# Patient Record
Sex: MALE | Race: BLACK OR AFRICAN AMERICAN | NOT HISPANIC OR LATINO | Employment: OTHER | URBAN - METROPOLITAN AREA
[De-identification: names, ages, dates, MRNs, and addresses within clinical notes are randomized per-mention and may not be internally consistent; named-entity substitution may affect disease eponyms.]

---

## 2017-01-03 ENCOUNTER — LAB VISIT (OUTPATIENT)
Dept: LAB | Facility: HOSPITAL | Age: 67
End: 2017-01-03
Attending: INTERNAL MEDICINE
Payer: MEDICARE

## 2017-01-03 ENCOUNTER — TELEPHONE (OUTPATIENT)
Dept: NEPHROLOGY | Facility: CLINIC | Age: 67
End: 2017-01-03

## 2017-01-03 DIAGNOSIS — E87.20 ACIDOSIS: ICD-10-CM

## 2017-01-03 DIAGNOSIS — R80.9 PROTEINURIA: ICD-10-CM

## 2017-01-03 DIAGNOSIS — N18.4 CHRONIC KIDNEY DISEASE, STAGE IV (SEVERE): ICD-10-CM

## 2017-01-03 DIAGNOSIS — E21.3 HPTH (HYPERPARATHYROIDISM): ICD-10-CM

## 2017-01-03 DIAGNOSIS — I10 ESSENTIAL HYPERTENSION: ICD-10-CM

## 2017-01-03 LAB
ALBUMIN SERPL BCP-MCNC: 2.9 G/DL
ANION GAP SERPL CALC-SCNC: 15 MMOL/L
BASOPHILS # BLD AUTO: 0.01 K/UL
BASOPHILS NFR BLD: 0.1 %
BUN SERPL-MCNC: 101 MG/DL
CALCIUM SERPL-MCNC: 10.3 MG/DL
CHLORIDE SERPL-SCNC: 102 MMOL/L
CO2 SERPL-SCNC: 21 MMOL/L
CREAT SERPL-MCNC: 9.9 MG/DL
DIFFERENTIAL METHOD: ABNORMAL
EOSINOPHIL # BLD AUTO: 0.2 K/UL
EOSINOPHIL NFR BLD: 1.8 %
ERYTHROCYTE [DISTWIDTH] IN BLOOD BY AUTOMATED COUNT: 13.6 %
EST. GFR  (AFRICAN AMERICAN): 5.6 ML/MIN/1.73 M^2
EST. GFR  (NON AFRICAN AMERICAN): 4.8 ML/MIN/1.73 M^2
GLUCOSE SERPL-MCNC: 98 MG/DL
HCT VFR BLD AUTO: 32.8 %
HGB BLD-MCNC: 10.8 G/DL
LYMPHOCYTES # BLD AUTO: 1.4 K/UL
LYMPHOCYTES NFR BLD: 16.2 %
MCH RBC QN AUTO: 28.2 PG
MCHC RBC AUTO-ENTMCNC: 32.9 %
MCV RBC AUTO: 86 FL
MONOCYTES # BLD AUTO: 1.1 K/UL
MONOCYTES NFR BLD: 13 %
NEUTROPHILS # BLD AUTO: 5.8 K/UL
NEUTROPHILS NFR BLD: 68.7 %
PHOSPHATE SERPL-MCNC: 5.9 MG/DL
PLATELET # BLD AUTO: 179 K/UL
PMV BLD AUTO: 12.5 FL
POTASSIUM SERPL-SCNC: 3.8 MMOL/L
RBC # BLD AUTO: 3.83 M/UL
SODIUM SERPL-SCNC: 138 MMOL/L
WBC # BLD AUTO: 8.44 K/UL

## 2017-01-03 PROCEDURE — 36415 COLL VENOUS BLD VENIPUNCTURE: CPT | Mod: PO

## 2017-01-03 PROCEDURE — 85025 COMPLETE CBC W/AUTO DIFF WBC: CPT

## 2017-01-03 PROCEDURE — 80069 RENAL FUNCTION PANEL: CPT

## 2017-01-03 NOTE — TELEPHONE ENCOUNTER
Returned call to pt. To schedule appointment as requested, pt. States he wants the appointment for his son, advised to call the appt. Desk as I can not schedule an appointment with him for his son. Pt. Transferred.

## 2017-01-03 NOTE — TELEPHONE ENCOUNTER
----- Message from Elham Adrian sent at 1/3/2017  1:19 PM CST -----  Contact: pt  Pt calling to speak to nurse....states that needs to be referred to a Primary Care Dr for his gout....please adv/call pt back at [phone]///thx jw

## 2017-01-04 ENCOUNTER — TELEPHONE (OUTPATIENT)
Dept: NEPHROLOGY | Facility: HOSPITAL | Age: 67
End: 2017-01-04

## 2017-01-04 NOTE — PLAN OF CARE
Lab Results   Component Value Date    CREATININE 9.9 (H) 01/03/2017     (H) 01/03/2017     01/03/2017    K 3.8 01/03/2017     01/03/2017    CO2 21 (L) 01/03/2017     I have called the patient and told him that he needs to prepare for dialysis now.  I have answered all his questions about hemodialysis as well as transplantation.  He may be a good candidate for home hemodialysis.  We will start him on training for home hemodialysis if he is agreeable which he is agreeable on the phone.  He has had education in the past.  We will also continue to promote home dialysis in his life.  He is very well educated and he is a good candidate for our 3 modalities for renal replacement therapy including PD &  Transplantation.      Robson Bernstein MD

## 2017-01-24 ENCOUNTER — TELEPHONE (OUTPATIENT)
Dept: NEPHROLOGY | Facility: CLINIC | Age: 67
End: 2017-01-24

## 2017-01-24 NOTE — TELEPHONE ENCOUNTER
----- Message from Marta Webb sent at 1/24/2017  3:32 PM CST -----  Pt at [phone]//states he has question//he is suppose to have surgery//and your office wanted to change the date//please call to discuss//thanks/lula

## 2017-01-24 NOTE — TELEPHONE ENCOUNTER
Returned call to pt. States that Vascular had to reschedule his pd cath placement was given the option to do it on 1/31 or on 2/6 advised pt. That he should proceed with the surgery on 1/31 that way he will have the cath placed before his appt. With dr. pabon 2/8 pt. Verbalized understanding.

## 2017-02-01 ENCOUNTER — LAB VISIT (OUTPATIENT)
Dept: LAB | Facility: HOSPITAL | Age: 67
End: 2017-02-01
Attending: INTERNAL MEDICINE
Payer: MEDICARE

## 2017-02-01 DIAGNOSIS — I10 ESSENTIAL HYPERTENSION: ICD-10-CM

## 2017-02-01 DIAGNOSIS — N18.4 CHRONIC KIDNEY DISEASE, STAGE IV (SEVERE): ICD-10-CM

## 2017-02-01 DIAGNOSIS — R80.9 PROTEINURIA: ICD-10-CM

## 2017-02-01 DIAGNOSIS — E87.20 ACIDOSIS: ICD-10-CM

## 2017-02-01 DIAGNOSIS — E21.3 HPTH (HYPERPARATHYROIDISM): ICD-10-CM

## 2017-02-01 LAB
ALBUMIN SERPL BCP-MCNC: 2.9 G/DL
ANION GAP SERPL CALC-SCNC: 16 MMOL/L
BASOPHILS # BLD AUTO: 0.01 K/UL
BASOPHILS NFR BLD: 0.2 %
BUN SERPL-MCNC: 95 MG/DL
CALCIUM SERPL-MCNC: 9.2 MG/DL
CHLORIDE SERPL-SCNC: 104 MMOL/L
CO2 SERPL-SCNC: 18 MMOL/L
CREAT SERPL-MCNC: 9.9 MG/DL
DIFFERENTIAL METHOD: ABNORMAL
EOSINOPHIL # BLD AUTO: 0.4 K/UL
EOSINOPHIL NFR BLD: 6.2 %
ERYTHROCYTE [DISTWIDTH] IN BLOOD BY AUTOMATED COUNT: 14.3 %
EST. GFR  (AFRICAN AMERICAN): 5.6 ML/MIN/1.73 M^2
EST. GFR  (NON AFRICAN AMERICAN): 4.8 ML/MIN/1.73 M^2
GLUCOSE SERPL-MCNC: 102 MG/DL
HCT VFR BLD AUTO: 32.5 %
HGB BLD-MCNC: 10.9 G/DL
LYMPHOCYTES # BLD AUTO: 1.7 K/UL
LYMPHOCYTES NFR BLD: 28.6 %
MCH RBC QN AUTO: 28.8 PG
MCHC RBC AUTO-ENTMCNC: 33.5 %
MCV RBC AUTO: 86 FL
MONOCYTES # BLD AUTO: 0.6 K/UL
MONOCYTES NFR BLD: 10.8 %
NEUTROPHILS # BLD AUTO: 3.1 K/UL
NEUTROPHILS NFR BLD: 53.9 %
PHOSPHATE SERPL-MCNC: 6 MG/DL
PLATELET # BLD AUTO: 190 K/UL
PMV BLD AUTO: 11.6 FL
POTASSIUM SERPL-SCNC: 3.7 MMOL/L
RBC # BLD AUTO: 3.79 M/UL
SODIUM SERPL-SCNC: 138 MMOL/L
WBC # BLD AUTO: 5.81 K/UL

## 2017-02-01 PROCEDURE — 80069 RENAL FUNCTION PANEL: CPT

## 2017-02-01 PROCEDURE — 85025 COMPLETE CBC W/AUTO DIFF WBC: CPT

## 2017-02-01 PROCEDURE — 36415 COLL VENOUS BLD VENIPUNCTURE: CPT | Mod: PO

## 2017-02-02 DIAGNOSIS — R53.1 WEAKNESS GENERALIZED: ICD-10-CM

## 2017-02-02 DIAGNOSIS — N18.5 CKD (CHRONIC KIDNEY DISEASE), STAGE 5: Primary | ICD-10-CM

## 2017-02-02 DIAGNOSIS — Z99.2 ENCOUNTER FOR PERITONEAL DIALYSIS: ICD-10-CM

## 2017-02-07 DIAGNOSIS — Z76.82 ORGAN TRANSPLANT CANDIDATE: Primary | ICD-10-CM

## 2017-02-07 NOTE — PROGRESS NOTES
YEARLY LIST MANAGEMENT NOTE    Alverto Ramirez's kidney transplant listing status reviewed.  Patient is due for follow-up appointments in March 2017.   Appointments will be scheduled per protocol.  HLA sample is past due at this time with sample last being received in November 2016.

## 2017-02-08 ENCOUNTER — OFFICE VISIT (OUTPATIENT)
Dept: NEPHROLOGY | Facility: CLINIC | Age: 67
End: 2017-02-08
Payer: MEDICARE

## 2017-02-08 ENCOUNTER — HOSPITAL ENCOUNTER (OUTPATIENT)
Dept: RADIOLOGY | Facility: HOSPITAL | Age: 67
Discharge: HOME | End: 2017-02-08
Attending: INTERNAL MEDICINE
Payer: MEDICARE

## 2017-02-08 VITALS
WEIGHT: 196.63 LBS | HEART RATE: 65 BPM | RESPIRATION RATE: 18 BRPM | DIASTOLIC BLOOD PRESSURE: 82 MMHG | SYSTOLIC BLOOD PRESSURE: 162 MMHG | BODY MASS INDEX: 30.35 KG/M2

## 2017-02-08 DIAGNOSIS — E87.20 ACIDOSIS: ICD-10-CM

## 2017-02-08 DIAGNOSIS — N18.9 ANEMIA IN CKD (CHRONIC KIDNEY DISEASE): ICD-10-CM

## 2017-02-08 DIAGNOSIS — D63.1 ANEMIA IN CKD (CHRONIC KIDNEY DISEASE): ICD-10-CM

## 2017-02-08 DIAGNOSIS — I10 ESSENTIAL HYPERTENSION: ICD-10-CM

## 2017-02-08 DIAGNOSIS — N18.5 CKD (CHRONIC KIDNEY DISEASE), STAGE 5: ICD-10-CM

## 2017-02-08 DIAGNOSIS — N18.5 CKD (CHRONIC KIDNEY DISEASE), STAGE 5: Primary | ICD-10-CM

## 2017-02-08 DIAGNOSIS — R80.1 PERSISTENT PROTEINURIA: ICD-10-CM

## 2017-02-08 PROCEDURE — 99215 OFFICE O/P EST HI 40 MIN: CPT | Mod: S$PBB,,, | Performed by: INTERNAL MEDICINE

## 2017-02-08 PROCEDURE — 71020 XR CHEST PA AND LATERAL: CPT | Mod: 26,,, | Performed by: RADIOLOGY

## 2017-02-08 PROCEDURE — 99999 PR PBB SHADOW E&M-EST. PATIENT-LVL III: CPT | Mod: PBBFAC,,, | Performed by: INTERNAL MEDICINE

## 2017-02-08 PROCEDURE — 71020 XR CHEST PA AND LATERAL: CPT | Mod: TC,PO

## 2017-02-08 RX ORDER — LOSARTAN POTASSIUM 100 MG/1
100 TABLET ORAL DAILY
COMMUNITY
End: 2017-05-09

## 2017-02-08 RX ORDER — AMLODIPINE BESYLATE 10 MG/1
10 TABLET ORAL DAILY
Qty: 90 TABLET | Refills: 8 | Status: SHIPPED | OUTPATIENT
Start: 2017-02-08 | End: 2018-04-13 | Stop reason: SDUPTHER

## 2017-02-08 RX ORDER — CALCIUM ACETATE 667 MG/1
1334 TABLET ORAL
Qty: 180 TABLET | Refills: 3 | Status: SHIPPED | OUTPATIENT
Start: 2017-02-08 | End: 2017-05-09

## 2017-02-08 RX ORDER — METOPROLOL TARTRATE 50 MG/1
50 TABLET ORAL DAILY
Qty: 90 TABLET | Refills: 3 | Status: SHIPPED | OUTPATIENT
Start: 2017-02-08 | End: 2018-02-20 | Stop reason: SDUPTHER

## 2017-02-08 NOTE — PROGRESS NOTES
Subjective:       Patient ID: Alverto Ramirez Jr. is a 66 y.o. Black or  male who presents for follow up evaluation of Chronic Kidney Disease; Hypertension; Proteinuria; and hyperparathyroid of renal origin    Hypertension   Pertinent negatives include no chest pain, headaches, neck pain, palpitations or shortness of breath.   Anemia   There has been no abdominal pain, confusion, fever or palpitations.   Hematuria   Irritative symptoms do not include frequency or urgency. Pertinent negatives include no abdominal pain, chills, dysuria, fever, flank pain, nausea or vomiting.    Mr. Ramirez is a 66 y.o. year old Black or  male who has presented to be evaluated for follow up .  He has advanced kidney disease secondary to HTN. 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 He refused a kidney biopsy for many years due to anxiety. His GFR was 34 in January 2014; 2/2015  it is reported at 15.5. He denies any uremic symptoms. Patient is currently pre-dialysis. He has 2 failed surgeries for  AV fistula for dialysis access ( Dr. Nascimento); [facility] -[facility] 11/2014     He states that he was diagnosed with HTN in his 40s. He states that his BP has never been normal at home. He recently had an adjustment in his medications.     He has history of monoclonal gammopathy . He is followed by hematology.     His PSA is elevated  at 4.2. His brother and uncle had prostate cancer. He has history of hematuria    He has off-and-on swelling of the legs; he has heavy proteinuria with 4.2 g per 24 hours; he has never been anemic    3/2015 on active transplant list ;     4/2015  Left arm BVT ; PSA down to 3.6    6/2015 walking and exercising ; lisinopril increased to 40 mg twice a day for proteinuria    August 2015 ultrasound of the kidney so stable examination    September 2015 patient comes back for follow-up with a creatinine of 5.2 and  bicarbonate of 16; increase calcitriol to 0.5 µg, added  sodium bicarbonate for acidosis, lisinopril increased to 40 mg twice a day    1/2016 Nuclear scan for stress test negative     2/2016 Echo showed LVEF 60 % no diastolic dysfunction     4/2016 Lip swelling ACEI stopped started cozaar; added phoslo for High PO4 and NaHCO3 for acidosis ( in place of baking soda)    10/2016 PCP ordered cardizem which was not started due to bradycardia on beta blocker     2/2017 PD cath placed dr. Guaman     Review of Systems   Constitutional: Negative.  Negative for activity change, appetite change, chills, diaphoresis, fatigue and fever.   HENT: Negative.  Negative for congestion and trouble swallowing.    Eyes: Negative.    Respiratory: Negative.  Negative for cough, chest tightness, shortness of breath and wheezing.    Cardiovascular: Negative.  Negative for chest pain, palpitations and leg swelling.   Gastrointestinal: Negative.  Negative for abdominal distention, abdominal pain, nausea and vomiting.   Genitourinary: Positive for hematuria. Negative for decreased urine volume, difficulty urinating, dysuria, enuresis, flank pain, frequency, penile swelling, scrotal swelling and urgency.   Musculoskeletal: Negative.  Negative for arthralgias, back pain, joint swelling and neck pain.   Skin: Negative for rash.   Neurological: Negative.  Negative for tremors, seizures and headaches.   Psychiatric/Behavioral: Negative.  Negative for confusion and sleep disturbance. The patient is not nervous/anxious.        Objective:     Visit Vitals    BP (!) 162/82 (BP Location: Right arm, Patient Position: Sitting, BP Method: Manual)    Pulse 65    Resp 18    Wt 89.2 kg (196 lb 10.4 oz)    BMI 30.35 kg/m2        Physical Exam   Constitutional: He is oriented to person, place, and time. He appears well-developed and well-nourished. No distress.   HENT:   Head: Normocephalic.   Eyes: EOM are normal. Pupils are equal, round, and reactive to light.   Neck: Normal range of motion. Neck supple. No  JVD present. No thyromegaly present.   Cardiovascular: Normal rate, regular rhythm, S1 normal, S2 normal, normal heart sounds and intact distal pulses.  PMI is not displaced.  Exam reveals no gallop and no friction rub.    No murmur heard.  S4 gallop   Pulmonary/Chest: Effort normal and breath sounds normal. No respiratory distress. He has no wheezes. He has no rales. He exhibits no tenderness.   Abdominal: He exhibits no distension and no mass. There is no hepatosplenomegaly. There is no tenderness. There is no rebound and no CVA tenderness. No hernia.   PD cath in left side intact mild tunnel tenderness no abscess   Musculoskeletal: Normal range of motion. He exhibits no edema or tenderness.   Scars in the left forearm from previous surgery; New LUAF is mature and ready with + B/T      Lymphadenopathy:     He has no cervical adenopathy.   Neurological: He is alert and oriented to person, place, and time. He has normal reflexes. He is not disoriented. No cranial nerve deficit. He exhibits normal muscle tone. Coordination normal.   Skin: Skin is warm and dry. No rash noted. No erythema. No pallor.   Psychiatric: He has a normal mood and affect. His behavior is normal. Judgment and thought content normal.         Lab Results   Component Value Date    CREATININE 9.9 (H) 02/01/2017    BUN 95 (H) 02/01/2017     02/01/2017    K 3.7 02/01/2017     02/01/2017    CO2 18 (L) 02/01/2017     Lab Results   Component Value Date    WBC 5.81 02/01/2017    HGB 10.9 (L) 02/01/2017    HCT 32.5 (L) 02/01/2017    MCV 86 02/01/2017     02/01/2017     Lab Results   Component Value Date    PTH 93.0 (H) 12/15/2016    CALCIUM 9.2 02/01/2017    PHOS 6.0 (H) 02/01/2017     Lab Results   Component Value Date    URICACID 9.5 (H) 06/25/2015     Urine protein 5.73 g /24 HOURS     Vitamin D level is 17    GFR is 17% according to Cystatin C as of October 2016    Assessment:    )    1. CKD (chronic kidney disease), stage 5    2.  Anemia in CKD (chronic kidney disease)    3. Essential hypertension    4. Persistent proteinuria    5. Acidosis        Plan:         1.  Chronic kidney disease stage V : hypertensive nephropathy with heavy proteinuria. Left arm BVT by dr. Nelson  Ready to use ; PD is also ready now ; on active transplant list. Slowly worsening but no uremia and no HD yet. Rapid rise in creatinine ___Hold cozaar and restart once starts PD in next 4 weeks     2.  Hypertension: controlled at home --restart NOravsc Hold cozaar     3.  Hyperparathyroidism:keep calcitriol to 0.5 mg once a day ; corrected calcium is ok ;  on ergocalciferol weekly,   keep phoslo for higher PO4 with each meal     4.  Nephrotic range proteinuria most likely from hypertensive nephropathy is ideal blood pressure would be 125 to 130 systolic.  stop  Arb NOW     5. Low albumin : diet options d/w patient in detail     6. Intertrigo:lotrisone cream working well     7. +eric feedback for exercise  ; Vince Prn #10 ; keep walking ; ok to take Tonic Water for leg cramps if any     8.  Metabolic acidosis: Getting worse with time. will need dialysis soon; will stop once start PD     9. On Transplant list     10. LUAF is access ready to use ; No steal           Robson Bernstein MD

## 2017-03-10 ENCOUNTER — CLINICAL SUPPORT (OUTPATIENT)
Dept: CARDIOLOGY | Facility: CLINIC | Age: 67
End: 2017-03-10
Payer: MEDICARE

## 2017-03-10 ENCOUNTER — HOSPITAL ENCOUNTER (OUTPATIENT)
Dept: RADIOLOGY | Facility: HOSPITAL | Age: 67
Discharge: HOME | End: 2017-03-10
Attending: INTERNAL MEDICINE
Payer: MEDICARE

## 2017-03-10 DIAGNOSIS — Z76.82 ORGAN TRANSPLANT CANDIDATE: ICD-10-CM

## 2017-03-15 ENCOUNTER — HOSPITAL ENCOUNTER (OUTPATIENT)
Dept: RADIOLOGY | Facility: HOSPITAL | Age: 67
Discharge: HOME | End: 2017-03-15
Attending: INTERNAL MEDICINE
Payer: MEDICARE

## 2017-03-15 ENCOUNTER — CLINICAL SUPPORT (OUTPATIENT)
Dept: CARDIOLOGY | Facility: CLINIC | Age: 67
End: 2017-03-15
Payer: MEDICARE

## 2017-03-15 DIAGNOSIS — Z99.2 CKD (CHRONIC KIDNEY DISEASE) REQUIRING CHRONIC DIALYSIS: ICD-10-CM

## 2017-03-15 DIAGNOSIS — I10 ESSENTIAL HYPERTENSION: ICD-10-CM

## 2017-03-15 DIAGNOSIS — N18.6 CKD (CHRONIC KIDNEY DISEASE) REQUIRING CHRONIC DIALYSIS: ICD-10-CM

## 2017-03-15 DIAGNOSIS — Z76.82 PRE-KIDNEY TRANSPLANT, PATIENT ON TRANSPLANT LIST: ICD-10-CM

## 2017-03-15 DIAGNOSIS — Z76.82 ORGAN TRANSPLANT CANDIDATE: ICD-10-CM

## 2017-03-15 DIAGNOSIS — Z01.810 ENCOUNTER FOR PREPROCEDURAL CARDIOVASCULAR EXAMINATION: ICD-10-CM

## 2017-03-15 DIAGNOSIS — Z76.82 ORGAN TRANSPLANT CANDIDATE: Primary | ICD-10-CM

## 2017-03-15 PROCEDURE — 93016 CV STRESS TEST SUPVJ ONLY: CPT | Mod: S$PBB,,, | Performed by: INTERNAL MEDICINE

## 2017-03-15 PROCEDURE — 78452 HT MUSCLE IMAGE SPECT MULT: CPT | Mod: 26,,, | Performed by: INTERNAL MEDICINE

## 2017-03-15 PROCEDURE — 93018 CV STRESS TEST I&R ONLY: CPT | Mod: S$PBB,,, | Performed by: INTERNAL MEDICINE

## 2017-03-15 PROCEDURE — 78452 HT MUSCLE IMAGE SPECT MULT: CPT | Mod: TC,PO

## 2017-03-16 LAB — DIASTOLIC DYSFUNCTION: NO

## 2017-03-16 RX ORDER — FUROSEMIDE 80 MG/1
80 TABLET ORAL DAILY
Qty: 90 TABLET | Refills: 3 | Status: SHIPPED | OUTPATIENT
Start: 2017-03-16 | End: 2017-05-17

## 2017-04-05 RX ORDER — GENTAMICIN SULFATE 1 MG/G
1 CREAM TOPICAL 3 TIMES DAILY
Qty: 15 G | Refills: 5 | Status: SHIPPED | OUTPATIENT
Start: 2017-04-05 | End: 2017-05-09

## 2017-05-09 ENCOUNTER — HOSPITAL ENCOUNTER (OUTPATIENT)
Dept: CARDIOLOGY | Facility: CLINIC | Age: 67
Discharge: HOME | End: 2017-05-09
Admitting: INTERNAL MEDICINE
Payer: MEDICARE

## 2017-05-09 ENCOUNTER — LAB VISIT (OUTPATIENT)
Dept: LAB | Facility: HOSPITAL | Age: 67
End: 2017-05-09
Payer: MEDICARE

## 2017-05-09 ENCOUNTER — OFFICE VISIT (OUTPATIENT)
Dept: TRANSPLANT | Facility: CLINIC | Age: 67
End: 2017-05-09
Payer: MEDICARE

## 2017-05-09 VITALS
BODY MASS INDEX: 32.35 KG/M2 | DIASTOLIC BLOOD PRESSURE: 84 MMHG | RESPIRATION RATE: 16 BRPM | SYSTOLIC BLOOD PRESSURE: 162 MMHG | HEART RATE: 84 BPM | OXYGEN SATURATION: 99 % | WEIGHT: 206.13 LBS | HEIGHT: 67 IN | TEMPERATURE: 98 F

## 2017-05-09 DIAGNOSIS — Z76.82 ORGAN TRANSPLANT CANDIDATE: ICD-10-CM

## 2017-05-09 DIAGNOSIS — I36.0 NONRHEUMATIC TRICUSPID (VALVE) STENOSIS: ICD-10-CM

## 2017-05-09 DIAGNOSIS — N18.6 ESRD (END STAGE RENAL DISEASE) ON DIALYSIS: ICD-10-CM

## 2017-05-09 DIAGNOSIS — E21.3 HPTH (HYPERPARATHYROIDISM): ICD-10-CM

## 2017-05-09 DIAGNOSIS — Z76.82 PATIENT ON WAITING LIST FOR KIDNEY TRANSPLANT: Primary | ICD-10-CM

## 2017-05-09 DIAGNOSIS — R97.20 ELEVATED PSA: ICD-10-CM

## 2017-05-09 DIAGNOSIS — I10 ESSENTIAL HYPERTENSION: ICD-10-CM

## 2017-05-09 DIAGNOSIS — Z99.2 ESRD (END STAGE RENAL DISEASE) ON DIALYSIS: ICD-10-CM

## 2017-05-09 DIAGNOSIS — F40.9 PHOBIC ANXIETY DISORDER: ICD-10-CM

## 2017-05-09 LAB
DIASTOLIC DYSFUNCTION: NO
ESTIMATED PA SYSTOLIC PRESSURE: 26.43
MITRAL VALVE MOBILITY: NORMAL
RETIRED EF AND QEF - SEE NOTES: 55 (ref 55–65)
TRICUSPID VALVE REGURGITATION: NORMAL

## 2017-05-09 PROCEDURE — 99214 OFFICE O/P EST MOD 30 MIN: CPT | Mod: S$PBB,TXP,, | Performed by: INTERNAL MEDICINE

## 2017-05-09 PROCEDURE — 93306 TTE W/DOPPLER COMPLETE: CPT | Mod: PBBFAC,TXP | Performed by: INTERNAL MEDICINE

## 2017-05-09 PROCEDURE — 99999 PR PBB SHADOW E&M-EST. PATIENT-LVL III: CPT | Mod: PBBFAC,TXP,, | Performed by: INTERNAL MEDICINE

## 2017-05-09 RX ORDER — ERGOCALCIFEROL 1.25 MG/1
50000 CAPSULE ORAL
COMMUNITY
End: 2018-09-16 | Stop reason: SDUPTHER

## 2017-05-09 NOTE — LETTER
May 9, 2017        Robson Bernstein  [address]  [place] [address]  Phone: [phone]  Fax: [phone]             [facility]  [address]  [facility] [address]  Phone: [phone]   Patient: Alverto Ramirez Jr.   MR Number: [id number]   YOB: 1950   Date of Visit: 5/9/2017       Dear Dr. Robson Bernstein    Thank you for referring Alverto Ramirez to me for evaluation. Attached you will find relevant portions of my assessment and plan of care.    If you have questions, please do not hesitate to call me. I look forward to following Alverto Ramirez along with you.    Sincerely,    Ann Peterson MD    Enclosure    If you would like to receive this communication electronically, please contact [email or web address] or [phone] to request [employer] access.    [employer] is a tool which provides read-only access to select patient information with whom you have a relationship. Its easy to use and provides real time access to review your patients record including encounter summaries, notes, results, and demographic information.    If you feel you have received this communication in error or would no longer like to receive these types of communications, please e-mail [email or web address]

## 2017-05-09 NOTE — PROGRESS NOTES
KIDNEY, KIDNEY/PANCREAS, PANCREAS TRANSPLANT RECIPIENT REEVALUATION    Requesting Physician: No ref. provider found    SUBJECTIVE     CC:   Six monthly/Annual reassessment of kidney transplant candidacy.    HPI:  Mr. Ramirez is a 66 y.o. Black or  male with end-stage renal disease to HTN. He has been on the wait-list for a kidney transplant since 3/30/2015. he is currently on peritoneal dialysis started on 2/1/17. Patient is dialyzing on cyclic peritoneal dialysis.  Patient reports that he is tolerating dialysis well.  No history of peritonitis.  He is currently active on the wait-list. He is here for his scheduled follow up appointment. Patient denies any recent ER visit, hospitalization or recent history of coronary artery disease, stroke, seizure disorder, chronic obstructive pulmonary disease, liver disease, kidney stones, gallstones, deep venous thrombosis, pulmonary embolism,  or malignancies.  Patient states that he is doing well. he denies any CP, SOB, nausea, vomiting, diarrhea, abdominal pain, melena. The bowel movements are regular and weight is stable. he has no cough, fever, chills, weight loss or night sweats.  he has no focal weakness, sensory loss, numbness or paresthesias.     He has had persistent elevated PSA since 2014 for which he follows with his urologist. Never had prostate biopsy. The recent PSA is 5.1.       Functional Capacity Status: he is active, walks frequently and denies any chest pain, shortness of breath or lower extremity pain on mild to moderate activity.  Previous Transplant: no  Previous Blood Transfusion: no  Potential Donor: no  On Anticoagulation: no    Recent Work UP  CXR: clear 2016  Echo: pending from today  Cardiac NM: unremarkable  Kindney US: simple cysts  PSA: 5.1  Colonoscopy: needs to repeat in 12/17      Past Medical History:   Diagnosis Date    Anemia in CKD (chronic kidney disease)     CKD (chronic kidney disease) stage 4, GFR 15 11/26/2014     Colon cancer screening 12/19/2014    Elevated PSA 11/26/2014    HTN (hypertension) diagnosed in his 40s 10/16/2014    Monoclonal gammopathy 11/26/2014    Phobic anxiety disorder 11/26/2014    Proteinuria 11/26/2014       Past Surgical History:   Procedure Laterality Date    AV FISTULA PLACEMENT  11/2014    VASECTOMY         Family History   Problem Relation Age of Onset    Heart disease Father     Dementia Father     Diabetes Mother     Cataracts Mother     Glaucoma Mother     Hypertension Sister     Cancer Brother      prostate    Kidney disease Sister      ESRD    Cancer Paternal Uncle        Social History     Social History    Marital status:      Spouse name: N/A    Number of children: N/A    Years of education: N/A     Occupational History     Retired     Social History Main Topics    Smoking status: Former Smoker     Packs/day: 1.00     Years: 15.00     Types: Cigarettes     Quit date: 11/26/1984    Smokeless tobacco: Never Used    Alcohol use 2.5 - 3.5 oz/week     5 - 7 Standard drinks or equivalent per week      Comment: 5-7 shots week of alyssa    Drug use: No    Sexual activity: Yes     Other Topics Concern    Not on file     Social History Narrative    Retired from [employer]     for 41 y.o.    2 children    No history of blood transfusions    No donors           Current Medication  Current Outpatient Prescriptions   Medication Sig Dispense Refill    alprazolam (XANAX) 1 MG tablet Take 1 mg by mouth 3 (three) times daily as needed for Anxiety.      amlodipine (NORVASC) 10 MG tablet Take 1 tablet (10 mg total) by mouth once daily. 90 tablet 8    doxazosin (CARDURA) 4 MG tablet TAKE 1 TABLET EVERY EVENING 90 tablet 4    ergocalciferol (VITAMIN D2) 50,000 unit Cap Take 50,000 Units by mouth every 7 days.      ferric citrate 210 mg iron Tab Take by mouth.      fluticasone (FLONASE) 50 mcg/actuation nasal spray 2 sprays by Each Nare route as needed.       furosemide (LASIX) 80 MG tablet Take 1 tablet (80 mg total) by mouth once daily. 90 tablet 3    metoprolol tartrate (LOPRESSOR) 50 MG tablet Take 1 tablet (50 mg total) by mouth once daily. 90 tablet 3    sodium bicarbonate 650 MG tablet Take 2 tablets (1,300 mg total) by mouth 3 (three) times daily. 540 tablet 0     No current facility-administered medications for this visit.            Review of Systems    Constitutional: Negative for activity change, appetite change, chills, fatigue, fever and unexpected weight change.   HENT: Negative for congestion, facial swelling, postnasal drip, rhinorrhea, sinus pressure, sore throat and trouble swallowing.   Eyes: Positive for visual disturbance. Negative for pain and redness.   Wears glasses   Respiratory: Negative for cough, chest tightness, shortness of breath and wheezing.   Cardiovascular: Negative. Negative for chest pain, palpitations and leg swelling.   Gastrointestinal: Negative for abdominal pain, diarrhea, nausea and vomiting.  + PD cath  Musculoskeletal: Negative for gait problem, neck pain and neck stiffness. LUE AV fistula   Skin: Negative for rash.   Allergic/Immunologic: Negative for environmental allergies, food allergies and immunocompromised state.   Neurological: Negative for dizziness, weakness, light-headedness and headaches.   Psychiatric/Behavioral: Negative for agitation and confusion. The patient is nervous/anxious.          OBJECTIVE       Body mass index is 32.28 kg/(m^2).    Vitals:    05/09/17 1234   BP: (!) 162/84   Pulse: 84   Resp: 16   Temp: 98.2 °F (36.8 °C)       Physical Exam  General: No acute distress, well groomed  HEENT: Normocephalic, atraumatic,  external inspection of ears and nose normal, moist mucous membranes, no oral ulcerations/lesions   Neck: Supple, symmetrical, trachea midline, no masses, thyroid is normal without nodules or enlargement   Respiratory: Clear to auscultation bilaterally, respirations unlabored, no  rales/rhonchi/wheezing   Cardiovacular: Regular rate and rhythm, S1, S2 normal, no murmurs, no rubs or gallops, no carotid bruits, no JVD,   Gastrointestinal: Soft, non-tender, bowel sounds normal, no masses, + PD cath in place, clean exit site  Extremities: No clubbing or cyanosis of upper extremities bilaterally, no pedal edema bilaterally; LUE AVF with good thirll  Skin: warm and dry; no rash on exposed skin  Lymph nodes: Cervical and supraclavicular nodes normal   Neurologic: No focal neurologic deficits. alert and oriented x 3   Musculoskeletal: moves all extremities without difficulty, FROM, 5/5 strength, ambulates without an assistive device  Psychiatric: Normal mood and affect. Responds appropriately to questions.        Labs:  Reviewed with the patient      ASSESSMENT     1. Patient on waiting list for kidney transplant     2. Essential hypertension     3. Elevated PSA     4. ESRD (end stage renal disease) on dialysis     5. Phobic anxiety disorder - Claustrophobia     6. HPTH (hyperparathyroidism)           PLAN       Transplant Candidacy:    Mr. Ramirez is a a suitable for kidney transplantation. He remains in overall stable health, and will remain active on the transplant list.    - Echo today  - Urology follow up for his PSA of 5.1  - Colonoscopy will be due in 12/17 , had tubular adenoma with high grade dysplasia in 2014         Follow up:  In addition to the tests mentioned above, the patient will continue to have reevaluation as per the standing pre-kidney transplant protocol:   1. Monthly blood for PRA   2. Annual return to Clinic, except HIV Positive, > 65 years of age, or pancreas transplant candidates who will be scheduled to see transplant every 6 months while in pre-transpalnt phase.   3. Annual re-testing: CXR, EKG, mammograms for women over 40 and PSA for males over 40. cardiology follow-up as recommended by initial cardiology pre-transplant evaluation.   4. Renal Ultrasound every 2 years.   5.  Baseline colonoscopy after age 50 and repeated as recommended.         Counselling:  We discussed various aspects of kidney transplantation including transplant surgery, immunosuppressive medications and the need for close follow up. We also discussed side effects of immunosuppression including weight gain, hypertension, hyperlipidemia, new-onset diabetes after transplantation, infections and malignancies, especially skin cancers and lymphomas. I also reviewed the risk of acute rejection, vascular thrombosis, recurrent disease and potential transmission of infections such as hepatitis and HIV. I informed the patient that the average time on the wait list in the [facility] is between 3 to 5 years.

## 2017-05-09 NOTE — MR AVS SNAPSHOT
[facility]  [address]  [facility] [address]  Phone: [phone]                  Alverto Ramirez Jr.   2017 12:30 PM   Office Visit    Description:  Male : 1950   Provider:  Ann Peterson MD   Department:  [facility]                To Do List           Future Appointments        Provider Department Dept Phone    2017 4:00 PM ECHO, [facility] Darin jessica - Echo/Stress Lab [phone]    2017 10:00 AM LABORATORY, [address] [facility]-Counts include 234 beds at the [facility] [phone]    2017 1:40 PM Zoltan Helm IV, MD [facility] [phone]      Goals (5 Years of Data)     None      [facility]     [facility] Nurse Care Line -  Assistance  Unless otherwise directed by your provider, please contact [facility], our nurse care line that is available for  assistance.     Registered nurses in the [facility] provide: appointment scheduling, clinical advisement, health education, and other advisory services.  Call: [phone] (toll free)               Medications           Message regarding Medications     Verify the changes and/or additions to your medication regime listed below are the same as discussed with your clinician today.  If any of these changes or additions are incorrect, please notify your healthcare provider.        STOP taking these medications     calcitRIOL (ROCALTROL) 0.5 MCG Cap 0.5 mcg once daily.     gentamicin (GARAMYCIN) 0.1 % cream Apply 1 mL topically 3 (three) times daily.    calcium acetate (PHOSLO) 667 mg tablet Take 2 tablets by mouth 3 (three) times daily with meals.    clotrimazole-betamethasone (LOTRISONE) lotion Apply topically as needed.    losartan (COZAAR) 100 MG tablet Take 100 mg by mouth once daily.           Verify that the below list of medications is an accurate representation of the medications you are currently taking.  If none reported, the list may be blank. If incorrect, please  "contact your healthcare provider. Carry this list with you in case of emergency.           Current Medications     alprazolam (XANAX) 1 MG tablet Take 1 mg by mouth 3 (three) times daily as needed for Anxiety.    amlodipine (NORVASC) 10 MG tablet Take 1 tablet (10 mg total) by mouth once daily.    doxazosin (CARDURA) 4 MG tablet TAKE 1 TABLET EVERY EVENING    ergocalciferol (VITAMIN D2) 50,000 unit Cap Take 50,000 Units by mouth every 7 days.    ferric citrate 210 mg iron Tab Take by mouth.    fluticasone (FLONASE) 50 mcg/actuation nasal spray 2 sprays by Each Nare route as needed.    furosemide (LASIX) 80 MG tablet Take 1 tablet (80 mg total) by mouth once daily.    metoprolol tartrate (LOPRESSOR) 50 MG tablet Take 1 tablet (50 mg total) by mouth once daily.    sodium bicarbonate 650 MG tablet Take 2 tablets (1,300 mg total) by mouth 3 (three) times daily.           Clinical Reference Information           Your Vitals Were     BP Pulse Temp Resp Height Weight    162/84 (BP Location: Right arm, Patient Position: Sitting, BP Method: Automatic) 84 98.2 °F (36.8 °C) (Oral) 16 5' 7" (1.702 m) 93.5 kg (206 lb 2.1 oz)    SpO2 BMI             99% 32.28 kg/m2         Blood Pressure          Most Recent Value    BP  (!)  162/84      Allergies as of 5/9/2017     No Known Allergies      Immunizations Administered on Date of Encounter - 5/9/2017     None      Maintenance Dialysis History     Start End Type Comments Center    2/20/2017  Peritoneal 1st date of dialysis is accurrate according to Zofia at dialysis unit. [facility]            Current Dialysis Center Information     [facility] [address] Phone #:  [phone]    Contact:  N/A [place]JEL[place]  [address] Fax #:  [phone]            Transplant Information        Txp Date Organ Coordinator Care Team     Kidney Wilberto Camp Jr., CAROLYNE Referring Physician:  Chris Adair MD   Current Nephrologist:  Robson Bernstein MD         Language Assistance Services     " ATTENTION: Language assistance services are available, free of charge. Please call [phone].      ATENCIÓN: Si habla maggiañol, tiene a germain disposición servicios gratuitos de asistencia lingüística. Llame al [phone].     CHÚ Ý: N?u b?n nói Ti?ng Vi?t, có các d?ch v? h? tr? ngôn ng? mi?n phí dành cho b?n. G?i s? [phone].         [facility] complies with applicable Federal civil rights laws and does not discriminate on the basis of race, color, national origin, age, disability, or sex.

## 2017-05-17 RX ORDER — POTASSIUM CHLORIDE 750 MG/1
10 TABLET, EXTENDED RELEASE ORAL DAILY
Qty: 90 TABLET | Refills: 3 | Status: ON HOLD | OUTPATIENT
Start: 2017-05-17 | End: 2017-12-29 | Stop reason: HOSPADM

## 2017-05-17 RX ORDER — TORSEMIDE 20 MG/1
100 TABLET ORAL DAILY
Qty: 450 TABLET | Refills: 3 | Status: SHIPPED | OUTPATIENT
Start: 2017-05-17 | End: 2018-09-06

## 2017-06-09 ENCOUNTER — LAB VISIT (OUTPATIENT)
Dept: LAB | Facility: HOSPITAL | Age: 67
End: 2017-06-09
Payer: MEDICARE

## 2017-06-09 DIAGNOSIS — Z76.82 ORGAN TRANSPLANT CANDIDATE: ICD-10-CM

## 2017-06-19 LAB — HPRA INTERPRETATION: NORMAL

## 2017-06-19 PROCEDURE — 86829 HLA CLASS I/II ANTIBODY QUAL: CPT | Mod: PO,TXP

## 2017-06-19 PROCEDURE — 86829 HLA CLASS I/II ANTIBODY QUAL: CPT | Mod: 91,PO,TXP

## 2017-06-20 PROCEDURE — 86832 HLA CLASS I HIGH DEFIN QUAL: CPT | Mod: PO,TXP

## 2017-06-28 NOTE — Clinical Note
June 28, 2017        ACE Badillo  [address]  [facility] [address]          The patient below is is currently being dialized at your dialysis center/unit and who is on the kidney waiting list at [facility].     Alverto Ramirez   [facility] Number: [id number]    To monitor the antibody levels that are essential in transplantation and which fluctuate   from month to month, it is imperative that we keep a record of monthly antibody titers. Therefore, we would like to have you draw one 10 ml RED top tube BEFORE dialysis   begins on the above specified patient.                                                    IMPORTANT NOTICE  SAMPLES THAT DO NOT HAVE CORRECT LABELLING INFORMATION WILL BE          REFUSED DUE TO LABORATORY REGULATIONS AND GUIDELINES    All tubes MUST BE labeled with the following information: PATIENT NAME, either DATE OF BIRTH or [facility] NUMBER and DATE DRAWN. Sample must be mailed within two to three days of the draw so that it is still a usable sample once received. Tubes do not have to be iced nor serum must be  from clot. It is imperative that the blood samples for the month be received in the HLA Laboratory by the end of the month, (preferably by the 15th).        Please send samples to :       [facility]     [address] [place] [address] [address]      [place] [address]        Please contact the HLA staff at [phone] if additional collection or shipping supplies are needed. Thank you for your cooperation.      Sincerely,  HLA Team

## 2017-06-28 NOTE — LETTER
IMPORTANT      July 7, 2017    Alverto Ramirez Jr.  [address] [facility] [address]     Re: [id number]    Dear Mr/Mrs Ramirez,    Thank you for choosing [facility] as your health care provider.  We are committed to assisting you with timely insurance filing and payment of your account.  To protect your liability, updated insurance information must be given to us at the time of service and we should be notified immediately if      · Your insurance benefits/plan changes.   You become eligible for any other benefits   Your current plan/coverage terms.    Also, please bring in a copy of your insurance premium payment if you have one of the following types of insurance:    · Coverage from a shelter plan.  · Coverage from the Affordable Healthcare Act Plan.  · Coverage from a COBRA plan  · Premium paid by the National Kid[employer] Foundation.    To ensure we have the correct insurance (Medical/Pharmacy) on file and to answer any questions regarding your benefits, please call us at [phone] or [phone] and ask to speak to the kidney  indicated below:      Transplant Dept  Katharine Julian   Heart   Nereyda Juanymiblu   Kidney (A-K) and Lung  Sera Sherman    Kidney (L-Z)  Kristy Rivas   Liver    We look forward to hearing from you soon.    Sincerely,      Transplant Financial Services

## 2017-07-03 LAB
CLASS I ANTIBODIES - LUMINEX: NEGATIVE
CPRA %: 0
SERUM COLLECTION DT - LUMINEX CLASS I: NORMAL
SPCL1 TESTING DATE: NORMAL

## 2017-07-07 NOTE — ADDENDUM NOTE
Encounter addended by: Mere Edwards on: 7/7/2017  3:37 PM<BR>    Actions taken: Letter status changed

## 2017-07-07 NOTE — PROGRESS NOTES
Transplant Recipient Adult Psychosocial Assessment    *This is an update assessment with the previous assessment completed on 10/04/2016; pt is listed for kidney txp since 3/30/2015 (UNOS qualified: 3/30/2015).      Alverto Ramirez  [address]  [place] [address]    Telephone Information:   Mobile [phone]   Mobile [phone]   Home  [phone] (home)  Work  There is no work phone number on file.  E-mail  [email or web address]    Sex: male  YOB: 1950  Age: 66 y.o.    Encounter Date: 2017  [place] Citizen: yes  Primary Language: English   Needed: no    Emergency Contact:  Name: Liana Ramirez  Relationship: wife  Address: Same as pt.  Phone Numbers:  [phone] (home), [phone] (work), [phone] (mobile)    Name: Capo Ramirez  Relationship: son  Address: [address] [place] [address] [place] [address]  Phone Numbers:  [phone] (mobile)    Family/Social Support:   Number of dependents/: Pt reports no dependents.  Marital history: pt reports being  only once and then to pt's Liana Ramirez for the past 46 years.  Other family dynamics: Pt reports 2 adult sons: Capo and Doug; sisters: Sue, Mayelin, Fernanda, and Jen (half-sister); 1 brother: Charles. Pt reports both parents are  and pt identifies all family members (except sisters: Sue and Fernanda) as supportive.    Household Composition:  Name: Alverto Ramirez  Age: 66  Relationship: patient  Does person drive? yes    Name: Liana Ramirez  Age: 66  Relationship: wife  Does person drive? yes     Name: Capo Ramirez  Age: 43  Relationship: son  Does person drive? yes    Do you and your caregivers have access to reliable transportation? yes     PRIMARY CAREGIVER: Liana Ramirez, pt's wife,  will be primary caregiver, phone number [phone].      provided in-depth information to patient and caregiver regarding pre- and post-transplant caregiver role.    strongly encourages patient and caregiver to have concrete plan regarding post-transplant care giving, including back-up caregiver(s) to ensure care giving needs are met as needed.    Patient and Caregiver states understanding all aspects of caregiver role/commitment and is able/willing/committed to being caregiver to the fullest extent necessary.    Patient and Caregiver verbalizes understanding of the education provided today and caregiver responsibilities.         remains available. Patient and Caregiver agree to contact  in a timely manner if concerns arise.      Able to take time off work without financial concerns: yes.      Additional Significant Others who will Assist with Transplant:  Name: Capo Ramirez  Age: 42  Phone: [phone]  City: [place] State: [place]  Relationship: son  Does person drive? yes       Living Will: no Education and information provided to pt.  Healthcare Power of : no Education and information provided to pt.  Advance Directives on file: <<no information> per medical record.  Verbally reviewed LW/HCPA information.   provided patient with copy of LW/HCPA documents and provided education on completion of forms    Living Donors: No. Education and resource information given to patient.    Highest Education Level: Attended College/Technical School  Reading Ability: college  Reports difficulty with: seeing and wears bifocals  Learns Best By:  Pt reports leaning best by verbal and hands-on instruction.     Status: no  VA Benefits: no     Working for Income: No  If no, reason not working: Patient Choice - Retired  Patient is retired from owning/operating driving school..    Spouse/Significant Other Employment: Pt and pt's wife both retired from work/own the driving school.    Disabled: no, however pt recived care home from [employer] due to anxiety.    Monthly Income:  FDC: $300  SS Reitrement: $1840     Able to afford all costs now  and if transplanted, including medications: yes Pt's son: Capo reports that he can assist as well  Patient verbalizes understanding of personal responsibilities related to transplant costs and the importance of having a financial plan to ensure that patients transplant costs are fully covered.   provided fundraising information/education.  Patient verbalizes understanding.   remains available.    Insurance:   Payor/Plan Subscr  Sex Relation Sub. Ins. ID Effective Group Num   1. MEDICARE - ME* ADAM MITCHELL JR. 1950 Male  [id number]                                     [address]   2. AETNA - AETNA* ADAM MITCHELL JR. 1950 Male  [id number]                                   [address]     Primary Insurance (for UNOS reporting): Public Insurance - Medicare FFS (Fee For Service)  Secondary Insurance (for UNOS reporting): Private Insurance (Pt reports that he pays and also has a part D)    Patient and Caregiver verbalizes clear understanding that patient may experience difficulty obtaining and/or be denied insurance coverage post-surgery. This includes and is not limited to disability insurance, life insurance, health insurance, burial insurance, long term care insurance, and other insurances.      Patient and Caregiver also reports understanding that future health concerns related to or unrelated to transplantation may not be covered by patient's insurance.  Resources and information provided and reviewed.     Patient and Caregiver provides verbal permission to release any necessary information to outside resources for patient care and discharge planning.  Resources and information provided are reviewed.      Patient provides verbal permission to release any necessary information to outside resources for patient care and discharge planning.  Resources and information provided are reviewed.      Dialysis History:  Patient reports being on peritoneal  dialysis attending all dialysis appointments and staying for the entire course of treatment. [employer] PD nurse at pt's dialysis center reports over last three months that the patient has completed his treatments, comes to clinic, appointments, calls with questions or concerns, and brings treatment logs.    Infusion Service: patient utilizing? no  Home Health: patient utilizing? no  DME: yes PD equipment and BP Cuff  Pulmonary/Cardiac Rehab: Pt denies.   ADLS:  Pt reports no difficulties with driving, walking, bathing, cooking, housekeeping, eating, shopping, and taking medication.    Adherence:   Pt reports good adherence with medications, dialysis, and health regimen..  Adherence education and counseling provided.     Per History Section:  Past Medical History:   Diagnosis Date    Anemia in CKD (chronic kidney disease)     CKD (chronic kidney disease) stage 4, GFR 15 11/26/2014    Colon cancer screening 12/19/2014    Elevated PSA 11/26/2014    HTN (hypertension) diagnosed in his 40s 10/16/2014    Monoclonal gammopathy 11/26/2014    Phobic anxiety disorder 11/26/2014    Proteinuria 11/26/2014     Social History   Substance Use Topics    Smoking status: Former Smoker     Packs/day: 1.00     Years: 15.00     Types: Cigarettes     Quit date: 11/26/1984    Smokeless tobacco: Never Used    Alcohol use 2.5 - 3.5 oz/week     5 - 7 Standard drinks or equivalent per week      Comment: 5-7 shots week of alyssa     History   Drug Use No     Per Today's Psychosocial:  Tobacco: See above.  Alcohol: See above.  Illicit Drugs/Non-prescribed Medications: See above    Patient states clear understanding of the potential impact of substance use as it relates to transplant candidacy and is aware of possible random substance screening.  Substance abstinence/cessation counseling, education and resources provided and reviewed.     Arrests/DWI/Treatment/Rehab: patient denies    Psychiatric History:    Mental Health: anxiety. Pt  reports dealing with his anxiety for the past 20 years. Pt reports claustrophobia and tight space as triggers.  Pt denies any recent difficulties.  Pt was previously cleared by Dr. Willis for transplant. Pt reports that he can go senior care through an MRI machine.  Psychiatrist/Counselor: Pt reports previously seeing Dr. Willis, psychiatrist, however reports that he is looking for a new psychiatrist that accepts his insurance. SW provided pt with resources.  Medications:  Pt reports taking Xanax, 1 mg PRN   Suicide/Homicide Issues: Pt denies any history of or current suicidal or homicidal ideations.    Safety in Household: Pt reports no current or history of safety concerns in household; including mental, physical, verbal, or sexual abuse.    Knowledge: Patient and Caregiver states having clear understanding and realistic expectations regarding the potential risks and potential benefits of organ transplantation and organ donation, agrees to discuss with health care team members and support system members and to utilize available resources and express questions and/or concerns in order to further facilitate the pt informed decision-making.  Resources and information provided and reviewed.     Patient and Caregiver is aware of [facility] affiliation and/or partnership with agencies in home health care, LTAC, SNF, [facility], and other hospitals and clinics.    Understanding: Patient and Caregiver reports having a clear understanding of the many lifetime commitments involved with being a transplant recipient, including costs, compliance, medications, lab work, procedures, appointments, concrete and financial planning, preparedness, timely and appropriate communication of concerns, abstinence (ETOH, tobacco, illicit non-prescribed drugs), adherence to all health care team recommendations, support system and caregiver involvement, appropriate and timely resource utilization and follow-through, mental health counseling as  needed/recommended, and patient and caregiver responsibilities.  Social Service Handbook, resources and detailed educational information provided and reviewed.  Educational information provided.    Patient and Caregiver also reports current and expected compliance with health care regime and states having a clear understanding of the importance of compliance.      Patient and Caregiver reports a clear understanding that risks and benefits may be involved with organ transplantation and with organ donation.      Patient and Caregiver also reports clear understanding that psychosocial risk factors may affect patient, and include but are not limited to feelings of depression, generalized anxiety, anxiety regarding dependence on others, post traumatic stress disorder, feelings of guilt and other emotional and/or mental concerns, and/or exacerbation of existing mental health concerns.  Detailed resources provided and discussed.     Patient and Caregiver agrees to access appropriate resources in a timely manner as needed and/or as recommended, and to communicate concerns appropriately.  Patient and Caregiver also reports a clear understanding of treatment options available.     Patient and Caregiver received education in a group setting.   reviewed education, provided additional information, and answered questions.    Feelings or Concerns: Patient and Caregiver did not express any concerns at this time.    Coping: Pt reports coping well with the transplant process at this time and reports taking a walk, watching DVDs ([employer], [employer], Dale and Deandre, etc); playing computer solitaire, and watching tv as ways to cope. Pt reports [employer] home as [facility] in [place], [place] with Rev. Shine presiding.     Goals: Pt reports enjoying life as a goal for after transplant and travel, play music, and smoke one cigar or cigarette. SW provided support and education. Pt verbalizes understanding  "that transplant is not a guarantee of ending dialysis and life after transplantation will be a "new normal" post transplant.  SW remains available.      Interview Behavior: Patient and Caregiver present as alert and oriented x 4, pleasant, good eye contact, well groomed, recall good, concentration/judgement good, average intelligence, calm, communicative, cooperative and asking and answering questions appropriately.  Pt presents with wife: Liana Ramirez and son: Capo Ramirez with pt's permission.       Transplant Social Work - Candidacy  Assessment/Plan:     Psychosocial Suitability: Patient presents as a suitable candidate for kidney transplant at this time. Based on psychosocial risk factors, patient presents as low risk, due to adequate insurance and financial plan in place, suitable dialysis adherence, and caregiver plan in place.    Recommendations/Additional Comments: SW recommends that pt conduct fundraising to assist pt with pay for cost of medications, food, gas, and other transplant related needs. SW recommends that pt remain aware of potential mental health concerns and contact the team if any concerns arise. SW recommends that pt remain abstinent from tobacco, ETOH, and drug use. SW supports pt's continued dialysis adherence. SW remains available to answer any questions or concerns that arise as the pt moves through the transplant process.       Rody Avila LCSW       "

## 2017-07-11 ENCOUNTER — LAB VISIT (OUTPATIENT)
Dept: LAB | Facility: HOSPITAL | Age: 67
End: 2017-07-11
Attending: UROLOGY
Payer: MEDICARE

## 2017-07-11 DIAGNOSIS — Z12.5 PROSTATE CANCER SCREENING: ICD-10-CM

## 2017-07-11 LAB — COMPLEXED PSA SERPL-MCNC: 4.9 NG/ML

## 2017-07-11 PROCEDURE — 84153 ASSAY OF PSA TOTAL: CPT | Mod: TXP

## 2017-07-11 PROCEDURE — 36415 COLL VENOUS BLD VENIPUNCTURE: CPT | Mod: PO,TXP

## 2017-07-14 LAB — HPRA INTERPRETATION: NORMAL

## 2017-07-14 PROCEDURE — 86829 HLA CLASS I/II ANTIBODY QUAL: CPT | Mod: 91,PO,TXP

## 2017-07-14 PROCEDURE — 86829 HLA CLASS I/II ANTIBODY QUAL: CPT | Mod: PO,TXP

## 2017-07-17 ENCOUNTER — OFFICE VISIT (OUTPATIENT)
Dept: UROLOGY | Facility: CLINIC | Age: 67
End: 2017-07-17
Payer: MEDICARE

## 2017-07-17 VITALS
BODY MASS INDEX: 32.91 KG/M2 | HEART RATE: 81 BPM | DIASTOLIC BLOOD PRESSURE: 84 MMHG | SYSTOLIC BLOOD PRESSURE: 139 MMHG | WEIGHT: 210.13 LBS

## 2017-07-17 DIAGNOSIS — Z12.5 PROSTATE CANCER SCREENING: ICD-10-CM

## 2017-07-17 DIAGNOSIS — N40.0 BPH LOC W/O UR OBS/LUTS: ICD-10-CM

## 2017-07-17 DIAGNOSIS — N52.9 ERECTILE DYSFUNCTION, UNSPECIFIED ERECTILE DYSFUNCTION TYPE: Primary | ICD-10-CM

## 2017-07-17 LAB
BILIRUB SERPL-MCNC: NORMAL MG/DL
BLOOD URINE, POC: 50
COLOR, POC UA: YELLOW
GLUCOSE UR QL STRIP: NORMAL
KETONES UR QL STRIP: NORMAL
LEUKOCYTE ESTERASE URINE, POC: NORMAL
NITRITE, POC UA: NORMAL
PH, POC UA: 5
PROTEIN, POC: 100
SPECIFIC GRAVITY, POC UA: 1.01
UROBILINOGEN, POC UA: NORMAL

## 2017-07-17 PROCEDURE — 99212 OFFICE O/P EST SF 10 MIN: CPT | Mod: PBBFAC,TXP | Performed by: UROLOGY

## 2017-07-17 PROCEDURE — 99999 PR PBB SHADOW E&M-EST. PATIENT-LVL II: CPT | Mod: PBBFAC,TXP,, | Performed by: UROLOGY

## 2017-07-17 PROCEDURE — 1126F AMNT PAIN NOTED NONE PRSNT: CPT | Mod: TXP,,, | Performed by: UROLOGY

## 2017-07-17 PROCEDURE — 1159F MED LIST DOCD IN RCRD: CPT | Mod: TXP,,, | Performed by: UROLOGY

## 2017-07-17 PROCEDURE — 99214 OFFICE O/P EST MOD 30 MIN: CPT | Mod: S$PBB,TXP,, | Performed by: UROLOGY

## 2017-07-17 PROCEDURE — 81002 URINALYSIS NONAUTO W/O SCOPE: CPT | Mod: PBBFAC,TXP | Performed by: UROLOGY

## 2017-07-17 RX ORDER — SILDENAFIL CITRATE 20 MG/1
20 TABLET ORAL
Qty: 50 TABLET | Refills: 11 | Status: SHIPPED | OUTPATIENT
Start: 2017-07-17 | End: 2017-12-20

## 2017-07-17 NOTE — PROGRESS NOTES
Chief Complaint: Elevated PSA    HPI:   7/17/17: Having some ED problems.  Still taking doxazosin, voiding well.  7/11/16: Doing well voiding fine  6/29/15: No problems in interim.  Has an AV fistula in left arm now. PSA unchanged at 3.6.  No urinary bother doing fine.  Taking saw palmetto and pumpkin seed.   12/29/14: 65 yo man being evaluated for kidney transplant had an elevated PSA of 4.2 and is here for screening.  Had a AV fistula surgery last week.  No abd/pelvic pain and no exac/rel factors.  No hematuria.  No urolithiasis history.  No prostate cancer family history but his brother had some sort of prostate surgery.  No urinary bother.  Still makes urine and is not on dialysis.  Feels good in general.    Allergies:  Review of patient's allergies indicates no known allergies.    Medications:  has a current medication list which includes the following prescription(s): alprazolam, amlodipine, doxazosin, ergocalciferol, fluticasone, metoprolol tartrate, potassium chloride, sucroferric oxyhydroxide, torsemide, and sodium bicarbonate.    Review of Systems:  General: No fever, chills, fatigability, or weight loss.  Skin: No rashes, itching, or changes in color or texture of skin.  Chest: Denies WEST, cyanosis, wheezing, cough, and sputum production.  Abdomen: Appetite fine. No weight loss. Denies diarrhea, abdominal pain, hematemesis, or blood in stool.  Musculoskeletal: No joint stiffness or swelling. Denies back pain.  : As above.  All other review of systems negative.    PMH:   has a past medical history of Anemia in CKD (chronic kidney disease); CKD (chronic kidney disease) stage 4, GFR 15 (11/26/2014); Colon cancer screening (12/19/2014); Elevated PSA (11/26/2014); HTN (hypertension) diagnosed in his 40s (10/16/2014); Monoclonal gammopathy (11/26/2014); Phobic anxiety disorder (11/26/2014); and Proteinuria (11/26/2014).    PSH:   has a past surgical history that includes AV fistula placement (11/2014) and  Vasectomy.    FamHx: family history includes Cancer in his brother and paternal uncle; Cataracts in his mother; Dementia in his father; Diabetes in his mother; Glaucoma in his mother; Heart disease in his father; Hypertension in his sister; Kidney disease in his sister.    SocHx:  reports that he quit smoking about 32 years ago. His smoking use included Cigarettes. He has a 15.00 pack-year smoking history. He has never used smokeless tobacco. He reports that he drinks about 2.5 - 3.5 oz of alcohol per week . He reports that he does not use drugs.      Physical Exam:  Vitals:    07/17/17 1058   BP: 139/84   Pulse: 81     General: A&Ox3, no apparent distress, no deformities  Neck: No masses, normal thyroid  Lungs: normal inspiration, no use of accessory muscles  Heart: normal pulse, no arrhythmias  Abdomen: Soft, NT, ND  Skin: The skin is warm and dry. No jaundice.  Ext: No c/c/e.  :   7/11/17: Test desc panfilo, no abnormalities of epididymus. Penis normal, with normal penile and scrotal skin. Meatus normal. Normal rectal tone, no hemorrhoids. Prost 30 gm no nodules or masses appreciated. SV not palpable. Perineum and anus normal.    Labs/Studies:   Urinalysis performed in clinic, summary: UA normal exc ++prot  PSA    3/15: 3.6    6/15: 3.6    6/16: 4.2    7/17: 4.9    Impression/Plan:   1. PSA elevated but not extremely so, not rising.  Will recheck 6/12 mo with RTC at 12 mo to review.  Low risk of prostate cancer at this point and without a bigger upward trend would not biopsy the prostate.

## 2017-07-18 PROCEDURE — 86833 HLA CLASS II HIGH DEFIN QUAL: CPT | Mod: PO,TXP

## 2017-07-18 PROCEDURE — 86832 HLA CLASS I HIGH DEFIN QUAL: CPT | Mod: PO,TXP

## 2017-07-24 LAB
CLASS I ANTIBODIES - LUMINEX: NEGATIVE
CLASS II ANTIBODIES - LUMINEX: NEGATIVE
CPRA %: 0
SERUM COLLECTION DT - LUMINEX CLASS I: NORMAL
SERUM COLLECTION DT - LUMINEX CLASS II: NORMAL
SPCL1 TESTING DATE: NORMAL
SPCL2 TESTING DATE: NORMAL
SPCLU TESTING DATE: NORMAL

## 2017-08-01 DIAGNOSIS — Z76.82 ORGAN TRANSPLANT CANDIDATE: ICD-10-CM

## 2017-08-01 DIAGNOSIS — Z76.82 ORGAN TRANSPLANT CANDIDATE: Primary | ICD-10-CM

## 2017-08-01 NOTE — PROGRESS NOTES
YEARLY LIST MANAGEMENT NOTE    Alverto Ramirez's kidney transplant listing status reviewed.  Patient is due for follow-up appointments in September 2017.   Appointments will be scheduled per protocol.  HLA sample is past due at this time with sample last being received in early June 2017.

## 2017-09-01 ENCOUNTER — TELEPHONE (OUTPATIENT)
Dept: RADIOLOGY | Facility: HOSPITAL | Age: 67
End: 2017-09-01

## 2017-09-05 ENCOUNTER — HOSPITAL ENCOUNTER (OUTPATIENT)
Dept: RADIOLOGY | Facility: HOSPITAL | Age: 67
Discharge: HOME | End: 2017-09-05
Attending: INTERNAL MEDICINE
Payer: MEDICARE

## 2017-09-05 DIAGNOSIS — Z76.82 ORGAN TRANSPLANT CANDIDATE: ICD-10-CM

## 2017-09-05 PROCEDURE — 76770 US EXAM ABDO BACK WALL COMP: CPT | Mod: TC,PO,TXP

## 2017-09-05 PROCEDURE — 76770 US EXAM ABDO BACK WALL COMP: CPT | Mod: 26,TXP,, | Performed by: RADIOLOGY

## 2017-09-08 ENCOUNTER — LAB VISIT (OUTPATIENT)
Dept: LAB | Facility: HOSPITAL | Age: 67
End: 2017-09-08
Payer: MEDICARE

## 2017-09-08 DIAGNOSIS — Z76.82 ORGAN TRANSPLANT CANDIDATE: ICD-10-CM

## 2017-09-08 PROCEDURE — 86829 HLA CLASS I/II ANTIBODY QUAL: CPT | Mod: 91,PO,TXP

## 2017-09-08 PROCEDURE — 86829 HLA CLASS I/II ANTIBODY QUAL: CPT | Mod: PO,TXP

## 2017-09-21 LAB — HPRA INTERPRETATION: NORMAL

## 2017-10-05 ENCOUNTER — OFFICE VISIT (OUTPATIENT)
Dept: TRANSPLANT | Facility: CLINIC | Age: 67
End: 2017-10-05
Payer: MEDICARE

## 2017-10-05 VITALS
TEMPERATURE: 98 F | OXYGEN SATURATION: 100 % | DIASTOLIC BLOOD PRESSURE: 79 MMHG | HEIGHT: 67 IN | RESPIRATION RATE: 16 BRPM | SYSTOLIC BLOOD PRESSURE: 148 MMHG | HEART RATE: 87 BPM | BODY MASS INDEX: 33.05 KG/M2 | WEIGHT: 210.56 LBS

## 2017-10-05 DIAGNOSIS — D47.2 MONOCLONAL GAMMOPATHY: ICD-10-CM

## 2017-10-05 DIAGNOSIS — F40.9 PHOBIC ANXIETY DISORDER: ICD-10-CM

## 2017-10-05 DIAGNOSIS — I10 ESSENTIAL HYPERTENSION: ICD-10-CM

## 2017-10-05 DIAGNOSIS — N18.5 CKD (CHRONIC KIDNEY DISEASE) STAGE 5, GFR LESS THAN 15 ML/MIN: Primary | Chronic | ICD-10-CM

## 2017-10-05 DIAGNOSIS — E21.3 HPTH (HYPERPARATHYROIDISM): ICD-10-CM

## 2017-10-05 PROCEDURE — 99213 OFFICE O/P EST LOW 20 MIN: CPT | Mod: PBBFAC,TXP

## 2017-10-05 PROCEDURE — 99215 OFFICE O/P EST HI 40 MIN: CPT | Mod: S$PBB,TXP,, | Performed by: INTERNAL MEDICINE

## 2017-10-05 PROCEDURE — 99999 PR PBB SHADOW E&M-EST. PATIENT-LVL III: CPT | Mod: PBBFAC,TXP,,

## 2017-10-05 NOTE — PROGRESS NOTES
Patient was seen in listed '[facility]' transplant clinic for continued evaluation for kidney, kidney/pancreas or pancreas only transplant. The patient attended a group education session that discussed/reviewed the following aspects of transplantation: evaluation and selection committee process, UNOS waitlist management/multiple listings, types of organs offered (KDPI < 85%, KDPI > 85%, PHS increased risk, DCD), financial aspects, surgical procedures, dietary instruction pre- and post-transplant, health maintenance pre- and post-transplant, post-transplant hospitalization and outpatient follow-up, potential to participate in a research protocol, and medication management and side effects. A question and answer session was provided after the presentation.     The patient was seen by all members of the multi-disciplinary team to include: Nephrologist/PA, , Transplant Coordinator, and .      The patient reviewed and signed all consents for evaluation which were witnessed and sent to scanning into the EPIC chart.     The patient was given an education book and plan for further evaluation based on her individual assessment.      The patient was encouraged to call with any questions or concerns.

## 2017-10-05 NOTE — PROGRESS NOTES
"   Kidney Transplant Recipient Reevalulation    Referring Physician: Chris Adair  Current Nephrologist: Robson Bernstein  Waitlist Status: active  Dialysis Start Date: 2/20/2017    Subjective:     CC:  Six month reassessment of kidney transplant candidacy.    HPI:  Mr. Ramirez is a 67 y.o. year old Black or  male with ESRD secondary to HTN.  He has been on the wait list for a kidney transplant at [facility] since 3/30/2015. Patient is currently on peritoneal dialysis started on 2/2017. Patient is dialyzing on cyclic peritoneal dialysis.  Patient reports that he is tolerating dialysis well. . He has a PD catheter. Patient denies any recent hospitalizations or ED visits.    Patient does not have any living donors    He is unclear if his proteinuria was the "cause of the kidney failure" he has a diagnosis of MGUS    No admissions to the hospital since the last clinic visit \\the patients says that he makes around 400 cc according with the 24 hr urine collection     No peritonitis    No exit site infection    PD for 8 hrs, no day exchange     Patient had an elevated PSA and is followed by Urology. Cleared to proceed with transplant. Marginal elevation. He saw Urology July 2017    Stress test 3/2017 No ischemia, good EF. 2D echo satisfactory    Kidney US No solid mass     Active at home. Walks up to 1 mile, walks 1 to 2 times a week, he remains active at home doing some shopping. Good appetite. No chest pain or SOB. No claudiaction      Review of Systems     Skin: no skin rash  CNS; no headaches, blurred vision, seizure, or syncope  ENT: No JVD,  Adenopathies,  nasal congestion. No oral lesions  Cardiac: No chest pain, dyspnea, claudication, edema or palpitations  Respiratory: No SOB, cough, hemoptysis   Gastro-intestinal: No diarrhea, constipation, abdominal pain, nausea, vomit. No ascitis  Genitourinary: no hematuria, dysuria, frequency, frequency  Musculoskeletal: joint pain, arthritis or vasculitic " "changes  Psych: alert awake, oriented, No cranial nerves deficit.      Objective:   body mass index is 32.58 kg/m².    Physical Exam     BP (!) 148/79 (BP Location: Right arm, Patient Position: Sitting, BP Method: Medium (Automatic))   Pulse 87   Temp 98 °F (36.7 °C) (Oral)   Resp 16   Ht 5' 7.4" (1.712 m)   Wt 95.5 kg (210 lb 8.6 oz)   SpO2 100%   BMI 32.58 kg/m²       Head: normocephalic  Neck: No JVD, cervical axillary, or femoral adenopathies  Heart: no murmurs, Normal s1 and s2, No gallops, no rubs, No murmurs  Lungs; CTA, good respiratory effort, no crackles  Abdomen: soft, non tender, no splenomegaly or hepatomegaly, no massess, no bruits. PD catheter w/o any erythema or signs of infection  Extremities: No edema, skin rash, joint pain. LUE AVF  SNC: awake, alert oriented. Cranial nerves are intact, no focalized, sensitivity and strength preserved      Labs:  Lab Results   Component Value Date    WBC 5.81 02/01/2017    HGB 10.9 (L) 02/01/2017    HCT 32.5 (L) 02/01/2017     02/01/2017    K 3.7 02/01/2017     02/01/2017    CO2 18 (L) 02/01/2017    BUN 95 (H) 02/01/2017    CREATININE 9.9 (H) 02/01/2017    EGFRNONAA 4.8 (A) 02/01/2017    CALCIUM 9.2 02/01/2017    PHOS 6.0 (H) 02/01/2017    ALBUMIN 2.9 (L) 02/01/2017    AST 27 11/26/2014    ALT 27 11/26/2014    UTPCR 5.38 (H) 02/01/2017    PTH 93.0 (H) 12/15/2016       Lab Results   Component Value Date    BILIRUBINUA Negative 02/01/2017    PROTEINUA 3+ (A) 02/01/2017    NITRITE neg\ 07/17/2017    RBCUA 0 02/01/2017    WBCUA 3 02/01/2017       No results found for: HLAABCTYPE    Lab Results   Component Value Date    CPRA 0 06/03/2017    CW6IEQR Negative 06/03/2017    CIIAB Negative 06/03/2017       Labs were reviewed with the patient.    Pre-transplant Workup:   Reviewed with the patient.    Assessment:     No diagnosis found.    Plan:     Transplant Candidacy:   Mr. Ramirez is a suitable kidney transplant candidate.  He remains in overall stable " health, and will remain active on the transplant list.    Wife will be care giver with his son    No living donors    Advise living donation    Remain active    Advise to call if any changes. Dr Bernstein is his nephrologist at     I have a very detailed conversation about the importance of living donation  And the process to do this if someone became available     We spoke for 40 minutes    All questions answered          Deny Rosario MD       Follow-up:   In addition to the tests noted in the plan, Mr. Ramirez will continue to have reevaluation as per the standing pre-kidney transplant protocol:  1. Monthly blood for PRA  2. Annual return to clinic, except HIV positive, > 65 years of age, or pancreas transplant candidates who will be scheduled to see transplant every 6 months while in pre-transplant phase  3. Annual re-testing: CXR, EKG, yearly mammograms for women over 40 and PSA for males over 40, cardiology follow-up as recommended by initial cardiology pre-transplant evaluation  4. Renal ultrasound every 2 years  5. Baseline colonoscopy after age 50 and repeated as recommended    UNOS Patient Status  Functional Status: 60% - Requires occasional assistance but is able to care for needs  Physical Capacity: No Limitations

## 2017-10-05 NOTE — LETTER
Date: 11/9/2017          Listed Patient      To: Dialysis Unit  and Charge RN From: HUSSAIN MoonW    RE: Alverto Ramirez , 1950, [id number]     At [facility], we conduct adherence checks as an important part of transplant care. Initial and listed patient assessments are not complete without adherence information.        Please complete the following information:     Current Dry Weight: ___________         Most Recent Pre-Treatment Weight: ___________ /date: _________                    Data from the last 1-3 months:  (data from last 3 months preferred):    Number of AMAs with dates, time, and reasons: ____________________________________________________    ______________________________________________________________________________________________    ______________________________________________________________________________________________    Number of No-Shows with dates and reasons: ______________________________________________________      ______________________________________________________________________________________________    Last intact PTH:  ___________/date: __________      Any concerns with Labs:  YES / NO      If yes, please explain:  ___________________________________________________________________________    ______________________________________________________________________________________________    Any concerns with Caregivers:  YES / NO    If yes, please explain:  ___________________________________________________________________________    ______________________________________________________________________________________________     Any concerns with Transportation:  YES / NO    If yes, please explain:  ___________________________________________________________________________    ______________________________________________________________________________________________    Any Psychiatric and/or Psychosocial concerns:   YES / NO     If yes, please explain: ___________________________________________________________________________    ______________________________________________________________________________________________      PLEASE RETURN TO: FAX: [phone]     Thank you for collaborating with us in the care of this patient.           [address]  ?  [place] [address]  ?  phone [phone]  ?  fax [phone]  ?  [email or web address]  Confidentiality notice: The accompanying facsimile is intended solely for the use of the recipient designated above. Document(s) transmitted herewith may contain information that is confidential and privileged. Delivery, distribution or dissemination of this communication other than to the intended recipient is strictly prohibited. If you have received this facsimile in error, please notify us immediately by telephone.

## 2017-10-06 ENCOUNTER — TELEPHONE (OUTPATIENT)
Dept: NEPHROLOGY | Facility: CLINIC | Age: 67
End: 2017-10-06

## 2017-10-06 NOTE — TELEPHONE ENCOUNTER
Returned call to patient who states that it is urgent that he speaks with in regards to the appointment yesterday. Patient states that he is very disappointed and discouraged. Please call patient back at [phone]

## 2017-10-06 NOTE — TELEPHONE ENCOUNTER
----- Message from Nereyda Hardwick sent at 10/6/2017  8:43 AM CDT -----  Contact: pt  Please call pt @ [phone], pt states he need to speak with doctor himself, pt will explain.

## 2017-10-09 RX ORDER — SUCROFERRIC OXYHYDROXIDE 500 MG/1
TABLET, CHEWABLE ORAL
Qty: 120 TABLET | Refills: 0 | Status: SHIPPED | OUTPATIENT
Start: 2017-10-09 | End: 2017-12-26 | Stop reason: SDUPTHER

## 2017-10-19 ENCOUNTER — LAB VISIT (OUTPATIENT)
Dept: LAB | Facility: HOSPITAL | Age: 67
End: 2017-10-19
Payer: MEDICARE

## 2017-10-19 DIAGNOSIS — Z76.82 ORGAN TRANSPLANT CANDIDATE: ICD-10-CM

## 2017-10-19 PROCEDURE — 86829 HLA CLASS I/II ANTIBODY QUAL: CPT | Mod: 91,PO,TXP

## 2017-10-19 PROCEDURE — 86829 HLA CLASS I/II ANTIBODY QUAL: CPT | Mod: PO,TXP

## 2017-11-07 ENCOUNTER — LAB VISIT (OUTPATIENT)
Dept: LAB | Facility: HOSPITAL | Age: 67
End: 2017-11-07
Payer: MEDICARE

## 2017-11-07 DIAGNOSIS — Z76.82 ORGAN TRANSPLANT CANDIDATE: ICD-10-CM

## 2017-11-08 ENCOUNTER — OFFICE VISIT (OUTPATIENT)
Dept: OPHTHALMOLOGY | Facility: CLINIC | Age: 67
End: 2017-11-08
Payer: MEDICARE

## 2017-11-08 DIAGNOSIS — H52.4 PRESBYOPIA: ICD-10-CM

## 2017-11-08 DIAGNOSIS — H52.223 REGULAR ASTIGMATISM OF BOTH EYES: ICD-10-CM

## 2017-11-08 DIAGNOSIS — H25.13 NUCLEAR SCLEROSIS, BILATERAL: Primary | ICD-10-CM

## 2017-11-08 PROCEDURE — 99211 OFF/OP EST MAY X REQ PHY/QHP: CPT | Mod: PBBFAC,PO,NTX | Performed by: OPTOMETRIST

## 2017-11-08 PROCEDURE — 99999 PR PBB SHADOW E&M-EST. PATIENT-LVL I: CPT | Mod: PBBFAC,TXP,, | Performed by: OPTOMETRIST

## 2017-11-08 PROCEDURE — 92015 DETERMINE REFRACTIVE STATE: CPT | Mod: TXP,,, | Performed by: OPTOMETRIST

## 2017-11-08 PROCEDURE — 92014 COMPRE OPH EXAM EST PT 1/>: CPT | Mod: S$PBB,TXP,, | Performed by: OPTOMETRIST

## 2017-11-08 RX ORDER — METHYLPREDNISOLONE 4 MG/1
TABLET ORAL
COMMUNITY
Start: 2017-08-10 | End: 2017-12-27 | Stop reason: ALTCHOICE

## 2017-11-08 RX ORDER — LOSARTAN POTASSIUM 100 MG/1
100 TABLET ORAL DAILY
COMMUNITY
Start: 2017-08-04 | End: 2018-12-31 | Stop reason: SDUPTHER

## 2017-11-08 RX ORDER — CALCITRIOL 0.5 UG/1
0.5 CAPSULE ORAL DAILY
COMMUNITY
End: 2019-01-30 | Stop reason: SDUPTHER

## 2017-11-08 NOTE — PROGRESS NOTES
HPI     Last appt with DNL 12/21/15.   Any vision changes since last exam: None  Eye pain: None  Other ocular symptoms: None    Do you wear currently wear glasses or contacts? Readers    Interested in contacts today? No    Do you plan on getting new glasses today? If needed    Last edited by Jose Antonio Mixon, Patient Care Assistant on 11/8/2017  8:55   AM. (History)            Assessment /Plan     For exam results, see Encounter Report.    Nuclear sclerosis, bilateral  Surgery is not indicated at this time. Monitor 12 months.    Regular astigmatism of both eyes  Presbyopia  Eyeglass Final Rx     Eyeglass Final Rx       Sphere Cylinder Axis Add    Right -0.25 +2.50 100 +2.50    Left Jadwin +2.00 090 +2.50    Expiration Date:  11/9/2018              RTC 1 yr for dilated eye exam or PRN if any problems.   Discussed above and answered questions.

## 2017-11-09 NOTE — PROGRESS NOTES
Transplant Recipient Adult Psychosocial Assessment (Last Assessment completed on 17)    Alverto Ramirez  [address]  [place] [address]    Telephone Information:   Mobile [phone]   Mobile [phone]   Home  [phone] (home)  Work  There is no work phone number on file.  E-mail  [email or web address]    Sex: male  YOB: 1950  Age: 67 y.o.    Encounter Date: 10/5/2017  U[place] Citizen: yes  Primary Language: English   Needed: no    Emergency Contact:  Name: Liana Ramirez  Relationship: wife  Address: Same as pt.  Phone Numbers:  [phone] (home), [phone] (mobile)    Name: Capo Ramirez  Relationship: son  Address: [address] [place] [address]  [place] [address]  Phone Numbers:  [phone] (mobile)    Family/Social Support:   Number of dependents/: Pt reports no dependents.  Marital history: pt reports being  only once and then to pt's Liana Ramirez for the past 46 years.  Other family dynamics: Pt reports 2 adult sons: Capo and Doug; sisters: Sue, Mayelin, Fernanda, and Jen (half-sister); 1 brother: Charles. Pt reports both parents are  and pt identifies all family members (except sisters: Sue and Fernanda) as supportive.    Household Composition:  Name: Alverto Ramirez  Age: 66  Relationship: patient  Does person drive? yes    Name: Liana Ramirez  Age: 66  Relationship: wife  Does person drive? yes     Name: Capo Ramirez  Age: 43  Relationship: son  Does person drive? yes    Do you and your caregivers have access to reliable transportation? yes     PRIMARY CAREGIVER: Liana Ramirez, pt's wife,  will be primary caregiver, phone number [phone].      provided in-depth information to patient and caregiver regarding pre- and post-transplant caregiver role.   strongly encourages patient and caregiver to have concrete plan regarding post-transplant care giving, including back-up caregiver(s) to ensure  care giving needs are met as needed.    Patient and Caregiver states understanding all aspects of caregiver role/commitment and is able/willing/committed to being caregiver to the fullest extent necessary.    Patient and Caregiver verbalizes understanding of the education provided today and caregiver responsibilities.         remains available. Patient and Caregiver agree to contact  in a timely manner if concerns arise.      Able to take time off work without financial concerns: yes.      Additional Significant Others who will Assist with Transplant:  Name: Capo Ramirez  Age: 42  Phone: [phone]  City: [place] State: [place]  Relationship: son  Does person drive? yes       Living Will: no Education and information provided to pt.  Healthcare Power of : no Education and information provided to pt.  Advance Directives on file: <<no information> per medical record.  Verbally reviewed LW/HCPA information.   provided patient with copy of LW/HCPA documents and provided education on completion of forms    Living Donors: Yes.  Name: wife: Liana Ramirez and son: Capo Ramirez. Education and resource information given to patient. SW explained to Patient and Caregiver that both recipients and donors require separate caregivers. Patient and Caregiver verbalized understanding and agreement.     Highest Education Level: Attended College/Technical School  Reading Ability: college  Reports difficulty with: seeing and wears bifocals  Learns Best By:  Pt reports leaning best by verbal and hands-on instruction.     Status: no  VA Benefits: no     Working for Income: No  If no, reason not working: Patient Choice - Retired  Patient is retired from owning/operating driving school..    Spouse/Significant Other Employment: Pt and pt's wife both retired from work/own the driving school.    Disabled: no, however pt recived correction from [employer] due to anxiety.    Monthly  Income:  skilled nursing: $300  SS Reitrement: $1840  Other household members total: $630 (wife)     Able to afford all costs now and if transplanted, including medications: yes Pt's son: Capo reports that he can assist as well  Patient verbalizes understanding of personal responsibilities related to transplant costs and the importance of having a financial plan to ensure that patients transplant costs are fully covered.   provided fundraising information/education.  Patient verbalizes understanding.   remains available.    Insurance:   Payor/Plan Subscr  Sex Relation Sub. Ins. ID Effective Group Num   1. MEDICARE - [place]* ADAM MITCHELL JR. 1950 Male  [id number]                                     [address]   2. AETNA - AETNA* ADAM MITCHELL JR. 1950 Male  [id number]                                   [address]     Primary Insurance (for UNOS reporting): Public Insurance - Medicare FFS (Fee For Service)  Secondary Insurance (for UNOS reporting): Private Insurance (Pt reports that he pays and also has a part D)    Patient and Caregiver verbalizes clear understanding that patient may experience difficulty obtaining and/or be denied insurance coverage post-surgery. This includes and is not limited to disability insurance, life insurance, health insurance, burial insurance, long term care insurance, and other insurances.      Patient and Caregiver also reports understanding that future health concerns related to or unrelated to transplantation may not be covered by patient's insurance.  Resources and information provided and reviewed.     Patient and Caregiver provides verbal permission to release any necessary information to outside resources for patient care and discharge planning.  Resources and information provided are reviewed.      Patient provides verbal permission to release any necessary information to outside resources for patient care and  discharge planning.  Resources and information provided are reviewed.      Dialysis History:  Patient reports being on peritoneal dialysis attending all dialysis appointments and staying for the entire course of treatment. SW faxed an adherence form (see Letters section) and is awaiting a fax back.   Infusion Service: patient utilizing? no  Home Health: patient utilizing? no  DME: yes PD equipment and BP Cuff  Pulmonary/Cardiac Rehab: Pt denies.   ADLS:  Pt reports no difficulties with driving, walking, bathing, cooking, housekeeping, eating, shopping, and taking medication.    Adherence:   Pt reports good adherence with medications, dialysis, and health regimen..  Adherence education and counseling provided.     Per History Section:  Past Medical History:   Diagnosis Date    Anemia in CKD (chronic kidney disease)     Atrial fibrillation     CKD (chronic kidney disease) stage 4, GFR 15 11/26/2014    Colon cancer screening 12/19/2014    Elevated PSA 11/26/2014    HTN (hypertension) diagnosed in his 40s 10/16/2014    Monoclonal gammopathy 11/26/2014    Phobic anxiety disorder 11/26/2014    Proteinuria 11/26/2014     Social History   Substance Use Topics    Smoking status: Former Smoker     Packs/day: 1.00     Years: 15.00     Types: Cigarettes     Quit date: 11/26/1984    Smokeless tobacco: Never Used    Alcohol use 2.5 - 3.5 oz/week     5 - 7 Standard drinks or equivalent per week      Comment: 5-7 shots week of alyssa     History   Drug Use No     Per Today's Psychosocial:  Tobacco: Pt reports quitting all use in 1984 and reports previously smoking about 1ppd for 15 years.  Alcohol: Pt reports drinking sips of wine coolers.  Illicit Drugs/Non-prescribed Medications: none, patient denies any use    Patient and Caregiver states clear understanding of the potential impact of substance use as it relates to transplant candidacy and is aware of possible random substance screening.  Substance  abstinence/cessation counseling, education and resources provided and reviewed.     Arrests/DWI/Treatment/Rehab: patient denies    Psychiatric History:    Mental Health: anxiety. Pt reports dealing with his anxiety for the past 20 years. Pt reports claustrophobia and tight space as triggers (Pt reports he was locked in an elevator a few times).  Pt denies any recent difficulties.  Pt was previously cleared by Dr. Willis for transplant. Pt reports that he can go penitentiary through an MRI machine.  Psychiatrist/Counselor: Pt reports previously seeing Dr. Willis, psychiatrist, however reports that he is looking for a new psychiatrist that accepts his insurance. SW provided pt with resources.  Medications:  Pt reports taking Xanax, 1 mg PRN   Suicide/Homicide Issues: Pt denies any history of or current suicidal or homicidal ideations.    Safety in Household: Pt reports no current or history of safety concerns in household; including mental, physical, verbal, or sexual abuse.    Knowledge: Patient and Caregiver states having clear understanding and realistic expectations regarding the potential risks and potential benefits of organ transplantation and organ donation, agrees to discuss with health care team members and support system members and to utilize available resources and express questions and/or concerns in order to further facilitate the pt informed decision-making.  Resources and information provided and reviewed.     Patient and Caregiver is aware of [facility]'s affiliation and/or partnership with agencies in home health care, LTAC, SNF, [facility], and other hospitals and clinics.    Understanding: Patient and Caregiver reports having a clear understanding of the many lifetime commitments involved with being a transplant recipient, including costs, compliance, medications, lab work, procedures, appointments, concrete and financial planning, preparedness, timely and appropriate communication of concerns, abstinence (ETOH,  tobacco, illicit non-prescribed drugs), adherence to all health care team recommendations, support system and caregiver involvement, appropriate and timely resource utilization and follow-through, mental health counseling as needed/recommended, and patient and caregiver responsibilities.  Social Service Handbook, resources and detailed educational information provided and reviewed.  Educational information provided.    Patient and Caregiver also reports current and expected compliance with health care regime and states having a clear understanding of the importance of compliance.      Patient and Caregiver reports a clear understanding that risks and benefits may be involved with organ transplantation and with organ donation.      Patient and Caregiver also reports clear understanding that psychosocial risk factors may affect patient, and include but are not limited to feelings of depression, generalized anxiety, anxiety regarding dependence on others, post traumatic stress disorder, feelings of guilt and other emotional and/or mental concerns, and/or exacerbation of existing mental health concerns.  Detailed resources provided and discussed.     Patient and Caregiver agrees to access appropriate resources in a timely manner as needed and/or as recommended, and to communicate concerns appropriately.  Patient and Caregiver also reports a clear understanding of treatment options available.     Patient and Caregiver received education in a group setting.   reviewed education, provided additional information, and answered questions.    Feelings or Concerns: Patient and Caregiver did not express any concerns at this time.    Coping: Pt reports coping well with the transplant process at this time and reports taking a walk, watching DVDs ([employer], [employer], Dale and Deandre, etc); playing computer solitaire, going to football games, and watching tv as ways to cope. Pt reports [employer] home as [facility]  [facility] in [place], [place] with Rev. Shine presiding.     Goals: Pt reports enjoying life, travel, play music, smoke one cigar or cigarette, and thank the donor family as goals for after transplant. SW provided support and education. Pt verbalizes understanding that transplant is not a guarantee of ending dialysis and life after transplantation will be a "new normal" post transplant.  SW remains available.      Interview Behavior: Patient and Caregiver present as alert and oriented x 4, pleasant, good eye contact, well groomed, recall good, concentration/judgement good, average intelligence, calm, communicative, cooperative and asking and answering questions appropriately.  Pt presents with wife: Liana Ramirez with pt's permission.       Transplant Social Work - Candidacy  Assessment/Plan:     Psychosocial Suitability: Patient presents as a suitable candidate for kidney transplant at this time. Based on psychosocial risk factors, patient presents as low risk, due to adequate insurance and financial plan in place, suitable dialysis adherence, and caregiver plan in place.    Recommendations/Additional Comments: SW recommends that pt conduct fundraising to assist pt with pay for cost of medications, food, gas, and other transplant related needs. SW recommends that pt remain aware of potential mental health concerns and contact the team if any concerns arise. SW recommends that pt remain abstinent from tobacco, ETOH, and drug use. SW supports pt's continued dialysis adherence. SW remains available to answer any questions or concerns that arise as the pt moves through the transplant process.       Rody Avila LCSW       "

## 2017-11-14 LAB — HPRA INTERPRETATION: NORMAL

## 2017-11-16 ENCOUNTER — TELEPHONE (OUTPATIENT)
Dept: NEPHROLOGY | Facility: CLINIC | Age: 67
End: 2017-11-16

## 2017-11-16 NOTE — TELEPHONE ENCOUNTER
Returned call to patient who states that he think that his sister in law has Meningitis. Informed patient if she does and it's contaitagious he will need to go to the ER to get evaluated. Patient expressed understanding.

## 2017-11-16 NOTE — TELEPHONE ENCOUNTER
----- Message from Marta Webb sent at 11/16/2017  4:09 PM CST -----  Pt at [phone]//or [phone]//states he has a question regarding a medical matter//all information given//please call//dena/lula

## 2017-11-28 ENCOUNTER — SOCIAL WORK (OUTPATIENT)
Dept: TRANSPLANT | Facility: CLINIC | Age: 67
End: 2017-11-28

## 2017-11-28 NOTE — PROGRESS NOTES
Dialysis Adherence:    SW received a faxed adherence form from pt's dialysis center indicates over last three months that the patient has had no AMAs, not had any no-shows, and reports that pt has not had any issues with labs, fluid gains, or other psychosocial issues..      Psychosocial Suitability: Patient presents as a suitable candidate for kidney transplant at this time. Based on psychosocial risk factors, patient presents as low risk, due to suitable adherence (see above).          Form also indicates:    Last intact PTH from 11/1/17 is reported as 7.19.    Current dry weight is reported as 90kg and Most Recent Pre-treatment weight is reported as 95.1kg on 11/1/17.

## 2017-12-13 PROCEDURE — 86829 HLA CLASS I/II ANTIBODY QUAL: CPT | Mod: 91,PO,TXP

## 2017-12-13 PROCEDURE — 86829 HLA CLASS I/II ANTIBODY QUAL: CPT | Mod: PO,TXP

## 2017-12-15 ENCOUNTER — LAB VISIT (OUTPATIENT)
Dept: LAB | Facility: HOSPITAL | Age: 67
End: 2017-12-15
Payer: MEDICARE

## 2017-12-15 DIAGNOSIS — Z76.82 ORGAN TRANSPLANT CANDIDATE: ICD-10-CM

## 2017-12-15 LAB — HPRA INTERPRETATION: NORMAL

## 2017-12-15 PROCEDURE — 86829 HLA CLASS I/II ANTIBODY QUAL: CPT | Mod: 91,PO,TXP

## 2017-12-15 PROCEDURE — 86829 HLA CLASS I/II ANTIBODY QUAL: CPT | Mod: PO,TXP

## 2017-12-20 RX ORDER — TADALAFIL 10 MG/1
10 TABLET ORAL DAILY PRN
Qty: 10 TABLET | Refills: 0 | Status: SHIPPED | OUTPATIENT
Start: 2017-12-20 | End: 2018-06-21

## 2017-12-26 ENCOUNTER — TELEPHONE (OUTPATIENT)
Dept: GASTROENTEROLOGY | Facility: CLINIC | Age: 67
End: 2017-12-26

## 2017-12-26 RX ORDER — SUCROFERRIC OXYHYDROXIDE 500 MG/1
TABLET, CHEWABLE ORAL
Qty: 120 TABLET | Refills: 0 | Status: SHIPPED | OUTPATIENT
Start: 2017-12-26 | End: 2018-03-24 | Stop reason: SDUPTHER

## 2017-12-26 NOTE — TELEPHONE ENCOUNTER
----- Message from Kristy Zaman sent at 12/26/2017 11:00 AM CST -----  Contact: pt  States he needs to schedule his colonoscopy and he would like to do it before the end of the year. States he is a dialysis patient now. Please call pt at [phone] or [phone]. Thank you

## 2017-12-27 ENCOUNTER — OFFICE VISIT (OUTPATIENT)
Dept: GASTROENTEROLOGY | Facility: CLINIC | Age: 67
End: 2017-12-27
Payer: MEDICARE

## 2017-12-27 VITALS
HEIGHT: 68 IN | DIASTOLIC BLOOD PRESSURE: 80 MMHG | HEART RATE: 84 BPM | WEIGHT: 210.56 LBS | BODY MASS INDEX: 31.91 KG/M2 | SYSTOLIC BLOOD PRESSURE: 140 MMHG

## 2017-12-27 DIAGNOSIS — Z12.11 COLON CANCER SCREENING: Primary | ICD-10-CM

## 2017-12-27 DIAGNOSIS — Z86.010 HISTORY OF COLON POLYPS: ICD-10-CM

## 2017-12-27 LAB — HPRA INTERPRETATION: NORMAL

## 2017-12-27 PROCEDURE — 99213 OFFICE O/P EST LOW 20 MIN: CPT | Mod: PBBFAC,NTX | Performed by: NURSE PRACTITIONER

## 2017-12-27 PROCEDURE — 99999 PR PBB SHADOW E&M-EST. PATIENT-LVL III: CPT | Mod: PBBFAC,TXP,, | Performed by: NURSE PRACTITIONER

## 2017-12-27 PROCEDURE — 99499 UNLISTED E&M SERVICE: CPT | Mod: S$PBB,NTX,, | Performed by: NURSE PRACTITIONER

## 2017-12-27 RX ORDER — POLYETHYLENE GLYCOL 3350, SODIUM SULFATE ANHYDROUS, SODIUM BICARBONATE, SODIUM CHLORIDE, POTASSIUM CHLORIDE 236; 22.74; 6.74; 5.86; 2.97 G/4L; G/4L; G/4L; G/4L; G/4L
POWDER, FOR SOLUTION ORAL
Qty: 4000 ML | Refills: 0 | Status: SHIPPED | OUTPATIENT
Start: 2017-12-27 | End: 2018-04-03

## 2017-12-29 ENCOUNTER — SURGERY (OUTPATIENT)
Age: 67
End: 2017-12-29

## 2017-12-29 ENCOUNTER — HOSPITAL ENCOUNTER (OUTPATIENT)
Facility: HOSPITAL | Age: 67
Discharge: HOME | End: 2017-12-29
Attending: INTERNAL MEDICINE | Admitting: INTERNAL MEDICINE
Payer: MEDICARE

## 2017-12-29 ENCOUNTER — ANESTHESIA (OUTPATIENT)
Dept: ENDOSCOPY | Facility: HOSPITAL | Age: 67
End: 2017-12-29
Payer: MEDICARE

## 2017-12-29 ENCOUNTER — ANESTHESIA EVENT (OUTPATIENT)
Dept: ENDOSCOPY | Facility: HOSPITAL | Age: 67
End: 2017-12-29
Payer: MEDICARE

## 2017-12-29 VITALS
HEART RATE: 79 BPM | WEIGHT: 205 LBS | TEMPERATURE: 98 F | SYSTOLIC BLOOD PRESSURE: 137 MMHG | RESPIRATION RATE: 18 BRPM | HEIGHT: 68 IN | BODY MASS INDEX: 31.07 KG/M2 | DIASTOLIC BLOOD PRESSURE: 78 MMHG | OXYGEN SATURATION: 98 %

## 2017-12-29 DIAGNOSIS — K57.30 DIVERTICULOSIS OF LARGE INTESTINE WITHOUT HEMORRHAGE: Primary | ICD-10-CM

## 2017-12-29 LAB — POTASSIUM SERPL-SCNC: 4 MMOL/L

## 2017-12-29 PROCEDURE — 84132 ASSAY OF SERUM POTASSIUM: CPT | Mod: NTX

## 2017-12-29 PROCEDURE — 63600175 PHARM REV CODE 636 W HCPCS: Mod: TXP | Performed by: NURSE ANESTHETIST, CERTIFIED REGISTERED

## 2017-12-29 PROCEDURE — 25000003 PHARM REV CODE 250: Mod: TXP | Performed by: NURSE ANESTHETIST, CERTIFIED REGISTERED

## 2017-12-29 PROCEDURE — 25000003 PHARM REV CODE 250: Mod: NTX | Performed by: INTERNAL MEDICINE

## 2017-12-29 PROCEDURE — G0105 COLORECTAL SCRN; HI RISK IND: HCPCS | Mod: NTX | Performed by: INTERNAL MEDICINE

## 2017-12-29 PROCEDURE — 37000009 HC ANESTHESIA EA ADD 15 MINS: Mod: NTX | Performed by: INTERNAL MEDICINE

## 2017-12-29 PROCEDURE — G0105 COLORECTAL SCRN; HI RISK IND: HCPCS | Mod: NTX,,, | Performed by: INTERNAL MEDICINE

## 2017-12-29 PROCEDURE — 37000008 HC ANESTHESIA 1ST 15 MINUTES: Mod: TXP | Performed by: INTERNAL MEDICINE

## 2017-12-29 RX ORDER — SODIUM CHLORIDE, SODIUM LACTATE, POTASSIUM CHLORIDE, CALCIUM CHLORIDE 600; 310; 30; 20 MG/100ML; MG/100ML; MG/100ML; MG/100ML
INJECTION, SOLUTION INTRAVENOUS CONTINUOUS PRN
Status: DISCONTINUED | OUTPATIENT
Start: 2017-12-29 | End: 2017-12-29

## 2017-12-29 RX ORDER — LIDOCAINE HCL/PF 100 MG/5ML
SYRINGE (ML) INTRAVENOUS
Status: DISCONTINUED | OUTPATIENT
Start: 2017-12-29 | End: 2017-12-29

## 2017-12-29 RX ORDER — SODIUM CHLORIDE 9 MG/ML
INJECTION, SOLUTION INTRAVENOUS ONCE
Status: COMPLETED | OUTPATIENT
Start: 2017-12-29 | End: 2017-12-29

## 2017-12-29 RX ORDER — PROPOFOL 10 MG/ML
VIAL (ML) INTRAVENOUS
Status: DISCONTINUED | OUTPATIENT
Start: 2017-12-29 | End: 2017-12-29

## 2017-12-29 RX ADMIN — PROPOFOL 40 MG: 10 INJECTION, EMULSION INTRAVENOUS at 02:12

## 2017-12-29 RX ADMIN — LIDOCAINE HYDROCHLORIDE 40 MG: 20 INJECTION, SOLUTION INTRAVENOUS at 02:12

## 2017-12-29 RX ADMIN — PROPOFOL 90 MG: 10 INJECTION, EMULSION INTRAVENOUS at 02:12

## 2017-12-29 RX ADMIN — PROPOFOL 20 MG: 10 INJECTION, EMULSION INTRAVENOUS at 02:12

## 2017-12-29 RX ADMIN — PROPOFOL 30 MG: 10 INJECTION, EMULSION INTRAVENOUS at 02:12

## 2017-12-29 RX ADMIN — SODIUM CHLORIDE, SODIUM LACTATE, POTASSIUM CHLORIDE, AND CALCIUM CHLORIDE: 600; 310; 30; 20 INJECTION, SOLUTION INTRAVENOUS at 02:12

## 2017-12-29 RX ADMIN — SODIUM CHLORIDE: 900 INJECTION, SOLUTION INTRAVENOUS at 01:12

## 2017-12-29 NOTE — PROGRESS NOTES
Clinic Consult:  [facility] Consultation Note    Reason for Consult:  The primary encounter diagnosis was Colon cancer screening. A diagnosis of History of colon polyps was also pertinent to this visit.    PCP: Arnulfo Du       HPI:  This is a 67 y.o. male here for evaluation for colon cancer screening. He is due for repeat screening colonoscopy. His last colonoscopy in 2014 with two large polyps. Recommended repeat study in 3 years. He denies any GI complaints at this time. No abdominal pain, hematochezia, melena, nausea, vomiting, or weight loss.      Review of Systems   Constitutional: Negative for fever, malaise/fatigue and weight loss.   HENT: Negative for sore throat.    Respiratory: Negative for cough and wheezing.    Cardiovascular: Negative for chest pain and palpitations.   Gastrointestinal: Negative for abdominal pain, blood in stool, constipation, diarrhea, heartburn, melena, nausea and vomiting.   Genitourinary: Negative for dysuria and frequency.   Musculoskeletal: Negative for back pain, joint pain, myalgias and neck pain.   Skin: Negative for itching and rash.   Neurological: Negative for dizziness, speech change, seizures, loss of consciousness and headaches.   Psychiatric/Behavioral: Negative for depression and substance abuse. The patient is not nervous/anxious.        Medical History:  has a past medical history of Anemia in CKD (chronic kidney disease); Atrial fibrillation; CKD (chronic kidney disease) stage 4, GFR 15 (11/26/2014); Colon cancer screening (12/19/2014); Elevated PSA (11/26/2014); HTN (hypertension) diagnosed in his 40s (10/16/2014); Monoclonal gammopathy (11/26/2014); Phobic anxiety disorder (11/26/2014); and Proteinuria (11/26/2014).    Surgical History:  has a past surgical history that includes AV fistula placement (11/2014); Vasectomy; Peritoneal catheter insertion; and Vasectomy.    Family History: family history includes Cancer in his brother and paternal  uncle; Cataracts in his mother; Dementia in his father; Diabetes in his mother; Glaucoma in his mother; Heart disease in his father; Hypertension in his sister; Kidney disease in his sister..     Social History:  reports that he quit smoking about 33 years ago. His smoking use included Cigarettes. He has a 15.00 pack-year smoking history. He has never used smokeless tobacco. He reports that he drinks about 2.5 - 3.5 oz of alcohol per week . He reports that he does not use drugs.    Allergies: Reviewed    Home Medications:   Current Outpatient Prescriptions on File Prior to Visit   Medication Sig Dispense Refill    alprazolam (XANAX) 1 MG tablet Take 1 mg by mouth 3 (three) times daily as needed for Anxiety.      amlodipine (NORVASC) 10 MG tablet Take 1 tablet (10 mg total) by mouth once daily. 90 tablet 8    calcitRIOL (ROCALTROL) 0.5 MCG Cap Take 0.5 mcg by mouth once daily.      ergocalciferol (VITAMIN D2) 50,000 unit Cap Take 50,000 Units by mouth every 7 days.      fluticasone (FLONASE) 50 mcg/actuation nasal spray 2 sprays by Each Nare route as needed.      losartan (COZAAR) 100 MG tablet Take 100 mg by mouth once daily.       metoprolol tartrate (LOPRESSOR) 50 MG tablet Take 1 tablet (50 mg total) by mouth once daily. 90 tablet 3    potassium chloride (KLOR-CON) 10 MEQ TbSR Take 1 tablet (10 mEq total) by mouth once daily. 90 tablet 3    tadalafil (CIALIS) 10 MG tablet Take 1 tablet (10 mg total) by mouth daily as needed for Erectile Dysfunction. 10 tablet 0    torsemide (DEMADEX) 20 MG Tab Take 5 tablets (100 mg total) by mouth once daily. 450 tablet 3    VELPHORO 500 mg Chew CHEW AND SWALLOW ONE TABLET BY MOUTH THREE TIMES DAILY WITH EACH MEAL AND 1 TABLET BY MOUTH WITH EACH SNACK 120 tablet 0    sodium bicarbonate 650 MG tablet Take 2 tablets (1,300 mg total) by mouth 3 (three) times daily. 540 tablet 0     No current facility-administered medications on file prior to visit.        Physical  "Exam:  Vital Signs:  BP (!) 140/80   Pulse 84   Ht 5' 7.5" (1.715 m)   Wt 95.5 kg (210 lb 8.6 oz)   BMI 32.49 kg/m²   Body mass index is 32.49 kg/m².  Physical Exam   Constitutional: He is oriented to person, place, and time and well-developed, well-nourished, and in no distress. No distress.   HENT:   Head: Normocephalic.   Eyes: Conjunctivae are normal. Pupils are equal, round, and reactive to light.   Cardiovascular: Normal rate, regular rhythm and normal heart sounds.    Pulmonary/Chest: Effort normal and breath sounds normal. No respiratory distress.   Abdominal: Soft. Bowel sounds are normal. He exhibits no distension. There is no tenderness.   Neurological: He is alert and oriented to person, place, and time. No cranial nerve deficit.   Skin: Skin is warm and dry. No rash noted.   Psychiatric: Mood and affect normal.       Labs: Pertinent labs reviewed.    CRC Screening: due    Assessment:  1. Colon cancer screening    2. History of colon polyps           Recommendations:  Plan for high risk screening colonoscopy  -     Case request GI: COLONOSCOPY  -     polyethylene glycol (GOLYTELY,NULYTELY) 236-22.74-6.74 -5.86 gram suspension; Use as directed.  Dispense: 4000 mL; Refill: 0    Follow up to be determined after procedure.     Thank you so much for allowing me to participate in the care of FRANCINE Lion Jr.  "

## 2017-12-29 NOTE — ANESTHESIA POSTPROCEDURE EVALUATION
"Anesthesia Post Evaluation    Patient: Alverto Ramirez Jr.    Procedure(s) Performed: Procedure(s) (LRB):  COLONOSCOPY (N/A)    Final Anesthesia Type: MAC  Patient location during evaluation: PACU  Patient participation: Yes- Able to Participate  Level of consciousness: awake and alert  Post-procedure vital signs: reviewed and stable  Pain management: adequate  Airway patency: patent  PONV status at discharge: No PONV  Anesthetic complications: no      Cardiovascular status: blood pressure returned to baseline  Respiratory status: unassisted  Hydration status: euvolemic  Follow-up not needed.        Visit Vitals  BP (!) 148/99   Pulse 91   Temp 36.5 °C (97.7 °F) (Oral)   Resp 18   Ht 5' 7.5" (1.715 m)   Wt 93 kg (205 lb)   SpO2 (!) 93%   BMI 31.63 kg/m²       Pain/Belkys Score: Pain Assessment Performed: Yes (12/29/2017  1:13 PM)  Presence of Pain: denies (12/29/2017  2:50 PM)  Belkys Score: 9 (12/29/2017  2:50 PM)      "

## 2017-12-29 NOTE — DISCHARGE INSTRUCTIONS

## 2017-12-29 NOTE — DISCHARGE SUMMARY
[facility]  Brief Operative Note     SUMMARY     Surgery Date: 12/29/2017     Surgeon(s) and Role:     * Tony Peacock III, MD - Primary    Assisting Surgeon: None    Pre-op Diagnosis:  Colon cancer screening [Z12.11]  History of colon polyps [Z86.010]    Post-op Diagnosis:  Post-Op Diagnosis Codes:     * Colon cancer screening [Z12.11]     * History of colon polyps [Z86.010]      - Diverticulosis  Procedure(s) (LRB):  COLONOSCOPY (N/A)    Anesthesia: Monitor Anesthesia Care    Description of the findings of the procedure: Procedures completed. See Procedure note for full details.    Findings/Key Components: Procedures completed. See Procedure note for full details.    Prosthetics/Devices: None    Estimated Blood Loss: * No values recorded between 12/29/2017 12:00 AM and 12/29/2017  3:02 PM *         Specimens:   Specimen (12h ago through future)    None          Discharge Note    SUMMARY     Admit Date: 12/29/2017    Discharge Date and Time: 12/29/2017    Hospital Course (synopsis of major diagnoses, care, treatment, and services provided during the course of the hospital stay):  Procedures completed. See Procedure note for full details. Discharge patient when discharge criteria met.    Final Diagnosis: Post-Op Diagnosis Codes:     * Colon cancer screening [Z12.11]     * History of colon polyps [Z86.010]      - Diverticulosis  Disposition: Discharge patient when discharge criteria met.    Follow Up/Patient Instructions:       Medications:  Reconciled Home Medications: Current Discharge Medication List      CONTINUE these medications which have NOT CHANGED    Details   alprazolam (XANAX) 1 MG tablet Take 1 mg by mouth 3 (three) times daily as needed for Anxiety.      amlodipine (NORVASC) 10 MG tablet Take 1 tablet (10 mg total) by mouth once daily.  Qty: 90 tablet, Refills: 8      calcitRIOL (ROCALTROL) 0.5 MCG Cap Take 0.5 mcg by mouth once daily.      ergocalciferol (VITAMIN D2) 50,000 unit Cap  Take 50,000 Units by mouth every 7 days.      fluticasone (FLONASE) 50 mcg/actuation nasal spray 2 sprays by Each Nare route as needed.      losartan (COZAAR) 100 MG tablet Take 100 mg by mouth once daily.       metoprolol tartrate (LOPRESSOR) 50 MG tablet Take 1 tablet (50 mg total) by mouth once daily.  Qty: 90 tablet, Refills: 3      polyethylene glycol (GOLYTELY,NULYTELY) 236-22.74-6.74 -5.86 gram suspension Use as directed.  Qty: 4000 mL, Refills: 0    Associated Diagnoses: Colon cancer screening; History of colon polyps      torsemide (DEMADEX) 20 MG Tab Take 5 tablets (100 mg total) by mouth once daily.  Qty: 450 tablet, Refills: 3      VELPHORO 500 mg Chew CHEW AND SWALLOW ONE TABLET BY MOUTH THREE TIMES DAILY WITH EACH MEAL AND 1 TABLET BY MOUTH WITH EACH SNACK  Qty: 120 tablet, Refills: 0      sodium bicarbonate 650 MG tablet Take 2 tablets (1,300 mg total) by mouth 3 (three) times daily.  Qty: 540 tablet, Refills: 0      tadalafil (CIALIS) 10 MG tablet Take 1 tablet (10 mg total) by mouth daily as needed for Erectile Dysfunction.  Qty: 10 tablet, Refills: 0         STOP taking these medications       potassium chloride (KLOR-CON) 10 MEQ TbSR Comments:   Reason for Stopping:                Discharge Procedure Orders  Diet general     Activity as tolerated

## 2017-12-29 NOTE — TRANSFER OF CARE
"Anesthesia Transfer of Care Note    Patient: Alverto Ramirez Jr.    Procedure(s) Performed: Procedure(s) (LRB):  COLONOSCOPY (N/A)    Patient location: PACU    Anesthesia Type: MAC    Transport from OR: Transported from OR on room air with adequate spontaneous ventilation    Post pain: adequate analgesia    Post assessment: no apparent anesthetic complications    Post vital signs: stable    Level of consciousness: awake    Nausea/Vomiting: no nausea/vomiting    Complications: none    Transfer of care protocol was followed      Last vitals:   Visit Vitals  BP (!) 148/99   Pulse 91   Temp 36.5 °C (97.7 °F) (Oral)   Resp 18   Ht 5' 7.5" (1.715 m)   Wt 93 kg (205 lb)   SpO2 (!) 93%   BMI 31.63 kg/m²     "

## 2017-12-29 NOTE — ANESTHESIA RELEASE NOTE
"Anesthesia Release from PACU Note    Patient: Alverto Ramirez Jr.    Procedure(s) Performed: Procedure(s) (LRB):  COLONOSCOPY (N/A)    Anesthesia type: MAC    Post pain: Adequate analgesia    Post assessment: no apparent anesthetic complications    Last Vitals:   Visit Vitals  BP (!) 148/99   Pulse 91   Temp 36.5 °C (97.7 °F) (Oral)   Resp 18   Ht 5' 7.5" (1.715 m)   Wt 93 kg (205 lb)   SpO2 (!) 93%   BMI 31.63 kg/m²       Post vital signs: stable    Level of consciousness: awake    Nausea/Vomiting: no nausea/no vomiting    Complications: none    Airway Patency: patent    Respiratory: unassisted    Cardiovascular: stable and blood pressure at baseline    Hydration: euvolemic  "

## 2018-01-24 DIAGNOSIS — Z76.82 ORGAN TRANSPLANT CANDIDATE: Primary | ICD-10-CM

## 2018-01-24 NOTE — PROGRESS NOTES
YEARLY LIST MANAGEMENT NOTE    Alverto Ramirez's kidney transplant listing status reviewed.  Patient is due for follow-up appointments in March 2018.  Appointments will be scheduled per protocol.  HLA sample is current and being rec'd on a regular basis.

## 2018-02-15 ENCOUNTER — HOSPITAL ENCOUNTER (OUTPATIENT)
Dept: RADIOLOGY | Facility: HOSPITAL | Age: 68
Discharge: HOME | End: 2018-02-15
Attending: INTERNAL MEDICINE
Payer: MEDICARE

## 2018-02-15 ENCOUNTER — CLINICAL SUPPORT (OUTPATIENT)
Dept: CARDIOLOGY | Facility: CLINIC | Age: 68
End: 2018-02-15
Attending: INTERNAL MEDICINE
Payer: MEDICARE

## 2018-02-15 DIAGNOSIS — Z76.82 ORGAN TRANSPLANT CANDIDATE: ICD-10-CM

## 2018-02-15 LAB
DIASTOLIC DYSFUNCTION: YES
ESTIMATED PA SYSTOLIC PRESSURE: 28.3
RETIRED EF AND QEF - SEE NOTES: 60 (ref 55–65)

## 2018-02-15 PROCEDURE — 71046 X-RAY EXAM CHEST 2 VIEWS: CPT | Mod: 26,TXP,, | Performed by: RADIOLOGY

## 2018-02-15 PROCEDURE — 93306 TTE W/DOPPLER COMPLETE: CPT | Mod: PBBFAC,PO,TXP | Performed by: INTERNAL MEDICINE

## 2018-02-15 PROCEDURE — 71046 X-RAY EXAM CHEST 2 VIEWS: CPT | Mod: TC,FY,PO,TXP

## 2018-02-20 RX ORDER — METOPROLOL TARTRATE 50 MG/1
TABLET ORAL
Qty: 90 TABLET | Refills: 3 | Status: SHIPPED | OUTPATIENT
Start: 2018-02-20 | End: 2019-02-18 | Stop reason: SDUPTHER

## 2018-02-22 ENCOUNTER — TELEPHONE (OUTPATIENT)
Dept: NEUROLOGY | Facility: CLINIC | Age: 68
End: 2018-02-22

## 2018-02-22 RX ORDER — POTASSIUM CHLORIDE 750 MG/1
10 TABLET, EXTENDED RELEASE ORAL DAILY
Qty: 90 TABLET | Refills: 2 | Status: SHIPPED | OUTPATIENT
Start: 2018-02-22 | End: 2018-03-09 | Stop reason: SDUPTHER

## 2018-02-22 NOTE — TELEPHONE ENCOUNTER
Patient is requesting a RX for potassium chlor 10MEq, this is not on patient's med list. Please order if necessary

## 2018-02-26 ENCOUNTER — TELEPHONE (OUTPATIENT)
Dept: NEPHROLOGY | Facility: CLINIC | Age: 68
End: 2018-02-26

## 2018-02-26 NOTE — TELEPHONE ENCOUNTER
----- Message from Soraida Pantoja sent at 2/26/2018  9:41 AM CST -----  Contact: rakel  needs to verify potassium chloride sent it, pt now aware that he was to start this rx...[phone]

## 2018-02-26 NOTE — TELEPHONE ENCOUNTER
Called and spoke with Lakisha with the pharmacy and patient's wife and informed them that patient will need to take the medication. Dr. Bernstein sent in the prescription.

## 2018-03-09 RX ORDER — POTASSIUM CHLORIDE 750 MG/1
10 TABLET, EXTENDED RELEASE ORAL DAILY
Qty: 90 TABLET | Refills: 2 | Status: SHIPPED | OUTPATIENT
Start: 2018-03-09 | End: 2018-03-16 | Stop reason: SDUPTHER

## 2018-03-09 RX ORDER — CLOTRIMAZOLE AND BETAMETHASONE DIPROPIONATE 10; .64 MG/G; MG/G
CREAM TOPICAL 2 TIMES DAILY
COMMUNITY
End: 2018-03-14 | Stop reason: SDUPTHER

## 2018-03-09 NOTE — TELEPHONE ENCOUNTER
----- Message from Livier Parisi sent at 3/9/2018  8:30 AM CST -----  Contact: pt  He's calling to discuss medication, please advise [phone] (home)

## 2018-03-14 RX ORDER — CLOTRIMAZOLE AND BETAMETHASONE DIPROPIONATE 10; .64 MG/G; MG/G
1 CREAM TOPICAL 2 TIMES DAILY
Qty: 15 G | Refills: 3 | Status: SHIPPED | OUTPATIENT
Start: 2018-03-14 | End: 2018-04-13

## 2018-03-16 ENCOUNTER — LAB VISIT (OUTPATIENT)
Dept: LAB | Facility: HOSPITAL | Age: 68
End: 2018-03-16
Payer: MEDICARE

## 2018-03-16 DIAGNOSIS — Z76.82 ORGAN TRANSPLANT CANDIDATE: ICD-10-CM

## 2018-03-16 PROCEDURE — 86829 HLA CLASS I/II ANTIBODY QUAL: CPT | Mod: 91,PO,TXP

## 2018-03-16 PROCEDURE — 86829 HLA CLASS I/II ANTIBODY QUAL: CPT | Mod: PO,TXP

## 2018-03-16 RX ORDER — POTASSIUM CHLORIDE 750 MG/1
10 TABLET, EXTENDED RELEASE ORAL DAILY
Qty: 90 TABLET | Refills: 3 | Status: SHIPPED | OUTPATIENT
Start: 2018-03-16 | End: 2019-04-10 | Stop reason: SDUPTHER

## 2018-03-21 LAB — HPRA INTERPRETATION: NORMAL

## 2018-03-24 RX ORDER — SUCROFERRIC OXYHYDROXIDE 500 MG/1
TABLET, CHEWABLE ORAL
Qty: 120 TABLET | Refills: 0 | Status: SHIPPED | OUTPATIENT
Start: 2018-03-24 | End: 2018-05-04 | Stop reason: SDUPTHER

## 2018-04-03 ENCOUNTER — OFFICE VISIT (OUTPATIENT)
Dept: TRANSPLANT | Facility: CLINIC | Age: 68
End: 2018-04-03
Payer: MEDICARE

## 2018-04-03 VITALS
RESPIRATION RATE: 18 BRPM | HEART RATE: 80 BPM | SYSTOLIC BLOOD PRESSURE: 153 MMHG | DIASTOLIC BLOOD PRESSURE: 85 MMHG | HEIGHT: 67 IN | BODY MASS INDEX: 33.01 KG/M2 | WEIGHT: 210.31 LBS | OXYGEN SATURATION: 98 % | TEMPERATURE: 99 F

## 2018-04-03 DIAGNOSIS — I15.0 RENOVASCULAR HYPERTENSION: ICD-10-CM

## 2018-04-03 DIAGNOSIS — N18.6 ANEMIA IN ESRD (END-STAGE RENAL DISEASE): Chronic | ICD-10-CM

## 2018-04-03 DIAGNOSIS — D47.2 MONOCLONAL GAMMOPATHY: ICD-10-CM

## 2018-04-03 DIAGNOSIS — N18.6 ESRD ON PERITONEAL DIALYSIS: Chronic | ICD-10-CM

## 2018-04-03 DIAGNOSIS — E21.3 HPTH (HYPERPARATHYROIDISM): ICD-10-CM

## 2018-04-03 DIAGNOSIS — D63.1 ANEMIA IN ESRD (END-STAGE RENAL DISEASE): Chronic | ICD-10-CM

## 2018-04-03 DIAGNOSIS — Z99.2 ESRD ON PERITONEAL DIALYSIS: Chronic | ICD-10-CM

## 2018-04-03 DIAGNOSIS — R97.20 ELEVATED PSA: ICD-10-CM

## 2018-04-03 DIAGNOSIS — Z76.82 PATIENT ON WAITING LIST FOR KIDNEY TRANSPLANT: Primary | ICD-10-CM

## 2018-04-03 PROCEDURE — 99214 OFFICE O/P EST MOD 30 MIN: CPT | Mod: S$PBB,TXP,, | Performed by: NURSE PRACTITIONER

## 2018-04-03 PROCEDURE — 99214 OFFICE O/P EST MOD 30 MIN: CPT | Mod: PBBFAC,TXP | Performed by: NURSE PRACTITIONER

## 2018-04-03 PROCEDURE — 99999 PR PBB SHADOW E&M-EST. PATIENT-LVL IV: CPT | Mod: PBBFAC,TXP,, | Performed by: NURSE PRACTITIONER

## 2018-04-03 NOTE — LETTER
Date: 4/4/2018          Listed Patient      To: Dialysis Unit  and Charge RN From: Lavelle Busby LMSW  RE: Alverto Ramirez , 1950, [id number]     At [facility], we conduct adherence checks as an important part of transplant care. Initial and listed patient assessments are not complete without adherence information.        Please complete the following information:     Current Dry Weight: ___________         Most Recent Pre-Treatment Weight: ___________ /date: _________                    Data from the last 3 months:  (data from last 3 months preferred):    Number of AMAs with dates, time, and reasons: ____________________________________________________    ______________________________________________________________________________________________    ______________________________________________________________________________________________    Number of No-Shows with dates and reasons: ______________________________________________________      ______________________________________________________________________________________________    Last intact PTH:  ___________/date: __________      Any concerns with Labs:  YES / NO      If yes, please explain:  ___________________________________________________________________________    ______________________________________________________________________________________________    Any concerns with Caregivers:  YES / NO    If yes, please explain:  ___________________________________________________________________________    ______________________________________________________________________________________________     Any concerns with Transportation:  YES / NO    If yes, please explain:  ___________________________________________________________________________    ______________________________________________________________________________________________    Any Psychiatric and/or Psychosocial concerns:  YES /  NO     If yes, please explain: ___________________________________________________________________________    ______________________________________________________________________________________________      PLEASE RETURN TO: FAX: [phone]     Thank you for collaborating with us in the care of this patient.           [address]  ?  [place] [address]  ?  phone [phone]  ?  fax [phone]  ?  [email or web address]  Confidentiality notice: The accompanying facsimile is intended solely for the use of the recipient designated above. Document(s) transmitted herewith may contain information that is confidential and privileged. Delivery, distribution or dissemination of this communication other than to the intended recipient is strictly prohibited. If you have received this facsimile in error, please notify us immediately by telephone.

## 2018-04-03 NOTE — PROGRESS NOTES
Kidney Transplant Recipient Reevalulation    Referring Physician: Chris Adair  Current Nephrologist: Robson Bernstein  Waitlist Status: active  Dialysis Start Date: 2/20/2017    Subjective:     CC:  Six month reassessment of kidney transplant candidacy.    HPI:  Mr. Ramirez is a 67 y.o. year old Black or  male with ESRD secondary to HTN.  He has been on the wait list for a kidney transplant at [facility] since 3/30/2015. Patient is currently on peritoneal dialysis started on 2/20/2017. Patient is dialyzing on cyclic peritoneal dialysis.  Patient reports that he is tolerating dialysis well. . He has a PD catheter. Denies peritonitis. Patient denies any recent hospitalizations or ED visits.      Elevated PSA  Lab Results   Component Value Date    PSA 4.9 (H) 07/11/2017    PSA 5.1 (H) 05/09/2017    PSA 4.2 (H) 11/26/2014 7/17/2017 Dr Helm / urology  Impression/Plan:   1. PSA elevated but not extremely so, not rising.  Will recheck 6/12 mo with RTC at 12 mo to review.  Low risk of prostate cancer at this point and without a bigger upward trend would not biopsy the prostate.     HX MGUS--Last seen by hemoc in 1/13/2015 12/29/2017 colonoscopy  Impression:           - Diverticulosis in the sigmoid colon, in the                         descending colon, in the transverse colon and in                         the ascending colon.                        - No specimens collected.                        - The exam was otherwise normal to the cecum.  Recommendation:       - Discharge patient to home (via wheelchair).                        - High fiber diet.                        - Continue present medications.                        - Repeat colonoscopy in 5 years for surveillance.                        - Return to primary care physician as previously                         scheduled.                        - Discharge patient to home (via wheelchair).                        - High fiber  "diet.                        - Continue present medications.                        - Repeat colonoscopy in 5 years for surveillance.                        - Return to primary care physician as previously                         scheduled.      Review of Systems   Constitutional: Negative for activity change, appetite change, chills, fatigue, fever and unexpected weight change.   HENT: Negative for congestion, facial swelling, postnasal drip, rhinorrhea, sinus pressure, sore throat and trouble swallowing.    Eyes: Negative for pain, redness and visual disturbance.   Respiratory: Negative for cough, chest tightness, shortness of breath and wheezing.    Cardiovascular: Negative.  Negative for chest pain, palpitations and leg swelling.   Gastrointestinal: Negative for abdominal pain, diarrhea, nausea and vomiting.   Genitourinary: Negative for dysuria, flank pain and urgency.   Musculoskeletal: Negative for gait problem, neck pain and neck stiffness.   Skin: Negative for rash.   Allergic/Immunologic: Negative for environmental allergies, food allergies and immunocompromised state.   Neurological: Negative for dizziness, weakness, light-headedness and headaches.   Psychiatric/Behavioral: Negative for agitation and confusion. The patient is not nervous/anxious.        Objective:   body mass index is 32.59 kg/m².  BP (!) 153/85 (BP Location: Right arm, Patient Position: Sitting, BP Method: Medium (Automatic))   Pulse 80   Temp 99.2 °F (37.3 °C) (Oral)   Resp 18   Ht 5' 7.36" (1.711 m)   Wt 95.4 kg (210 lb 5.1 oz)   SpO2 98%   BMI 32.59 kg/m²     Physical Exam   Constitutional: He is oriented to person, place, and time. He appears well-developed and well-nourished.   HENT:   Head: Normocephalic.   Mouth/Throat: Oropharynx is clear and moist. No oropharyngeal exudate.   Eyes: Conjunctivae and EOM are normal. Pupils are equal, round, and reactive to light. No scleral icterus.   Neck: Normal range of motion. Neck " supple.   Cardiovascular: Normal rate, regular rhythm and normal heart sounds.    Pulmonary/Chest: Effort normal and breath sounds normal.   Abdominal: Soft. Normal appearance and bowel sounds are normal. He exhibits no distension and no mass. There is no splenomegaly or hepatomegaly. There is no tenderness. There is no rebound, no guarding, no CVA tenderness, no tenderness at McBurney's point and negative Richardson's sign.       Musculoskeletal: Normal range of motion. He exhibits edema.   Bilateral trace peripheral edema    Lymphadenopathy:     He has no cervical adenopathy.   Neurological: He is alert and oriented to person, place, and time. He exhibits normal muscle tone. Coordination normal.   Skin: Skin is warm and dry.   Psychiatric: He has a normal mood and affect. His behavior is normal.   Vitals reviewed.      Labs:    Lab Results   Component Value Date    WBC 5.81 02/01/2017    HGB 10.9 (L) 02/01/2017    HCT 32.5 (L) 02/01/2017     02/01/2017    K 4.0 12/29/2017     02/01/2017    CO2 18 (L) 02/01/2017    BUN 95 (H) 02/01/2017    CREATININE 9.9 (H) 02/01/2017    EGFRNONAA 4.8 (A) 02/01/2017    CALCIUM 9.2 02/01/2017    PHOS 6.0 (H) 02/01/2017    ALBUMIN 2.9 (L) 02/01/2017    AST 27 11/26/2014    ALT 27 11/26/2014    UTPCR 5.38 (H) 02/01/2017    PTH 93.0 (H) 12/15/2016       Lab Results   Component Value Date    BILIRUBINUA Negative 02/01/2017    PROTEINUA 3+ (A) 02/01/2017    NITRITE neg\ 07/17/2017    RBCUA 0 02/01/2017    WBCUA 3 02/01/2017       No results found for: HLAABCTYPE    Lab Results   Component Value Date    CPRA 0 06/03/2017    LV8RNTV Negative 06/03/2017    CIIAB Negative 06/03/2017       Labs were reviewed with the patient.    Pre-transplant Workup:   Reviewed with the patient.    Assessment:     1. Patient on waiting list for kidney transplant    2. Monoclonal gammopathy    3. Renovascular hypertension    4. HPTH (hyperparathyroidism)    5. Elevated PSA    6. ESRD on peritoneal  dialysis    7. Anemia in ESRD (end-stage renal disease)    8. BMI 32.0-32.9,adult        Plan:      Needs urology F/U w/ PSA 7/2018    Discussed case w/ Dr Kristen CHAUDHARY--needs Hemonc f/u every 6 months  . Last eval.  1/13/2015  ? Past  Bone marrow biopsy, please get record for Pt chart      Transplant Candidacy:   Mr. Ramirez is a suitable kidney transplant candidate.  He remains in overall stable health, and will remain active on the transplant list.    Jessica Spann NP       Follow-up:   In addition to the tests noted in the plan, Mr. Ramirez will continue to have reevaluation as per the standing pre-kidney transplant protocol:  1. Monthly blood for PRA  2. Annual return to clinic, except HIV positive, > 65 years of age, or pancreas transplant candidates who will be scheduled to see transplant every 6 months while in pre-transplant phase  3. Annual re-testing: CXR, EKG, yearly mammograms for women over 40 and PSA for males over 40, cardiology follow-up as recommended by initial cardiology pre-transplant evaluation  4. Renal ultrasound every 2 years  5. Baseline colonoscopy after age 50 and repeated as recommended    UNOS Patient Status  Functional Status: 80% - Normal activity with effort: some symptoms of disease  Physical Capacity: No Limitations

## 2018-04-03 NOTE — LETTER
April 3, 2018        Robson Bernstein  [address]  [place] [address]  Phone: [phone]  Fax: [phone]             [facility]  [address]  [facility] [address]  Phone: [phone]   Patient: Alverto Ramirez Jr.   MR Number: [id number]   YOB: 1950   Date of Visit: 4/3/2018       Dear Dr. Robson Bernstein    Thank you for referring Alverto Ramirze to me for evaluation. Attached you will find relevant portions of my assessment and plan of care.    If you have questions, please do not hesitate to call me. I look forward to following Alverto Ramirez along with you.    Sincerely,    Jessica Spann, NP    Enclosure    If you would like to receive this communication electronically, please contact [email or web address] or [phone] to request [employer] access.    [employer] is a tool which provides read-only access to select patient information with whom you have a relationship. Its easy to use and provides real time access to review your patients record including encounter summaries, notes, results, and demographic information.    If you feel you have received this communication in error or would no longer like to receive these types of communications, please e-mail [email or web address]

## 2018-04-04 NOTE — PROGRESS NOTES
"Transplant Recipient Adult Psychosocial Assessment    *This is an update assessment with the previous assessment completed on 2017; pt is listed for kidney txp since 3/30/2015 (UNOS qualified: 3/30/2015).      Alverto Ramirez  [address] [facility]  [place] [address]    Telephone Information:   Mobile [phone]   Mobile [phone]   Home  [phone] (home)  Work  There is no work phone number on file.  E-mail  [email or web address]    Sex: male  YOB: 1950  Age: 67 y.o.    Encounter Date: 4/3/2018  [place] Citizen: yes  Primary Language: English   Needed: no    Emergency Contact:  Name: Liana Ramirez  Relationship: wife  Address: Same as pt.  Phone Numbers:  [phone] (home), [phone] (work), [phone] (mobile)    Name: Capo Ashley  Relationship: son  Address: [address] [place] [address] [place] [address]  Phone Numbers:  [phone] (mobile)    Family/Social Support:   Number of dependents/: Pt reports no dependents.  Marital history: pt reports being  only once and then to pt's Liana Ramirez for the past 47 years.  Other family dynamics: Pt reports 2 adult sons: Capo and Doug (resides in [place]); sisters: Sue, Mayelin, Fernanda, and Jen (half-sister); 1 brother: Charles. Pt reports both parents are  and pt identifies all family members (except sisters: Sue and Fernanda) as supportive.    Household Composition:  Name: Alverto Ramirez  Age: 66  Relationship: patient  Does person drive? yes    Name: Liana Ramirez  Age: 66  Relationship: wife  Does person drive? yes     Name: Capo Ramirez (pt reports son is "in and out of the house"  Age: 43  Relationship: son  Does person drive? yes    Do you and your caregivers have access to reliable transportation? yes     PRIMARY CAREGIVER: Liana Ramirez, pt's wife,  will be primary caregiver, phone number [phone].      provided in-depth information to patient and " caregiver regarding pre- and post-transplant caregiver role.   strongly encourages patient and caregiver to have concrete plan regarding post-transplant care giving, including back-up caregiver(s) to ensure care giving needs are met as needed.    Patient and Caregiver states understanding all aspects of caregiver role/commitment and is able/willing/committed to being caregiver to the fullest extent necessary.    Patient and Caregiver verbalizes understanding of the education provided today and caregiver responsibilities.         remains available. Patient and Caregiver agree to contact  in a timely manner if concerns arise.      Able to take time off work without financial concerns: yes.      Additional Significant Others who will Assist with Transplant:  Name: Capo Ramirez  Age: 42  Phone: [phone]  City: [place] State: [place]  Relationship: son  Does person drive? yes       Living Will: no Education and information provided to pt.  Healthcare Power of : no Education and information provided to pt. Pt did report trusting wife with medical decisions  Advance Directives on file: <<no information> per medical record.  Verbally reviewed LW/HCPA information.   provided patient with copy of LW/HCPA documents and provided education on completion of forms    Living Donors: No. Education and resource information given to patient.     Highest Education Level: Attended College/Technical School  Reading Ability: college  Reports difficulty with: seeing and wears bifocals  Learns Best By:  A combination of verbal, written, and hands-on instruction.      Status: no  VA Benefits: no     Working for Income: No  If no, reason not working: Patient Choice - Retired  Patient is retired from owning/operating driving school..    Spouse/Significant Other Employment: Pt and pt's wife both retired from work/own the driving school.    Disabled: no, however pt received  USP from [employer] due to anxiety.    Monthly Income:  senior care: $300  SS Reitrement: $1840  Other household members total: $360 (wife's income)      Able to afford all costs now and if transplanted, including medications: yes pt and wife reports conerns financially. SW did encourage pt to contact insurance company regarding post transplant medications cost.   Patient verbalizes understanding of personal responsibilities related to transplant costs and the importance of having a financial plan to ensure that patients transplant costs are fully covered.   provided fundraising information/education.  Patient verbalizes understanding.   remains available.    Insurance:   Payor/Plan Subscr  Sex Relation Sub. Ins. ID Effective Group Num   1. MEDICARE - ME* ADAM MITCHELL JR. 1950 Male  [id number]                                     [address]   2. AETNA - AETNA* ADAM MITCHELL JR. 1950 Male  [id number]                                   [address]     Primary Insurance (for UNOS reporting): Public Insurance - Medicare FFS (Fee For Service)  Secondary Insurance (for UNOS reporting): Private Insurance (Pt reports that he pays and also has a part D)    Patient and Caregiver verbalizes clear understanding that patient may experience difficulty obtaining and/or be denied insurance coverage post-surgery. This includes and is not limited to disability insurance, life insurance, health insurance, burial insurance, long term care insurance, and other insurances.      Patient and Caregiver also reports understanding that future health concerns related to or unrelated to transplantation may not be covered by patient's insurance.  Resources and information provided and reviewed.     Patient and Caregiver provides verbal permission to release any necessary information to outside resources for patient care and discharge planning.  Resources and information  provided are reviewed.      Patient provides verbal permission to release any necessary information to outside resources for patient care and discharge planning.  Resources and information provided are reviewed.      Dialysis History:  Patient reports being on peritoneal dialysis attending all dialysis appointments and staying for the entire course of treatment. SW faxed adherence form.     Infusion Service: patient utilizing? no  Home Health: patient utilizing? no  DME: yes PD equipment and BP Cuff  Pulmonary/Cardiac Rehab: Pt denies.   ADLS:  Pt reports no difficulties with driving, walking, bathing, cooking, housekeeping, eating, shopping, and taking medication.    Adherence:   Pt reports good adherence with medications, dialysis, and health regimen..  Adherence education and counseling provided.     Per History Section:  Past Medical History:   Diagnosis Date    Anemia in CKD (chronic kidney disease)     Atrial fibrillation     CKD (chronic kidney disease) stage 4, GFR 15 11/26/2014    Colon cancer screening 12/19/2014    Elevated PSA 11/26/2014    HTN (hypertension) diagnosed in his 40s 10/16/2014    Monoclonal gammopathy 11/26/2014    Phobic anxiety disorder 11/26/2014    Proteinuria 11/26/2014     Social History   Substance Use Topics    Smoking status: Former Smoker     Packs/day: 1.00     Years: 15.00     Types: Cigarettes     Quit date: 11/26/1984    Smokeless tobacco: Never Used    Alcohol use 7.2 - 8.4 oz/week     5 - 7 Standard drinks or equivalent, 7 Glasses of wine per week      Comment: drinks glass red wine nightly     History   Drug Use No       Patient and Caregiver states clear understanding of the potential impact of substance use as it relates to transplant candidacy and is aware of possible random substance screening.  Substance abstinence/cessation counseling, education and resources provided and reviewed.     Arrests/DWI/Treatment/Rehab: patient denies    Psychiatric History:     Mental Health: anxiety. Pt reports dealing with his anxiety for the past 22 years. Pt confirmed previously reported claustrophobia and tight space as triggers.  Pt denies any recent difficulties.  Pt was previously cleared by Dr. Willis for transplant. Pt reports that he can go FPC through an MRI machine.  Psychiatrist/Counselor: Pt reports previously seeing Dr. Willis, psychiatrist, however reports that he is looking for a new psychiatrist that accepts his insurance.  Pt reports he does have the list of insurance approved psychiatrist and will choose a provider soon.   Medications:  Pt reports taking Xanax, 1 mg PRN. However, pt reports medication does not work as well as it once did. SW inquired if he expressed concern to prescribing doctor. Pt denies. SW STRONGLY encourage pt to communicate concern with doctor. Pt verbalized understanding and agreement.   Suicide/Homicide Issues: Pt denies any history of or current suicidal or homicidal ideations.    Safety in Household: Pt reports no current or history of safety concerns in household; including mental, physical, verbal, or sexual abuse.    Knowledge: Patient and Caregiver states having clear understanding and realistic expectations regarding the potential risks and potential benefits of organ transplantation and organ donation, agrees to discuss with health care team members and support system members and to utilize available resources and express questions and/or concerns in order to further facilitate the pt informed decision-making.  Resources and information provided and reviewed.     Patient and Caregiver is aware of [facility]'s affiliation and/or partnership with agencies in home health care, LTAC, SNF, [facility], and other hospitals and clinics.    Understanding: Patient and Caregiver reports having a clear understanding of the many lifetime commitments involved with being a transplant recipient, including costs, compliance, medications, lab work, procedures,  appointments, concrete and financial planning, preparedness, timely and appropriate communication of concerns, abstinence (ETOH, tobacco, illicit non-prescribed drugs), adherence to all health care team recommendations, support system and caregiver involvement, appropriate and timely resource utilization and follow-through, mental health counseling as needed/recommended, and patient and caregiver responsibilities.  Social Service Handbook, resources and detailed educational information provided and reviewed.  Educational information provided.    Patient and Caregiver also reports current and expected compliance with health care regime and states having a clear understanding of the importance of compliance.      Patient and Caregiver reports a clear understanding that risks and benefits may be involved with organ transplantation and with organ donation.      Patient and Caregiver also reports clear understanding that psychosocial risk factors may affect patient, and include but are not limited to feelings of depression, generalized anxiety, anxiety regarding dependence on others, post traumatic stress disorder, feelings of guilt and other emotional and/or mental concerns, and/or exacerbation of existing mental health concerns.  Detailed resources provided and discussed.     Patient and Caregiver agrees to access appropriate resources in a timely manner as needed and/or as recommended, and to communicate concerns appropriately.  Patient and Caregiver also reports a clear understanding of treatment options available.     Patient and Caregiver received education in a group setting.   reviewed education, provided additional information, and answered questions.    Feelings or Concerns: Patient and Caregiver did not express any concerns at this time.    Coping: Pt reports coping well with the transplant process at this time and reports taking a walk, watching DVDs (Javier, Jabier & Ian, Dale and Deandre, etc);  "playing computer solitaire, and watching tv as ways to cope. Pt reports Baptism home as [facility] in [place], [place] with Rev. Shine presiding.     Goals: Pt reports enjoying life as a goal for after transplant and travel, play music, and smoke a cigar . SW provided support and education. Pt verbalizes understanding that transplant is not a guarantee of ending dialysis and life after transplantation will be a "new normal" post transplant.  SW remains available.      Interview Behavior: Patient and Caregiver present as alert and oriented x 4, pleasant, good eye contact, well groomed, recall good, concentration/judgement good, average intelligence, calm, communicative, cooperative and asking and answering questions appropriately.  Pt presents with wife: Liana Ramirez with pt's permission.       Transplant Social Work - Candidacy  Assessment/Plan:     Psychosocial Suitability: Patient presents as a suitable candidate for kidney transplant at this time. Based on psychosocial risk factors, patient presents as low risk, due to adequate insurance and financial plan in place, suitable dialysis adherence, and caregiver plan in place.    Recommendations/Additional Comments: SW recommends that pt conduct fundraising to assist pt with pay for cost of medications, food, gas, and other transplant related needs. SW recommends that pt remain aware of potential mental health concerns and contact the team if any concerns arise. SW recommends that pt remain abstinent from tobacco, ETOH, and drug use. SW supports pt's continued dialysis adherence. SW remains available to answer any questions or concerns that arise as the pt moves through the transplant process.       KELLY Rowland, LMSW       "

## 2018-04-11 ENCOUNTER — TELEPHONE (OUTPATIENT)
Dept: NEPHROLOGY | Facility: CLINIC | Age: 68
End: 2018-04-11

## 2018-04-11 DIAGNOSIS — D47.2 MGUS (MONOCLONAL GAMMOPATHY OF UNKNOWN SIGNIFICANCE): Primary | ICD-10-CM

## 2018-04-11 NOTE — TELEPHONE ENCOUNTER
----- Message from Robson Bernstein MD sent at 4/11/2018  1:26 PM CDT -----  Please schedule patient for blood draw SPEP and free light chains as ordered for MGUS     Thanks

## 2018-04-12 ENCOUNTER — LAB VISIT (OUTPATIENT)
Dept: LAB | Facility: HOSPITAL | Age: 68
End: 2018-04-12
Attending: INTERNAL MEDICINE
Payer: MEDICARE

## 2018-04-12 DIAGNOSIS — D47.2 MGUS (MONOCLONAL GAMMOPATHY OF UNKNOWN SIGNIFICANCE): ICD-10-CM

## 2018-04-12 PROCEDURE — 84165 PROTEIN E-PHORESIS SERUM: CPT | Mod: 26,NTX,, | Performed by: PATHOLOGY

## 2018-04-12 PROCEDURE — 86334 IMMUNOFIX E-PHORESIS SERUM: CPT | Mod: TXP

## 2018-04-12 PROCEDURE — 84165 PROTEIN E-PHORESIS SERUM: CPT | Mod: TXP

## 2018-04-12 PROCEDURE — 86334 IMMUNOFIX E-PHORESIS SERUM: CPT | Mod: 26,NTX,, | Performed by: PATHOLOGY

## 2018-04-12 PROCEDURE — 83520 IMMUNOASSAY QUANT NOS NONAB: CPT | Mod: 59,TXP

## 2018-04-12 PROCEDURE — 36415 COLL VENOUS BLD VENIPUNCTURE: CPT | Mod: PO,TXP

## 2018-04-13 LAB
ALBUMIN SERPL ELPH-MCNC: 3.91 G/DL
ALPHA1 GLOB SERPL ELPH-MCNC: 0.35 G/DL
ALPHA2 GLOB SERPL ELPH-MCNC: 1.01 G/DL
B-GLOBULIN SERPL ELPH-MCNC: 0.86 G/DL
GAMMA GLOB SERPL ELPH-MCNC: 0.98 G/DL
PATHOLOGIST INTERPRETATION SPE: NORMAL
PROT SERPL-MCNC: 7.1 G/DL

## 2018-04-13 RX ORDER — AMLODIPINE BESYLATE 10 MG/1
TABLET ORAL
Qty: 90 TABLET | Refills: 8 | Status: SHIPPED | OUTPATIENT
Start: 2018-04-13 | End: 2019-07-01 | Stop reason: SDUPTHER

## 2018-04-16 LAB
INTERPRETATION SERPL IFE-IMP: NORMAL
KAPPA LC SER QL IA: 13.43 MG/DL
KAPPA LC/LAMBDA SER IA: 2.11
LAMBDA LC SER QL IA: 6.37 MG/DL
PATHOLOGIST INTERPRETATION IFE: NORMAL

## 2018-04-19 ENCOUNTER — TELEPHONE (OUTPATIENT)
Dept: NEPHROLOGY | Facility: CLINIC | Age: 68
End: 2018-04-19

## 2018-04-19 DIAGNOSIS — D47.2 MONOCLONAL GAMMOPATHY: Primary | ICD-10-CM

## 2018-04-19 NOTE — TELEPHONE ENCOUNTER
----- Message from Robson Bernstein MD sent at 4/19/2018 11:17 AM CDT -----  Please schedule the patient in hematology for monoclonal gammopathy orders for consultation placed    Thank you

## 2018-04-30 ENCOUNTER — LAB VISIT (OUTPATIENT)
Dept: LAB | Facility: HOSPITAL | Age: 68
End: 2018-04-30
Attending: INTERNAL MEDICINE
Payer: MEDICARE

## 2018-04-30 ENCOUNTER — INITIAL CONSULT (OUTPATIENT)
Dept: HEMATOLOGY/ONCOLOGY | Facility: CLINIC | Age: 68
End: 2018-04-30
Payer: MEDICARE

## 2018-04-30 VITALS
BODY MASS INDEX: 32.7 KG/M2 | SYSTOLIC BLOOD PRESSURE: 142 MMHG | DIASTOLIC BLOOD PRESSURE: 81 MMHG | HEIGHT: 67 IN | RESPIRATION RATE: 18 BRPM | TEMPERATURE: 98 F | WEIGHT: 208.31 LBS | HEART RATE: 77 BPM | OXYGEN SATURATION: 97 %

## 2018-04-30 DIAGNOSIS — D47.2 MONOCLONAL GAMMOPATHY: ICD-10-CM

## 2018-04-30 DIAGNOSIS — D47.2 IGM MONOCLONAL GAMMOPATHY OF UNCERTAIN SIGNIFICANCE: ICD-10-CM

## 2018-04-30 DIAGNOSIS — N52.9 ERECTILE DYSFUNCTION, UNSPECIFIED ERECTILE DYSFUNCTION TYPE: ICD-10-CM

## 2018-04-30 DIAGNOSIS — Z12.5 PROSTATE CANCER SCREENING: ICD-10-CM

## 2018-04-30 DIAGNOSIS — N40.0 BENIGN LOCALIZED PROSTATIC HYPERPLASIA WITHOUT LOWER URINARY TRACT SYMPTOMS (LUTS): ICD-10-CM

## 2018-04-30 DIAGNOSIS — D47.2 MONOCLONAL GAMMOPATHY: Primary | ICD-10-CM

## 2018-04-30 LAB
ALBUMIN SERPL BCP-MCNC: 3.3 G/DL
ALP SERPL-CCNC: 80 U/L
ALT SERPL W/O P-5'-P-CCNC: 91 U/L
ANION GAP SERPL CALC-SCNC: 16 MMOL/L
AST SERPL-CCNC: 44 U/L
BASOPHILS # BLD AUTO: 0.02 K/UL
BASOPHILS NFR BLD: 0.3 %
BILIRUB SERPL-MCNC: 0.4 MG/DL
BUN SERPL-MCNC: 79 MG/DL
CALCIUM SERPL-MCNC: 8.2 MG/DL
CHLORIDE SERPL-SCNC: 103 MMOL/L
CO2 SERPL-SCNC: 21 MMOL/L
COMPLEXED PSA SERPL-MCNC: 4.4 NG/ML
CREAT SERPL-MCNC: 12.3 MG/DL
DIFFERENTIAL METHOD: ABNORMAL
EOSINOPHIL # BLD AUTO: 0.4 K/UL
EOSINOPHIL NFR BLD: 6.7 %
ERYTHROCYTE [DISTWIDTH] IN BLOOD BY AUTOMATED COUNT: 14 %
EST. GFR  (AFRICAN AMERICAN): 4 ML/MIN/1.73 M^2
EST. GFR  (NON AFRICAN AMERICAN): 4 ML/MIN/1.73 M^2
GLUCOSE SERPL-MCNC: 105 MG/DL
HCT VFR BLD AUTO: 31.3 %
HGB BLD-MCNC: 10.6 G/DL
LYMPHOCYTES # BLD AUTO: 1.7 K/UL
LYMPHOCYTES NFR BLD: 26.9 %
MCH RBC QN AUTO: 30.8 PG
MCHC RBC AUTO-ENTMCNC: 33.9 G/DL
MCV RBC AUTO: 91 FL
MONOCYTES # BLD AUTO: 0.8 K/UL
MONOCYTES NFR BLD: 11.9 %
NEUTROPHILS # BLD AUTO: 3.4 K/UL
NEUTROPHILS NFR BLD: 54.2 %
PLATELET # BLD AUTO: 150 K/UL
PMV BLD AUTO: 10.1 FL
POTASSIUM SERPL-SCNC: 3.8 MMOL/L
PROT SERPL-MCNC: 6.8 G/DL
RBC # BLD AUTO: 3.44 M/UL
SODIUM SERPL-SCNC: 140 MMOL/L
WBC # BLD AUTO: 6.29 K/UL

## 2018-04-30 PROCEDURE — 99999 PR PBB SHADOW E&M-EST. PATIENT-LVL III: CPT | Mod: PBBFAC,,, | Performed by: INTERNAL MEDICINE

## 2018-04-30 PROCEDURE — 99205 OFFICE O/P NEW HI 60 MIN: CPT | Mod: S$PBB,,, | Performed by: INTERNAL MEDICINE

## 2018-04-30 PROCEDURE — 80053 COMPREHEN METABOLIC PANEL: CPT | Mod: NTX

## 2018-04-30 PROCEDURE — 99213 OFFICE O/P EST LOW 20 MIN: CPT | Mod: PBBFAC,NTX | Performed by: INTERNAL MEDICINE

## 2018-04-30 PROCEDURE — 85025 COMPLETE CBC W/AUTO DIFF WBC: CPT | Mod: TXP

## 2018-04-30 PROCEDURE — 36415 COLL VENOUS BLD VENIPUNCTURE: CPT | Mod: TXP

## 2018-04-30 PROCEDURE — 84153 ASSAY OF PSA TOTAL: CPT | Mod: NTX

## 2018-04-30 NOTE — LETTER
April 30, 2018      Robson Bernstein MD  [address]  [facility] [address]           [place] [facility] Hematology Oncology  [address] [place] [address]  Phone: [phone]  Fax: [phone]          Patient: Alverto Ramirez Jr.   MR Number: [id number]   YOB: 1950   Date of Visit: 4/30/2018       Dear Dr. Robson Bernstein:    Thank you for referring Alverto Ramirez to me for evaluation. Attached you will find relevant portions of my assessment and plan of care.    If you have questions, please do not hesitate to call me. I look forward to following Alverto Ramirez along with you.    Sincerely,    Rick Godoy Jr., MD    Enclosure  CC:  No Recipients    If you would like to receive this communication electronically, please contact [email or web address] or [phone] to request more information on [employer] access.    For providers and/or their staff who would like to refer a patient to [facility], please contact us through our one-stop-shop provider referral line, [facility], at [phone].    If you feel you have received this communication in error or would no longer like to receive these types of communications, please e-mail [email or web address]

## 2018-04-30 NOTE — PROGRESS NOTES
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  He is currently on peritoneal dialysis and has been placed on the kidney transplant list.   The patient has no complaints today and specifically denies fever, chills, night sweats, weight loss, nausea/vomiting/diarrhea.    Past Medical History:   Diagnosis Date    Anemia in CKD (chronic kidney disease)     Atrial fibrillation     CKD (chronic kidney disease) stage 4, GFR 15 11/26/2014    Colon cancer screening 12/19/2014    Elevated PSA 11/26/2014    HTN (hypertension) diagnosed in his 40s 10/16/2014    Monoclonal gammopathy 11/26/2014    Phobic anxiety disorder 11/26/2014    Proteinuria 11/26/2014         Social History:  No tobacco, alcohol, or illicit drugs      Family History: family history includes Cancer in his brother and paternal uncle; Cataracts in his mother; Dementia in his father; Diabetes in his mother; Glaucoma in his mother; Heart disease in his father; Hypertension in his sister; Kidney disease in his sister.        HPI  Review of Systems   Constitutional: Negative for activity change, appetite change, fatigue, fever and unexpected weight change.   HENT: Negative for congestion, drooling, ear discharge, ear pain, facial swelling, nosebleeds, rhinorrhea, sinus pain, sinus pressure, sneezing, sore throat, tinnitus, trouble swallowing and voice change.    Eyes: Negative for photophobia, discharge and visual disturbance.   Respiratory: Negative for apnea, cough, shortness of breath, wheezing and stridor.    Cardiovascular: Negative for chest pain, palpitations and leg swelling.  "  Gastrointestinal: Negative for abdominal distention, abdominal pain, anal bleeding, nausea and vomiting.   Endocrine: Negative for cold intolerance, polyphagia and polyuria.   Genitourinary: Negative for decreased urine volume, difficulty urinating, enuresis, flank pain, frequency, genital sores, hematuria, testicular pain and urgency.   Musculoskeletal: Negative for arthralgias, back pain, gait problem, joint swelling, myalgias, neck pain and neck stiffness.   Skin: Negative for color change and pallor.   Allergic/Immunologic: Negative for environmental allergies.   Neurological: Negative for dizziness, tremors, seizures, syncope, facial asymmetry, speech difficulty, weakness, light-headedness, numbness and headaches.   Hematological: Negative for adenopathy. Does not bruise/bleed easily.   Psychiatric/Behavioral: Negative for agitation, confusion, decreased concentration and hallucinations. The patient is not nervous/anxious and is not hyperactive.        Objective:       Vitals:    04/30/18 1134   BP: (!) 142/81   Pulse: 77   Resp: 18   Temp: 98.2 °F (36.8 °C)   TempSrc: Oral   SpO2: 97%   Weight: 94.5 kg (208 lb 5.4 oz)   Height: 5' 7" (1.702 m)       Physical Exam   Constitutional: He is oriented to person, place, and time. He appears well-developed and well-nourished. No distress.   HENT:   Head: Normocephalic and atraumatic.   Nose: Nose normal.   Mouth/Throat: Oropharynx is clear and moist. No oropharyngeal exudate.   Eyes: Conjunctivae and EOM are normal. Pupils are equal, round, and reactive to light. Right eye exhibits no discharge. Left eye exhibits no discharge. No scleral icterus.   Neck: Normal range of motion. Neck supple. No JVD present. No tracheal deviation present. No thyromegaly present.   Cardiovascular: Normal rate and regular rhythm.  Exam reveals no gallop and no friction rub.    No murmur heard.  Pulmonary/Chest: Effort normal and breath sounds normal. No stridor. No respiratory distress. " He has no wheezes. He has no rales. He exhibits no tenderness.   Abdominal: Soft. Bowel sounds are normal. He exhibits no distension and no mass. There is no tenderness. There is no rebound.   Musculoskeletal: Normal range of motion. He exhibits no edema or tenderness.   Lymphadenopathy:     He has no cervical adenopathy.   Neurological: He is alert and oriented to person, place, and time. No cranial nerve deficit. Coordination normal.   Skin: Skin is warm, dry and intact. Capillary refill takes less than 2 seconds. No rash noted. He is not diaphoretic. No cyanosis. No pallor. Nails show no clubbing.   Psychiatric: He has a normal mood and affect. Thought content normal.   Vitals reviewed.        Lab Results   Component Value Date    WBC 6.29 04/30/2018    HGB 10.6 (L) 04/30/2018    HCT 31.3 (L) 04/30/2018    MCV 91 04/30/2018     04/30/2018     Lab Results   Component Value Date    CREATININE 9.9 (H) 02/01/2017    BUN 95 (H) 02/01/2017     02/01/2017    K 4.0 12/29/2017     02/01/2017    CO2 18 (L) 02/01/2017     Lab Results   Component Value Date    ALT 27 11/26/2014    AST 27 11/26/2014    ALKPHOS 94 11/26/2014    BILITOT 0.3 11/26/2014         Assessment:     67-year-old gentleman with end-stage renal disease secondary to hypertensive nephropathy who was been referred for IgM kappa monoclonal gammopathy measuring 0.24 g/dL.  This appears to be intermediate risk monoclonal gammopathy with 1-1.5% chance of developing multiple myeloma annually or 20-25% chance of developing multiple myeloma over the next 20 years.  We will expand workup to include skeletal survey and 24 hour urine.  The patient will follow-up in 2-3 weeks to discuss results.      Intermediate risk monoclonal gammopathy IgM measuring 0.24 g/dL:  --CBC, CMP, 24-hour urine electrophoresis  --Skeletal survey  --Abdominal ultrasound to evaluate spleen  --Follow-up 2-3 weeks to discuss results

## 2018-05-04 RX ORDER — SUCROFERRIC OXYHYDROXIDE 500 MG/1
TABLET, CHEWABLE ORAL
Qty: 120 TABLET | Refills: 0 | Status: SHIPPED | OUTPATIENT
Start: 2018-05-04 | End: 2018-10-24

## 2018-05-08 ENCOUNTER — TELEPHONE (OUTPATIENT)
Dept: RADIOLOGY | Facility: HOSPITAL | Age: 68
End: 2018-05-08

## 2018-05-09 ENCOUNTER — HOSPITAL ENCOUNTER (OUTPATIENT)
Dept: RADIOLOGY | Facility: HOSPITAL | Age: 68
Discharge: HOME | End: 2018-05-09
Attending: INTERNAL MEDICINE
Payer: MEDICARE

## 2018-05-09 DIAGNOSIS — D47.2 MONOCLONAL GAMMOPATHY: ICD-10-CM

## 2018-05-09 DIAGNOSIS — D47.2 IGM MONOCLONAL GAMMOPATHY OF UNCERTAIN SIGNIFICANCE: ICD-10-CM

## 2018-05-09 PROCEDURE — 76705 ECHO EXAM OF ABDOMEN: CPT | Mod: 26,NTX,, | Performed by: RADIOLOGY

## 2018-05-09 PROCEDURE — 77074 RADEX OSSEOUS SURVEY LMTD: CPT | Mod: TC,PO,NTX

## 2018-05-09 PROCEDURE — 76705 ECHO EXAM OF ABDOMEN: CPT | Mod: TC,PO,TXP

## 2018-05-09 PROCEDURE — 77074 RADEX OSSEOUS SURVEY LMTD: CPT | Mod: 26,NTX,, | Performed by: RADIOLOGY

## 2018-05-09 RX ORDER — LANTHANUM CARBONATE 1000 MG/1
1000 TABLET, CHEWABLE ORAL
Qty: 90 TABLET | Refills: 11 | Status: SHIPPED | OUTPATIENT
Start: 2018-05-09 | End: 2018-10-24 | Stop reason: SDUPTHER

## 2018-05-17 ENCOUNTER — OFFICE VISIT (OUTPATIENT)
Dept: HEMATOLOGY/ONCOLOGY | Facility: CLINIC | Age: 68
End: 2018-05-17
Payer: MEDICARE

## 2018-05-17 VITALS
HEIGHT: 67 IN | OXYGEN SATURATION: 98 % | WEIGHT: 206.81 LBS | BODY MASS INDEX: 32.46 KG/M2 | HEART RATE: 75 BPM | RESPIRATION RATE: 18 BRPM | SYSTOLIC BLOOD PRESSURE: 128 MMHG | DIASTOLIC BLOOD PRESSURE: 74 MMHG | TEMPERATURE: 98 F

## 2018-05-17 DIAGNOSIS — D47.2 MONOCLONAL GAMMOPATHY: Primary | ICD-10-CM

## 2018-05-17 DIAGNOSIS — D47.2 IGM MONOCLONAL GAMMOPATHY OF UNCERTAIN SIGNIFICANCE: ICD-10-CM

## 2018-05-17 DIAGNOSIS — M89.9 LYTIC BONE LESIONS ON XRAY: ICD-10-CM

## 2018-05-17 PROCEDURE — 99999 PR PBB SHADOW E&M-EST. PATIENT-LVL III: CPT | Mod: PBBFAC,,, | Performed by: INTERNAL MEDICINE

## 2018-05-17 PROCEDURE — 99213 OFFICE O/P EST LOW 20 MIN: CPT | Mod: PBBFAC,PO | Performed by: INTERNAL MEDICINE

## 2018-05-17 PROCEDURE — 99214 OFFICE O/P EST MOD 30 MIN: CPT | Mod: S$PBB,,, | Performed by: INTERNAL MEDICINE

## 2018-05-17 NOTE — PROGRESS NOTES
Hematology/Oncology Office Note    Reason for referral:  IgM monoclonal gammopathy    CC:  Abnormal protien    Referred by:  No ref. provider found    Diagnosis:  IgM kappa monoclonal gammopathy 0.24 g/dL        History of present illness:  67-year-old gentleman with end-stage renal disease secondary to hypertensive nephropathy who was been referred for small IgM kappa monoclonal gammopathy measuring 0.24 g/dL.  He is currently on peritoneal dialysis and has been placed on the kidney transplant list.   The patient has no complaints today and specifically denies fever, chills, night sweats, weight loss, nausea/vomiting/diarrhea.    I have reviewed and updated the HPI, ROS, PMHx, Social Hx, Family Hx and treatment history.    Today's visit:  Patient presents today for routine follow-up.  He has no specific complaints.    Past Medical History:   Diagnosis Date    Anemia in CKD (chronic kidney disease)     Atrial fibrillation     CKD (chronic kidney disease) stage 4, GFR 15 11/26/2014    Colon cancer screening 12/19/2014    Elevated PSA 11/26/2014    HTN (hypertension) diagnosed in his 40s 10/16/2014    Monoclonal gammopathy 11/26/2014    Phobic anxiety disorder 11/26/2014    Proteinuria 11/26/2014         Social History:  No tobacco, alcohol, or illicit drugs      Family History: family history includes Cancer in his brother and paternal uncle; Cataracts in his mother; Dementia in his father; Diabetes in his mother; Glaucoma in his mother; Heart disease in his father; Hypertension in his sister; Kidney disease in his sister.        HPI    Review of Systems   Constitutional: Negative for activity change, appetite change, fatigue, fever and unexpected weight change.   HENT: Negative for congestion, ear pain, facial swelling, nosebleeds, rhinorrhea, sinus pain, sinus pressure, sneezing, sore throat and voice change.    Eyes: Negative.    Respiratory: Negative for apnea, cough, choking, chest tightness, shortness  "of breath, wheezing and stridor.    Cardiovascular: Negative for chest pain, palpitations and leg swelling.   Gastrointestinal: Negative for abdominal distention, abdominal pain and anal bleeding.   Endocrine: Negative.    Genitourinary: Negative for decreased urine volume, difficulty urinating, enuresis, flank pain, frequency, genital sores, hematuria, testicular pain and urgency.   Musculoskeletal: Negative for arthralgias, joint swelling, myalgias, neck pain and neck stiffness.   Skin: Negative for color change, pallor, rash and wound.   Allergic/Immunologic: Negative.    Neurological: Negative for dizziness, tremors, seizures, syncope, speech difficulty, light-headedness and numbness.   Hematological: Negative for adenopathy. Does not bruise/bleed easily.   Psychiatric/Behavioral: Negative for agitation, behavioral problems, confusion, decreased concentration, dysphoric mood and hallucinations. The patient is not nervous/anxious and is not hyperactive.        Objective:       Vitals:    05/17/18 1012   BP: 128/74   Pulse: 75   Resp: 18   Temp: 98.1 °F (36.7 °C)   TempSrc: Oral   SpO2: 98%   Weight: 93.8 kg (206 lb 12.8 oz)   Height: 5' 7" (1.702 m)       Physical Exam   Constitutional: He is oriented to person, place, and time. He appears well-developed and well-nourished. No distress.   HENT:   Head: Normocephalic and atraumatic.   Nose: Nose normal.   Mouth/Throat: Oropharynx is clear and moist. No oropharyngeal exudate.   Eyes: Conjunctivae and EOM are normal. Pupils are equal, round, and reactive to light. Right eye exhibits no discharge. Left eye exhibits no discharge. No scleral icterus.   Neck: Normal range of motion. Neck supple. No JVD present. No tracheal deviation present. No thyromegaly present.   Cardiovascular: Normal rate and regular rhythm.  Exam reveals no gallop and no friction rub.    No murmur heard.  Pulmonary/Chest: Effort normal and breath sounds normal. No stridor. No respiratory " distress. He has no wheezes.   Abdominal: Soft. Bowel sounds are normal. He exhibits no distension and no mass. There is no tenderness. There is no rebound and no guarding. No hernia.   Musculoskeletal: Normal range of motion. He exhibits no edema, tenderness or deformity.   Lymphadenopathy:     He has no cervical adenopathy.   Neurological: He is alert and oriented to person, place, and time. He displays normal reflexes. No cranial nerve deficit. Coordination normal.   Skin: Skin is warm, dry and intact. Capillary refill takes less than 2 seconds. No abrasion, no bruising, no ecchymosis and no rash noted. Rash is not pustular and not urticarial. He is not diaphoretic. No cyanosis. No pallor. Nails show no clubbing.   Psychiatric: He has a normal mood and affect. Thought content normal.   Vitals reviewed.        Lab Results   Component Value Date    WBC 6.29 04/30/2018    HGB 10.6 (L) 04/30/2018    HCT 31.3 (L) 04/30/2018    MCV 91 04/30/2018     04/30/2018     Lab Results   Component Value Date    CREATININE 12.3 (H) 04/30/2018    BUN 79 (H) 04/30/2018     04/30/2018    K 3.8 04/30/2018     04/30/2018    CO2 21 (L) 04/30/2018     Lab Results   Component Value Date    ALT 91 (H) 04/30/2018    AST 44 (H) 04/30/2018    ALKPHOS 80 04/30/2018    BILITOT 0.4 04/30/2018         Assessment:     67-year-old gentleman with end-stage renal disease secondary to hypertensive nephropathy who was been referred for IgM kappa monoclonal gammopathy measuring 0.24 g/dL.  24 hr urine demonstrated no monoclonal protein and skeletal survey demonstrated several lucencies within cervical vertebral bodies which we will further evaluate with CT scan without contrast.  This appears to be intermediate risk monoclonal gammopathy with 1-1.5% chance of developing multiple myeloma annually or 20-25% chance of developing multiple myeloma over the next 20 years.  There is no contraindication to renal transplant from a hematology  standpoint provided CT scan is unremarkable.        Intermediate risk monoclonal gammopathy IgM measuring 0.24 g/dL:  --CT scan of cervical spine without contrast to evaluate lucency of vertebral bodies  --continue to monitor with repeat CBC/SPEP/serum free light chains  --follow-up in 3 months

## 2018-05-23 ENCOUNTER — TELEPHONE (OUTPATIENT)
Dept: RADIOLOGY | Facility: HOSPITAL | Age: 68
End: 2018-05-23

## 2018-05-24 ENCOUNTER — HOSPITAL ENCOUNTER (OUTPATIENT)
Dept: RADIOLOGY | Facility: HOSPITAL | Age: 68
Discharge: HOME | End: 2018-05-24
Attending: INTERNAL MEDICINE
Payer: MEDICARE

## 2018-05-24 DIAGNOSIS — M89.9 LYTIC BONE LESIONS ON XRAY: ICD-10-CM

## 2018-05-24 PROCEDURE — 72125 CT NECK SPINE W/O DYE: CPT | Mod: TC,PO,TXP

## 2018-05-24 PROCEDURE — 72125 CT NECK SPINE W/O DYE: CPT | Mod: 26,NTX,, | Performed by: RADIOLOGY

## 2018-05-25 ENCOUNTER — DOCUMENTATION ONLY (OUTPATIENT)
Dept: NEPHROLOGY | Facility: HOSPITAL | Age: 68
End: 2018-05-25

## 2018-05-25 NOTE — H&P
History & Physical        Chief Complaint:  ESRD     HPI:      Patient is a 67-year-old male with ESRD due to diabetic nephropathy.  Currently is on peritoneal dialysis..  Patient is on active transplant list in [facility].    ROS:        Constitutional: Negative for fever, chills, weight loss, malaise/fatigue and diaphoresis.   HENT: Negative for hearing loss, ear pain, nosebleeds, congestion, sore throat, neck pain, tinnitus and ear discharge.    Eyes: Negative for blurred vision, double vision, photophobia, pain, discharge and redness.   Respiratory: Negative for cough, hemoptysis, sputum production, shortness of breath, wheezing and stridor.    Cardiovascular: Negative for chest pain, palpitations, orthopnea, claudication, leg swelling and PND.   Gastrointestinal: Negative for heartburn, nausea, vomiting, abdominal pain, diarrhea, constipation, blood in stool and melena.   Genitourinary: Negative for dysuria, urgency, frequency, hematuria and flank pain.   Musculoskeletal: Negative for myalgias, back pain, joint pain and falls.   Skin: Negative for itching and rash.   Neurological: Negative for dizziness, tingling, tremors, sensory change, speech change, focal weakness, seizures, loss of consciousness, weakness and headaches.   Endo/Heme/Allergies: Negative for environmental allergies and polydipsia. Does not bruise/bleed easily.   Psychiatric/Behavioral: Negative for depression, suicidal ideas, hallucinations, memory loss and substance abuse. The patient is not nervous/anxious and does not have insomnia.    All 14 systems reviewed and negative except as noted above.      PMHx:      Past Medical History:   Diagnosis Date    Anemia in CKD (chronic kidney disease)     Atrial fibrillation     CKD (chronic kidney disease) stage 4, GFR 15 11/26/2014    Colon cancer screening 12/19/2014    Elevated PSA 11/26/2014    HTN (hypertension) diagnosed in his 40s 10/16/2014    Monoclonal  gammopathy 11/26/2014    Phobic anxiety disorder 11/26/2014    Proteinuria 11/26/2014        PMSx:      Past Surgical History:   Procedure Laterality Date    AV FISTULA PLACEMENT  11/2014    COLONOSCOPY N/A 12/29/2017    Procedure: COLONOSCOPY;  Surgeon: Tony Peacock III, MD;  Location: [facility];  Service: Endoscopy;  Laterality: N/A;    PERITONEAL CATHETER INSERTION      VASECTOMY      VASECTOMY          Social Hx:      Social History     Social History    Marital status:      Spouse name: N/A    Number of children: N/A    Years of education: N/A     Occupational History     Retired     Social History Main Topics    Smoking status: Former Smoker     Packs/day: 1.00     Years: 15.00     Types: Cigarettes     Quit date: 11/26/1984    Smokeless tobacco: Never Used    Alcohol use 7.2 - 8.4 oz/week     5 - 7 Standard drinks or equivalent, 7 Glasses of wine per week      Comment: drinks glass red wine nightly    Drug use: No    Sexual activity: Yes     Other Topics Concern    Not on file     Social History Narrative    Retired from [employer]     for 41 y.o.    2 children    No history of blood transfusions    No donors        Family Hx:      Family History   Problem Relation Age of Onset    Heart disease Father     Dementia Father     Diabetes Mother     Cataracts Mother     Glaucoma Mother     Hypertension Sister     Cancer Brother         prostate    Kidney disease Sister         ESRD    Cancer Paternal Uncle         VITALS:          Physical Exam   Nursing Notes and Vital Signs Reviewed.     Constitutional: Well developed, well nourished. AAOx3, NAD, speech/ comprehension clear   Head: Atraumatic. Normocephalic.   Eyes: PERRL. EOMI. Conjunctivae are not pale. No scleral icterus.   ENT: Mucous membranes are dry. No tongue tremors. Throat clear.  Neck: Supple. No JVD or LN or Carotid Bruits noted B.  Cardiovascular: S1S2 RRR, no murmurs, rubs, or gallops. Distal pulses  are 2+ and symmetric.   Pulmonary/Chest: No evidence of respiratory distress. Clear to auscultation bilaterally. No wheezing, rales or rhonchi. No chest wall TTP.   Abdominal: Soft and non-distended. There is no tenderness. No rebound, guarding, or rigidity. No organomegaly. No mass or viscera palpable. PD Cath intact   Musculoskeletal: FROM in all extremities. No deformities, no TTP, no edema. No midline spinal TTP. No step-offs. Pelvis is stable to compression. No cyanosis. Moves all four extremities.   Skin: Skin is warm and dry. left knee, left foot.   Neurological: No gross neurological deficits, Strength 5/5 B, is equal in the upper and lower extremities bilaterally. No sensory deficits to light touch. No pronator drift.  DTRs are 2+ and equal throughout.   Psychiatric: Good eye contact. Normal Affect.      Laboratory Data:  Reviewed and noted in plan where applicable- Please see chart for full laboratory data.        Lab Results   Component Value Date    INR 0.9 11/26/2014       Lab Results   Component Value Date    WBC 6.29 04/30/2018    HGB 10.6 (L) 04/30/2018    HCT 31.3 (L) 04/30/2018    MCV 91 04/30/2018     04/30/2018         Radiology:  Reviewed and noted in plan where applicable- Please see chart for full radiology data.    Medications:  Current Outpatient Prescriptions   Medication Sig    alprazolam (XANAX) 1 MG tablet Take 1 mg by mouth 3 (three) times daily as needed for Anxiety.    amLODIPine (NORVASC) 10 MG tablet TAKE 1 TABLET DAILY    calcitRIOL (ROCALTROL) 0.5 MCG Cap Take 0.5 mcg by mouth once daily.    ergocalciferol (VITAMIN D2) 50,000 unit Cap Take 50,000 Units by mouth every 7 days.    fluticasone (FLONASE) 50 mcg/actuation nasal spray 2 sprays by Each Nare route as needed.    lanthanum (FOSRENOL) 1000 MG chewable tablet Take 1 tablet (1,000 mg total) by mouth 3 (three) times daily with meals.    losartan (COZAAR) 100 MG tablet Take 100 mg by mouth once daily.      metoprolol tartrate (LOPRESSOR) 50 MG tablet TAKE 1 TABLET DAILY    potassium chloride SA (K-DUR,KLOR-CON) 10 MEQ tablet Take 1 tablet (10 mEq total) by mouth once daily.    tadalafil (CIALIS) 10 MG tablet Take 1 tablet (10 mg total) by mouth daily as needed for Erectile Dysfunction.    torsemide (DEMADEX) 20 MG Tab Take 5 tablets (100 mg total) by mouth once daily.    VELPHORO 500 mg Chew CHEW AND SWALLOW ONE TABLET BY MOUTH THREE TIMES DAILY with each meal and chew 1 tablet  with each snack     No current facility-administered medications for this visit.          ASSESSMENT/PLAN:     ACTIVE PROBLEMS:    Patient Active Problem List   Diagnosis    HTN (hypertension) diagnosed in his 40s    Proteinuria    Hematuria    Monoclonal gammopathy    Hyperlipidemia    Phobic anxiety disorder - Claustrophobia    Elevated PSA    Colon cancer screening    HPTH (hyperparathyroidism)    Acidosis    Patient on waiting list for kidney transplant    ESRD on peritoneal dialysis    Anemia in ESRD (end-stage renal disease)    BMI 32.0-32.9,adult           PLAN:    Assessment and plan:    1.  ESRD: Doing very well on peritoneal dialysis    2.  Anemia continue Epogen and iron per protocol    3.  Hypertension much better controlled    4.  MGUS: s/p Heme follow up     5.  Hyperparathyroidism treat with vitamin D therapy.      Robson Bernstein MD

## 2018-06-19 ENCOUNTER — LAB VISIT (OUTPATIENT)
Dept: LAB | Facility: HOSPITAL | Age: 68
End: 2018-06-19
Payer: MEDICARE

## 2018-06-19 DIAGNOSIS — Z76.82 ORGAN TRANSPLANT CANDIDATE: ICD-10-CM

## 2018-06-19 PROCEDURE — 86829 HLA CLASS I/II ANTIBODY QUAL: CPT | Mod: PO,TXP

## 2018-06-19 PROCEDURE — 86829 HLA CLASS I/II ANTIBODY QUAL: CPT | Mod: 91,PO,TXP

## 2018-06-21 RX ORDER — SILDENAFIL 50 MG/1
50 TABLET, FILM COATED ORAL DAILY PRN
Qty: 8 TABLET | Refills: 1 | Status: SHIPPED | OUTPATIENT
Start: 2018-06-21 | End: 2018-07-25 | Stop reason: SDUPTHER

## 2018-07-03 LAB — HPRA INTERPRETATION: NORMAL

## 2018-07-13 ENCOUNTER — LAB VISIT (OUTPATIENT)
Dept: LAB | Facility: HOSPITAL | Age: 68
End: 2018-07-13
Payer: MEDICARE

## 2018-07-13 DIAGNOSIS — Z76.82 ORGAN TRANSPLANT CANDIDATE: ICD-10-CM

## 2018-07-13 PROCEDURE — 86833 HLA CLASS II HIGH DEFIN QUAL: CPT | Mod: PO,TXP

## 2018-07-13 PROCEDURE — 86832 HLA CLASS I HIGH DEFIN QUAL: CPT | Mod: PO,TXP

## 2018-07-23 DIAGNOSIS — Z76.82 ORGAN TRANSPLANT CANDIDATE: Primary | ICD-10-CM

## 2018-07-23 NOTE — PROGRESS NOTES
YEARLY LIST MANAGEMENT NOTE    Alverto Ramirez's kidney transplant listing status reviewed.  Patient is due for follow-up appointments in September 2018.  Appointments will be scheduled per protocol.  HLA sample is current and being rec'd on a regular basis.

## 2018-07-25 RX ORDER — SILDENAFIL 50 MG/1
50 TABLET, FILM COATED ORAL DAILY PRN
Qty: 8 TABLET | Refills: 1 | Status: SHIPPED | OUTPATIENT
Start: 2018-07-25 | End: 2019-07-25

## 2018-07-31 LAB — HPRA INTERPRETATION: NORMAL

## 2018-08-29 RX ORDER — CALCIUM ACETATE 667 MG/1
2001 TABLET ORAL
Qty: 270 TABLET | Refills: 11 | Status: SHIPPED | OUTPATIENT
Start: 2018-08-29 | End: 2018-09-06

## 2018-09-05 ENCOUNTER — LAB VISIT (OUTPATIENT)
Dept: LAB | Facility: HOSPITAL | Age: 68
End: 2018-09-05
Attending: UROLOGY
Payer: MEDICARE

## 2018-09-05 DIAGNOSIS — N52.9 ERECTILE DYSFUNCTION, UNSPECIFIED ERECTILE DYSFUNCTION TYPE: ICD-10-CM

## 2018-09-05 DIAGNOSIS — N40.0 BENIGN LOCALIZED PROSTATIC HYPERPLASIA WITHOUT LOWER URINARY TRACT SYMPTOMS (LUTS): ICD-10-CM

## 2018-09-05 DIAGNOSIS — Z12.5 PROSTATE CANCER SCREENING: ICD-10-CM

## 2018-09-05 LAB — COMPLEXED PSA SERPL-MCNC: 4.1 NG/ML

## 2018-09-05 PROCEDURE — 36415 COLL VENOUS BLD VENIPUNCTURE: CPT | Mod: PO,TXP

## 2018-09-05 PROCEDURE — 84153 ASSAY OF PSA TOTAL: CPT | Mod: TXP

## 2018-09-06 ENCOUNTER — OFFICE VISIT (OUTPATIENT)
Dept: TRANSPLANT | Facility: CLINIC | Age: 68
End: 2018-09-06
Payer: MEDICARE

## 2018-09-06 ENCOUNTER — HOSPITAL ENCOUNTER (OUTPATIENT)
Dept: RADIOLOGY | Facility: HOSPITAL | Age: 68
Discharge: HOME | End: 2018-09-06
Attending: INTERNAL MEDICINE
Payer: MEDICARE

## 2018-09-06 VITALS
SYSTOLIC BLOOD PRESSURE: 146 MMHG | HEART RATE: 72 BPM | TEMPERATURE: 98 F | DIASTOLIC BLOOD PRESSURE: 86 MMHG | WEIGHT: 205 LBS | HEIGHT: 67 IN | BODY MASS INDEX: 32.18 KG/M2 | OXYGEN SATURATION: 100 % | RESPIRATION RATE: 16 BRPM

## 2018-09-06 DIAGNOSIS — D63.1 ANEMIA IN ESRD (END-STAGE RENAL DISEASE): Chronic | ICD-10-CM

## 2018-09-06 DIAGNOSIS — N18.6 ANEMIA IN ESRD (END-STAGE RENAL DISEASE): Chronic | ICD-10-CM

## 2018-09-06 DIAGNOSIS — D47.2 MONOCLONAL GAMMOPATHY: ICD-10-CM

## 2018-09-06 DIAGNOSIS — N18.6 ESRD (END STAGE RENAL DISEASE): Chronic | ICD-10-CM

## 2018-09-06 DIAGNOSIS — I10 ESSENTIAL HYPERTENSION: Primary | ICD-10-CM

## 2018-09-06 DIAGNOSIS — Z76.82 ORGAN TRANSPLANT CANDIDATE: ICD-10-CM

## 2018-09-06 PROCEDURE — 76770 US EXAM ABDO BACK WALL COMP: CPT | Mod: TC,TXP

## 2018-09-06 PROCEDURE — 99213 OFFICE O/P EST LOW 20 MIN: CPT | Mod: PBBFAC,25,TXP

## 2018-09-06 PROCEDURE — 99999 PR PBB SHADOW E&M-EST. PATIENT-LVL III: CPT | Mod: PBBFAC,TXP,,

## 2018-09-06 PROCEDURE — 76770 US EXAM ABDO BACK WALL COMP: CPT | Mod: 26,TXP,, | Performed by: RADIOLOGY

## 2018-09-06 PROCEDURE — 99215 OFFICE O/P EST HI 40 MIN: CPT | Mod: S$PBB,TXP,, | Performed by: INTERNAL MEDICINE

## 2018-09-06 NOTE — PROGRESS NOTES
"   Kidney Transplant Recipient Reevalulation    Referring Physician: Chris Adair  Current Nephrologist: Robson Bernstein  Waitlist Status: active  Dialysis Start Date: 2/20/2017    Subjective:     CC:  Six month reassessment of kidney transplant candidacy.    HPI:  Mr. Ramirez is a 68 y.o. year old Black or  male with ESRD secondary to HTN.  He has been on the wait list for a kidney transplant at [facility] since 3/30/2015. Patient is currently on peritoneal dialysis started on 2/20/2017. Patient is dialyzing on cyclic peritoneal dialysis.  Patient reports that he is tolerating dialysis well. . He has a PD catheter. Patient denies any recent hospitalizations or ED visits.    No changes in the last 12 months    No peritonitis    Patient does some yard work. Patient able to walk up to 2 miles a few times a month. He does some walking w/o any limitation. No chest pain or SOB. No 02 at home. No walker or a cane.     Appetite is good. Still makes some urine.     Last colonoscopy in December 2017 with diverticulosis but not polyps, no specimen collected for pathology    Kidney US this morning showed No solid tumors      Review of Systems     Skin: no skin rash  CNS; no headaches, blurred vision, seizure, or syncope  ENT: No JVD,  Adenopathies,  nasal congestion. No oral lesions  Cardiac: No chest pain, dyspnea, claudication, edema or palpitations  Respiratory: No SOB, cough, hemoptysis   Gastro-intestinal: No diarrhea, constipation, abdominal pain, nausea, vomit. No ascitis  Genitourinary: no hematuria, dysuria, frequency, frequency  Musculoskeletal: joint pain, arthritis or vasculitic changes  Psych: alert awake, oriented, No cranial nerves deficit.      Objective:   body mass index is 32.11 kg/m².    Physical Exam     BP (!) 146/86 (BP Location: Right arm, Patient Position: Sitting, BP Method: Medium (Automatic))   Pulse 72   Temp 97.6 °F (36.4 °C) (Oral)   Resp 16   Ht 5' 7" (1.702 m)   Wt 93 kg " (205 lb 0.4 oz)   SpO2 100%   BMI 32.11 kg/m²     Head: normocephalic  Neck: No JVD, cervical axillary, or femoral adenopathies  Heart: no murmurs, Normal s1 and s2, No gallops, no rubs, No murmurs  Lungs; CTA, good respiratory effort, no crackles  Abdomen: soft, non tender, no splenomegaly or hepatomegaly, no massess, no bruits. PD catheter in place   Extremities: No edema, skin rash, joint pain  SNC: awake, alert oriented. Cranial nerves are intact, no focalized, sensitivity and strength preserved      Labs:  Lab Results   Component Value Date    WBC 6.29 04/30/2018    HGB 10.6 (L) 04/30/2018    HCT 31.3 (L) 04/30/2018     04/30/2018    K 3.8 04/30/2018     04/30/2018    CO2 21 (L) 04/30/2018    BUN 79 (H) 04/30/2018    CREATININE 12.3 (H) 04/30/2018    EGFRNONAA 4 (A) 04/30/2018    CALCIUM 8.2 (L) 04/30/2018    PHOS 6.0 (H) 02/01/2017    ALBUMIN 3.3 (L) 04/30/2018    AST 44 (H) 04/30/2018    ALT 91 (H) 04/30/2018    UTPCR 5.38 (H) 02/01/2017    PTH 93.0 (H) 12/15/2016       Lab Results   Component Value Date    BILIRUBINUA Negative 02/01/2017    PROTEINUA 3+ (A) 02/01/2017    NITRITE neg\ 07/17/2017    RBCUA 0 02/01/2017    WBCUA 3 02/01/2017       No results found for: HLAABCTYPE    Lab Results   Component Value Date    CPRA 0 07/12/2018    ZF5NAGO Negative 07/12/2018    CIIAB Negative 07/12/2018       Labs were reviewed with the patient.    Pre-transplant Workup:   Reviewed with the patient.    Assessment:     1. Essential hypertension    2. Anemia in ESRD (end-stage renal disease)    3. Monoclonal gammopathy    4. ESRD (end stage renal disease)        Plan:     Transplant Candidacy:   Mr. Ramirez is a suitable kidney transplant candidate.  He remains in overall stable health, and will remain active on the transplant list.    Patient remains acceptable for transplantation    Work up available reviewed with the patient    PSA stable and seen by Urology      Cardiac evaluation satisfactory    We spoke  in detail about living donation due to his age    We spoke for 30 minutes      Extensive education    All questions answered    Deny Rosario MD       Follow-up:   In addition to the tests noted in the plan, Mr. Ramirez will continue to have reevaluation as per the standing pre-kidney transplant protocol:  1. Monthly blood for PRA  2. Annual return to clinic, except HIV positive, > 65 years of age, or pancreas transplant candidates who will be scheduled to see transplant every 6 months while in pre-transplant phase  3. Annual re-testing: CXR, EKG, yearly mammograms for women over 40 and PSA for males over 40, cardiology follow-up as recommended by initial cardiology pre-transplant evaluation  4. Renal ultrasound every 2 years  5. Baseline colonoscopy after age 50 and repeated as recommended    UNOS Patient Status  Functional Status: 60% - Requires occasional assistance but is able to care for needs  Physical Capacity: No Limitations

## 2018-09-06 NOTE — PROGRESS NOTES
"Transplant Recipient Adult Psychosocial Assessment(update)      Alverto Ramirez  [address]  [place] [address]    Telephone Information:   Mobile [phone]   Mobile [phone]   Home  [phone] (home)  Work  There is no work phone number on file.  E-mail  [email or web address]    Sex: male  YOB: 1950  Age: 68 y.o.    Encounter Date: 2018  [place] Citizen: yes  Primary Language: English   Needed: no    Emergency Contact:  Name: Liana Ramirez  Relationship: wife  Address: Same as pt.  Phone Numbers:  [phone] (home), [phone] (work), [phone] (mobile)    Name: Capo Ramirez  Relationship: son  Address: [facility] [address] [place] [address]  Phone Numbers:  [phone] (mobile)    Family/Social Support:   Number of dependents/: Pt reports no dependents.  Marital history: pt reports being  only once and then to pt's Liana Ramirez for the past 47 years.  Other family dynamics: Pt reports 2 adult sons: Capo and Doug (resides in [place]); sisters: Sue, Mayelin, Fernanda, and Jen (half-sister); 1 brother: Charles. Pt reports both parents are  and pt identifies all family members (except sisters: Sue and Fernanda) as supportive.    Household Composition:  Name: Alverto Rmairez  Age: 66  Relationship: patient  Does person drive? yes    Name: Liana Ramirez  Age: 66  Relationship: wife  Does person drive? yes     Name: Capo Ramirez (pt reports son is "in and out of the house"  Age: 43  Relationship: son  Does person drive? yes    Do you and your caregivers have access to reliable transportation? yes     PRIMARY CAREGIVER: Liana Ramirez, pt's wife,  will be primary caregiver, phone number [phone].      provided in-depth information to patient and caregiver regarding pre- and post-transplant caregiver role.   strongly encourages patient and caregiver to have concrete plan regarding " post-transplant care giving, including back-up caregiver(s) to ensure care giving needs are met as needed.    Patient and Caregiver states understanding all aspects of caregiver role/commitment and is able/willing/committed to being caregiver to the fullest extent necessary.    Patient and Caregiver verbalizes understanding of the education provided today and caregiver responsibilities.         remains available. Patient and Caregiver agree to contact  in a timely manner if concerns arise.      Able to take time off work without financial concerns: yes.      Additional Significant Others who will Assist with Transplant:  Name: Capo Ramirez  Age: 42  Phone: [phone]  City: [place] State: [place]  Relationship: son  Does person drive? yes       Living Will: no Education and information provided to pt.  Healthcare Power of : no Education and information provided to pt. Pt did report trusting wife with medical decisions  Advance Directives on file: <<no information> per medical record.  Verbally reviewed LW/HCPA information.   provided patient with copy of LW/HCPA documents and provided education on completion of forms    Living Donors: No. Education and resource information given to patient.     Highest Education Level: Attended College/Technical School  Reading Ability: college  Reports difficulty with: seeing and wears bifocals  Learns Best By:  A combination of verbal, written, and hands-on instruction.      Status: no  VA Benefits: no     Working for Income: No  If no, reason not working: Patient Choice - Retired  Patient is retired from owning/operating driving school..    Spouse/Significant Other Employment: Pt and pt's wife both retired from work/own the driving school.    Disabled: no, however pt received nursing home from [facility] due to anxiety.    Monthly Income:  MCFP: $300  SS Reitrement: $1840  Other household members total: $360 (wife's income)       Able to afford all costs now and if transplanted, including medications: yes pt and wife reports conerns financially. SW did encourage pt to contact insurance company regarding post transplant medications cost.   Patient verbalizes understanding of personal responsibilities related to transplant costs and the importance of having a financial plan to ensure that patients transplant costs are fully covered.   provided fundraising information/education.  Patient verbalizes understanding.   remains available.    Insurance:   Payor/Plan Subscr  Sex Relation Sub. Ins. ID Effective Group Num   1. MEDICARE - [place]* ADAM MITCHELL JR. 1950 Male  [id number]                                     [address]   2. AETNA - AETNA* ADAM MITCHELL JR. 1950 Male  [id number]                                   [address]     Primary Insurance (for UNOS reporting): Public Insurance - Medicare FFS (Fee For Service)  Secondary Insurance (for UNOS reporting): Private Insurance (Pt reports that he pays and also has a part D)    Patient and Caregiver verbalizes clear understanding that patient may experience difficulty obtaining and/or be denied insurance coverage post-surgery. This includes and is not limited to disability insurance, life insurance, health insurance, burial insurance, long term care insurance, and other insurances.      Patient and Caregiver also reports understanding that future health concerns related to or unrelated to transplantation may not be covered by patient's insurance.  Resources and information provided and reviewed.     Patient and Caregiver provides verbal permission to release any necessary information to outside resources for patient care and discharge planning.  Resources and information provided are reviewed.      Patient provides verbal permission to release any necessary information to outside resources for patient care and discharge  planning.  Resources and information provided are reviewed.      Dialysis History:  Patient reports being on peritoneal dialysis attending all dialysis appointments and staying for the entire course of treatment. SW faxed adherence form.     Infusion Service: patient utilizing? no  Home Health: patient utilizing? no  DME: yes PD equipment and BP Cuff  Pulmonary/Cardiac Rehab: Pt denies.   ADLS:  Pt reports no difficulties with driving, walking, bathing, cooking, housekeeping, eating, shopping, and taking medication.    Adherence:   Pt reports good adherence with medications, dialysis, and health regimen..  Adherence education and counseling provided.     Per History Section:  Past Medical History:   Diagnosis Date    Anemia in CKD (chronic kidney disease)     Atrial fibrillation     CKD (chronic kidney disease) stage 4, GFR 15 2014    Colon cancer screening 2014    Elevated PSA 2014    HTN (hypertension) diagnosed in his 40s 10/16/2014    Monoclonal gammopathy 2014    Phobic anxiety disorder 2014    Proteinuria 2014     Social History     Tobacco Use    Smoking status: Former Smoker     Packs/day: 1.00     Years: 15.00     Pack years: 15.00     Types: Cigarettes     Last attempt to quit: 1984     Years since quittin.8    Smokeless tobacco: Never Used   Substance Use Topics    Alcohol use: Yes     Alcohol/week: 7.2 - 8.4 oz     Types: 5 - 7 Standard drinks or equivalent, 7 Glasses of wine per week     Comment: drinks glass red wine nightly     Social History     Substance and Sexual Activity   Drug Use No       Patient and Caregiver states clear understanding of the potential impact of substance use as it relates to transplant candidacy and is aware of possible random substance screening.  Substance abstinence/cessation counseling, education and resources provided and reviewed.     Arrests/DWI/Treatment/Rehab: patient denies    Psychiatric History:    Mental  Health: anxiety. Pt reports dealing with his anxiety for the past 22 years. Pt confirmed previously reported claustrophobia and tight space as triggers.  Pt denies any recent difficulties.  Pt was previously cleared by Dr. Willis for transplant. Pt reports that he can go residential through an MRI machine.  Psychiatrist/Counselor: Pt reports previously seeing Dr. Willis, psychiatrist, however reports that he is looking for a new psychiatrist that accepts his insurance.  Pt reports he does have the list of insurance approved psychiatrist and will choose a provider soon.   Medications:  Pt reports taking Xanax, 1 mg PRN. However, pt reports medication does not work as well as it once did. SW inquired if he expressed concern to prescribing doctor. Pt denies. SW STRONGLY encourage pt to communicate concern with doctor. Pt verbalized understanding and agreement.   Suicide/Homicide Issues: Pt denies any history of or current suicidal or homicidal ideations.    Safety in Household: Pt reports no current or history of safety concerns in household; including mental, physical, verbal, or sexual abuse.    Knowledge: Patient and Caregiver states having clear understanding and realistic expectations regarding the potential risks and potential benefits of organ transplantation and organ donation, agrees to discuss with health care team members and support system members and to utilize available resources and express questions and/or concerns in order to further facilitate the pt informed decision-making.  Resources and information provided and reviewed.     Patient and Caregiver is aware of [facility]'s affiliation and/or partnership with agencies in home health care, LTAC, SNF, [facility], and other hospitals and clinics.    Understanding: Patient and Caregiver reports having a clear understanding of the many lifetime commitments involved with being a transplant recipient, including costs, compliance, medications, lab work, procedures,  appointments, concrete and financial planning, preparedness, timely and appropriate communication of concerns, abstinence (ETOH, tobacco, illicit non-prescribed drugs), adherence to all health care team recommendations, support system and caregiver involvement, appropriate and timely resource utilization and follow-through, mental health counseling as needed/recommended, and patient and caregiver responsibilities.  Social Service Handbook, resources and detailed educational information provided and reviewed.  Educational information provided.    Patient and Caregiver also reports current and expected compliance with health care regime and states having a clear understanding of the importance of compliance.      Patient and Caregiver reports a clear understanding that risks and benefits may be involved with organ transplantation and with organ donation.      Patient and Caregiver also reports clear understanding that psychosocial risk factors may affect patient, and include but are not limited to feelings of depression, generalized anxiety, anxiety regarding dependence on others, post traumatic stress disorder, feelings of guilt and other emotional and/or mental concerns, and/or exacerbation of existing mental health concerns.  Detailed resources provided and discussed.     Patient and Caregiver agrees to access appropriate resources in a timely manner as needed and/or as recommended, and to communicate concerns appropriately.  Patient and Caregiver also reports a clear understanding of treatment options available.     Patient and Caregiver received education in a group setting.   reviewed education, provided additional information, and answered questions.    Feelings or Concerns: Patient and Caregiver did not express any concerns at this time.    Coping: Pt reports coping well with the transplant process at this time and reports taking a walk, watching DVDs (Javier, Jabier & Ian, Dale and Deandre, etc);  "playing computer solitaire, and watching tv as ways to cope. Pt reports [facility] home as [facility] in [place], [place] with Rev. Sihne presiding.     Goals: Pt reports enjoying life as a goal for after transplant and travel, play music, and smoke a cigar . SW provided support and education. Pt verbalizes understanding that transplant is not a guarantee of ending dialysis and life after transplantation will be a "new normal" post transplant.  SW remains available.      Interview Behavior: Patient and Caregiver present as alert and oriented x 4, pleasant, good eye contact, well groomed, recall good, concentration/judgement good, average intelligence, calm, communicative, cooperative and asking and answering questions appropriately.  Pt presents with wife: Liana Ramirez with pt's permission.       Transplant Social Work - Candidacy  Assessment/Plan:     Psychosocial Suitability: Patient presents as a suitable candidate for kidney transplant at this time. Based on psychosocial risk factors, patient presents as low risk, due to adequate insurance and financial plan in place, suitable dialysis adherence, and caregiver plan in place.    Recommendations/Additional Comments: SW recommends that pt conduct fundraising to assist pt with pay for cost of medications, food, gas, and other transplant related needs. SW recommends that pt remain aware of potential mental health concerns and contact the team if any concerns arise. SW recommends that pt remain abstinent from tobacco, ETOH, and drug use. SW supports pt's continued dialysis adherence. SW remains available to answer any questions or concerns that arise as the pt moves through the transplant process.       Aby Guerra LMSW       "

## 2018-09-10 ENCOUNTER — OFFICE VISIT (OUTPATIENT)
Dept: UROLOGY | Facility: CLINIC | Age: 68
End: 2018-09-10
Payer: MEDICARE

## 2018-09-10 VITALS — WEIGHT: 205 LBS | DIASTOLIC BLOOD PRESSURE: 68 MMHG | BODY MASS INDEX: 32.11 KG/M2 | SYSTOLIC BLOOD PRESSURE: 134 MMHG

## 2018-09-10 DIAGNOSIS — Z12.5 ENCOUNTER FOR SCREENING FOR MALIGNANT NEOPLASM OF PROSTATE: ICD-10-CM

## 2018-09-10 DIAGNOSIS — N40.0 BENIGN PROSTATIC HYPERPLASIA, UNSPECIFIED WHETHER LOWER URINARY TRACT SYMPTOMS PRESENT: ICD-10-CM

## 2018-09-10 DIAGNOSIS — R97.20 ELEVATED PSA: Primary | ICD-10-CM

## 2018-09-10 PROCEDURE — 99999 PR PBB SHADOW E&M-EST. PATIENT-LVL II: CPT | Mod: PBBFAC,TXP,, | Performed by: UROLOGY

## 2018-09-10 PROCEDURE — 99212 OFFICE O/P EST SF 10 MIN: CPT | Mod: PBBFAC,TXP | Performed by: UROLOGY

## 2018-09-10 PROCEDURE — 99214 OFFICE O/P EST MOD 30 MIN: CPT | Mod: S$PBB,TXP,, | Performed by: UROLOGY

## 2018-09-10 NOTE — PROGRESS NOTES
Chief Complaint: Elevated PSA    HPI:   9/10/18: Voiding fine with cardura off his list.  PSA dropped this last year.  7/17/17: Having some ED problems.  Still taking doxazosin, voiding well.  7/11/16: Doing well voiding fine  6/29/15: No problems in interim.  Has an AV fistula in left arm now. PSA unchanged at 3.6.  No urinary bother doing fine.  Taking saw palmetto and pumpkin seed.   12/29/14: 65 yo man being evaluated for kidney transplant had an elevated PSA of 4.2 and is here for screening.  Had a AV fistula surgery last week.  No abd/pelvic pain and no exac/rel factors.  No hematuria.  No urolithiasis history.  No prostate cancer family history but his brother had some sort of prostate surgery.  No urinary bother.  Still makes urine and is not on dialysis.  Feels good in general.    Allergies:  Patient has no known allergies.    Medications:  has a current medication list which includes the following prescription(s): alprazolam, amlodipine, calcitriol, ergocalciferol, fluticasone, lanthanum, losartan, metoprolol tartrate, potassium chloride sa, sildenafil, torsemide, and velphoro.    Review of Systems:  General: No fever, chills, fatigability, or weight loss.  Skin: No rashes, itching, or changes in color or texture of skin.  Chest: Denies WEST, cyanosis, wheezing, cough, and sputum production.  Abdomen: Appetite fine. No weight loss. Denies diarrhea, abdominal pain, hematemesis, or blood in stool.  Musculoskeletal: No joint stiffness or swelling. Denies back pain.  : As above.  All other review of systems negative.    PMH:   has a past medical history of Anemia in CKD (chronic kidney disease), Atrial fibrillation, CKD (chronic kidney disease) stage 4, GFR 15 (11/26/2014), Colon cancer screening (12/19/2014), Elevated PSA (11/26/2014), HTN (hypertension) diagnosed in his 40s (10/16/2014), Monoclonal gammopathy (11/26/2014), Phobic anxiety disorder (11/26/2014), and Proteinuria (11/26/2014).    PSH:   has a  past surgical history that includes AV fistula placement (11/2014); Vasectomy; Peritoneal catheter insertion; Vasectomy; COLONOSCOPY (N/A, 12/29/2017); [id number] left upper extremity (Left, 4/9/2015); and COLONOSCOPY (N/A, 12/19/2014).    FamHx: family history includes Cancer in his brother and paternal uncle; Cataracts in his mother; Dementia in his father; Diabetes in his mother; Glaucoma in his mother; Heart disease in his father; Hypertension in his sister; Kidney disease in his sister.    SocHx:  reports that he quit smoking about 33 years ago. His smoking use included cigarettes. He has a 15.00 pack-year smoking history. he has never used smokeless tobacco. He reports that he drinks about 7.2 - 8.4 oz of alcohol per week. He reports that he does not use drugs.      Physical Exam:  Vitals:    09/10/18 1530   BP: 134/68     General: A&Ox3, no apparent distress, no deformities  Neck: No masses, normal thyroid  Lungs: normal inspiration, no use of accessory muscles  Heart: normal pulse, no arrhythmias  Abdomen: Soft, NT, ND  Skin: The skin is warm and dry. No jaundice.  Ext: No c/c/e.  :   7/11/17: Test desc panfilo, no abnormalities of epididymus. Penis normal, with normal penile and scrotal skin. Meatus normal. Normal rectal tone, no hemorrhoids. Prost 30 gm no nodules or masses appreciated. SV not palpable. Perineum and anus normal.    Labs/Studies:   Urinalysis performed in clinic, summary: UA normal exc ++prot  PSA    3/15: 3.6    6/15: 3.6    6/16: 4.2    7/17: 4.9    9/18: 4.1    Impression/Plan:   1. PSA elevated but not extremely so, not rising.  PSA/RTC 12 mo.  Low risk of prostate cancer at this point and without a bigger upward trend would not biopsy the prostate.   2. No contraindications to renal transplant.

## 2018-09-16 RX ORDER — ERGOCALCIFEROL 1.25 MG/1
CAPSULE ORAL
Qty: 13 CAPSULE | Refills: 4 | Status: SHIPPED | OUTPATIENT
Start: 2018-09-16 | End: 2019-12-11 | Stop reason: SDUPTHER

## 2018-10-01 ENCOUNTER — TELEPHONE (OUTPATIENT)
Dept: TRANSPLANT | Facility: CLINIC | Age: 68
End: 2018-10-01

## 2018-10-01 NOTE — TELEPHONE ENCOUNTER
Adherence check:  SW received adherence fax form from pt's dialysis unit. In the last 3 months pt has had 0 AMAs, 0 no-shows, and does not sign off early. Pt's last PTH on 9/2018 was 511. No concerns with labs, caregivers, or transportation listed. SW remains available.

## 2018-10-10 DIAGNOSIS — Z76.82 ORGAN TRANSPLANT CANDIDATE: ICD-10-CM

## 2018-10-24 RX ORDER — LANTHANUM CARBONATE 1000 MG/1
2000 TABLET, CHEWABLE ORAL
Qty: 540 TABLET | Refills: 3 | Status: SHIPPED | OUTPATIENT
Start: 2018-10-24 | End: 2019-11-18 | Stop reason: SDUPTHER

## 2018-10-31 ENCOUNTER — TELEPHONE (OUTPATIENT)
Dept: NEPHROLOGY | Facility: CLINIC | Age: 68
End: 2018-10-31

## 2018-10-31 NOTE — TELEPHONE ENCOUNTER
----- Message from Robson Bernstein MD sent at 10/31/2018  9:28 AM CDT -----  Contact: Rosalba Lopez  Dose  would be 1000 mg 2 tablets with each meal 3 times a day  ----- Message -----  From: Anisa Castle  Sent: 10/31/2018   9:04 AM  To: Amandeep PRINGLE Staff    Ms Navarrete needs clarification on the medication Fosrenol, the directions, [phone], ref# [id number]. Thank you

## 2018-12-31 RX ORDER — LOSARTAN POTASSIUM 100 MG/1
TABLET ORAL
Qty: 90 TABLET | Refills: 4 | Status: SHIPPED | OUTPATIENT
Start: 2018-12-31 | End: 2020-01-30 | Stop reason: SDUPTHER

## 2019-01-16 RX ORDER — ALPRAZOLAM 1 MG/1
1 TABLET ORAL 3 TIMES DAILY PRN
Qty: 90 TABLET | Refills: 0 | Status: SHIPPED | OUTPATIENT
Start: 2019-01-16 | End: 2019-04-11 | Stop reason: SDUPTHER

## 2019-01-30 RX ORDER — CALCITRIOL 0.5 UG/1
CAPSULE ORAL
Qty: 30 CAPSULE | Refills: 10 | Status: SHIPPED | OUTPATIENT
Start: 2019-01-30 | End: 2021-05-28 | Stop reason: SDUPTHER

## 2019-01-31 DIAGNOSIS — N18.6 HYPERTENSIVE HEART DISEASE WITH END STAGE CHRONIC KIDNEY DISEASE ON DIALYSIS, UNSPECIFIED WHETHER HEART FAILURE PRESENT: ICD-10-CM

## 2019-01-31 DIAGNOSIS — I13.11 HYPERTENSIVE HEART DISEASE WITH END STAGE CHRONIC KIDNEY DISEASE ON DIALYSIS, UNSPECIFIED WHETHER HEART FAILURE PRESENT: ICD-10-CM

## 2019-01-31 DIAGNOSIS — Z76.82 ORGAN TRANSPLANT CANDIDATE: Primary | ICD-10-CM

## 2019-01-31 DIAGNOSIS — Z99.2 HYPERTENSIVE HEART DISEASE WITH END STAGE CHRONIC KIDNEY DISEASE ON DIALYSIS, UNSPECIFIED WHETHER HEART FAILURE PRESENT: ICD-10-CM

## 2019-01-31 NOTE — PROGRESS NOTES
YEARLY LIST MANAGEMENT NOTE    Alverto Ramirez's kidney transplant listing status reviewed.  Patient is due for follow-up appointments in March 2019.  Appointments will be scheduled per protocol.  HLA sample is current and being rec'd on a regular basis.

## 2019-02-02 DIAGNOSIS — Z76.82 ORGAN TRANSPLANT CANDIDATE: Primary | ICD-10-CM

## 2019-02-05 DIAGNOSIS — Z76.82 ORGAN TRANSPLANT CANDIDATE: Primary | ICD-10-CM

## 2019-02-11 RX ORDER — GENTAMICIN SULFATE 1 MG/G
CREAM TOPICAL
Qty: 15 G | Refills: 4 | Status: SHIPPED | OUTPATIENT
Start: 2019-02-11 | End: 2021-05-28 | Stop reason: SDUPTHER

## 2019-02-18 RX ORDER — METOPROLOL TARTRATE 50 MG/1
TABLET ORAL
Qty: 90 TABLET | Refills: 3 | Status: SHIPPED | OUTPATIENT
Start: 2019-02-18 | End: 2020-03-02

## 2019-02-19 DIAGNOSIS — Z76.82 ORGAN TRANSPLANT CANDIDATE: Primary | ICD-10-CM

## 2019-03-06 ENCOUNTER — HOSPITAL ENCOUNTER (OUTPATIENT)
Dept: RADIOLOGY | Facility: HOSPITAL | Age: 69
Discharge: HOME | End: 2019-03-06
Attending: NURSE PRACTITIONER
Payer: MEDICARE

## 2019-03-06 ENCOUNTER — CLINICAL SUPPORT (OUTPATIENT)
Dept: CARDIOLOGY | Facility: CLINIC | Age: 69
End: 2019-03-06
Attending: NURSE PRACTITIONER
Payer: MEDICARE

## 2019-03-06 DIAGNOSIS — Z76.82 ORGAN TRANSPLANT CANDIDATE: ICD-10-CM

## 2019-03-06 LAB
ESTIMATED PA SYSTOLIC PRESSURE: 25.85
RETIRED EF AND QEF - SEE NOTES: 60 (ref 55–65)

## 2019-03-06 PROCEDURE — 71046 X-RAY EXAM CHEST 2 VIEWS: CPT | Mod: TC,TXP

## 2019-03-06 PROCEDURE — 93306 2D ECHO WITH COLOR FLOW DOPPLER: ICD-10-PCS | Mod: 26,S$PBB,TXP, | Performed by: INTERNAL MEDICINE

## 2019-03-06 PROCEDURE — 93306 TTE W/DOPPLER COMPLETE: CPT | Mod: PBBFAC,PN,TXP | Performed by: INTERNAL MEDICINE

## 2019-03-06 PROCEDURE — 72170 XR PELVIS ROUTINE AP: ICD-10-PCS | Mod: 26,TXP,, | Performed by: RADIOLOGY

## 2019-03-06 PROCEDURE — 71046 XR CHEST PA AND LATERAL: ICD-10-PCS | Mod: 26,TXP,, | Performed by: RADIOLOGY

## 2019-03-06 PROCEDURE — 93978 US DOPP ILIACS BILATERAL: ICD-10-PCS | Mod: 26,TXP,, | Performed by: RADIOLOGY

## 2019-03-06 PROCEDURE — 93978 VASCULAR STUDY: CPT | Mod: 26,TXP,, | Performed by: RADIOLOGY

## 2019-03-06 PROCEDURE — 93978 VASCULAR STUDY: CPT | Mod: TC,TXP

## 2019-03-06 PROCEDURE — 71046 X-RAY EXAM CHEST 2 VIEWS: CPT | Mod: 26,TXP,, | Performed by: RADIOLOGY

## 2019-03-06 PROCEDURE — 72170 X-RAY EXAM OF PELVIS: CPT | Mod: TC,TXP

## 2019-03-06 PROCEDURE — 72170 X-RAY EXAM OF PELVIS: CPT | Mod: 26,TXP,, | Performed by: RADIOLOGY

## 2019-03-13 ENCOUNTER — TELEPHONE (OUTPATIENT)
Dept: CARDIOLOGY | Facility: CLINIC | Age: 69
End: 2019-03-13

## 2019-03-14 ENCOUNTER — CLINICAL SUPPORT (OUTPATIENT)
Dept: CARDIOLOGY | Facility: CLINIC | Age: 69
End: 2019-03-14
Attending: NURSE PRACTITIONER
Payer: MEDICARE

## 2019-03-14 ENCOUNTER — OFFICE VISIT (OUTPATIENT)
Dept: TRANSPLANT | Facility: CLINIC | Age: 69
End: 2019-03-14
Payer: MEDICARE

## 2019-03-14 VITALS
DIASTOLIC BLOOD PRESSURE: 89 MMHG | HEART RATE: 78 BPM | SYSTOLIC BLOOD PRESSURE: 177 MMHG | RESPIRATION RATE: 18 BRPM | TEMPERATURE: 96 F | HEIGHT: 67 IN | BODY MASS INDEX: 31.49 KG/M2 | OXYGEN SATURATION: 99 % | WEIGHT: 200.63 LBS

## 2019-03-14 VITALS — BODY MASS INDEX: 32.18 KG/M2 | WEIGHT: 205 LBS | HEIGHT: 67 IN

## 2019-03-14 DIAGNOSIS — Z76.82 PATIENT ON WAITING LIST FOR KIDNEY TRANSPLANT: Primary | ICD-10-CM

## 2019-03-14 DIAGNOSIS — N18.6 ANEMIA IN ESRD (END-STAGE RENAL DISEASE): Chronic | ICD-10-CM

## 2019-03-14 DIAGNOSIS — I15.0 RENOVASCULAR HYPERTENSION: ICD-10-CM

## 2019-03-14 DIAGNOSIS — N18.6 ESRD (END STAGE RENAL DISEASE): Chronic | ICD-10-CM

## 2019-03-14 DIAGNOSIS — R97.20 ELEVATED PSA: ICD-10-CM

## 2019-03-14 DIAGNOSIS — E21.3 HPTH (HYPERPARATHYROIDISM): ICD-10-CM

## 2019-03-14 DIAGNOSIS — D47.2 MONOCLONAL GAMMOPATHY: ICD-10-CM

## 2019-03-14 DIAGNOSIS — Z76.82 ORGAN TRANSPLANT CANDIDATE: ICD-10-CM

## 2019-03-14 DIAGNOSIS — D63.1 ANEMIA IN ESRD (END-STAGE RENAL DISEASE): Chronic | ICD-10-CM

## 2019-03-14 LAB
CV STRESS BASE HR: 70 BPM
DIASTOLIC BLOOD PRESSURE: 95 MMHG
END DIASTOLIC INDEX-HIGH: 171 ML/M2
END SYSTOLIC INDEX-HIGH: 70 ML/M2
NUC REST DIASTOLIC VOLUME INDEX: 79
NUC REST EJECTION FRACTION: 85
NUC REST SYSTOLIC VOLUME INDEX: 12
NUC STRESS DIASTOLIC VOLUME INDEX: 78
NUC STRESS EJECTION FRACTION: 90 %
NUC STRESS SYSTOLIC VOLUME INDEX: 8
OHS CV CPX 1 MINUTE RECOVERY HEART RATE: 90 BPM
OHS CV CPX 85 PERCENT MAX PREDICTED HEART RATE MALE: 128
OHS CV CPX MAX PREDICTED HEART RATE: 150
OHS CV CPX PATIENT IS FEMALE: 0
OHS CV CPX PATIENT IS MALE: 1
OHS CV CPX PEAK DIASTOLIC BLOOD PRESSURE: 96 MMHG
OHS CV CPX PEAK HEAR RATE: 83 BPM
OHS CV CPX PEAK RATE PRESSURE PRODUCT: NORMAL
OHS CV CPX PEAK SYSTOLIC BLOOD PRESSURE: 169 MMHG
OHS CV CPX PERCENT MAX PREDICTED HEART RATE ACHIEVED: 55
OHS CV CPX RATE PRESSURE PRODUCT PRESENTING: NORMAL
RETIRED EF AND QEF - SEE NOTES: 51 %
SYSTOLIC BLOOD PRESSURE: 170 MMHG

## 2019-03-14 PROCEDURE — 78452 STRESS TEST WITH MYOCARDIAL PERFUSION (CUPID ONLY): ICD-10-PCS | Mod: 26,S$PBB,TXP, | Performed by: INTERNAL MEDICINE

## 2019-03-14 PROCEDURE — 99215 OFFICE O/P EST HI 40 MIN: CPT | Mod: PBBFAC,25,27,TXP | Performed by: NURSE PRACTITIONER

## 2019-03-14 PROCEDURE — 99211 OFF/OP EST MAY X REQ PHY/QHP: CPT | Mod: PBBFAC,TXP,25

## 2019-03-14 PROCEDURE — 93016 STRESS TEST WITH MYOCARDIAL PERFUSION (CUPID ONLY): ICD-10-PCS | Mod: S$PBB,TXP,, | Performed by: INTERNAL MEDICINE

## 2019-03-14 PROCEDURE — 93018 STRESS TEST WITH MYOCARDIAL PERFUSION (CUPID ONLY): ICD-10-PCS | Mod: S$PBB,TXP,, | Performed by: INTERNAL MEDICINE

## 2019-03-14 PROCEDURE — 99999 PR PBB SHADOW E&M-EST. PATIENT-LVL V: CPT | Mod: PBBFAC,TXP,, | Performed by: NURSE PRACTITIONER

## 2019-03-14 PROCEDURE — 99999 PR PBB SHADOW E&M-EST. PATIENT-LVL V: ICD-10-PCS | Mod: PBBFAC,TXP,, | Performed by: NURSE PRACTITIONER

## 2019-03-14 PROCEDURE — 93018 CV STRESS TEST I&R ONLY: CPT | Mod: S$PBB,TXP,, | Performed by: INTERNAL MEDICINE

## 2019-03-14 PROCEDURE — 78452 HT MUSCLE IMAGE SPECT MULT: CPT | Mod: PBBFAC,TXP | Performed by: INTERNAL MEDICINE

## 2019-03-14 PROCEDURE — 99215 OFFICE O/P EST HI 40 MIN: CPT | Mod: S$PBB,TXP,, | Performed by: NURSE PRACTITIONER

## 2019-03-14 PROCEDURE — 99215 PR OFFICE/OUTPT VISIT, EST, LEVL V, 40-54 MIN: ICD-10-PCS | Mod: S$PBB,TXP,, | Performed by: NURSE PRACTITIONER

## 2019-03-14 PROCEDURE — 99999 PR PBB SHADOW E&M-EST. PATIENT-LVL I: ICD-10-PCS | Mod: PBBFAC,TXP,,

## 2019-03-14 PROCEDURE — 99999 PR PBB SHADOW E&M-EST. PATIENT-LVL I: CPT | Mod: PBBFAC,TXP,,

## 2019-03-14 PROCEDURE — 93016 CV STRESS TEST SUPVJ ONLY: CPT | Mod: S$PBB,TXP,, | Performed by: INTERNAL MEDICINE

## 2019-03-14 RX ORDER — TORSEMIDE 20 MG/1
80 TABLET ORAL DAILY
COMMUNITY
Start: 2019-02-10 | End: 2019-08-22 | Stop reason: SDUPTHER

## 2019-03-14 RX ORDER — REGADENOSON 0.08 MG/ML
0.4 INJECTION, SOLUTION INTRAVENOUS
Status: COMPLETED | OUTPATIENT
Start: 2019-03-14 | End: 2019-03-14

## 2019-03-14 RX ORDER — CALCIUM CARBONATE 500(1250)
1 TABLET,CHEWABLE ORAL 3 TIMES DAILY
Status: ON HOLD | COMMUNITY
End: 2022-03-21 | Stop reason: HOSPADM

## 2019-03-14 RX ADMIN — REGADENOSON 0.4 MG: 0.08 INJECTION, SOLUTION INTRAVENOUS at 09:03

## 2019-03-14 NOTE — PROGRESS NOTES
Transplant Recipient Adult Psychosocial Assessment(update)      Alverto Ramirez  [address]  [place] [address]  Telephone Information:   Mobile [phone]   Mobile [phone]   Home  [phone] (home)  Work  There is no work phone number on file.  E-mail  [email or web address]    Sex: male  YOB: 1950  Age: 68 y.o.    Encounter Date: 3/14/2019  [place] Citizen: yes  Primary Language: English   Needed: no    Emergency Contact:  Name: Liana Ramirez  Relationship: wife  Address: Same as pt.  Phone Numbers:  [phone] (home), [phone] (work), [phone] (mobile)    Name: Capo Ramirez  Relationship: son  Address: [address] [place] [address] [place] [address]  Phone Numbers:  [phone] (mobile)    Family/Social Support:   Number of dependents/: Pt reports no dependents.  Marital history: pt reports being  only once and then to pt's Liana Ramirez for the past 47 years.  Other family dynamics: Pt reports 2 adult sons: Capo and Doug (resides in [place]); sisters: Sue, Mayelin, Fernanda, and Jen (half-sister); 1 brother: Charles. Pt reports both parents are  and pt identifies all family members (except sisters: Sue and Fernanda) as supportive.    Household Composition:  Name: Alverto Ramirez  Age: 68  Relationship: patient  Does person drive? yes    Name: Liana Ramirez  Age: 67  Relationship: wife  Does person drive? yes     Do you and your caregivers have access to reliable transportation? yes     PRIMARY CAREGIVER: Liana Ramirez, pt's wife,  will be primary caregiver, phone number [phone].      provided in-depth information to patient and caregiver regarding pre- and post-transplant caregiver role.   strongly encourages patient and caregiver to have concrete plan regarding post-transplant care giving, including back-up caregiver(s) to ensure care giving needs are met as needed.    Patient and Caregiver  states understanding all aspects of caregiver role/commitment and is able/willing/committed to being caregiver to the fullest extent necessary.    Patient and Caregiver verbalizes understanding of the education provided today and caregiver responsibilities.         remains available. Patient and Caregiver agree to contact  in a timely manner if concerns arise.      Able to take time off work without financial concerns: yes.      Additional Significant Others who will Assist with Transplant:  Name: Capo Ramirez  Age: 42  Phone: [phone]  City: [place] State: [place]  Relationship: son  Does person drive? yes       Living Will: no Education and information provided to pt.  Healthcare Power of : no Education and information provided to pt. Pt did report trusting wife with medical decisions  Advance Directives on file: <<no information> per medical record.  Verbally reviewed LW/HCPA information.   provided patient with copy of LW/HCPA documents and provided education on completion of forms    Living Donors: No. Education and resource information given to patient.     Highest Education Level: Attended College/Technical School  Reading Ability: college  Reports difficulty with: seeing and wears bifocals  Learns Best By:  A combination of verbal, written, and hands-on instruction.      Status: no  VA Benefits: no     Working for Income: No  If no, reason not working: Patient Choice - Retired  Patient is retired from owning/operating driving school..    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    Disabled: no, however pt received snf from [employer] due to anxiety.    Monthly Income:  halfway: $300  SS Reitrement: $1840  Other household members total: $360 (wife's income)      Able to afford all costs now and if transplanted, including medications: yes pt and wife reports conerns financially. SW did  encourage pt to contact insurance company regarding post transplant medications cost.   Patient verbalizes understanding of personal responsibilities related to transplant costs and the importance of having a financial plan to ensure that patients transplant costs are fully covered.   provided fundraising information/education.  Patient verbalizes understanding.   remains available.    Insurance:   Payor/Plan Subscr  Sex Relation Sub. Ins. ID Effective Group Num   1. MEDICARE - ME* ADAM MITCHELL JR. 1950 Male  [id number]                                     [address]   2. AETNA - AETNA* ADAM MITCHELL JR. 1950 Male  [id number]                                   [address]     Primary Insurance (for UNOS reporting): Public Insurance - Medicare FFS (Fee For Service)  Secondary Insurance (for UNOS reporting): Private Insurance (Pt reports that he pays and also has a part D)    Patient and Caregiver verbalizes clear understanding that patient may experience difficulty obtaining and/or be denied insurance coverage post-surgery. This includes and is not limited to disability insurance, life insurance, health insurance, burial insurance, long term care insurance, and other insurances.      Patient and Caregiver also reports understanding that future health concerns related to or unrelated to transplantation may not be covered by patient's insurance.  Resources and information provided and reviewed.     Patient and Caregiver provides verbal permission to release any necessary information to outside resources for patient care and discharge planning.  Resources and information provided are reviewed.      Patient provides verbal permission to release any necessary information to outside resources for patient care and discharge planning.  Resources and information provided are reviewed.      Dialysis History:  Patient reports being on peritoneal dialysis  attending all dialysis appointments and staying for the entire course of treatment. SW faxed adherence form.     Infusion Service: patient utilizing? no  Home Health: patient utilizing? no  DME: yes PD equipment and BP Cuff  Pulmonary/Cardiac Rehab: Pt denies.   ADLS:  Pt reports no difficulties with driving, walking, bathing, cooking, housekeeping, eating, shopping, and taking medication.    Adherence:   Pt reports good adherence with medications, dialysis, and health regimen..  Adherence education and counseling provided.     Per History Section:  Past Medical History:   Diagnosis Date    Anemia in CKD (chronic kidney disease)     Atrial fibrillation     CKD (chronic kidney disease) stage 4, GFR 15 2014    Colon cancer screening 2014    Elevated PSA 2014    HTN (hypertension) diagnosed in his 40s 10/16/2014    Monoclonal gammopathy 2014    Phobic anxiety disorder 2014    Proteinuria 2014     Social History     Tobacco Use    Smoking status: Former Smoker     Packs/day: 1.00     Years: 15.00     Pack years: 15.00     Types: Cigarettes     Last attempt to quit: 1984     Years since quittin.3    Smokeless tobacco: Never Used   Substance Use Topics    Alcohol use: Yes     Alcohol/week: 7.2 - 8.4 oz     Types: 5 - 7 Standard drinks or equivalent, 7 Glasses of wine per week     Comment: drinks glass red wine nightly     Social History     Substance and Sexual Activity   Drug Use No       Patient and Caregiver states clear understanding of the potential impact of substance use as it relates to transplant candidacy and is aware of possible random substance screening.  Substance abstinence/cessation counseling, education and resources provided and reviewed.     Arrests/DWI/Treatment/Rehab: patient denies    Psychiatric History:    Mental Health: anxiety. Pt reports dealing with his anxiety for the past 22 years. Pt confirmed previously reported claustrophobia and  tight space as triggers.  Pt denies any recent difficulties.  Pt was previously cleared by Dr. Willis for transplant. Pt reports that he can go detention through an MRI machine.  Psychiatrist/Counselor: Pt reports previously seeing Dr. Willis, psychiatrist, however reports that he is looking for a new psychiatrist that accepts his insurance.  Pt reports he does have the list of insurance approved psychiatrist and will choose a provider soon.   Medications:  Pt reports taking Xanax, 1 mg PRN. However, pt reports medication does not work as well as it once did. SW inquired if he expressed concern to prescribing doctor. Pt denies. SW STRONGLY encourage pt to communicate concern with doctor. Pt verbalized understanding and agreement.   Suicide/Homicide Issues: Pt denies any history of or current suicidal or homicidal ideations.    Safety in Household: Pt reports no current or history of safety concerns in household; including mental, physical, verbal, or sexual abuse.    Knowledge: Patient and Caregiver states having clear understanding and realistic expectations regarding the potential risks and potential benefits of organ transplantation and organ donation, agrees to discuss with health care team members and support system members and to utilize available resources and express questions and/or concerns in order to further facilitate the pt informed decision-making.  Resources and information provided and reviewed.     Patient and Caregiver is aware of [facility]'s affiliation and/or partnership with agencies in home health care, LTAC, SNF, [facility], and other hospitals and clinics.    Understanding: Patient and Caregiver reports having a clear understanding of the many lifetime commitments involved with being a transplant recipient, including costs, compliance, medications, lab work, procedures, appointments, concrete and financial planning, preparedness, timely and appropriate communication of concerns, abstinence (ETOH, tobacco,  illicit non-prescribed drugs), adherence to all health care team recommendations, support system and caregiver involvement, appropriate and timely resource utilization and follow-through, mental health counseling as needed/recommended, and patient and caregiver responsibilities.  Social Service Handbook, resources and detailed educational information provided and reviewed.  Educational information provided.    Patient and Caregiver also reports current and expected compliance with health care regime and states having a clear understanding of the importance of compliance.      Patient and Caregiver reports a clear understanding that risks and benefits may be involved with organ transplantation and with organ donation.      Patient and Caregiver also reports clear understanding that psychosocial risk factors may affect patient, and include but are not limited to feelings of depression, generalized anxiety, anxiety regarding dependence on others, post traumatic stress disorder, feelings of guilt and other emotional and/or mental concerns, and/or exacerbation of existing mental health concerns.  Detailed resources provided and discussed.     Patient and Caregiver agrees to access appropriate resources in a timely manner as needed and/or as recommended, and to communicate concerns appropriately.  Patient and Caregiver also reports a clear understanding of treatment options available.     Patient and Caregiver received education in a group setting.   reviewed education, provided additional information, and answered questions.    Feelings or Concerns: Patient and Caregiver did not express any concerns at this time.    Coping: Pt reports coping well with the transplant process at this time and reports taking a walk, watching DVDs (Kingfish, Eugene & Son, Dale and Deandre, etc); playing computer solitaire, and watching tv as ways to cope. Pt reports Faith home as [facility] in [place], [place]  "with Rev. Shine presiding.     Goals: Pt reports enjoying life as a goal for after transplant and travel, play music, and smoke a cigar . SW provided support and education. Pt verbalizes understanding that transplant is not a guarantee of ending dialysis and life after transplantation will be a "new normal" post transplant.  SW remains available.      Interview Behavior: Patient and Caregiver present as alert and oriented x 4, pleasant, good eye contact, well groomed, recall good, concentration/judgement good, average intelligence, calm, communicative, cooperative and asking and answering questions appropriately.  Pt presents with wife: Liana Ramirez with pt's permission.       Transplant Social Work - Candidacy  Assessment/Plan:     Psychosocial Suitability: Patient presents as a suitable candidate for kidney transplant at this time. Based on psychosocial risk factors, patient presents as low risk, due to adequate insurance and financial plan in place, suitable dialysis adherence, and caregiver plan in place.    Recommendations/Additional Comments: SW recommends that pt conduct fundraising to assist pt with pay for cost of medications, food, gas, and other transplant related needs. SW recommends that pt remain aware of potential mental health concerns and contact the team if any concerns arise. SW recommends that pt remain abstinent from tobacco, ETOH, and drug use. SW supports pt's continued dialysis adherence. SW remains available to answer any questions or concerns that arise as the pt moves through the transplant process.       Aby Guerra LMSW       "

## 2019-03-14 NOTE — LETTER
March 17, 2019        Robson Bernstein  [address]  [place] [address]  Phone: [phone]  Fax: [phone]             [facility]  [address]  [facility] [address]  Phone: [phone]   Patient: Alverto Ramirez Jr.   MR Number: [id number]   YOB: 1950   Date of Visit: 3/14/2019       Dear Dr. Robson Bernstein    Thank you for referring Alverto Ramirez to me for evaluation. Attached you will find relevant portions of my assessment and plan of care.    If you have questions, please do not hesitate to call me. I look forward to following Alverto Ramirez along with you.    Sincerely,    Jessica Spann, NP    Enclosure    If you would like to receive this communication electronically, please contact [email or web address] or [phone] to request [facility] access.    [employer] is a tool which provides read-only access to select patient information with whom you have a relationship. Its easy to use and provides real time access to review your patients record including encounter summaries, notes, results, and demographic information.    If you feel you have received this communication in error or would no longer like to receive these types of communications, please e-mail [email or web address]

## 2019-03-14 NOTE — LETTER
Date: 3/14/2019          Listed Patient      To: Dialysis Unit  and Charge RN From: [facility] Workers and      Kidney Transplant Nurse Coordinators    RE: Alverto Ashley Merchant, 1950, [id number]     At [facility], we conduct adherence checks as an important part of transplant care. Initial and listed patient assessments are not complete without adherence information.        Please complete the following information:     Current Dry Weight: ___________         Most Recent Pre-Treatment Weight: ___________ /date: _________                    Data from the last 3 months:  (data from last 3 months preferred):    Number of AMAs with dates, time, and reasons: ____________________________________________________    ______________________________________________________________________________________________    ______________________________________________________________________________________________    Number of No-Shows with dates and reasons: ______________________________________________________      ______________________________________________________________________________________________    Last intact PTH:  ___________/date: __________      Any concerns with Labs:  YES / NO      If yes, please explain:  ___________________________________________________________________________    ______________________________________________________________________________________________    Any concerns with Caregivers:  YES / NO    If yes, please explain:  ___________________________________________________________________________    ______________________________________________________________________________________________     Any concerns with Transportation:  YES / NO    If yes, please explain:   ___________________________________________________________________________    ______________________________________________________________________________________________    Any Psychiatric and/or Psychosocial concerns:  YES / NO     If yes, please explain: ___________________________________________________________________________    ______________________________________________________________________________________________      PLEASE RETURN TO: FAX: [phone]     Thank you for collaborating with us in the care of this patient.           [address]  ?  [place] [address]  ?  phone [phone]  ?  fax [phone]  ?  [email or web address]  Confidentiality notice: The accompanying facsimile is intended solely for the use of the recipient designated above. Document(s) transmitted herewith may contain information that is confidential and privileged. Delivery, distribution or dissemination of this communication other than to the intended recipient is strictly prohibited. If you have received this facsimile in error, please notify us immediately by telephone.

## 2019-03-14 NOTE — PROGRESS NOTES
Kidney Transplant Recipient Reevalulation    Referring Physician: Chris Adair  Current Nephrologist: Robson Bernstein  Waitlist Status: active  Dialysis Start Date: 2/20/2017    Subjective:     CC:  Six month reassessment of kidney transplant candidacy.    HPI:  Mr. Ramirez is a 68 y.o. year old Black or  male with ESRD secondary to HTN.  He has been on the wait list for a kidney transplant [facility] since 3/30/2015. Patient is currently on peritoneal dialysis started on 2/20/2017. Patient is dialyzing on cyclic peritoneal dialysis.  Patient reports that he is tolerating dialysis well. . He has a PD catheter. Patient denies any recent hospitalizations or ED visits. Denies peritonitis. 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 Will do stairs daily for exercise at the building where he teaches. Doing well. No problems  With SOB, chest pain or claudication with exertion.  Looks good, does not appear frail.     HX MGUS, f/u annually with hemonc, remains stable    HX elevated PSA/ stable, followed by urology   Lab Results   Component Value Date    PSA 4.1 (H) 09/05/2018    PSA 4.4 (H) 04/30/2018    PSA 4.9 (H) 07/11/2017     3/14/2019 NM cardiac stress --results pending     3/6/2019 2 D Echo  CONCLUSIONS     1 - Mild left atrial enlargement.     2 - Concentric hypertrophy.     3 - No wall motion abnormalities.     4 - Normal left ventricular systolic function (EF 60-65%).     5 - Indeterminate LV diastolic function.     6 - Normal right ventricular systolic function .     7 - The estimated PA systolic pressure is 26 mmHg.     8 - Trivial pulmonic regurgitation.     3/6/2019 iliac doppler study   FINDINGS:  Right iliac vein is patent.  Peak systolic velocity within the right iliac artery is 150 centimeters/second.  Left iliac vein is patent.  Left iliac artery has PSV of 147 centimeters/second.      Impression     As above.     3/6/2019 PXR  FINDINGS:  Dialysis catheter projects over the upper  pelvis.  Retained oral contrast noted within numerous diverticuli in the distal descending and redundant sigmoid regions of the colon.  Mild degenerative change noted in the included spine, bilateral SI and hip joints.  No significant vascular calcification identified on this exam.  Few punctate areas of retained contrast or ingested material noted in the rectal and colonic regions.  Osseous structures intact.      Impression     As above.     3/6/2019 CXR   FINDINGS:  No significant interval change.  Heart and pulmonary vasculature within normal limits.  Trachea is midline and CP angles sharp.  Lungs are clear without consolidation, mass lesion or effusion noted.  Degenerative change present throughout the spine with minimal underlying scoliosis versus positioning noted.  There is faint visualization of nodular areas of increased density projecting over the upper abdomen most consistent with retained contrast material within colonic diverticuli.      Impression     Stable exam without acute infiltrate       12/29/2017 colonoscopy   Impression:           - Diverticulosis in the sigmoid colon, in the                         descending colon, in the transverse colon and in                         the ascending colon.                        - No specimens collected.                        - The exam was otherwise normal to the cecum.  Recommendation:       - Discharge patient to home (via wheelchair).                        - High fiber diet.                        - Continue present medications.                        - Repeat colonoscopy in 5 years for surveillance    Past Medical History:   Diagnosis Date    Anemia in CKD (chronic kidney disease)     Atrial fibrillation     CKD (chronic kidney disease) stage 4, GFR 15 11/26/2014    Colon cancer screening 12/19/2014    Elevated PSA 11/26/2014    HTN (hypertension) diagnosed in his 40s 10/16/2014    Monoclonal gammopathy 11/26/2014    Phobic anxiety disorder  "11/26/2014    Proteinuria 11/26/2014       Review of Systems   Constitutional: Negative for activity change, appetite change, chills, fatigue, fever and unexpected weight change.   HENT: Negative for congestion, facial swelling, postnasal drip, rhinorrhea, sinus pressure, sore throat and trouble swallowing.    Eyes: Positive for visual disturbance. Negative for pain and redness.        Wears glasses for reading    Respiratory: Negative for cough, chest tightness, shortness of breath and wheezing.    Cardiovascular: Negative.  Negative for chest pain, palpitations and leg swelling.        Hx a-fib   Gastrointestinal: Negative for abdominal pain, diarrhea, nausea and vomiting.   Genitourinary: Negative for dysuria, flank pain and urgency.        Still makes a good amount of urine   Musculoskeletal: Negative for gait problem, neck pain and neck stiffness.   Skin: Negative for rash.   Allergic/Immunologic: Negative for environmental allergies, food allergies and immunocompromised state.   Neurological: Negative for dizziness, weakness, light-headedness and headaches.   Psychiatric/Behavioral: Negative for agitation and confusion. The patient is not nervous/anxious.        Objective:   body mass index is 31.38 kg/m².  BP (!) 177/89 (BP Location: Right arm, Patient Position: Sitting, BP Method: Large (Automatic))   Pulse 78   Temp 96.2 °F (35.7 °C) (Oral)   Resp 18   Ht 5' 7.05" (1.703 m)   Wt 91 kg (200 lb 9.9 oz)   SpO2 99%   BMI 31.38 kg/m²     Physical Exam   Constitutional: He is oriented to person, place, and time. He appears well-developed and well-nourished.   HENT:   Head: Normocephalic.   Mouth/Throat: Oropharynx is clear and moist. No oropharyngeal exudate.   Eyes: Conjunctivae and EOM are normal. Pupils are equal, round, and reactive to light. No scleral icterus.   Neck: Normal range of motion. Neck supple.   Cardiovascular: Normal rate, regular rhythm and normal heart sounds.   Pulmonary/Chest: Effort " normal and breath sounds normal.   Abdominal: Soft. Normal appearance and bowel sounds are normal. He exhibits no distension and no mass. There is no splenomegaly or hepatomegaly. There is no tenderness. There is no rebound, no guarding, no CVA tenderness, no tenderness at McBurney's point and negative Richardson's sign.       Musculoskeletal: Normal range of motion. He exhibits no edema.   Lymphadenopathy:     He has no cervical adenopathy.   Neurological: He is alert and oriented to person, place, and time. He exhibits normal muscle tone. Coordination normal.   Skin: Skin is warm and dry.   Psychiatric: He has a normal mood and affect. His behavior is normal.   Vitals reviewed.      Labs:  Lab Results   Component Value Date    WBC 6.29 04/30/2018    HGB 10.6 (L) 04/30/2018    HCT 31.3 (L) 04/30/2018     04/30/2018    K 3.8 04/30/2018     04/30/2018    CO2 21 (L) 04/30/2018    BUN 79 (H) 04/30/2018    CREATININE 12.3 (H) 04/30/2018    EGFRNONAA 4 (A) 04/30/2018    CALCIUM 8.2 (L) 04/30/2018    PHOS 6.0 (H) 02/01/2017    ALBUMIN 3.3 (L) 04/30/2018    AST 44 (H) 04/30/2018    ALT 91 (H) 04/30/2018    UTPCR 5.38 (H) 02/01/2017    .0 (H) 03/14/2019       Lab Results   Component Value Date    BILIRUBINUA Negative 02/01/2017    PROTEINUA 3+ (A) 02/01/2017    NITRITE neg\ 07/17/2017    RBCUA 0 02/01/2017    WBCUA 3 02/01/2017       No results found for: HLAABCTYPE    Lab Results   Component Value Date    CPRA 0 07/12/2018    LW6DWBD Negative 07/12/2018    CIIAB Negative 07/12/2018       Labs were reviewed with the patient.    Pre-transplant Workup:   Reviewed with the patient.    Assessment:     1. Patient on waiting list for kidney transplant    2. ESRD (end stage renal disease)    3. BMI 31.0-31.9,adult    4. Anemia in ESRD (end-stage renal disease)    5. Monoclonal gammopathy    6. Renovascular hypertension    7. HPTH (hyperparathyroidism)    8. Elevated PSA        Plan:      Last colonoscopy 12/19/2014  with a 3 year repeat recommendation (on checklist). Repeat colonoscopy 12/29/2017--Checklist needs to be updated.     Routine hemonc and urology f/u as scheduled    Transplant Candidacy:   Mr. Ramirez is a suitable kidney transplant candidate.  Meets center eligibility for accepting HCV+ donor offer - yes.  Patient educated on HCV+ donors. Alverto is willing  to accept HCV+ donor offer -  no Pt would like to think about this option more.   Patient is a candidate for KDPI > 85 kidney donor offer - no d/t weight.  He remains in overall stable health, and will remain active on the transplant list.    Jessica Spann NP       Follow-up:   In addition to the tests noted in the plan, Mr. Ramirez will continue to have reevaluation as per the standing pre-kidney transplant protocol:  1. Monthly blood for PRA  2. Annual return to clinic, except HIV positive, > 65 years of age, or pancreas transplant candidates who will be scheduled to see transplant every 6 months while in pre-transplant phase  3. Annual re-testing: CXR, EKG, yearly mammograms for women over 40 and PSA for males over 40, cardiology follow-up as recommended by initial cardiology pre-transplant evaluation  4. Renal ultrasound every 2 years  5. Baseline colonoscopy after age 50 and repeated as recommended    UNOS Patient Status  Functional Status: 60% - Requires occasional assistance but is able to care for needs  Physical Capacity: No Limitations

## 2019-03-15 NOTE — PROGRESS NOTES
PHARM.D. PRE-TRANSPLANT NOTE:    This patient's medication therapy was evaluated as part of his pre-transplant evaluation.    The following pharmacologic concerns were noted: Patient on xanax.    This patient's medication profile was reviewed for contraindications for DAA Hepatitis C therapy:    [X]  No current inducers of CYP 3A4 or PGP  [X]  No amiodarone on this patient's EMR profile in the last 24 months  [YES]  No past or current atrial fibrillation on this patient's EMR profile       Current Outpatient Medications   Medication Sig Dispense Refill    ALPRAZolam (XANAX) 1 MG tablet Take 1 tablet (1 mg total) by mouth 3 (three) times daily as needed for Anxiety. 90 tablet 0    amLODIPine (NORVASC) 10 MG tablet TAKE 1 TABLET DAILY 90 tablet 8    calcitRIOL (ROCALTROL) 0.5 MCG Cap TAKE 1 BY MOUTH DAILY 30 capsule 10    calcium carbonate (OS-SUSAN) 500 mg calcium (1,250 mg) chewable tablet Take 1 tablet by mouth 3 (three) times daily.      fluticasone (FLONASE) 50 mcg/actuation nasal spray 2 sprays by Each Nare route as needed.      gentamicin (GARAMYCIN) 0.1 % cream APPLY  TOPICALLY THREE TIMES A DAY  15 g 4    lanthanum (FOSRENOL) 1000 MG chewable tablet Take 2 tablets (2,000 mg total) by mouth 3 (three) times daily with meals. 540 tablet 3    losartan (COZAAR) 100 MG tablet TAKE 1 TABLET DAILY 90 tablet 4    metoprolol tartrate (LOPRESSOR) 50 MG tablet TAKE 1 TABLET DAILY 90 tablet 3    potassium chloride SA (K-DUR,KLOR-CON) 10 MEQ tablet Take 1 tablet (10 mEq total) by mouth once daily. 90 tablet 3    torsemide (DEMADEX) 20 MG Tab Take 80 mg by mouth once daily.      VITAMIN D2 50,000 unit capsule TAKE 1 CAPSULE EVERY 7 DAYS 13 capsule 4    sildenafil (VIAGRA) 50 MG tablet Take 1 tablet (50 mg total) by mouth daily as needed for Erectile Dysfunction. 8 tablet 1     No current facility-administered medications for this visit.          Currently the patient is responsible for preparing / administering  this patient's medications on a daily basis.  I am available for consultation and can be contacted, as needed by the other members of the Kidney Transplant team.

## 2019-04-10 RX ORDER — POTASSIUM CHLORIDE 750 MG/1
TABLET, EXTENDED RELEASE ORAL
Qty: 90 TABLET | Refills: 3 | Status: SHIPPED | OUTPATIENT
Start: 2019-04-10 | End: 2020-04-27 | Stop reason: SDUPTHER

## 2019-04-11 RX ORDER — ALPRAZOLAM 1 MG/1
1 TABLET ORAL 3 TIMES DAILY PRN
Qty: 90 TABLET | Refills: 0 | Status: SHIPPED | OUTPATIENT
Start: 2019-04-11 | End: 2019-06-27 | Stop reason: SDUPTHER

## 2019-06-27 RX ORDER — SILDENAFIL CITRATE 20 MG/1
TABLET ORAL
Qty: 50 TABLET | Refills: 11 | Status: SHIPPED | OUTPATIENT
Start: 2019-06-27 | End: 2020-09-21

## 2019-06-27 RX ORDER — ALPRAZOLAM 1 MG/1
1 TABLET ORAL 3 TIMES DAILY PRN
Qty: 90 TABLET | Refills: 0 | Status: SHIPPED | OUTPATIENT
Start: 2019-06-27 | End: 2019-06-27 | Stop reason: SDUPTHER

## 2019-06-27 RX ORDER — ALPRAZOLAM 1 MG/1
1 TABLET ORAL 3 TIMES DAILY PRN
Qty: 90 TABLET | Refills: 0 | Status: SHIPPED | OUTPATIENT
Start: 2019-06-27 | End: 2019-07-09 | Stop reason: SDUPTHER

## 2019-07-01 RX ORDER — AMLODIPINE BESYLATE 10 MG/1
TABLET ORAL
Qty: 90 TABLET | Refills: 8 | Status: SHIPPED | OUTPATIENT
Start: 2019-07-01 | End: 2020-05-01 | Stop reason: SDUPTHER

## 2019-07-09 ENCOUNTER — TELEPHONE (OUTPATIENT)
Dept: NEPHROLOGY | Facility: CLINIC | Age: 69
End: 2019-07-09

## 2019-07-09 RX ORDER — ALPRAZOLAM 1 MG/1
1 TABLET ORAL 3 TIMES DAILY PRN
Qty: 90 TABLET | Refills: 0 | Status: SHIPPED | OUTPATIENT
Start: 2019-07-09 | End: 2019-09-03 | Stop reason: SDUPTHER

## 2019-07-09 NOTE — TELEPHONE ENCOUNTER
----- Message from Jackie Avila sent at 7/9/2019 10:47 AM CDT -----  Contact: Ms Cait March- [phone]  Would like to consult with the  Nurse, needs to verify patient medication, please call back at  [phone], thanks sj  .Type:  Pharmacy Calling to Clarify an RX,     Name of Caller  Pharmacy Name: [facility]  Prescription Name: Xanax   What do they need to clarify?  Best Call Back Number:[phone]  Additional Information: would like to know if this patient can take this medication

## 2019-07-31 DIAGNOSIS — Z76.82 ORGAN TRANSPLANT CANDIDATE: Primary | ICD-10-CM

## 2019-07-31 NOTE — PROGRESS NOTES
YEARLY LIST MANAGEMENT NOTE    Alverto Ramirez's kidney transplant listing status reviewed.  Patient is due for follow-up appointments in September 2019.  Appointments will be scheduled per protocol.  HLA sample is current and being rec'd on a regular basis.

## 2019-08-22 RX ORDER — TORSEMIDE 20 MG/1
TABLET ORAL
Qty: 450 TABLET | Refills: 4 | Status: SHIPPED | OUTPATIENT
Start: 2019-08-22 | End: 2019-09-26 | Stop reason: SDUPTHER

## 2019-08-29 NOTE — PROGRESS NOTES
History & Physical        Chief Complaint:  ESRD     HPI:      Patient is a 67-year-old male with ESRD due to diabetic nephropathy.  Currently is on peritoneal dialysis..  Patient is on active transplant list in [facility].    ROS:        Constitutional: Negative for fever, chills, weight loss, malaise/fatigue and diaphoresis.   HENT: Negative for hearing loss, ear pain, nosebleeds, congestion, sore throat, neck pain, tinnitus and ear discharge.    Eyes: Negative for blurred vision, double vision, photophobia, pain, discharge and redness.   Respiratory: Negative for cough, hemoptysis, sputum production, shortness of breath, wheezing and stridor.    Cardiovascular: Negative for chest pain, palpitations, orthopnea, claudication, leg swelling and PND.   Gastrointestinal: Negative for heartburn, nausea, vomiting, abdominal pain, diarrhea, constipation, blood in stool and melena.   Genitourinary: Negative for dysuria, urgency, frequency, hematuria and flank pain.   Musculoskeletal: Negative for myalgias, back pain, joint pain and falls.   Skin: Negative for itching and rash.   Neurological: Negative for dizziness, tingling, tremors, sensory change, speech change, focal weakness, seizures, loss of consciousness, weakness and headaches.   Endo/Heme/Allergies: Negative for environmental allergies and polydipsia. Does not bruise/bleed easily.   Psychiatric/Behavioral: Negative for depression, suicidal ideas, hallucinations, memory loss and substance abuse. The patient is not nervous/anxious and does not have insomnia.    All 14 systems reviewed and negative except as noted above.      PMHx:      Past Medical History:   Diagnosis Date    Anemia in CKD (chronic kidney disease)     Atrial fibrillation     CKD (chronic kidney disease) stage 4, GFR 15 11/26/2014    Colon cancer screening 12/19/2014    Elevated PSA 11/26/2014    HTN (hypertension) diagnosed in his 40s 10/16/2014    Monoclonal  gammopathy 2014    Phobic anxiety disorder 2014    Proteinuria 2014        PMSx:      Past Surgical History:   Procedure Laterality Date    AV FISTULA PLACEMENT  2014    COLONOSCOPY N/A 2017    [facility]    COLONOSCOPY N/A 2014    Per[facility] at [facility]    [id number] left upper extremity Left 2015    [facility]    PERITONEAL CATHETER INSERTION      VASECTOMY      VASECTOMY          Social Hx:      Social History     Socioeconomic History    Marital status:      Spouse name: Not on file    Number of children: Not on file    Years of education: Not on file    Highest education level: Not on file   Occupational History     Employer: retired   Social Needs    Financial resource strain: Not on file    Food insecurity:     Worry: Not on file     Inability: Not on file    Transportation needs:     Medical: Not on file     Non-medical: Not on file   Tobacco Use    Smoking status: Former Smoker     Packs/day: 1.00     Years: 15.00     Pack years: 15.00     Types: Cigarettes     Last attempt to quit: 1984     Years since quittin.7    Smokeless tobacco: Never Used   Substance and Sexual Activity    Alcohol use: Yes     Alcohol/week: 7.2 - 8.4 oz     Types: 5 - 7 Standard drinks or equivalent, 7 Glasses of wine per week     Comment: drinks glass red wine nightly    Drug use: No    Sexual activity: Yes   Lifestyle    Physical activity:     Days per week: Not on file     Minutes per session: Not on file    Stress: Not on file   Relationships    Social connections:     Talks on phone: Not on file     Gets together: Not on file     Attends Muslim service: Not on file     Active member of club or organization: Not on file     Attends meetings of clubs or organizations: Not on file     Relationship status: Not on file   Other Topics  Concern    Not on file   Social History Narrative    Retired from [employer]     for 41 y.o.    2 children    No history of blood transfusions    No donors        Family Hx:      Family History   Problem Relation Age of Onset    Heart disease Father     Dementia Father     Diabetes Mother     Cataracts Mother     Glaucoma Mother     Hypertension Sister     Cancer Brother         prostate    Kidney disease Sister         ESRD    Cancer Paternal Uncle         VITALS:          Physical Exam   Nursing Notes and Vital Signs Reviewed.     Constitutional: Well developed, well nourished. AAOx3, NAD, speech/ comprehension clear   Head: Atraumatic. Normocephalic.   Eyes: PERRL. EOMI. Conjunctivae are not pale. No scleral icterus.   ENT: Mucous membranes are dry. No tongue tremors. Throat clear.  Neck: Supple. No JVD or LN or Carotid Bruits noted B.  Cardiovascular: S1S2 RRR, no murmurs, rubs, or gallops. Distal pulses are 2+ and symmetric.   Pulmonary/Chest: No evidence of respiratory distress. Clear to auscultation bilaterally. No wheezing, rales or rhonchi. No chest wall TTP.   Abdominal: Soft and non-distended. There is no tenderness. No rebound, guarding, or rigidity. No organomegaly. No mass or viscera palpable. PD Cath intact   Musculoskeletal: FROM in all extremities. No deformities, no TTP, no edema. No midline spinal TTP. No step-offs. Pelvis is stable to compression. No cyanosis. Moves all four extremities.     LUAF+B/T  Skin: Skin is warm and dry. left knee, left foot.   Neurological: No gross neurological deficits, Strength 5/5 B, is equal in the upper and lower extremities bilaterally. No sensory deficits to light touch. No pronator drift.  DTRs are 2+ and equal throughout.   Psychiatric: Good eye contact. Normal Affect.      Laboratory Data:  Reviewed and noted in plan where applicable- Please see chart for full laboratory data.        Lab Results   Component Value Date    INR 0.9  11/26/2014       Lab Results   Component Value Date    WBC 6.29 04/30/2018    HGB 10.6 (L) 04/30/2018    HCT 31.3 (L) 04/30/2018    MCV 91 04/30/2018     04/30/2018         Radiology:  Reviewed and noted in plan where applicable- Please see chart for full radiology data.    Medications:  Current Outpatient Medications   Medication Sig    ALPRAZolam (XANAX) 1 MG tablet Take 1 tablet (1 mg total) by mouth 3 (three) times daily as needed for Anxiety.    amLODIPine (NORVASC) 10 MG tablet TAKE 1 TABLET DAILY    calcitRIOL (ROCALTROL) 0.5 MCG Cap TAKE 1 BY MOUTH DAILY    calcium carbonate (OS-SUSAN) 500 mg calcium (1,250 mg) chewable tablet Take 1 tablet by mouth 3 (three) times daily.    fluticasone (FLONASE) 50 mcg/actuation nasal spray 2 sprays by Each Nare route as needed.    gentamicin (GARAMYCIN) 0.1 % cream APPLY  TOPICALLY THREE TIMES A DAY     lanthanum (FOSRENOL) 1000 MG chewable tablet Take 2 tablets (2,000 mg total) by mouth 3 (three) times daily with meals.    losartan (COZAAR) 100 MG tablet TAKE 1 TABLET DAILY    metoprolol tartrate (LOPRESSOR) 50 MG tablet TAKE 1 TABLET DAILY    potassium chloride SA (K-DUR,KLOR-CON) 10 MEQ tablet TAKE 1 TABLET DAILY    sildenafil (REVATIO) 20 mg Tab TAKE 1 TABLET (20 MG TOTAL) BY MOUTH AS NEEDED.    torsemide (DEMADEX) 20 MG Tab TAKE 5 TABLETS ONCE DAILY    VITAMIN D2 50,000 unit capsule TAKE 1 CAPSULE EVERY 7 DAYS     No current facility-administered medications for this visit.          ASSESSMENT/PLAN:     ACTIVE PROBLEMS:    Patient Active Problem List   Diagnosis    HTN (hypertension) diagnosed in his 40s    Proteinuria    Hematuria    Monoclonal gammopathy    Hyperlipidemia    Phobic anxiety disorder - Claustrophobia    Elevated PSA    Colon cancer screening    HPTH (hyperparathyroidism)    Acidosis    Patient on waiting list for kidney transplant    ESRD (end stage renal disease)    Anemia in ESRD (end-stage renal disease)    BMI  32.0-32.9,adult           PLAN:    Assessment and plan:    1.  ESRD: Doing very well on peritoneal dialysis    2.  Anemia continue Epogen and iron per protocol    3.  Hypertension much better controlled    4.  MGUS: s/p Heme follow up     5.  Hyperparathyroidism treat with vitamin D therapy.      Robson Bernstein MD

## 2019-09-03 RX ORDER — ALPRAZOLAM 1 MG/1
1 TABLET ORAL 3 TIMES DAILY PRN
Qty: 90 TABLET | Refills: 0 | Status: SHIPPED | OUTPATIENT
Start: 2019-09-03 | End: 2019-09-07 | Stop reason: SDUPTHER

## 2019-09-06 ENCOUNTER — TELEPHONE (OUTPATIENT)
Dept: NEPHROLOGY | Facility: CLINIC | Age: 69
End: 2019-09-06

## 2019-09-06 NOTE — TELEPHONE ENCOUNTER
----- Message from Robson Bernstein MD sent at 9/6/2019  3:18 PM CDT -----  Contact: RX TO [facility] - [facility] [address]  Did we fax it ?  ----- Message -----  From: Angela Rebekah  Sent: 9/6/2019   2:57 PM  To: Robson Bernstein MD    She's calling in regards to Rx xanax 1mg     Rx needs to be sent over     [facility] call back  [phone]

## 2019-09-06 NOTE — TELEPHONE ENCOUNTER
Called pharmacy back and they are stating that patient wants it refilled there also until his mail order comes on.

## 2019-09-07 RX ORDER — ALPRAZOLAM 1 MG/1
1 TABLET ORAL 3 TIMES DAILY PRN
Qty: 90 TABLET | Refills: 0 | Status: SHIPPED | OUTPATIENT
Start: 2019-09-07 | End: 2020-03-11 | Stop reason: SDUPTHER

## 2019-09-10 ENCOUNTER — HOSPITAL ENCOUNTER (OUTPATIENT)
Dept: RADIOLOGY | Facility: HOSPITAL | Age: 69
Discharge: HOME | End: 2019-09-10
Attending: NURSE PRACTITIONER
Payer: MEDICARE

## 2019-09-10 ENCOUNTER — OFFICE VISIT (OUTPATIENT)
Dept: TRANSPLANT | Facility: CLINIC | Age: 69
End: 2019-09-10
Payer: MEDICARE

## 2019-09-10 VITALS
RESPIRATION RATE: 16 BRPM | TEMPERATURE: 98 F | HEART RATE: 69 BPM | OXYGEN SATURATION: 100 % | BODY MASS INDEX: 32.25 KG/M2 | WEIGHT: 205.5 LBS | HEIGHT: 67 IN | SYSTOLIC BLOOD PRESSURE: 135 MMHG | DIASTOLIC BLOOD PRESSURE: 79 MMHG

## 2019-09-10 DIAGNOSIS — D63.1 ANEMIA IN ESRD (END-STAGE RENAL DISEASE): Chronic | ICD-10-CM

## 2019-09-10 DIAGNOSIS — N18.6 ANEMIA IN ESRD (END-STAGE RENAL DISEASE): Chronic | ICD-10-CM

## 2019-09-10 DIAGNOSIS — R80.9 PROTEINURIA, UNSPECIFIED TYPE: ICD-10-CM

## 2019-09-10 DIAGNOSIS — F40.9 PHOBIC ANXIETY DISORDER: ICD-10-CM

## 2019-09-10 DIAGNOSIS — Z76.82 PATIENT ON WAITING LIST FOR KIDNEY TRANSPLANT: ICD-10-CM

## 2019-09-10 DIAGNOSIS — Z99.2 ESRD ON DIALYSIS: Primary | ICD-10-CM

## 2019-09-10 DIAGNOSIS — E78.5 HYPERLIPIDEMIA, UNSPECIFIED HYPERLIPIDEMIA TYPE: ICD-10-CM

## 2019-09-10 DIAGNOSIS — Z76.82 ORGAN TRANSPLANT CANDIDATE: ICD-10-CM

## 2019-09-10 DIAGNOSIS — I10 ESSENTIAL HYPERTENSION: ICD-10-CM

## 2019-09-10 DIAGNOSIS — Z76.82 AWAITING ORGAN TRANSPLANT STATUS: ICD-10-CM

## 2019-09-10 DIAGNOSIS — N18.6 ESRD ON DIALYSIS: Primary | ICD-10-CM

## 2019-09-10 DIAGNOSIS — D47.2 MONOCLONAL GAMMOPATHY: ICD-10-CM

## 2019-09-10 PROCEDURE — 99999 PR PBB SHADOW E&M-EST. PATIENT-LVL IV: ICD-10-PCS | Mod: PBBFAC,TXP,, | Performed by: INTERNAL MEDICINE

## 2019-09-10 PROCEDURE — 76770 US RETROPERITONEAL COMPLETE: ICD-10-PCS | Mod: 26,TXP,, | Performed by: RADIOLOGY

## 2019-09-10 PROCEDURE — 99214 OFFICE O/P EST MOD 30 MIN: CPT | Mod: PBBFAC,25,TXP | Performed by: INTERNAL MEDICINE

## 2019-09-10 PROCEDURE — 99999 PR PBB SHADOW E&M-EST. PATIENT-LVL IV: CPT | Mod: PBBFAC,TXP,, | Performed by: INTERNAL MEDICINE

## 2019-09-10 PROCEDURE — 99215 PR OFFICE/OUTPT VISIT, EST, LEVL V, 40-54 MIN: ICD-10-PCS | Mod: S$PBB,TXP,, | Performed by: INTERNAL MEDICINE

## 2019-09-10 PROCEDURE — 76770 US EXAM ABDO BACK WALL COMP: CPT | Mod: TC,TXP

## 2019-09-10 PROCEDURE — 76770 US EXAM ABDO BACK WALL COMP: CPT | Mod: 26,TXP,, | Performed by: RADIOLOGY

## 2019-09-10 PROCEDURE — 99215 OFFICE O/P EST HI 40 MIN: CPT | Mod: S$PBB,TXP,, | Performed by: INTERNAL MEDICINE

## 2019-09-10 NOTE — PATIENT INSTRUCTIONS
1. Stay active/exercise   2. Make sure to call Dr. Godoy's office to schedule a follow-up visit for the MGUS (abnormal protein in the blood) ASAP (he wanted to see you back in August 2018)  3. Seek living donors and have them contact the living donor coordinator

## 2019-09-10 NOTE — LETTER
September 11, 2019        Robson Bernstein  [address] [place] [address]  Phone: [phone]  Fax: [phone]             [facility]  [address]  [facility] [address]  Phone: [phone]   Patient: Alverto Ramirez Jr.   MR Number: [id number]   YOB: 1950   Date of Visit: 9/10/2019       Dear Dr. Robson Bernstein    Thank you for referring Alverto Ramirze to me for evaluation. Attached you will find relevant portions of my assessment and plan of care.    If you have questions, please do not hesitate to call me. I look forward to following Alverto Ramirez along with you.    Sincerely,    Ashley Moraes MD    Enclosure    If you would like to receive this communication electronically, please contact [email or web address] or [phone] to request [facility] access.    [facility] is a tool which provides read-only access to select patient information with whom you have a relationship. Its easy to use and provides real time access to review your patients record including encounter summaries, notes, results, and demographic information.    If you feel you have received this communication in error or would no longer like to receive these types of communications, please e-mail [email or web address]

## 2019-09-10 NOTE — PROGRESS NOTES
Kidney Transplant Recipient Reevalulation    Referring Physician: Chris Adair  Current Nephrologist: Robson Bernstein  Waitlist Status: active  Dialysis Start Date: 2/20/2017    Subjective:     CC:  Six month reassessment of kidney transplant candidacy.    HPI:  Mr. Ramirez is a 69 y.o. year old Black or  male with ESRD presumed secondary to HTN. He denies having a native kidney biopsy. Query whether he has undiagnosed GN given nephrotic range proteinuria. He has been on the wait list for a kidney transplant at [facility], [place] since 3/30/2015. Patient is currently on peritoneal dialysis started on 2/20/17. Patient is dialyzing on cyclic peritoneal dialysis.  Patient reports that he is tolerating dialysis well.  H denies any peritonitis and exit site infections. He has a PD catheter. Patient was last seen in listed [facility] clinic on 3/14/19. He denies any recent hospitalizations or ED visits.    He has history of MGUS - last seen by hem-onc in May 2018 and was supposed to follow-up in 3 months (August 2018) - but he hasn't been back since.     He does a lot of walking. He states he doesn't like going on elevators so uses stairs. He lives in a 1 story house without any stairs. He does household chores. With the activity that he does he denies any chest pain, WEST, or claudication.     He is accompanied to visit by his wife.     Review of Systems   Constitutional: Denies fever/chills, fatigue, night sweats  EENT: +wears reading glasses; Denies hearing problems, trouble swallowing.   Respiratory: Denies shortness of breath, dyspnea on exertion, orthopnea, wheezing, hemoptysis, denies known TB exposure or history of positive TB skin test  Cardiovascular: +A-fib shows up on his problem list but he denies ever being told he had any cardiac arrhythmia and denies ever being on anti-coagulation; Denies chest pain, palpitations, history of MI, history of stroke, history of DVT  Gastrointestinal: Denies abdominal  pain, nausea/vomiting/diarrhea/constipation. Denies history of GI bleeding or ulcers.   Genitourinary: +estimates residual UOP ~1L a day; Denies history of kidney stones, recurrent UTI's, history of urinary obstruction, gross hematuria, dysuria, urinary frequency, incontinence  Musculoskeletal: +occasional knee pain when he walks up the stairs; Denies trouble moving extremities, denies joint pain or swelling  Skin: Denies history of skin cancer, denies rash, ulcerations  Heme/onc: +MGUS as mentioned above; Denies history of coagulopathies or bleeding disorders  Endocrine: +gained 5 lbs since last transplant clinic visit; Denies thyroid disease, unintentional weight loss/weight gain  Neurological: Denies tremors, seizures, dizziness, headaches, peripheral neuropathy  Psychiatric: +anxiety/nervousness related to being 68 yo and whether he will ever get a kidney transplant; Denies depression. Denies hallucinations or delusions.    Potential Donors: no    Medications:  Current Outpatient Medications   Medication Sig Dispense Refill    ALPRAZolam (XANAX) 1 MG tablet Take 1 tablet (1 mg total) by mouth 3 (three) times daily as needed for Anxiety. 90 tablet 0    amLODIPine (NORVASC) 10 MG tablet TAKE 1 TABLET DAILY 90 tablet 8    calcitRIOL (ROCALTROL) 0.5 MCG Cap TAKE 1 BY MOUTH DAILY 30 capsule 10    calcium carbonate (OS-SUSAN) 500 mg calcium (1,250 mg) chewable tablet Take 1 tablet by mouth 3 (three) times daily.      fluticasone (FLONASE) 50 mcg/actuation nasal spray 2 sprays by Each Nare route as needed.      gentamicin (GARAMYCIN) 0.1 % cream APPLY  TOPICALLY THREE TIMES A DAY  15 g 4    lanthanum (FOSRENOL) 1000 MG chewable tablet Take 2 tablets (2,000 mg total) by mouth 3 (three) times daily with meals. 540 tablet 3    losartan (COZAAR) 100 MG tablet TAKE 1 TABLET DAILY 90 tablet 4    metoprolol tartrate (LOPRESSOR) 50 MG tablet TAKE 1 TABLET DAILY 90 tablet 3    potassium chloride SA (K-DUR,KLOR-CON) 10  "MEQ tablet TAKE 1 TABLET DAILY 90 tablet 3    sildenafil (REVATIO) 20 mg Tab TAKE 1 TABLET (20 MG TOTAL) BY MOUTH AS NEEDED. 50 tablet 11    torsemide (DEMADEX) 20 MG Tab TAKE 5 TABLETS ONCE DAILY 450 tablet 4    VITAMIN D2 50,000 unit capsule TAKE 1 CAPSULE EVERY 7 DAYS 13 capsule 4     No current facility-administered medications for this visit.          Objective:   body mass index is 31.87 kg/m².   /79 (BP Location: Right arm, Patient Position: Sitting, BP Method: Medium (Automatic))   Pulse 69   Temp 98.2 °F (36.8 °C) (Oral)   Resp 16   Ht 5' 7.32" (1.71 m)   Wt 93.2 kg (205 lb 7.5 oz)   SpO2 100%   BMI 31.87 kg/m²        Physical Exam  General: No acute distress, well groomed, alert and oriented x 3  HEENT: Normocephalic, atraumatic, PERRLA, sclera anicteric, conjunctiva/corneas clear, EOM's intact bilaterally, external inspection of ears and nose normal, moist mucous membranes, no oral ulcerations/lesions   Neck: Supple, symmetrical, trachea midline, no masses, no carotid bruits, no JVD, thyroid is normal without nodules or enlargement   Respiratory: Clear to auscultation bilaterally, respirations unlabored, no rales/rhonchi/wheezing   Cardiovacular: Regular rate and rhythm, S1, S2 normal, no murmurs, no rubs or gallops   Gastrointestinal: Soft, non-tender, bowel sounds normal, no masses, no palpable organomegaly  Extremities: No clubbing or cyanosis of upper extremities bilaterally, no pedal edema bilaterally; +2 bilateral radial pedis pulses  Skin: warm and dry; no rash on exposed skin  Lymph nodes: Cervical and supraclavicular nodes normal   Neurologic: No focal neurologic deficits.   Musculoskeletal: moves all extremities without difficulty, FROM, 5/5 strength, ambulates without an assistive device  Psychiatric: Normal mood and affect. Responds appropriately to questions.  Access: LUE AVF +thrill/bruit and PD on left       Labs:  Lab Results   Component Value Date    WBC 6.29 04/30/2018    " HGB 10.6 (L) 04/30/2018    HCT 31.3 (L) 04/30/2018     04/30/2018    K 3.8 04/30/2018     04/30/2018    CO2 21 (L) 04/30/2018    BUN 79 (H) 04/30/2018    CREATININE 12.3 (H) 04/30/2018    EGFRNONAA 4 (A) 04/30/2018    CALCIUM 8.2 (L) 04/30/2018    PHOS 6.0 (H) 02/01/2017    ALBUMIN 3.3 (L) 04/30/2018    AST 44 (H) 04/30/2018    ALT 91 (H) 04/30/2018    UTPCR 5.38 (H) 02/01/2017    .0 (H) 03/14/2019       Lab Results   Component Value Date    BILIRUBINUA Negative 02/01/2017    PROTEINUA 3+ (A) 02/01/2017    NITRITE neg\ 07/17/2017    RBCUA 0 02/01/2017    WBCUA 3 02/01/2017       No results found for: HLAABCTYPE    Lab Results   Component Value Date    CPRA 0 07/12/2018    CZ5GDLJ Negative 07/12/2018    CIIAB Negative 07/12/2018       Labs were reviewed with the patient.    Pre-transplant Workup:   Reviewed with the patient.    Assessment:     1. ESRD on dialysis    2. Anemia in ESRD (end-stage renal disease)    3. Essential hypertension    4. Phobic anxiety disorder - Claustrophobia    5. Proteinuria, unspecified type    6. Patient on waiting list for kidney transplant    7. Monoclonal gammopathy    8. Hyperlipidemia, unspecified hyperlipidemia type    9. Awaiting organ transplant status        Plan:     Transplant Candidacy:   Mr. Ramirez is a suitable kidney transplant candidate.  Meets center eligibility for accepting HCV+ donor offer - yes.  Patient educated on HCV+ donors. Alverto is willing to accept HCV+ donor offer -  yes   Patient is a candidate for KDPI > 85 kidney donor offer - no.  He remains in overall stable health, and will remain active on the transplant list. He needs to have follow-up with hem-onc - he has history of MGUS - last seen by hem-onc in May 2018 and was supposed to follow-up in 3 months (August 2018) - but he hasn't been back since.     Dicussed with patient how he had significant proteinuria and that it may be from un undiagnosed GN vs ?MGUS? Would trend UPC  post-transplant.     Exercise: reminded Alverto of the importance of regular exercise for weight management, blood sugar and blood pressure management.  I also explained exercise has been shown to improve cardiovascular health, energy level, and sleep hygiene.  Lastly, I advised him that cardiovascular complications are leading cause of death and regular exercise can help lower this risk.    Encouraged him to seek living donors and have them contact the living donor coordinator     Ashley Moraes MD       Follow-up:   In addition to the tests noted in the plan, Mr. Ramirez will continue to have reevaluation as per the standing pre-kidney transplant protocol:  1. Monthly blood for PRA  2. Annual return to clinic, except HIV positive, > 65 years of age, or pancreas transplant candidates who will be scheduled to see transplant every 6 months while in pre-transplant phase  3. Annual re-testing: CXR, EKG, yearly mammograms for women over 40 and PSA for males over 40, cardiology follow-up as recommended by initial cardiology pre-transplant evaluation  4. Renal ultrasound every 2 years  5. Baseline colonoscopy after age 50 and repeated as recommended    UNOS Patient Status  Functional Status: 70% - Cares for self: unable to carry on normal activity or active work  Physical Capacity: No Limitations

## 2019-09-11 ENCOUNTER — TELEPHONE (OUTPATIENT)
Dept: NEPHROLOGY | Facility: CLINIC | Age: 69
End: 2019-09-11

## 2019-09-11 NOTE — TELEPHONE ENCOUNTER
Returned call to patient who states that he was told by Transplant team that he needs to see Hematology. He would like to know if you can send him a message through the portal on who you recommend. He is currently scheduled with Dr. Iglesia Hilliard. Please send patient message through the portal he states.

## 2019-09-11 NOTE — TELEPHONE ENCOUNTER
----- Message from Renetta Elizabeth sent at 9/11/2019  4:22 PM CDT -----  Contact: pt  The pt request a return call, no additional info given and can be reached at [phone]///[id number]

## 2019-09-12 ENCOUNTER — PATIENT MESSAGE (OUTPATIENT)
Dept: NEPHROLOGY | Facility: CLINIC | Age: 69
End: 2019-09-12

## 2019-09-16 ENCOUNTER — OFFICE VISIT (OUTPATIENT)
Dept: UROLOGY | Facility: CLINIC | Age: 69
End: 2019-09-16
Payer: MEDICARE

## 2019-09-16 VITALS
DIASTOLIC BLOOD PRESSURE: 74 MMHG | BODY MASS INDEX: 31.7 KG/M2 | WEIGHT: 201.94 LBS | SYSTOLIC BLOOD PRESSURE: 138 MMHG | HEIGHT: 67 IN | HEART RATE: 69 BPM

## 2019-09-16 DIAGNOSIS — N52.9 ERECTILE DYSFUNCTION, UNSPECIFIED ERECTILE DYSFUNCTION TYPE: ICD-10-CM

## 2019-09-16 DIAGNOSIS — Z12.5 PROSTATE CANCER SCREENING: Primary | ICD-10-CM

## 2019-09-16 LAB
BILIRUB SERPL-MCNC: NORMAL MG/DL
BLOOD URINE, POC: 250
COLOR, POC UA: YELLOW
GLUCOSE UR QL STRIP: NORMAL
KETONES UR QL STRIP: NORMAL
LEUKOCYTE ESTERASE URINE, POC: NORMAL
NITRITE, POC UA: NORMAL
PH, POC UA: 5
PROTEIN, POC: NORMAL
SPECIFIC GRAVITY, POC UA: 1.01
UROBILINOGEN, POC UA: NORMAL

## 2019-09-16 PROCEDURE — 99214 PR OFFICE/OUTPT VISIT, EST, LEVL IV, 30-39 MIN: ICD-10-PCS | Mod: S$PBB,NTX,, | Performed by: UROLOGY

## 2019-09-16 PROCEDURE — 99999 PR PBB SHADOW E&M-EST. PATIENT-LVL III: ICD-10-PCS | Mod: PBBFAC,TXP,, | Performed by: UROLOGY

## 2019-09-16 PROCEDURE — 81002 URINALYSIS NONAUTO W/O SCOPE: CPT | Mod: PBBFAC,NTX | Performed by: UROLOGY

## 2019-09-16 PROCEDURE — 99999 PR PBB SHADOW E&M-EST. PATIENT-LVL III: CPT | Mod: PBBFAC,TXP,, | Performed by: UROLOGY

## 2019-09-16 PROCEDURE — 99213 OFFICE O/P EST LOW 20 MIN: CPT | Mod: PBBFAC,NTX | Performed by: UROLOGY

## 2019-09-16 PROCEDURE — 99214 OFFICE O/P EST MOD 30 MIN: CPT | Mod: S$PBB,NTX,, | Performed by: UROLOGY

## 2019-09-16 RX ORDER — SILDENAFIL 50 MG/1
50 TABLET, FILM COATED ORAL DAILY PRN
Qty: 30 TABLET | Refills: 11 | Status: SHIPPED | OUTPATIENT
Start: 2019-09-16 | End: 2020-09-15

## 2019-09-16 NOTE — PROGRESS NOTES
Transplant Recipient Adult Psychosocial Assessment Update      Alverto Ramirez  [address]  [place] [address]  Telephone Information:   Mobile [phone]   Mobile [phone]   Home  [phone] (home)  Work  There is no work phone number on file.  E-mail  [email or web address]    Sex: male  YOB: 1950  Age: 69 y.o.    Encounter Date: 9/10/2019  [place] Citizen: yes  Primary Language: English   Needed: no    Emergency Contact:  Name: Liana Ramirez  Relationship: wife  Address: Same as pt.  Phone Numbers:  [phone] (home), [phone] (work), [phone] (mobile)    Name: Capo Ramirez  Relationship: son  Address: [address] [place] [address] [place] [address]  Phone Numbers:  [phone] (mobile)    Family/Social Support:   Number of dependents/: Pt reports no dependents.  Marital history: pt reports being  only once and then to pt's Liana Ramirez for the past 47 years.  Other family dynamics: Pt reports 2 adult sons: Capo and Doug (resides in [place]); sisters: Sue, Mayelin, Fernanda, and Jen (half-sister); 1 brother: Charles. Pt reports both parents are  and pt identifies all family members (except sisters: Sue and Fernanda) as supportive.    Household Composition:  Name: Alverto Ramirez  Age: 68  Relationship: patient  Does person drive? yes    Name: Liana Ramirez  Age: 67  Relationship: wife  Does person drive? yes     Name: Capo Ramirez  Age:  46  Relationship: son  Phone Numbers:  [phone] (mobile)      Do you and your caregivers have access to reliable transportation? yes     PRIMARY CAREGIVER: Liana Ramirez, pt's wife,  will be primary caregiver, phone number [phone].      provided in-depth information to patient and caregiver regarding pre- and post-transplant caregiver role.   strongly encourages patient and caregiver to have concrete plan regarding post-transplant care giving, including  back-up caregiver(s) to ensure care giving needs are met as needed.    Patient and Caregiver states understanding all aspects of caregiver role/commitment and is able/willing/committed to being caregiver to the fullest extent necessary.    Patient and Caregiver verbalizes understanding of the education provided today and caregiver responsibilities.         remains available. Patient and Caregiver agree to contact  in a timely manner if concerns arise.      Able to take time off work without financial concerns: yes.      Additional Significant Others who will Assist with Transplant:  Name: Capo Ramirez  Age: 42  Phone: [phone]  City: [place] State: [place]  Relationship: son  Does person drive? yes       Living Will: no Education and information provided to pt.  Healthcare Power of : no Education and information provided to pt. Pt did report trusting wife with medical decisions  Advance Directives on file: <<no information> per medical record.  Verbally reviewed LW/HCPA information.   provided patient with copy of LW/HCPA documents and provided education on completion of forms    Living Donors: No. Education and resource information given to patient.     Highest Education Level: Attended College/Technical School  Reading Ability: college  Reports difficulty with: seeing and wears bifocals  Learns Best By:  A combination of verbal, written, and hands-on instruction.      Status: no  VA Benefits: no     Working for Income: No  If no, reason not working: Patient Choice - Retired  Patient is retired from owning/operating driving school..    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    Disabled: no, however pt received residential from [employer] due to anxiety.    Monthly Income:  half-way: $300  SS Reitrement: $1840  Other household members total: $360 (wife's income)      Able to afford all costs now and if  transplanted, including medications: yes pt and wife reports concerns financially. SW did encourage pt to contact insurance company regarding post transplant medications cost.   Patient verbalizes understanding of personal responsibilities related to transplant costs and the importance of having a financial plan to ensure that patients transplant costs are fully covered.   provided fundraising information/education.  Patient verbalizes understanding.   remains available.    Insurance:   Payor/Plan Subscr  Sex Relation Sub. Ins. ID Effective Group Num   1. MEDICARE - ME* ADAM MITCHELL JR. 1950 Male  [id number] NO                                   [address]   2. AETNA - AETNA* ADAM MITCHELL JR. 1950 Male  [id number] 1995 [id number]                                   [address]       Primary Insurance (for UNOS reporting): Public Insurance - Medicare FFS (Fee For Service)  Secondary Insurance (for UNOS reporting): Private Insurance (Pt reports that he pays and also has a part D)    Patient and Caregiver verbalizes clear understanding that patient may experience difficulty obtaining and/or be denied insurance coverage post-surgery. This includes and is not limited to disability insurance, life insurance, health insurance, burial insurance, long term care insurance, and other insurances.      Patient and Caregiver also reports understanding that future health concerns related to or unrelated to transplantation may not be covered by patient's insurance.  Resources and information provided and reviewed.     Patient and Caregiver provides verbal permission to release any necessary information to outside resources for patient care and discharge planning.  Resources and information provided are reviewed.      Patient provides verbal permission to release any necessary information to outside resources for patient care and discharge planning.  Resources and information  provided are reviewed.      Dialysis History:  Patient reports being on peritoneal dialysis attending all dialysis appointments and staying for the entire course of treatment. SW faxed adherence form.     Infusion Service: patient utilizing? no  Home Health: patient utilizing? no  DME: yes PD equipment and BP Cuff  Pulmonary/Cardiac Rehab: Pt denies.   ADLS:  Pt reports no difficulties with driving, walking, bathing, cooking, housekeeping, eating, shopping, and taking medication.    Adherence:   Pt reports good adherence with medications, dialysis, and health regimen..  Adherence education and counseling provided.     Per History Section:  Past Medical History:   Diagnosis Date    Anemia in CKD (chronic kidney disease)     Atrial fibrillation     CKD (chronic kidney disease) stage 4, GFR 15 2014    Colon cancer screening 2014    Elevated PSA 2014    HTN (hypertension) diagnosed in his 40s 10/16/2014    Monoclonal gammopathy 2014    Phobic anxiety disorder 2014    Proteinuria 2014     Social History     Tobacco Use    Smoking status: Former Smoker     Packs/day: 1.00     Years: 15.00     Pack years: 15.00     Types: Cigarettes     Last attempt to quit: 1984     Years since quittin.8    Smokeless tobacco: Never Used   Substance Use Topics    Alcohol use: Yes     Alcohol/week: 7.2 - 8.4 oz     Types: 5 - 7 Standard drinks or equivalent, 7 Glasses of wine per week     Comment: drinks glass red wine nightly     Social History     Substance and Sexual Activity   Drug Use No       Patient and Caregiver states clear understanding of the potential impact of substance use as it relates to transplant candidacy and is aware of possible random substance screening.  Substance abstinence/cessation counseling, education and resources provided and reviewed.     Arrests/DWI/Treatment/Rehab: patient denies    Psychiatric History:    Mental Health: anxiety. Pt reports dealing  with his anxiety for the past 22 years. Pt confirmed previously reported claustrophobia and tight space as triggers.  Pt denies any recent difficulties.  Pt was previously cleared by Dr. Willis for transplant. Pt reports that he can go long-term through an MRI machine.  Psychiatrist/Counselor: Dr. Willis , nephorolgist, Dr Duffy prescribes medication if needed.  Medications:  Pt reports taking Xanax, 1 mg PRN, which is infrequent.   Suicide/Homicide Issues: Pt denies any history of or current suicidal or homicidal ideations.    Safety in Household: Pt reports no current or history of safety concerns in household; including mental, physical, verbal, or sexual abuse.    Knowledge: Patient and Caregiver states having clear understanding and realistic expectations regarding the potential risks and potential benefits of organ transplantation and organ donation, agrees to discuss with health care team members and support system members and to utilize available resources and express questions and/or concerns in order to further facilitate the pt informed decision-making.  Resources and information provided and reviewed.     Patient and Caregiver is aware of [facility]'s affiliation and/or partnership with agencies in home health care, LTAC, SNF, [facility], and other hospitals and clinics.    Understanding: Patient and Caregiver reports having a clear understanding of the many lifetime commitments involved with being a transplant recipient, including costs, compliance, medications, lab work, procedures, appointments, concrete and financial planning, preparedness, timely and appropriate communication of concerns, abstinence (ETOH, tobacco, illicit non-prescribed drugs), adherence to all health care team recommendations, support system and caregiver involvement, appropriate and timely resource utilization and follow-through, mental health counseling as needed/recommended, and patient and caregiver responsibilities.  Social Service Handbook,  resources and detailed educational information provided and reviewed.  Educational information provided.    Patient and Caregiver also reports current and expected compliance with health care regime and states having a clear understanding of the importance of compliance.      Patient and Caregiver reports a clear understanding that risks and benefits may be involved with organ transplantation and with organ donation.      Patient and Caregiver also reports clear understanding that psychosocial risk factors may affect patient, and include but are not limited to feelings of depression, generalized anxiety, anxiety regarding dependence on others, post traumatic stress disorder, feelings of guilt and other emotional and/or mental concerns, and/or exacerbation of existing mental health concerns.  Detailed resources provided and discussed.     Patient and Caregiver agrees to access appropriate resources in a timely manner as needed and/or as recommended, and to communicate concerns appropriately.  Patient and Caregiver also reports a clear understanding of treatment options available.     Patient and Caregiver received education in a group setting.   reviewed education, provided additional information, and answered questions.    Feelings or Concerns: Patient and Caregiver did not express any concerns at this time.    Coping: Pt reports coping well with the transplant process at this time and reports taking a walk, going to casino, watching DVDs ([email or web address], [employer], Dale and Deandre, etc); playing computer solitaire, and watching tv as ways to cope. Pt reports Muslim home as [facility] in [place], [place] with Rev. Shine presiding.     Goals: Pt reports enjoying life as a goal for after transplant and travel, play music, and smoke a cigar . SW provided support and education. Pt verbalizes understanding that transplant is not a guarantee of ending dialysis and life after transplantation  "will be a "new normal" post transplant.  SW remains available.      Interview Behavior: Patient and Caregiver present as alert and oriented x 4, pleasant, good eye contact, well groomed, recall good, concentration/judgement good, average intelligence, calm, communicative, cooperative and asking and answering questions appropriately.  Pt presents with wife: Liana Ramirez with pt's permission.       Transplant Social Work - Candidacy  Assessment/Plan:     Psychosocial Suitability: Patient presents as a suitable candidate for kidney transplant at this time. Based on psychosocial risk factors, patient presents as low risk, due to adequate insurance and financial plan in place, suitable dialysis adherence, and caregiver plan in place.    Recommendations/Additional Comments: SW recommends that pt conduct fundraising to assist pt with pay for cost of medications, food, gas, and other transplant related needs. SW recommends that pt remain aware of potential mental health concerns and contact the team if any concerns arise. SW recommends that pt remain abstinent from tobacco, ETOH, and drug use. SW supports pt's continued dialysis adherence. SW remains available to answer any questions or concerns that arise as the pt moves through the transplant process.       Cristina Geiger LCSW       "

## 2019-09-16 NOTE — PROGRESS NOTES
Chief Complaint: Elevated PSA    HPI:   9/16/19: Doing well no complaints.  Taking 1-2 of the sildenafil 20.  Reviewed history in detail.   9/10/18: Voiding fine with cardura off his list.  PSA dropped this last year.  7/17/17: Having some ED problems.  Still taking doxazosin, voiding well.  7/11/16: Doing well voiding fine  6/29/15: No problems in interim.  Has an AV fistula in left arm now. PSA unchanged at 3.6.  No urinary bother doing fine.  Taking saw palmetto and pumpkin seed.   12/29/14: 65 yo man being evaluated for kidney transplant had an elevated PSA of 4.2 and is here for screening.  Had a AV fistula surgery last week.  No abd/pelvic pain and no exac/rel factors.  No hematuria.  No urolithiasis history.  No prostate cancer family history but his brother had some sort of prostate surgery.  No urinary bother.  Still makes urine and is not on dialysis.  Feels good in general.    Allergies:  Patient has no known allergies.    Medications:  has a current medication list which includes the following prescription(s): alprazolam, amlodipine, calcitriol, calcium carbonate, fluticasone propionate, gentamicin, lanthanum, losartan, metoprolol tartrate, potassium chloride sa, sildenafil, torsemide, and vitamin d2.    Review of Systems:  General: No fever, chills, fatigability, or weight loss.  Skin: No rashes, itching, or changes in color or texture of skin.  Chest: Denies WEST, cyanosis, wheezing, cough, and sputum production.  Abdomen: Appetite fine. No weight loss. Denies diarrhea, abdominal pain, hematemesis, or blood in stool.  Musculoskeletal: No joint stiffness or swelling. Denies back pain.  : As above.  All other review of systems negative.    PMH:   has a past medical history of Anemia in CKD (chronic kidney disease), Atrial fibrillation, CKD (chronic kidney disease) stage 4, GFR 15 (11/26/2014), Colon cancer screening (12/19/2014), Elevated PSA (11/26/2014), HTN (hypertension) diagnosed in his 40s  (10/16/2014), Monoclonal gammopathy (11/26/2014), Phobic anxiety disorder (11/26/2014), and Proteinuria (11/26/2014).    PSH:   has a past surgical history that includes AV fistula placement (11/2014); Vasectomy; Peritoneal catheter insertion; Vasectomy; and Colonoscopy (N/A, 12/29/2017).    FamHx: family history includes Cancer in his brother and paternal uncle; Cataracts in his mother; Dementia in his father; Diabetes in his mother; Glaucoma in his mother; Heart disease in his father; Hypertension in his sister; Kidney disease in his sister.    SocHx:  reports that he quit smoking about 34 years ago. His smoking use included cigarettes. He has a 15.00 pack-year smoking history. He has never used smokeless tobacco. He reports that he drinks about 7.2 - 8.4 oz of alcohol per week. He reports that he does not use drugs.      Physical Exam:  Vitals:    09/16/19 0939   BP: 138/74   Pulse: 69     General: A&Ox3, no apparent distress, no deformities  Neck: No masses, normal thyroid  Lungs: normal inspiration, no use of accessory muscles  Heart: normal pulse, no arrhythmias  Abdomen: Soft, NT, ND  Skin: The skin is warm and dry. No jaundice.  Ext: No c/c/e.  :   7/11/17: Test desc panfilo, no abnormalities of epididymus. Penis normal, with normal penile and scrotal skin. Meatus normal. Normal rectal tone, no hemorrhoids. Prost 30 gm no nodules or masses appreciated. SV not palpable. Perineum and anus normal.    Labs/Studies:   Urinalysis performed in clinic, summary: UA normal exc ++prot  PSA    3/15: 3.6    6/15: 3.6    6/16: 4.2    7/17: 4.9    9/18: 4.1    9/19: 4.6    Impression/Plan:   1. PSA elevated but not extremely so, not rising.  PSA/RTC 12 mo.  Low risk of prostate cancer at this point and without a bigger upward trend would not biopsy the prostate.   2. No contraindications to renal transplant.  3. Sildenafil rx.

## 2019-09-20 ENCOUNTER — TELEPHONE (OUTPATIENT)
Dept: TRANSPLANT | Facility: CLINIC | Age: 69
End: 2019-09-20

## 2019-09-20 NOTE — TELEPHONE ENCOUNTER
Compliance check:  Dialysis unit reports in the past three months pt has had 0 no shows, 0 AMAs, labs , no concerns noted, transportation no concerns noted and family support no concerns noted.    Reported by [facility] via fax back sheet

## 2019-09-25 ENCOUNTER — TELEPHONE (OUTPATIENT)
Dept: NEPHROLOGY | Facility: HOSPITAL | Age: 69
End: 2019-09-25

## 2019-09-25 RX ORDER — GENTAMICIN SULFATE 1 MG/G
CREAM TOPICAL 3 TIMES DAILY
Qty: 15 G | Refills: 3 | Status: SHIPPED | OUTPATIENT
Start: 2019-09-25 | End: 2021-05-06 | Stop reason: SDUPTHER

## 2019-09-25 NOTE — TELEPHONE ENCOUNTER
----- Message from Anisa Castle sent at 9/25/2019 10:42 AM CDT -----  Contact: [facility] [place]  Ms Mcgraw needs to speak to nurse regarding Gentamicin lotion 15 gram 0.1%, they are requesting a prescription for this, please call her back at  [phone]. Thank you

## 2019-09-26 RX ORDER — TORSEMIDE 100 MG/1
200 TABLET ORAL DAILY
Qty: 180 TABLET | Refills: 2 | Status: SHIPPED | OUTPATIENT
Start: 2019-09-26 | End: 2020-11-13

## 2019-10-16 ENCOUNTER — LAB VISIT (OUTPATIENT)
Dept: LAB | Facility: HOSPITAL | Age: 69
End: 2019-10-16
Payer: MEDICARE

## 2019-10-16 ENCOUNTER — OFFICE VISIT (OUTPATIENT)
Dept: HEMATOLOGY/ONCOLOGY | Facility: CLINIC | Age: 69
End: 2019-10-16
Payer: MEDICARE

## 2019-10-16 VITALS
DIASTOLIC BLOOD PRESSURE: 78 MMHG | RESPIRATION RATE: 14 BRPM | HEART RATE: 65 BPM | OXYGEN SATURATION: 96 % | WEIGHT: 207.44 LBS | HEIGHT: 67 IN | TEMPERATURE: 98 F | BODY MASS INDEX: 32.56 KG/M2 | SYSTOLIC BLOOD PRESSURE: 124 MMHG

## 2019-10-16 DIAGNOSIS — D47.2 MONOCLONAL GAMMOPATHY: Primary | ICD-10-CM

## 2019-10-16 DIAGNOSIS — D47.2 MONOCLONAL GAMMOPATHY: ICD-10-CM

## 2019-10-16 LAB
ALBUMIN SERPL BCP-MCNC: 3.3 G/DL (ref 3.5–5.2)
ALP SERPL-CCNC: 113 U/L (ref 55–135)
ALT SERPL W/O P-5'-P-CCNC: 59 U/L (ref 10–44)
ANION GAP SERPL CALC-SCNC: 14 MMOL/L (ref 8–16)
AST SERPL-CCNC: 29 U/L (ref 10–40)
BASOPHILS # BLD AUTO: 0.03 K/UL (ref 0–0.2)
BASOPHILS NFR BLD: 0.4 % (ref 0–1.9)
BILIRUB SERPL-MCNC: 0.5 MG/DL (ref 0.1–1)
BUN SERPL-MCNC: 67 MG/DL (ref 8–23)
CALCIUM SERPL-MCNC: 9.2 MG/DL (ref 8.7–10.5)
CHLORIDE SERPL-SCNC: 97 MMOL/L (ref 95–110)
CO2 SERPL-SCNC: 25 MMOL/L (ref 23–29)
CREAT SERPL-MCNC: 14.5 MG/DL (ref 0.5–1.4)
DIFFERENTIAL METHOD: ABNORMAL
EOSINOPHIL # BLD AUTO: 0.4 K/UL (ref 0–0.5)
EOSINOPHIL NFR BLD: 5.4 % (ref 0–8)
ERYTHROCYTE [DISTWIDTH] IN BLOOD BY AUTOMATED COUNT: 14.6 % (ref 11.5–14.5)
EST. GFR  (AFRICAN AMERICAN): 3 ML/MIN/1.73 M^2
EST. GFR  (NON AFRICAN AMERICAN): 3 ML/MIN/1.73 M^2
GLUCOSE SERPL-MCNC: 106 MG/DL (ref 70–110)
HCT VFR BLD AUTO: 36.2 % (ref 40–54)
HGB BLD-MCNC: 12 G/DL (ref 14–18)
IGA SERPL-MCNC: 285 MG/DL (ref 40–350)
IGG SERPL-MCNC: 1010 MG/DL (ref 650–1600)
IGM SERPL-MCNC: 201 MG/DL (ref 50–300)
IMM GRANULOCYTES # BLD AUTO: 0.03 K/UL (ref 0–0.04)
IMM GRANULOCYTES NFR BLD AUTO: 0.4 % (ref 0–0.5)
LYMPHOCYTES # BLD AUTO: 1.8 K/UL (ref 1–4.8)
LYMPHOCYTES NFR BLD: 26.2 % (ref 18–48)
MCH RBC QN AUTO: 30.7 PG (ref 27–31)
MCHC RBC AUTO-ENTMCNC: 33.1 G/DL (ref 32–36)
MCV RBC AUTO: 93 FL (ref 82–98)
MONOCYTES # BLD AUTO: 0.7 K/UL (ref 0.3–1)
MONOCYTES NFR BLD: 10.2 % (ref 4–15)
NEUTROPHILS # BLD AUTO: 4 K/UL (ref 1.8–7.7)
NEUTROPHILS NFR BLD: 57.8 % (ref 38–73)
NRBC BLD-RTO: 0 /100 WBC
PLATELET # BLD AUTO: 167 K/UL (ref 150–350)
PMV BLD AUTO: 10.4 FL (ref 9.2–12.9)
POTASSIUM SERPL-SCNC: 4.1 MMOL/L (ref 3.5–5.1)
PROT SERPL-MCNC: 7.4 G/DL (ref 6–8.4)
RBC # BLD AUTO: 3.91 M/UL (ref 4.6–6.2)
SODIUM SERPL-SCNC: 136 MMOL/L (ref 136–145)
WBC # BLD AUTO: 6.86 K/UL (ref 3.9–12.7)

## 2019-10-16 PROCEDURE — 86334 IMMUNOFIX E-PHORESIS SERUM: CPT | Mod: TXP

## 2019-10-16 PROCEDURE — 85025 COMPLETE CBC W/AUTO DIFF WBC: CPT | Mod: NTX

## 2019-10-16 PROCEDURE — 99215 OFFICE O/P EST HI 40 MIN: CPT | Mod: S$PBB,,, | Performed by: INTERNAL MEDICINE

## 2019-10-16 PROCEDURE — 36415 COLL VENOUS BLD VENIPUNCTURE: CPT | Mod: NTX

## 2019-10-16 PROCEDURE — 99999 PR PBB SHADOW E&M-EST. PATIENT-LVL IV: CPT | Mod: PBBFAC,,, | Performed by: INTERNAL MEDICINE

## 2019-10-16 PROCEDURE — 84165 PROTEIN E-PHORESIS SERUM: CPT | Mod: NTX

## 2019-10-16 PROCEDURE — 82784 ASSAY IGA/IGD/IGG/IGM EACH: CPT | Mod: 59,NTX

## 2019-10-16 PROCEDURE — 86334 IMMUNOFIX E-PHORESIS SERUM: CPT | Mod: 26,NTX,, | Performed by: PATHOLOGY

## 2019-10-16 PROCEDURE — 80053 COMPREHEN METABOLIC PANEL: CPT | Mod: TXP

## 2019-10-16 PROCEDURE — 99215 PR OFFICE/OUTPT VISIT, EST, LEVL V, 40-54 MIN: ICD-10-PCS | Mod: S$PBB,,, | Performed by: INTERNAL MEDICINE

## 2019-10-16 PROCEDURE — 84165 PATHOLOGIST INTERPRETATION SPE: ICD-10-PCS | Mod: 26,NTX,, | Performed by: PATHOLOGY

## 2019-10-16 PROCEDURE — 86334 PATHOLOGIST INTERPRETATION IFE: ICD-10-PCS | Mod: 26,NTX,, | Performed by: PATHOLOGY

## 2019-10-16 PROCEDURE — 84165 PROTEIN E-PHORESIS SERUM: CPT | Mod: 26,NTX,, | Performed by: PATHOLOGY

## 2019-10-16 PROCEDURE — 99214 OFFICE O/P EST MOD 30 MIN: CPT | Mod: PBBFAC | Performed by: INTERNAL MEDICINE

## 2019-10-16 PROCEDURE — 99999 PR PBB SHADOW E&M-EST. PATIENT-LVL IV: ICD-10-PCS | Mod: PBBFAC,,, | Performed by: INTERNAL MEDICINE

## 2019-10-16 PROCEDURE — 83520 IMMUNOASSAY QUANT NOS NONAB: CPT | Mod: TXP

## 2019-10-16 NOTE — PROGRESS NOTES
Hematology/Oncology Office Note    Reason for referral:  IgM monoclonal gammopathy    CC:  Abnormal protien    Referred by:  No ref. provider found    Diagnosis:  IgM kappa monoclonal gammopathy 0.24 g/dL    History of present illness:  Mr. Ramirez was seen at [facility] today.  He is a patient of my colleague whom I had opportunity of meeting for the 1st time today.  He is a 69-year-old  male with history of hypertensive nephropathy, monoclonal gammopathy of undetermined significance that was diagnosed in 2018 and since been monitored.  He is also noted to be on peritoneal dialysis and currently on transplant list.  He is here today for follow-up.  He denies any systemic symptoms including fever, chills, nausea or vomiting, chest pain or shortness of breath, diarrhea, abdominal pain, change in urinary frequency.  He was seen at his transplant center about a month ago and his next appointment with them is March of 2020.      I have reviewed and updated the HPI, ROS, PMHx, Social Hx, Family Hx and treatment history.    Past Medical History:   Diagnosis Date    Anemia in CKD (chronic kidney disease)     Atrial fibrillation     CKD (chronic kidney disease) stage 4, GFR 15 11/26/2014    Colon cancer screening 12/19/2014    Elevated PSA 11/26/2014    HTN (hypertension) diagnosed in his 40s 10/16/2014    Monoclonal gammopathy 11/26/2014    Phobic anxiety disorder 11/26/2014    Proteinuria 11/26/2014         Social History:  No tobacco, alcohol, or illicit drugs      Family History: family history includes Cancer in his brother and paternal uncle; Cataracts in his mother; Dementia in his father; Diabetes in his mother; Glaucoma in his mother; Heart disease in his father; Hypertension in his sister; Kidney disease in his sister.        HPI  Review of Systems   Constitutional: Negative for activity change, appetite change, fatigue, fever and unexpected weight change.   HENT: Negative for  congestion, ear pain, facial swelling, nosebleeds, rhinorrhea, sinus pressure, sinus pain, sneezing, sore throat and voice change.    Eyes: Negative.    Respiratory: Negative for apnea, cough, choking, chest tightness, shortness of breath, wheezing and stridor.    Cardiovascular: Negative for chest pain, palpitations and leg swelling.   Gastrointestinal: Negative for abdominal distention, abdominal pain and anal bleeding.   Endocrine: Negative.    Genitourinary: Negative for decreased urine volume, difficulty urinating, enuresis, flank pain, frequency, genital sores, hematuria, testicular pain and urgency.   Musculoskeletal: Negative for arthralgias, joint swelling, myalgias, neck pain and neck stiffness.   Skin: Negative for color change, pallor, rash and wound.   Allergic/Immunologic: Negative.    Neurological: Negative for dizziness, tremors, seizures, syncope, speech difficulty, light-headedness and numbness.   Hematological: Negative for adenopathy. Does not bruise/bleed easily.   Psychiatric/Behavioral: Negative for agitation, behavioral problems, confusion, decreased concentration, dysphoric mood and hallucinations. The patient is not nervous/anxious and is not hyperactive.        Objective:       There were no vitals filed for this visit.    Physical Exam   Constitutional: He is oriented to person, place, and time. He appears well-developed and well-nourished. No distress.   HENT:   Head: Normocephalic and atraumatic.   Nose: Nose normal.   Mouth/Throat: Oropharynx is clear and moist. No oropharyngeal exudate.   Eyes: Pupils are equal, round, and reactive to light. Conjunctivae and EOM are normal. Right eye exhibits no discharge. Left eye exhibits no discharge. No scleral icterus.   Neck: Normal range of motion. Neck supple. No JVD present. No tracheal deviation present. No thyromegaly present.   Cardiovascular: Normal rate and regular rhythm. Exam reveals no gallop and no friction rub.   No murmur  heard.  Pulmonary/Chest: Effort normal and breath sounds normal. No stridor. No respiratory distress. He has no wheezes.   Abdominal: Soft. Bowel sounds are normal. He exhibits no distension and no mass. There is no tenderness. There is no rebound and no guarding. No hernia.   Musculoskeletal: Normal range of motion. He exhibits no edema, tenderness or deformity.   Lymphadenopathy:     He has no cervical adenopathy.   Neurological: He is alert and oriented to person, place, and time. He displays normal reflexes. No cranial nerve deficit. Coordination normal.   Skin: Skin is warm, dry and intact. Capillary refill takes less than 2 seconds. No abrasion, no bruising, no ecchymosis and no rash noted. Rash is not pustular and not urticarial. He is not diaphoretic. No cyanosis. No pallor. Nails show no clubbing.   Psychiatric: He has a normal mood and affect. Thought content normal.   Vitals reviewed.        Lab Results   Component Value Date    WBC 6.29 04/30/2018    HGB 10.6 (L) 04/30/2018    HCT 31.3 (L) 04/30/2018    MCV 91 04/30/2018     04/30/2018     Lab Results   Component Value Date    CREATININE 12.3 (H) 04/30/2018    BUN 79 (H) 04/30/2018     04/30/2018    K 3.8 04/30/2018     04/30/2018    CO2 21 (L) 04/30/2018     Lab Results   Component Value Date    ALT 91 (H) 04/30/2018    AST 44 (H) 04/30/2018    ALKPHOS 80 04/30/2018    BILITOT 0.4 04/30/2018         Assessment:   I had an extensive conversation with patient regarding the prognosis of monoclonal gammopathy of undetermined significance in terms progression to multiple myeloma.  Patient is noted to have IgM kappa monoclonal gammopathy measuring 0.24 grams/deciliter.  Urine testing done last year did not demonstrate monoclonal protein and skeletal survey was unremarkable. This appears to be intermediate risk monoclonal gammopathy with 1-1.5% chance of developing multiple myeloma annually or 20-25% chance of developing multiple myeloma  over the next 20 years. Follow-up CT scan showed no definite suspicious lytic or blastic osseous lesion.  I have ordered repeat labs including serum protein electrophoresis and immunofixation as well as a serum free light chains.  He will follow up with us in about 6 weeks to discuss results and if any order investigation is needed.  At this time I do not see any contraindication renal transplant from Hematology standpoint.    Iglesia Hilliard MD

## 2019-10-17 LAB
ALBUMIN SERPL ELPH-MCNC: 3.7 G/DL (ref 3.35–5.55)
ALPHA1 GLOB SERPL ELPH-MCNC: 0.32 G/DL (ref 0.17–0.41)
ALPHA2 GLOB SERPL ELPH-MCNC: 0.89 G/DL (ref 0.43–0.99)
B-GLOBULIN SERPL ELPH-MCNC: 0.78 G/DL (ref 0.5–1.1)
GAMMA GLOB SERPL ELPH-MCNC: 1.01 G/DL (ref 0.67–1.58)
INTERPRETATION SERPL IFE-IMP: NORMAL
KAPPA LC SER QL IA: 14.56 MG/DL (ref 0.33–1.94)
KAPPA LC/LAMBDA SER IA: 1.8 (ref 0.26–1.65)
LAMBDA LC SER QL IA: 8.1 MG/DL (ref 0.57–2.63)
PATHOLOGIST INTERPRETATION IFE: NORMAL
PATHOLOGIST INTERPRETATION SPE: NORMAL
PROT SERPL-MCNC: 6.7 G/DL (ref 6–8.4)

## 2019-11-13 ENCOUNTER — TELEPHONE (OUTPATIENT)
Dept: HEMATOLOGY/ONCOLOGY | Facility: CLINIC | Age: 69
End: 2019-11-13

## 2019-11-13 NOTE — TELEPHONE ENCOUNTER
Called pt to inform of appt date change to 11/26 instead.. Informed of time. Pt verbalized understanding and agreed to appt.

## 2019-11-18 RX ORDER — LANTHANUM CARBONATE 1000 MG/1
TABLET, CHEWABLE ORAL
Qty: 540 TABLET | Refills: 4 | Status: SHIPPED | OUTPATIENT
Start: 2019-11-18 | End: 2019-11-21 | Stop reason: SDUPTHER

## 2019-11-21 RX ORDER — LANTHANUM CARBONATE 1000 MG/1
TABLET, CHEWABLE ORAL
Qty: 540 TABLET | Refills: 4 | Status: SHIPPED | OUTPATIENT
Start: 2019-11-21 | End: 2021-05-28 | Stop reason: SDUPTHER

## 2019-11-26 ENCOUNTER — OFFICE VISIT (OUTPATIENT)
Dept: HEMATOLOGY/ONCOLOGY | Facility: CLINIC | Age: 69
End: 2019-11-26
Payer: MEDICARE

## 2019-11-26 VITALS
TEMPERATURE: 97 F | SYSTOLIC BLOOD PRESSURE: 118 MMHG | DIASTOLIC BLOOD PRESSURE: 68 MMHG | WEIGHT: 205.69 LBS | HEIGHT: 67 IN | OXYGEN SATURATION: 98 % | HEART RATE: 62 BPM | BODY MASS INDEX: 32.28 KG/M2

## 2019-11-26 DIAGNOSIS — D47.2 MONOCLONAL GAMMOPATHY: ICD-10-CM

## 2019-11-26 DIAGNOSIS — I10 ESSENTIAL HYPERTENSION: ICD-10-CM

## 2019-11-26 DIAGNOSIS — Z12.11 COLON CANCER SCREENING: ICD-10-CM

## 2019-11-26 DIAGNOSIS — Z99.2 ESRD ON DIALYSIS: ICD-10-CM

## 2019-11-26 DIAGNOSIS — N18.6 ESRD ON DIALYSIS: ICD-10-CM

## 2019-11-26 PROCEDURE — 1159F PR MEDICATION LIST DOCUMENTED IN MEDICAL RECORD: ICD-10-PCS | Mod: ,,, | Performed by: INTERNAL MEDICINE

## 2019-11-26 PROCEDURE — 99214 OFFICE O/P EST MOD 30 MIN: CPT | Mod: PBBFAC | Performed by: INTERNAL MEDICINE

## 2019-11-26 PROCEDURE — 1159F MED LIST DOCD IN RCRD: CPT | Mod: ,,, | Performed by: INTERNAL MEDICINE

## 2019-11-26 PROCEDURE — 1126F AMNT PAIN NOTED NONE PRSNT: CPT | Mod: ,,, | Performed by: INTERNAL MEDICINE

## 2019-11-26 PROCEDURE — 99215 PR OFFICE/OUTPT VISIT, EST, LEVL V, 40-54 MIN: ICD-10-PCS | Mod: S$PBB,,, | Performed by: INTERNAL MEDICINE

## 2019-11-26 PROCEDURE — 99215 OFFICE O/P EST HI 40 MIN: CPT | Mod: S$PBB,,, | Performed by: INTERNAL MEDICINE

## 2019-11-26 PROCEDURE — 99999 PR PBB SHADOW E&M-EST. PATIENT-LVL IV: ICD-10-PCS | Mod: PBBFAC,,, | Performed by: INTERNAL MEDICINE

## 2019-11-26 PROCEDURE — 99999 PR PBB SHADOW E&M-EST. PATIENT-LVL IV: CPT | Mod: PBBFAC,,, | Performed by: INTERNAL MEDICINE

## 2019-11-26 PROCEDURE — 1126F PR PAIN SEVERITY QUANTIFIED, NO PAIN PRESENT: ICD-10-PCS | Mod: ,,, | Performed by: INTERNAL MEDICINE

## 2019-11-26 NOTE — ASSESSMENT & PLAN NOTE
Repeat serum protein electrophoresis and immunofixation showed small portion of monoclonal protein noted in the gamma region I had and quantify to be at 0.25 g per dL which was 0.24 grams/deciliter about a year ago.  This is mostly IgM kappa monoclonal.  This indicates persisting monoclonal gammopathy of undetermined significance.  Will continue to monitor.

## 2019-11-26 NOTE — PROGRESS NOTES
Hematology/Oncology Office Note    Reason for referral:  IgM monoclonal gammopathy    CC:  Abnormal protien    Referred by:  No ref. provider found    Diagnosis:  IgM kappa monoclonal gammopathy 0.24 g/dL    History of present illness:  Mr. Ramirez was seen at [facility] today.  He is a 69-year-old  male with history of hypertensive nephropathy, monoclonal gammopathy of undetermined significance that was diagnosed in 2018 and since has been monitored.  He is also noted to be on peritoneal dialysis and currently on transplant list.      He is here today for follow-up.  He was last seen about 6 weeks ago.  Today, he denies any systemic symptoms including fever, chills, nausea or vomiting, chest pain or shortness of breath, diarrhea, abdominal pain, change in urinary frequency.  He was seen at his transplant center about a month ago and his next appointment with them is March of 2020.      I have reviewed and updated the HPI, ROS, PMHx, Social Hx, Family Hx and treatment history.    Past Medical History:   Diagnosis Date    Anemia in CKD (chronic kidney disease)     Atrial fibrillation     CKD (chronic kidney disease) stage 4, GFR 15 11/26/2014    Colon cancer screening 12/19/2014    Elevated PSA 11/26/2014    HTN (hypertension) diagnosed in his 40s 10/16/2014    Monoclonal gammopathy 11/26/2014    Phobic anxiety disorder 11/26/2014    Proteinuria 11/26/2014         Social History:  No tobacco, alcohol, or illicit drugs      Family History: family history includes Cancer in his brother and paternal uncle; Cataracts in his mother; Dementia in his father; Diabetes in his mother; Glaucoma in his mother; Heart disease in his father; Hypertension in his sister; Kidney disease in his sister.        HPI  Review of Systems   Constitutional: Negative for activity change, appetite change, fatigue, fever and unexpected weight change.   HENT: Negative for congestion, ear pain, facial swelling,  "nosebleeds, rhinorrhea, sinus pressure, sinus pain, sneezing, sore throat and voice change.    Eyes: Negative.    Respiratory: Negative for apnea, cough, choking, chest tightness, shortness of breath, wheezing and stridor.    Cardiovascular: Negative for chest pain, palpitations and leg swelling.   Gastrointestinal: Negative for abdominal distention, abdominal pain and anal bleeding.   Endocrine: Negative.    Genitourinary: Negative for decreased urine volume, difficulty urinating, enuresis, flank pain, frequency, genital sores, hematuria, testicular pain and urgency.   Musculoskeletal: Negative for arthralgias, joint swelling, myalgias, neck pain and neck stiffness.   Skin: Negative for color change, pallor, rash and wound.   Allergic/Immunologic: Negative.    Neurological: Negative for dizziness, tremors, seizures, syncope, speech difficulty, light-headedness and numbness.   Hematological: Negative for adenopathy. Does not bruise/bleed easily.   Psychiatric/Behavioral: Negative for agitation, behavioral problems, confusion, decreased concentration, dysphoric mood and hallucinations. The patient is not nervous/anxious and is not hyperactive.        Objective:       Vitals:    11/26/19 1010   BP: 118/68   Pulse: 62   Temp: 97.3 °F (36.3 °C)   TempSrc: Oral   SpO2: 98%   Weight: 93.3 kg (205 lb 11 oz)   Height: 5' 7.32" (1.71 m)       Physical Exam   Constitutional: He is oriented to person, place, and time. He appears well-developed and well-nourished. No distress.   HENT:   Head: Normocephalic and atraumatic.   Nose: Nose normal.   Mouth/Throat: Oropharynx is clear and moist. No oropharyngeal exudate.   Eyes: Pupils are equal, round, and reactive to light. Conjunctivae and EOM are normal. Right eye exhibits no discharge. Left eye exhibits no discharge. No scleral icterus.   Neck: Normal range of motion. Neck supple. No JVD present. No tracheal deviation present. No thyromegaly present.   Cardiovascular: Normal rate " and regular rhythm. Exam reveals no gallop and no friction rub.   No murmur heard.  Pulmonary/Chest: Effort normal and breath sounds normal. No stridor. No respiratory distress. He has no wheezes.   Abdominal: Soft. Bowel sounds are normal. He exhibits no distension and no mass. There is no tenderness. There is no rebound and no guarding. No hernia.   Musculoskeletal: Normal range of motion. He exhibits no edema, tenderness or deformity.   Lymphadenopathy:     He has no cervical adenopathy.   Neurological: He is alert and oriented to person, place, and time. He displays normal reflexes. No cranial nerve deficit. Coordination normal.   Skin: Skin is warm, dry and intact. Capillary refill takes less than 2 seconds. No abrasion, no bruising, no ecchymosis and no rash noted. Rash is not pustular and not urticarial. He is not diaphoretic. No cyanosis. No pallor. Nails show no clubbing.   Psychiatric: He has a normal mood and affect. Thought content normal.   Vitals reviewed.        Lab Results   Component Value Date    WBC 6.86 10/16/2019    HGB 12.0 (L) 10/16/2019    HCT 36.2 (L) 10/16/2019    MCV 93 10/16/2019     10/16/2019     Lab Results   Component Value Date    CREATININE 14.5 (H) 10/16/2019    BUN 67 (H) 10/16/2019     10/16/2019    K 4.1 10/16/2019    CL 97 10/16/2019    CO2 25 10/16/2019     Lab Results   Component Value Date    ALT 59 (H) 10/16/2019    AST 29 10/16/2019    ALKPHOS 113 10/16/2019    BILITOT 0.5 10/16/2019         Assessment:     ESRD on dialysis  Follows with Nephrology.  On transplant list for kidney    HTN (hypertension) diagnosed in his 40s  Follows with PCP.    Monoclonal gammopathy  Repeat serum protein electrophoresis and immunofixation showed small portion of monoclonal protein noted in the gamma region I had and quantify to be at 0.25 g per dL which was 0.24 grams/deciliter about a year ago.  This is mostly IgM kappa monoclonal.  This indicates persisting monoclonal  gammopathy of undetermined significance.  Will continue to monitor.    BMI 32.0-32.9,adult  Counseled on lifestyle modification and exercise    Colon cancer screening  Up-to-date with colonoscopy per patient.  We do not have records.      Plan on seeing him back in February 2020 or sooner as needed.    Iglesia Hilliard MD

## 2019-12-11 RX ORDER — ERGOCALCIFEROL 1.25 MG/1
CAPSULE ORAL
Qty: 13 CAPSULE | Refills: 4 | Status: SHIPPED | OUTPATIENT
Start: 2019-12-11 | End: 2021-03-06

## 2020-01-27 DIAGNOSIS — Z76.82 ORGAN TRANSPLANT CANDIDATE: Primary | ICD-10-CM

## 2020-01-27 NOTE — PROGRESS NOTES
YEARLY LIST MANAGEMENT NOTE    Alverto Ramirez's kidney transplant listing status reviewed.  Patient is due for follow-up appointments in March 2020.  Appointments will be scheduled per protocol.  HLA sample is current and being rec'd on a regular basis.

## 2020-01-28 ENCOUNTER — TELEPHONE (OUTPATIENT)
Dept: TRANSPLANT | Facility: CLINIC | Age: 70
End: 2020-01-28

## 2020-01-30 RX ORDER — LOSARTAN POTASSIUM 100 MG/1
100 TABLET ORAL DAILY
Qty: 90 TABLET | Refills: 4 | Status: SHIPPED | OUTPATIENT
Start: 2020-01-30 | End: 2021-05-28 | Stop reason: SDUPTHER

## 2020-02-12 ENCOUNTER — LAB VISIT (OUTPATIENT)
Dept: LAB | Facility: HOSPITAL | Age: 70
End: 2020-02-12
Attending: INTERNAL MEDICINE
Payer: MEDICARE

## 2020-02-12 ENCOUNTER — OFFICE VISIT (OUTPATIENT)
Dept: HEMATOLOGY/ONCOLOGY | Facility: CLINIC | Age: 70
End: 2020-02-12
Payer: MEDICARE

## 2020-02-12 VITALS
WEIGHT: 203.69 LBS | HEART RATE: 69 BPM | OXYGEN SATURATION: 96 % | RESPIRATION RATE: 18 BRPM | DIASTOLIC BLOOD PRESSURE: 74 MMHG | HEIGHT: 67 IN | BODY MASS INDEX: 31.97 KG/M2 | TEMPERATURE: 96 F | SYSTOLIC BLOOD PRESSURE: 128 MMHG

## 2020-02-12 DIAGNOSIS — D47.2 MONOCLONAL GAMMOPATHY: Primary | ICD-10-CM

## 2020-02-12 DIAGNOSIS — N18.6 ESRD ON DIALYSIS: ICD-10-CM

## 2020-02-12 DIAGNOSIS — Z99.2 ESRD ON DIALYSIS: ICD-10-CM

## 2020-02-12 DIAGNOSIS — D47.2 MONOCLONAL GAMMOPATHY: ICD-10-CM

## 2020-02-12 DIAGNOSIS — D63.1 ANEMIA IN ESRD (END-STAGE RENAL DISEASE): Chronic | ICD-10-CM

## 2020-02-12 DIAGNOSIS — N18.6 ANEMIA IN ESRD (END-STAGE RENAL DISEASE): Chronic | ICD-10-CM

## 2020-02-12 LAB
ALBUMIN SERPL BCP-MCNC: 3.4 G/DL (ref 3.5–5.2)
ALP SERPL-CCNC: 88 U/L (ref 55–135)
ALT SERPL W/O P-5'-P-CCNC: 41 U/L (ref 10–44)
ANION GAP SERPL CALC-SCNC: 15 MMOL/L (ref 8–16)
AST SERPL-CCNC: 18 U/L (ref 10–40)
BASOPHILS # BLD AUTO: 0.04 K/UL (ref 0–0.2)
BASOPHILS NFR BLD: 0.5 % (ref 0–1.9)
BILIRUB SERPL-MCNC: 0.6 MG/DL (ref 0.1–1)
BUN SERPL-MCNC: 79 MG/DL (ref 8–23)
CALCIUM SERPL-MCNC: 8.5 MG/DL (ref 8.7–10.5)
CHLORIDE SERPL-SCNC: 96 MMOL/L (ref 95–110)
CO2 SERPL-SCNC: 25 MMOL/L (ref 23–29)
CREAT SERPL-MCNC: 15.8 MG/DL (ref 0.5–1.4)
DIFFERENTIAL METHOD: ABNORMAL
EOSINOPHIL # BLD AUTO: 0.4 K/UL (ref 0–0.5)
EOSINOPHIL NFR BLD: 5 % (ref 0–8)
ERYTHROCYTE [DISTWIDTH] IN BLOOD BY AUTOMATED COUNT: 14.9 % (ref 11.5–14.5)
EST. GFR  (AFRICAN AMERICAN): 3 ML/MIN/1.73 M^2
EST. GFR  (NON AFRICAN AMERICAN): 3 ML/MIN/1.73 M^2
GLUCOSE SERPL-MCNC: 90 MG/DL (ref 70–110)
HCT VFR BLD AUTO: 35.7 % (ref 40–54)
HGB BLD-MCNC: 11.8 G/DL (ref 14–18)
IMM GRANULOCYTES # BLD AUTO: 0.04 K/UL (ref 0–0.04)
IMM GRANULOCYTES NFR BLD AUTO: 0.5 % (ref 0–0.5)
LYMPHOCYTES # BLD AUTO: 2.2 K/UL (ref 1–4.8)
LYMPHOCYTES NFR BLD: 26.4 % (ref 18–48)
MCH RBC QN AUTO: 31.6 PG (ref 27–31)
MCHC RBC AUTO-ENTMCNC: 33.1 G/DL (ref 32–36)
MCV RBC AUTO: 96 FL (ref 82–98)
MONOCYTES # BLD AUTO: 0.9 K/UL (ref 0.3–1)
MONOCYTES NFR BLD: 11.4 % (ref 4–15)
NEUTROPHILS # BLD AUTO: 4.6 K/UL (ref 1.8–7.7)
NEUTROPHILS NFR BLD: 56.7 % (ref 38–73)
NRBC BLD-RTO: 0 /100 WBC
PLATELET # BLD AUTO: 214 K/UL (ref 150–350)
PMV BLD AUTO: 11.1 FL (ref 9.2–12.9)
POTASSIUM SERPL-SCNC: 3.9 MMOL/L (ref 3.5–5.1)
PROT SERPL-MCNC: 7.3 G/DL (ref 6–8.4)
RBC # BLD AUTO: 3.73 M/UL (ref 4.6–6.2)
SODIUM SERPL-SCNC: 136 MMOL/L (ref 136–145)
WBC # BLD AUTO: 8.18 K/UL (ref 3.9–12.7)

## 2020-02-12 PROCEDURE — 99214 OFFICE O/P EST MOD 30 MIN: CPT | Mod: S$PBB,,, | Performed by: INTERNAL MEDICINE

## 2020-02-12 PROCEDURE — 80053 COMPREHEN METABOLIC PANEL: CPT | Mod: TXP

## 2020-02-12 PROCEDURE — 85025 COMPLETE CBC W/AUTO DIFF WBC: CPT | Mod: NTX

## 2020-02-12 PROCEDURE — 99214 OFFICE O/P EST MOD 30 MIN: CPT | Mod: PBBFAC | Performed by: INTERNAL MEDICINE

## 2020-02-12 PROCEDURE — 36415 COLL VENOUS BLD VENIPUNCTURE: CPT | Mod: TXP

## 2020-02-12 PROCEDURE — 99999 PR PBB SHADOW E&M-EST. PATIENT-LVL IV: CPT | Mod: PBBFAC,,, | Performed by: INTERNAL MEDICINE

## 2020-02-12 PROCEDURE — 99999 PR PBB SHADOW E&M-EST. PATIENT-LVL IV: ICD-10-PCS | Mod: PBBFAC,,, | Performed by: INTERNAL MEDICINE

## 2020-02-12 PROCEDURE — 99214 PR OFFICE/OUTPT VISIT, EST, LEVL IV, 30-39 MIN: ICD-10-PCS | Mod: S$PBB,,, | Performed by: INTERNAL MEDICINE

## 2020-02-12 NOTE — ASSESSMENT & PLAN NOTE
Stable disease based on prior labs.  Will plan to repeat immunoglobulin free light assay in 3 months.

## 2020-02-12 NOTE — ASSESSMENT & PLAN NOTE
Stable.  Hemoglobin noted at 11.7 grams/deciliter.  MCV within normal range.  This is likely related to chronic kidney failure.  Patient is on transplant list and continues to follow with the transplant team in [place].

## 2020-02-12 NOTE — PROGRESS NOTES
Hematology/Oncology Office Note    Reason for referral:  IgM monoclonal gammopathy    CC:  Abnormal protien    Referred by:  No ref. provider found    Diagnosis:  IgM kappa monoclonal gammopathy 0.24 g/dL    History of present illness:  Mr. Ramirez was seen at [facility] today.  He is a 69-year-old  male with history of hypertensive nephropathy, monoclonal gammopathy of undetermined significance that was diagnosed in 2018 and since has been monitored.  He is also noted to be on peritoneal dialysis and currently on transplant list.      Interval history:  Patient presents today in the company of his wife for routine follow-up.  Continues to deny any systemic symptoms including fever, chills, nausea or vomiting, chest pain or shortness of breath, diarrhea, abdominal pain, change in urinary frequency.  He was seen at his transplant center about a month ago and his next appointment with them is March of 2020.      I have reviewed and updated the HPI, ROS, PMHx, Social Hx, Family Hx and treatment history.    Past Medical History:   Diagnosis Date    Anemia in CKD (chronic kidney disease)     Atrial fibrillation     CKD (chronic kidney disease) stage 4, GFR 15 11/26/2014    Colon cancer screening 12/19/2014    Elevated PSA 11/26/2014    HTN (hypertension) diagnosed in his 40s 10/16/2014    Monoclonal gammopathy 11/26/2014    Phobic anxiety disorder 11/26/2014    Proteinuria 11/26/2014         Social History:  No tobacco, alcohol, or illicit drugs      Family History: family history includes Cancer in his brother and paternal uncle; Cataracts in his mother; Dementia in his father; Diabetes in his mother; Glaucoma in his mother; Heart disease in his father; Hypertension in his sister; Kidney disease in his sister.        Follow-up   Pertinent negatives include no abdominal pain, arthralgias, chest pain, congestion, coughing, fatigue, fever, joint swelling, myalgias, neck pain, numbness, rash or  "sore throat.     Review of Systems   Constitutional: Negative for activity change, appetite change, fatigue, fever and unexpected weight change.   HENT: Negative for congestion, ear pain, facial swelling, nosebleeds, rhinorrhea, sinus pressure, sinus pain, sneezing, sore throat and voice change.    Eyes: Negative.    Respiratory: Negative for apnea, cough, choking, chest tightness, shortness of breath, wheezing and stridor.    Cardiovascular: Negative for chest pain, palpitations and leg swelling.   Gastrointestinal: Negative for abdominal distention, abdominal pain and anal bleeding.   Endocrine: Negative.    Genitourinary: Negative for decreased urine volume, difficulty urinating, enuresis, flank pain, frequency, genital sores, hematuria, testicular pain and urgency.   Musculoskeletal: Negative for arthralgias, joint swelling, myalgias, neck pain and neck stiffness.   Skin: Negative for color change, pallor, rash and wound.   Allergic/Immunologic: Negative.    Neurological: Negative for dizziness, tremors, seizures, syncope, speech difficulty, light-headedness and numbness.   Hematological: Negative for adenopathy. Does not bruise/bleed easily.   Psychiatric/Behavioral: Negative for agitation, behavioral problems, confusion, decreased concentration, dysphoric mood and hallucinations. The patient is not nervous/anxious and is not hyperactive.        Objective:       Vitals:    02/12/20 1259   BP: 128/74   Pulse: 69   Resp: 18   Temp: 96.3 °F (35.7 °C)   TempSrc: Oral   SpO2: 96%   Weight: 92.4 kg (203 lb 11.3 oz)   Height: 5' 7.3" (1.709 m)       Physical Exam   Constitutional: He is oriented to person, place, and time. He appears well-developed and well-nourished. No distress.   HENT:   Head: Normocephalic and atraumatic.   Nose: Nose normal.   Mouth/Throat: Oropharynx is clear and moist. No oropharyngeal exudate.   Eyes: Pupils are equal, round, and reactive to light. Conjunctivae and EOM are normal. Right eye " exhibits no discharge. Left eye exhibits no discharge. No scleral icterus.   Neck: Normal range of motion. Neck supple. No JVD present. No tracheal deviation present. No thyromegaly present.   Cardiovascular: Normal rate and regular rhythm. Exam reveals no gallop and no friction rub.   No murmur heard.  Pulmonary/Chest: Effort normal and breath sounds normal. No stridor. No respiratory distress. He has no wheezes.   Abdominal: Soft. Bowel sounds are normal. He exhibits no distension and no mass. There is no tenderness. There is no rebound and no guarding. No hernia.   Musculoskeletal: Normal range of motion. He exhibits no edema, tenderness or deformity.   Lymphadenopathy:     He has no cervical adenopathy.   Neurological: He is alert and oriented to person, place, and time. He displays normal reflexes. No cranial nerve deficit. Coordination normal.   Skin: Skin is warm, dry and intact. Capillary refill takes less than 2 seconds. No abrasion, no bruising, no ecchymosis and no rash noted. Rash is not pustular and not urticarial. He is not diaphoretic. No cyanosis. No pallor. Nails show no clubbing.   Psychiatric: He has a normal mood and affect. Thought content normal.   Vitals reviewed.        Lab Results   Component Value Date    WBC 8.18 02/12/2020    HGB 11.8 (L) 02/12/2020    HCT 35.7 (L) 02/12/2020    MCV 96 02/12/2020     02/12/2020     Lab Results   Component Value Date    CREATININE 15.8 (H) 02/12/2020    BUN 79 (H) 02/12/2020     02/12/2020    K 3.9 02/12/2020    CL 96 02/12/2020    CO2 25 02/12/2020     Lab Results   Component Value Date    ALT 41 02/12/2020    AST 18 02/12/2020    ALKPHOS 88 02/12/2020    BILITOT 0.6 02/12/2020         Assessment:     Anemia in ESRD (end-stage renal disease)  Stable.  Hemoglobin noted at 11.7 grams/deciliter.  MCV within normal range.  This is likely related to chronic kidney failure.  Patient is on transplant list and continues to follow with the transplant  team in [place].    Monoclonal gammopathy  Stable disease based on prior labs.  Will plan to repeat immunoglobulin free light assay in 3 months.    ESRD on dialysis  Management per Nephrology service.    BMI 32.0-32.9,adult  Counseled on lifestyle modification including diet and exercise.      Iglesia Hilliard MD

## 2020-03-02 RX ORDER — METOPROLOL TARTRATE 50 MG/1
TABLET ORAL
Qty: 90 TABLET | Refills: 4 | Status: SHIPPED | OUTPATIENT
Start: 2020-03-02 | End: 2021-04-06 | Stop reason: SDUPTHER

## 2020-03-11 RX ORDER — ALPRAZOLAM 1 MG/1
1 TABLET ORAL 3 TIMES DAILY PRN
Qty: 90 TABLET | Refills: 2 | Status: SHIPPED | OUTPATIENT
Start: 2020-03-11 | End: 2020-08-19 | Stop reason: SDUPTHER

## 2020-04-08 NOTE — ANESTHESIA PREPROCEDURE EVALUATION
12/29/2017  Alverto Ramirez Jr. is a 67 y.o., male.    Anesthesia Evaluation    I have reviewed the Patient Summary Reports.    I have reviewed the Nursing Notes.   I have reviewed the Medications.     Review of Systems  Anesthesia Hx:  No problems with previous Anesthesia    Social:  Former Smoker    Hematology/Oncology:     Oncology Normal    -- Anemia:   EENT/Dental:EENT/Dental Normal   Cardiovascular:   Hypertension, well controlled    Pulmonary:  Pulmonary Normal    Renal/:   Chronic Renal Disease, ESRD  Kidney Function/Disease, Chronic Kidney Disease (CKD) , CKD Stage V (ESRD) (GFR <15 or on Dialysis)    Hepatic/GI:   Bowel Prep.    Musculoskeletal:  Musculoskeletal Normal    Neurological:  Neurology Normal    Endocrine:  Endocrine Normal    Dermatological:  Skin Normal    Psych:   Psychiatric History anxiety          Physical Exam  General:  Obesity    Airway/Jaw/Neck:  Airway Findings: Mouth Opening: Normal Tongue: Normal       Chest/Lungs:  Chest/Lungs Findings: Clear to auscultation     Heart/Vascular:  Heart Findings: Rate: Normal             Anesthesia Plan  Type of Anesthesia, risks & benefits discussed:  Anesthesia Type:  MAC  Patient's Preference:   Intra-op Monitoring Plan:   Intra-op Monitoring Plan Comments:   Post Op Pain Control Plan:   Post Op Pain Control Plan Comments:   Induction:   IV  Beta Blocker:  Patient is not currently on a Beta-Blocker (No further documentation required).       Informed Consent: Patient understands risks and agrees with Anesthesia plan.  Questions answered. Anesthesia consent signed with patient.  ASA Score: 2     Day of Surgery Review of History & Physical: I have interviewed and examined the patient. I have reviewed the patient's H&P dated:  There are no significant changes.          Ready For Surgery From Anesthesia Perspective.        Prescriptions refilled. Please let the patient know. If he is having any issues with the medications he should let us know. Furthermore, if he thinks the medications are not working then I can increase the dose to 20 mg.

## 2020-04-27 RX ORDER — POTASSIUM CHLORIDE 750 MG/1
TABLET, EXTENDED RELEASE ORAL
Qty: 90 TABLET | Refills: 3 | Status: SHIPPED | OUTPATIENT
Start: 2020-04-27 | End: 2021-05-28 | Stop reason: SDUPTHER

## 2020-05-01 RX ORDER — AMLODIPINE BESYLATE 10 MG/1
10 TABLET ORAL DAILY
Qty: 90 TABLET | Refills: 8 | Status: SHIPPED | OUTPATIENT
Start: 2020-05-01 | End: 2021-05-28 | Stop reason: SDUPTHER

## 2020-05-19 ENCOUNTER — OFFICE VISIT (OUTPATIENT)
Dept: TRANSPLANT | Facility: CLINIC | Age: 70
End: 2020-05-19
Payer: MEDICARE

## 2020-05-19 VITALS
HEIGHT: 67 IN | RESPIRATION RATE: 18 BRPM | WEIGHT: 198.88 LBS | DIASTOLIC BLOOD PRESSURE: 74 MMHG | SYSTOLIC BLOOD PRESSURE: 123 MMHG | TEMPERATURE: 99 F | HEART RATE: 80 BPM | BODY MASS INDEX: 31.21 KG/M2 | OXYGEN SATURATION: 98 %

## 2020-05-19 DIAGNOSIS — D63.1 ANEMIA IN ESRD (END-STAGE RENAL DISEASE): Chronic | ICD-10-CM

## 2020-05-19 DIAGNOSIS — D47.2 MONOCLONAL GAMMOPATHY: ICD-10-CM

## 2020-05-19 DIAGNOSIS — Z76.82 PATIENT ON WAITING LIST FOR KIDNEY TRANSPLANT: Primary | ICD-10-CM

## 2020-05-19 DIAGNOSIS — N18.6 ANEMIA IN ESRD (END-STAGE RENAL DISEASE): Chronic | ICD-10-CM

## 2020-05-19 DIAGNOSIS — Z99.2 ESRD ON DIALYSIS: ICD-10-CM

## 2020-05-19 DIAGNOSIS — N18.6 ESRD ON DIALYSIS: ICD-10-CM

## 2020-05-19 PROCEDURE — 99999 PR PBB SHADOW E&M-EST. PATIENT-LVL IV: CPT | Mod: PBBFAC,TXP,, | Performed by: INTERNAL MEDICINE

## 2020-05-19 PROCEDURE — 99214 OFFICE O/P EST MOD 30 MIN: CPT | Mod: PBBFAC,TXP | Performed by: INTERNAL MEDICINE

## 2020-05-19 PROCEDURE — 99215 OFFICE O/P EST HI 40 MIN: CPT | Mod: S$PBB,TXP,, | Performed by: INTERNAL MEDICINE

## 2020-05-19 PROCEDURE — 99215 PR OFFICE/OUTPT VISIT, EST, LEVL V, 40-54 MIN: ICD-10-PCS | Mod: S$PBB,TXP,, | Performed by: INTERNAL MEDICINE

## 2020-05-19 PROCEDURE — 99999 PR PBB SHADOW E&M-EST. PATIENT-LVL IV: ICD-10-PCS | Mod: PBBFAC,TXP,, | Performed by: INTERNAL MEDICINE

## 2020-05-19 NOTE — PROGRESS NOTES
Transplant Recipient Adult Psychosocial Assessment UPDATE (Last Assessment completed on 09/10/2019)      Alverto Ramirez  [address]  [place] [address]  Telephone Information:   Mobile [phone]   Mobile [phone]   Home  [phone] (home)  Work  There is no work phone number on file.  E-mail  [email or web address]    Sex: male  YOB: 1950  Age: 69 y.o.    Encounter Date: 2020  [place] Citizen: yes  Primary Language: English   Needed: no    Emergency Contact:  Name: Liana Ramirez  Relationship: wife  Address: Same as pt.  Phone Numbers:  [phone] (home), [phone] (mobile)    Name: Capo Ramirez  Relationship: son  Address: [address] [place] [address] [address] [place] [address]  Phone Numbers:  [phone] (mobile)    Family/Social Support:   Number of dependents/: Pt reports no dependents.  Marital history: pt reports being  only once and then to pt's Liana Ramirez for the past 48 years.  Other family dynamics: Pt reports 2 adult sons: Capo and Doug (resides in [place]); sisters: Sue, Mayelin, Fernanda, and Jen (half-sister); 1 brother: Charles. Pt reports both parents are  and pt identifies all family members (except sisters: Sue and Fernanda) as supportive.    Household Composition:  Name: Alverto Ramirez  Age: 69  Relationship: patient  Does person drive? yes    Name: Liana Ramirez  Age: 68  Relationship: wife  Does person drive? yes     Name: Capo Ramirez  Age:  47  Relationship: son  Phone Numbers:  [phone] (mobile)    Do you and your caregivers have access to reliable transportation? yes     PRIMARY CAREGIVER: Liana Ramirez, pt's wife,  will be primary caregiver, phone number [phone].      provided in-depth information to patient and caregiver regarding pre- and post-transplant caregiver role.   strongly encourages patient and caregiver to have concrete plan regarding post-transplant care  giving, including back-up caregiver(s) to ensure care giving needs are met as needed.    Patient and Caregiver states understanding all aspects of caregiver role/commitment and is able/willing/committed to being caregiver to the fullest extent necessary.    Patient and Caregiver verbalizes understanding of the education provided today and caregiver responsibilities.         remains available. Patient and Caregiver agree to contact  in a timely manner if concerns arise.      Able to take time off work without financial concerns: yes.  Caregiver was present via phone during the assessment. Pt's caregiver expressed no questions or concerns at this time, reports understanding of the caregiver role, and reports knowing how to contact the transplant team. SW remains available at [phone].    Additional Significant Others who will Assist with Transplant:  Name: Capo Ramirez  Age: 47  Phone: [phone]  City: [place] State: [place]  Relationship: son  Does person drive? yes     Living Will: no Education and information provided to pt.  Healthcare Power of : no Education and information provided to pt. Pt did report trusting wife with medical decisions  Advance Directives on file: <<no information> per medical record.  Verbally reviewed LW/HCPA information.   provided patient with copy of LW/HCPA documents and provided education on completion of forms    Living Donors: No. Education and resource information given to patient.     Highest Education Level: Attended College/Technical School  Reading Ability: college  Reports difficulty with: seeing and wears bifocals  Learns Best By:  A combination of verbal, written, and hands-on instruction.      Status: no  VA Benefits: no     Working for Income: No  If no, reason not working: Patient Choice - Retired  Patient is retired from owning/operating driving school..    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    Disabled: no, however pt received FPC from [employer] due to anxiety.    Monthly Income:  shelter: $300  SS Reitrement: $1840  Other household members total: $600 (wife's income)      Able to afford all costs now and if transplanted, including medications: yes Pt reports that his son can assist if needed.  Patient verbalizes understanding of personal responsibilities related to transplant costs and the importance of having a financial plan to ensure that patients transplant costs are fully covered.   provided fundraising information/education.  Patient verbalizes understanding.   remains available.    Insurance:   Payor/Plan Subscr  Sex Relation Sub. Ins. ID Effective Group Num   1. MEDICARE - ME* ADAM MITCHELL JR. 1950 Male  [id number] NO                                   [address]   2. AETNA - AETNA* ADAM MITCHELL JR. 1950 Male  [id number] 1995 [id number]                                   [address]       Primary Insurance (for UNOS reporting): Public Insurance - Medicare FFS (Fee For Service)  Secondary Insurance (for UNOS reporting): Private Insurance  Pt reports supplement is being paid for by the [employer]. SW provided patient with information regarding pt's responsibility for payment after transplant. Patient verbalized understanding.     Patient and Caregiver verbalizes clear understanding that patient may experience difficulty obtaining and/or be denied insurance coverage post-surgery. This includes and is not limited to disability insurance, life insurance, health insurance, burial insurance, long term care insurance, and other insurances.      Patient and Caregiver also reports understanding that future health concerns related to or unrelated to transplantation may not be covered by patient's insurance.  Resources and information provided and reviewed.     Patient and Caregiver  provides verbal permission to release any necessary information to outside resources for patient care and discharge planning.  Resources and information provided are reviewed.      Patient provides verbal permission to release any necessary information to outside resources for patient care and discharge planning.  Resources and information provided are reviewed.      Dialysis History:  Patient reports being on peritoneal dialysis attending all dialysis appointments and staying for the entire course of treatment.     SW received a faxed adherence form from pt's dialysis center indicates over last three months that the patient has had no AMAs, not had any no-shows, and no issues with caregivers, transportation, or any other psychosocial concerns.    Form also indicates:    Last intact PTH from 03/04/2020 is reported as 479.    Current dry weight is reported as 92kg and Most Recent Pre-treatment weight is reported as 91.3kg on 04/01/2020.    Infusion Service: patient utilizing? no  Home Health: patient utilizing? no  DME: yes PD equipment and BP Cuff  Pulmonary/Cardiac Rehab: Pt denies.   ADLS:  Pt reports no difficulties with driving, walking, bathing, cooking, housekeeping, eating, shopping, and taking medication.    Adherence:   Pt reports good adherence with medications, dialysis, and health regimen..  Adherence education and counseling provided.     Per History Section:  Past Medical History:   Diagnosis Date    Anemia in CKD (chronic kidney disease)     Atrial fibrillation     CKD (chronic kidney disease) stage 4, GFR 15 11/26/2014    Colon cancer screening 12/19/2014    Elevated PSA 11/26/2014    HTN (hypertension) diagnosed in his 40s 10/16/2014    Monoclonal gammopathy 11/26/2014    Phobic anxiety disorder 11/26/2014    Proteinuria 11/26/2014     Social History     Tobacco Use    Smoking status: Former Smoker     Packs/day: 1.00     Years: 15.00     Pack years: 15.00     Types: Cigarettes     Last  attempt to quit: 1984     Years since quittin.5    Smokeless tobacco: Never Used   Substance Use Topics    Alcohol use: Yes     Alcohol/week: 12.0 - 14.0 standard drinks     Types: 7 Glasses of wine, 5 - 7 Standard drinks or equivalent per week     Comment: drinks about 1/2 glass of red wine nightly     Social History     Substance and Sexual Activity   Drug Use No     Per Today's Psychosocial:  Tobacco: Pt reports remote history of tobacco use and quit in .  Alcohol: Pt reports he drinks about 1/2 glass of red wine nightly.  Illicit Drugs/Non-prescribed Medications: none, patient denies any use.    Patient and Caregiver states clear understanding of the potential impact of substance use as it relates to transplant candidacy and is aware of possible random substance screening.  Substance abstinence/cessation counseling, education and resources provided and reviewed.     Arrests/DWI/Treatment/Rehab: patient denies    Psychiatric History:    Mental Health: anxiety. Pt reports dealing with his anxiety for the past 22 years. Pt confirmed previously reported claustrophobia and tight space as triggers.  Pt denies any recent difficulties.  Pt was previously cleared by Dr. Willis for transplant. Pt reports that he can go intermediate through an MRI machine.  Psychiatrist/Counselor: Pt denies seeing a mental health professional and reports being open to seeing the psych department for talk therapy if necessary.  Medications:  Pt reports taking Xanax, 1 mg PRN, which is infrequent and is prescribed by Dr. Bernstein, pt's nephrologist.  Suicide/Homicide Issues: Pt denies any history of or current suicidal or homicidal ideations.    Safety in Household: Pt reports no current or history of safety concerns in household; including mental, physical, verbal, or sexual abuse.    Knowledge: Patient and Caregiver states having clear understanding and realistic expectations regarding the potential risks and potential benefits of  organ transplantation and organ donation, agrees to discuss with health care team members and support system members and to utilize available resources and express questions and/or concerns in order to further facilitate the pt informed decision-making.  Resources and information provided and reviewed.     Patient and Caregiver is aware of [facility]'s affiliation and/or partnership with agencies in home health care, LTAC, SNF, [facility] [place], and other hospitals and clinics.    Understanding: Patient and Caregiver reports having a clear understanding of the many lifetime commitments involved with being a transplant recipient, including costs, compliance, medications, lab work, procedures, appointments, concrete and financial planning, preparedness, timely and appropriate communication of concerns, abstinence (ETOH, tobacco, illicit non-prescribed drugs), adherence to all health care team recommendations, support system and caregiver involvement, appropriate and timely resource utilization and follow-through, mental health counseling as needed/recommended, and patient and caregiver responsibilities.  Social Service Handbook, resources and detailed educational information provided and reviewed.  Educational information provided.    Patient and Caregiver also reports current and expected compliance with health care regime and states having a clear understanding of the importance of compliance.      Patient and Caregiver reports a clear understanding that risks and benefits may be involved with organ transplantation and with organ donation.      Patient and Caregiver also reports clear understanding that psychosocial risk factors may affect patient, and include but are not limited to feelings of depression, generalized anxiety, anxiety regarding dependence on others, post traumatic stress disorder, feelings of guilt and other emotional and/or mental concerns, and/or exacerbation of existing mental health concerns.  Detailed  "resources provided and discussed.     Patient and Caregiver agrees to access appropriate resources in a timely manner as needed and/or as recommended, and to communicate concerns appropriately.  Patient and Caregiver also reports a clear understanding of treatment options available.     Patient and Caregiver received education in a group setting.   reviewed education, provided additional information, and answered questions.    Feelings or Concerns: Patient and Caregiver did not express any concerns at this time.    Coping: Pt reports coping well with the transplant process at this time and reports taking a walk, going to casino, watching DVDs (King[employer], Eugene [employer] Son, Dale and Deandre, etc); playing computer solitaire, and watching tv as ways to cope. Pt reports [employer] home as [facility] in [place], [place] with Rev. Shine presiding.     Goals: Pt reports enjoying life as a goal for after transplant and travel, play music, smoke a cigar, and have 1 beer with a steak.. SW provided support and education. Pt verbalizes understanding that transplant is not a guarantee of ending dialysis and life after transplantation will be a "new normal" post transplant.  SW remains available.      Interview Behavior: Patient and Caregiver present as alert and oriented x 4, pleasant, good eye contact, well groomed, recall good, concentration/judgement good, average intelligence, calm, communicative, cooperative and asking and answering questions appropriately.  Pt presents with wife: Liana Ramirez via phone with pt's permission.       Transplant Social Work - Candidacy  Assessment/Plan:     Psychosocial Suitability: Patient presents as a suitable candidate for kidney transplant at this time. Based on psychosocial risk factors, patient presents as low risk, due to adequate insurance and financial plan in place, suitable dialysis adherence, and caregiver plan in place.    Recommendations/Additional Comments: " SW recommends that pt conduct fundraising to assist pt with pay for cost of medications, food, gas, and other transplant related needs. SW recommends that pt remain aware of potential mental health concerns and contact the team if any concerns arise. SW recommends that pt remain abstinent from tobacco, ETOH, and drug use. SW supports pt's continued dialysis adherence. SW remains available to answer any questions or concerns that arise as the pt moves through the transplant process.       Rody Avila, HUSSAINW

## 2020-05-19 NOTE — LETTER
May 20, 2020        Robson Bernstein  [address] [place] [address]  Phone: [phone]  Fax: [phone]             [facility]  [address]  [facility] [address]  Phone: [phone]   Patient: Alverto Ramirez Jr.   MR Number: [id number]   YOB: 1950   Date of Visit: 5/19/2020       Dear Dr. Robson Bernstein    Thank you for referring Alverto Ramirez to me for evaluation. Attached you will find relevant portions of my assessment and plan of care.    If you have questions, please do not hesitate to call me. I look forward to following Alverto Ramirez along with you.    Sincerely,    Ann Peterson MD    Enclosure    If you would like to receive this communication electronically, please contact [email or web address] or [phone] to request [employer] access.    [employer] is a tool which provides read-only access to select patient information with whom you have a relationship. Its easy to use and provides real time access to review your patients record including encounter summaries, notes, results, and demographic information.    If you feel you have received this communication in error or would no longer like to receive these types of communications, please e-mail [email or web address]

## 2020-05-19 NOTE — PROGRESS NOTES
YEARLY PATIENT EDUCATION NOTE    Patient was seen in pre-kidney transplant clinic for yearly (or six months) evaluation for kidney, kidney/pancreas or pancreas only transplant.  The patient attended a webinar based education session that discussed/reviewed the following aspects of transplantation: evaluation and selection committee process, UNOS waitlist management/multiple listings, types of organs offered (KDPI < 85%, KDPI > 85%, PHS increased risk, DCD, HCV+), financial aspects, surgical procedures, dietary instruction pre- and post-transplant, health maintenance pre- and post-transplant, post-transplant hospitalization and outpatient follow-up, potential to participate in a research protocol, and medication management and side effects.  A question and answer session was provided after the presentation.    The patient was seen by the following members of the multi-disciplinary team to include: Nephrologist/PA, , , Pharmacist and Dietician (if applicable).    The patient reviewed and signed all consents for evaluation which were witnessed and sent to scanning into the EPIC chart.    The patient was given an education book and plan for further evaluation based on his individual assessment.      The patient was encouraged to call with any questions or concerns.

## 2020-05-19 NOTE — PROGRESS NOTES
Kidney Transplant Recipient Reevalulation    Referring Physician: Chris Adair  Current Nephrologist: Robson Bernstein  Waitlist Status: active  Dialysis Start Date: 2/20/2017    Subjective:     CC:  Six month reassessment of kidney transplant candidacy.    HPI:  Mr. Ramirez is a 69 y.o. year old Black or  male with ESRD presumed secondary to HTN. He denies having a native kidney biopsy. Query whether he has undiagnosed GN given nephrotic range proteinuria. He has been on the wait list for a kidney transplant at [facility] since 3/30/2015. Patient is currently on peritoneal dialysis started on 2/20/17. Patient is dialyzing on cyclic peritoneal dialysis.  Patient reports that he is tolerating dialysis well.  H denies any peritonitis and exit site infections. He has a PD catheter.      - He denies any recent hospitalizations or ED visits.  - History of MGUS - plan to see them in May 2020      Functional Status: He walks a few blocks without any symptoms of chest pain, SOB, claudication.   Previous Transplant: no  Previous Blood Transfusion: no  Anticoagulation/ antiplatelet therapy and reason: no  Potential Donor: no  High KDPI candidate: no due to weight  Meets center eligibility for accepting HCV+ donor offer: yes      Review of Systems   Constitutional: + fatigue  Respiratory: Denies shortness of breath, dyspnea on exertion, orthopnea, wheezing, hemoptysis, denies known TB exposure or history of positive TB skin test  Cardiovascular: +A-fib shows up on his problem list but he denies ever being told he had any cardiac arrhythmia and denies ever being on anti-coagulation; Denies chest pain, palpitations, history of MI, history of stroke, history of DVT  Gastrointestinal: Denies abdominal pain, nausea/vomiting/diarrhea/constipation. Denies history of GI bleeding or ulcers.   Musculoskeletal:  Denies trouble moving extremities, denies joint pain or swelling  Skin: Denies history of skin cancer,  denies rash, ulcerations  Heme/onc: +MGUS as mentioned above; Denies history of coagulopathies or bleeding disorders  Endocrine:  Denies thyroid disease, unintentional weight loss/weight gain  Neurological: Denies tremors, seizures, dizziness, headaches, peripheral neuropathy  Psychiatric: +anxiety/nervousness whether he will ever get a kidney transplant; Denies depression. Denies hallucinations or delusions.    Potential Donors: no    Medications:  Current Outpatient Medications   Medication Sig Dispense Refill    ALPRAZolam (XANAX) 1 MG tablet Take 1 tablet (1 mg total) by mouth 3 (three) times daily as needed for Anxiety. 90 tablet 2    amLODIPine (NORVASC) 10 MG tablet Take 1 tablet (10 mg total) by mouth once daily. 90 tablet 8    calcitRIOL (ROCALTROL) 0.5 MCG Cap TAKE 1 BY MOUTH DAILY 30 capsule 10    calcium carbonate (OS-SUSAN) 500 mg calcium (1,250 mg) chewable tablet Take 1 tablet by mouth 3 (three) times daily.      ergocalciferol (ERGOCALCIFEROL) 50,000 unit Cap TAKE 1 CAPSULE EVERY 7 DAYS 13 capsule 4    fluticasone (FLONASE) 50 mcg/actuation nasal spray 2 sprays by Each Nare route as needed.      gentamicin (GARAMYCIN) 0.1 % cream APPLY  TOPICALLY THREE TIMES A DAY  15 g 4    gentamicin (GARAMYCIN) 0.1 % cream Apply topically 3 (three) times daily. 15 g 3    lanthanum (FOSRENOL) 1000 MG chewable tablet CHEW 2 TABLETS THREE TIMES A DAY WITH MEALS 540 tablet 4    losartan (COZAAR) 100 MG tablet Take 1 tablet (100 mg total) by mouth once daily. 90 tablet 4    metoprolol tartrate (LOPRESSOR) 50 MG tablet TAKE 1 TABLET DAILY 90 tablet 4    potassium chloride SA (K-DUR,KLOR-CON) 10 MEQ tablet TAKE 1 TABLET DAILY 90 tablet 3    sildenafil (VIAGRA) 50 MG tablet Take 1 tablet (50 mg total) by mouth daily as needed for Erectile Dysfunction. 30 tablet 11    torsemide (DEMADEX) 100 MG Tab Take 2 tablets (200 mg total) by mouth once daily. 180 tablet 2    sildenafil (REVATIO) 20 mg Tab TAKE 1 TABLET  "(20 MG TOTAL) BY MOUTH AS NEEDED. (Patient not taking: Reported on 5/19/2020) 50 tablet 11     No current facility-administered medications for this visit.          Objective:   body mass index is 31.03 kg/m².   /74 (BP Location: Right arm, Patient Position: Sitting, BP Method: Medium (Automatic))   Pulse 80   Temp 98.5 °F (36.9 °C) (Oral)   Resp 18   Ht 5' 7.13" (1.705 m)   Wt 90.2 kg (198 lb 13.7 oz)   SpO2 98%   BMI 31.03 kg/m²        Physical Exam  General: No acute distress, well groomed, alert and oriented x 3  Respiratory: Clear to auscultation bilaterally, respirations unlabored, no rales/rhonchi/wheezing   Cardiovacular: Regular rate and rhythm, S1, S2 normal, no murmurs, no rubs or gallops   Gastrointestinal: Soft, non-tender, bowel sounds normal, no masses, no palpable organomegaly  Extremities: No clubbing or cyanosis of upper extremities bilaterally, no pedal edema bilaterally; +2 bilateral radial pedis pulses  Skin: warm and dry; no rash on exposed skin  Lymph nodes: Cervical and supraclavicular nodes normal   Neurologic: No focal neurologic deficits.   Musculoskeletal: moves all extremities without difficulty, FROM, 5/5 strength, ambulates without an assistive device  Psychiatric: Normal mood and affect. Responds appropriately to questions.  Access: LUE AVF +thrill/bruit and PD on left       Labs:  Lab Results   Component Value Date    WBC 8.18 02/12/2020    HGB 11.8 (L) 02/12/2020    HCT 35.7 (L) 02/12/2020     02/12/2020    K 3.9 02/12/2020    CL 96 02/12/2020    CO2 25 02/12/2020    BUN 79 (H) 02/12/2020    CREATININE 15.8 (H) 02/12/2020    EGFRNONAA 3 (A) 02/12/2020    CALCIUM 8.5 (L) 02/12/2020    PHOS 6.0 (H) 02/01/2017    ALBUMIN 3.4 (L) 02/12/2020    AST 18 02/12/2020    ALT 41 02/12/2020    UTPCR 5.38 (H) 02/01/2017    .0 (H) 03/14/2019       Lab Results   Component Value Date    BILIRUBINUA Negative 02/01/2017    PROTEINUA 3+ (A) 02/01/2017    NITRITE neg 09/16/2019 "    RBCUA 0 02/01/2017    WBCUA 3 02/01/2017       No results found for: HLAABCTYPE    Lab Results   Component Value Date    CPRA 0 01/08/2020    CPRA 0 01/08/2020    CPRA 0 01/08/2020    LY9RTKG Negative 01/08/2020    CIIAB Negative 01/08/2020       Labs were reviewed with the patient.    Pre-transplant Workup:   Reviewed with the patient.    Assessment:     No diagnosis found.    Plan:     Transplant Candidacy:   Mr. Ramirez is a suitable kidney transplant candidate.  Meets center eligibility for accepting HCV+ donor offer - yes.  Patient educated on HCV+ donors. [facility] is willing to accept HCV+ donor offer -  yes   Patient is a candidate for KDPI > 85 kidney donor offer - no.  He remains in overall stable health, and will remain active on the transplant list.     - He needs to have follow-up with hem-onc - he has history of MGUS   -  from last year

## 2020-05-20 ENCOUNTER — HOSPITAL ENCOUNTER (OUTPATIENT)
Dept: RADIOLOGY | Facility: HOSPITAL | Age: 70
Discharge: HOME | End: 2020-05-20
Attending: NURSE PRACTITIONER
Payer: MEDICARE

## 2020-05-20 ENCOUNTER — HOSPITAL ENCOUNTER (OUTPATIENT)
Dept: CARDIOLOGY | Facility: HOSPITAL | Age: 70
Discharge: HOME | End: 2020-05-20
Attending: NURSE PRACTITIONER
Payer: MEDICARE

## 2020-05-20 VITALS
SYSTOLIC BLOOD PRESSURE: 128 MMHG | DIASTOLIC BLOOD PRESSURE: 74 MMHG | HEIGHT: 67 IN | BODY MASS INDEX: 31.86 KG/M2 | WEIGHT: 203 LBS

## 2020-05-20 DIAGNOSIS — Z76.82 ORGAN TRANSPLANT CANDIDATE: ICD-10-CM

## 2020-05-20 LAB
AORTIC ROOT ANNULUS: 3.35 CM
AV INDEX (PROSTH): 0.84
AV MEAN GRADIENT: 4 MMHG
AV PEAK GRADIENT: 7 MMHG
AV VALVE AREA: 2.58 CM2
AV VELOCITY RATIO: 0.82
BSA FOR ECHO PROCEDURE: 2.09 M2
CV ECHO LV RWT: 0.61 CM
DOP CALC AO PEAK VEL: 1.3 M/S
DOP CALC AO VTI: 25.56 CM
DOP CALC LVOT AREA: 3.1 CM2
DOP CALC LVOT DIAMETER: 1.98 CM
DOP CALC LVOT PEAK VEL: 1.07 M/S
DOP CALC LVOT STROKE VOLUME: 65.95 CM3
DOP CALCLVOT PEAK VEL VTI: 21.43 CM
E/A RATIO: 0.63
E/E' RATIO: 6.12 M/S
ECHO LV POSTERIOR WALL: 1.25 CM (ref 0.6–1.1)
FRACTIONAL SHORTENING: 41 % (ref 28–44)
INTERVENTRICULAR SEPTUM: 1.24 CM (ref 0.6–1.1)
IVRT: 82.78 MSEC
LA MAJOR: 3.59 CM
LA MINOR: 3.91 CM
LA WIDTH: 3.11 CM
LEFT ATRIUM SIZE: 3.48 CM
LEFT ATRIUM VOLUME INDEX: 16.9 ML/M2
LEFT ATRIUM VOLUME: 34.43 CM3
LEFT INTERNAL DIMENSION IN SYSTOLE: 2.44 CM (ref 2.1–4)
LEFT VENTRICLE DIASTOLIC VOLUME INDEX: 36.97 ML/M2
LEFT VENTRICLE DIASTOLIC VOLUME: 75.23 ML
LEFT VENTRICLE MASS INDEX: 90 G/M2
LEFT VENTRICLE SYSTOLIC VOLUME INDEX: 10.3 ML/M2
LEFT VENTRICLE SYSTOLIC VOLUME: 21.03 ML
LEFT VENTRICULAR INTERNAL DIMENSION IN DIASTOLE: 4.12 CM (ref 3.5–6)
LEFT VENTRICULAR MASS: 182.7 G
LV LATERAL E/E' RATIO: 5.2 M/S
LV SEPTAL E/E' RATIO: 7.43 M/S
MV PEAK A VEL: 0.82 M/S
MV PEAK E VEL: 0.52 M/S
PISA TR MAX VEL: 2.73 M/S
PV PEAK VELOCITY: 1.37 CM/S
RA MAJOR: 4.05 CM
RA PRESSURE: 3 MMHG
RA WIDTH: 2.54 CM
RIGHT VENTRICULAR END-DIASTOLIC DIMENSION: 2.6 CM
SINUS: 2.89 CM
STJ: 2.99 CM
TDI LATERAL: 0.1 M/S
TDI SEPTAL: 0.07 M/S
TDI: 0.09 M/S
TR MAX PG: 30 MMHG
TV REST PULMONARY ARTERY PRESSURE: 33 MMHG

## 2020-05-20 PROCEDURE — 71046 X-RAY EXAM CHEST 2 VIEWS: CPT | Mod: TC,TXP

## 2020-05-20 PROCEDURE — 93306 TTE W/DOPPLER COMPLETE: CPT | Mod: 26,TXP,, | Performed by: INTERNAL MEDICINE

## 2020-05-20 PROCEDURE — 93306 TTE W/DOPPLER COMPLETE: CPT | Mod: TXP

## 2020-05-20 PROCEDURE — 93306 ECHO (CUPID ONLY): ICD-10-PCS | Mod: 26,TXP,, | Performed by: INTERNAL MEDICINE

## 2020-05-20 PROCEDURE — 71046 XR CHEST PA AND LATERAL: ICD-10-PCS | Mod: 26,TXP,, | Performed by: RADIOLOGY

## 2020-05-20 PROCEDURE — 71046 X-RAY EXAM CHEST 2 VIEWS: CPT | Mod: 26,TXP,, | Performed by: RADIOLOGY

## 2020-05-27 ENCOUNTER — OFFICE VISIT (OUTPATIENT)
Dept: HEMATOLOGY/ONCOLOGY | Facility: CLINIC | Age: 70
End: 2020-05-27
Payer: MEDICARE

## 2020-05-27 ENCOUNTER — LAB VISIT (OUTPATIENT)
Dept: LAB | Facility: HOSPITAL | Age: 70
End: 2020-05-27
Attending: INTERNAL MEDICINE
Payer: MEDICARE

## 2020-05-27 VITALS
HEART RATE: 66 BPM | WEIGHT: 199.31 LBS | OXYGEN SATURATION: 97 % | DIASTOLIC BLOOD PRESSURE: 75 MMHG | RESPIRATION RATE: 17 BRPM | SYSTOLIC BLOOD PRESSURE: 116 MMHG | BODY MASS INDEX: 31.28 KG/M2 | TEMPERATURE: 97 F | HEIGHT: 67 IN

## 2020-05-27 DIAGNOSIS — N18.6 ANEMIA IN ESRD (END-STAGE RENAL DISEASE): Chronic | ICD-10-CM

## 2020-05-27 DIAGNOSIS — D63.1 ANEMIA IN ESRD (END-STAGE RENAL DISEASE): Chronic | ICD-10-CM

## 2020-05-27 DIAGNOSIS — D47.2 MONOCLONAL GAMMOPATHY: ICD-10-CM

## 2020-05-27 DIAGNOSIS — Z99.2 ESRD ON DIALYSIS: ICD-10-CM

## 2020-05-27 DIAGNOSIS — N18.6 ESRD ON DIALYSIS: ICD-10-CM

## 2020-05-27 LAB
ALBUMIN SERPL BCP-MCNC: 3.3 G/DL (ref 3.5–5.2)
ALP SERPL-CCNC: 105 U/L (ref 55–135)
ALT SERPL W/O P-5'-P-CCNC: 41 U/L (ref 10–44)
ANION GAP SERPL CALC-SCNC: 15 MMOL/L (ref 8–16)
AST SERPL-CCNC: 16 U/L (ref 10–40)
BASOPHILS # BLD AUTO: 0.04 K/UL (ref 0–0.2)
BASOPHILS NFR BLD: 0.5 % (ref 0–1.9)
BILIRUB SERPL-MCNC: 0.6 MG/DL (ref 0.1–1)
BUN SERPL-MCNC: 66 MG/DL (ref 8–23)
CALCIUM SERPL-MCNC: 9.1 MG/DL (ref 8.7–10.5)
CHLORIDE SERPL-SCNC: 94 MMOL/L (ref 95–110)
CO2 SERPL-SCNC: 26 MMOL/L (ref 23–29)
CREAT SERPL-MCNC: 16.3 MG/DL (ref 0.5–1.4)
DIFFERENTIAL METHOD: ABNORMAL
EOSINOPHIL # BLD AUTO: 0.4 K/UL (ref 0–0.5)
EOSINOPHIL NFR BLD: 4.7 % (ref 0–8)
ERYTHROCYTE [DISTWIDTH] IN BLOOD BY AUTOMATED COUNT: 15 % (ref 11.5–14.5)
EST. GFR  (AFRICAN AMERICAN): 3 ML/MIN/1.73 M^2
EST. GFR  (NON AFRICAN AMERICAN): 3 ML/MIN/1.73 M^2
GLUCOSE SERPL-MCNC: 105 MG/DL (ref 70–110)
HCT VFR BLD AUTO: 32.2 % (ref 40–54)
HGB BLD-MCNC: 10.4 G/DL (ref 14–18)
IGA SERPL-MCNC: 276 MG/DL (ref 40–350)
IGG SERPL-MCNC: 997 MG/DL (ref 650–1600)
IGM SERPL-MCNC: 189 MG/DL (ref 50–300)
IMM GRANULOCYTES # BLD AUTO: 0.05 K/UL (ref 0–0.04)
IMM GRANULOCYTES NFR BLD AUTO: 0.6 % (ref 0–0.5)
LYMPHOCYTES # BLD AUTO: 2.2 K/UL (ref 1–4.8)
LYMPHOCYTES NFR BLD: 28 % (ref 18–48)
MCH RBC QN AUTO: 31.1 PG (ref 27–31)
MCHC RBC AUTO-ENTMCNC: 32.3 G/DL (ref 32–36)
MCV RBC AUTO: 96 FL (ref 82–98)
MONOCYTES # BLD AUTO: 1 K/UL (ref 0.3–1)
MONOCYTES NFR BLD: 12.8 % (ref 4–15)
NEUTROPHILS # BLD AUTO: 4.2 K/UL (ref 1.8–7.7)
NEUTROPHILS NFR BLD: 54 % (ref 38–73)
NRBC BLD-RTO: 0 /100 WBC
PLATELET # BLD AUTO: 191 K/UL (ref 150–350)
PMV BLD AUTO: 10.4 FL (ref 9.2–12.9)
POTASSIUM SERPL-SCNC: 4.1 MMOL/L (ref 3.5–5.1)
PROT SERPL-MCNC: 7.2 G/DL (ref 6–8.4)
RBC # BLD AUTO: 3.34 M/UL (ref 4.6–6.2)
SODIUM SERPL-SCNC: 135 MMOL/L (ref 136–145)
WBC # BLD AUTO: 7.86 K/UL (ref 3.9–12.7)

## 2020-05-27 PROCEDURE — 36415 COLL VENOUS BLD VENIPUNCTURE: CPT | Mod: TXP

## 2020-05-27 PROCEDURE — 99214 PR OFFICE/OUTPT VISIT, EST, LEVL IV, 30-39 MIN: ICD-10-PCS | Mod: S$PBB,,, | Performed by: INTERNAL MEDICINE

## 2020-05-27 PROCEDURE — 99999 PR PBB SHADOW E&M-EST. PATIENT-LVL III: CPT | Mod: PBBFAC,,, | Performed by: INTERNAL MEDICINE

## 2020-05-27 PROCEDURE — 85025 COMPLETE CBC W/AUTO DIFF WBC: CPT | Mod: TXP

## 2020-05-27 PROCEDURE — 99999 PR PBB SHADOW E&M-EST. PATIENT-LVL III: ICD-10-PCS | Mod: PBBFAC,,, | Performed by: INTERNAL MEDICINE

## 2020-05-27 PROCEDURE — 99213 OFFICE O/P EST LOW 20 MIN: CPT | Mod: PBBFAC | Performed by: INTERNAL MEDICINE

## 2020-05-27 PROCEDURE — 82784 ASSAY IGA/IGD/IGG/IGM EACH: CPT | Mod: 59,TXP

## 2020-05-27 PROCEDURE — 80053 COMPREHEN METABOLIC PANEL: CPT | Mod: TXP

## 2020-05-27 PROCEDURE — 83520 IMMUNOASSAY QUANT NOS NONAB: CPT | Mod: TXP

## 2020-05-27 PROCEDURE — 99214 OFFICE O/P EST MOD 30 MIN: CPT | Mod: S$PBB,,, | Performed by: INTERNAL MEDICINE

## 2020-05-27 NOTE — PROGRESS NOTES
Hematology/Oncology Office Note    Reason for referral:  IgM monoclonal gammopathy    CC:  Abnormal protien    Referred by:  No ref. provider found    Diagnosis:  IgM kappa monoclonal gammopathy 0.24 g/dL    History of present illness:  Mr. Ramirez was seen at [facility] today.  He is a 69-year-old  male with history of hypertensive nephropathy, monoclonal gammopathy of undetermined significance that was diagnosed in 2018 and since has been monitored.  He is also noted to be on peritoneal dialysis and currently on transplant list.      He was last seen by myself on 02/12/2020 and at that time review of his monoclonal evaluation was stable with no evidence of progression.  He was advised to follow-up in 3 months.  He presents today for routine visit, denies any symptoms including unintentional weight loss, night sweats of fever.  He denies abnormal bleed.  He was recently seen by transplant team in [place] for his 6 months follow-up.        I have reviewed and updated the HPI, ROS, PMHx, Social Hx, Family Hx and treatment history.    Past Medical History:   Diagnosis Date    Anemia in CKD (chronic kidney disease)     Atrial fibrillation     CKD (chronic kidney disease) stage 4, GFR 15 11/26/2014    Colon cancer screening 12/19/2014    Elevated PSA 11/26/2014    HTN (hypertension) diagnosed in his 40s 10/16/2014    Monoclonal gammopathy 11/26/2014    Phobic anxiety disorder 11/26/2014    Proteinuria 11/26/2014         Social History:  No tobacco, alcohol, or illicit drugs      Family History: family history includes Cancer in his brother and paternal uncle; Cataracts in his mother; Dementia in his father; Diabetes in his mother; Glaucoma in his mother; Heart disease in his father; Hypertension in his sister; Kidney disease in his sister.        Follow-up   Pertinent negatives include no abdominal pain, arthralgias, chest pain, congestion, coughing, fatigue, fever, joint swelling,  "myalgias, neck pain, numbness, rash or sore throat.     Review of Systems   Constitutional: Negative for activity change, appetite change, fatigue, fever and unexpected weight change.   HENT: Negative for congestion, ear pain, facial swelling, nosebleeds, rhinorrhea, sinus pressure, sinus pain, sneezing, sore throat and voice change.    Eyes: Negative.    Respiratory: Negative for apnea, cough, choking, chest tightness, shortness of breath, wheezing and stridor.    Cardiovascular: Negative for chest pain, palpitations and leg swelling.   Gastrointestinal: Negative for abdominal distention, abdominal pain and anal bleeding.   Endocrine: Negative.    Genitourinary: Negative for decreased urine volume, difficulty urinating, enuresis, flank pain, frequency, genital sores, hematuria, testicular pain and urgency.   Musculoskeletal: Negative for arthralgias, joint swelling, myalgias, neck pain and neck stiffness.   Skin: Negative for color change, pallor, rash and wound.   Allergic/Immunologic: Negative.    Neurological: Negative for dizziness, tremors, seizures, syncope, speech difficulty, light-headedness and numbness.   Hematological: Negative for adenopathy. Does not bruise/bleed easily.   Psychiatric/Behavioral: Negative for agitation, behavioral problems, confusion, decreased concentration, dysphoric mood and hallucinations. The patient is not nervous/anxious and is not hyperactive.        Objective:       Vitals:    05/27/20 1035   BP: 116/75   Pulse: 66   Resp: 17   Temp: 97.2 °F (36.2 °C)   TempSrc: Oral   SpO2: 97%   Weight: 90.4 kg (199 lb 4.7 oz)   Height: 5' 7" (1.702 m)       Physical Exam   Constitutional: He is oriented to person, place, and time. He appears well-developed and well-nourished. No distress.   HENT:   Head: Normocephalic and atraumatic.   Nose: Nose normal.   Mouth/Throat: Oropharynx is clear and moist. No oropharyngeal exudate.   Eyes: Pupils are equal, round, and reactive to light. " Conjunctivae and EOM are normal. Right eye exhibits no discharge. Left eye exhibits no discharge. No scleral icterus.   Neck: Normal range of motion. Neck supple. No JVD present. No tracheal deviation present. No thyromegaly present.   Cardiovascular: Normal rate and regular rhythm. Exam reveals no gallop and no friction rub.   No murmur heard.  Pulmonary/Chest: Effort normal and breath sounds normal. No stridor. No respiratory distress. He has no wheezes.   Abdominal: Soft. Bowel sounds are normal. He exhibits no distension and no mass. There is no tenderness. There is no rebound and no guarding. No hernia.   Musculoskeletal: Normal range of motion. He exhibits no edema, tenderness or deformity.   Lymphadenopathy:     He has no cervical adenopathy.   Neurological: He is alert and oriented to person, place, and time. He displays normal reflexes. No cranial nerve deficit. Coordination normal.   Skin: Skin is warm, dry and intact. Capillary refill takes less than 2 seconds. No abrasion, no bruising, no ecchymosis and no rash noted. Rash is not pustular and not urticarial. He is not diaphoretic. No cyanosis. No pallor. Nails show no clubbing.   Psychiatric: He has a normal mood and affect. Thought content normal.   Vitals reviewed.        Lab Results   Component Value Date    WBC 7.86 05/27/2020    HGB 10.4 (L) 05/27/2020    HCT 32.2 (L) 05/27/2020    MCV 96 05/27/2020     05/27/2020     Lab Results   Component Value Date    CREATININE 16.3 (H) 05/27/2020    BUN 66 (H) 05/27/2020     (L) 05/27/2020    K 4.1 05/27/2020    CL 94 (L) 05/27/2020    CO2 26 05/27/2020     Lab Results   Component Value Date    ALT 41 05/27/2020    AST 16 05/27/2020    ALKPHOS 105 05/27/2020    BILITOT 0.6 05/27/2020         Assessment:     Anemia in ESRD (end-stage renal disease)  Stable.  Hemoglobin noted at 10.4 grams/deciliter.  MCV within normal range.  This is likely related to chronic kidney failure.  No need for  erythropoietin stimulating agent.   Patient is on transplant list and continues to follow with the transplant team in [place].    ESRD on dialysis  Follows with nephrology team.    BMI 32.0-32.9,adult  Counseled on lifestyle modification including diet and exercise    Monoclonal gammopathy  Ordered immunoglobulin free light chain assay and electrophoresis.  Will communicate results with patient once available.  Will plan to see him back in 3 months with repeat labs.    Elevated PSA:  Noted mildly elevated PSA at 4.2.  Patient follows with Dr. Helm with urology service.  No prior history of malignancy.    Iglesia Hilliard MD

## 2020-05-27 NOTE — ASSESSMENT & PLAN NOTE
Ordered immunoglobulin free light chain assay and electrophoresis.  Will communicate results with patient once available.  Will plan to see him back in 3 months with repeat labs.

## 2020-05-27 NOTE — ASSESSMENT & PLAN NOTE
Stable.  Hemoglobin noted at 10.4 grams/deciliter.  MCV within normal range.  This is likely related to chronic kidney failure.  No need for erythropoietin stimulating agent.   Patient is on transplant list and continues to follow with the transplant team in [place].

## 2020-05-28 LAB
KAPPA LC SER QL IA: 20.29 MG/DL (ref 0.33–1.94)
KAPPA LC/LAMBDA SER IA: 1.79 (ref 0.26–1.65)
LAMBDA LC SER QL IA: 11.32 MG/DL (ref 0.57–2.63)

## 2020-06-04 ENCOUNTER — TELEPHONE (OUTPATIENT)
Dept: NEPHROLOGY | Facility: CLINIC | Age: 70
End: 2020-06-04

## 2020-06-04 RX ORDER — FINASTERIDE 5 MG/1
5 TABLET, FILM COATED ORAL DAILY
Qty: 90 TABLET | Refills: 3 | Status: SHIPPED | OUTPATIENT
Start: 2020-06-04 | End: 2020-09-21 | Stop reason: SDUPTHER

## 2020-06-04 NOTE — TELEPHONE ENCOUNTER
----- Message from Alex Askew sent at 6/4/2020 11:19 AM CDT -----  Contact: self  Pt ask for a call in regards to needing a new prescription Pt states need to speak to Dr Bernstein    Contact info  [phone]

## 2020-06-04 NOTE — TELEPHONE ENCOUNTER
----- Message from Robson Bernstein MD sent at 6/4/2020 11:57 AM CDT -----  Contact: self  Rx sent please let him know   ----- Message -----  From: Catherine Bloom LPN  Sent: 6/4/2020  11:47 AM CDT  To: Robson Bernstein MD    Pt wants you to order finasteride 5 mg with 90 days rx . He said its for hair growth and his prostate. The 5 mg cost him $2.51 for 90 days and he can cut into 4 with his pill cutter. Please advise. He uses express script  ----- Message -----  From: Alex Askew  Sent: 6/4/2020  11:19 AM CDT  To: Amandeep PRINGLE Staff    Pt ask for a call in regards to needing a new prescription Pt states need to speak to Dr Bernstein    Contact info  [phone]

## 2020-06-04 NOTE — TELEPHONE ENCOUNTER
----- Message from Catherine Bloom LPN sent at 6/4/2020 11:47 AM CDT -----  Contact: self  Pt wants you to order finasteride 5 mg with 90 days rx . He said its for hair growth and his prostate. The 5 mg cost him $2.51 for 90 days and he can cut into 4 with his pill cutter. Please advise. He uses express script  ----- Message -----  From: Alex Askew  Sent: 6/4/2020  11:19 AM CDT  To: Amandeep PRINGLE Staff    Pt ask for a call in regards to needing a new prescription Pt states need to speak to Dr Bernstein    Contact info  [phone]

## 2020-07-29 DIAGNOSIS — Z76.82 ORGAN TRANSPLANT CANDIDATE: Primary | ICD-10-CM

## 2020-07-29 NOTE — PROGRESS NOTES
YEARLY LIST MANAGEMENT NOTE    Alverto Ramirez's kidney transplant listing status reviewed.  Patient is due for follow-up appointments in October 2020.  Appointments will be scheduled per protocol.  HLA sample is current and being rec'd on a regular basis.

## 2020-07-30 ENCOUNTER — TELEPHONE (OUTPATIENT)
Dept: TRANSPLANT | Facility: CLINIC | Age: 70
End: 2020-07-30

## 2020-08-07 NOTE — PROGRESS NOTES
History & Physical        Chief Complaint:  ESRD     HPI:      Patient is a 70-year-old male with ESRD due to diabetic nephropathy.  Currently is on peritoneal dialysis..  Patient is on active transplant list in [facility].    ROS:        Constitutional: Negative for fever, chills, weight loss, malaise/fatigue and diaphoresis.   HENT: Negative for hearing loss, ear pain, nosebleeds, congestion, sore throat, neck pain, tinnitus and ear discharge.    Eyes: Negative for blurred vision, double vision, photophobia, pain, discharge and redness.   Respiratory: Negative for cough, hemoptysis, sputum production, shortness of breath, wheezing and stridor.    Cardiovascular: Negative for chest pain, palpitations, orthopnea, claudication, leg swelling and PND.   Gastrointestinal: Negative for heartburn, nausea, vomiting, abdominal pain, diarrhea, constipation, blood in stool and melena.   Genitourinary: Negative for dysuria, urgency, frequency, hematuria and flank pain.   Musculoskeletal: Negative for myalgias, back pain, joint pain and falls.   Skin: Negative for itching and rash.   Neurological: Negative for dizziness, tingling, tremors, sensory change, speech change, focal weakness, seizures, loss of consciousness, weakness and headaches.   Endo/Heme/Allergies: Negative for environmental allergies and polydipsia. Does not bruise/bleed easily.   Psychiatric/Behavioral: Negative for depression, suicidal ideas, hallucinations, memory loss and substance abuse. The patient is not nervous/anxious and does not have insomnia.    All 14 systems reviewed and negative except as noted above.      PMHx:      Past Medical History:   Diagnosis Date    Anemia in CKD (chronic kidney disease)     Atrial fibrillation     CKD (chronic kidney disease) stage 4, GFR 15 11/26/2014    Colon cancer screening 12/19/2014    Elevated PSA 11/26/2014    HTN (hypertension) diagnosed in his 40s 10/16/2014    Monoclonal  gammopathy 2014    Phobic anxiety disorder 2014    Proteinuria 2014        PMSx:      Past Surgical History:   Procedure Laterality Date    AV FISTULA PLACEMENT  2014    COLONOSCOPY N/A 2017    Procedure: COLONOSCOPY;  Surgeon: Tony Peacock III, MD;  Location: [facility];  Service: Endoscopy;  Laterality: N/A;    PERITONEAL CATHETER INSERTION      VASECTOMY      VASECTOMY          Social Hx:      Social History     Socioeconomic History    Marital status:      Spouse name: Not on file    Number of children: Not on file    Years of education: Not on file    Highest education level: Not on file   Occupational History     Employer: retired   Social Needs    Financial resource strain: Not on file    Food insecurity     Worry: Not on file     Inability: Not on file    Transportation needs     Medical: Not on file     Non-medical: Not on file   Tobacco Use    Smoking status: Former Smoker     Packs/day: 1.00     Years: 15.00     Pack years: 15.00     Types: Cigarettes     Quit date: 1984     Years since quittin.7    Smokeless tobacco: Never Used   Substance and Sexual Activity    Alcohol use: Yes     Alcohol/week: 12.0 - 14.0 standard drinks     Types: 7 Glasses of wine, 5 - 7 Standard drinks or equivalent per week     Comment: drinks about 1/2 glass of red wine nightly    Drug use: No    Sexual activity: Yes   Lifestyle    Physical activity     Days per week: Not on file     Minutes per session: Not on file    Stress: Not on file   Relationships    Social connections     Talks on phone: Not on file     Gets together: Not on file     Attends Faith service: Not on file     Active member of club or organization: Not on file     Attends meetings of clubs or organizations: Not on file     Relationship status: Not on file   Other Topics Concern    Not on file   Social History Narrative    Retired from [employer]     for 41 y.o.    2 children     No history of blood transfusions    No donors        Family Hx:      Family History   Problem Relation Age of Onset    Heart disease Father     Dementia Father     Diabetes Mother     Cataracts Mother     Glaucoma Mother     Hypertension Sister     Cancer Brother         prostate    Kidney disease Sister         ESRD    Cancer Paternal Uncle         VITALS:          Physical Exam   Nursing Notes and Vital Signs Reviewed.     Constitutional: Well developed, well nourished. AAOx3, NAD, speech/ comprehension clear   Head: Atraumatic. Normocephalic.   Eyes: PERRL. EOMI. Conjunctivae are not pale. No scleral icterus.   ENT: Mucous membranes are dry. No tongue tremors. Throat clear.  Neck: Supple. No JVD or LN or Carotid Bruits noted B.  Cardiovascular: S1S2 RRR, no murmurs, rubs, or gallops. Distal pulses are 2+ and symmetric.   Pulmonary/Chest: No evidence of respiratory distress. Clear to auscultation bilaterally. No wheezing, rales or rhonchi. No chest wall TTP.   Abdominal: Soft and non-distended. There is no tenderness. No rebound, guarding, or rigidity. No organomegaly. No mass or viscera palpable. PD Cath intact   Musculoskeletal: FROM in all extremities. No deformities, no TTP, no edema. No midline spinal TTP. No step-offs. Pelvis is stable to compression. No cyanosis. Moves all four extremities.     LUAF+B/T  Skin: Skin is warm and dry. left knee, left foot.   Neurological: No gross neurological deficits, Strength 5/5 B, is equal in the upper and lower extremities bilaterally. No sensory deficits to light touch. No pronator drift.  DTRs are 2+ and equal throughout.   Psychiatric: Good eye contact. Normal Affect.      Laboratory Data:  Reviewed and noted in plan where applicable- Please see chart for full laboratory data.        Lab Results   Component Value Date    INR 0.9 11/26/2014       Lab Results   Component Value Date    WBC 7.86 05/27/2020    HGB 10.4 (L) 05/27/2020    HCT 32.2 (L) 05/27/2020     MCV 96 05/27/2020     05/27/2020         Radiology:  Reviewed and noted in plan where applicable- Please see chart for full radiology data.    Medications:  Current Outpatient Medications   Medication Sig    ALPRAZolam (XANAX) 1 MG tablet Take 1 tablet (1 mg total) by mouth 3 (three) times daily as needed for Anxiety.    amLODIPine (NORVASC) 10 MG tablet Take 1 tablet (10 mg total) by mouth once daily.    calcitRIOL (ROCALTROL) 0.5 MCG Cap TAKE 1 BY MOUTH DAILY    calcium carbonate (OS-SUSAN) 500 mg calcium (1,250 mg) chewable tablet Take 1 tablet by mouth 3 (three) times daily.    ergocalciferol (ERGOCALCIFEROL) 50,000 unit Cap TAKE 1 CAPSULE EVERY 7 DAYS    finasteride (PROSCAR) 5 mg tablet Take 1 tablet (5 mg total) by mouth once daily.    fluticasone (FLONASE) 50 mcg/actuation nasal spray 2 sprays by Each Nare route as needed.    gentamicin (GARAMYCIN) 0.1 % cream APPLY  TOPICALLY THREE TIMES A DAY     gentamicin (GARAMYCIN) 0.1 % cream Apply topically 3 (three) times daily.    lanthanum (FOSRENOL) 1000 MG chewable tablet CHEW 2 TABLETS THREE TIMES A DAY WITH MEALS    losartan (COZAAR) 100 MG tablet Take 1 tablet (100 mg total) by mouth once daily.    metoprolol tartrate (LOPRESSOR) 50 MG tablet TAKE 1 TABLET DAILY    potassium chloride SA (K-DUR,KLOR-CON) 10 MEQ tablet TAKE 1 TABLET DAILY    sildenafil (REVATIO) 20 mg Tab TAKE 1 TABLET (20 MG TOTAL) BY MOUTH AS NEEDED.    sildenafil (VIAGRA) 50 MG tablet Take 1 tablet (50 mg total) by mouth daily as needed for Erectile Dysfunction.    torsemide (DEMADEX) 100 MG Tab Take 2 tablets (200 mg total) by mouth once daily.     No current facility-administered medications for this visit.          ASSESSMENT/PLAN:     ACTIVE PROBLEMS:    Patient Active Problem List   Diagnosis    HTN (hypertension) diagnosed in his 40s    Proteinuria    Hematuria    Monoclonal gammopathy    Hyperlipidemia    Phobic anxiety disorder - Claustrophobia     Elevated PSA    Colon cancer screening    HPTH (hyperparathyroidism)    Acidosis    Patient on waiting list for kidney transplant    ESRD on dialysis    Anemia in ESRD (end-stage renal disease)    BMI 32.0-32.9,adult           PLAN:    Assessment and plan:    1.  ESRD: Doing very well on peritoneal dialysis    2.  Anemia continue Epogen and iron per protocol    3.  Hypertension much better controlled    4.  MGUS: s/p Heme follow up     5.  Hyperparathyroidism treat with vitamin D therapy.      Robson Bernstein MD

## 2020-08-19 RX ORDER — ALPRAZOLAM 1 MG/1
1 TABLET ORAL 3 TIMES DAILY PRN
Qty: 90 TABLET | Refills: 2 | Status: SHIPPED | OUTPATIENT
Start: 2020-08-19 | End: 2021-02-24 | Stop reason: SDUPTHER

## 2020-09-08 ENCOUNTER — LAB VISIT (OUTPATIENT)
Dept: LAB | Facility: HOSPITAL | Age: 70
End: 2020-09-08
Attending: INTERNAL MEDICINE
Payer: MEDICARE

## 2020-09-08 DIAGNOSIS — D47.2 MONOCLONAL GAMMOPATHY: ICD-10-CM

## 2020-09-08 DIAGNOSIS — D63.1 ANEMIA IN ESRD (END-STAGE RENAL DISEASE): Chronic | ICD-10-CM

## 2020-09-08 DIAGNOSIS — N18.6 ANEMIA IN ESRD (END-STAGE RENAL DISEASE): Chronic | ICD-10-CM

## 2020-09-08 LAB
ALBUMIN SERPL BCP-MCNC: 3.4 G/DL (ref 3.5–5.2)
ALP SERPL-CCNC: 113 U/L (ref 55–135)
ALT SERPL W/O P-5'-P-CCNC: 48 U/L (ref 10–44)
ANION GAP SERPL CALC-SCNC: 18 MMOL/L (ref 8–16)
AST SERPL-CCNC: 22 U/L (ref 10–40)
BASOPHILS # BLD AUTO: 0.04 K/UL (ref 0–0.2)
BASOPHILS NFR BLD: 0.5 % (ref 0–1.9)
BILIRUB SERPL-MCNC: 0.5 MG/DL (ref 0.1–1)
BUN SERPL-MCNC: 59 MG/DL (ref 8–23)
CALCIUM SERPL-MCNC: 8.4 MG/DL (ref 8.7–10.5)
CHLORIDE SERPL-SCNC: 94 MMOL/L (ref 95–110)
CO2 SERPL-SCNC: 22 MMOL/L (ref 23–29)
CREAT SERPL-MCNC: 17 MG/DL (ref 0.5–1.4)
DIFFERENTIAL METHOD: ABNORMAL
EOSINOPHIL # BLD AUTO: 0.2 K/UL (ref 0–0.5)
EOSINOPHIL NFR BLD: 2.4 % (ref 0–8)
ERYTHROCYTE [DISTWIDTH] IN BLOOD BY AUTOMATED COUNT: 14.5 % (ref 11.5–14.5)
EST. GFR  (AFRICAN AMERICAN): 3 ML/MIN/1.73 M^2
EST. GFR  (NON AFRICAN AMERICAN): 2 ML/MIN/1.73 M^2
GLUCOSE SERPL-MCNC: 96 MG/DL (ref 70–110)
HCT VFR BLD AUTO: 33.8 % (ref 40–54)
HGB BLD-MCNC: 11.3 G/DL (ref 14–18)
IMM GRANULOCYTES # BLD AUTO: 0.03 K/UL (ref 0–0.04)
IMM GRANULOCYTES NFR BLD AUTO: 0.4 % (ref 0–0.5)
LYMPHOCYTES # BLD AUTO: 2.3 K/UL (ref 1–4.8)
LYMPHOCYTES NFR BLD: 30 % (ref 18–48)
MCH RBC QN AUTO: 31.7 PG (ref 27–31)
MCHC RBC AUTO-ENTMCNC: 33.4 G/DL (ref 32–36)
MCV RBC AUTO: 95 FL (ref 82–98)
MONOCYTES # BLD AUTO: 1 K/UL (ref 0.3–1)
MONOCYTES NFR BLD: 13.2 % (ref 4–15)
NEUTROPHILS # BLD AUTO: 4.2 K/UL (ref 1.8–7.7)
NEUTROPHILS NFR BLD: 53.9 % (ref 38–73)
NRBC BLD-RTO: 0 /100 WBC
PLATELET # BLD AUTO: 208 K/UL (ref 150–350)
PMV BLD AUTO: 10.3 FL (ref 9.2–12.9)
POTASSIUM SERPL-SCNC: 3.5 MMOL/L (ref 3.5–5.1)
PROT SERPL-MCNC: 7.5 G/DL (ref 6–8.4)
RBC # BLD AUTO: 3.57 M/UL (ref 4.6–6.2)
SODIUM SERPL-SCNC: 134 MMOL/L (ref 136–145)
WBC # BLD AUTO: 7.79 K/UL (ref 3.9–12.7)

## 2020-09-08 PROCEDURE — 84165 PATHOLOGIST INTERPRETATION SPE: ICD-10-PCS | Mod: 26,NTX,, | Performed by: PATHOLOGY

## 2020-09-08 PROCEDURE — 36415 COLL VENOUS BLD VENIPUNCTURE: CPT | Mod: TXP

## 2020-09-08 PROCEDURE — 86334 PATHOLOGIST INTERPRETATION IFE: ICD-10-PCS | Mod: 26,NTX,, | Performed by: PATHOLOGY

## 2020-09-08 PROCEDURE — 80053 COMPREHEN METABOLIC PANEL: CPT | Mod: NTX

## 2020-09-08 PROCEDURE — 83520 IMMUNOASSAY QUANT NOS NONAB: CPT | Mod: 59,TXP

## 2020-09-08 PROCEDURE — 86334 IMMUNOFIX E-PHORESIS SERUM: CPT | Mod: 26,NTX,, | Performed by: PATHOLOGY

## 2020-09-08 PROCEDURE — 84165 PROTEIN E-PHORESIS SERUM: CPT | Mod: 26,NTX,, | Performed by: PATHOLOGY

## 2020-09-08 PROCEDURE — 86334 IMMUNOFIX E-PHORESIS SERUM: CPT | Mod: NTX

## 2020-09-08 PROCEDURE — 84165 PROTEIN E-PHORESIS SERUM: CPT | Mod: NTX

## 2020-09-08 PROCEDURE — 82784 ASSAY IGA/IGD/IGG/IGM EACH: CPT | Mod: 59,NTX

## 2020-09-08 PROCEDURE — 85025 COMPLETE CBC W/AUTO DIFF WBC: CPT | Mod: TXP

## 2020-09-09 LAB
ALBUMIN SERPL ELPH-MCNC: 3.75 G/DL (ref 3.35–5.55)
ALPHA1 GLOB SERPL ELPH-MCNC: 0.35 G/DL (ref 0.17–0.41)
ALPHA2 GLOB SERPL ELPH-MCNC: 0.97 G/DL (ref 0.43–0.99)
B-GLOBULIN SERPL ELPH-MCNC: 0.77 G/DL (ref 0.5–1.1)
GAMMA GLOB SERPL ELPH-MCNC: 1.06 G/DL (ref 0.67–1.58)
IGA SERPL-MCNC: 296 MG/DL (ref 40–350)
IGG SERPL-MCNC: 1044 MG/DL (ref 650–1600)
IGM SERPL-MCNC: 193 MG/DL (ref 50–300)
INTERPRETATION SERPL IFE-IMP: NORMAL
KAPPA LC SER QL IA: 16.48 MG/DL (ref 0.33–1.94)
KAPPA LC/LAMBDA SER IA: 1.31 (ref 0.26–1.65)
LAMBDA LC SER QL IA: 12.55 MG/DL (ref 0.57–2.63)
PROT SERPL-MCNC: 6.9 G/DL (ref 6–8.4)

## 2020-09-10 LAB
PATHOLOGIST INTERPRETATION IFE: NORMAL
PATHOLOGIST INTERPRETATION SPE: NORMAL

## 2020-09-15 ENCOUNTER — OFFICE VISIT (OUTPATIENT)
Dept: HEMATOLOGY/ONCOLOGY | Facility: CLINIC | Age: 70
End: 2020-09-15
Payer: MEDICARE

## 2020-09-15 VITALS
BODY MASS INDEX: 31.17 KG/M2 | HEART RATE: 94 BPM | OXYGEN SATURATION: 97 % | HEIGHT: 68 IN | SYSTOLIC BLOOD PRESSURE: 126 MMHG | DIASTOLIC BLOOD PRESSURE: 82 MMHG | RESPIRATION RATE: 17 BRPM | WEIGHT: 205.69 LBS | TEMPERATURE: 98 F

## 2020-09-15 DIAGNOSIS — N18.6 ANEMIA IN ESRD (END-STAGE RENAL DISEASE): Chronic | ICD-10-CM

## 2020-09-15 DIAGNOSIS — N18.6 ESRD ON DIALYSIS: ICD-10-CM

## 2020-09-15 DIAGNOSIS — D63.1 ANEMIA IN ESRD (END-STAGE RENAL DISEASE): Chronic | ICD-10-CM

## 2020-09-15 DIAGNOSIS — D47.2 MONOCLONAL GAMMOPATHY: ICD-10-CM

## 2020-09-15 DIAGNOSIS — Z99.2 ESRD ON DIALYSIS: ICD-10-CM

## 2020-09-15 PROCEDURE — 99214 OFFICE O/P EST MOD 30 MIN: CPT | Mod: S$PBB,,, | Performed by: INTERNAL MEDICINE

## 2020-09-15 PROCEDURE — 99215 OFFICE O/P EST HI 40 MIN: CPT | Mod: PBBFAC | Performed by: INTERNAL MEDICINE

## 2020-09-15 PROCEDURE — 99999 PR PBB SHADOW E&M-EST. PATIENT-LVL V: CPT | Mod: PBBFAC,,, | Performed by: INTERNAL MEDICINE

## 2020-09-15 PROCEDURE — 99214 PR OFFICE/OUTPT VISIT, EST, LEVL IV, 30-39 MIN: ICD-10-PCS | Mod: S$PBB,,, | Performed by: INTERNAL MEDICINE

## 2020-09-15 PROCEDURE — 99999 PR PBB SHADOW E&M-EST. PATIENT-LVL V: ICD-10-PCS | Mod: PBBFAC,,, | Performed by: INTERNAL MEDICINE

## 2020-09-15 NOTE — PROGRESS NOTES
Hematology/Oncology Office Note    Reason for referral:  IgM monoclonal gammopathy    CC:  Abnormal protien    Referred by:  No ref. provider found    Diagnosis:  IgM kappa monoclonal gammopathy 0.24 g/dL    History of present illness:  Mr. Ramirez was seen at [facility] today.  He is a 70-year-old  male with history of hypertensive nephropathy, monoclonal gammopathy of undetermined significance that was diagnosed in 2018 and since has been monitored.  On peritoneal dialysis and currently on transplant list.      He was last seen by myself on 05/27/2020 and at that time review of his monoclonal evaluation was stable with no evidence of progression.  He was advised to follow-up in 3 months.  He presents today for routine visit, denies any symptoms including unintentional weight loss, night sweats of fever.  He denies abnormal bleed.  He was recently seen by transplant team in [place] for his 6 months follow-up.    I have reviewed and updated the HPI, ROS, PMHx, Social Hx, Family Hx and treatment history.    Past Medical History:   Diagnosis Date    Anemia in CKD (chronic kidney disease)     Atrial fibrillation     CKD (chronic kidney disease) stage 4, GFR 15 11/26/2014    Colon cancer screening 12/19/2014    Elevated PSA 11/26/2014    HTN (hypertension) diagnosed in his 40s 10/16/2014    Monoclonal gammopathy 11/26/2014    Phobic anxiety disorder 11/26/2014    Proteinuria 11/26/2014         Social History:  No tobacco, alcohol, or illicit drugs      Family History: family history includes Cancer in his brother and paternal uncle; Cataracts in his mother; Dementia in his father; Diabetes in his mother; Glaucoma in his mother; Heart disease in his father; Hypertension in his sister; Kidney disease in his sister.        Follow-up  Pertinent negatives include no abdominal pain, arthralgias, chest pain, congestion, coughing, fatigue, fever, joint swelling, myalgias, neck pain, numbness, rash  "or sore throat.     Review of Systems   Constitutional: Negative for activity change, appetite change, fatigue, fever and unexpected weight change.   HENT: Negative for congestion, ear pain, facial swelling, nosebleeds, rhinorrhea, sinus pressure, sinus pain, sneezing, sore throat and voice change.    Eyes: Negative.    Respiratory: Negative for apnea, cough, choking, chest tightness, shortness of breath, wheezing and stridor.    Cardiovascular: Negative for chest pain, palpitations and leg swelling.   Gastrointestinal: Negative for abdominal distention, abdominal pain and anal bleeding.   Endocrine: Negative.    Genitourinary: Negative for decreased urine volume, difficulty urinating, enuresis, flank pain, frequency, genital sores, hematuria, testicular pain and urgency.   Musculoskeletal: Negative for arthralgias, joint swelling, myalgias, neck pain and neck stiffness.   Skin: Negative for color change, pallor, rash and wound.   Allergic/Immunologic: Negative.    Neurological: Negative for dizziness, tremors, seizures, syncope, speech difficulty, light-headedness and numbness.   Hematological: Negative for adenopathy. Does not bruise/bleed easily.   Psychiatric/Behavioral: Negative for agitation, behavioral problems, confusion, decreased concentration, dysphoric mood and hallucinations. The patient is not nervous/anxious and is not hyperactive.        Objective:       Vitals:    09/15/20 1429   BP: 126/82   Pulse: 94   Resp: 17   Temp: 97.9 °F (36.6 °C)   TempSrc: Oral   SpO2: 97%   Weight: 93.3 kg (205 lb 11 oz)   Height: 5' 8" (1.727 m)       Physical Exam  Vitals signs reviewed.   Constitutional:       General: He is not in acute distress.     Appearance: He is well-developed. He is not diaphoretic.   HENT:      Head: Normocephalic and atraumatic.      Nose: Nose normal.      Mouth/Throat:      Pharynx: No oropharyngeal exudate.   Eyes:      General: No scleral icterus.        Right eye: No discharge.         " Left eye: No discharge.      Conjunctiva/sclera: Conjunctivae normal.      Pupils: Pupils are equal, round, and reactive to light.   Neck:      Musculoskeletal: Normal range of motion and neck supple.      Thyroid: No thyromegaly.      Vascular: No JVD.      Trachea: No tracheal deviation.   Cardiovascular:      Rate and Rhythm: Normal rate and regular rhythm.      Heart sounds: No murmur. No friction rub. No gallop.    Pulmonary:      Effort: Pulmonary effort is normal. No respiratory distress.      Breath sounds: Normal breath sounds. No stridor. No wheezing.   Abdominal:      General: Bowel sounds are normal. There is no distension.      Palpations: Abdomen is soft. There is no mass.      Tenderness: There is no abdominal tenderness. There is no guarding or rebound.      Hernia: No hernia is present.   Musculoskeletal: Normal range of motion.         General: No tenderness or deformity.   Lymphadenopathy:      Cervical: No cervical adenopathy.   Skin:     General: Skin is warm and dry.      Capillary Refill: Capillary refill takes less than 2 seconds.      Coloration: Skin is not pale.      Findings: No abrasion, bruising, ecchymosis or rash. Rash is not pustular or urticarial.      Nails: There is no clubbing.     Neurological:      Mental Status: He is alert and oriented to person, place, and time.      Cranial Nerves: No cranial nerve deficit.      Coordination: Coordination normal.      Deep Tendon Reflexes: Reflexes normal.   Psychiatric:         Thought Content: Thought content normal.           Lab Results   Component Value Date    WBC 7.79 09/08/2020    HGB 11.3 (L) 09/08/2020    HCT 33.8 (L) 09/08/2020    MCV 95 09/08/2020     09/08/2020     Lab Results   Component Value Date    CREATININE 17.0 (H) 09/08/2020    BUN 59 (H) 09/08/2020     (L) 09/08/2020    K 3.5 09/08/2020    CL 94 (L) 09/08/2020    CO2 22 (L) 09/08/2020     Lab Results   Component Value Date    ALT 48 (H) 09/08/2020    AST  22 09/08/2020    ALKPHOS 113 09/08/2020    BILITOT 0.5 09/08/2020         Assessment:     Monoclonal gammopathy  IgM monoclonal gammopathy.  Recent immunofixation reviewed IgM kappa specific monoclonal peak in the gamma region.  Continue to monitor.  Return back in 3 months.    ESRD on dialysis  Follows with nephrology team.  On transplant list in [place].    Anemia in ESRD (end-stage renal disease)  Anemia of chronic inflammation.  CBC showed hemoglobin at 11.3 grams/deciliter.  No need for erythropoietin stimulating agent at this time.      Elevated PSA:  Follows with Dr. Helm with urology service.  No prior history of malignancy.      Iglesia Hilliard MD

## 2020-09-15 NOTE — ASSESSMENT & PLAN NOTE
IgM monoclonal gammopathy.  Recent immunofixation reviewed IgM kappa specific monoclonal peak in the gamma region.  Continue to monitor.  Return back in 3 months.

## 2020-09-15 NOTE — ASSESSMENT & PLAN NOTE
Anemia of chronic inflammation.  CBC showed hemoglobin at 11.3 grams/deciliter.  No need for erythropoietin stimulating agent at this time.

## 2020-09-21 ENCOUNTER — OFFICE VISIT (OUTPATIENT)
Dept: UROLOGY | Facility: CLINIC | Age: 70
End: 2020-09-21
Payer: MEDICARE

## 2020-09-21 VITALS
BODY MASS INDEX: 30.91 KG/M2 | SYSTOLIC BLOOD PRESSURE: 134 MMHG | DIASTOLIC BLOOD PRESSURE: 76 MMHG | TEMPERATURE: 97 F | WEIGHT: 203.25 LBS

## 2020-09-21 DIAGNOSIS — N52.9 ERECTILE DYSFUNCTION, UNSPECIFIED ERECTILE DYSFUNCTION TYPE: ICD-10-CM

## 2020-09-21 DIAGNOSIS — I10 HYPERTENSION, UNSPECIFIED TYPE: ICD-10-CM

## 2020-09-21 DIAGNOSIS — N40.0 BENIGN PROSTATIC HYPERPLASIA, UNSPECIFIED WHETHER LOWER URINARY TRACT SYMPTOMS PRESENT: ICD-10-CM

## 2020-09-21 DIAGNOSIS — Z12.5 PROSTATE CANCER SCREENING: Primary | ICD-10-CM

## 2020-09-21 LAB
BILIRUB SERPL-MCNC: ABNORMAL MG/DL
BLOOD URINE, POC: 50
CLARITY, POC UA: CLEAR
COLOR, POC UA: YELLOW
GLUCOSE UR QL STRIP: ABNORMAL
KETONES UR QL STRIP: ABNORMAL
LEUKOCYTE ESTERASE URINE, POC: ABNORMAL
NITRITE, POC UA: ABNORMAL
PH, POC UA: 5
PROTEIN, POC: 100
SPECIFIC GRAVITY, POC UA: 1.01
UROBILINOGEN, POC UA: ABNORMAL

## 2020-09-21 PROCEDURE — 99999 PR PBB SHADOW E&M-EST. PATIENT-LVL III: CPT | Mod: PBBFAC,TXP,, | Performed by: UROLOGY

## 2020-09-21 PROCEDURE — 81002 URINALYSIS NONAUTO W/O SCOPE: CPT | Mod: PBBFAC,TXP | Performed by: UROLOGY

## 2020-09-21 PROCEDURE — 99213 OFFICE O/P EST LOW 20 MIN: CPT | Mod: PBBFAC,TXP | Performed by: UROLOGY

## 2020-09-21 PROCEDURE — 99214 OFFICE O/P EST MOD 30 MIN: CPT | Mod: S$PBB,NTX,, | Performed by: UROLOGY

## 2020-09-21 PROCEDURE — 99214 PR OFFICE/OUTPT VISIT, EST, LEVL IV, 30-39 MIN: ICD-10-PCS | Mod: S$PBB,NTX,, | Performed by: UROLOGY

## 2020-09-21 PROCEDURE — 99999 PR PBB SHADOW E&M-EST. PATIENT-LVL III: ICD-10-PCS | Mod: PBBFAC,TXP,, | Performed by: UROLOGY

## 2020-09-21 RX ORDER — FINASTERIDE 5 MG/1
5 TABLET, FILM COATED ORAL DAILY
Qty: 90 TABLET | Refills: 3 | Status: SHIPPED | OUTPATIENT
Start: 2020-09-21 | End: 2021-09-27

## 2020-09-21 RX ORDER — SILDENAFIL 50 MG/1
50 TABLET, FILM COATED ORAL DAILY PRN
Qty: 30 TABLET | Refills: 11 | Status: SHIPPED | OUTPATIENT
Start: 2020-09-21 | End: 2021-09-27 | Stop reason: SDUPTHER

## 2020-09-21 NOTE — PROGRESS NOTES
Chief Complaint: Elevated PSA    HPI:   9/21/20: PSA not rising on last check.  20mg sildenafil not sufficient.  Reviewed history in detail. Stream is good.  My mom's neighbor.  9/16/19: Doing well no complaints.  Taking 1-2 of the sildenafil 20.  Reviewed history in detail.   9/10/18: Voiding fine with cardura off his list.  PSA dropped this last year.  7/17/17: Having some ED problems.  Still taking doxazosin, voiding well.  7/11/16: Doing well voiding fine  6/29/15: No problems in interim.  Has an AV fistula in left arm now. PSA unchanged at 3.6.  No urinary bother doing fine.  Taking saw palmetto and pumpkin seed.   12/29/14: 63 yo man being evaluated for kidney transplant had an elevated PSA of 4.2 and is here for screening.  Had a AV fistula surgery last week.  No abd/pelvic pain and no exac/rel factors.  No hematuria.  No urolithiasis history.  No prostate cancer family history but his brother had some sort of prostate surgery.  No urinary bother.  Still makes urine and is not on dialysis.  Feels good in general.    Allergies:  Patient has no known allergies.    Medications:  has a current medication list which includes the following prescription(s): alprazolam, amlodipine, calcitriol, calcium carbonate, ergocalciferol, finasteride, fluticasone propionate, gentamicin, gentamicin, lanthanum, losartan, metoprolol tartrate, potassium chloride sa, sildenafil, and torsemide.    Review of Systems:  General: No fever, chills, fatigability, or weight loss.  Skin: No rashes, itching, or changes in color or texture of skin.  Chest: Denies WEST, cyanosis, wheezing, cough, and sputum production.  Abdomen: Appetite fine. No weight loss. Denies diarrhea, abdominal pain, hematemesis, or blood in stool.  Musculoskeletal: No joint stiffness or swelling. Denies back pain.  : As above.  All other review of systems negative.    PMH:   has a past medical history of Anemia in CKD (chronic kidney disease), Atrial fibrillation, CKD  (chronic kidney disease) stage 4, GFR 15 (11/26/2014), Colon cancer screening (12/19/2014), Elevated PSA (11/26/2014), HTN (hypertension) diagnosed in his 40s (10/16/2014), Monoclonal gammopathy (11/26/2014), Phobic anxiety disorder (11/26/2014), and Proteinuria (11/26/2014).    PSH:   has a past surgical history that includes AV fistula placement (11/2014); Vasectomy; Peritoneal catheter insertion; Vasectomy; and Colonoscopy (N/A, 12/29/2017).    FamHx: family history includes Cancer in his brother and paternal uncle; Cataracts in his mother; Dementia in his father; Diabetes in his mother; Glaucoma in his mother; Heart disease in his father; Hypertension in his sister; Kidney disease in his sister.    SocHx:  reports that he quit smoking about 35 years ago. His smoking use included cigarettes. He has a 15.00 pack-year smoking history. He has never used smokeless tobacco. He reports current alcohol use of about 12.0 - 14.0 standard drinks of alcohol per week. He reports that he does not use drugs.      Physical Exam:  Vitals:    09/21/20 0841   BP: 134/76   Temp: 97.3 °F (36.3 °C)     General: A&Ox3, no apparent distress, no deformities  Neck: No masses, normal thyroid  Lungs: normal inspiration, no use of accessory muscles  Heart: normal pulse, no arrhythmias  Abdomen: Soft, NT, ND  Skin: The skin is warm and dry. No jaundice.  Ext: No c/c/e.  :   7/11/17: Test desc panfilo, no abnormalities of epididymus. Penis normal, with normal penile and scrotal skin. Meatus normal. Normal rectal tone, no hemorrhoids. Prost 30 gm no nodules or masses appreciated. SV not palpable. Perineum and anus normal.    Labs/Studies:   Urinalysis performed in clinic, summary: UA normal exc ++prot  PSA    3/15: 3.6    6/15: 3.6    6/16: 4.2    7/17: 4.9    9/18: 4.1    9/19: 4.6    5/20: 4.2    Impression/Plan:   1. PSA elevated but not extremely so, not rising.  PSA/RTC 12 mo.  Low risk of prostate cancer at this point and without a bigger  upward trend would not biopsy the prostate.   2. No contraindications to renal transplant.  3. Sildenafil rx.  4. HTN: takes his meds, mild elev, discussed

## 2020-10-22 ENCOUNTER — HOSPITAL ENCOUNTER (OUTPATIENT)
Dept: RADIOLOGY | Facility: HOSPITAL | Age: 70
Discharge: HOME | End: 2020-10-22
Attending: NURSE PRACTITIONER
Payer: MEDICARE

## 2020-10-22 ENCOUNTER — OFFICE VISIT (OUTPATIENT)
Dept: TRANSPLANT | Facility: CLINIC | Age: 70
End: 2020-10-22
Payer: MEDICARE

## 2020-10-22 VITALS
DIASTOLIC BLOOD PRESSURE: 67 MMHG | SYSTOLIC BLOOD PRESSURE: 119 MMHG | OXYGEN SATURATION: 98 % | BODY MASS INDEX: 31.59 KG/M2 | HEIGHT: 67 IN | HEART RATE: 77 BPM | WEIGHT: 201.25 LBS | RESPIRATION RATE: 16 BRPM

## 2020-10-22 DIAGNOSIS — I10 ESSENTIAL HYPERTENSION: ICD-10-CM

## 2020-10-22 DIAGNOSIS — N18.6 ANEMIA IN ESRD (END-STAGE RENAL DISEASE): Chronic | ICD-10-CM

## 2020-10-22 DIAGNOSIS — D63.1 ANEMIA IN ESRD (END-STAGE RENAL DISEASE): Chronic | ICD-10-CM

## 2020-10-22 DIAGNOSIS — Z99.2 ESRD ON DIALYSIS: ICD-10-CM

## 2020-10-22 DIAGNOSIS — E78.5 HYPERLIPIDEMIA, UNSPECIFIED HYPERLIPIDEMIA TYPE: ICD-10-CM

## 2020-10-22 DIAGNOSIS — E21.3 HPTH (HYPERPARATHYROIDISM): ICD-10-CM

## 2020-10-22 DIAGNOSIS — N18.6 ESRD ON DIALYSIS: ICD-10-CM

## 2020-10-22 DIAGNOSIS — Z76.82 PATIENT ON WAITING LIST FOR KIDNEY TRANSPLANT: Primary | ICD-10-CM

## 2020-10-22 DIAGNOSIS — Z76.82 ORGAN TRANSPLANT CANDIDATE: ICD-10-CM

## 2020-10-22 PROCEDURE — 99999 PR PBB SHADOW E&M-EST. PATIENT-LVL IV: CPT | Mod: PBBFAC,TXP,, | Performed by: NURSE PRACTITIONER

## 2020-10-22 PROCEDURE — 99214 OFFICE O/P EST MOD 30 MIN: CPT | Mod: PBBFAC,25,TXP | Performed by: NURSE PRACTITIONER

## 2020-10-22 PROCEDURE — 76770 US EXAM ABDO BACK WALL COMP: CPT | Mod: TC,TXP

## 2020-10-22 PROCEDURE — 99215 PR OFFICE/OUTPT VISIT, EST, LEVL V, 40-54 MIN: ICD-10-PCS | Mod: S$PBB,TXP,, | Performed by: NURSE PRACTITIONER

## 2020-10-22 PROCEDURE — 76770 US RETROPERITONEAL COMPLETE: ICD-10-PCS | Mod: 26,TXP,, | Performed by: RADIOLOGY

## 2020-10-22 PROCEDURE — 76770 US EXAM ABDO BACK WALL COMP: CPT | Mod: 26,TXP,, | Performed by: RADIOLOGY

## 2020-10-22 PROCEDURE — 99999 PR PBB SHADOW E&M-EST. PATIENT-LVL IV: ICD-10-PCS | Mod: PBBFAC,TXP,, | Performed by: NURSE PRACTITIONER

## 2020-10-22 PROCEDURE — 99215 OFFICE O/P EST HI 40 MIN: CPT | Mod: S$PBB,TXP,, | Performed by: NURSE PRACTITIONER

## 2020-10-22 NOTE — LETTER
October 26, 2020        Robson Bernstein  [address]  [place] [address]  Phone: [phone]  Fax: [phone]             [facility]- Transplant 1st Fl  [address]  [facility] [address]  Phone: [phone]   Patient: Alverto Ramirez Jr.   MR Number: [id number]   YOB: 1950   Date of Visit: 10/22/2020       Dear Dr. Robson Bernstein    Thank you for referring Alverto Ramirez to me for evaluation. Attached you will find relevant portions of my assessment and plan of care.    If you have questions, please do not hesitate to call me. I look forward to following Alverto Ramirez along with you.    Sincerely,    Judi Robert, NP    Enclosure    If you would like to receive this communication electronically, please contact [email or web address] or [phone] to request [employer] access.    [employer] is a tool which provides read-only access to select patient information with whom you have a relationship. Its easy to use and provides real time access to review your patients record including encounter summaries, notes, results, and demographic information.    If you feel you have received this communication in error or would no longer like to receive these types of communications, please e-mail [email or web address]

## 2020-10-22 NOTE — PROGRESS NOTES
Kidney Transplant Recipient Reevalulation    Referring Physician: Chris Adair  Current Nephrologist: Robson Bernstein  Waitlist Status: active  Dialysis Start Date: 2/20/2017    Subjective:     CC:  Six month reassessment of kidney transplant candidacy.    HPI:  Mr. Ramirez is a 70 y.o. year old Black or  male with ESRD secondary to HTN.  He has been on the wait list for a kidney transplant at [facility] since 3/30/2015. Patient is currently on peritoneal dialysis started on 2/20/2017. Patient is dialyzing on cyclic peritoneal dialysis.  Patient reports that he is tolerating dialysis well. . He has a PD catheter. Patient denies any recent hospitalizations or ED visits. Denies peritonitis. 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 Will do stairs daily for exercise at the building where he teaches. Doing well. No problems  With SOB, chest pain or claudication with exertion.  Looks good, does not appear frail.     HX MGUS, f/u annually with hemonc, remains stable seen about ago    HX elevated PSA/ stable, followed by urology   : last seen 9/2020    Hospitalizations/ED visits:  none    Interval History:   Reports that since COVID-19 has limited his active to socially distance. Patient reports caring for his property regularly by mowing and raking. Patient's mobility is acceptable and patient appears younger than his stated age.      CXR: 5/20/20 unremarkable  PXR: 3/6/19 unremarkable  Renal US: 10/22/20 Few simple to minimally complex renal cysts bilaterally.  Iliacs : 3/6/19 unremarkable  Echo:  Results for orders placed during the hospital encounter of 05/20/20   Echo Color Flow Doppler? Yes    Narrative · Concentric left ventricular remodeling.  · Normal left ventricular systolic function. The estimated ejection   fraction is 60%.  · Normal LV diastolic function.  · No wall motion abnormalities.  · Normal right ventricular systolic function.  · Normal central venous pressure (3 mmHg).  ·  The estimated PA systolic pressure is 33 mmHg.      Stress:  3/14/19 without ischemia   Colonoscopy: 12/29/17 diverticulosis repeat in 5 years  PTH:  Lab Results   Component Value Date    .0 (H) 05/20/2020    CALCIUM 8.4 (L) 09/08/2020    PHOS 6.0 (H) 02/01/2017   PSA:  PSA, SCREEN (ng/mL)   Date Value   05/20/2020 4.2 (H)           Past Medical History:   Diagnosis Date    Anemia in CKD (chronic kidney disease)     Atrial fibrillation     CKD (chronic kidney disease) stage 4, GFR 15 11/26/2014    Colon cancer screening 12/19/2014    Elevated PSA 11/26/2014    HTN (hypertension) diagnosed in his 40s 10/16/2014    Monoclonal gammopathy 11/26/2014    Phobic anxiety disorder 11/26/2014    Proteinuria 11/26/2014       Review of Systems   Constitutional: Negative for activity change, appetite change, chills, fatigue, fever and unexpected weight change.   HENT: Negative for congestion, facial swelling, postnasal drip, rhinorrhea, sinus pressure, sore throat and trouble swallowing.    Eyes: Positive for visual disturbance. Negative for pain and redness.        Wears glasses for reading    Respiratory: Negative for cough, chest tightness, shortness of breath and wheezing.    Cardiovascular: Negative.  Negative for chest pain, palpitations and leg swelling.        Hx a-fib   Gastrointestinal: Negative for abdominal pain, diarrhea, nausea and vomiting.   Genitourinary: Negative for dysuria, flank pain and urgency.        Still makes a good amount of urine   Musculoskeletal: Negative for gait problem, neck pain and neck stiffness.   Skin: Negative for rash.   Allergic/Immunologic: Negative for environmental allergies, food allergies and immunocompromised state.   Neurological: Negative for dizziness, weakness, light-headedness and headaches.   Psychiatric/Behavioral: Negative for agitation and confusion. The patient is not nervous/anxious.        Objective:   body mass index is 31.52 kg/m².  /67 (BP  "Location: Right arm, Patient Position: Sitting, BP Method: Medium (Automatic))   Pulse 77   Resp 16   Ht 5' 7" (1.702 m)   Wt 91.3 kg (201 lb 4.5 oz)   SpO2 98%   BMI 31.52 kg/m²     Physical Exam  Vitals signs reviewed.   Constitutional:       Appearance: Normal appearance. He is well-developed.   HENT:      Head: Normocephalic.      Mouth/Throat:      Pharynx: No oropharyngeal exudate.   Eyes:      General: No scleral icterus.     Conjunctiva/sclera: Conjunctivae normal.      Pupils: Pupils are equal, round, and reactive to light.   Neck:      Musculoskeletal: Normal range of motion and neck supple.   Cardiovascular:      Rate and Rhythm: Normal rate and regular rhythm.      Heart sounds: Normal heart sounds.   Pulmonary:      Effort: Pulmonary effort is normal.      Breath sounds: Normal breath sounds.   Abdominal:      General: Bowel sounds are normal. There is no distension.      Palpations: Abdomen is soft. There is no hepatomegaly, splenomegaly or mass.      Tenderness: There is no abdominal tenderness. There is no guarding or rebound. Negative signs include Richardson's sign and McBurney's sign.       Musculoskeletal: Normal range of motion.   Lymphadenopathy:      Cervical: No cervical adenopathy.   Skin:     General: Skin is warm and dry.   Neurological:      Mental Status: He is alert and oriented to person, place, and time.      Motor: No abnormal muscle tone.      Coordination: Coordination normal.   Psychiatric:         Behavior: Behavior normal.         Labs:  Lab Results   Component Value Date    WBC 7.79 09/08/2020    HGB 11.3 (L) 09/08/2020    HCT 33.8 (L) 09/08/2020     (L) 09/08/2020    K 3.5 09/08/2020    CL 94 (L) 09/08/2020    CO2 22 (L) 09/08/2020    BUN 59 (H) 09/08/2020    CREATININE 17.0 (H) 09/08/2020    EGFRNONAA 2 (A) 09/08/2020    CALCIUM 8.4 (L) 09/08/2020    PHOS 6.0 (H) 02/01/2017    ALBUMIN 3.4 (L) 09/08/2020    AST 22 09/08/2020    ALT 48 (H) 09/08/2020    UTPCR 5.38 (H) " 02/01/2017    .0 (H) 05/20/2020       Lab Results   Component Value Date    BILIRUBINUA Negative 02/01/2017    PROTEINUA 3+ (A) 02/01/2017    NITRITE neg 09/21/2020    RBCUA 0 02/01/2017    WBCUA 3 02/01/2017       No results found for: HLAABCTYPE    Lab Results   Component Value Date    CPRA 0 01/08/2020    CPRA 0 01/08/2020    CPRA 0 01/08/2020    DU6RLBU Negative 01/08/2020    CIIAB Negative 01/08/2020       Labs were reviewed with the patient.    Pre-transplant Workup:   Reviewed with the patient.    Assessment:     1. Patient on waiting list for kidney transplant    2. ESRD on dialysis    3. Essential hypertension    4. Hyperlipidemia, unspecified hyperlipidemia type    5. Anemia in ESRD (end-stage renal disease)    6. HPTH (hyperparathyroidism)    7. BMI 31.0-31.9,adult        Plan:      Stress test due 3/2021  Routine hemonc and urology f/u as scheduled    Transplant Candidacy:   Mr. Ramirez is a suitable kidney transplant candidate.  Meets center eligibility for accepting HCV+ donor offer - yes.  Patient educated on HCV+ donors. Alverto is willing  to accept HCV+ donor offer -  no Pt would like to think about this option more.   Patient is a candidate for KDPI > 85 kidney donor offer - no d/t weight.  He remains in overall stable health, and will remain active on the transplant list.    Judi Robert NP       Follow-up:   In addition to the tests noted in the plan, Mr. Ramirez will continue to have reevaluation as per the standing pre-kidney transplant protocol:  1. Monthly blood for PRA  2. Annual return to clinic, except HIV positive, > 65 years of age, or pancreas transplant candidates who will be scheduled to see transplant every 6 months while in pre-transplant phase  3. Annual re-testing: CXR, EKG, yearly mammograms for women over 40 and PSA for males over 40, cardiology follow-up as recommended by initial cardiology pre-transplant evaluation  4. Renal ultrasound every 2 years  5. Baseline  colonoscopy after age 50 and repeated as recommended    UNOS Patient Status  Functional Status: 60% - Requires occasional assistance but is able to care for needs  Physical Capacity: No Limitations

## 2020-10-26 NOTE — PROGRESS NOTES
Transplant Recipient Adult Psychosocial Assessment UPDATE      Alverto Ramirez  [address]  [place] [address]  Telephone Information:   Mobile [phone]   Mobile [phone]   Home  [phone] (home)  Work  There is no work phone number on file.  E-mail  [email or web address]    Sex: male  YOB: 1950  Age: 70 y.o.    Encounter Date: 10/22/2020  U[place] Citizen: yes  Primary Language: English   Needed: no    Emergency Contact:  Name: Liana Ramirez  Relationship: wife  Address: Same as pt.  Phone Numbers:  [phone] (home), [phone] (mobile)    Name: Capo Ramirez  Relationship: son  Address: [address] [place] [address]  Phone Numbers:  [phone] (mobile)    Family/Social Support:   Number of dependents/: Pt reports no dependents.  Marital history: pt reports being  only once and then to pt's Liana Ramirez since .  Other family dynamics: Pt reports 2 adult sons: Capo and Doug (resides in [place]); sisters: Seu, Mayelin, Fernanda, and Jen (half-sister); 1 brother: Charles. Pt reports both parents are  and pt identifies all family members (except sisters: Sue and Fernanda) as supportive.    Household Composition:  Name: Alverto Ramirez  Age: 70  Relationship: patient  Does person drive? yes    Name: Liana Ramirez  Age: 70  Relationship: wife  Does person drive? yes     Name: Capo Ramirez  Age:  46  Relationship: son  Phone Numbers:  [phone] (mobile)    Do you and your caregivers have access to reliable transportation? yes     PRIMARY CAREGIVER: Liana Ramirez, pt's wife,  will be primary caregiver, phone number [phone].      provided in-depth information to patient and caregiver regarding pre- and post-transplant caregiver role.   strongly encourages patient and caregiver to have concrete plan regarding post-transplant care giving, including back-up caregiver(s) to ensure  care giving needs are met as needed.    Patient and Caregiver states understanding all aspects of caregiver role/commitment and is able/willing/committed to being caregiver to the fullest extent necessary.    Patient and Caregiver verbalizes understanding of the education provided today and caregiver responsibilities.         remains available. Patient and Caregiver agree to contact  in a timely manner if concerns arise.      Able to take time off work without financial concerns: yes.  Caregiver, Liana was present during the assessment. Pt's caregiver expressed no questions or concerns at this time, reports understanding of the caregiver role, and reports knowing how to contact the transplant team. SW remains available at [phone].    Additional Significant Others who will Assist with Transplant:  Name: Capo Ramirez  Age: 46  Phone: [phone]  City: [place] State: [place]  Relationship: son  Does person drive? yes     Living Will: no Education and information provided to pt.  Healthcare Power of : no Education and information provided to pt. Pt did report trusting wife with medical decisions  Advance Directives on file: <<no information> per medical record.  Verbally reviewed LW/HCPA information.   provided patient with copy of LW/HCPA documents and provided education on completion of forms    Living Donors: No. Education and resource information given to patient.     Highest Education Level: Attended College/Technical School  Reading Ability: college  Reports difficulty with: seeing and wears bifocals  Learns Best By:  A combination of verbal, written, and hands-on instruction.      Status: no  VA Benefits: no     Working for Income: No  If no, reason not working: Patient Choice - Retired  Patient is retired from owning/operating driving school..    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    Disabled: no, however pt receives FPC from [employer] due to anxiety.    Monthly Income:  USP: $300  SS Reitrement: $1840  Other household members total: $600 (wife's income)      Able to afford all costs now and if transplanted, including medications: yes Pt reports that his son can assist if needed.  Patient verbalizes understanding of personal responsibilities related to transplant costs and the importance of having a financial plan to ensure that patients transplant costs are fully covered.   provided fundraising information/education.  Patient verbalizes understanding.   remains available.    Insurance:   Payor/Plan Subscr  Sex Relation Sub. Ins. ID Effective Group Num   1. MEDICARE - [place]* ADAM MITCHELL JR. 1950 Male  [id number] NO                                   [address]   2. AETNA - AETNA* ADAM MITCHELL JR. 1950 Male  [id number] 1995 [id number]                                   [address]       Primary Insurance (for UNOS reporting): Public Insurance - Medicare FFS (Fee For Service)  Secondary Insurance (for UNOS reporting): Private Insurance  Pt reports supplement is being paid for by the [employer]. SW provided patient with information regarding pt's responsibility for payment after transplant. Patient verbalized understanding.     Patient and Caregiver verbalizes clear understanding that patient may experience difficulty obtaining and/or be denied insurance coverage post-surgery. This includes and is not limited to disability insurance, life insurance, health insurance, burial insurance, long term care insurance, and other insurances.      Patient and Caregiver also reports understanding that future health concerns related to or unrelated to transplantation may not be covered by patient's insurance.  Resources and information provided and reviewed.     Patient and Caregiver provides verbal permission to release any  necessary information to outside resources for patient care and discharge planning.  Resources and information provided are reviewed.      Patient provides verbal permission to release any necessary information to outside resources for patient care and discharge planning.  Resources and information provided are reviewed.      Dialysis History:  Patient reports being on peritoneal dialysis attending all dialysis appointments and staying for the entire course of treatment.     SW received a faxed adherence form from pt's dialysis center indicates over last three months that the patient has had no AMAs, not had any no-shows, and no issues with caregivers, transportation, or any other psychosocial concerns.    Form also indicates:    Last intact PTH from 03/04/2020 is reported as 479.    Current dry weight is reported as 92kg and Most Recent Pre-treatment weight is reported as 91.3kg on 04/01/2020.    Infusion Service: patient utilizing? no  Home Health: patient utilizing? no  DME: yes PD equipment and BP Cuff  Pulmonary/Cardiac Rehab: Pt denies.   ADLS:  Pt reports no difficulties with driving, walking, bathing, cooking, housekeeping, eating, shopping, and taking medication.    Adherence:   Pt reports good adherence with medications, dialysis, and health regimen..  Adherence education and counseling provided.     Per History Section:  Past Medical History:   Diagnosis Date    Anemia in CKD (chronic kidney disease)     Atrial fibrillation     CKD (chronic kidney disease) stage 4, GFR 15 11/26/2014    Colon cancer screening 12/19/2014    Elevated PSA 11/26/2014    HTN (hypertension) diagnosed in his 40s 10/16/2014    Monoclonal gammopathy 11/26/2014    Phobic anxiety disorder 11/26/2014    Proteinuria 11/26/2014     Social History     Tobacco Use    Smoking status: Former Smoker     Packs/day: 1.00     Years: 15.00     Pack years: 15.00     Types: Cigarettes     Quit date: 11/26/1984     Years since quitting:  35.9    Smokeless tobacco: Never Used   Substance Use Topics    Alcohol use: Yes     Alcohol/week: 12.0 - 14.0 standard drinks     Types: 7 Glasses of wine, 5 - 7 Standard drinks or equivalent per week     Comment: drinks about 1/2 glass of red wine nightly     Social History     Substance and Sexual Activity   Drug Use No     Per Today's Psychosocial:  Tobacco: Pt reports remote history of tobacco use and quit in 1984.  Alcohol: Pt reports he drinks about 1/2 glass of red wine occasionally.  Illicit Drugs/Non-prescribed Medications: none, patient denies any use.    Patient and Caregiver states clear understanding of the potential impact of substance use as it relates to transplant candidacy and is aware of possible random substance screening.  Substance abstinence/cessation counseling, education and resources provided and reviewed.     Arrests/DWI/Treatment/Rehab: patient denies    Psychiatric History:    Mental Health: anxiety. Pt reports dealing with his anxiety for the past 22 years. Pt confirmed previously reported claustrophobia and tight space as triggers.  Pt denies any recent difficulties.  Pt was previously cleared by Dr. Willis for transplant. Pt reports that he can go retirement through an MRI machine.  Psychiatrist/Counselor: Pt denies seeing a mental health professional and reports being open to seeing the psych department for talk therapy if necessary.  Medications:  Pt reports taking Xanax, 1 mg PRN, which is infrequent and is prescribed by Dr. Bernstein, pt's nephrologist.  Suicide/Homicide Issues: Pt denies any history of or current suicidal or homicidal ideations.    Safety in Household: Pt reports no current or history of safety concerns in household; including mental, physical, verbal, or sexual abuse.    Knowledge: Patient and Caregiver states having clear understanding and realistic expectations regarding the potential risks and potential benefits of organ transplantation and organ donation, agrees to  discuss with health care team members and support system members and to utilize available resources and express questions and/or concerns in order to further facilitate the pt informed decision-making.  Resources and information provided and reviewed.     Patient and Caregiver is aware of [facility]'s affiliation and/or partnership with agencies in home health care, LTAC, SNF, [facility], and other hospitals and clinics.    Understanding: Patient and Caregiver reports having a clear understanding of the many lifetime commitments involved with being a transplant recipient, including costs, compliance, medications, lab work, procedures, appointments, concrete and financial planning, preparedness, timely and appropriate communication of concerns, abstinence (ETOH, tobacco, illicit non-prescribed drugs), adherence to all health care team recommendations, support system and caregiver involvement, appropriate and timely resource utilization and follow-through, mental health counseling as needed/recommended, and patient and caregiver responsibilities.  Social Service Handbook, resources and detailed educational information provided and reviewed.  Educational information provided.    Patient and Caregiver also reports current and expected compliance with health care regime and states having a clear understanding of the importance of compliance.      Patient and Caregiver reports a clear understanding that risks and benefits may be involved with organ transplantation and with organ donation.      Patient and Caregiver also reports clear understanding that psychosocial risk factors may affect patient, and include but are not limited to feelings of depression, generalized anxiety, anxiety regarding dependence on others, post traumatic stress disorder, feelings of guilt and other emotional and/or mental concerns, and/or exacerbation of existing mental health concerns.  Detailed resources provided and discussed.     Patient and Caregiver  "agrees to access appropriate resources in a timely manner as needed and/or as recommended, and to communicate concerns appropriately.  Patient and Caregiver also reports a clear understanding of treatment options available.     Patient and Caregiver received education in a group setting.   reviewed education, provided additional information, and answered questions.    Feelings or Concerns: Patient and Caregiver did not express any concerns at this time.    Coping: Pt reports coping well with the transplant process at this time and reports taking a walk, going to casino, watching DVDs ([employer], [employer] Son, Dale and Deandre, etc); playing computer solitaire, and watching tv as ways to cope. Pt reports [facility] home as [facility] in [place], [place] with Rev. Shine presiding.     Goals: Pt reports enjoying life as a goal for after transplant and travel, play music, smoke a cigar, and have 1 beer with a steak.. SW provided support and education. Pt verbalizes understanding that transplant is not a guarantee of ending dialysis and life after transplantation will be a "new normal" post transplant.  SW remains available.      Interview Behavior: Patient and Caregiver present as alert and oriented x 4, pleasant, good eye contact, well groomed, recall good, concentration/judgement good, average intelligence, calm, communicative, cooperative and asking and answering questions appropriately.  Pt presents with wife: Liana Ramirez with pt's permission.       Transplant Social Work - Candidacy  Assessment/Plan:     Psychosocial Suitability: Patient presents as a suitable candidate for kidney transplant at this time. Based on psychosocial risk factors, patient presents as low risk, due to adequate insurance and financial plan in place, suitable dialysis adherence, and caregiver plan in place.    Recommendations/Additional Comments: SW recommends that pt conduct fundraising to assist pt with pay for " cost of medications, food, gas, and other transplant related needs. SW recommends that pt remain aware of potential mental health concerns and contact the team if any concerns arise. SW recommends that pt remain abstinent from tobacco, ETOH, and drug use. SW supports pt's continued dialysis adherence. SW remains available to answer any questions or concerns that arise as the pt moves through the transplant process.       Cristina Geiger, KHADIJAH

## 2020-10-27 ENCOUNTER — TELEPHONE (OUTPATIENT)
Dept: TRANSPLANT | Facility: CLINIC | Age: 70
End: 2020-10-27

## 2020-10-27 NOTE — TELEPHONE ENCOUNTER
"Compliance check:  Dialysis unit reports in the past three months pt has had "n/a" no shows, "n/a" AMAs, labs , no lab concerns, transportation no concerns noted and family support no concerns noted.    Reported by Hayden Moss at  via fax back sheet    "

## 2020-11-27 ENCOUNTER — TELEPHONE (OUTPATIENT)
Dept: OPHTHALMOLOGY | Facility: CLINIC | Age: 70
End: 2020-11-27

## 2020-11-27 NOTE — TELEPHONE ENCOUNTER
----- Message from Sona Chapman sent at 11/27/2020  3:53 PM CST -----  Regarding: eye exam  Contact: Patient  Please call to schedule with Dr Carlton at Ph [phone] in December if possible.

## 2020-12-24 ENCOUNTER — DOCUMENTATION ONLY (OUTPATIENT)
Dept: TRANSPLANT | Facility: HOSPITAL | Age: 70
End: 2020-12-24

## 2020-12-28 ENCOUNTER — TELEPHONE (OUTPATIENT)
Dept: TRANSPLANT | Facility: CLINIC | Age: 70
End: 2020-12-28

## 2020-12-28 NOTE — TELEPHONE ENCOUNTER
ON-CALL NOTE  12/24/20    UNOS# [id number]    Notified by Ramiro Longo, , that Alverto Ramirez is eligible for kidney offer.  Spoke with patient and identified no acute medical issues with telephone assessment. Protocol script read to patient regarding N/A, standard donor offer. Patient verbalized understanding, all questions answered, patient accepts organ offer. Notified by Ramiro Longo that virtual crossmatch is negative.  Current sample of blood is available from date 12/2/20 for crossmatch.  Patient reports no sensitizing event since last blood sample for PRA received.     Patient notified of plan to admit to hospital @ 9AM on 12/25/20 for kidney transplant and states understanding.    Contacted patient to inform him of case being declined due to poor pump function of kidney. Instructed patient to resume routine care and diet.  Patient verbalized understanding of organ offer process.   Respiratory

## 2021-01-08 ENCOUNTER — PATIENT MESSAGE (OUTPATIENT)
Dept: TRANSPLANT | Facility: CLINIC | Age: 71
End: 2021-01-08

## 2021-01-12 RX ORDER — FLUCONAZOLE 100 MG/1
100 TABLET ORAL DAILY
Qty: 7 TABLET | Refills: 0 | Status: SHIPPED | OUTPATIENT
Start: 2021-01-12 | End: 2021-01-15 | Stop reason: SDUPTHER

## 2021-01-13 ENCOUNTER — IMMUNIZATION (OUTPATIENT)
Dept: INTERNAL MEDICINE | Facility: CLINIC | Age: 71
End: 2021-01-13
Payer: MEDICARE

## 2021-01-13 DIAGNOSIS — Z23 NEED FOR VACCINATION: ICD-10-CM

## 2021-01-13 PROCEDURE — 91300 COVID-19, MRNA, LNP-S, PF, 30 MCG/0.3 ML DOSE VACCINE: CPT | Mod: PBBFAC | Performed by: FAMILY MEDICINE

## 2021-01-15 RX ORDER — FLUCONAZOLE 100 MG/1
100 TABLET ORAL DAILY
Qty: 7 TABLET | Refills: 0 | Status: SHIPPED | OUTPATIENT
Start: 2021-01-15 | End: 2021-01-22

## 2021-01-25 DIAGNOSIS — Z76.82 ORGAN TRANSPLANT CANDIDATE: Primary | ICD-10-CM

## 2021-02-01 ENCOUNTER — TELEPHONE (OUTPATIENT)
Dept: TRANSPLANT | Facility: CLINIC | Age: 71
End: 2021-02-01

## 2021-02-03 ENCOUNTER — IMMUNIZATION (OUTPATIENT)
Dept: INTERNAL MEDICINE | Facility: CLINIC | Age: 71
End: 2021-02-03
Payer: MEDICARE

## 2021-02-03 DIAGNOSIS — Z23 NEED FOR VACCINATION: Primary | ICD-10-CM

## 2021-02-03 PROCEDURE — 91300 COVID-19, MRNA, LNP-S, PF, 30 MCG/0.3 ML DOSE VACCINE: CPT | Mod: PBBFAC | Performed by: FAMILY MEDICINE

## 2021-02-03 PROCEDURE — 0002A COVID-19, MRNA, LNP-S, PF, 30 MCG/0.3 ML DOSE VACCINE: CPT | Mod: PBBFAC | Performed by: FAMILY MEDICINE

## 2021-02-24 RX ORDER — ALPRAZOLAM 1 MG/1
1 TABLET ORAL 3 TIMES DAILY PRN
Qty: 90 TABLET | Refills: 3 | Status: SHIPPED | OUTPATIENT
Start: 2021-02-24 | End: 2021-11-09 | Stop reason: SDUPTHER

## 2021-03-09 ENCOUNTER — LAB VISIT (OUTPATIENT)
Dept: LAB | Facility: HOSPITAL | Age: 71
End: 2021-03-09
Attending: INTERNAL MEDICINE
Payer: MEDICARE

## 2021-03-09 ENCOUNTER — OFFICE VISIT (OUTPATIENT)
Dept: HEMATOLOGY/ONCOLOGY | Facility: CLINIC | Age: 71
End: 2021-03-09
Payer: MEDICARE

## 2021-03-09 VITALS
HEART RATE: 82 BPM | WEIGHT: 205.25 LBS | RESPIRATION RATE: 18 BRPM | HEIGHT: 67 IN | DIASTOLIC BLOOD PRESSURE: 71 MMHG | BODY MASS INDEX: 32.21 KG/M2 | TEMPERATURE: 97 F | OXYGEN SATURATION: 99 % | SYSTOLIC BLOOD PRESSURE: 122 MMHG

## 2021-03-09 DIAGNOSIS — D47.2 MONOCLONAL GAMMOPATHY: ICD-10-CM

## 2021-03-09 DIAGNOSIS — N18.6 ANEMIA IN ESRD (END-STAGE RENAL DISEASE): Chronic | ICD-10-CM

## 2021-03-09 DIAGNOSIS — N18.6 ESRD ON DIALYSIS: ICD-10-CM

## 2021-03-09 DIAGNOSIS — D63.1 ANEMIA IN ESRD (END-STAGE RENAL DISEASE): Chronic | ICD-10-CM

## 2021-03-09 DIAGNOSIS — Z99.2 ESRD ON DIALYSIS: ICD-10-CM

## 2021-03-09 LAB
ALBUMIN SERPL BCP-MCNC: 3.5 G/DL (ref 3.5–5.2)
ALP SERPL-CCNC: 122 U/L (ref 55–135)
ALT SERPL W/O P-5'-P-CCNC: 32 U/L (ref 10–44)
ANION GAP SERPL CALC-SCNC: 17 MMOL/L (ref 8–16)
AST SERPL-CCNC: 17 U/L (ref 10–40)
BASOPHILS # BLD AUTO: 0.03 K/UL (ref 0–0.2)
BASOPHILS NFR BLD: 0.4 % (ref 0–1.9)
BILIRUB SERPL-MCNC: 0.4 MG/DL (ref 0.1–1)
BUN SERPL-MCNC: 71 MG/DL (ref 8–23)
CALCIUM SERPL-MCNC: 8.6 MG/DL (ref 8.7–10.5)
CHLORIDE SERPL-SCNC: 95 MMOL/L (ref 95–110)
CO2 SERPL-SCNC: 24 MMOL/L (ref 23–29)
CREAT SERPL-MCNC: 17.5 MG/DL (ref 0.5–1.4)
DIFFERENTIAL METHOD: ABNORMAL
EOSINOPHIL # BLD AUTO: 0.4 K/UL (ref 0–0.5)
EOSINOPHIL NFR BLD: 5.4 % (ref 0–8)
ERYTHROCYTE [DISTWIDTH] IN BLOOD BY AUTOMATED COUNT: 15.8 % (ref 11.5–14.5)
EST. GFR  (AFRICAN AMERICAN): 3 ML/MIN/1.73 M^2
EST. GFR  (NON AFRICAN AMERICAN): 2 ML/MIN/1.73 M^2
GLUCOSE SERPL-MCNC: 98 MG/DL (ref 70–110)
HCT VFR BLD AUTO: 34 % (ref 40–54)
HGB BLD-MCNC: 11.3 G/DL (ref 14–18)
IMM GRANULOCYTES # BLD AUTO: 0.03 K/UL (ref 0–0.04)
IMM GRANULOCYTES NFR BLD AUTO: 0.4 % (ref 0–0.5)
LYMPHOCYTES # BLD AUTO: 2.7 K/UL (ref 1–4.8)
LYMPHOCYTES NFR BLD: 34.1 % (ref 18–48)
MCH RBC QN AUTO: 31.6 PG (ref 27–31)
MCHC RBC AUTO-ENTMCNC: 33.2 G/DL (ref 32–36)
MCV RBC AUTO: 95 FL (ref 82–98)
MONOCYTES # BLD AUTO: 0.9 K/UL (ref 0.3–1)
MONOCYTES NFR BLD: 11.1 % (ref 4–15)
NEUTROPHILS # BLD AUTO: 3.8 K/UL (ref 1.8–7.7)
NEUTROPHILS NFR BLD: 48.6 % (ref 38–73)
NRBC BLD-RTO: 0 /100 WBC
PLATELET # BLD AUTO: 212 K/UL (ref 150–350)
PMV BLD AUTO: 10.3 FL (ref 9.2–12.9)
POTASSIUM SERPL-SCNC: 3.5 MMOL/L (ref 3.5–5.1)
PROT SERPL-MCNC: 7.3 G/DL (ref 6–8.4)
RBC # BLD AUTO: 3.58 M/UL (ref 4.6–6.2)
SODIUM SERPL-SCNC: 136 MMOL/L (ref 136–145)
WBC # BLD AUTO: 7.84 K/UL (ref 3.9–12.7)

## 2021-03-09 PROCEDURE — 80053 COMPREHEN METABOLIC PANEL: CPT | Mod: NTX | Performed by: INTERNAL MEDICINE

## 2021-03-09 PROCEDURE — 86334 PATHOLOGIST INTERPRETATION IFE: ICD-10-PCS | Mod: 26,NTX,, | Performed by: PATHOLOGY

## 2021-03-09 PROCEDURE — 99999 PR PBB SHADOW E&M-EST. PATIENT-LVL IV: CPT | Mod: PBBFAC,,, | Performed by: INTERNAL MEDICINE

## 2021-03-09 PROCEDURE — 36415 COLL VENOUS BLD VENIPUNCTURE: CPT | Mod: NTX | Performed by: INTERNAL MEDICINE

## 2021-03-09 PROCEDURE — 86334 IMMUNOFIX E-PHORESIS SERUM: CPT | Mod: TXP | Performed by: INTERNAL MEDICINE

## 2021-03-09 PROCEDURE — 83520 IMMUNOASSAY QUANT NOS NONAB: CPT | Mod: NTX | Performed by: INTERNAL MEDICINE

## 2021-03-09 PROCEDURE — 99999 PR PBB SHADOW E&M-EST. PATIENT-LVL IV: ICD-10-PCS | Mod: PBBFAC,,, | Performed by: INTERNAL MEDICINE

## 2021-03-09 PROCEDURE — 99214 PR OFFICE/OUTPT VISIT, EST, LEVL IV, 30-39 MIN: ICD-10-PCS | Mod: S$GLB,,, | Performed by: INTERNAL MEDICINE

## 2021-03-09 PROCEDURE — 86334 IMMUNOFIX E-PHORESIS SERUM: CPT | Mod: 26,NTX,, | Performed by: PATHOLOGY

## 2021-03-09 PROCEDURE — 88271 CYTOGENETICS DNA PROBE: CPT | Mod: TXP | Performed by: INTERNAL MEDICINE

## 2021-03-09 PROCEDURE — 85025 COMPLETE CBC W/AUTO DIFF WBC: CPT | Mod: NTX | Performed by: INTERNAL MEDICINE

## 2021-03-09 PROCEDURE — 84165 PATHOLOGIST INTERPRETATION SPE: ICD-10-PCS | Mod: 26,NTX,, | Performed by: PATHOLOGY

## 2021-03-09 PROCEDURE — 84165 PROTEIN E-PHORESIS SERUM: CPT | Mod: 26,NTX,, | Performed by: PATHOLOGY

## 2021-03-09 PROCEDURE — 99214 OFFICE O/P EST MOD 30 MIN: CPT | Mod: S$GLB,,, | Performed by: INTERNAL MEDICINE

## 2021-03-09 PROCEDURE — 84165 PROTEIN E-PHORESIS SERUM: CPT | Mod: TXP | Performed by: INTERNAL MEDICINE

## 2021-03-10 LAB
ALBUMIN SERPL ELPH-MCNC: 3.87 G/DL (ref 3.35–5.55)
ALPHA1 GLOB SERPL ELPH-MCNC: 0.32 G/DL (ref 0.17–0.41)
ALPHA2 GLOB SERPL ELPH-MCNC: 0.93 G/DL (ref 0.43–0.99)
B-GLOBULIN SERPL ELPH-MCNC: 0.81 G/DL (ref 0.5–1.1)
GAMMA GLOB SERPL ELPH-MCNC: 1.07 G/DL (ref 0.67–1.58)
INTERPRETATION SERPL IFE-IMP: NORMAL
KAPPA LC SER QL IA: 16.6 MG/DL (ref 0.33–1.94)
KAPPA LC/LAMBDA SER IA: 1.27 (ref 0.26–1.65)
LAMBDA LC SER QL IA: 13.1 MG/DL (ref 0.57–2.63)
PATHOLOGIST INTERPRETATION IFE: NORMAL
PATHOLOGIST INTERPRETATION SPE: NORMAL
PROT SERPL-MCNC: 7 G/DL (ref 6–8.4)

## 2021-03-12 LAB
GENETICIST REVIEW: NORMAL
PLASMA CELL PROLIF RELEASED BY: NORMAL
PLASMA CELL PROLIF RESULT SUMMARY: NORMAL
PLASMA CELL PROLIF RESULT TABLE: NORMAL
REASON FOR REFERRAL, PLASMA CELL PROLIF (PCPD), FISH: NORMAL
REF LAB TEST METHOD: NORMAL
RESULTS, PLASMA CELL PROLIF (PCPD), FISH: NORMAL
SERVICE CMNT-IMP: NORMAL
SERVICE CMNT-IMP: NORMAL
SPECIMEN SOURCE: NORMAL
SPECIMEN, PLASMA CELL PROLIF (PCPD), FISH: NORMAL

## 2021-03-31 ENCOUNTER — TELEPHONE (OUTPATIENT)
Dept: TRANSPLANT | Facility: CLINIC | Age: 71
End: 2021-03-31

## 2021-04-07 RX ORDER — METOPROLOL TARTRATE 50 MG/1
50 TABLET ORAL DAILY
Qty: 90 TABLET | Refills: 3 | Status: ON HOLD | OUTPATIENT
Start: 2021-04-07 | End: 2022-03-21 | Stop reason: SDUPTHER

## 2021-04-08 ENCOUNTER — OFFICE VISIT (OUTPATIENT)
Dept: OPHTHALMOLOGY | Facility: CLINIC | Age: 71
End: 2021-04-08
Payer: MEDICARE

## 2021-04-08 DIAGNOSIS — H25.13 NUCLEAR SCLEROSIS, BILATERAL: Primary | ICD-10-CM

## 2021-04-08 DIAGNOSIS — H52.203 HYPEROPIA WITH ASTIGMATISM AND PRESBYOPIA, BILATERAL: ICD-10-CM

## 2021-04-08 DIAGNOSIS — H52.03 HYPEROPIA WITH ASTIGMATISM AND PRESBYOPIA, BILATERAL: ICD-10-CM

## 2021-04-08 DIAGNOSIS — H52.4 HYPEROPIA WITH ASTIGMATISM AND PRESBYOPIA, BILATERAL: ICD-10-CM

## 2021-04-08 PROCEDURE — 92004 COMPRE OPH EXAM NEW PT 1/>: CPT | Mod: S$GLB,TXP,, | Performed by: OPTOMETRIST

## 2021-04-08 PROCEDURE — 92015 PR REFRACTION: ICD-10-PCS | Mod: S$GLB,TXP,, | Performed by: OPTOMETRIST

## 2021-04-08 PROCEDURE — 99999 PR PBB SHADOW E&M-EST. PATIENT-LVL I: ICD-10-PCS | Mod: PBBFAC,TXP,, | Performed by: OPTOMETRIST

## 2021-04-08 PROCEDURE — 99999 PR PBB SHADOW E&M-EST. PATIENT-LVL I: CPT | Mod: PBBFAC,TXP,, | Performed by: OPTOMETRIST

## 2021-04-08 PROCEDURE — 92004 PR EYE EXAM, NEW PATIENT,COMPREHESV: ICD-10-PCS | Mod: S$GLB,TXP,, | Performed by: OPTOMETRIST

## 2021-04-08 PROCEDURE — 92015 DETERMINE REFRACTIVE STATE: CPT | Mod: S$GLB,TXP,, | Performed by: OPTOMETRIST

## 2021-04-30 ENCOUNTER — TELEPHONE (OUTPATIENT)
Dept: CARDIOLOGY | Facility: CLINIC | Age: 71
End: 2021-04-30

## 2021-05-04 ENCOUNTER — HOSPITAL ENCOUNTER (OUTPATIENT)
Dept: RADIOLOGY | Facility: HOSPITAL | Age: 71
Discharge: HOME | End: 2021-05-04
Attending: NURSE PRACTITIONER
Payer: MEDICARE

## 2021-05-04 ENCOUNTER — HOSPITAL ENCOUNTER (OUTPATIENT)
Dept: CARDIOLOGY | Facility: HOSPITAL | Age: 71
Discharge: HOME | End: 2021-05-04
Attending: NURSE PRACTITIONER
Payer: MEDICARE

## 2021-05-04 ENCOUNTER — OFFICE VISIT (OUTPATIENT)
Dept: TRANSPLANT | Facility: CLINIC | Age: 71
End: 2021-05-04
Payer: MEDICARE

## 2021-05-04 VITALS — BODY MASS INDEX: 32.18 KG/M2 | WEIGHT: 205 LBS | HEIGHT: 67 IN

## 2021-05-04 VITALS
SYSTOLIC BLOOD PRESSURE: 126 MMHG | BODY MASS INDEX: 31.7 KG/M2 | HEIGHT: 67 IN | DIASTOLIC BLOOD PRESSURE: 76 MMHG | HEART RATE: 83 BPM | TEMPERATURE: 99 F | WEIGHT: 201.94 LBS | OXYGEN SATURATION: 99 % | RESPIRATION RATE: 16 BRPM

## 2021-05-04 VITALS
WEIGHT: 205 LBS | BODY MASS INDEX: 32.18 KG/M2 | HEART RATE: 70 BPM | HEIGHT: 67 IN | DIASTOLIC BLOOD PRESSURE: 75 MMHG | SYSTOLIC BLOOD PRESSURE: 128 MMHG

## 2021-05-04 DIAGNOSIS — Z76.82 ORGAN TRANSPLANT CANDIDATE: ICD-10-CM

## 2021-05-04 DIAGNOSIS — N18.6 ANEMIA IN ESRD (END-STAGE RENAL DISEASE): Chronic | ICD-10-CM

## 2021-05-04 DIAGNOSIS — D63.1 ANEMIA IN ESRD (END-STAGE RENAL DISEASE): Chronic | ICD-10-CM

## 2021-05-04 DIAGNOSIS — Z76.82 PATIENT ON WAITING LIST FOR KIDNEY TRANSPLANT: Primary | ICD-10-CM

## 2021-05-04 DIAGNOSIS — I10 ESSENTIAL HYPERTENSION: ICD-10-CM

## 2021-05-04 DIAGNOSIS — Z99.2 ESRD ON DIALYSIS: ICD-10-CM

## 2021-05-04 DIAGNOSIS — N18.6 ESRD ON DIALYSIS: ICD-10-CM

## 2021-05-04 LAB
ASCENDING AORTA: 3.95 CM
AV INDEX (PROSTH): 0.89
AV MEAN GRADIENT: 3 MMHG
AV PEAK GRADIENT: 6 MMHG
AV VALVE AREA: 3.36 CM2
AV VELOCITY RATIO: 0.84
BSA FOR ECHO PROCEDURE: 2.1 M2
CV ECHO LV RWT: 0.47 CM
CV STRESS BASE HR: 68 BPM
DIASTOLIC BLOOD PRESSURE: 71 MMHG
DOP CALC AO PEAK VEL: 1.26 M/S
DOP CALC AO VTI: 23.18 CM
DOP CALC LVOT AREA: 3.8 CM2
DOP CALC LVOT DIAMETER: 2.19 CM
DOP CALC LVOT PEAK VEL: 1.06 M/S
DOP CALC LVOT STROKE VOLUME: 77.9 CM3
DOP CALCLVOT PEAK VEL VTI: 20.69 CM
E WAVE DECELERATION TIME: 220.38 MSEC
E/A RATIO: 0.58
E/E' RATIO: 5.18 M/S
ECHO LV POSTERIOR WALL: 1.1 CM (ref 0.6–1.1)
EJECTION FRACTION: 70 %
END DIASTOLIC INDEX-HIGH: 170 ML/M2
END SYSTOLIC INDEX-HIGH: 70 ML/M2
FRACTIONAL SHORTENING: 43 % (ref 28–44)
INTERVENTRICULAR SEPTUM: 1 CM (ref 0.6–1.1)
IVRT: 102.76 MSEC
LA MAJOR: 4.91 CM
LA MINOR: 4.56 CM
LA WIDTH: 4.25 CM
LEFT ATRIUM SIZE: 3.45 CM
LEFT ATRIUM VOLUME INDEX MOD: 31.3 ML/M2
LEFT ATRIUM VOLUME INDEX: 28.9 ML/M2
LEFT ATRIUM VOLUME MOD: 63.92 CM3
LEFT ATRIUM VOLUME: 58.93 CM3
LEFT INTERNAL DIMENSION IN SYSTOLE: 2.7 CM (ref 2.1–4)
LEFT VENTRICLE DIASTOLIC VOLUME INDEX: 42.06 ML/M2
LEFT VENTRICLE DIASTOLIC VOLUME: 85.81 ML
LEFT VENTRICLE MASS INDEX: 86 G/M2
LEFT VENTRICLE SYSTOLIC VOLUME INDEX: 13.3 ML/M2
LEFT VENTRICLE SYSTOLIC VOLUME: 27.13 ML
LEFT VENTRICULAR INTERNAL DIMENSION IN DIASTOLE: 4.7 CM (ref 3.5–6)
LEFT VENTRICULAR MASS: 175.83 G
LV LATERAL E/E' RATIO: 4.4 M/S
LV SEPTAL E/E' RATIO: 6.29 M/S
MV A" WAVE DURATION": 19.12 MSEC
MV PEAK A VEL: 0.76 M/S
MV PEAK E VEL: 0.44 M/S
MV STENOSIS PRESSURE HALF TIME: 63.91 MS
MV VALVE AREA P 1/2 METHOD: 3.44 CM2
OHS CV CPX 1 MINUTE RECOVERY HEART RATE: 88 BPM
OHS CV CPX 85 PERCENT MAX PREDICTED HEART RATE MALE: 126
OHS CV CPX MAX PREDICTED HEART RATE: 148
OHS CV CPX PATIENT IS FEMALE: 0
OHS CV CPX PATIENT IS MALE: 1
OHS CV CPX PEAK DIASTOLIC BLOOD PRESSURE: 71 MMHG
OHS CV CPX PEAK HEAR RATE: 85 BPM
OHS CV CPX PEAK RATE PRESSURE PRODUCT: 9520
OHS CV CPX PEAK SYSTOLIC BLOOD PRESSURE: 112 MMHG
OHS CV CPX PERCENT MAX PREDICTED HEART RATE ACHIEVED: 57
OHS CV CPX RATE PRESSURE PRODUCT PRESENTING: 7616
PISA TR MAX VEL: 2.54 M/S
PULM VEIN S/D RATIO: 1.57
PV PEAK D VEL: 0.44 M/S
PV PEAK S VEL: 0.69 M/S
RA MAJOR: 3.51 CM
RA PRESSURE: 3 MMHG
RA WIDTH: 2.99 CM
RETIRED EF AND QEF - SEE NOTES: 51 %
RIGHT VENTRICULAR END-DIASTOLIC DIMENSION: 3.43 CM
RV TISSUE DOPPLER FREE WALL SYSTOLIC VELOCITY 1 (APICAL 4 CHAMBER VIEW): 11.66 CM/S
SINUS: 3.74 CM
STJ: 3.45 CM
SYSTOLIC BLOOD PRESSURE: 112 MMHG
TDI LATERAL: 0.1 M/S
TDI SEPTAL: 0.07 M/S
TDI: 0.09 M/S
TR MAX PG: 26 MMHG
TRICUSPID ANNULAR PLANE SYSTOLIC EXCURSION: 2.14 CM
TV REST PULMONARY ARTERY PRESSURE: 29 MMHG

## 2021-05-04 PROCEDURE — 93978 US DOPP ILIACS BILATERAL: ICD-10-PCS | Mod: 26,TXP,, | Performed by: RADIOLOGY

## 2021-05-04 PROCEDURE — 72170 X-RAY EXAM OF PELVIS: CPT | Mod: TC,TXP

## 2021-05-04 PROCEDURE — 99999 PR PBB SHADOW E&M-EST. PATIENT-LVL IV: CPT | Mod: PBBFAC,TXP,, | Performed by: NURSE PRACTITIONER

## 2021-05-04 PROCEDURE — 93978 VASCULAR STUDY: CPT | Mod: TC,TXP

## 2021-05-04 PROCEDURE — 93016 STRESS TEST WITH MYOCARDIAL PERFUSION (CUPID ONLY): ICD-10-PCS | Mod: TXP,,, | Performed by: INTERNAL MEDICINE

## 2021-05-04 PROCEDURE — 93018 CV STRESS TEST I&R ONLY: CPT | Mod: TXP,,, | Performed by: INTERNAL MEDICINE

## 2021-05-04 PROCEDURE — 93016 CV STRESS TEST SUPVJ ONLY: CPT | Mod: TXP,,, | Performed by: INTERNAL MEDICINE

## 2021-05-04 PROCEDURE — 71046 XR CHEST PA AND LATERAL: ICD-10-PCS | Mod: 26,TXP,, | Performed by: RADIOLOGY

## 2021-05-04 PROCEDURE — 93978 VASCULAR STUDY: CPT | Mod: 26,TXP,, | Performed by: RADIOLOGY

## 2021-05-04 PROCEDURE — 93018 STRESS TEST WITH MYOCARDIAL PERFUSION (CUPID ONLY): ICD-10-PCS | Mod: TXP,,, | Performed by: INTERNAL MEDICINE

## 2021-05-04 PROCEDURE — 93306 TTE W/DOPPLER COMPLETE: CPT | Mod: TXP

## 2021-05-04 PROCEDURE — 63600175 PHARM REV CODE 636 W HCPCS: Mod: TXP | Performed by: NURSE PRACTITIONER

## 2021-05-04 PROCEDURE — 72170 X-RAY EXAM OF PELVIS: CPT | Mod: 26,TXP,, | Performed by: RADIOLOGY

## 2021-05-04 PROCEDURE — 99215 OFFICE O/P EST HI 40 MIN: CPT | Mod: S$GLB,TXP,, | Performed by: NURSE PRACTITIONER

## 2021-05-04 PROCEDURE — 78452 STRESS TEST WITH MYOCARDIAL PERFUSION (CUPID ONLY): ICD-10-PCS | Mod: 26,TXP,, | Performed by: INTERNAL MEDICINE

## 2021-05-04 PROCEDURE — 99999 PR PBB SHADOW E&M-EST. PATIENT-LVL IV: ICD-10-PCS | Mod: PBBFAC,TXP,, | Performed by: NURSE PRACTITIONER

## 2021-05-04 PROCEDURE — A9502 TC99M TETROFOSMIN: HCPCS | Mod: TXP

## 2021-05-04 PROCEDURE — 78452 HT MUSCLE IMAGE SPECT MULT: CPT | Mod: TXP

## 2021-05-04 PROCEDURE — 78452 HT MUSCLE IMAGE SPECT MULT: CPT | Mod: 26,TXP,, | Performed by: INTERNAL MEDICINE

## 2021-05-04 PROCEDURE — 93306 TTE W/DOPPLER COMPLETE: CPT | Mod: 26,TXP,, | Performed by: INTERNAL MEDICINE

## 2021-05-04 PROCEDURE — 99215 PR OFFICE/OUTPT VISIT, EST, LEVL V, 40-54 MIN: ICD-10-PCS | Mod: S$GLB,TXP,, | Performed by: NURSE PRACTITIONER

## 2021-05-04 PROCEDURE — 93306 ECHO (CUPID ONLY): ICD-10-PCS | Mod: 26,TXP,, | Performed by: INTERNAL MEDICINE

## 2021-05-04 PROCEDURE — 72170 XR PELVIS ROUTINE AP: ICD-10-PCS | Mod: 26,TXP,, | Performed by: RADIOLOGY

## 2021-05-04 PROCEDURE — 71046 X-RAY EXAM CHEST 2 VIEWS: CPT | Mod: TC,TXP

## 2021-05-04 PROCEDURE — 71046 X-RAY EXAM CHEST 2 VIEWS: CPT | Mod: 26,TXP,, | Performed by: RADIOLOGY

## 2021-05-04 RX ORDER — REGADENOSON 0.08 MG/ML
0.4 INJECTION, SOLUTION INTRAVENOUS
Status: COMPLETED | OUTPATIENT
Start: 2021-05-04 | End: 2021-05-04

## 2021-05-04 RX ADMIN — REGADENOSON 0.4 MG: 0.08 INJECTION, SOLUTION INTRAVENOUS at 08:05

## 2021-05-05 ENCOUNTER — TELEPHONE (OUTPATIENT)
Dept: UROLOGY | Facility: CLINIC | Age: 71
End: 2021-05-05

## 2021-05-10 ENCOUNTER — TELEPHONE (OUTPATIENT)
Dept: UROLOGY | Facility: CLINIC | Age: 71
End: 2021-05-10

## 2021-05-14 ENCOUNTER — TELEPHONE (OUTPATIENT)
Dept: TRANSPLANT | Facility: CLINIC | Age: 71
End: 2021-05-14

## 2021-05-18 ENCOUNTER — TELEPHONE (OUTPATIENT)
Dept: TRANSPLANT | Facility: CLINIC | Age: 71
End: 2021-05-18

## 2021-05-28 RX ORDER — CALCITRIOL 0.5 UG/1
CAPSULE ORAL
Qty: 30 CAPSULE | Refills: 10 | Status: SHIPPED | OUTPATIENT
Start: 2021-05-28 | End: 2022-02-17 | Stop reason: SDUPTHER

## 2021-05-28 RX ORDER — LOSARTAN POTASSIUM 100 MG/1
100 TABLET ORAL DAILY
Qty: 90 TABLET | Refills: 3 | Status: ON HOLD | OUTPATIENT
Start: 2021-05-28 | End: 2022-03-21 | Stop reason: HOSPADM

## 2021-05-28 RX ORDER — LANTHANUM CARBONATE 1000 MG/1
TABLET, CHEWABLE ORAL
Qty: 540 TABLET | Refills: 4 | Status: ON HOLD | OUTPATIENT
Start: 2021-05-28 | End: 2022-03-21 | Stop reason: HOSPADM

## 2021-05-28 RX ORDER — GENTAMICIN SULFATE 1 MG/G
CREAM TOPICAL
Qty: 15 G | Refills: 4 | Status: ON HOLD | OUTPATIENT
Start: 2021-05-28 | End: 2022-03-21 | Stop reason: HOSPADM

## 2021-05-28 RX ORDER — AMLODIPINE BESYLATE 10 MG/1
10 TABLET ORAL DAILY
Qty: 90 TABLET | Refills: 3 | Status: ON HOLD | OUTPATIENT
Start: 2021-05-28 | End: 2022-03-21 | Stop reason: SDUPTHER

## 2021-05-28 RX ORDER — TORSEMIDE 100 MG/1
200 TABLET ORAL DAILY
Qty: 180 TABLET | Refills: 3 | Status: ON HOLD | OUTPATIENT
Start: 2021-05-28 | End: 2022-03-21 | Stop reason: HOSPADM

## 2021-05-28 RX ORDER — POTASSIUM CHLORIDE 750 MG/1
10 TABLET, EXTENDED RELEASE ORAL DAILY
Qty: 90 TABLET | Refills: 3 | Status: ON HOLD | OUTPATIENT
Start: 2021-05-28 | End: 2022-03-21 | Stop reason: HOSPADM

## 2021-06-04 ENCOUNTER — TELEPHONE (OUTPATIENT)
Dept: NEPHROLOGY | Facility: CLINIC | Age: 71
End: 2021-06-04

## 2021-06-21 ENCOUNTER — LAB VISIT (OUTPATIENT)
Dept: LAB | Facility: HOSPITAL | Age: 71
End: 2021-06-21
Attending: INTERNAL MEDICINE
Payer: MEDICARE

## 2021-06-21 DIAGNOSIS — N18.6 ANEMIA IN ESRD (END-STAGE RENAL DISEASE): Chronic | ICD-10-CM

## 2021-06-21 DIAGNOSIS — D63.1 ANEMIA IN ESRD (END-STAGE RENAL DISEASE): Chronic | ICD-10-CM

## 2021-06-21 DIAGNOSIS — N18.6 ESRD ON DIALYSIS: ICD-10-CM

## 2021-06-21 DIAGNOSIS — Z99.2 ESRD ON DIALYSIS: ICD-10-CM

## 2021-06-21 DIAGNOSIS — D47.2 MONOCLONAL GAMMOPATHY: ICD-10-CM

## 2021-06-21 LAB
ALBUMIN SERPL BCP-MCNC: 3.1 G/DL (ref 3.5–5.2)
ALP SERPL-CCNC: 148 U/L (ref 55–135)
ALT SERPL W/O P-5'-P-CCNC: 28 U/L (ref 10–44)
ANION GAP SERPL CALC-SCNC: 18 MMOL/L (ref 8–16)
AST SERPL-CCNC: 14 U/L (ref 10–40)
BASOPHILS # BLD AUTO: 0.02 K/UL (ref 0–0.2)
BASOPHILS NFR BLD: 0.3 % (ref 0–1.9)
BILIRUB SERPL-MCNC: 0.5 MG/DL (ref 0.1–1)
BUN SERPL-MCNC: 73 MG/DL (ref 8–23)
CALCIUM SERPL-MCNC: 8.7 MG/DL (ref 8.7–10.5)
CHLORIDE SERPL-SCNC: 93 MMOL/L (ref 95–110)
CO2 SERPL-SCNC: 26 MMOL/L (ref 23–29)
CREAT SERPL-MCNC: 19.6 MG/DL (ref 0.5–1.4)
DIFFERENTIAL METHOD: ABNORMAL
EOSINOPHIL # BLD AUTO: 0.3 K/UL (ref 0–0.5)
EOSINOPHIL NFR BLD: 5.2 % (ref 0–8)
ERYTHROCYTE [DISTWIDTH] IN BLOOD BY AUTOMATED COUNT: 14.7 % (ref 11.5–14.5)
EST. GFR  (AFRICAN AMERICAN): 2 ML/MIN/1.73 M^2
EST. GFR  (NON AFRICAN AMERICAN): 2 ML/MIN/1.73 M^2
GLUCOSE SERPL-MCNC: 108 MG/DL (ref 70–110)
HCT VFR BLD AUTO: 30.8 % (ref 40–54)
HGB BLD-MCNC: 10.4 G/DL (ref 14–18)
IMM GRANULOCYTES # BLD AUTO: 0.02 K/UL (ref 0–0.04)
IMM GRANULOCYTES NFR BLD AUTO: 0.3 % (ref 0–0.5)
LYMPHOCYTES # BLD AUTO: 2.1 K/UL (ref 1–4.8)
LYMPHOCYTES NFR BLD: 32.6 % (ref 18–48)
MCH RBC QN AUTO: 31.3 PG (ref 27–31)
MCHC RBC AUTO-ENTMCNC: 33.8 G/DL (ref 32–36)
MCV RBC AUTO: 93 FL (ref 82–98)
MONOCYTES # BLD AUTO: 0.9 K/UL (ref 0.3–1)
MONOCYTES NFR BLD: 13.7 % (ref 4–15)
NEUTROPHILS # BLD AUTO: 3.1 K/UL (ref 1.8–7.7)
NEUTROPHILS NFR BLD: 47.9 % (ref 38–73)
NRBC BLD-RTO: 0 /100 WBC
PLATELET # BLD AUTO: 184 K/UL (ref 150–450)
PMV BLD AUTO: 10.2 FL (ref 9.2–12.9)
POTASSIUM SERPL-SCNC: 3.5 MMOL/L (ref 3.5–5.1)
PROT SERPL-MCNC: 7.3 G/DL (ref 6–8.4)
RBC # BLD AUTO: 3.32 M/UL (ref 4.6–6.2)
SODIUM SERPL-SCNC: 137 MMOL/L (ref 136–145)
WBC # BLD AUTO: 6.51 K/UL (ref 3.9–12.7)

## 2021-06-21 PROCEDURE — 83520 IMMUNOASSAY QUANT NOS NONAB: CPT | Mod: TXP | Performed by: INTERNAL MEDICINE

## 2021-06-21 PROCEDURE — 85025 COMPLETE CBC W/AUTO DIFF WBC: CPT | Mod: TXP | Performed by: INTERNAL MEDICINE

## 2021-06-21 PROCEDURE — 36415 COLL VENOUS BLD VENIPUNCTURE: CPT | Mod: TXP | Performed by: INTERNAL MEDICINE

## 2021-06-21 PROCEDURE — 80053 COMPREHEN METABOLIC PANEL: CPT | Mod: TXP | Performed by: INTERNAL MEDICINE

## 2021-06-22 LAB
KAPPA LC SER QL IA: 14.87 MG/DL (ref 0.33–1.94)
KAPPA LC/LAMBDA SER IA: 1.19 (ref 0.26–1.65)
LAMBDA LC SER QL IA: 12.46 MG/DL (ref 0.57–2.63)

## 2021-06-23 ENCOUNTER — OFFICE VISIT (OUTPATIENT)
Dept: HEMATOLOGY/ONCOLOGY | Facility: CLINIC | Age: 71
End: 2021-06-23
Payer: MEDICARE

## 2021-06-23 VITALS
HEIGHT: 67 IN | DIASTOLIC BLOOD PRESSURE: 68 MMHG | HEART RATE: 73 BPM | BODY MASS INDEX: 31.59 KG/M2 | SYSTOLIC BLOOD PRESSURE: 115 MMHG | OXYGEN SATURATION: 98 % | WEIGHT: 201.25 LBS | TEMPERATURE: 98 F

## 2021-06-23 DIAGNOSIS — N18.6 ESRD ON DIALYSIS: ICD-10-CM

## 2021-06-23 DIAGNOSIS — N18.6 ANEMIA IN ESRD (END-STAGE RENAL DISEASE): Chronic | ICD-10-CM

## 2021-06-23 DIAGNOSIS — D47.2 MONOCLONAL GAMMOPATHY: ICD-10-CM

## 2021-06-23 DIAGNOSIS — D63.1 ANEMIA IN ESRD (END-STAGE RENAL DISEASE): Chronic | ICD-10-CM

## 2021-06-23 DIAGNOSIS — Z99.2 ESRD ON DIALYSIS: ICD-10-CM

## 2021-06-23 DIAGNOSIS — R97.20 ELEVATED PSA: ICD-10-CM

## 2021-06-23 PROCEDURE — 99999 PR PBB SHADOW E&M-EST. PATIENT-LVL IV: CPT | Mod: PBBFAC,,, | Performed by: INTERNAL MEDICINE

## 2021-06-23 PROCEDURE — 99214 OFFICE O/P EST MOD 30 MIN: CPT | Mod: S$GLB,,, | Performed by: INTERNAL MEDICINE

## 2021-06-23 PROCEDURE — 99999 PR PBB SHADOW E&M-EST. PATIENT-LVL IV: ICD-10-PCS | Mod: PBBFAC,,, | Performed by: INTERNAL MEDICINE

## 2021-06-23 PROCEDURE — 99214 PR OFFICE/OUTPT VISIT, EST, LEVL IV, 30-39 MIN: ICD-10-PCS | Mod: S$GLB,,, | Performed by: INTERNAL MEDICINE

## 2021-06-29 DIAGNOSIS — Z76.82 ORGAN TRANSPLANT CANDIDATE: Primary | ICD-10-CM

## 2021-07-01 ENCOUNTER — TELEPHONE (OUTPATIENT)
Dept: TRANSPLANT | Facility: CLINIC | Age: 71
End: 2021-07-01

## 2021-07-05 ENCOUNTER — TELEPHONE (OUTPATIENT)
Dept: TRANSPLANT | Facility: CLINIC | Age: 71
End: 2021-07-05

## 2021-08-09 ENCOUNTER — TELEPHONE (OUTPATIENT)
Dept: NEPHROLOGY | Facility: CLINIC | Age: 71
End: 2021-08-09

## 2021-08-24 RX ORDER — FLUTICASONE PROPIONATE 50 MCG
2 SPRAY, SUSPENSION (ML) NASAL
Qty: 16 G | Refills: 3 | Status: SHIPPED | OUTPATIENT
Start: 2021-08-24 | End: 2021-09-24 | Stop reason: SDUPTHER

## 2021-09-21 ENCOUNTER — LAB VISIT (OUTPATIENT)
Dept: LAB | Facility: HOSPITAL | Age: 71
End: 2021-09-21
Attending: UROLOGY
Payer: MEDICARE

## 2021-09-21 DIAGNOSIS — N18.6 ESRD ON DIALYSIS: ICD-10-CM

## 2021-09-21 DIAGNOSIS — N18.6 ANEMIA IN ESRD (END-STAGE RENAL DISEASE): Chronic | ICD-10-CM

## 2021-09-21 DIAGNOSIS — D63.1 ANEMIA IN ESRD (END-STAGE RENAL DISEASE): Chronic | ICD-10-CM

## 2021-09-21 DIAGNOSIS — Z99.2 ESRD ON DIALYSIS: ICD-10-CM

## 2021-09-21 DIAGNOSIS — D47.2 MONOCLONAL GAMMOPATHY: ICD-10-CM

## 2021-09-21 DIAGNOSIS — Z12.5 PROSTATE CANCER SCREENING: ICD-10-CM

## 2021-09-21 LAB
ALBUMIN SERPL BCP-MCNC: 3.2 G/DL (ref 3.5–5.2)
ALP SERPL-CCNC: 127 U/L (ref 55–135)
ALT SERPL W/O P-5'-P-CCNC: 29 U/L (ref 10–44)
ANION GAP SERPL CALC-SCNC: 15 MMOL/L (ref 8–16)
AST SERPL-CCNC: 14 U/L (ref 10–40)
BASOPHILS # BLD AUTO: 0.03 K/UL (ref 0–0.2)
BASOPHILS NFR BLD: 0.4 % (ref 0–1.9)
BILIRUB SERPL-MCNC: 0.5 MG/DL (ref 0.1–1)
BUN SERPL-MCNC: 60 MG/DL (ref 8–23)
CALCIUM SERPL-MCNC: 8.3 MG/DL (ref 8.7–10.5)
CHLORIDE SERPL-SCNC: 93 MMOL/L (ref 95–110)
CO2 SERPL-SCNC: 25 MMOL/L (ref 23–29)
COMPLEXED PSA SERPL-MCNC: 4.7 NG/ML (ref 0–4)
CREAT SERPL-MCNC: 16.1 MG/DL (ref 0.5–1.4)
DIFFERENTIAL METHOD: ABNORMAL
EOSINOPHIL # BLD AUTO: 0.4 K/UL (ref 0–0.5)
EOSINOPHIL NFR BLD: 5.2 % (ref 0–8)
ERYTHROCYTE [DISTWIDTH] IN BLOOD BY AUTOMATED COUNT: 14.8 % (ref 11.5–14.5)
EST. GFR  (AFRICAN AMERICAN): 3 ML/MIN/1.73 M^2
EST. GFR  (NON AFRICAN AMERICAN): 3 ML/MIN/1.73 M^2
GLUCOSE SERPL-MCNC: 95 MG/DL (ref 70–110)
HCT VFR BLD AUTO: 30.4 % (ref 40–54)
HGB BLD-MCNC: 10.2 G/DL (ref 14–18)
IMM GRANULOCYTES # BLD AUTO: 0.03 K/UL (ref 0–0.04)
IMM GRANULOCYTES NFR BLD AUTO: 0.4 % (ref 0–0.5)
LYMPHOCYTES # BLD AUTO: 2.3 K/UL (ref 1–4.8)
LYMPHOCYTES NFR BLD: 31.8 % (ref 18–48)
MCH RBC QN AUTO: 30.3 PG (ref 27–31)
MCHC RBC AUTO-ENTMCNC: 33.6 G/DL (ref 32–36)
MCV RBC AUTO: 90 FL (ref 82–98)
MONOCYTES # BLD AUTO: 1 K/UL (ref 0.3–1)
MONOCYTES NFR BLD: 12.9 % (ref 4–15)
NEUTROPHILS # BLD AUTO: 3.6 K/UL (ref 1.8–7.7)
NEUTROPHILS NFR BLD: 49.3 % (ref 38–73)
NRBC BLD-RTO: 0 /100 WBC
PLATELET # BLD AUTO: 210 K/UL (ref 150–450)
PMV BLD AUTO: 9.6 FL (ref 9.2–12.9)
POTASSIUM SERPL-SCNC: 3.3 MMOL/L (ref 3.5–5.1)
PROT SERPL-MCNC: 7 G/DL (ref 6–8.4)
RBC # BLD AUTO: 3.37 M/UL (ref 4.6–6.2)
SODIUM SERPL-SCNC: 133 MMOL/L (ref 136–145)
WBC # BLD AUTO: 7.36 K/UL (ref 3.9–12.7)

## 2021-09-21 PROCEDURE — 86334 PATHOLOGIST INTERPRETATION IFE: ICD-10-PCS | Mod: 26,NTX,, | Performed by: PATHOLOGY

## 2021-09-21 PROCEDURE — 84165 PROTEIN E-PHORESIS SERUM: CPT | Mod: 26,NTX,, | Performed by: PATHOLOGY

## 2021-09-21 PROCEDURE — 84153 ASSAY OF PSA TOTAL: CPT | Mod: NTX | Performed by: UROLOGY

## 2021-09-21 PROCEDURE — 85025 COMPLETE CBC W/AUTO DIFF WBC: CPT | Mod: TXP | Performed by: INTERNAL MEDICINE

## 2021-09-21 PROCEDURE — 86334 IMMUNOFIX E-PHORESIS SERUM: CPT | Mod: TXP | Performed by: INTERNAL MEDICINE

## 2021-09-21 PROCEDURE — 80053 COMPREHEN METABOLIC PANEL: CPT | Mod: TXP | Performed by: INTERNAL MEDICINE

## 2021-09-21 PROCEDURE — 86334 IMMUNOFIX E-PHORESIS SERUM: CPT | Mod: 26,NTX,, | Performed by: PATHOLOGY

## 2021-09-21 PROCEDURE — 84165 PROTEIN E-PHORESIS SERUM: CPT | Mod: TXP | Performed by: INTERNAL MEDICINE

## 2021-09-21 PROCEDURE — 83520 IMMUNOASSAY QUANT NOS NONAB: CPT | Mod: TXP | Performed by: INTERNAL MEDICINE

## 2021-09-21 PROCEDURE — 84165 PATHOLOGIST INTERPRETATION SPE: ICD-10-PCS | Mod: 26,NTX,, | Performed by: PATHOLOGY

## 2021-09-21 PROCEDURE — 36415 COLL VENOUS BLD VENIPUNCTURE: CPT | Mod: NTX | Performed by: INTERNAL MEDICINE

## 2021-09-22 LAB
ALBUMIN SERPL ELPH-MCNC: 3.5 G/DL (ref 3.35–5.55)
ALPHA1 GLOB SERPL ELPH-MCNC: 0.31 G/DL (ref 0.17–0.41)
ALPHA2 GLOB SERPL ELPH-MCNC: 0.97 G/DL (ref 0.43–0.99)
B-GLOBULIN SERPL ELPH-MCNC: 0.74 G/DL (ref 0.5–1.1)
GAMMA GLOB SERPL ELPH-MCNC: 0.88 G/DL (ref 0.67–1.58)
INTERPRETATION SERPL IFE-IMP: NORMAL
KAPPA LC SER QL IA: 14.78 MG/DL (ref 0.33–1.94)
KAPPA LC/LAMBDA SER IA: 1.45 (ref 0.26–1.65)
LAMBDA LC SER QL IA: 10.21 MG/DL (ref 0.57–2.63)
PATHOLOGIST INTERPRETATION IFE: NORMAL
PATHOLOGIST INTERPRETATION SPE: NORMAL
PROT SERPL-MCNC: 6.4 G/DL (ref 6–8.4)

## 2021-09-23 ENCOUNTER — OFFICE VISIT (OUTPATIENT)
Dept: HEMATOLOGY/ONCOLOGY | Facility: CLINIC | Age: 71
End: 2021-09-23
Payer: MEDICARE

## 2021-09-23 VITALS
OXYGEN SATURATION: 100 % | BODY MASS INDEX: 32.28 KG/M2 | WEIGHT: 205.69 LBS | HEIGHT: 67 IN | TEMPERATURE: 97 F | DIASTOLIC BLOOD PRESSURE: 75 MMHG | HEART RATE: 85 BPM | SYSTOLIC BLOOD PRESSURE: 126 MMHG

## 2021-09-23 DIAGNOSIS — D47.2 MONOCLONAL GAMMOPATHY: ICD-10-CM

## 2021-09-23 DIAGNOSIS — N18.6 ANEMIA IN ESRD (END-STAGE RENAL DISEASE): Chronic | ICD-10-CM

## 2021-09-23 DIAGNOSIS — N18.6 ESRD ON DIALYSIS: ICD-10-CM

## 2021-09-23 DIAGNOSIS — Z99.2 ESRD ON DIALYSIS: ICD-10-CM

## 2021-09-23 DIAGNOSIS — D63.1 ANEMIA IN ESRD (END-STAGE RENAL DISEASE): Chronic | ICD-10-CM

## 2021-09-23 PROCEDURE — 99999 PR PBB SHADOW E&M-EST. PATIENT-LVL III: ICD-10-PCS | Mod: PBBFAC,,, | Performed by: INTERNAL MEDICINE

## 2021-09-23 PROCEDURE — 99214 OFFICE O/P EST MOD 30 MIN: CPT | Mod: S$GLB,,, | Performed by: INTERNAL MEDICINE

## 2021-09-23 PROCEDURE — 99214 PR OFFICE/OUTPT VISIT, EST, LEVL IV, 30-39 MIN: ICD-10-PCS | Mod: S$GLB,,, | Performed by: INTERNAL MEDICINE

## 2021-09-23 PROCEDURE — 99999 PR PBB SHADOW E&M-EST. PATIENT-LVL III: CPT | Mod: PBBFAC,,, | Performed by: INTERNAL MEDICINE

## 2021-09-24 RX ORDER — FLUTICASONE PROPIONATE 50 MCG
2 SPRAY, SUSPENSION (ML) NASAL
Qty: 16 G | Refills: 3 | Status: SHIPPED | OUTPATIENT
Start: 2021-09-24 | End: 2022-06-20

## 2021-09-27 ENCOUNTER — OFFICE VISIT (OUTPATIENT)
Dept: UROLOGY | Facility: CLINIC | Age: 71
End: 2021-09-27
Payer: MEDICARE

## 2021-09-27 VITALS
WEIGHT: 206.81 LBS | SYSTOLIC BLOOD PRESSURE: 112 MMHG | HEART RATE: 73 BPM | DIASTOLIC BLOOD PRESSURE: 71 MMHG | BODY MASS INDEX: 32.39 KG/M2

## 2021-09-27 DIAGNOSIS — R31.29 MICROHEMATURIA: Primary | ICD-10-CM

## 2021-09-27 DIAGNOSIS — R97.20 ELEVATED PSA: ICD-10-CM

## 2021-09-27 DIAGNOSIS — N52.9 ERECTILE DYSFUNCTION, UNSPECIFIED ERECTILE DYSFUNCTION TYPE: ICD-10-CM

## 2021-09-27 LAB
MICROSCOPIC COMMENT: ABNORMAL
RBC #/AREA URNS HPF: 0 /HPF (ref 0–4)
SQUAMOUS #/AREA URNS HPF: 2 /HPF
WBC #/AREA URNS HPF: 75 /HPF (ref 0–5)
WBC CLUMPS URNS QL MICRO: ABNORMAL

## 2021-09-27 PROCEDURE — 87086 URINE CULTURE/COLONY COUNT: CPT | Mod: TXP | Performed by: UROLOGY

## 2021-09-27 PROCEDURE — 99214 PR OFFICE/OUTPT VISIT, EST, LEVL IV, 30-39 MIN: ICD-10-PCS | Mod: NTX,S$GLB,, | Performed by: UROLOGY

## 2021-09-27 PROCEDURE — 81000 URINALYSIS NONAUTO W/SCOPE: CPT | Mod: NTX | Performed by: UROLOGY

## 2021-09-27 PROCEDURE — 99999 PR PBB SHADOW E&M-EST. PATIENT-LVL III: CPT | Mod: PBBFAC,TXP,, | Performed by: UROLOGY

## 2021-09-27 PROCEDURE — 99214 OFFICE O/P EST MOD 30 MIN: CPT | Mod: NTX,S$GLB,, | Performed by: UROLOGY

## 2021-09-27 PROCEDURE — 99999 PR PBB SHADOW E&M-EST. PATIENT-LVL III: ICD-10-PCS | Mod: PBBFAC,TXP,, | Performed by: UROLOGY

## 2021-09-27 RX ORDER — SILDENAFIL 50 MG/1
50 TABLET, FILM COATED ORAL DAILY PRN
Qty: 30 TABLET | Refills: 11 | Status: SHIPPED | OUTPATIENT
Start: 2021-09-27 | End: 2022-10-03 | Stop reason: SDUPTHER

## 2021-09-29 LAB — BACTERIA UR CULT: NO GROWTH

## 2021-10-05 ENCOUNTER — HOSPITAL ENCOUNTER (OUTPATIENT)
Dept: RADIOLOGY | Facility: HOSPITAL | Age: 71
Discharge: HOME | End: 2021-10-05
Attending: NURSE PRACTITIONER
Payer: MEDICARE

## 2021-10-05 ENCOUNTER — OFFICE VISIT (OUTPATIENT)
Dept: TRANSPLANT | Facility: CLINIC | Age: 71
End: 2021-10-05
Payer: MEDICARE

## 2021-10-05 VITALS
HEIGHT: 67 IN | OXYGEN SATURATION: 99 % | RESPIRATION RATE: 16 BRPM | DIASTOLIC BLOOD PRESSURE: 67 MMHG | SYSTOLIC BLOOD PRESSURE: 132 MMHG | BODY MASS INDEX: 32.21 KG/M2 | HEART RATE: 75 BPM | TEMPERATURE: 97 F | WEIGHT: 205.25 LBS

## 2021-10-05 DIAGNOSIS — Z99.2 ESRD ON DIALYSIS: ICD-10-CM

## 2021-10-05 DIAGNOSIS — D63.1 ANEMIA IN ESRD (END-STAGE RENAL DISEASE): Chronic | ICD-10-CM

## 2021-10-05 DIAGNOSIS — E21.3 HPTH (HYPERPARATHYROIDISM): ICD-10-CM

## 2021-10-05 DIAGNOSIS — N18.6 ESRD ON DIALYSIS: ICD-10-CM

## 2021-10-05 DIAGNOSIS — Z76.82 PATIENT ON WAITING LIST FOR KIDNEY TRANSPLANT: Primary | ICD-10-CM

## 2021-10-05 DIAGNOSIS — E87.20 ACIDOSIS: ICD-10-CM

## 2021-10-05 DIAGNOSIS — Z76.82 ORGAN TRANSPLANT CANDIDATE: ICD-10-CM

## 2021-10-05 DIAGNOSIS — I10 PRIMARY HYPERTENSION: ICD-10-CM

## 2021-10-05 DIAGNOSIS — N18.6 ANEMIA IN ESRD (END-STAGE RENAL DISEASE): Chronic | ICD-10-CM

## 2021-10-05 DIAGNOSIS — E78.5 HYPERLIPIDEMIA, UNSPECIFIED HYPERLIPIDEMIA TYPE: ICD-10-CM

## 2021-10-05 PROCEDURE — 99999 PR PBB SHADOW E&M-EST. PATIENT-LVL IV: ICD-10-PCS | Mod: PBBFAC,TXP,, | Performed by: NURSE PRACTITIONER

## 2021-10-05 PROCEDURE — 99215 OFFICE O/P EST HI 40 MIN: CPT | Mod: S$GLB,TXP,, | Performed by: NURSE PRACTITIONER

## 2021-10-05 PROCEDURE — 99215 PR OFFICE/OUTPT VISIT, EST, LEVL V, 40-54 MIN: ICD-10-PCS | Mod: S$GLB,TXP,, | Performed by: NURSE PRACTITIONER

## 2021-10-05 PROCEDURE — 76770 US RETROPERITONEAL COMPLETE: ICD-10-PCS | Mod: 26,TXP,, | Performed by: RADIOLOGY

## 2021-10-05 PROCEDURE — 76770 US EXAM ABDO BACK WALL COMP: CPT | Mod: TC,TXP

## 2021-10-05 PROCEDURE — 76770 US EXAM ABDO BACK WALL COMP: CPT | Mod: 26,TXP,, | Performed by: RADIOLOGY

## 2021-10-05 PROCEDURE — 99999 PR PBB SHADOW E&M-EST. PATIENT-LVL IV: CPT | Mod: PBBFAC,TXP,, | Performed by: NURSE PRACTITIONER

## 2021-10-28 ENCOUNTER — TELEPHONE (OUTPATIENT)
Dept: TRANSPLANT | Facility: CLINIC | Age: 71
End: 2021-10-28
Payer: MEDICARE

## 2021-10-29 ENCOUNTER — TREATMENT PLANNING (OUTPATIENT)
Dept: TRANSPLANT | Facility: HOSPITAL | Age: 71
End: 2021-10-29
Payer: MEDICARE

## 2021-11-01 ENCOUNTER — SOCIAL WORK (OUTPATIENT)
Dept: TRANSPLANT | Facility: CLINIC | Age: 71
End: 2021-11-01
Payer: MEDICARE

## 2021-11-03 ENCOUNTER — TELEPHONE (OUTPATIENT)
Dept: TRANSPLANT | Facility: CLINIC | Age: 71
End: 2021-11-03
Payer: MEDICARE

## 2021-11-03 DIAGNOSIS — Z76.82 ORGAN TRANSPLANT CANDIDATE: Primary | ICD-10-CM

## 2021-11-03 DIAGNOSIS — Z76.82 AWAITING ORGAN TRANSPLANT STATUS: ICD-10-CM

## 2021-11-04 ENCOUNTER — TELEPHONE (OUTPATIENT)
Dept: TRANSPLANT | Facility: CLINIC | Age: 71
End: 2021-11-04
Payer: MEDICARE

## 2021-11-05 ENCOUNTER — TELEPHONE (OUTPATIENT)
Dept: TRANSPLANT | Facility: CLINIC | Age: 71
End: 2021-11-05
Payer: MEDICARE

## 2021-11-09 RX ORDER — ALPRAZOLAM 1 MG/1
1 TABLET ORAL 3 TIMES DAILY PRN
Qty: 270 TABLET | Refills: 3 | Status: SHIPPED | OUTPATIENT
Start: 2021-11-09 | End: 2021-12-20 | Stop reason: SDUPTHER

## 2021-11-30 ENCOUNTER — TELEPHONE (OUTPATIENT)
Dept: TRANSPLANT | Facility: CLINIC | Age: 71
End: 2021-11-30
Payer: MEDICARE

## 2021-11-30 ENCOUNTER — TREATMENT PLANNING (OUTPATIENT)
Dept: TRANSPLANT | Facility: HOSPITAL | Age: 71
End: 2021-11-30
Payer: MEDICARE

## 2021-12-01 ENCOUNTER — TELEPHONE (OUTPATIENT)
Dept: TRANSPLANT | Facility: CLINIC | Age: 71
End: 2021-12-01
Payer: MEDICARE

## 2021-12-02 ENCOUNTER — TELEPHONE (OUTPATIENT)
Dept: TRANSPLANT | Facility: CLINIC | Age: 71
End: 2021-12-02
Payer: MEDICARE

## 2021-12-10 ENCOUNTER — DOCUMENTATION ONLY (OUTPATIENT)
Dept: NEPHROLOGY | Facility: CLINIC | Age: 71
End: 2021-12-10
Payer: MEDICARE

## 2021-12-17 ENCOUNTER — TELEPHONE (OUTPATIENT)
Dept: TRANSPLANT | Facility: CLINIC | Age: 71
End: 2021-12-17
Payer: MEDICARE

## 2021-12-18 ENCOUNTER — TELEPHONE (OUTPATIENT)
Dept: TRANSPLANT | Facility: CLINIC | Age: 71
End: 2021-12-18
Payer: MEDICARE

## 2021-12-22 RX ORDER — ALPRAZOLAM 1 MG/1
1 TABLET ORAL 3 TIMES DAILY PRN
Qty: 270 TABLET | Refills: 3 | Status: SHIPPED | OUTPATIENT
Start: 2021-12-22 | End: 2022-04-01 | Stop reason: SDUPTHER

## 2021-12-28 DIAGNOSIS — Z76.82 ORGAN TRANSPLANT CANDIDATE: Primary | ICD-10-CM

## 2022-01-07 ENCOUNTER — TELEPHONE (OUTPATIENT)
Dept: TRANSPLANT | Facility: CLINIC | Age: 72
End: 2022-01-07
Payer: MEDICARE

## 2022-01-19 ENCOUNTER — TELEPHONE (OUTPATIENT)
Dept: TRANSPLANT | Facility: CLINIC | Age: 72
End: 2022-01-19
Payer: MEDICARE

## 2022-02-05 ENCOUNTER — TELEPHONE (OUTPATIENT)
Dept: TRANSPLANT | Facility: CLINIC | Age: 72
End: 2022-02-05
Payer: MEDICARE

## 2022-02-05 NOTE — TELEPHONE ENCOUNTER
ON-CALL NOTE    UNOS# [id number]    Notified by Ramiro Longo, , that Alverto Ramirez is eligible for kidney offer.  Spoke with patient and identified no acute medical issues with telephone assessment. Protocol script read to patient regarding N/A, standard donor offer. Patient verbalized understanding, all questions answered, patient accepts organ offer. Notified by Ramiro Longo that virtual crossmatch is negative.  Current sample of blood is available from date 12/3/21 for virtual crossmatch.  Patient reports no sensitizing event since last blood sample for PRA received. Notification pending of tech in [facility] to perform a retrospective  crossmatch per guideline.    Patient was asked if they have had a positive COVID-19 test or if they have any signs or symptoms. Informed patient that they will be tested for COVID-19 upon arrival to the hospital, unless have a previous positive result. If tested and result is positive, the transplant will not be able to occur, they will be inactivated on the wait list for 28 days per protocol and required to quarantine. Patient reports last vaccine 2/3/21.    Patient notified of plan to remain on standby at home and states understanding. Dialysis unit will need to be notified.

## 2022-02-06 ENCOUNTER — SOCIAL WORK (OUTPATIENT)
Dept: TRANSPLANT | Facility: CLINIC | Age: 72
End: 2022-02-06
Payer: MEDICARE

## 2022-02-06 ENCOUNTER — TREATMENT PLANNING (OUTPATIENT)
Dept: TRANSPLANT | Facility: HOSPITAL | Age: 72
End: 2022-02-06
Payer: MEDICARE

## 2022-02-06 NOTE — PROGRESS NOTES
Transplant Social Work On Call - Organ Offer Chart Review     On Call Transplant SW reviewed patient's chart for psychosocial barriers or concerns as patient may have an organ offer. At this time there are no needs or concerns identified per review. If patient is transplanted, transplant SW to follow and remains available.

## 2022-02-06 NOTE — PROGRESS NOTES
ORGAN OFFER REVIEW   Patient has come up for an organ offer today. EMR, transplant checklist, and Care Everywhere reviewed.    Hx A fib  MGUS IgM kappa, last seen 9/23/21  per psych 2019: has phobic anxiety d/o - claustrophobia  diverticulosis, scope due 12/2022    No events noted since last reviewed 11/2021.    A/P   Stable to proceed with offer

## 2022-02-07 NOTE — TELEPHONE ENCOUNTER
ON-CALL NOTE     OS# [id number]     Notified by Kevin Medina, , that Alverto Ramirez is released from organ offer. Offer declined by surgeon due to donor quality. He is aware he will remain eligible for future kidney offers. Patient verbalized understanding.

## 2022-02-17 RX ORDER — CALCITRIOL 0.5 UG/1
CAPSULE ORAL
Qty: 90 CAPSULE | Refills: 3 | Status: ON HOLD | OUTPATIENT
Start: 2022-02-17 | End: 2022-03-21 | Stop reason: HOSPADM

## 2022-03-03 PROCEDURE — 86833 HLA CLASS II HIGH DEFIN QUAL: CPT | Mod: PO | Performed by: NURSE PRACTITIONER

## 2022-03-03 PROCEDURE — 86832 HLA CLASS I HIGH DEFIN QUAL: CPT | Mod: PO | Performed by: NURSE PRACTITIONER

## 2022-03-03 PROCEDURE — 86977 RBC SERUM PRETX INCUBJ/INHIB: CPT | Mod: 59,PO | Performed by: NURSE PRACTITIONER

## 2022-03-14 DIAGNOSIS — Z76.82 AWAITING ORGAN TRANSPLANT STATUS: Primary | ICD-10-CM

## 2022-03-15 ENCOUNTER — TELEPHONE (OUTPATIENT)
Dept: TRANSPLANT | Facility: CLINIC | Age: 72
End: 2022-03-15
Payer: MEDICARE

## 2022-03-15 ENCOUNTER — LAB VISIT (OUTPATIENT)
Dept: LAB | Facility: HOSPITAL | Age: 72
End: 2022-03-15
Payer: MEDICARE

## 2022-03-15 DIAGNOSIS — Z76.82 ORGAN TRANSPLANT CANDIDATE: ICD-10-CM

## 2022-03-15 NOTE — TELEPHONE ENCOUNTER
Late entry from 3/14/22 at 1700:    ON-CALL NOTE    UNOS# [id number]    Notified by Sudarshan Mooney, , that Alverto Ramirez is eligible for kidney offer.  Spoke with patient and identified no acute medical issues with telephone assessment. Protocol script read to patient regarding N/A, standard donor offer. Patient verbalized understanding, all questions answered, patient accepts organ offer. Notified by Sudarshan Mooney that virtual crossmatch is negative.  Current sample of blood is available from date 2/3/22 for crossmatch.  Patient reports no sensitizing event since last blood sample for PRA received. Notified Verlinda in [facility] Lab to perform a prospective  crossmatch per guideline.    Patient was asked if they have had a positive COVID-19 test or if they have any signs or symptoms. Informed patient that they will be tested for COVID-19 upon arrival to the hospital, unless have a previous positive result. If tested and result is positive, the transplant will not be able to occur, they will be inactivated on the wait list for 28 days per protocol and required to quarantine.     3/15/22: at 0315:  Notification that prospective cross match is negative.      0900:  Notification of organs going to higher ranked recipient.  Patient informed.  Verbalized understanding this will not affect listing status.

## 2022-03-15 NOTE — TELEPHONE ENCOUNTER
On-Call SW Note:     On Call Transplant SW reviewed patient's chart for psychosocial barriers or concerns as patient may have an organ offer.  No concerns/needs identified or indicated at this time. Transplant SW remains available.     If pt is transplanted, transplant SW team to follow. Transplant SW team remains available.

## 2022-03-15 NOTE — TELEPHONE ENCOUNTER
ON-CALL NOTE    UNOS# [id number]    Notified by Sudarshan Mooney, , that Alverto Ramirez is eligible for kidney offer.  Spoke with patient and identified no acute medical issues with telephone assessment. Protocol script read to patient regarding N/A, standard donor offer. Patient verbalized understanding, all questions answered, patient accepts organ offer. Notified by Sudarshan Monoey that virtual crossmatch is negative.  Current sample of blood is available from date 2/3/22 for crossmatch.  Patient reports no sensitizing event since last blood sample for PRA received. Notified Jessica in [facility] to perform a prospective  crossmatch per guideline.  This is a DCD donor.    Patient was asked if they have had a positive COVID-19 test or if they have any signs or symptoms. Informed patient that they will be tested for COVID-19 upon arrival to the hospital, unless have a previous positive result. If tested and result is positive, the transplant will not be able to occur, they will be inactivated on the wait list for 28 days per protocol and required to quarantine.     2000:  Notification that prospective crossmatch is negative.    2315:  Notification that donor did not  within allotted timeframe.  Case closed.  Patient informed of case closure and verbalized understanding.

## 2022-03-17 ENCOUNTER — TELEPHONE (OUTPATIENT)
Dept: TRANSPLANT | Facility: CLINIC | Age: 72
End: 2022-03-17
Payer: MEDICARE

## 2022-03-17 DIAGNOSIS — Z76.82 AWAITING ORGAN TRANSPLANT STATUS: Primary | ICD-10-CM

## 2022-03-18 ENCOUNTER — TELEPHONE (OUTPATIENT)
Dept: TRANSPLANT | Facility: CLINIC | Age: 72
End: 2022-03-18
Payer: MEDICARE

## 2022-03-18 ENCOUNTER — ANESTHESIA EVENT (OUTPATIENT)
Dept: SURGERY | Facility: HOSPITAL | Age: 72
DRG: 652 | End: 2022-03-18
Payer: MEDICARE

## 2022-03-18 ENCOUNTER — ANESTHESIA (OUTPATIENT)
Dept: SURGERY | Facility: HOSPITAL | Age: 72
DRG: 652 | End: 2022-03-18
Payer: MEDICARE

## 2022-03-18 ENCOUNTER — HOSPITAL ENCOUNTER (INPATIENT)
Facility: HOSPITAL | Age: 72
LOS: 3 days | Discharge: HOME | DRG: 652 | End: 2022-03-21
Attending: TRANSPLANT SURGERY | Admitting: TRANSPLANT SURGERY
Payer: MEDICARE

## 2022-03-18 DIAGNOSIS — N18.9 ANEMIA OF RENAL DISEASE: ICD-10-CM

## 2022-03-18 DIAGNOSIS — A53.9 POSITIVE RPR TEST IN CADAVERIC DONOR: ICD-10-CM

## 2022-03-18 DIAGNOSIS — I10 PRIMARY HYPERTENSION: ICD-10-CM

## 2022-03-18 DIAGNOSIS — Z79.60 LONG-TERM USE OF IMMUNOSUPPRESSANT MEDICATION: ICD-10-CM

## 2022-03-18 DIAGNOSIS — Z01.818 PRE-OP EVALUATION: ICD-10-CM

## 2022-03-18 DIAGNOSIS — Z91.89 AT RISK FOR OPPORTUNISTIC INFECTIONS: ICD-10-CM

## 2022-03-18 DIAGNOSIS — Z00.5 POSITIVE RPR TEST IN CADAVERIC DONOR: ICD-10-CM

## 2022-03-18 DIAGNOSIS — E21.3 HPTH (HYPERPARATHYROIDISM): ICD-10-CM

## 2022-03-18 DIAGNOSIS — D63.1 ANEMIA OF RENAL DISEASE: ICD-10-CM

## 2022-03-18 DIAGNOSIS — Z94.0 S/P KIDNEY TRANSPLANT: Primary | ICD-10-CM

## 2022-03-18 DIAGNOSIS — N18.6 ESRD (END STAGE RENAL DISEASE): ICD-10-CM

## 2022-03-18 DIAGNOSIS — D62 ACUTE BLOOD LOSS ANEMIA: ICD-10-CM

## 2022-03-18 DIAGNOSIS — Z99.2 ESRD ON DIALYSIS: ICD-10-CM

## 2022-03-18 DIAGNOSIS — Z29.89 PROPHYLACTIC IMMUNOTHERAPY: ICD-10-CM

## 2022-03-18 DIAGNOSIS — N18.6 ESRD ON DIALYSIS: ICD-10-CM

## 2022-03-18 LAB
25(OH)D3+25(OH)D2 SERPL-MCNC: 29 NG/ML (ref 30–96)
ABO + RH BLD: NORMAL
ALBUMIN SERPL BCP-MCNC: 3.2 G/DL (ref 3.5–5.2)
ALP SERPL-CCNC: 117 U/L (ref 55–135)
ALT SERPL W/O P-5'-P-CCNC: 20 U/L (ref 10–44)
ANION GAP SERPL CALC-SCNC: 18 MMOL/L (ref 8–16)
APTT BLDCRRT: 28 SEC (ref 21–32)
AST SERPL-CCNC: 13 U/L (ref 10–40)
BASOPHILS # BLD AUTO: 0.04 K/UL (ref 0–0.2)
BASOPHILS NFR BLD: 0.5 % (ref 0–1.9)
BILIRUB SERPL-MCNC: 0.6 MG/DL (ref 0.1–1)
BLD GP AB SCN CELLS X3 SERPL QL: NORMAL
BUN SERPL-MCNC: 56 MG/DL (ref 8–23)
CALCIUM SERPL-MCNC: 8.7 MG/DL (ref 8.7–10.5)
CHLORIDE SERPL-SCNC: 94 MMOL/L (ref 95–110)
CHOLEST SERPL-MCNC: 166 MG/DL (ref 120–199)
CHOLEST/HDLC SERPL: 4.5 {RATIO} (ref 2–5)
CO2 SERPL-SCNC: 24 MMOL/L (ref 23–29)
CREAT SERPL-MCNC: 16 MG/DL (ref 0.5–1.4)
CREAT UR-MCNC: 130 MG/DL (ref 23–375)
DIFFERENTIAL METHOD: ABNORMAL
EOSINOPHIL # BLD AUTO: 0.2 K/UL (ref 0–0.5)
EOSINOPHIL NFR BLD: 2.3 % (ref 0–8)
ERYTHROCYTE [DISTWIDTH] IN BLOOD BY AUTOMATED COUNT: 14.9 % (ref 11.5–14.5)
EST. GFR  (AFRICAN AMERICAN): 3 ML/MIN/1.73 M^2
EST. GFR  (NON AFRICAN AMERICAN): 2.6 ML/MIN/1.73 M^2
GLUCOSE SERPL-MCNC: 90 MG/DL (ref 70–110)
HCT VFR BLD AUTO: 32.8 % (ref 40–54)
HDLC SERPL-MCNC: 37 MG/DL (ref 40–75)
HDLC SERPL: 22.3 % (ref 20–50)
HGB BLD-MCNC: 10.5 G/DL (ref 14–18)
IMM GRANULOCYTES # BLD AUTO: 0.03 K/UL (ref 0–0.04)
IMM GRANULOCYTES NFR BLD AUTO: 0.4 % (ref 0–0.5)
INR PPP: 1.1 (ref 0.8–1.2)
LDH SERPL L TO P-CCNC: 187 U/L (ref 110–260)
LDLC SERPL CALC-MCNC: 107.8 MG/DL (ref 63–159)
LYMPHOCYTES # BLD AUTO: 1.6 K/UL (ref 1–4.8)
LYMPHOCYTES NFR BLD: 20.6 % (ref 18–48)
MCH RBC QN AUTO: 29.3 PG (ref 27–31)
MCHC RBC AUTO-ENTMCNC: 32 G/DL (ref 32–36)
MCV RBC AUTO: 92 FL (ref 82–98)
MONOCYTES # BLD AUTO: 1 K/UL (ref 0.3–1)
MONOCYTES NFR BLD: 11.9 % (ref 4–15)
NEUTROPHILS # BLD AUTO: 5.1 K/UL (ref 1.8–7.7)
NEUTROPHILS NFR BLD: 64.3 % (ref 38–73)
NONHDLC SERPL-MCNC: 129 MG/DL
NRBC BLD-RTO: 0 /100 WBC
PHOSPHATE SERPL-MCNC: 6.4 MG/DL (ref 2.7–4.5)
PLATELET # BLD AUTO: 217 K/UL (ref 150–450)
PMV BLD AUTO: 10.5 FL (ref 9.2–12.9)
POTASSIUM SERPL-SCNC: 3.5 MMOL/L (ref 3.5–5.1)
PROT SERPL-MCNC: 7.2 G/DL (ref 6–8.4)
PROT UR-MCNC: 102 MG/DL (ref 0–15)
PROT/CREAT UR: 0.78 MG/G{CREAT} (ref 0–0.2)
PROTHROMBIN TIME: 11 SEC (ref 9–12.5)
PTH-INTACT SERPL-MCNC: 728.1 PG/ML (ref 9–77)
RBC # BLD AUTO: 3.58 M/UL (ref 4.6–6.2)
SARS-COV-2 RDRP RESP QL NAA+PROBE: NEGATIVE
SODIUM SERPL-SCNC: 136 MMOL/L (ref 136–145)
TRIGL SERPL-MCNC: 106 MG/DL (ref 30–150)
URATE SERPL-MCNC: 6.8 MG/DL (ref 3.4–7)
WBC # BLD AUTO: 7.97 K/UL (ref 3.9–12.7)

## 2022-03-18 PROCEDURE — C1729 CATH, DRAINAGE: HCPCS | Performed by: TRANSPLANT SURGERY

## 2022-03-18 PROCEDURE — U0002 COVID-19 LAB TEST NON-CDC: HCPCS | Performed by: PHYSICIAN ASSISTANT

## 2022-03-18 PROCEDURE — 27200950 HC CAPD SUPPORT

## 2022-03-18 PROCEDURE — 93010 EKG 12-LEAD: ICD-10-PCS | Mod: ,,, | Performed by: INTERNAL MEDICINE

## 2022-03-18 PROCEDURE — 63600175 PHARM REV CODE 636 W HCPCS: Mod: JG | Performed by: TRANSPLANT SURGERY

## 2022-03-18 PROCEDURE — 71000033 HC RECOVERY, INTIAL HOUR: Performed by: TRANSPLANT SURGERY

## 2022-03-18 PROCEDURE — 36000931 HC OR TIME LEV VII EA ADD 15 MIN: Performed by: TRANSPLANT SURGERY

## 2022-03-18 PROCEDURE — 71000039 HC RECOVERY, EACH ADD'L HOUR: Performed by: TRANSPLANT SURGERY

## 2022-03-18 PROCEDURE — 63600175 PHARM REV CODE 636 W HCPCS: Performed by: NURSE ANESTHETIST, CERTIFIED REGISTERED

## 2022-03-18 PROCEDURE — 36620 INSERTION CATHETER ARTERY: CPT | Mod: 59,,, | Performed by: ANESTHESIOLOGY

## 2022-03-18 PROCEDURE — 25000003 PHARM REV CODE 250: Performed by: NURSE ANESTHETIST, CERTIFIED REGISTERED

## 2022-03-18 PROCEDURE — 99223 1ST HOSP IP/OBS HIGH 75: CPT | Mod: 57,25,AI,NTX | Performed by: PHYSICIAN ASSISTANT

## 2022-03-18 PROCEDURE — 99223 PR INITIAL HOSPITAL CARE,LEVL III: ICD-10-PCS | Mod: 57,25,AI,NTX | Performed by: PHYSICIAN ASSISTANT

## 2022-03-18 PROCEDURE — 93005 ELECTROCARDIOGRAM TRACING: CPT

## 2022-03-18 PROCEDURE — 82570 ASSAY OF URINE CREATININE: CPT | Performed by: PHYSICIAN ASSISTANT

## 2022-03-18 PROCEDURE — C2617 STENT, NON-COR, TEM W/O DEL: HCPCS | Performed by: TRANSPLANT SURGERY

## 2022-03-18 PROCEDURE — 49422 REMOVE TUNNELED IP CATH: CPT | Mod: 51,RT,, | Performed by: TRANSPLANT SURGERY

## 2022-03-18 PROCEDURE — 87075 CULTR BACTERIA EXCEPT BLOOD: CPT | Performed by: PHYSICIAN ASSISTANT

## 2022-03-18 PROCEDURE — 84100 ASSAY OF PHOSPHORUS: CPT | Performed by: PHYSICIAN ASSISTANT

## 2022-03-18 PROCEDURE — 83615 LACTATE (LD) (LDH) ENZYME: CPT | Performed by: PHYSICIAN ASSISTANT

## 2022-03-18 PROCEDURE — D9220A PRA ANESTHESIA: Mod: CRNA,,, | Performed by: NURSE ANESTHETIST, CERTIFIED REGISTERED

## 2022-03-18 PROCEDURE — 27201423 OPTIME MED/SURG SUP & DEVICES STERILE SUPPLY: Performed by: TRANSPLANT SURGERY

## 2022-03-18 PROCEDURE — 49422 PR REMOVAL TUNNELED INTRAPERITONEAL CATHETER: ICD-10-PCS | Mod: 51,RT,, | Performed by: TRANSPLANT SURGERY

## 2022-03-18 PROCEDURE — 20600001 HC STEP DOWN PRIVATE ROOM: Mod: NTX

## 2022-03-18 PROCEDURE — 82306 VITAMIN D 25 HYDROXY: CPT | Performed by: PHYSICIAN ASSISTANT

## 2022-03-18 PROCEDURE — 27200677 HC TRANSDUCER MONITOR KIT SINGLE: Mod: NTX | Performed by: ANESTHESIOLOGY

## 2022-03-18 PROCEDURE — 87522 HEPATITIS C REVRS TRNSCRPJ: CPT | Performed by: PHYSICIAN ASSISTANT

## 2022-03-18 PROCEDURE — 50323 PR TRANSPLANT,PREP CADAVER RENAL GRAFT: ICD-10-PCS | Mod: 51,RT,, | Performed by: TRANSPLANT SURGERY

## 2022-03-18 PROCEDURE — 50360 PR TRANSPLANTATION OF KIDNEY: ICD-10-PCS | Mod: RT,,, | Performed by: TRANSPLANT SURGERY

## 2022-03-18 PROCEDURE — 37000008 HC ANESTHESIA 1ST 15 MINUTES: Performed by: TRANSPLANT SURGERY

## 2022-03-18 PROCEDURE — 37000009 HC ANESTHESIA EA ADD 15 MINS: Performed by: TRANSPLANT SURGERY

## 2022-03-18 PROCEDURE — 86706 HEP B SURFACE ANTIBODY: CPT | Performed by: PHYSICIAN ASSISTANT

## 2022-03-18 PROCEDURE — 86705 HEP B CORE ANTIBODY IGM: CPT | Performed by: PHYSICIAN ASSISTANT

## 2022-03-18 PROCEDURE — 82962 GLUCOSE BLOOD TEST: CPT | Performed by: TRANSPLANT SURGERY

## 2022-03-18 PROCEDURE — D9220A PRA ANESTHESIA: ICD-10-PCS | Mod: ANES,,, | Performed by: ANESTHESIOLOGY

## 2022-03-18 PROCEDURE — 86704 HEP B CORE ANTIBODY TOTAL: CPT | Performed by: PHYSICIAN ASSISTANT

## 2022-03-18 PROCEDURE — 87340 HEPATITIS B SURFACE AG IA: CPT | Performed by: PHYSICIAN ASSISTANT

## 2022-03-18 PROCEDURE — 87389 HIV-1 AG W/HIV-1&-2 AB AG IA: CPT | Performed by: PHYSICIAN ASSISTANT

## 2022-03-18 PROCEDURE — 80061 LIPID PANEL: CPT | Performed by: PHYSICIAN ASSISTANT

## 2022-03-18 PROCEDURE — 12000002 HC ACUTE/MED SURGE SEMI-PRIVATE ROOM

## 2022-03-18 PROCEDURE — 50360 RNL ALTRNSPLJ W/O RCP NFRCT: CPT | Mod: RT,,, | Performed by: TRANSPLANT SURGERY

## 2022-03-18 PROCEDURE — 36000930 HC OR TIME LEV VII 1ST 15 MIN: Performed by: TRANSPLANT SURGERY

## 2022-03-18 PROCEDURE — 86803 HEPATITIS C AB TEST: CPT | Performed by: PHYSICIAN ASSISTANT

## 2022-03-18 PROCEDURE — 84550 ASSAY OF BLOOD/URIC ACID: CPT | Performed by: PHYSICIAN ASSISTANT

## 2022-03-18 PROCEDURE — D9220A PRA ANESTHESIA: ICD-10-PCS | Mod: CRNA,,, | Performed by: NURSE ANESTHETIST, CERTIFIED REGISTERED

## 2022-03-18 PROCEDURE — D9220A PRA ANESTHESIA: Mod: ANES,,, | Performed by: ANESTHESIOLOGY

## 2022-03-18 PROCEDURE — 71000015 HC POSTOP RECOV 1ST HR: Performed by: TRANSPLANT SURGERY

## 2022-03-18 PROCEDURE — 87205 SMEAR GRAM STAIN: CPT | Performed by: PHYSICIAN ASSISTANT

## 2022-03-18 PROCEDURE — 50605 PR URETEROTOMY TO INSERT STENT: ICD-10-PCS | Mod: 51,RT,, | Performed by: TRANSPLANT SURGERY

## 2022-03-18 PROCEDURE — 63600175 PHARM REV CODE 636 W HCPCS: Mod: NTX | Performed by: PHYSICIAN ASSISTANT

## 2022-03-18 PROCEDURE — 87070 CULTURE OTHR SPECIMN AEROBIC: CPT | Performed by: PHYSICIAN ASSISTANT

## 2022-03-18 PROCEDURE — 86901 BLOOD TYPING SEROLOGIC RH(D): CPT | Performed by: PHYSICIAN ASSISTANT

## 2022-03-18 PROCEDURE — C1751 CATH, INF, PER/CENT/MIDLINE: HCPCS | Mod: NTX | Performed by: ANESTHESIOLOGY

## 2022-03-18 PROCEDURE — 93010 ELECTROCARDIOGRAM REPORT: CPT | Mod: ,,, | Performed by: INTERNAL MEDICINE

## 2022-03-18 PROCEDURE — 85610 PROTHROMBIN TIME: CPT | Performed by: PHYSICIAN ASSISTANT

## 2022-03-18 PROCEDURE — 85025 COMPLETE CBC W/AUTO DIFF WBC: CPT | Performed by: PHYSICIAN ASSISTANT

## 2022-03-18 PROCEDURE — 25000003 PHARM REV CODE 250: Mod: NTX | Performed by: PHYSICIAN ASSISTANT

## 2022-03-18 PROCEDURE — 89051 BODY FLUID CELL COUNT: CPT | Performed by: PHYSICIAN ASSISTANT

## 2022-03-18 PROCEDURE — 85730 THROMBOPLASTIN TIME PARTIAL: CPT | Performed by: PHYSICIAN ASSISTANT

## 2022-03-18 PROCEDURE — 83970 ASSAY OF PARATHORMONE: CPT | Performed by: PHYSICIAN ASSISTANT

## 2022-03-18 PROCEDURE — 50605 INSERT URETERAL SUPPORT: CPT | Mod: 51,RT,, | Performed by: TRANSPLANT SURGERY

## 2022-03-18 PROCEDURE — 36620 PR INSERT CATH,ART,PERCUT,SHORTTERM: ICD-10-PCS | Mod: 59,,, | Performed by: ANESTHESIOLOGY

## 2022-03-18 PROCEDURE — 90945 DIALYSIS ONE EVALUATION: CPT | Mod: NTX

## 2022-03-18 PROCEDURE — 27100025 HC TUBING, SET FLUID WARMER: Mod: NTX | Performed by: ANESTHESIOLOGY

## 2022-03-18 PROCEDURE — 80053 COMPREHEN METABOLIC PANEL: CPT | Performed by: PHYSICIAN ASSISTANT

## 2022-03-18 DEVICE — STENT DOUBLE J 7FRX12CM
Type: IMPLANTABLE DEVICE | Site: URETER | Status: NON-FUNCTIONAL
Removed: 2022-04-07

## 2022-03-18 RX ORDER — MIDAZOLAM HYDROCHLORIDE 1 MG/ML
INJECTION, SOLUTION INTRAMUSCULAR; INTRAVENOUS
Status: DISCONTINUED | OUTPATIENT
Start: 2022-03-18 | End: 2022-03-19

## 2022-03-18 RX ORDER — METHYLENE BLUE 5 MG/ML
INJECTION INTRAVENOUS
Status: DISCONTINUED | OUTPATIENT
Start: 2022-03-18 | End: 2022-03-19 | Stop reason: HOSPADM

## 2022-03-18 RX ORDER — MUPIROCIN 20 MG/G
OINTMENT TOPICAL
Status: DISCONTINUED | OUTPATIENT
Start: 2022-03-18 | End: 2022-03-19 | Stop reason: HOSPADM

## 2022-03-18 RX ORDER — ACETAMINOPHEN 650 MG/20.3ML
650 LIQUID ORAL ONCE
Status: COMPLETED | OUTPATIENT
Start: 2022-03-18 | End: 2022-03-18

## 2022-03-18 RX ORDER — DIPHENHYDRAMINE HYDROCHLORIDE 50 MG/ML
50 INJECTION INTRAMUSCULAR; INTRAVENOUS ONCE
Status: COMPLETED | OUTPATIENT
Start: 2022-03-18 | End: 2022-03-18

## 2022-03-18 RX ORDER — LIDOCAINE HYDROCHLORIDE 20 MG/ML
INJECTION INTRAVENOUS
Status: DISCONTINUED | OUTPATIENT
Start: 2022-03-18 | End: 2022-03-19

## 2022-03-18 RX ORDER — HEPARIN SODIUM 5000 [USP'U]/ML
5000 INJECTION, SOLUTION INTRAVENOUS; SUBCUTANEOUS ONCE
Status: DISCONTINUED | OUTPATIENT
Start: 2022-03-18 | End: 2022-03-19 | Stop reason: HOSPADM

## 2022-03-18 RX ORDER — ONDANSETRON 2 MG/ML
INJECTION INTRAMUSCULAR; INTRAVENOUS
Status: DISCONTINUED | OUTPATIENT
Start: 2022-03-18 | End: 2022-03-19

## 2022-03-18 RX ORDER — HEPARIN SODIUM 10000 [USP'U]/ML
INJECTION, SOLUTION INTRAVENOUS; SUBCUTANEOUS
Status: DISCONTINUED | OUTPATIENT
Start: 2022-03-18 | End: 2022-03-19 | Stop reason: HOSPADM

## 2022-03-18 RX ORDER — CEFAZOLIN SODIUM 1 G/3ML
INJECTION, POWDER, FOR SOLUTION INTRAMUSCULAR; INTRAVENOUS
Status: DISCONTINUED | OUTPATIENT
Start: 2022-03-18 | End: 2022-03-19 | Stop reason: HOSPADM

## 2022-03-18 RX ORDER — PREGABALIN 75 MG/1
75 CAPSULE ORAL
Status: DISCONTINUED | OUTPATIENT
Start: 2022-03-18 | End: 2022-03-19 | Stop reason: HOSPADM

## 2022-03-18 RX ORDER — CEFAZOLIN SODIUM/WATER 2 G/20 ML
2 SYRINGE (ML) INTRAVENOUS
Status: COMPLETED | OUTPATIENT
Start: 2022-03-18 | End: 2022-03-19

## 2022-03-18 RX ORDER — CISATRACURIUM BESYLATE 10 MG/ML
INJECTION, SOLUTION INTRAVENOUS
Status: DISCONTINUED | OUTPATIENT
Start: 2022-03-18 | End: 2022-03-19

## 2022-03-18 RX ORDER — FENTANYL CITRATE 50 UG/ML
INJECTION, SOLUTION INTRAMUSCULAR; INTRAVENOUS
Status: DISCONTINUED | OUTPATIENT
Start: 2022-03-18 | End: 2022-03-19

## 2022-03-18 RX ORDER — PROPOFOL 10 MG/ML
VIAL (ML) INTRAVENOUS
Status: DISCONTINUED | OUTPATIENT
Start: 2022-03-18 | End: 2022-03-19

## 2022-03-18 RX ADMIN — PROPOFOL 170 MG: 10 INJECTION, EMULSION INTRAVENOUS at 10:03

## 2022-03-18 RX ADMIN — MIDAZOLAM HYDROCHLORIDE 2 MG: 1 INJECTION, SOLUTION INTRAMUSCULAR; INTRAVENOUS at 10:03

## 2022-03-18 RX ADMIN — FENTANYL CITRATE 50 MCG: 50 INJECTION, SOLUTION INTRAMUSCULAR; INTRAVENOUS at 11:03

## 2022-03-18 RX ADMIN — SODIUM CHLORIDE, SODIUM GLUCONATE, SODIUM ACETATE, POTASSIUM CHLORIDE, MAGNESIUM CHLORIDE, SODIUM PHOSPHATE, DIBASIC, AND POTASSIUM PHOSPHATE: .53; .5; .37; .037; .03; .012; .00082 INJECTION, SOLUTION INTRAVENOUS at 11:03

## 2022-03-18 RX ADMIN — FENTANYL CITRATE 50 MCG: 50 INJECTION, SOLUTION INTRAMUSCULAR; INTRAVENOUS at 10:03

## 2022-03-18 RX ADMIN — Medication 2 G: at 11:03

## 2022-03-18 RX ADMIN — ACETAMINOPHEN 650 MG: 650 SOLUTION ORAL at 11:03

## 2022-03-18 RX ADMIN — SODIUM CHLORIDE: 0.9 INJECTION, SOLUTION INTRAVENOUS at 10:03

## 2022-03-18 RX ADMIN — CALCIUM CHLORIDE 0.5 G: 100 INJECTION, SOLUTION INTRAVENOUS at 11:03

## 2022-03-18 RX ADMIN — DIPHENHYDRAMINE HYDROCHLORIDE 50 MG: 50 INJECTION INTRAMUSCULAR; INTRAVENOUS at 10:03

## 2022-03-18 RX ADMIN — CISATRACURIUM BESYLATE 14 MG: 10 INJECTION INTRAVENOUS at 10:03

## 2022-03-18 RX ADMIN — LIDOCAINE HYDROCHLORIDE 100 MG: 20 INJECTION, SOLUTION INTRAVENOUS at 10:03

## 2022-03-18 RX ADMIN — DEXTROSE 500 MG: 50 INJECTION, SOLUTION INTRAVENOUS at 10:03

## 2022-03-18 NOTE — TELEPHONE ENCOUNTER
ON-CALL NOTE    UNOS# [id number]    Received report from LESLY Arteaga, transplant coordinator, she reports patient is aware he is standby at home, instructed on PD dwell for possible culture.   Notified by Kevin Medina, , that Alverto Ramirez continues to remain on standby as back up candidate for organ offer. Patient is ware, he reports he last ate at 2000 on 3/17/22.   Patient notified of plan to remain on standby and states understanding.  Dialysis unit will need to be notified.    Update @1635: patient to proceed with admission for possible transplant as he remains backup. Patient verbalized understanding, instructions provided regarding admission, TSU 11th floor, he verbalized understanding and is aware he may have two visitors with him, that one may remain with him.     Notified TSU charge Vicki ([phone]), Jonas mckeon sup ([phone]); Michaelle - admit office ([id number]) [facility]#[id number]; HLA-Shanita (1707), DESIREE Corral ROBERT ([id number]).     Update: 1828 - patient has become primary for kidney, notified patient, Kathia (for Vicki) TSU charge, Jonas (house sup), Elham (OR) - who is requesting a call when patient is in bed/admitted, ROBERT LESLY Estrada, she is aware he will need PD cultures and of recipient OR time.    Update: 1915 - Carla in OR is aware that patient has arrived and is admitted for transplant, room 42889.    Update: 3/19/22@0865 - notified Jessica in HLA of completed transplant, need for retrospective cxm. Verified.

## 2022-03-18 NOTE — TELEPHONE ENCOUNTER
ON Call note  UNOS ID [id number]  Received call from Kevin Medina that patient to be NPO. Patient remains on standby until the primary candidate's crossmatches are complete. I called patient and notified him to have nothing to eat or drink starting at 9:00 AM. He verbalized understanding. Crossmatch results should be back by noon.

## 2022-03-18 NOTE — TELEPHONE ENCOUNTER
ON-CALL NOTE    UNOS# [id number]    Notified by Sudarshan Medina, , that Alverto Ramirez is eligible for kidney offer.  Spoke with patient and identified no acute medical issues with telephone assessment. Protocol script read to patient regarding PHS risk criteria donor offer . Patient verbalized understanding, all questions answered, patient accepts organ offer. Notified by Sudarshan Medina that virtual crossmatch is negative.  Current sample of blood is available from date 3/3/2022 for crossmatch.  Patient reports no sensitizing event since last blood sample for PRA received.  Patient was asked if they have had a positive COVID-19 test or if they have any signs or symptoms. Informed patient that they will be tested for COVID-19 upon arrival to the hospital, unless have a previous positive result. If tested and result is positive, the transplant will not be able to occur, they will be inactivated on the wait list for 28 days per protocol and required to quarantine.     Patient notified of plan to remain at home on standby and wait for further instructions and updates and states understanding.

## 2022-03-18 NOTE — HPI
Mr. Ramirez is a 71 y.o. year old Black or  male with ESRD secondary to HTN admitted for kidney transplant. He has been on the wait list for a kidney transplant at [facility] since 3/30/2015. Patient is currently on peritoneal dialysis started on 2/20/2017. Patient is dialyzing on cyclic peritoneal dialysis. Patient reports that he is tolerating dialysis well. He has a PD catheter. Denies peritonitis. UOP ~ 500 cc/day. Hx MGUS (f/u annually with hem/onc) and elevated PSA (stable, followed by urology). Patient denies any recent hospitalizations or ED visits. Dr Thomas will be the primary surgeon. Thymo induction. OR scheduled for 2000. Pre op labs and imaging pending.

## 2022-03-18 NOTE — TELEPHONE ENCOUNTER
At end of interview pt reports he and his wife/primary caregiver are approximately 5 minutes from [facility].    Spoke with pt and pts wife/primary caregiver by phone due to possible organ offer. Reviewed Transplant Psychosocial (conducted by Jong on October 21, 2021) in it's entirety with pt and pts wife by phone. Pt is driving in from [place] now and wife placed me on speaker phone.Both pt and wife were able to ask, answer and receive answers to all questions.     Both deny additional concerns and report are coping well with support from each other and from family members. Discussed previous calls with possible organ offers, feelings around waiting on transplantation, and hope for transplant. Provided supportive listening and encouragement. Discussed my availability as the on call transplant SW and the team's availability, as well as how to access.     Note:  Pt may have used [place] premium assistance and wife agrees to call [facility] to ask about premiums being paid, amounts and due dates for pt to assume costs in order to prevent lapse in coverage if pt receives a transplant.   Pt states continues to take Xanax 1mg or less for anxiety as needed in order to sleep and does not take Xanax daily.    Pt and wife report no changes to previous psychsocial evaluation, and confirmed all information listed including caregivers, transportation plan, back up caregiver plan (son), income and resources.     Pt states is no longer working part-time, only as needed. Son is managing pts family business (driving school)    Education, information and resources reviewed again today by phone. Both state having a clear understanding.     Pt and wife/caregiver report both are highly independent with all ADLs including driving.     Suitability for Transplant:  Pt remains a low risk candidate for organ transplant at this time due to past, recent and current psychosocial stability and effective supports and coping strategies in place.    Discharge  Plan:   Pt states plans to stay locally at [facility] under care of wife/caregiver who will transport pt to all appts as needed.  Adult son to serve as second caregiver if needed (see psychosocial). Adult son will bring pts supplies for apts, at time of hospital discharge.    Transplant SW remains available.    Pts niece Angy, 49 yo, lives on [address], drives - and pt is being cautious due to pandemic and prefers to stay at [facility] instead.

## 2022-03-18 NOTE — ASSESSMENT & PLAN NOTE
- ESRD secondary to HTN admitted for kidney transplant.   - Dr Thomas will be the primary surgeon.   - Thymo induction  - OR scheduled for 2000  - Pre op labs and imaging pending.

## 2022-03-19 LAB
ALBUMIN SERPL BCP-MCNC: 2.5 G/DL (ref 3.5–5.2)
ALBUMIN SERPL BCP-MCNC: 2.6 G/DL (ref 3.5–5.2)
ALBUMIN SERPL BCP-MCNC: 2.6 G/DL (ref 3.5–5.2)
ALP SERPL-CCNC: 95 U/L (ref 55–135)
ALT SERPL W/O P-5'-P-CCNC: 16 U/L (ref 10–44)
ANION GAP SERPL CALC-SCNC: 18 MMOL/L (ref 8–16)
ANION GAP SERPL CALC-SCNC: 19 MMOL/L (ref 8–16)
ANION GAP SERPL CALC-SCNC: 23 MMOL/L (ref 8–16)
ANISOCYTOSIS BLD QL SMEAR: SLIGHT
APPEARANCE FLD: CLEAR
AST SERPL-CCNC: 16 U/L (ref 10–40)
BASOPHILS # BLD AUTO: 0.01 K/UL (ref 0–0.2)
BASOPHILS # BLD AUTO: 0.01 K/UL (ref 0–0.2)
BASOPHILS NFR BLD: 0.1 % (ref 0–1.9)
BASOPHILS NFR BLD: 0.1 % (ref 0–1.9)
BILIRUB SERPL-MCNC: 0.4 MG/DL (ref 0.1–1)
BODY FLD TYPE: NORMAL
BUN SERPL-MCNC: 55 MG/DL (ref 8–23)
BUN SERPL-MCNC: 59 MG/DL (ref 8–23)
BUN SERPL-MCNC: 59 MG/DL (ref 8–23)
BURR CELLS BLD QL SMEAR: ABNORMAL
CALCIUM SERPL-MCNC: 7.4 MG/DL (ref 8.7–10.5)
CALCIUM SERPL-MCNC: 7.7 MG/DL (ref 8.7–10.5)
CALCIUM SERPL-MCNC: 7.7 MG/DL (ref 8.7–10.5)
CHLORIDE SERPL-SCNC: 103 MMOL/L (ref 95–110)
CHLORIDE SERPL-SCNC: 97 MMOL/L (ref 95–110)
CHLORIDE SERPL-SCNC: 98 MMOL/L (ref 95–110)
CO2 SERPL-SCNC: 15 MMOL/L (ref 23–29)
CO2 SERPL-SCNC: 18 MMOL/L (ref 23–29)
CO2 SERPL-SCNC: 19 MMOL/L (ref 23–29)
COLOR FLD: YELLOW
CREAT SERPL-MCNC: 12.1 MG/DL (ref 0.5–1.4)
CREAT SERPL-MCNC: 14.8 MG/DL (ref 0.5–1.4)
CREAT SERPL-MCNC: 16 MG/DL (ref 0.5–1.4)
DIFFERENTIAL METHOD: ABNORMAL
DIFFERENTIAL METHOD: ABNORMAL
EOSINOPHIL # BLD AUTO: 0 K/UL (ref 0–0.5)
EOSINOPHIL # BLD AUTO: 0 K/UL (ref 0–0.5)
EOSINOPHIL NFR BLD: 0 % (ref 0–8)
EOSINOPHIL NFR BLD: 0 % (ref 0–8)
EOSINOPHIL NFR FLD MANUAL: 2 %
ERYTHROCYTE [DISTWIDTH] IN BLOOD BY AUTOMATED COUNT: 14.7 % (ref 11.5–14.5)
ERYTHROCYTE [DISTWIDTH] IN BLOOD BY AUTOMATED COUNT: 15.2 % (ref 11.5–14.5)
EST. GFR  (AFRICAN AMERICAN): 3 ML/MIN/1.73 M^2
EST. GFR  (AFRICAN AMERICAN): 3.3 ML/MIN/1.73 M^2
EST. GFR  (AFRICAN AMERICAN): 4.2 ML/MIN/1.73 M^2
EST. GFR  (NON AFRICAN AMERICAN): 2.6 ML/MIN/1.73 M^2
EST. GFR  (NON AFRICAN AMERICAN): 2.9 ML/MIN/1.73 M^2
EST. GFR  (NON AFRICAN AMERICAN): 3.6 ML/MIN/1.73 M^2
GLUCOSE SERPL-MCNC: 161 MG/DL (ref 70–110)
GLUCOSE SERPL-MCNC: 169 MG/DL (ref 70–110)
GLUCOSE SERPL-MCNC: 178 MG/DL (ref 70–110)
GLUCOSE SERPL-MCNC: 179 MG/DL (ref 70–110)
GLUCOSE SERPL-MCNC: 182 MG/DL (ref 70–110)
GLUCOSE SERPL-MCNC: 91 MG/DL (ref 70–110)
HCO3 UR-SCNC: 20.6 MMOL/L (ref 24–28)
HCO3 UR-SCNC: 22.2 MMOL/L (ref 24–28)
HCO3 UR-SCNC: 24.3 MMOL/L (ref 24–28)
HCT VFR BLD AUTO: 28.5 % (ref 40–54)
HCT VFR BLD AUTO: 30.1 % (ref 40–54)
HCT VFR BLD AUTO: 30.2 % (ref 40–54)
HCT VFR BLD CALC: 27 %PCV (ref 36–54)
HCT VFR BLD CALC: 27 %PCV (ref 36–54)
HCT VFR BLD CALC: 28 %PCV (ref 36–54)
HGB BLD-MCNC: 10 G/DL (ref 14–18)
HGB BLD-MCNC: 10.1 G/DL (ref 14–18)
IMM GRANULOCYTES # BLD AUTO: 0.05 K/UL (ref 0–0.04)
IMM GRANULOCYTES # BLD AUTO: 0.08 K/UL (ref 0–0.04)
IMM GRANULOCYTES NFR BLD AUTO: 0.5 % (ref 0–0.5)
IMM GRANULOCYTES NFR BLD AUTO: 0.7 % (ref 0–0.5)
LYMPHOCYTES # BLD AUTO: 0.1 K/UL (ref 1–4.8)
LYMPHOCYTES # BLD AUTO: 0.1 K/UL (ref 1–4.8)
LYMPHOCYTES NFR BLD: 0.6 % (ref 18–48)
LYMPHOCYTES NFR BLD: 0.7 % (ref 18–48)
LYMPHOCYTES NFR FLD MANUAL: 23 %
MCH RBC QN AUTO: 29.5 PG (ref 27–31)
MCH RBC QN AUTO: 30.4 PG (ref 27–31)
MCHC RBC AUTO-ENTMCNC: 33.2 G/DL (ref 32–36)
MCHC RBC AUTO-ENTMCNC: 33.4 G/DL (ref 32–36)
MCV RBC AUTO: 88 FL (ref 82–98)
MCV RBC AUTO: 92 FL (ref 82–98)
MONOCYTES # BLD AUTO: 0.4 K/UL (ref 0.3–1)
MONOCYTES # BLD AUTO: 0.5 K/UL (ref 0.3–1)
MONOCYTES NFR BLD: 3.5 % (ref 4–15)
MONOCYTES NFR BLD: 4.7 % (ref 4–15)
MONOS+MACROS NFR FLD MANUAL: 66 %
NEUTROPHILS # BLD AUTO: 10.1 K/UL (ref 1.8–7.7)
NEUTROPHILS # BLD AUTO: 10.3 K/UL (ref 1.8–7.7)
NEUTROPHILS NFR BLD: 94.1 % (ref 38–73)
NEUTROPHILS NFR BLD: 95 % (ref 38–73)
NEUTROPHILS NFR FLD MANUAL: 9 %
NRBC BLD-RTO: 0 /100 WBC
NRBC BLD-RTO: 0 /100 WBC
PCO2 BLDA: 40.4 MMHG (ref 35–45)
PCO2 BLDA: 43.4 MMHG (ref 35–45)
PCO2 BLDA: 43.7 MMHG (ref 35–45)
PH SMN: 7.28 [PH] (ref 7.35–7.45)
PH SMN: 7.32 [PH] (ref 7.35–7.45)
PH SMN: 7.39 [PH] (ref 7.35–7.45)
PHOSPHATE SERPL-MCNC: 5.2 MG/DL (ref 2.7–4.5)
PHOSPHATE SERPL-MCNC: 7.8 MG/DL (ref 2.7–4.5)
PLATELET # BLD AUTO: 148 K/UL (ref 150–450)
PLATELET # BLD AUTO: 181 K/UL (ref 150–450)
PLATELET BLD QL SMEAR: ABNORMAL
PMV BLD AUTO: 11 FL (ref 9.2–12.9)
PMV BLD AUTO: 11.6 FL (ref 9.2–12.9)
PO2 BLDA: 192 MMHG (ref 80–100)
PO2 BLDA: 89 MMHG (ref 80–100)
PO2 BLDA: 98 MMHG (ref 80–100)
POC BE: -1 MMOL/L
POC BE: -4 MMOL/L
POC BE: -6 MMOL/L
POC IONIZED CALCIUM: 0.95 MMOL/L (ref 1.06–1.42)
POC IONIZED CALCIUM: 0.95 MMOL/L (ref 1.06–1.42)
POC IONIZED CALCIUM: 1.01 MMOL/L (ref 1.06–1.42)
POC SATURATED O2: 100 % (ref 95–100)
POC SATURATED O2: 96 % (ref 95–100)
POC SATURATED O2: 97 % (ref 95–100)
POC TCO2: 22 MMOL/L (ref 23–27)
POC TCO2: 24 MMOL/L (ref 23–27)
POC TCO2: 26 MMOL/L (ref 23–27)
POCT GLUCOSE: 146 MG/DL (ref 70–110)
POIKILOCYTOSIS BLD QL SMEAR: ABNORMAL
POTASSIUM BLD-SCNC: 3 MMOL/L (ref 3.5–5.1)
POTASSIUM BLD-SCNC: 3 MMOL/L (ref 3.5–5.1)
POTASSIUM BLD-SCNC: 3.1 MMOL/L (ref 3.5–5.1)
POTASSIUM SERPL-SCNC: 3.4 MMOL/L (ref 3.5–5.1)
POTASSIUM SERPL-SCNC: 3.5 MMOL/L (ref 3.5–5.1)
POTASSIUM SERPL-SCNC: 3.8 MMOL/L (ref 3.5–5.1)
PROT SERPL-MCNC: 6.2 G/DL (ref 6–8.4)
RBC # BLD AUTO: 3.29 M/UL (ref 4.6–6.2)
RBC # BLD AUTO: 3.42 M/UL (ref 4.6–6.2)
SAMPLE: ABNORMAL
SODIUM BLD-SCNC: 132 MMOL/L (ref 136–145)
SODIUM BLD-SCNC: 134 MMOL/L (ref 136–145)
SODIUM BLD-SCNC: 135 MMOL/L (ref 136–145)
SODIUM SERPL-SCNC: 135 MMOL/L (ref 136–145)
SODIUM SERPL-SCNC: 136 MMOL/L (ref 136–145)
SODIUM SERPL-SCNC: 139 MMOL/L (ref 136–145)
WBC # BLD AUTO: 10.78 K/UL (ref 3.9–12.7)
WBC # BLD AUTO: 10.81 K/UL (ref 3.9–12.7)
WBC # FLD: 10 /CU MM

## 2022-03-19 PROCEDURE — 25000003 PHARM REV CODE 250: Mod: NTX | Performed by: PHYSICIAN ASSISTANT

## 2022-03-19 PROCEDURE — 25000003 PHARM REV CODE 250: Performed by: ANESTHESIOLOGY

## 2022-03-19 PROCEDURE — 20600001 HC STEP DOWN PRIVATE ROOM

## 2022-03-19 PROCEDURE — 81200001 HC KIDNEY ACQUISITION - CADAVER

## 2022-03-19 PROCEDURE — S5010 5% DEXTROSE AND 0.45% SALINE: HCPCS | Performed by: TRANSPLANT SURGERY

## 2022-03-19 PROCEDURE — 36415 COLL VENOUS BLD VENIPUNCTURE: CPT | Performed by: STUDENT IN AN ORGANIZED HEALTH CARE EDUCATION/TRAINING PROGRAM

## 2022-03-19 PROCEDURE — 36415 COLL VENOUS BLD VENIPUNCTURE: CPT | Performed by: TRANSPLANT SURGERY

## 2022-03-19 PROCEDURE — 25000003 PHARM REV CODE 250: Performed by: TRANSPLANT SURGERY

## 2022-03-19 PROCEDURE — 63600175 PHARM REV CODE 636 W HCPCS: Performed by: TRANSPLANT SURGERY

## 2022-03-19 PROCEDURE — 63600175 PHARM REV CODE 636 W HCPCS: Performed by: NURSE ANESTHETIST, CERTIFIED REGISTERED

## 2022-03-19 PROCEDURE — 25000003 PHARM REV CODE 250: Performed by: PHYSICIAN ASSISTANT

## 2022-03-19 PROCEDURE — 63600175 PHARM REV CODE 636 W HCPCS: Mod: NTX | Performed by: PHYSICIAN ASSISTANT

## 2022-03-19 PROCEDURE — 80069 RENAL FUNCTION PANEL: CPT | Mod: 91 | Performed by: TRANSPLANT SURGERY

## 2022-03-19 PROCEDURE — 63600175 PHARM REV CODE 636 W HCPCS: Performed by: ANESTHESIOLOGY

## 2022-03-19 PROCEDURE — 25000003 PHARM REV CODE 250: Performed by: NURSE ANESTHETIST, CERTIFIED REGISTERED

## 2022-03-19 PROCEDURE — 85025 COMPLETE CBC W/AUTO DIFF WBC: CPT | Mod: 91 | Performed by: NURSE PRACTITIONER

## 2022-03-19 PROCEDURE — 85014 HEMATOCRIT: CPT | Performed by: TRANSPLANT SURGERY

## 2022-03-19 PROCEDURE — 36415 COLL VENOUS BLD VENIPUNCTURE: CPT | Performed by: NURSE PRACTITIONER

## 2022-03-19 PROCEDURE — 81300011 HC KIDNEY TRANSPORT, FLIGHT WITHIN 701-1000 MILES

## 2022-03-19 PROCEDURE — 85025 COMPLETE CBC W/AUTO DIFF WBC: CPT | Performed by: STUDENT IN AN ORGANIZED HEALTH CARE EDUCATION/TRAINING PROGRAM

## 2022-03-19 PROCEDURE — 80053 COMPREHEN METABOLIC PANEL: CPT | Performed by: NURSE PRACTITIONER

## 2022-03-19 RX ORDER — MANNITOL 250 MG/ML
INJECTION, SOLUTION INTRAVENOUS
Status: DISCONTINUED | OUTPATIENT
Start: 2022-03-19 | End: 2022-03-19

## 2022-03-19 RX ORDER — HEPARIN SODIUM 5000 [USP'U]/ML
5000 INJECTION, SOLUTION INTRAVENOUS; SUBCUTANEOUS EVERY 8 HOURS
Status: DISCONTINUED | OUTPATIENT
Start: 2022-03-19 | End: 2022-03-21 | Stop reason: HOSPADM

## 2022-03-19 RX ORDER — NEOSTIGMINE METHYLSULFATE 0.5 MG/ML
INJECTION, SOLUTION INTRAVENOUS
Status: DISCONTINUED | OUTPATIENT
Start: 2022-03-19 | End: 2022-03-19

## 2022-03-19 RX ORDER — TRAMADOL HYDROCHLORIDE 50 MG/1
50 TABLET ORAL EVERY 6 HOURS PRN
Status: DISCONTINUED | OUTPATIENT
Start: 2022-03-19 | End: 2022-03-21 | Stop reason: HOSPADM

## 2022-03-19 RX ORDER — METHYLPREDNISOLONE SOD SUCC 125 MG
250 VIAL (EA) INJECTION ONCE
Status: COMPLETED | OUTPATIENT
Start: 2022-03-20 | End: 2022-03-20

## 2022-03-19 RX ORDER — IBUPROFEN 200 MG
16 TABLET ORAL
Status: DISCONTINUED | OUTPATIENT
Start: 2022-03-19 | End: 2022-03-21 | Stop reason: HOSPADM

## 2022-03-19 RX ORDER — EPINEPHRINE 1 MG/ML
1 INJECTION, SOLUTION INTRACARDIAC; INTRAMUSCULAR; INTRAVENOUS; SUBCUTANEOUS ONCE AS NEEDED
Status: DISCONTINUED | OUTPATIENT
Start: 2022-03-19 | End: 2022-03-21 | Stop reason: HOSPADM

## 2022-03-19 RX ORDER — FENTANYL CITRATE 50 UG/ML
25 INJECTION, SOLUTION INTRAMUSCULAR; INTRAVENOUS EVERY 5 MIN PRN
Status: DISCONTINUED | OUTPATIENT
Start: 2022-03-19 | End: 2022-03-19 | Stop reason: HOSPADM

## 2022-03-19 RX ORDER — METOPROLOL TARTRATE 50 MG/1
50 TABLET ORAL DAILY
Status: DISCONTINUED | OUTPATIENT
Start: 2022-03-19 | End: 2022-03-21

## 2022-03-19 RX ORDER — OXYCODONE HYDROCHLORIDE 5 MG/1
5 TABLET ORAL EVERY 6 HOURS PRN
Status: DISCONTINUED | OUTPATIENT
Start: 2022-03-19 | End: 2022-03-21 | Stop reason: HOSPADM

## 2022-03-19 RX ORDER — KETAMINE HCL IN 0.9 % NACL 50 MG/5 ML
SYRINGE (ML) INTRAVENOUS
Status: DISCONTINUED | OUTPATIENT
Start: 2022-03-19 | End: 2022-03-19

## 2022-03-19 RX ORDER — ONDANSETRON 2 MG/ML
4 INJECTION INTRAMUSCULAR; INTRAVENOUS DAILY PRN
Status: DISCONTINUED | OUTPATIENT
Start: 2022-03-19 | End: 2022-03-19 | Stop reason: HOSPADM

## 2022-03-19 RX ORDER — HYDRALAZINE HYDROCHLORIDE 20 MG/ML
10 INJECTION INTRAMUSCULAR; INTRAVENOUS EVERY 8 HOURS PRN
Status: DISCONTINUED | OUTPATIENT
Start: 2022-03-19 | End: 2022-03-21

## 2022-03-19 RX ORDER — FUROSEMIDE 10 MG/ML
INJECTION INTRAMUSCULAR; INTRAVENOUS
Status: DISCONTINUED | OUTPATIENT
Start: 2022-03-19 | End: 2022-03-19

## 2022-03-19 RX ORDER — DIPHENHYDRAMINE HCL 25 MG
25 CAPSULE ORAL
Status: COMPLETED | OUTPATIENT
Start: 2022-03-20 | End: 2022-03-21

## 2022-03-19 RX ORDER — GLUCAGON 1 MG
1 KIT INJECTION CONTINUOUS PRN
Status: DISCONTINUED | OUTPATIENT
Start: 2022-03-19 | End: 2022-03-21 | Stop reason: HOSPADM

## 2022-03-19 RX ORDER — ACETAMINOPHEN 500 MG
1000 TABLET ORAL EVERY 8 HOURS
Status: COMPLETED | OUTPATIENT
Start: 2022-03-19 | End: 2022-03-20

## 2022-03-19 RX ORDER — DEXMEDETOMIDINE HYDROCHLORIDE 100 UG/ML
INJECTION, SOLUTION INTRAVENOUS
Status: DISCONTINUED | OUTPATIENT
Start: 2022-03-19 | End: 2022-03-19

## 2022-03-19 RX ORDER — DIPHENHYDRAMINE HCL 50 MG
50 CAPSULE ORAL ONCE AS NEEDED
Status: DISCONTINUED | OUTPATIENT
Start: 2022-03-19 | End: 2022-03-21 | Stop reason: HOSPADM

## 2022-03-19 RX ORDER — ONDANSETRON 2 MG/ML
4 INJECTION INTRAMUSCULAR; INTRAVENOUS ONCE AS NEEDED
Status: DISCONTINUED | OUTPATIENT
Start: 2022-03-19 | End: 2022-03-21 | Stop reason: HOSPADM

## 2022-03-19 RX ORDER — BUPIVACAINE HYDROCHLORIDE 2.5 MG/ML
INJECTION, SOLUTION EPIDURAL; INFILTRATION; INTRACAUDAL
Status: DISCONTINUED | OUTPATIENT
Start: 2022-03-19 | End: 2022-03-19 | Stop reason: HOSPADM

## 2022-03-19 RX ORDER — BISACODYL 5 MG
10 TABLET, DELAYED RELEASE (ENTERIC COATED) ORAL NIGHTLY
Status: DISCONTINUED | OUTPATIENT
Start: 2022-03-19 | End: 2022-03-21 | Stop reason: HOSPADM

## 2022-03-19 RX ORDER — METHYLPREDNISOLONE SOD SUCC 125 MG
125 VIAL (EA) INJECTION ONCE
Status: COMPLETED | OUTPATIENT
Start: 2022-03-21 | End: 2022-03-21

## 2022-03-19 RX ORDER — SODIUM CHLORIDE 9 MG/ML
INJECTION, SOLUTION INTRAVENOUS CONTINUOUS
Status: DISCONTINUED | OUTPATIENT
Start: 2022-03-19 | End: 2022-03-20

## 2022-03-19 RX ORDER — TACROLIMUS 1 MG/1
3 CAPSULE ORAL 2 TIMES DAILY
Status: DISCONTINUED | OUTPATIENT
Start: 2022-03-19 | End: 2022-03-20

## 2022-03-19 RX ORDER — PHENYLEPHRINE HCL IN 0.9% NACL 1 MG/10 ML
SYRINGE (ML) INTRAVENOUS
Status: DISPENSED
Start: 2022-03-19 | End: 2022-03-19

## 2022-03-19 RX ORDER — ACETAMINOPHEN 325 MG/1
650 TABLET ORAL
Status: DISCONTINUED | OUTPATIENT
Start: 2022-03-20 | End: 2022-03-21 | Stop reason: HOSPADM

## 2022-03-19 RX ORDER — IBUPROFEN 200 MG
24 TABLET ORAL
Status: DISCONTINUED | OUTPATIENT
Start: 2022-03-19 | End: 2022-03-21 | Stop reason: HOSPADM

## 2022-03-19 RX ORDER — PREDNISONE 20 MG/1
20 TABLET ORAL DAILY
Status: DISCONTINUED | OUTPATIENT
Start: 2022-03-22 | End: 2022-03-21 | Stop reason: HOSPADM

## 2022-03-19 RX ORDER — CEFAZOLIN SODIUM 2 G/50ML
2 SOLUTION INTRAVENOUS
Status: COMPLETED | OUTPATIENT
Start: 2022-03-19 | End: 2022-03-19

## 2022-03-19 RX ORDER — MUPIROCIN 20 MG/G
1 OINTMENT TOPICAL 2 TIMES DAILY
Status: DISCONTINUED | OUTPATIENT
Start: 2022-03-19 | End: 2022-03-21 | Stop reason: HOSPADM

## 2022-03-19 RX ORDER — SULFAMETHOXAZOLE AND TRIMETHOPRIM 400; 80 MG/1; MG/1
1 TABLET ORAL EVERY MORNING
Status: DISCONTINUED | OUTPATIENT
Start: 2022-03-20 | End: 2022-03-21 | Stop reason: HOSPADM

## 2022-03-19 RX ORDER — NYSTATIN 100000 [USP'U]/ML
500000 SUSPENSION ORAL
Status: DISCONTINUED | OUTPATIENT
Start: 2022-03-19 | End: 2022-03-21 | Stop reason: HOSPADM

## 2022-03-19 RX ORDER — FAMOTIDINE 20 MG/1
20 TABLET, FILM COATED ORAL NIGHTLY
Status: DISCONTINUED | OUTPATIENT
Start: 2022-03-19 | End: 2022-03-21 | Stop reason: HOSPADM

## 2022-03-19 RX ORDER — DEXTROSE MONOHYDRATE AND SODIUM CHLORIDE 5; .45 G/100ML; G/100ML
INJECTION, SOLUTION INTRAVENOUS CONTINUOUS
Status: DISCONTINUED | OUTPATIENT
Start: 2022-03-19 | End: 2022-03-20

## 2022-03-19 RX ORDER — MYCOPHENOLATE MOFETIL 250 MG/1
1000 CAPSULE ORAL 2 TIMES DAILY
Status: DISCONTINUED | OUTPATIENT
Start: 2022-03-19 | End: 2022-03-21 | Stop reason: HOSPADM

## 2022-03-19 RX ORDER — VALGANCICLOVIR 450 MG/1
450 TABLET, FILM COATED ORAL EVERY MORNING
Status: DISCONTINUED | OUTPATIENT
Start: 2022-03-29 | End: 2022-03-21 | Stop reason: HOSPADM

## 2022-03-19 RX ORDER — DOCUSATE SODIUM 100 MG/1
100 CAPSULE, LIQUID FILLED ORAL 3 TIMES DAILY
Status: DISCONTINUED | OUTPATIENT
Start: 2022-03-19 | End: 2022-03-21 | Stop reason: HOSPADM

## 2022-03-19 RX ORDER — ONDANSETRON 2 MG/ML
INJECTION INTRAMUSCULAR; INTRAVENOUS
Status: DISCONTINUED | OUTPATIENT
Start: 2022-03-19 | End: 2022-03-19

## 2022-03-19 RX ORDER — PHENYLEPHRINE HYDROCHLORIDE 10 MG/ML
INJECTION INTRAVENOUS
Status: DISCONTINUED | OUTPATIENT
Start: 2022-03-19 | End: 2022-03-19

## 2022-03-19 RX ADMIN — FAMOTIDINE 20 MG: 20 TABLET ORAL at 09:03

## 2022-03-19 RX ADMIN — DOCUSATE SODIUM 100 MG: 100 CAPSULE ORAL at 08:03

## 2022-03-19 RX ADMIN — CISATRACURIUM BESYLATE 6 MG: 10 INJECTION INTRAVENOUS at 12:03

## 2022-03-19 RX ADMIN — OXYCODONE 5 MG: 5 TABLET ORAL at 08:03

## 2022-03-19 RX ADMIN — MYCOPHENOLATE MOFETIL 1000 MG: 250 CAPSULE ORAL at 08:03

## 2022-03-19 RX ADMIN — MUPIROCIN 1 G: 20 OINTMENT TOPICAL at 08:03

## 2022-03-19 RX ADMIN — Medication 20 MG: at 01:03

## 2022-03-19 RX ADMIN — ACETAMINOPHEN 1000 MG: 500 TABLET ORAL at 06:03

## 2022-03-19 RX ADMIN — DOCUSATE SODIUM 100 MG: 100 CAPSULE ORAL at 09:03

## 2022-03-19 RX ADMIN — GLYCOPYRROLATE 0.4 MG: 0.2 INJECTION, SOLUTION INTRAMUSCULAR; INTRAVITREAL at 03:03

## 2022-03-19 RX ADMIN — TACROLIMUS 3 MG: 1 CAPSULE ORAL at 05:03

## 2022-03-19 RX ADMIN — NYSTATIN 500000 UNITS: 500000 SUSPENSION ORAL at 02:03

## 2022-03-19 RX ADMIN — CEFAZOLIN SODIUM 2 G: 2 SOLUTION INTRAVENOUS at 12:03

## 2022-03-19 RX ADMIN — CISATRACURIUM BESYLATE 6 MG: 10 INJECTION INTRAVENOUS at 01:03

## 2022-03-19 RX ADMIN — NYSTATIN 500000 UNITS: 500000 SUSPENSION ORAL at 09:03

## 2022-03-19 RX ADMIN — HEPARIN SODIUM 5000 UNITS: 5000 INJECTION INTRAVENOUS; SUBCUTANEOUS at 09:03

## 2022-03-19 RX ADMIN — DEXTROSE AND SODIUM CHLORIDE: 5; .45 INJECTION, SOLUTION INTRAVENOUS at 03:03

## 2022-03-19 RX ADMIN — CISATRACURIUM BESYLATE 6 MG: 10 INJECTION INTRAVENOUS at 02:03

## 2022-03-19 RX ADMIN — OXYCODONE 5 MG: 5 TABLET ORAL at 10:03

## 2022-03-19 RX ADMIN — MYCOPHENOLATE MOFETIL 1000 MG: 250 CAPSULE ORAL at 09:03

## 2022-03-19 RX ADMIN — SODIUM CHLORIDE 1000 ML: 0.9 INJECTION, SOLUTION INTRAVENOUS at 09:03

## 2022-03-19 RX ADMIN — FENTANYL CITRATE 50 MCG: 50 INJECTION, SOLUTION INTRAMUSCULAR; INTRAVENOUS at 02:03

## 2022-03-19 RX ADMIN — NEOSTIGMINE METHYLSULFATE 5 MG: 0.5 INJECTION INTRAVENOUS at 03:03

## 2022-03-19 RX ADMIN — FENTANYL CITRATE 25 MCG: 50 INJECTION INTRAMUSCULAR; INTRAVENOUS at 04:03

## 2022-03-19 RX ADMIN — ACETAMINOPHEN 1000 MG: 500 TABLET ORAL at 10:03

## 2022-03-19 RX ADMIN — CALCIUM CHLORIDE 0.5 G: 100 INJECTION, SOLUTION INTRAVENOUS at 01:03

## 2022-03-19 RX ADMIN — DEXMEDETOMIDINE HYDROCHLORIDE 8 MCG: 100 INJECTION, SOLUTION, CONCENTRATE INTRAVENOUS at 02:03

## 2022-03-19 RX ADMIN — HEPARIN SODIUM 5000 UNITS: 5000 INJECTION INTRAVENOUS; SUBCUTANEOUS at 06:03

## 2022-03-19 RX ADMIN — ACETAMINOPHEN 1000 MG: 500 TABLET ORAL at 02:03

## 2022-03-19 RX ADMIN — PHENYLEPHRINE HYDROCHLORIDE 200 MCG: 10 INJECTION INTRAVENOUS at 03:03

## 2022-03-19 RX ADMIN — OXYCODONE 5 MG: 5 TABLET ORAL at 02:03

## 2022-03-19 RX ADMIN — BISACODYL 10 MG: 5 TABLET, COATED ORAL at 09:03

## 2022-03-19 RX ADMIN — TACROLIMUS 3 MG: 1 CAPSULE ORAL at 08:03

## 2022-03-19 RX ADMIN — Medication 2 G: at 03:03

## 2022-03-19 RX ADMIN — ANTI-THYMOCYTE GLOBULIN (RABBIT) 125 MG: 5 INJECTION, POWDER, LYOPHILIZED, FOR SOLUTION INTRAVENOUS at 12:03

## 2022-03-19 RX ADMIN — MANNITOL 25 G: 12.5 INJECTION, SOLUTION INTRAVENOUS at 12:03

## 2022-03-19 RX ADMIN — THERA TABS 1 TABLET: TAB at 08:03

## 2022-03-19 RX ADMIN — MUPIROCIN 1 G: 20 OINTMENT TOPICAL at 09:03

## 2022-03-19 RX ADMIN — Medication 30 MG: at 12:03

## 2022-03-19 RX ADMIN — DOCUSATE SODIUM 100 MG: 100 CAPSULE ORAL at 02:03

## 2022-03-19 RX ADMIN — CEFAZOLIN SODIUM 2 G: 2 SOLUTION INTRAVENOUS at 08:03

## 2022-03-19 RX ADMIN — DEXTROSE AND SODIUM CHLORIDE: 5; .45 INJECTION, SOLUTION INTRAVENOUS at 11:03

## 2022-03-19 RX ADMIN — TRAMADOL HYDROCHLORIDE 50 MG: 50 TABLET, COATED ORAL at 10:03

## 2022-03-19 RX ADMIN — SODIUM CHLORIDE, SODIUM GLUCONATE, SODIUM ACETATE, POTASSIUM CHLORIDE, MAGNESIUM CHLORIDE, SODIUM PHOSPHATE, DIBASIC, AND POTASSIUM PHOSPHATE: .53; .5; .37; .037; .03; .012; .00082 INJECTION, SOLUTION INTRAVENOUS at 02:03

## 2022-03-19 RX ADMIN — SODIUM CHLORIDE 500 ML: 0.9 INJECTION, SOLUTION INTRAVENOUS at 05:03

## 2022-03-19 RX ADMIN — ONDANSETRON HYDROCHLORIDE 4 MG: 2 INJECTION INTRAMUSCULAR; INTRAVENOUS at 02:03

## 2022-03-19 RX ADMIN — PHENYLEPHRINE HYDROCHLORIDE 100 MCG: 10 INJECTION INTRAVENOUS at 03:03

## 2022-03-19 RX ADMIN — HEPARIN SODIUM 5000 UNITS: 5000 INJECTION INTRAVENOUS; SUBCUTANEOUS at 02:03

## 2022-03-19 RX ADMIN — FUROSEMIDE 100 MG: 10 INJECTION, SOLUTION INTRAVENOUS at 12:03

## 2022-03-19 NOTE — PROGRESS NOTES
03/18/22 1913   Manual Peritoneal Dialysis   Manual Drain Volume (mL) 1500 mL   Effluent Appearance Clear   Manual PD Total UF (mL) 1500 mL   Manual Treatment Comments Manual drain upon arrival   Manual PD drain upon arrival for kidney transplant. PD sample also drawn and sent to lab.

## 2022-03-19 NOTE — ANESTHESIA POSTPROCEDURE EVALUATION
Anesthesia Post Evaluation    Patient: Alverto Ramirez Jr.    Procedure(s) Performed: Procedure(s) (LRB):  TRANSPLANT, KIDNEY (Right)    Final Anesthesia Type: general      Patient location during evaluation: PACU  Patient participation: Yes- Able to Participate  Level of consciousness: awake and alert  Post-procedure vital signs: reviewed and stable  Pain management: adequate  Airway patency: patent    PONV status at discharge: No PONV  Anesthetic complications: no      Cardiovascular status: blood pressure returned to baseline  Respiratory status: nasal cannula and spontaneous ventilation  Hydration status: euvolemic  Follow-up not needed.          Vitals Value Taken Time   /59 03/19/22 0600   Temp 36.6 °C (97.9 °F) 03/19/22 0600   Pulse 61 03/19/22 0608   Resp 15 03/19/22 0603   SpO2 94 % 03/19/22 0608   Vitals shown include unvalidated device data.      No case tracking events are documented in the log.      Pain/Belkys Score: Pain Rating Prior to Med Admin: 3 (3/19/2022  4:14 AM)  Pain Rating Post Med Admin: 0 (3/19/2022  4:44 AM)  Belkys Score: 10 (3/19/2022  5:45 AM)

## 2022-03-19 NOTE — ANESTHESIA PROCEDURE NOTES
Intubation    Date/Time: 3/18/2022 10:53 PM  Performed by: Khushi Harris CRNA  Authorized by: Lia Sheldon MD     Intubation:     Induction:  Intravenous    Intubated:  Postinduction    Mask Ventilation:  Moderately difficult with oral airway (2 person bag mask)    Attempts:  1    Attempted By:  CRNA    Method of Intubation:  Direct    Blade:  Muñoz 2    Laryngeal View Grade: Grade I - full view of cords      Difficult Airway Encountered?: No      Complications:  None    Airway Device:  Oral endotracheal tube    Airway Device Size:  7.5    Style/Cuff Inflation:  Cuffed (inflated to minimal occlusive pressure)    Secured at:  The lips    Placement Verified By:  Capnometry    Complicating Factors:  None    Findings Post-Intubation:  BS equal bilateral and atraumatic/condition of teeth unchanged

## 2022-03-19 NOTE — OP NOTE
Operative Report    Date of Procedure: 3/18/2022  Date of Transplant (UNOS): 3/19/22    Surgeons:  Surgeon(s) and Role:     * Victoriano Thomas MD - Primary     * Marge Smyth MD - Assisting    First Assistant Attestation:  The Transplant Fellow served as the assistant    Pre-operative Diagnosis: ESRD, requiring chronic dialysis secondary to Hypertensive Nephrosclerosis  Post-operative Diagnosis: Same    Procedure(s) Performed:   1. Back Table Preparation of Right Kidney    2. Donation after Brain Death Kidney transplant    Anesthesia: General endotracheal    Preamble  Indications and Patient Counseling: The patient is a 71 y.o. year-old male with end-stage kidney disease secondary to Hypertensive Nephrosclerosis who has been evaluated for a kidney transplant.  The procedure was thoroughly discussed with the patient, including potential risks, complications, and alternatives.  Specific complications mentioned included death, graft non-function, bleeding, infection, and rejection, as well as the possibility of other complications not specifically mentioned.    Donor Risk Factors: Prior to the operation, the patient was advised of any donor-specific risk factors requiring specific disclosure.  Factors in this case included PHS risk criteria .      Specific PHS donor risk criteria for the organ donor include:  Drug injection for nonmedical reasons    All questions were answered, the patient voiced appropriate understanding, and he agreed to proceed with the planned procedure.    ABO Confirmation: Immediately following arrival of the donor organ and prior to implantation, a formal ABO confirmation was done according to hospital and UNOS policies.  I confirmed the UNOS ID number ([id number]) of the donor organ and the donor and recipient ABO types, directly verifying these data by comparison with the UNOS Match Run report ([id number]).  This confirmation was personally done by an attending surgeon and circulating nurse, and is  officially documented elsewhere.    Time-Out: A complete time out was carried out prior to incision, with confirmation of patient identity, correct procedure, correct operative site, appropriate antibiotic prophylaxis, review of any known allergies, and presence of all needed equipment.    Procedure in Detail  Prior to starting the operation, the right kidney  was prepared on the back table. Arterial anatomy was single. Venous anatomy was single. Ureteral anatomy was single. Back table vascular reconstruction was not required .  Unneeded fat was removed from the kidney, the vessels were cleaned of adherent tissue and tested for leaks, and the kidney was maintained at ice temperature in organ preservation solution until it was brought to the operative field.     The patient was brought into the operating room and placed in a supine position on the OR table.  After the induction of general endotracheal anesthesia, lines were placed by the anesthesiologist.  The urinary bladder was catheterized and irrigated with antibiotic solution.  There was no tension on the axillae and all pressure points were padded.  Sequential compression boots were used as were Charisma Huggers.  The abdomen was prepped and draped in the usual sterile fashion.  Skin was incised over the right with a knife and deepened with electrocautery.  The peritoneum and its contents were swept medially, exposing the right external iliac artery and the right common iliac vein.  The Bookwalter retractor was used to provide exposure.  Overlying lymphatics were ligated or cauterized and the vessels were dissected free for a length compatible with anastomosis. There was severe calcific atherosclerosis involving both the right common and external iliac artery, both of which were densely adherent to the iliac vein and surrounding tissue.  This required tedious dissection.  Eventually a suitable segment of ~3cm of iliac artery was identified that would allow for the  anastomosis.   The kidney was brought to the OR table at 3/19/2022 00:27 AM.  Venous control was obtained with a vascular clamp.  A venotomy was made, the vein irrigated, and an end renal to right common iliac vein anastomosis was created with 5-0 polypropylene.  With the degree of arterial disease, it was difficult to obtain proximal and distal control of the artery.  As a result, a Derra clamp was used to obtain control just distal to the takeoff of the internal iliac, that was relatively free of calcific plaque.    Arteriotomy was made, the artery irrigated, and an aortic patch to right external iliac artery anastomosis was created with 6-0 polypropylene.  The kidney was unclamped and reperfused at 3/19/2022  1:06 AM.  Reperfusion quality was good. Intraoperative urine production was not observed.  After hemostasis was obtained, a Lich uretero-neocystostomy was created.  The bladder was filled and identified, opened, and the anastomosis created using 6-0 PDS.  The bladder muscle was closed over the distal ureter to create an antireflux tunnel.  A ureteral stent was used.  With the kidney well perfused and sitting appropriately without tension on the anastomoses, viscera were replaced in their usual position.  The wound was closed after a final check for hemostasis.  Overall, the graft quality was assessed to be good.    After closure of the Mccoy incision, the PD catheter was removed starting with excision of the skin around the exit site.   The PD cathter cuffs were freed after making a counter incision at the site of entry to the peritoneal cavity.  The catheter was removed in its entirety and the fascia closed with 0 PDS.  The skin over the counterincision was closed and the PD catheter exit site was left open and packed with a single piece of packing tape.     At the end of the case the needle, sponge and instrument counts were all correct.  Sterile dressings were applied and the patient was brought to the  recovery room/ICU in good condition.    Estimated Blood Loss: 150 mL  Fluids Administered:    Drains: 19f Gianni drains x 1  Specimens: none    Findings:    Organ Transplanted: Right Kidney    Arterial Anatomy: single  Number of Arteries: 1  Configuration of Multiple Arteries: not applicable  Venous Anatomy: single  Number of Veins: 1  Ureteral Anatomy: single  Number of Ureters: 1  Reperfusion Quality: good  Overall Graft Quality: good  Intraoperative Urine Production: no  Banegas: not to be removed before 2 days.  Ureteral Stent: Yes    Ischemic Times:   Anastomosis (warm ischemia) time: 39 minutes   Cold ischemia time: 2,074 minutes  Total ischemia time: 1393 minutes    Donor Data:  ExploretripOS ID:  [id number]  UNOS Match Run:  [id number]  Donor Type:  Donation after Brain Death  Donor CMV Status:  Positive  Donor HBcAB:  Negative  Donor HCV Status:  Negative

## 2022-03-19 NOTE — ANESTHESIA PREPROCEDURE EVALUATION
[facility]  Anesthesia Pre-Operative Evaluation         Patient Name: Alverto Ramirez Jr.  YOB: 1950  MRN: [id number]    SUBJECTIVE:     Pre-operative evaluation for Procedure(s) (LRB):  TRANSPLANT, KIDNEY (N/A)     03/18/2022    Alverto Ramirez Jr. is a 71 y.o. male w/ a significant PMHx of HTN, HLD, ESRD with hyperparathyroidism (on peritoneal dialysis), anxiety.  Also with functioning LUE AVF.    Patient now presents for the above procedure(s).      LDA:        Peripheral IV - Single Lumen 03/18/22 1936 20 G Anterior;Right Forearm (Active)   Site Assessment Clean;Dry;Intact;No redness;No swelling;No warmth;No drainage 03/18/22 1936   Extremity Assessment Distal to IV No abnormal discoloration;No redness;No swelling;No warmth 03/18/22 1936   Line Status Blood return noted;Flushed;Saline locked 03/18/22 1936   Dressing Status Clean;Dry;Intact;New drainage 03/18/22 1936   Dressing Intervention First dressing 03/18/22 1936   Dressing Change Due 03/22/22 03/18/22 1936   Site Change Due 03/22/22 03/18/22 1936   Number of days: 0       Prev airway: None documented.    Drips: None documented.      Patient Active Problem List   Diagnosis    HTN (hypertension) diagnosed in his 40s    Proteinuria    Hematuria    Monoclonal gammopathy    Hyperlipidemia    Phobic anxiety disorder - Claustrophobia    Elevated PSA    Colon cancer screening    HPTH (hyperparathyroidism)    Acidosis    Patient on waiting list for kidney transplant    ESRD on dialysis    Anemia in ESRD (end-stage renal disease)    BMI 31.0-31.9,adult       Review of patient's allergies indicates:  No Known Allergies    Current Inpatient Medications:   acetaminophen  650 mg Per NG tube Once    antithymocyte globulin (rabbit), hydrocortisone 20mg, with optional heparin 1000 units in NS 500ml (FOR PERIPHERAL LINE ADMINISTRATION ONLY)  1.5 mg/kg Intravenous Once    diphenhydrAMINE  50 mg Intravenous Once    heparin (porcine)   5,000 Units Subcutaneous Once    methylPREDNISolone sodium succinate injection  500 mg Intravenous Once       No current facility-administered medications on file prior to encounter.     Current Outpatient Medications on File Prior to Encounter   Medication Sig Dispense Refill    amLODIPine (NORVASC) 10 MG tablet Take 1 tablet (10 mg total) by mouth once daily. 90 tablet 3    calcitRIOL (ROCALTROL) 0.5 MCG Cap TAKE 1 BY MOUTH DAILY 90 capsule 3    ergocalciferol (ERGOCALCIFEROL) 50,000 unit Cap TAKE 1 CAPSULE EVERY 7 DAYS 13 capsule 3    losartan (COZAAR) 100 MG tablet Take 1 tablet (100 mg total) by mouth once daily. 90 tablet 3    metoprolol tartrate (LOPRESSOR) 50 MG tablet Take 1 tablet (50 mg total) by mouth once daily. 90 tablet 3    potassium chloride SA (K-DUR,KLOR-CON) 10 MEQ tablet Take 1 tablet (10 mEq total) by mouth once daily. 90 tablet 3    sildenafiL (VIAGRA) 50 MG tablet Take 1 tablet (50 mg total) by mouth daily as needed for Erectile Dysfunction. 30 tablet 11    torsemide (DEMADEX) 100 MG Tab Take 2 tablets (200 mg total) by mouth once daily. 180 tablet 3    ALPRAZolam (XANAX) 1 MG tablet Take 1 tablet (1 mg total) by mouth 3 (three) times daily as needed for Anxiety. 270 tablet 3    calcium carbonate (OS-SUSAN) 500 mg calcium (1,250 mg) chewable tablet Take 1 tablet by mouth 3 (three) times daily.      fluticasone propionate (FLONASE) 50 mcg/actuation nasal spray 2 sprays (100 mcg total) by Each Nostril route as needed for Allergies. 16 g 3    gentamicin (GARAMYCIN) 0.1 % cream APPLY  TOPICALLY THREE TIMES A DAY 15 g 4    lanthanum (FOSRENOL) 1000 MG chewable tablet CHEW 2 TABLETS THREE TIMES A DAY WITH MEALS 540 tablet 4       Past Surgical History:   Procedure Laterality Date    AV FISTULA PLACEMENT  11/2014    COLONOSCOPY N/A 12/29/2017    Procedure: COLONOSCOPY;  Surgeon: Tony Peacock III, MD;  Location: [facility];  Service: Endoscopy;  Laterality: N/A;    PERITONEAL  CATHETER INSERTION      VASECTOMY      VASECTOMY         Social History     Socioeconomic History    Marital status:    Occupational History     Employer: retired   Tobacco Use    Smoking status: Former Smoker     Packs/day: 1.00     Years: 15.00     Pack years: 15.00     Types: Cigarettes     Quit date: 1984     Years since quittin.3    Smokeless tobacco: Never Used   Substance and Sexual Activity    Alcohol use: Yes     Alcohol/week: 5.0 - 7.0 standard drinks     Types: 5 - 7 Standard drinks or equivalent per week     Comment: stopped drinking    Drug use: No    Sexual activity: Yes     Partners: Female   Social History Narrative    Retired from [employer]     for 41 y.o.    2 children    No history of blood transfusions    No donors       OBJECTIVE:     Vital Signs Range (Last 24H):  Temp:  [36.7 °C (98 °F)]   Pulse:  [80]   Resp:  [18]   BP: (141)/(78)   SpO2:  [99 %]       Significant Labs:  Lab Results   Component Value Date    WBC 7.97 2022    HGB 10.5 (L) 2022    HCT 32.8 (L) 2022     2022    CHOL 166 2022    TRIG 106 2022    HDL 37 (L) 2022    ALT 20 2022    AST 13 2022     2022    K 3.5 2022    CL 94 (L) 2022    CREATININE 16.0 (H) 2022    BUN 56 (H) 2022    CO2 24 2022    PSA 3.7 10/05/2021    INR 1.1 2022    HGBA1C 5.5 2014       Diagnostic Studies: No relevant studies.    EKG:   No results found. However, due to the size of the patient record, not all encounters were searched. Please check Results Review for a complete set of results.    2D ECHO:  TTE:  Results for orders placed or performed during the hospital encounter of 21   Echo Color Flow Doppler? Yes   Result Value Ref Range    Ascending aorta 3.95 cm    STJ 3.45 cm    AV mean gradient 3 mmHg    Ao peak ron 1.26 m/s    Ao VTI 23.18 cm    IVRT 102.76 msec    IVS 1.00 0.6 - 1.1 cm    LA size  "3.45 cm    Left Atrium Major Axis 4.91 cm    Left Atrium Minor Axis 4.56 cm    LVIDd 4.70 3.5 - 6.0 cm    LVIDs 2.70 2.1 - 4.0 cm    LVOT diameter 2.19 cm    LVOT peak VTI 20.69 cm    Posterior Wall 1.10 0.6 - 1.1 cm    MV Peak A Jose Daniel 0.76 m/s    E wave deceleration time 220.38 msec    MV Peak E Jose Daniel 0.44 m/s    PV Peak D Jose Daniel 0.44 m/s    PV Peak S Jose Daniel 0.69 m/s    RA Major Axis 3.51 cm    RA Width 2.99 cm    RVDD 3.43 cm    Sinus 3.74 cm    TAPSE 2.14 cm    TR Max Jose Daniel 2.54 m/s    TDI LATERAL 0.10 m/s    TDI SEPTAL 0.07 m/s    LA WIDTH 4.25 cm    MV stenosis pressure 1/2 time 63.91 ms    LV Diastolic Volume 85.81 mL    LV Systolic Volume 27.13 mL    RV S' 11.66 cm/s    LVOT peak jose daniel 1.06 m/s    LA volume (mod) 63.92 cm3    MV "A" wave duration 19.12 msec    LV LATERAL E/E' RATIO 4.40 m/s    LV SEPTAL E/E' RATIO 6.29 m/s    FS 43 %    LA volume 58.93 cm3    LV mass 175.83 g    Left Ventricle Relative Wall Thickness 0.47 cm    AV valve area 3.36 cm2    AV Velocity Ratio 0.84     AV index (prosthetic) 0.89     MV valve area p 1/2 method 3.44 cm2    E/A ratio 0.58     Mean e' 0.09 m/s    Pulm vein S/D ratio 1.57     LVOT area 3.8 cm2    LVOT stroke volume 77.90 cm3    AV peak gradient 6 mmHg    E/E' ratio 5.18 m/s    LV Systolic Volume Index 13.3 mL/m2    LV Diastolic Volume Index 42.06 mL/m2    LA Volume Index 28.9 mL/m2    LV Mass Index 86 g/m2    Triscuspid Valve Regurgitation Peak Gradient 26 mmHg    LA Volume Index (Mod) 31.3 mL/m2    BSA 2.1 m2    Right Atrial Pressure (from IVC) 3 mmHg    EF 70 %    TV rest pulmonary artery pressure 29 mmHg    Narrative    · The estimated ejection fraction is 70%.  · The left ventricle is normal in size with concentric remodeling and   hyperdynamic systolic function.  · Normal left ventricular diastolic function.  · Normal right ventricular size with normal right ventricular systolic   function.  · The estimated PA systolic pressure is 29 mmHg.  · Normal central venous pressure (3 " mmHg).          LORE:  No results found. However, due to the size of the patient record, not all encounters were searched. Please check Results Review for a complete set of results.    ASSESSMENT/PLAN:                                                                                                                  03/18/2022  Alverto Ramirez Jr. is a 71 y.o., male.      Pre-op Assessment    I have reviewed the Patient Summary Reports.     I have reviewed the Nursing Notes. I have reviewed the NPO Status.   I have reviewed the Medications.     Review of Systems  Anesthesia Hx:  No problems with previous Anesthesia    Social:  Non-Smoker    Hematology/Oncology:         -- Anemia:   Cardiovascular:   Hypertension    Pulmonary:  Pulmonary Normal    Renal/:   Chronic Renal Disease, ESRD, Dialysis, renal hypertension    Musculoskeletal:  Musculoskeletal Normal    Neurological:  Neurology Normal    Endocrine:  Parathyroid Disease, Hyperparathyroidism    Psych:   anxiety          Physical Exam  General: Well nourished    Airway:  Mallampati: II   Mouth Opening: Normal  TM Distance: Normal  Neck ROM: Normal ROM    Dental:  Intact    Chest/Lungs:  Normal Respiratory Rate    Heart:  Rate: Normal  Rhythm: Regular Rhythm        Anesthesia Plan  Type of Anesthesia, risks & benefits discussed:    Anesthesia Type: Gen ETT  Intra-op Monitoring Plan: Standard ASA Monitors and Art Line  Post Op Pain Control Plan: multimodal analgesia  Induction:  IV  Airway Plan: , Post-Induction  Informed Consent: Informed consent signed with the Patient and all parties understand the risks and agree with anesthesia plan.  All questions answered.   ASA Score: 3 Emergent  Day of Surgery Review of History & Physical: H&P Update referred to the surgeon/provider.    Ready For Surgery From Anesthesia Perspective.     .

## 2022-03-19 NOTE — NURSING TRANSFER
Nursing Transfer Note      3/19/2022     Reason patient is being transferred: post op    Transfer : [id number]    Transfer via bed    Transfer with IVFs/Tele    Transported by transport    Medicines sent: none    Any special needs or follow-up needed:     Chart send with patient: YES     Notified: Cristal BARFIELD    Patient reassessed at: 03/19/22 @ 0615

## 2022-03-19 NOTE — PROGRESS NOTES
RN Paged ELIEZER Saini. Regarding pt clearance to transfer to floor. Per Dr. Estrada pt VS are okay to transfer to pt room.

## 2022-03-19 NOTE — ANESTHESIA RELEASE NOTE
"Anesthesia Release from PACU Note    Patient: Alverto Ramirez Jr.    Procedure(s) Performed: Procedure(s) (LRB):  TRANSPLANT, KIDNEY (Right)    Anesthesia type: general    Post pain: Adequate analgesia    Post assessment: no apparent anesthetic complications    Last Vitals:   Visit Vitals  BP (!) 103/59   Pulse 62   Temp 36.6 °C (97.9 °F) (Temporal)   Resp 13   Ht 5' 7.5" (1.715 m)   Wt 90.8 kg (200 lb 2.8 oz)   SpO2 (!) 94%   BMI 30.89 kg/m²       Post vital signs: stable    Level of consciousness: awake    Nausea/Vomiting: no nausea/no vomiting    Complications: none    Airway Patency: patent    Respiratory: unassisted, spontaneous ventilation, nasal cannula    Cardiovascular: stable and blood pressure at baseline    Hydration: euvolemic  "

## 2022-03-19 NOTE — PROGRESS NOTES
03/18/22 1913   Manual Peritoneal Dialysis   Manual Drain Volume (mL) 1500 mL   Effluent Appearance Clear   Manual PD Total UF (mL) 1500 mL   Manual Treatment Comments Manual drain upon arrival

## 2022-03-19 NOTE — H&P
[facility] - Transplant Stepdown  Kidney Transplant  H&P      Subjective:     Chief Complaint/Reason for Admission: Kidney Transplant    History of Present Illness:  Mr. Ramirez is a 71 y.o. year old Black or  male with ESRD secondary to HTN admitted for kidney transplant. He has been on the wait list for a kidney transplant at [facility] since 3/30/2015. Patient is currently on peritoneal dialysis started on 2/20/2017. Patient is dialyzing on cyclic peritoneal dialysis. Patient reports that he is tolerating dialysis well. He has a PD catheter. Denies peritonitis. UOP ~ 500 cc/day. Hx MGUS (f/u annually with hem/onc) and elevated PSA (stable, followed by urology). Patient denies any recent hospitalizations or ED visits. Dr Thomas will be the primary surgeon. Thymo induction. OR scheduled for 2000. Pre op labs and imaging pending.     Dialysis History: Mr. Del Toro with ESRD, requiring chronic dialysis who is on peritoneal dialysis started on 2/20/2017. Patient is dialyzing on cyclic peritoneal dialysis.  Patient reports that he is tolerating dialysis well.   Date of Last Dialysis: 3/17    Native urine output per day: ~ 500 mL    Previous Transplant: no    PTA Medications   Medication Sig    ALPRAZolam (XANAX) 1 MG tablet Take 1 tablet (1 mg total) by mouth 3 (three) times daily as needed for Anxiety.    amLODIPine (NORVASC) 10 MG tablet Take 1 tablet (10 mg total) by mouth once daily.    calcitRIOL (ROCALTROL) 0.5 MCG Cap TAKE 1 BY MOUTH DAILY    calcium carbonate (OS-SUSAN) 500 mg calcium (1,250 mg) chewable tablet Take 1 tablet by mouth 3 (three) times daily.    ergocalciferol (ERGOCALCIFEROL) 50,000 unit Cap TAKE 1 CAPSULE EVERY 7 DAYS    fluticasone propionate (FLONASE) 50 mcg/actuation nasal spray 2 sprays (100 mcg total) by Each Nostril route as needed for Allergies.    gentamicin (GARAMYCIN) 0.1 % cream APPLY  TOPICALLY THREE TIMES A DAY    lanthanum (FOSRENOL) 1000 MG chewable tablet CHEW 2 TABLETS  THREE TIMES A DAY WITH MEALS    losartan (COZAAR) 100 MG tablet Take 1 tablet (100 mg total) by mouth once daily.    metoprolol tartrate (LOPRESSOR) 50 MG tablet Take 1 tablet (50 mg total) by mouth once daily.    potassium chloride SA (K-DUR,KLOR-CON) 10 MEQ tablet Take 1 tablet (10 mEq total) by mouth once daily.    sildenafiL (VIAGRA) 50 MG tablet Take 1 tablet (50 mg total) by mouth daily as needed for Erectile Dysfunction.    torsemide (DEMADEX) 100 MG Tab Take 2 tablets (200 mg total) by mouth once daily.       Review of patient's allergies indicates:  No Known Allergies    Past Medical History:   Diagnosis Date    Anemia in CKD (chronic kidney disease)     Atrial fibrillation     CKD (chronic kidney disease) stage 4, GFR 15 2014    Colon cancer screening 2014    Elevated PSA 2014    HTN (hypertension) diagnosed in his 40s 10/16/2014    Monoclonal gammopathy 2014    Phobic anxiety disorder 2014    Proteinuria 2014     Past Surgical History:   Procedure Laterality Date    AV FISTULA PLACEMENT  2014    COLONOSCOPY N/A 2017    Procedure: COLONOSCOPY;  Surgeon: Tony Peacock III, MD;  Location: [facility];  Service: Endoscopy;  Laterality: N/A;    PERITONEAL CATHETER INSERTION      VASECTOMY      VASECTOMY       Family History       Problem Relation (Age of Onset)    Cancer Brother, Paternal Uncle    Cataracts Mother    Dementia Father    Diabetes Mother    Glaucoma Mother    Heart disease Father    Hypertension Sister    Kidney disease Sister          Tobacco Use    Smoking status: Former Smoker     Packs/day: 1.00     Years: 15.00     Pack years: 15.00     Types: Cigarettes     Quit date: 1984     Years since quittin.3    Smokeless tobacco: Never Used   Substance and Sexual Activity    Alcohol use: Yes     Alcohol/week: 5.0 - 7.0 standard drinks     Types: 5 - 7 Standard drinks or equivalent per week     Comment: stopped drinking     "Drug use: No    Sexual activity: Yes     Partners: Female        Review of Systems   Constitutional:  Negative for activity change, chills, fatigue and fever.   HENT: Negative.     Eyes: Negative.    Respiratory:  Negative for chest tightness and shortness of breath.    Cardiovascular:  Negative for chest pain and leg swelling.   Gastrointestinal:  Positive for abdominal distention. Negative for abdominal pain, constipation, diarrhea, nausea and vomiting.   Endocrine: Negative.    Genitourinary:  Positive for decreased urine volume. Negative for difficulty urinating, dysuria and hematuria.   Musculoskeletal: Negative.    Skin:  Negative for wound.   Neurological:  Negative for dizziness, weakness and headaches.   Psychiatric/Behavioral:  Negative for agitation and behavioral problems. The patient is not nervous/anxious.    All other systems reviewed and are negative.  Objective:     Vital Signs (Most Recent):  Temp: 98 °F (36.7 °C) (03/18/22 1913)  Pulse: 92 (03/18/22 1913)  Resp: 18 (03/18/22 1913)  BP: (!) 141/78 (03/18/22 1913)    Height: 5' 7.5" (171.5 cm)  Weight: 90.8 kg (200 lb 2.8 oz)  Body mass index is 30.89 kg/m².     Physical Exam  Vitals and nursing note reviewed.   Constitutional:       General: He is not in acute distress.  HENT:      Head: Normocephalic and atraumatic.   Eyes:      Extraocular Movements: Extraocular movements intact.      Pupils: Pupils are equal, round, and reactive to light.   Cardiovascular:      Rate and Rhythm: Normal rate.      Pulses: Normal pulses.   Pulmonary:      Effort: Pulmonary effort is normal.      Breath sounds: Normal breath sounds.   Abdominal:      General: Bowel sounds are normal. There is distension.      Palpations: Abdomen is soft.      Tenderness: There is no abdominal tenderness.   Musculoskeletal:         General: No swelling or tenderness. Normal range of motion.      Cervical back: Normal range of motion.      Right lower leg: No edema.      Left lower " leg: No edema.   Skin:     General: Skin is warm and dry.   Neurological:      Mental Status: He is alert and oriented to person, place, and time.      Motor: No weakness.   Psychiatric:         Behavior: Behavior normal.         Thought Content: Thought content normal.       Pre op labs and imaging pending    Patient was SARS-CoV-2 /COVID-19 tested with negative results.     Assessment/Plan:     * ESRD on dialysis  - ESRD secondary to HTN admitted for kidney transplant.   - Dr Thomas will be the primary surgeon.   - Thymo induction  - OR scheduled for 2000  - Pre op labs and imaging pending.         The patient presents for kidney transplant.  There are no apparent contraindications to proceeding with the planned transplant.  The patient understands that the transplant could potentially be cancelled pending detailed assessment of the donor organ.  He will receive Thymoglobulin induction.  A complete discussion of the transplant procedure, including risks, complications, and alternatives, as well as any donor-specific risk factors requiring specific disclosure, will be carried out by the responsible staff surgeon prior to the procedure.     Discharge Planning: Not a candidate for d/c at this time.       Eileen Estrada PA-C  Kidney Transplant  [facility] - Transplant Stepdown

## 2022-03-19 NOTE — PROGRESS NOTES
Kidney Transplant   Progress Note      HPI: Mr. Ramirez is a 71 y.o. year old male who is s/p Kidney transplant on 3/19/22.         Interval History: No acute events overnight. Kidney transplant was over around 2:30 am, transferred to floor around 6:30 am. Pain is well controlled. Denies nausea. Hourly urine output 30-50 cc. Pink tinged. No hyperkalemia from follow up lab, but low bicarbonate 15. Denies shortness of breath.        Exam:  Alert and oriented x3, NAD  Not anemic, anicteric  Unlabored breathing in room air  Regular heart rate and rhythm  Abdomen : soft, not distended. Intact dressing from OR, no strike thru bleeding. RL x1 drain in place, serosanguinous output in the bulb  Freire catheter in place, pink tinged urine+  Warm and dry skin  Palpable bilateral femoral pulses      Labs:   CBC:   Recent Labs   Lab 03/19/22  0725   WBC 10.78   RBC 3.29*   HGB 10.0*   HCT 30.1*      MCV 92   MCH 30.4   MCHC 33.2     CMP:   Recent Labs   Lab 03/19/22  0725   *   CALCIUM 7.7*   ALBUMIN 2.6*   PROT 6.2      K 3.8   CO2 15*   CL 98   BUN 59*   CREATININE 16.0*   ALKPHOS 95   ALT 16   AST 16   BILITOT 0.4     Coagulation:   Recent Labs   Lab 03/18/22 1942   INR 1.1   APTT 28.0     Labs within the past 24 hours have been reviewed.        Assessment/Plan:     1. S/p Kidney transplant, KDPI 26%, DBD donor. Severe atherosclerotic disease involving iliac arteries. Prolonged cold ischemia time (cross clamp time 3/18/2022 02:53). 2 days freire, 1 RL drain. Expected DGF. Will repeat CBC and renal function panel at 2 pm. Will assess need for hemodialysis closely. Has LUE AV fistula for access.    2. Long Term Immunosuppression- Cont with Tacrolimus, Pred, Cellcept. Draw tacrolimus level daily and adjust dose as needed to maintain a therapeutic level.     3. At Risk for Opportunistic Infections- Cont with OI prophylaxis as per protocol.     4. Anemia of chronic disease- H&H stable. Monitor.       Discussed  with Dr Lena Smyth MD  Fellow, Abdominal Transplant Surgery

## 2022-03-19 NOTE — PROGRESS NOTES
Report given to Cristal RN on 11th floor TSU. Per TSU RN, Nurse Practitioner will be notified regarding whether or not morning labs should be started on 3/19 or 3/20.

## 2022-03-19 NOTE — TRANSFER OF CARE
"Anesthesia Transfer of Care Note    Patient: Alverto Ramirez Jr.    Procedure(s) Performed: Procedure(s) (LRB):  TRANSPLANT, KIDNEY (Right)    Patient location: PACU    Anesthesia Type: general    Transport from OR: Transported from OR on 6-10 L/min O2 by face mask with adequate spontaneous ventilation    Post pain: adequate analgesia    Post assessment: tolerated procedure well and no apparent anesthetic complications    Post vital signs: stable    Level of consciousness: awake    Nausea/Vomiting: no nausea/vomiting    Complications: none    Transfer of care protocol was followed      Last vitals:   Visit Vitals  /63   Pulse 73   Temp 36.7 °C (98.1 °F) (Temporal)   Resp 14   Ht 5' 7.5" (1.715 m)   Wt 90.8 kg (200 lb 2.8 oz)   SpO2 96%   BMI 30.89 kg/m²     "

## 2022-03-19 NOTE — SUBJECTIVE & OBJECTIVE
Subjective:     Chief Complaint/Reason for Admission: Kidney Transplant    History of Present Illness:  Mr. Ramirez is a 71 y.o. year old Black or  male with ESRD secondary to HTN admitted for kidney transplant. He has been on the wait list for a kidney transplant at [email or web address] since 3/30/2015. Patient is currently on peritoneal dialysis started on 2/20/2017. Patient is dialyzing on cyclic peritoneal dialysis. Patient reports that he is tolerating dialysis well. He has a PD catheter. Denies peritonitis. UOP ~ 500 cc/day. Hx MGUS (f/u annually with hem/onc) and elevated PSA (stable, followed by urology). Patient denies any recent hospitalizations or ED visits. Dr Thomas will be the primary surgeon. Thymo induction. OR scheduled for 2000. Pre op labs and imaging pending.     Dialysis History: Mr. Del Toro with ESRD, requiring chronic dialysis who is on peritoneal dialysis started on 2/20/2017. Patient is dialyzing on cyclic peritoneal dialysis.  Patient reports that he is tolerating dialysis well.   Date of Last Dialysis: 3/17    Native urine output per day: ~ 500 mL    Previous Transplant: no    PTA Medications   Medication Sig    ALPRAZolam (XANAX) 1 MG tablet Take 1 tablet (1 mg total) by mouth 3 (three) times daily as needed for Anxiety.    amLODIPine (NORVASC) 10 MG tablet Take 1 tablet (10 mg total) by mouth once daily.    calcitRIOL (ROCALTROL) 0.5 MCG Cap TAKE 1 BY MOUTH DAILY    calcium carbonate (OS-SUSAN) 500 mg calcium (1,250 mg) chewable tablet Take 1 tablet by mouth 3 (three) times daily.    ergocalciferol (ERGOCALCIFEROL) 50,000 unit Cap TAKE 1 CAPSULE EVERY 7 DAYS    fluticasone propionate (FLONASE) 50 mcg/actuation nasal spray 2 sprays (100 mcg total) by Each Nostril route as needed for Allergies.    gentamicin (GARAMYCIN) 0.1 % cream APPLY  TOPICALLY THREE TIMES A DAY    lanthanum (FOSRENOL) 1000 MG chewable tablet CHEW 2 TABLETS THREE TIMES A DAY WITH MEALS    losartan (COZAAR) 100 MG tablet  Take 1 tablet (100 mg total) by mouth once daily.    metoprolol tartrate (LOPRESSOR) 50 MG tablet Take 1 tablet (50 mg total) by mouth once daily.    potassium chloride SA (K-DUR,KLOR-CON) 10 MEQ tablet Take 1 tablet (10 mEq total) by mouth once daily.    sildenafiL (VIAGRA) 50 MG tablet Take 1 tablet (50 mg total) by mouth daily as needed for Erectile Dysfunction.    torsemide (DEMADEX) 100 MG Tab Take 2 tablets (200 mg total) by mouth once daily.       Review of patient's allergies indicates:  No Known Allergies    Past Medical History:   Diagnosis Date    Anemia in CKD (chronic kidney disease)     Atrial fibrillation     CKD (chronic kidney disease) stage 4, GFR 15 2014    Colon cancer screening 2014    Elevated PSA 2014    HTN (hypertension) diagnosed in his 40s 10/16/2014    Monoclonal gammopathy 2014    Phobic anxiety disorder 2014    Proteinuria 2014     Past Surgical History:   Procedure Laterality Date    AV FISTULA PLACEMENT  2014    COLONOSCOPY N/A 2017    Procedure: COLONOSCOPY;  Surgeon: Tony Peacock III, MD;  Location: [facility];  Service: Endoscopy;  Laterality: N/A;    PERITONEAL CATHETER INSERTION      VASECTOMY      VASECTOMY       Family History       Problem Relation (Age of Onset)    Cancer Brother, Paternal Uncle    Cataracts Mother    Dementia Father    Diabetes Mother    Glaucoma Mother    Heart disease Father    Hypertension Sister    Kidney disease Sister          Tobacco Use    Smoking status: Former Smoker     Packs/day: 1.00     Years: 15.00     Pack years: 15.00     Types: Cigarettes     Quit date: 1984     Years since quittin.3    Smokeless tobacco: Never Used   Substance and Sexual Activity    Alcohol use: Yes     Alcohol/week: 5.0 - 7.0 standard drinks     Types: 5 - 7 Standard drinks or equivalent per week     Comment: stopped drinking    Drug use: No    Sexual activity: Yes     Partners: Female        Review of Systems  "  Constitutional:  Negative for activity change, chills, fatigue and fever.   HENT: Negative.     Eyes: Negative.    Respiratory:  Negative for chest tightness and shortness of breath.    Cardiovascular:  Negative for chest pain and leg swelling.   Gastrointestinal:  Positive for abdominal distention. Negative for abdominal pain, constipation, diarrhea, nausea and vomiting.   Endocrine: Negative.    Genitourinary:  Positive for decreased urine volume. Negative for difficulty urinating, dysuria and hematuria.   Musculoskeletal: Negative.    Skin:  Negative for wound.   Neurological:  Negative for dizziness, weakness and headaches.   Psychiatric/Behavioral:  Negative for agitation and behavioral problems. The patient is not nervous/anxious.    All other systems reviewed and are negative.  Objective:     Vital Signs (Most Recent):  Temp: 98 °F (36.7 °C) (03/18/22 1913)  Pulse: 92 (03/18/22 1913)  Resp: 18 (03/18/22 1913)  BP: (!) 141/78 (03/18/22 1913)    Height: 5' 7.5" (171.5 cm)  Weight: 90.8 kg (200 lb 2.8 oz)  Body mass index is 30.89 kg/m².     Physical Exam  Vitals and nursing note reviewed.   Constitutional:       General: He is not in acute distress.  HENT:      Head: Normocephalic and atraumatic.   Eyes:      Extraocular Movements: Extraocular movements intact.      Pupils: Pupils are equal, round, and reactive to light.   Cardiovascular:      Rate and Rhythm: Normal rate.      Pulses: Normal pulses.   Pulmonary:      Effort: Pulmonary effort is normal.      Breath sounds: Normal breath sounds.   Abdominal:      General: Bowel sounds are normal. There is distension.      Palpations: Abdomen is soft.      Tenderness: There is no abdominal tenderness.   Musculoskeletal:         General: No swelling or tenderness. Normal range of motion.      Cervical back: Normal range of motion.      Right lower leg: No edema.      Left lower leg: No edema.   Skin:     General: Skin is warm and dry.   Neurological:      Mental " Status: He is alert and oriented to person, place, and time.      Motor: No weakness.   Psychiatric:         Behavior: Behavior normal.         Thought Content: Thought content normal.       Pre op labs and imaging pending    Patient was SARS-CoV-2 /COVID-19 tested with negative results.

## 2022-03-19 NOTE — ANESTHESIA PROCEDURE NOTES
Arterial    Diagnosis: ESRD  Doctor requesting consult: Martha    Patient location during procedure: done in OR    Staffing  Authorizing Provider: Lia Sheldon MD  Performing Provider: Jesus George MD    Anesthesiologist was present at the time of the procedure.    Preanesthetic Checklist  Completed: patient identified, IV checked, site marked, risks and benefits discussed, surgical consent, monitors and equipment checked, pre-op evaluation, timeout performed and anesthesia consent givenArterial  Skin Prep: chlorhexidine gluconate and isopropyl alcohol  Local Infiltration: none  Orientation: right  Location: radial    Catheter Size: 20 G  Catheter placement by Anatomical landmarks. Heme positive aspiration all ports. Insertion Attempts: 1  Assessment  Dressing: secured with tape and tegaderm  Patient: Tolerated well

## 2022-03-19 NOTE — PROGRESS NOTES
Stat labs, ekg, chest x-ray, covid test, urine studies,   teds/scds in place, chlorhexidine wipes administered per pt  KURT fistula +/+, R forearm 20G in place, LLQ PD cath drained/cx sent  anesthesia notified for consents, blood/surgery consents planned to be done downstairs  Wife to remain @ bedside, refer to sticky note for her number  Call light within reach, will cont to monitor

## 2022-03-19 NOTE — PLAN OF CARE
Pre-operative Discussion Note  Kidney Transplant Surgery    Alverto Ramirez Jr. is a 71 y.o. male with ESRD, requiring chronic dialysis admitted for kidney transplant.  I discussed the planned procedure in detail, including expected hospital course and outcomes, benefits, risks, and potential complications.  Complications discussed included death, graft failure, bleeding, infection, vascular thrombosis, and rejection.  I discussed the risks of anesthesia, as well as the potential need for re-operation.  The possibility of other complications not specifically mentioned was also discussed.  Also, I discussed the need for lifelong immunosuppression and the possibility of serious complications from immunosuppressive drugs.    The discussion included the risks that the patient will incur if he elects to not have the proposed procedure.    Relevant donor-specific risk factors were disclosed and discussed with the patient, including:   PHS: I discussed the use of organs from donors with PHS risk criteria, including the testing protocols utilized, as well as data from the literature regarding the likelihood of transmission of hepatitis or HIV. The patient is willing to consider such grafts.    Specific [facility] donor risk criteria for the organ donor include:  Drug injection for nonmedical reasons    HCV: N/A    The patient was SARS-CoV-2 /COVID-19 tested with negative results.      I also discussed that transplant immunosuppression will increase susceptibility to COVID-19 and other viruses, and that although we use stringent precautions to protect patients from infection, it is possible for a transplant recipient to contract this infection. If COVID-19 infection should occur, it would be a serious matter with a significant risk of death.    All questions were answered.  The patient and available family members voice understanding and agree to proceed with the transplant.    UNOS Patient Status  Note on scores:  ICU = 10 = total  assistance  TSU = 20-30 = partial assistance  Outpatient admitted for transplant requiring medical care in last year = 40-50 = partial assistance  Scores 60 or higher indicate no assistance, meaning no need for medical care in last year. This would be very unusual for a transplant candidate.    UNOS Patient Status  Functional Status: 50% - Requires considerable assistance and frequent medical care  Physical Capacity: No Limitations

## 2022-03-19 NOTE — PLAN OF CARE
Pt AAO x 4 throughout shift. Pt up at side of bed with walker this afternoon. Self meds introduced but not taught. Pt pain fairly controlled with oral pain meds. Pt wife at bedside throughout shift. Banegas draining light pink - yellow urine. Initially slow output but urine output increased throughout shift. Pt free from falls and injury throughout shift. Kidney u/s completed this AM. Thymo to be given tomorrow.

## 2022-03-20 PROBLEM — Z00.5 POSITIVE RPR TEST IN CADAVERIC DONOR: Status: ACTIVE | Noted: 2022-03-20

## 2022-03-20 PROBLEM — A53.9 POSITIVE RPR TEST IN CADAVERIC DONOR: Status: ACTIVE | Noted: 2022-03-20

## 2022-03-20 LAB
ALBUMIN SERPL BCP-MCNC: 2.7 G/DL (ref 3.5–5.2)
ANION GAP SERPL CALC-SCNC: 16 MMOL/L (ref 8–16)
BASOPHILS # BLD AUTO: 0.01 K/UL (ref 0–0.2)
BASOPHILS NFR BLD: 0.1 % (ref 0–1.9)
BUN SERPL-MCNC: 51 MG/DL (ref 8–23)
CALCIUM SERPL-MCNC: 8 MG/DL (ref 8.7–10.5)
CHLORIDE SERPL-SCNC: 107 MMOL/L (ref 95–110)
CO2 SERPL-SCNC: 17 MMOL/L (ref 23–29)
CREAT SERPL-MCNC: 8.3 MG/DL (ref 0.5–1.4)
DIFFERENTIAL METHOD: ABNORMAL
EOSINOPHIL # BLD AUTO: 0 K/UL (ref 0–0.5)
EOSINOPHIL NFR BLD: 0 % (ref 0–8)
ERYTHROCYTE [DISTWIDTH] IN BLOOD BY AUTOMATED COUNT: 15 % (ref 11.5–14.5)
EST. GFR  (AFRICAN AMERICAN): 6.7 ML/MIN/1.73 M^2
EST. GFR  (NON AFRICAN AMERICAN): 5.8 ML/MIN/1.73 M^2
GLUCOSE SERPL-MCNC: 123 MG/DL (ref 70–110)
HCT VFR BLD AUTO: 30.2 % (ref 40–54)
HGB BLD-MCNC: 9.9 G/DL (ref 14–18)
IMM GRANULOCYTES # BLD AUTO: 0.08 K/UL (ref 0–0.04)
IMM GRANULOCYTES NFR BLD AUTO: 0.6 % (ref 0–0.5)
LYMPHOCYTES # BLD AUTO: 0.2 K/UL (ref 1–4.8)
LYMPHOCYTES NFR BLD: 1.6 % (ref 18–48)
MAGNESIUM SERPL-MCNC: 1.6 MG/DL (ref 1.6–2.6)
MCH RBC QN AUTO: 29.5 PG (ref 27–31)
MCHC RBC AUTO-ENTMCNC: 32.8 G/DL (ref 32–36)
MCV RBC AUTO: 90 FL (ref 82–98)
MONOCYTES # BLD AUTO: 0.8 K/UL (ref 0.3–1)
MONOCYTES NFR BLD: 5.6 % (ref 4–15)
NEUTROPHILS # BLD AUTO: 12.4 K/UL (ref 1.8–7.7)
NEUTROPHILS NFR BLD: 92.1 % (ref 38–73)
NRBC BLD-RTO: 0 /100 WBC
PHOSPHATE SERPL-MCNC: 5.1 MG/DL (ref 2.7–4.5)
PHOSPHATE SERPL-MCNC: 5.1 MG/DL (ref 2.7–4.5)
PLATELET # BLD AUTO: 191 K/UL (ref 150–450)
PMV BLD AUTO: 11.1 FL (ref 9.2–12.9)
POCT GLUCOSE: 127 MG/DL (ref 70–110)
POCT GLUCOSE: 175 MG/DL (ref 70–110)
POCT GLUCOSE: 184 MG/DL (ref 70–110)
POTASSIUM SERPL-SCNC: 3.5 MMOL/L (ref 3.5–5.1)
RBC # BLD AUTO: 3.36 M/UL (ref 4.6–6.2)
SODIUM SERPL-SCNC: 140 MMOL/L (ref 136–145)
TACROLIMUS BLD-MCNC: 3.2 NG/ML (ref 5–15)
TACROLIMUS BLD-MCNC: 3.2 NG/ML (ref 5–15)
WBC # BLD AUTO: 13.44 K/UL (ref 3.9–12.7)

## 2022-03-20 PROCEDURE — 83735 ASSAY OF MAGNESIUM: CPT | Performed by: PHYSICIAN ASSISTANT

## 2022-03-20 PROCEDURE — 80069 RENAL FUNCTION PANEL: CPT | Performed by: PHYSICIAN ASSISTANT

## 2022-03-20 PROCEDURE — 80197 ASSAY OF TACROLIMUS: CPT | Performed by: STUDENT IN AN ORGANIZED HEALTH CARE EDUCATION/TRAINING PROGRAM

## 2022-03-20 PROCEDURE — 25000003 PHARM REV CODE 250: Performed by: INTERNAL MEDICINE

## 2022-03-20 PROCEDURE — 99900035 HC TECH TIME PER 15 MIN (STAT)

## 2022-03-20 PROCEDURE — 63600175 PHARM REV CODE 636 W HCPCS: Performed by: TRANSPLANT SURGERY

## 2022-03-20 PROCEDURE — 20600001 HC STEP DOWN PRIVATE ROOM

## 2022-03-20 PROCEDURE — 80197 ASSAY OF TACROLIMUS: CPT | Mod: 91 | Performed by: TRANSPLANT SURGERY

## 2022-03-20 PROCEDURE — 94761 N-INVAS EAR/PLS OXIMETRY MLT: CPT

## 2022-03-20 PROCEDURE — 94799 UNLISTED PULMONARY SVC/PX: CPT

## 2022-03-20 PROCEDURE — 85025 COMPLETE CBC W/AUTO DIFF WBC: CPT | Performed by: PHYSICIAN ASSISTANT

## 2022-03-20 PROCEDURE — 25000003 PHARM REV CODE 250: Performed by: TRANSPLANT SURGERY

## 2022-03-20 PROCEDURE — 99223 1ST HOSP IP/OBS HIGH 75: CPT | Mod: ,,, | Performed by: INTERNAL MEDICINE

## 2022-03-20 PROCEDURE — 99223 PR INITIAL HOSPITAL CARE,LEVL III: ICD-10-PCS | Mod: ,,, | Performed by: INTERNAL MEDICINE

## 2022-03-20 PROCEDURE — 36415 COLL VENOUS BLD VENIPUNCTURE: CPT | Performed by: PHYSICIAN ASSISTANT

## 2022-03-20 PROCEDURE — 25000003 PHARM REV CODE 250: Performed by: STUDENT IN AN ORGANIZED HEALTH CARE EDUCATION/TRAINING PROGRAM

## 2022-03-20 RX ORDER — VALGANCICLOVIR 450 MG/1
900 TABLET, FILM COATED ORAL EVERY MORNING
Qty: 60 TABLET | Refills: 2 | Status: SHIPPED | OUTPATIENT
Start: 2022-03-18 | End: 2022-03-21 | Stop reason: SDUPTHER

## 2022-03-20 RX ORDER — OXYBUTYNIN CHLORIDE 5 MG/1
5 TABLET ORAL 3 TIMES DAILY
Status: DISCONTINUED | OUTPATIENT
Start: 2022-03-20 | End: 2022-03-21

## 2022-03-20 RX ORDER — OXYBUTYNIN CHLORIDE 5 MG/1
5 TABLET ORAL 3 TIMES DAILY
Status: DISCONTINUED | OUTPATIENT
Start: 2022-03-20 | End: 2022-03-20

## 2022-03-20 RX ORDER — FAMOTIDINE 20 MG/1
20 TABLET, FILM COATED ORAL NIGHTLY
Qty: 30 TABLET | Refills: 1 | Status: SHIPPED | OUTPATIENT
Start: 2022-03-20 | End: 2022-04-20

## 2022-03-20 RX ORDER — SODIUM BICARBONATE 650 MG/1
1300 TABLET ORAL 2 TIMES DAILY
Status: DISCONTINUED | OUTPATIENT
Start: 2022-03-20 | End: 2022-03-20

## 2022-03-20 RX ORDER — MYCOPHENOLATE MOFETIL 250 MG/1
1000 CAPSULE ORAL 2 TIMES DAILY
Qty: 240 CAPSULE | Refills: 11 | Status: SHIPPED | OUTPATIENT
Start: 2022-03-20 | End: 2022-06-15 | Stop reason: SDUPTHER

## 2022-03-20 RX ORDER — PREDNISONE 5 MG/1
TABLET ORAL
Qty: 120 TABLET | Refills: 5 | Status: SHIPPED | OUTPATIENT
Start: 2022-03-20 | End: 2022-05-09 | Stop reason: SDUPTHER

## 2022-03-20 RX ORDER — TACROLIMUS 1 MG/1
5 CAPSULE ORAL 2 TIMES DAILY
Status: DISCONTINUED | OUTPATIENT
Start: 2022-03-21 | End: 2022-03-21 | Stop reason: HOSPADM

## 2022-03-20 RX ORDER — NYSTATIN 100000 [USP'U]/ML
500000 SUSPENSION ORAL
Qty: 600 ML | Refills: 0 | Status: SHIPPED | OUTPATIENT
Start: 2022-03-20 | End: 2022-03-21 | Stop reason: SDUPTHER

## 2022-03-20 RX ORDER — LANOLIN ALCOHOL/MO/W.PET/CERES
800 CREAM (GRAM) TOPICAL 2 TIMES DAILY
Status: DISCONTINUED | OUTPATIENT
Start: 2022-03-20 | End: 2022-03-21 | Stop reason: HOSPADM

## 2022-03-20 RX ORDER — SODIUM CHLORIDE 9 MG/ML
INJECTION, SOLUTION INTRAVENOUS CONTINUOUS
Status: DISCONTINUED | OUTPATIENT
Start: 2022-03-20 | End: 2022-03-21

## 2022-03-20 RX ORDER — SULFAMETHOXAZOLE AND TRIMETHOPRIM 400; 80 MG/1; MG/1
1 TABLET ORAL DAILY
Qty: 30 TABLET | Refills: 5 | Status: SHIPPED | OUTPATIENT
Start: 2022-03-20 | End: 2022-05-09 | Stop reason: SDUPTHER

## 2022-03-20 RX ORDER — SODIUM BICARBONATE 650 MG/1
1300 TABLET ORAL 3 TIMES DAILY
Status: DISCONTINUED | OUTPATIENT
Start: 2022-03-20 | End: 2022-03-21 | Stop reason: HOSPADM

## 2022-03-20 RX ORDER — TACROLIMUS 1 MG/1
6 CAPSULE ORAL EVERY 12 HOURS
Qty: 360 CAPSULE | Refills: 11 | Status: SHIPPED | OUTPATIENT
Start: 2022-03-20 | End: 2022-03-21 | Stop reason: SDUPTHER

## 2022-03-20 RX ADMIN — MUPIROCIN 1 G: 20 OINTMENT TOPICAL at 07:03

## 2022-03-20 RX ADMIN — MYCOPHENOLATE MOFETIL 1000 MG: 250 CAPSULE ORAL at 07:03

## 2022-03-20 RX ADMIN — MYCOPHENOLATE MOFETIL 1000 MG: 250 CAPSULE ORAL at 08:03

## 2022-03-20 RX ADMIN — NYSTATIN 500000 UNITS: 500000 SUSPENSION ORAL at 05:03

## 2022-03-20 RX ADMIN — SODIUM CHLORIDE 1000 ML: 0.9 INJECTION, SOLUTION INTRAVENOUS at 06:03

## 2022-03-20 RX ADMIN — DOCUSATE SODIUM 100 MG: 100 CAPSULE ORAL at 07:03

## 2022-03-20 RX ADMIN — FAMOTIDINE 20 MG: 20 TABLET ORAL at 08:03

## 2022-03-20 RX ADMIN — HEPARIN SODIUM 5000 UNITS: 5000 INJECTION INTRAVENOUS; SUBCUTANEOUS at 05:03

## 2022-03-20 RX ADMIN — OXYBUTYNIN CHLORIDE 5 MG: 5 TABLET ORAL at 08:03

## 2022-03-20 RX ADMIN — ANTI-THYMOCYTE GLOBULIN (RABBIT) 125 MG: 5 INJECTION, POWDER, LYOPHILIZED, FOR SOLUTION INTRAVENOUS at 10:03

## 2022-03-20 RX ADMIN — METHYLPREDNISOLONE SODIUM SUCCINATE 250 MG: 125 INJECTION, POWDER, FOR SOLUTION INTRAMUSCULAR; INTRAVENOUS at 09:03

## 2022-03-20 RX ADMIN — Medication 800 MG: at 08:03

## 2022-03-20 RX ADMIN — BISACODYL 10 MG: 5 TABLET, COATED ORAL at 08:03

## 2022-03-20 RX ADMIN — THERA TABS 1 TABLET: TAB at 07:03

## 2022-03-20 RX ADMIN — SULFAMETHOXAZOLE AND TRIMETHOPRIM 1 TABLET: 400; 80 TABLET ORAL at 07:03

## 2022-03-20 RX ADMIN — NYSTATIN 500000 UNITS: 500000 SUSPENSION ORAL at 01:03

## 2022-03-20 RX ADMIN — OXYBUTYNIN CHLORIDE 5 MG: 5 TABLET ORAL at 01:03

## 2022-03-20 RX ADMIN — MUPIROCIN 1 G: 20 OINTMENT TOPICAL at 08:03

## 2022-03-20 RX ADMIN — HEPARIN SODIUM 5000 UNITS: 5000 INJECTION INTRAVENOUS; SUBCUTANEOUS at 01:03

## 2022-03-20 RX ADMIN — TACROLIMUS 3 MG: 1 CAPSULE ORAL at 05:03

## 2022-03-20 RX ADMIN — SODIUM BICARBONATE 650 MG TABLET 1300 MG: at 08:03

## 2022-03-20 RX ADMIN — DOCUSATE SODIUM 100 MG: 100 CAPSULE ORAL at 08:03

## 2022-03-20 RX ADMIN — NYSTATIN 500000 UNITS: 500000 SUSPENSION ORAL at 08:03

## 2022-03-20 RX ADMIN — ACETAMINOPHEN 1000 MG: 500 TABLET ORAL at 08:03

## 2022-03-20 RX ADMIN — TACROLIMUS 3 MG: 1 CAPSULE ORAL at 07:03

## 2022-03-20 RX ADMIN — NYSTATIN 500000 UNITS: 500000 SUSPENSION ORAL at 07:03

## 2022-03-20 RX ADMIN — DIPHENHYDRAMINE HYDROCHLORIDE 25 MG: 25 CAPSULE ORAL at 09:03

## 2022-03-20 RX ADMIN — ACETAMINOPHEN 1000 MG: 500 TABLET ORAL at 01:03

## 2022-03-20 RX ADMIN — DOCUSATE SODIUM 100 MG: 100 CAPSULE ORAL at 01:03

## 2022-03-20 RX ADMIN — SODIUM BICARBONATE 650 MG TABLET 1300 MG: at 01:03

## 2022-03-20 RX ADMIN — ACETAMINOPHEN 1000 MG: 500 TABLET ORAL at 05:03

## 2022-03-20 RX ADMIN — SODIUM CHLORIDE 1000 ML: 0.9 INJECTION, SOLUTION INTRAVENOUS at 02:03

## 2022-03-20 RX ADMIN — HEPARIN SODIUM 5000 UNITS: 5000 INJECTION INTRAVENOUS; SUBCUTANEOUS at 08:03

## 2022-03-20 RX ADMIN — SODIUM CHLORIDE: 0.9 INJECTION, SOLUTION INTRAVENOUS at 07:03

## 2022-03-20 RX ADMIN — METOPROLOL TARTRATE 50 MG: 50 TABLET, FILM COATED ORAL at 07:03

## 2022-03-20 RX ADMIN — OXYBUTYNIN CHLORIDE 5 MG: 5 TABLET ORAL at 11:03

## 2022-03-20 NOTE — PROGRESS NOTES
Kidney Transplant   Progress Note      HPI: Mr. Ramirez is a 71 y.o. year old male who is s/p Kidney transplant on 3/19/22.         Interval History: No acute events overnight. 7.7 Liters urine output last 24 hours. RL drain serosanguinous output, 170 cc last 24 hours. Pain is well controlled. Ambulating in the room with assistance. Tolerating regular diet. Passing flatus, no bowel movement yet. Denies shortness of breath.          Exam:  Alert and oriented x3, NAD  Not anemic, anicteric  Unlabored breathing in room air  Regular heart rate and rhythm  Abdomen : soft, not distended, clear Mccoy incision with intact skin staples. No erythema, no induration. RL drain in place with serosanguinous output.  Warm and dry skin, palpable peripheral pulses      Labs:   CBC:   Recent Labs   Lab 03/20/22  0632   WBC 13.44*   RBC 3.36*   HGB 9.9*   HCT 30.2*      MCV 90   MCH 29.5   MCHC 32.8     CMP:   Recent Labs   Lab 03/19/22  0725 03/19/22  1510 03/20/22  0632   *   < > 123*   CALCIUM 7.7*   < > 8.0*   ALBUMIN 2.6*   < > 2.7*   PROT 6.2  --   --       < > 140   K 3.8   < > 3.5   CO2 15*   < > 17*   CL 98   < > 107   BUN 59*   < > 51*   CREATININE 16.0*   < > 8.3*   ALKPHOS 95  --   --    ALT 16  --   --    AST 16  --   --    BILITOT 0.4  --   --     < > = values in this interval not displayed.     Coagulation:   Recent Labs   Lab 03/18/22 1942   INR 1.1   APTT 28.0     Labs within the past 24 hours have been reviewed.        Assessment/Plan:     1. S/p Kidney transplant, KDPI 26%, DBD donor. Severe atherosclerotic disease involving iliac arteries. Prolonged cold ischemia time (cross clamp time 3/18/2022 02:53). 2 days freire, 1 RL drain. Excellent urine output with clearing creatinine. Discontinued replacement protocol, started N/S 75 ml/h maintenance. Plan to remove freire catheter tomorrow    2. Long Term Immunosuppression- Cont with Tacrolimus, Pred, Cellcept. Draw tacrolimus level daily and adjust dose  as needed to maintain a therapeutic level.     3. At Risk for Opportunistic Infections- Cont with OI prophylaxis as per protocol.     4. Anemia of chronic disease- H&H stable. Monitor.     5.  Replaced po magnesium and sodium bicarbonate      Discussed with Dr Sola Smyth MD  Fellow, Abdominal Transplant Surgery

## 2022-03-20 NOTE — PLAN OF CARE
Pt AAO x 4 throughout shift. Pt up ad min and independent throughout shift. Banegas draining clear-pinkish-yellow urine throughout shift. Pt and wife pulled self meds 100%. Pt walked in halls with walker. Banegas to be removed tomorrow at 0600. Pt free from falls and injury throughout shift.

## 2022-03-20 NOTE — PROGRESS NOTES
TRANSPLANT NOTE:      ORGAN:  RIGHT KIDNEY   Disease Etiology: Hypertensive Nephrosclerosis  Donor CMV Status:  Positive  Donor HCV Status:  Negative  Donor HBcAb:  Negative  Donor HBV MARIAM: Negative  Donor HCV MARIAM:  Negative  Peak cPRA % (within 1 year of transplant): 0%      Alverto Ramirez Jr. is a 71 y.o. male s/p   Donation after Brain Death  kidney transplant on 3/19/2022 (Kidney) for Hypertensive Nephrosclerosis.   This patient will receive 3 doses of Thymoglobulin for induction.  This patients maintenance immunosuppression will include a steroid taper per protocol to 5mg daily, Prograf, and Cellcept maintenance.  Opportunistic infection prophylaxis will include Valcyte for 3 months (CMV D + , R + ), Bactrim for 6 months, and nystatin for 4 weeks.  Patient is to begin self medications upon transfer to the TSU, and I plan to meet with this patient and his/her support person prior to discharge to review the medication section of the Kidney Transplant Education Manual.  I have reviewed the pre-op medications and have restarted those, as appropriate.

## 2022-03-20 NOTE — PLAN OF CARE
Pt is AAOx4, afebrile overnight, and VSS. Pt has urine output of  >1 L overnight. Urine is pink tinged draining with freire. Pt and wife taught self meds and pulled 100%. Pt on I=O. NS gtts running @ 275 cc/hr. Pain controlled with PRN PO oxycodone 5 mg. Pt denies SOB and dizziness overnight. Pt is in bed at lowest position, wheels locked, and call light in reach.

## 2022-03-21 VITALS
RESPIRATION RATE: 18 BRPM | BODY MASS INDEX: 32.58 KG/M2 | HEIGHT: 68 IN | WEIGHT: 215 LBS | DIASTOLIC BLOOD PRESSURE: 80 MMHG | OXYGEN SATURATION: 98 % | HEART RATE: 71 BPM | SYSTOLIC BLOOD PRESSURE: 155 MMHG | TEMPERATURE: 98 F

## 2022-03-21 DIAGNOSIS — Z94.0 KIDNEY REPLACED BY TRANSPLANT: Primary | ICD-10-CM

## 2022-03-21 PROBLEM — Z79.60 LONG-TERM USE OF IMMUNOSUPPRESSANT MEDICATION: Status: ACTIVE | Noted: 2022-03-21

## 2022-03-21 PROBLEM — Z29.89 PROPHYLACTIC IMMUNOTHERAPY: Status: ACTIVE | Noted: 2022-03-21

## 2022-03-21 PROBLEM — Z91.89 AT RISK FOR OPPORTUNISTIC INFECTIONS: Status: ACTIVE | Noted: 2022-03-21

## 2022-03-21 PROBLEM — D63.1 ANEMIA OF RENAL DISEASE: Status: ACTIVE | Noted: 2022-03-21

## 2022-03-21 PROBLEM — N18.9 ANEMIA OF RENAL DISEASE: Status: ACTIVE | Noted: 2022-03-21

## 2022-03-21 PROBLEM — D62 ACUTE BLOOD LOSS ANEMIA: Status: ACTIVE | Noted: 2022-03-21

## 2022-03-21 LAB
ALBUMIN SERPL BCP-MCNC: 2.9 G/DL (ref 3.5–5.2)
ANION GAP SERPL CALC-SCNC: 12 MMOL/L (ref 8–16)
BASOPHILS # BLD AUTO: 0.01 K/UL (ref 0–0.2)
BASOPHILS NFR BLD: 0.1 % (ref 0–1.9)
BUN SERPL-MCNC: 42 MG/DL (ref 8–23)
CALCIUM SERPL-MCNC: 8.9 MG/DL (ref 8.7–10.5)
CHLORIDE SERPL-SCNC: 110 MMOL/L (ref 95–110)
CO2 SERPL-SCNC: 20 MMOL/L (ref 23–29)
CREAT SERPL-MCNC: 4.3 MG/DL (ref 0.5–1.4)
DIFFERENTIAL METHOD: ABNORMAL
EOSINOPHIL # BLD AUTO: 0 K/UL (ref 0–0.5)
EOSINOPHIL NFR BLD: 0 % (ref 0–8)
ERYTHROCYTE [DISTWIDTH] IN BLOOD BY AUTOMATED COUNT: 15.3 % (ref 11.5–14.5)
EST. GFR  (AFRICAN AMERICAN): 14.7 ML/MIN/1.73 M^2
EST. GFR  (NON AFRICAN AMERICAN): 12.7 ML/MIN/1.73 M^2
GLUCOSE SERPL-MCNC: 102 MG/DL (ref 70–110)
HBV CORE AB SERPL QL IA: NEGATIVE
HBV CORE IGM SERPL QL IA: NEGATIVE
HBV SURFACE AB SER QL IA: NEGATIVE
HBV SURFACE AB SERPL IA-ACNC: 9 MIU/ML
HBV SURFACE AG SERPL QL IA: NEGATIVE
HCT VFR BLD AUTO: 31.8 % (ref 40–54)
HCV AB SERPL QL IA: NEGATIVE
HEPATITIS C VIRUS (HCV) RNA DETECTION/QUANTIFICATION RT-PCR: NORMAL IU/ML
HGB BLD-MCNC: 10.3 G/DL (ref 14–18)
IMM GRANULOCYTES # BLD AUTO: 0.03 K/UL (ref 0–0.04)
IMM GRANULOCYTES NFR BLD AUTO: 0.4 % (ref 0–0.5)
LYMPHOCYTES # BLD AUTO: 0.2 K/UL (ref 1–4.8)
LYMPHOCYTES NFR BLD: 2.9 % (ref 18–48)
MAGNESIUM SERPL-MCNC: 1.9 MG/DL (ref 1.6–2.6)
MCH RBC QN AUTO: 29.7 PG (ref 27–31)
MCHC RBC AUTO-ENTMCNC: 32.4 G/DL (ref 32–36)
MCV RBC AUTO: 92 FL (ref 82–98)
MONOCYTES # BLD AUTO: 0.7 K/UL (ref 0.3–1)
MONOCYTES NFR BLD: 8.7 % (ref 4–15)
NEUTROPHILS # BLD AUTO: 7.1 K/UL (ref 1.8–7.7)
NEUTROPHILS NFR BLD: 87.9 % (ref 38–73)
NRBC BLD-RTO: 0 /100 WBC
PHOSPHATE SERPL-MCNC: 3.6 MG/DL (ref 2.7–4.5)
PLATELET # BLD AUTO: 200 K/UL (ref 150–450)
PMV BLD AUTO: 11.2 FL (ref 9.2–12.9)
POCT GLUCOSE: 102 MG/DL (ref 70–110)
POCT GLUCOSE: 112 MG/DL (ref 70–110)
POTASSIUM SERPL-SCNC: 3.6 MMOL/L (ref 3.5–5.1)
RBC # BLD AUTO: 3.47 M/UL (ref 4.6–6.2)
SODIUM SERPL-SCNC: 142 MMOL/L (ref 136–145)
TACROLIMUS BLD-MCNC: 3.6 NG/ML (ref 5–15)
WBC # BLD AUTO: 8.06 K/UL (ref 3.9–12.7)

## 2022-03-21 PROCEDURE — 80069 RENAL FUNCTION PANEL: CPT | Performed by: TRANSPLANT SURGERY

## 2022-03-21 PROCEDURE — 99239 HOSP IP/OBS DSCHRG MGMT >30: CPT | Mod: ,,,

## 2022-03-21 PROCEDURE — 99239 PR HOSPITAL DISCHARGE DAY,>30 MIN: ICD-10-PCS | Mod: ,,,

## 2022-03-21 PROCEDURE — 63600175 PHARM REV CODE 636 W HCPCS: Performed by: TRANSPLANT SURGERY

## 2022-03-21 PROCEDURE — 25000003 PHARM REV CODE 250: Performed by: INTERNAL MEDICINE

## 2022-03-21 PROCEDURE — 63600175 PHARM REV CODE 636 W HCPCS: Performed by: PHYSICIAN ASSISTANT

## 2022-03-21 PROCEDURE — 25000003 PHARM REV CODE 250: Performed by: STUDENT IN AN ORGANIZED HEALTH CARE EDUCATION/TRAINING PROGRAM

## 2022-03-21 PROCEDURE — 99900035 HC TECH TIME PER 15 MIN (STAT)

## 2022-03-21 PROCEDURE — 80197 ASSAY OF TACROLIMUS: CPT | Performed by: STUDENT IN AN ORGANIZED HEALTH CARE EDUCATION/TRAINING PROGRAM

## 2022-03-21 PROCEDURE — 25000003 PHARM REV CODE 250: Performed by: TRANSPLANT SURGERY

## 2022-03-21 PROCEDURE — 63600175 PHARM REV CODE 636 W HCPCS: Mod: JG | Performed by: NURSE PRACTITIONER

## 2022-03-21 PROCEDURE — 83735 ASSAY OF MAGNESIUM: CPT | Performed by: TRANSPLANT SURGERY

## 2022-03-21 PROCEDURE — 36415 COLL VENOUS BLD VENIPUNCTURE: CPT | Performed by: STUDENT IN AN ORGANIZED HEALTH CARE EDUCATION/TRAINING PROGRAM

## 2022-03-21 PROCEDURE — 85025 COMPLETE CBC W/AUTO DIFF WBC: CPT | Performed by: TRANSPLANT SURGERY

## 2022-03-21 PROCEDURE — 25000003 PHARM REV CODE 250

## 2022-03-21 RX ORDER — CHOLECALCIFEROL (VITAMIN D3) 50 MCG
2000 TABLET ORAL DAILY
Qty: 60 TABLET | Refills: 11 | Status: SHIPPED | OUTPATIENT
Start: 2022-03-21 | End: 2022-05-09 | Stop reason: SDUPTHER

## 2022-03-21 RX ORDER — AMLODIPINE BESYLATE 10 MG/1
10 TABLET ORAL DAILY
Status: DISCONTINUED | OUTPATIENT
Start: 2022-03-21 | End: 2022-03-21 | Stop reason: HOSPADM

## 2022-03-21 RX ORDER — LIDOCAINE HYDROCHLORIDE 10 MG/ML
10 INJECTION INFILTRATION; PERINEURAL ONCE
Status: DISCONTINUED | OUTPATIENT
Start: 2022-03-21 | End: 2022-03-21

## 2022-03-21 RX ORDER — LIDOCAINE HYDROCHLORIDE 10 MG/ML
10 INJECTION, SOLUTION EPIDURAL; INFILTRATION; INTRACAUDAL; PERINEURAL ONCE
Status: COMPLETED | OUTPATIENT
Start: 2022-03-21 | End: 2022-03-21

## 2022-03-21 RX ORDER — OXYBUTYNIN CHLORIDE 5 MG/1
5 TABLET ORAL 3 TIMES DAILY PRN
Status: DISCONTINUED | OUTPATIENT
Start: 2022-03-21 | End: 2022-03-21 | Stop reason: HOSPADM

## 2022-03-21 RX ORDER — TACROLIMUS 1 MG/1
5 CAPSULE ORAL EVERY 12 HOURS
Qty: 300 CAPSULE | Refills: 11 | Status: ON HOLD | OUTPATIENT
Start: 2022-03-21 | End: 2022-03-24 | Stop reason: SDUPTHER

## 2022-03-21 RX ORDER — METOPROLOL TARTRATE 50 MG/1
50 TABLET ORAL 2 TIMES DAILY
Status: DISCONTINUED | OUTPATIENT
Start: 2022-03-21 | End: 2022-03-21 | Stop reason: HOSPADM

## 2022-03-21 RX ORDER — LANOLIN ALCOHOL/MO/W.PET/CERES
800 CREAM (GRAM) TOPICAL 2 TIMES DAILY
Qty: 120 TABLET | Refills: 11 | Status: SHIPPED | OUTPATIENT
Start: 2022-03-21 | End: 2022-03-22 | Stop reason: SINTOL

## 2022-03-21 RX ORDER — METOPROLOL TARTRATE 50 MG/1
50 TABLET ORAL 2 TIMES DAILY
Qty: 60 TABLET | Refills: 11 | Status: SHIPPED | OUTPATIENT
Start: 2022-03-21 | End: 2022-04-25

## 2022-03-21 RX ORDER — OXYBUTYNIN CHLORIDE 5 MG/1
5 TABLET ORAL 3 TIMES DAILY PRN
Qty: 10 TABLET | Refills: 0 | Status: SHIPPED | OUTPATIENT
Start: 2022-03-21 | End: 2022-04-20

## 2022-03-21 RX ORDER — CINACALCET 30 MG/1
30 TABLET, FILM COATED ORAL
Status: DISCONTINUED | OUTPATIENT
Start: 2022-03-21 | End: 2022-03-21 | Stop reason: HOSPADM

## 2022-03-21 RX ORDER — AMLODIPINE BESYLATE 5 MG/1
5 TABLET ORAL DAILY
Status: CANCELLED | OUTPATIENT
Start: 2022-03-21

## 2022-03-21 RX ORDER — CINACALCET 30 MG/1
30 TABLET, FILM COATED ORAL
Qty: 30 TABLET | Refills: 11 | Status: SHIPPED | OUTPATIENT
Start: 2022-03-21 | End: 2022-04-25

## 2022-03-21 RX ORDER — SODIUM BICARBONATE 650 MG/1
1300 TABLET ORAL 2 TIMES DAILY
Qty: 120 TABLET | Refills: 11 | Status: SHIPPED | OUTPATIENT
Start: 2022-03-21 | End: 2022-05-16 | Stop reason: SDUPTHER

## 2022-03-21 RX ORDER — VALGANCICLOVIR 450 MG/1
450 TABLET, FILM COATED ORAL EVERY MORNING
Qty: 30 TABLET | Refills: 2 | Status: SHIPPED | OUTPATIENT
Start: 2022-03-21 | End: 2022-04-11

## 2022-03-21 RX ORDER — CHOLECALCIFEROL (VITAMIN D3) 25 MCG
2000 TABLET ORAL DAILY
Status: DISCONTINUED | OUTPATIENT
Start: 2022-03-21 | End: 2022-03-21 | Stop reason: HOSPADM

## 2022-03-21 RX ORDER — OXYCODONE HYDROCHLORIDE 5 MG/1
5 TABLET ORAL EVERY 6 HOURS PRN
Qty: 28 TABLET | Refills: 0 | Status: SHIPPED | OUTPATIENT
Start: 2022-03-21 | End: 2022-04-20

## 2022-03-21 RX ORDER — AMLODIPINE BESYLATE 10 MG/1
10 TABLET ORAL DAILY
Qty: 30 TABLET | Refills: 11 | Status: ON HOLD | OUTPATIENT
Start: 2022-03-21 | End: 2022-03-24 | Stop reason: HOSPADM

## 2022-03-21 RX ORDER — NYSTATIN 100000 [USP'U]/ML
500000 SUSPENSION ORAL
Qty: 450 ML | Refills: 0 | Status: SHIPPED | OUTPATIENT
Start: 2022-03-21 | End: 2022-05-09

## 2022-03-21 RX ORDER — HYDRALAZINE HYDROCHLORIDE 20 MG/ML
10 INJECTION INTRAMUSCULAR; INTRAVENOUS EVERY 8 HOURS PRN
Status: DISCONTINUED | OUTPATIENT
Start: 2022-03-21 | End: 2022-03-21 | Stop reason: HOSPADM

## 2022-03-21 RX ORDER — DOCUSATE SODIUM 100 MG/1
100 CAPSULE, LIQUID FILLED ORAL 3 TIMES DAILY PRN
Refills: 0 | Status: ON HOLD | COMMUNITY
Start: 2022-03-21 | End: 2022-03-24 | Stop reason: HOSPADM

## 2022-03-21 RX ADMIN — Medication 2000 UNITS: at 11:03

## 2022-03-21 RX ADMIN — MUPIROCIN 1 G: 20 OINTMENT TOPICAL at 08:03

## 2022-03-21 RX ADMIN — NYSTATIN 500000 UNITS: 500000 SUSPENSION ORAL at 08:03

## 2022-03-21 RX ADMIN — SODIUM BICARBONATE 650 MG TABLET 1300 MG: at 02:03

## 2022-03-21 RX ADMIN — DOCUSATE SODIUM 100 MG: 100 CAPSULE ORAL at 08:03

## 2022-03-21 RX ADMIN — HEPARIN SODIUM 5000 UNITS: 5000 INJECTION INTRAVENOUS; SUBCUTANEOUS at 05:03

## 2022-03-21 RX ADMIN — PENICILLIN G BENZATHINE 2.4 MILLION UNITS: 2400000 INJECTION, SUSPENSION INTRAMUSCULAR at 02:03

## 2022-03-21 RX ADMIN — LIDOCAINE HYDROCHLORIDE 100 MG: 10 INJECTION, SOLUTION EPIDURAL; INFILTRATION; INTRACAUDAL at 01:03

## 2022-03-21 RX ADMIN — MYCOPHENOLATE MOFETIL 1000 MG: 250 CAPSULE ORAL at 08:03

## 2022-03-21 RX ADMIN — DOCUSATE SODIUM 100 MG: 100 CAPSULE ORAL at 02:03

## 2022-03-21 RX ADMIN — SODIUM BICARBONATE 650 MG TABLET 1300 MG: at 08:03

## 2022-03-21 RX ADMIN — METHYLPREDNISOLONE SODIUM SUCCINATE 125 MG: 125 INJECTION, POWDER, FOR SOLUTION INTRAMUSCULAR; INTRAVENOUS at 09:03

## 2022-03-21 RX ADMIN — OXYCODONE 5 MG: 5 TABLET ORAL at 01:03

## 2022-03-21 RX ADMIN — THERA TABS 1 TABLET: TAB at 08:03

## 2022-03-21 RX ADMIN — AMLODIPINE BESYLATE 10 MG: 10 TABLET ORAL at 10:03

## 2022-03-21 RX ADMIN — OXYBUTYNIN CHLORIDE 5 MG: 5 TABLET ORAL at 08:03

## 2022-03-21 RX ADMIN — TACROLIMUS 5 MG: 1 CAPSULE ORAL at 08:03

## 2022-03-21 RX ADMIN — Medication 800 MG: at 08:03

## 2022-03-21 RX ADMIN — ACETAMINOPHEN 650 MG: 325 TABLET ORAL at 10:03

## 2022-03-21 RX ADMIN — METOPROLOL TARTRATE 50 MG: 50 TABLET, FILM COATED ORAL at 08:03

## 2022-03-21 RX ADMIN — SULFAMETHOXAZOLE AND TRIMETHOPRIM 1 TABLET: 400; 80 TABLET ORAL at 08:03

## 2022-03-21 RX ADMIN — TACROLIMUS 5 MG: 1 CAPSULE ORAL at 06:03

## 2022-03-21 RX ADMIN — NYSTATIN 500000 UNITS: 500000 SUSPENSION ORAL at 02:03

## 2022-03-21 RX ADMIN — ANTI-THYMOCYTE GLOBULIN (RABBIT) 125 MG: 5 INJECTION, POWDER, LYOPHILIZED, FOR SOLUTION INTRAVENOUS at 11:03

## 2022-03-21 RX ADMIN — DIPHENHYDRAMINE HYDROCHLORIDE 25 MG: 25 CAPSULE ORAL at 10:03

## 2022-03-21 NOTE — SUBJECTIVE & OBJECTIVE
Past Medical History:   Diagnosis Date    Anemia in CKD (chronic kidney disease)     Atrial fibrillation     CKD (chronic kidney disease) stage 4, GFR 15 11/26/2014    Colon cancer screening 12/19/2014    Elevated PSA 11/26/2014    HTN (hypertension) diagnosed in his 40s 10/16/2014    Monoclonal gammopathy 11/26/2014    Phobic anxiety disorder 11/26/2014    Proteinuria 11/26/2014       Past Surgical History:   Procedure Laterality Date    AV FISTULA PLACEMENT  11/2014    COLONOSCOPY N/A 12/29/2017    Procedure: COLONOSCOPY;  Surgeon: Tony Peacock III, MD;  Location: [facility];  Service: Endoscopy;  Laterality: N/A;    PERITONEAL CATHETER INSERTION      VASECTOMY      VASECTOMY         Review of patient's allergies indicates:  No Known Allergies    Medications:  Medications Prior to Admission   Medication Sig    amLODIPine (NORVASC) 10 MG tablet Take 1 tablet (10 mg total) by mouth once daily.    calcitRIOL (ROCALTROL) 0.5 MCG Cap TAKE 1 BY MOUTH DAILY    ergocalciferol (ERGOCALCIFEROL) 50,000 unit Cap TAKE 1 CAPSULE EVERY 7 DAYS    losartan (COZAAR) 100 MG tablet Take 1 tablet (100 mg total) by mouth once daily.    metoprolol tartrate (LOPRESSOR) 50 MG tablet Take 1 tablet (50 mg total) by mouth once daily.    potassium chloride SA (K-DUR,KLOR-CON) 10 MEQ tablet Take 1 tablet (10 mEq total) by mouth once daily.    sildenafiL (VIAGRA) 50 MG tablet Take 1 tablet (50 mg total) by mouth daily as needed for Erectile Dysfunction.    torsemide (DEMADEX) 100 MG Tab Take 2 tablets (200 mg total) by mouth once daily.    ALPRAZolam (XANAX) 1 MG tablet Take 1 tablet (1 mg total) by mouth 3 (three) times daily as needed for Anxiety.    calcium carbonate (OS-SUSAN) 500 mg calcium (1,250 mg) chewable tablet Take 1 tablet by mouth 3 (three) times daily.    fluticasone propionate (FLONASE) 50 mcg/actuation nasal spray 2 sprays (100 mcg total) by Each Nostril route as needed for Allergies.    gentamicin (GARAMYCIN) 0.1 % cream  APPLY  TOPICALLY THREE TIMES A DAY    lanthanum (FOSRENOL) 1000 MG chewable tablet CHEW 2 TABLETS THREE TIMES A DAY WITH MEALS     Antibiotics (From admission, onward)                Start     Stop Route Frequency Ordered    22 1000  penicillin G benzathine (BICILLIN LA) injection 2.4 Million Units         -- IM Once 22 1208    22 0800  sulfamethoxazole-trimethoprim 400-80mg per tablet 1 tablet         -- Oral Every morning 22 0325    22 0900  mupirocin 2 % ointment 1 g          0859 Nasl 2 times daily 22 032          Antifungals (From admission, onward)                Start     Stop Route Frequency Ordered    22 0945  nystatin 100,000 unit/mL suspension 500,000 Units         -- MT 4 times daily after meals & nightly 22 032          Antivirals (From admission, onward)          Stop Route Frequency     valGANciclovir         -- Oral Every morning             Immunization History   Administered Date(s) Administered    COVID-19, MRNA, LN-S, PF (Pfizer) (Purple Cap) 2021, 2021    Hepatitis A / Hepatitis B 10/21/2015, 2015, 2016    Influenza - Trivalent - PF (ADULT) 2014    Pneumococcal Conjugate - 13 Valent 2014    Tdap 2014       Family History       Problem Relation (Age of Onset)    Cancer Brother, Paternal Uncle    Cataracts Mother    Dementia Father    Diabetes Mother    Glaucoma Mother    Heart disease Father    Hypertension Sister    Kidney disease Sister          Social History     Socioeconomic History    Marital status:    Occupational History     Employer: retired   Tobacco Use    Smoking status: Former Smoker     Packs/day: 1.00     Years: 15.00     Pack years: 15.00     Types: Cigarettes     Quit date: 1984     Years since quittin.3    Smokeless tobacco: Never Used   Substance and Sexual Activity    Alcohol use: Yes     Alcohol/week: 5.0 - 7.0 standard drinks     Types: 5 - 7 Standard drinks or  equivalent per week     Comment: stopped drinking    Drug use: No    Sexual activity: Yes     Partners: Female   Social History Narrative    Retired from [employer]     for 41 y.o.    2 children    No history of blood transfusions    No donors     Review of Systems   Constitutional:  Negative for chills and fatigue.   HENT:  Negative for sore throat.    Eyes:  Negative for redness.   Respiratory:  Negative for cough and shortness of breath.    Cardiovascular:  Negative for chest pain.   Gastrointestinal:  Negative for abdominal distention and abdominal pain.   Genitourinary:  Negative for flank pain.   Skin:  Negative for color change.   Neurological:  Negative for headaches.   Psychiatric/Behavioral:  Negative for agitation, behavioral problems, confusion and decreased concentration.    Objective:     Vital Signs (Most Recent):  Temp: 97.8 °F (36.6 °C) (03/20/22 1633)  Pulse: 76 (03/20/22 1700)  Resp: 19 (03/20/22 1700)  BP: 131/71 (03/20/22 1700)  SpO2: 98 % (03/20/22 1700) Vital Signs (24h Range):  Temp:  [97.8 °F (36.6 °C)-98.4 °F (36.9 °C)] 97.8 °F (36.6 °C)  Pulse:  [66-98] 76  Resp:  [14-20] 19  SpO2:  [94 %-99 %] 98 %  BP: (131-176)/(71-95) 131/71     Weight: 99.8 kg (220 lb)  Body mass index is 33.95 kg/m².    Estimated Creatinine Clearance: 9 mL/min (A) (based on SCr of 8.3 mg/dL (H)).    Physical Exam  Vitals reviewed.   Constitutional:       General: He is not in acute distress.     Appearance: He is obese. He is not ill-appearing or toxic-appearing.   HENT:      Head: Normocephalic and atraumatic.      Right Ear: External ear normal.      Left Ear: External ear normal.      Nose: Nose normal.      Mouth/Throat:      Mouth: Mucous membranes are moist.      Pharynx: Oropharynx is clear.   Eyes:      Extraocular Movements: Extraocular movements intact.      Conjunctiva/sclera: Conjunctivae normal.   Cardiovascular:      Rate and Rhythm: Normal rate and regular rhythm.      Heart sounds: Normal  heart sounds.   Pulmonary:      Effort: Pulmonary effort is normal.      Breath sounds: Normal breath sounds.   Abdominal:      General: Abdomen is flat.      Palpations: Abdomen is soft.   Musculoskeletal:         General: Normal range of motion.      Cervical back: Normal range of motion.   Skin:     General: Skin is warm and dry.   Neurological:      Mental Status: He is alert and oriented to person, place, and time. Mental status is at baseline.   Psychiatric:         Mood and Affect: Mood normal.         Behavior: Behavior normal.         Thought Content: Thought content normal.         Judgment: Judgment normal.       Significant Labs: All pertinent labs within the past 24 hours have been reviewed.    Significant Imaging: I have reviewed all pertinent imaging results/findings within the past 24 hours.

## 2022-03-21 NOTE — PLAN OF CARE
Pt AAOx4. VSS, afebrile, on room air. Pt w/o complaints of pain. Pt on regular diet, denies N/V. Blood glucose monitoring ACHS. BM 4x/shift,  cc/shift. Thymo #3 administered, vital signs monitored throughout administration. Pt ambulating in room independently, non-skid socks on pt when ambulating. Bed in lowest position, wheels locked, call light within pt reach. Pt to d/c to apartments, labs/appointments scheduled for tomorrow. AVS reviewed w/ pt & spouse.

## 2022-03-21 NOTE — PROGRESS NOTES
[facility] - Transplant Stepdown  Adult Nutrition  Progress Note    SUMMARY     Recommendations    1. Continue Regular diet    - If renal labs worsen, add full renal restrictions     2. If PO intake <50%, add Boost Plus BID     3. Post-transplant nutrition education provided 3/20 and reinforced 3/21    Goals: Pt will meet and tolerate >75% EEN/EPN over the course of 7 days  Nutrition Goal Status: progressing towards goal    Communication of RD Recs:  Reviewed with RN    Assessment and Plan    Nutrition Problem  Increased nutrient needs, protein    Related to (etiology):   Physiological demands, healing    Signs and Symptoms (as evidenced by):   S/p KTx on 3/19    Interventions/Recommendations (treatment strategy):  Collaboration with other providers  Nutrition education    Nutrition Diagnosis Status:   Continues     Reason for Assessment    Reason For Assessment: RD f/u  Diagnosis:  S/p KTx  Relevant Medical History: HTN, ESRD on PD  Interdisciplinary Rounds: did not attend    General Information Comments: S/p KTx on 3/19. Pt reports appetite improved this am, tolerating 25-50% of breakfast tray. Good appetite PTA following Renal diet at home. on PD PTA. Denies n/v/d/c or difficulties chewing/swallowing. UBW ~202#; recent intentional wt loss. 20# wt gain noted x 1 day; likely scale error or fluid related. NFPE not warranted; pt appears nourished with no s/s of malnutrition.    Nutrition Discharge Planning: Post transplant nutrition education provided on 3/20 and reinforced 3/21. Food safety/drug interactions emphasized. General healthy/low salt diet recommended. Eduation material left at bedside. No other needs identified.    Nutrition Risk Screen    Nutrition Risk Screen: no indicators present    Nutrition/Diet History    Patient Reported Diet/Restrictions/Preferences: renal  Spiritual, Cultural Beliefs, Yazdanism Practices, Values that Affect Care: no  Food Allergies: NKFA  Factors Affecting Nutritional Intake:  "decreased appetite    Anthropometrics    Temp: 98.2 °F (36.8 °C)  Height Method: Stated  Height: 5' 7.5" (171.5 cm)  Height (inches): 67.5 in  Weight Method: Standard Scale  Weight: 97.5 kg (215 lb)  Weight (lb): 215 lb  Ideal Body Weight (IBW), Male: 151 lb  % Ideal Body Weight, Male (lb): 132.57 %  BMI (Calculated): 33.2  BMI Grade: 30 - 34.9- obesity - grade I  Weight Loss: intentional  Usual Body Weight (UBW), k.82 kg  % Usual Body Weight: 106.43       Lab/Procedures/Meds    Pertinent Labs Reviewed: reviewed  Pertinent Labs Comments: BUN 42, Creat 4.3, GFR 14.7  Pertinent Medications Reviewed: reviewed  Pertinent Medications Comments: antithymocyte globulin, senna, heparin, prednisone, mycophenolate, MVI, tacrolimus    Estimated/Assessed Needs    Weight Used For Calorie Calculations: 97.5 kg (215 lb)  Energy Calorie Requirements (kcal): 1686 kcal/day  Energy Need Method: [place]-St Jeor (No PAL)  Protein Requirements:  g/day (1.0-1.2 g/kg)  Weight Used For Protein Calculations: 97.5 kg (215 lb)  Fluid Requirements (mL): 1 mL/kcal or per MD  Estimated Fluid Requirement Method: RDA Method  RDA Method (mL): 1686       Nutrition Prescription Ordered    Current Diet Order: Regular    Evaluation of Received Nutrient/Fluid Intake    I/O: +3.8L since admit  Energy Calories Required: not meeting needs  Protein Required: not meeting needs  Comments: LBM 3/21  Tolerance: tolerating  % Intake of Estimated Energy Needs: 25 - 50 %  % Meal Intake: 25 - 50 %    Nutrition Risk    Level of Risk/Frequency of Follow-up: low      Monitor and Evaluation    Food and Nutrient Intake: food and beverage intake, energy intake  Food and Nutrient Adminstration: diet order  Knowledge/Beliefs/Attitudes: food and nutrition knowledge/skill  Physical Activity and Function: nutrition-related ADLs and IADLs  Anthropometric Measurements: weight, weight change  Biochemical Data, Medical Tests and Procedures: gastrointestinal profile, " electrolyte and renal panel, glucose/endocrine profile, inflammatory profile, lipid profile  Nutrition-Focused Physical Findings: overall appearance     Nutrition Follow-Up    RD Follow-up?: Yes

## 2022-03-21 NOTE — PROGRESS NOTES
Transplant Coordinator  3/18-3/21/22     Pt admitted for a cadaveric kidney transplant from a [facility] increased risk donor. ESRD 2/2 HTN.Pt was previously on PD, catheter removed. Site packed with wet/dry change daily. Thymo induction, CIT >22hr, KDPI 26%, CMV +/+, RPR+ donor     Kidney function improving daily, Cr 4.3 at time of d/c. Good UOP     RLQ incision WILSON with staples  Banegas and RL removed prior to d/c     Pt will d/c to [facility] with his wife and son  Labs 3/22 and RTC to meet with coordinator/pharmD

## 2022-03-21 NOTE — CONSULTS
"[facility] - Transplant Stepdown  Adult Nutrition  Consult Note    SUMMARY     Recommendations    1. Continue Regular diet    - If renal labs worsen, add full renal restrictions     2. If PO intake <50%, add Boost Plus BID     3. Post-transplant nutrition education provided 3/20    Goals: Pt will meet and tolerate >75% EEN/EPN over the course of 7 days  Nutrition Goal Status: new    Communication of RD Recs:  (POC)    Assessment and Plan    Nutrition Problem  Increased nutrient needs, protein    Related to (etiology):   Physiological demands, healing    Signs and Symptoms (as evidenced by):   S/p KTx on 3/19    Interventions/Recommendations (treatment strategy):  Collaboration with other providers  Nutrition education    Nutrition Diagnosis Status:   New     Reason for Assessment    Reason For Assessment: consult (Assess nutritional needs post KTx)  Diagnosis:  (ESRD)  Relevant Medical History: HTN, ESRD on PD  Interdisciplinary Rounds: did not attend    General Information Comments: S/p KTx on 3/19. Pt reports poor appetite this am, tolerating 0-25% of breakfast tray. Good appetite PTA following Renal diet at home. on PD PTA. Denies n/v/d/c or difficulties chewing/swallowing. UBW ~202#; recent intentional wt loss. NFPE not warranted; pt appears nourished with no s/s of malnutrition.    Nutrition Discharge Planning: Post transplant nutrition education provided on 3/20. Food safety/drug interactions emphasized. General healthy/low salt diet recommended. Eduation material left at bedside. No other needs identified.    Nutrition Risk Screen    Nutrition Risk Screen: no indicators present    Nutrition/Diet History    Patient Reported Diet/Restrictions/Preferences: renal  Spiritual, Cultural Beliefs, Oriental orthodox Practices, Values that Affect Care: no  Food Allergies: NKFA  Factors Affecting Nutritional Intake: decreased appetite    Anthropometrics    Temp: 98.2 °F (36.8 °C)  Height Method: Stated  Height: 5' 7.5" (171.5 " cm)  Height (inches): 67.5 in  Weight Method: Standard Scale  Weight: 97.5 kg (215 lb)  Weight (lb): 215 lb  Ideal Body Weight (IBW), Male: 151 lb  % Ideal Body Weight, Male (lb): 132.57 %  BMI (Calculated): 33.2  BMI Grade: 30 - 34.9- obesity - grade I  Weight Loss: intentional  Usual Body Weight (UBW), k.82 kg  % Usual Body Weight: 106.43       Lab/Procedures/Meds    Pertinent Labs Reviewed: reviewed  Pertinent Labs Comments: K 3.4, BUN 51, Creat 8.3, GFR 6.7, Phos 5.1  Pertinent Medications Reviewed: reviewed  Pertinent Medications Comments: antithymocyte globulin, senna, heparin, prednisone, mycophenolate, MVI, tacrolimus    Estimated/Assessed Needs    Weight Used For Calorie Calculations: 97.5 kg (215 lb)  Energy Calorie Requirements (kcal): 1686 kcal/day  Energy Need Method: [place]-St Jeor (No PAL)  Protein Requirements:  g/day (1.0-1.2 g/kg)  Weight Used For Protein Calculations: 97.5 kg (215 lb)  Fluid Requirements (mL): 1 mL/kcal or per MD  Estimated Fluid Requirement Method: RDA Method  RDA Method (mL): 1686       Nutrition Prescription Ordered    Current Diet Order: Regular    Evaluation of Received Nutrient/Fluid Intake    I/O: +3.6L since admit  Energy Calories Required: not meeting needs  Protein Required: not meeting needs  Comments: LBM 3/18  Tolerance: tolerating  % Intake of Estimated Energy Needs: 0 - 25 %  % Meal Intake: 0 - 25 %    Nutrition Risk    Level of Risk/Frequency of Follow-up: high     Monitor and Evaluation    Food and Nutrient Intake: food and beverage intake, energy intake  Food and Nutrient Adminstration: diet order  Knowledge/Beliefs/Attitudes: food and nutrition knowledge/skill  Physical Activity and Function: nutrition-related ADLs and IADLs  Anthropometric Measurements: weight, weight change  Biochemical Data, Medical Tests and Procedures: gastrointestinal profile, electrolyte and renal panel, glucose/endocrine profile, inflammatory profile, lipid  profile  Nutrition-Focused Physical Findings: overall appearance     Nutrition Follow-Up    RD Follow-up?: Yes

## 2022-03-21 NOTE — ASSESSMENT & PLAN NOTE
- s/p DBD KTX (thymo, cit 22h34m, 26% KDPI, CMV +/+, RPR + donor)  - progressed well, Cr coming down nicely, UOP increasing   - freire out 3/21   - RL out 3/21   - US 3/19 stable

## 2022-03-21 NOTE — PROGRESS NOTES
Admit / (Tentative) Discharge Note     Met with patient, wife and son to assess needs. Patient is a 71 y.o.  male, admitted for for kidney transplant.      Patient admitted from home on 3/18/2022 .  At this time, patient presents as alert and oriented x 4, pleasant, good eye contact, well groomed, recall good, concentration/judgement good, average intelligence, calm, communicative, cooperative and asking and answering questions appropriately.  At this time, patients caregiver presents as alert and oriented x 4, pleasant, good eye contact, well groomed, recall good, concentration/judgement good, average intelligence, calm, communicative, cooperative and asking and answering questions appropriately.    Household/Family Systems     Patient resides with patient's wife and son, at [address] [place] [id number].  Support system includes pt's wife, son Capo Ramirez ([phone]), and pt's niece Sara Gonzales ([phone]).  Patient does not have dependents that are need of being cared for.     Patients primary caregiver is Liana Ramirez, patients wife, phone number [phone].  Confirmed patients contact information is [phone] (home);   Telephone Information:   Mobile [phone]   Mobile [phone]   .    During admission, patient's caregiver plans to stay in patient's room.  Confirmed patient and patients caregivers do have access to reliable transportation.    Cognitive Status/Learning     Patient reports reading ability as college and states patient does have difficulty with seeing and wears bifocals.  Patient reports patient learns best by multisensory information.   Needed: No.   Highest education level: Attended College/Technical School    Vocation/Disability   .  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    Adherence      Patient reports a high level of adherence to patients health care regimen.  Adherence counseling and education provided. Patient verbalizes understanding.    Substance Use    Patient reports the following substance usage.    Tobacco: none, patient denies any use.  Quit in .  Alcohol: Pt reports drinking about 1/2 glass of red win occasionally 1-2 times per week.  Illicit Drugs/Non-prescribed Medications: none, patient denies any use.  Patient states clear understanding of the potential impact of substance use.  Substance abstinence/cessation counseling, education and resources provided and reviewed.     Services Utilizing/ADLS    Infusion Service: Prior to admission, patient utilizing? no  Home Health: Prior to admission, patient utilizing? no  DME: Prior to admission, yes - PD equipment and BP cuff  Pulmonary/Cardiac Rehab: Prior to admission, no  Dialysis:  Prior to admission, yes - PD  Transplant Specialty Pharmacy:  Prior to admission, no; patient provides permission to release necessary information to specialty pharmacy for medications post-tranplant. Resources provided and patient is choosing [facility] pharmacy at this time.    Prior to admission, patient reports patient was independent with ADLS and was driving.  Patient reports patient is not able to care for self at this time due to compromised medical condition (as documented in medical record) and physical weakness..  Patient indicates a willingness to care for self once medically cleared to do so.    Insurance/Medications    Insured by   Payer/Plan Subscr  Sex Relation Sub. Ins. ID Effective Group Num   1. AET[employer] MANAGED* ADAM MITCHELL JR. 1950 Male Self [id number] [phone]                                   [address]      Primary Insurance (for UNOS reporting): Public Insurance - Medicare & Choice - Aetna Managed Medicare  Secondary Insurance (for UNOS reporting): None    Patient reports patient is able to obtain and afford  medications at this time and at time of discharge.    Living Will/Healthcare Power of     Patient states patient does not have a LW and/or HCPA.   provided education regarding LW and HCPA and the completion of forms.    Coping/Mental Health    Patient is coping adequately with the aid of  family members.  Patient denies mental health difficulties.  Pt has history of anxiety symptoms and is prescribed Xanax 1 mg PRN.  Pt reports taking xanax nightly for sleep.    Discharge Planning    At time of discharge, patient plans to return to [facility] apartments under the care of wife and/or son.  Patients wife and son will transport patient.  Per rounds today, expected discharge date is tentatively scheduled for today (3/21/2022) pending transplant teaching and medication pulling. Patient and patients caregiver  verbalize understanding and are involved in treatment planning and discharge process.    SW reviewed [facility] apartment check in process.  SW received completed financial profile from pt/caregiver.  SW updated pt/caregiver on daily apt fee of $1.66 based on sliding scale.  SW scheduled caregiver to check into apartment today at 3 pm in anticipation of pt's discharge.    Additional Concerns    Patient's caretaker denies additional needs and/or concerns at this time. Patient is being followed for needs, education, resources, information, emotional support, supportive counseling, and for supportive and skilled discharge plan of care.  providing ongoing psychosocial support, education, resources and d/c planning as needed.  SW remains available.  remains available. Patient's caregiver verbalizes understanding and agreement with information reviewed,  availability and how to access available resources as needed. Patient denies additional needs and/or concerns at this time. Patient verbalizes understanding and agreement with information reviewed, social work  availability, and how to access available resources as needed.

## 2022-03-21 NOTE — PROGRESS NOTES
Patient admitted for Kidney transplant.Transplant coordinator met with the patient on rounds to introduce self and explain the coordinator role. The post-transplant teaching book was given. Transplant Coordinator explained that she will follow the patient while in the hospital and assist with discharge.     Transplant Specifics  ESRD 2/2 HTN  SCD, PHS increased risk, KDPI 26%  CIT>22hrs, Thymo induction  CMV ++

## 2022-03-21 NOTE — PLAN OF CARE
Pt is AAOx4 and afebrile overnight. Pt has urine output of  >1 L overnight. Urine is pink tinged draining with freire. Pt and wife taught self meds and pulled 100%. Pt on NS gtts running @ 75 cc/hr. Pain controlled with PRN PO oxycodone 5 mg. Pt denies SOB and dizziness overnight. Pt had 168/84 BP overnight. PA notified, PRN hydralazine IV push ordered if BP gets over 168 systolic again. Pt is in bed at lowest position, wheels locked, and call light in reach.

## 2022-03-21 NOTE — DISCHARGE SUMMARY
[facility] - Transplant Stepdown  Kidney Transplant  Discharge Summary    Patient Name: Alverto Ramirez Jr.  MRN: [id number]  Admission Date: 3/18/2022  Hospital Length of Stay: 3 days  Discharge Date and Time:  03/21/2022 2:59 PM  Attending Physician: Moises Little MD   Discharging Provider: Breanne Alejandra PA-C  Primary Care Provider: Arnulfo Du MD    HPI:   Mr. Ramirez is a 71 y.o. year old Black or  male with ESRD secondary to HTN admitted for kidney transplant. He has been on the wait list for a kidney transplant at [facility] since 3/30/2015. Patient is currently on peritoneal dialysis started on 2/20/2017. Patient is dialyzing on cyclic peritoneal dialysis. Patient reports that he is tolerating dialysis well. He has a PD catheter. Denies peritonitis. UOP ~ 500 cc/day. Hx MGUS (f/u annually with hem/onc) and elevated PSA (stable, followed by urology). Patient denies any recent hospitalizations or ED visits. Dr Thomas will be the primary surgeon. Thymo induction. OR scheduled for 2000. Pre op labs and imaging pending.       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 Intra op with severe calcific atherosclerosis in rt common/external iliac, derra clamp used after tedious dissection. Came to unit with 2 day freire and RL drain. Both out 3/21 without issue. Cr improved nicely post op with increasing UOP from patient. US 3/19/22 stable. Was getting hypertensive again post op, restarted home meds prior to dc.     Patient ready and stable for discharge at this time. On day of discharge, patient ambulating without assistance, tolerating diet, pain well controlled. Incision is CDI with staples.  F/u labs 3/22. F/u clinic appointment 3/22 and 3/28.  Patient understands discharge instruction and importance of follow up.    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] 03/20/2022 Not Applicable    HPTH (hyperparathyroidism) [E21.3] 01/08/2015 Yes    HTN (hypertension) diagnosed in his 40s [I10] 10/16/2014 Yes      Problems Resolved During this Admission:    Diagnosis Date Noted Date Resolved POA    ESRD on dialysis [N18.6, Z99.2] 10/04/2016 03/21/2022 Not Applicable       Treatments: see above    Consults (From admission, onward)        Status Ordering Provider     Inpatient consult to Infectious Diseases  Once        Provider:  (Not yet assigned)    Completed KARLI DE LA FUENTE     Inpatient consult to Registered Dietitian/Nutritionist  Once        Provider:  (Not yet assigned)    Completed VALENTE COELLO     Inpatient consult to Transplant Nephrology (KTM)  Once        Provider:  (Not yet assigned)    Acknowledged VALENTE COELLO          Pending Diagnostic Studies:     Procedure Component Value Units Date/Time    HIV 1/2 Ag/Ab (4th Gen) [[id number]] Collected: 03/18/22 1941    Order Status: Sent Lab Status: In process Updated: 03/18/22 1954    Specimen: Blood         Significant Diagnostic Studies: Labs:   CMP   Recent Labs   Lab 03/19/22  1510 03/20/22  0632 03/21/22  0624    140 142   K 3.4* 3.5 3.6    107 110   CO2 18* 17* 20*   * 123* 102   BUN 55* 51* 42*   CREATININE 12.1* 8.3* 4.3*   CALCIUM 7.4* 8.0* 8.9   ALBUMIN 2.6* 2.7* 2.9*   ANIONGAP 18* 16 12   ESTGFRAFRICA 4.2* 6.7* 14.7*   EGFRNONAA 3.6* 5.8* 12.7*   , CBC   Recent Labs   Lab 03/20/22  0617  03/21/22  0624   WBC 13.44* 8.06   HGB 9.9* 10.3*   HCT 30.2* 31.8*    200    and All labs within the past 24 hours have been reviewed      Discharged Condition: good    Disposition: Home or Self Care      Patient Instructions:      Diet Adult Regular     Notify your health care provider if you experience any of the following:  increased confusion or weakness     Notify your health care provider if you experience any of the following:  persistent dizziness, light-headedness, or visual disturbances     Notify your health care provider if you experience any of the following:  worsening rash     Notify your health care provider if you experience any of the following:  severe persistent headache     Notify your health care provider if you experience any of the following:  difficulty breathing or increased cough     Notify your health care provider if you experience any of the following:  redness, tenderness, or signs of infection (pain, swelling, redness, odor or green/yellow discharge around incision site)     Notify your health care provider if you experience any of the following:  severe uncontrolled pain     Notify your health care provider if you experience any of the following:  persistent nausea and vomiting or diarrhea     Notify your health care provider if you experience any of the following:  temperature >100.4     Change dressing (specify)   Order Comments: Dressing change: 1 time per day using gauze as instructed.     Activity as tolerated     Medications:  Reconciled Home Medications:      Medication List      START taking these medications    cholecalciferol (vitamin D3) 50 mcg (2,000 unit) Tab  Commonly known as: VITAMIN D3  Take 1 tablet (2,000 Units total) by mouth once daily.     cinacalcet 30 MG Tab  Commonly known as: SENSIPAR  Take 1 tablet (30 mg total) by mouth daily with breakfast.     docusate sodium 100 MG capsule  Commonly known as: COLACE  Take 1 capsule (100 mg total) by mouth 3  (three) times daily as needed for Constipation.     famotidine 20 MG tablet  Commonly known as: PEPCID  Take 1 tablet (20 mg total) by mouth every evening. STOP 4/20/22.     magnesium oxide 400 mg (241.3 mg magnesium) tablet  Commonly known as: MAG-OX  Take 2 tablets (800 mg total) by mouth 2 (two) times daily.     mycophenolate 250 mg Cap  Commonly known as: CELLCEPT  Take 4 capsules (1,000 mg total) by mouth 2 (two) times daily.     nystatin 100,000 unit/mL suspension  Commonly known as: MYCOSTATIN  Take 5 mLs (500,000 Units total) by mouth 3 (three) times daily after meals.     oxybutynin 5 MG Tab  Commonly known as: DITROPAN  Take 1 tablet (5 mg total) by mouth 3 (three) times daily as needed (bladder spasms).     oxyCODONE 5 MG immediate release tablet  Commonly known as: ROXICODONE  Take 1 tablet (5 mg total) by mouth every 6 (six) hours as needed for Pain.     predniSONE 5 MG tablet  Commonly known as: DELTASONE  Take by mouth daily: 20mg 3/22/22 -4/21, 15mg 4/22-5/22, 10mg 5/23-6/22, 5mg 6/23- thereafter     sodium bicarbonate 650 MG tablet  Take 2 tablets (1,300 mg total) by mouth 2 (two) times daily.     sulfamethoxazole-trimethoprim 400-80mg 400-80 mg per tablet  Commonly known as: BACTRIM,SEPTRA  Take 1 tablet by mouth once daily. Stop 9/16/22     tacrolimus 1 MG Cap  Commonly known as: PROGRAF  Take 5 capsules (5 mg total) by mouth every 12 (twelve) hours.     valGANciclovir 450 mg Tab  Commonly known as: VALCYTE  Take 1 tablet (450 mg total) by mouth every morning. Stop 6/16/22        CHANGE how you take these medications    metoprolol tartrate 50 MG tablet  Commonly known as: LOPRESSOR  Take 1 tablet (50 mg total) by mouth 2 (two) times daily. HOLD FOR SBP <120 or HR <60  What changed:   · when to take this  · additional instructions        CONTINUE taking these medications    ALPRAZolam 1 MG tablet  Commonly known as: XANAX  Take 1 tablet (1 mg total) by mouth 3 (three) times daily as needed for  Anxiety.     amLODIPine 10 MG tablet  Commonly known as: NORVASC  Take 1 tablet (10 mg total) by mouth once daily.     fluticasone propionate 50 mcg/actuation nasal spray  Commonly known as: FLONASE  2 sprays (100 mcg total) by Each Nostril route as needed for Allergies.     sildenafiL 50 MG tablet  Commonly known as: VIAGRA  Take 1 tablet (50 mg total) by mouth daily as needed for Erectile Dysfunction.        STOP taking these medications    calcitRIOL 0.5 MCG Cap  Commonly known as: ROCALTROL     calcium carbonate 500 mg calcium (1,250 mg) chewable tablet  Commonly known as: OS-SUSAN     ergocalciferol 50,000 unit Cap  Commonly known as: ERGOCALCIFEROL     gentamicin 0.1 % cream  Commonly known as: GARAMYCIN     lanthanum 1000 MG chewable tablet  Commonly known as: FOSRENOL     losartan 100 MG tablet  Commonly known as: COZAAR     potassium chloride SA 10 MEQ tablet  Commonly known as: K-DUR,KLOR-CON     torsemide 100 MG Tab  Commonly known as: DEMADEX          Time spent caring for patient (Greater than 1/2 spent in direct face-to-face contact): > 30 minutes    Breanne Alejandra PA-C  Kidney Transplant  [facility] - Transplant Stepdown

## 2022-03-21 NOTE — HPI
"71-year-old male with HTN, MGUS, ESRD 2/2 HTN nephrosclerosis, PD since 2/20/17, waitlisted for kidney transplant at [facility] since 3/30/2015, admitted for kidney transplant.  DBD, CMV D+/ R-, thymo induction, performed 3/18/22.  Infectious Disease consulted for"Donor RPR +. Received Penicillin pre recovery."  Lives in [place].  "

## 2022-03-21 NOTE — PROGRESS NOTES
EDUCATION NOTE:    Met with Alverto Ramirez Jr. and his caregivers to provide teaching re: immunosuppressant medications.  Reviewed medication section of the Kidney Transplant Education book that was provided.  Emphasized the importance of compliance, role of the blue medication card, concerns for drug interactions, and process of obtaining refills.  Counseled regarding Prograf, Cellcept , prednisone, including directions for use, monitoring, how to handle missed doses, and side effects.  Mr Ramirez verbalized understanding and had the opportunity to ask questions.

## 2022-03-21 NOTE — PROGRESS NOTES
Transplant Teaching Book given to patient, Alverto Ramirez Jr., on 03/21/2022.  During the course of the hospital stay the patient received information regarding kidney transplant. Teaching and instruction were completed.  Areas that were discussed included: how to contact the Transplant Team, the importance of measuring intake of fluids and urine output, and monitoring vital signs such as blood pressure, temperature, and daily weights.  Parameters for which to report abnormal findings were given.  Appointment were provided along with the rational for the importance of lab work and clinic visits.  A written medication list was provided.  The importance of immunosuppressive medications, their common side effects, and treatment to prevent or minimize side effects has been reviewed.  Signs and symptoms of rejection and infection along with various treatments were reviewed.  The need to avoid infection was discussed.  Wound care and special consideration regarding activities of daily living were explained.  Written and verbal teaching of the above information was given.     Discussed with the patient and caregiver the importance of maintaining COVID-19 precautions; wearing a mask, good handwashing, and social distancing.  Also, to report any signs or symptoms (fever, difficulty breathing, loss of taste/smell, etc.), suspected exposure, or COVID testing, immediately to the transplant program.    Education was provided to the pt, his wife and his son.

## 2022-03-21 NOTE — ASSESSMENT & PLAN NOTE
71-year-old male with HTN, MGUS, ESRD 2/2 HTN nephrosclerosis, PD since 2/20/17, now s/p DBD kidney transplant, CMV D+/ R-, thymo induction, performed 3/18/22.  +RPR donor.  Patient lives in [place].    Recommendations:  -Pen G 2.4mu weekly x 3 weeks  -Spoke to RN and primary team; initial dose to be given 3/21/22; next doses 3/28/22 and 4/4/22  -Depending on if patient remains local in [place] or goes back to [place], will coordinate next doses on an outpatient basis with ID clinic here or in .

## 2022-03-21 NOTE — PROGRESS NOTES
DISCHARGE NOTE:    Alverto Ramirez Jr. is a 71 y.o. male s/p  RIGHT KIDNEY   Donation after Brain Death  transplant on 3/19/2022 (Kidney) for ESRD secondary to Hypertensive Nephrosclerosis.      Past Medical History:   Diagnosis Date    Anemia in CKD (chronic kidney disease)     Atrial fibrillation     CKD (chronic kidney disease) stage 4, GFR 15 11/26/2014    Colon cancer screening 12/19/2014    Elevated PSA 11/26/2014    HTN (hypertension) diagnosed in his 40s 10/16/2014    Monoclonal gammopathy 11/26/2014    Phobic anxiety disorder 11/26/2014    Proteinuria 11/26/2014       Hospital Course: Mr Ramirez is s/p kidney transplant from 3/19/22 (thymo induction).  Post operative course uncomplicated with improvement in SCr and good UOP.  Will discharge to apartments today after last thymo infusion.  Of note, donor RPR+ and ID consulted, one dose of penicillin G given today 3/21 - will need remaining injections outpt on 3/28 and 4/4.  Blood pressure slightly above goal - so home amlodipine restarted and metoprolol adjusted with hold parameters.     Allergies: Review of patient's allergies indicates:  No Known Allergies    Patient Pharmacy: [facility]    Discharge Medications:     Medication List      START taking these medications    cholecalciferol (vitamin D3) 50 mcg (2,000 unit) Tab  Commonly known as: VITAMIN D3  Take 1 tablet (2,000 Units total) by mouth once daily.     cinacalcet 30 MG Tab  Commonly known as: SENSIPAR  Take 1 tablet (30 mg total) by mouth daily with breakfast.     docusate sodium 100 MG capsule  Commonly known as: COLACE     famotidine 20 MG tablet  Commonly known as: PEPCID  Take 1 tablet (20 mg total) by mouth every evening. STOP 4/20/22.     magnesium oxide 400 mg (241.3 mg magnesium) tablet  Commonly known as: MAG-OX  Take 2 tablets (800 mg total) by mouth 2 (two) times daily.     mycophenolate 250 mg Cap  Commonly known as: CELLCEPT  Take 4 capsules (1,000 mg total) by mouth 2 (two) times  daily.     nystatin 100,000 unit/mL suspension  Commonly known as: MYCOSTATIN  Take 5 mLs (500,000 Units total) by mouth 3 (three) times daily after meals.     oxybutynin 5 MG Tab  Commonly known as: DITROPAN  Take 1 tablet (5 mg total) by mouth 3 (three) times daily as needed (bladder spasms).     oxyCODONE 5 MG immediate release tablet  Commonly known as: ROXICODONE  Take 1 tablet (5 mg total) by mouth every 6 (six) hours as needed for Pain.     predniSONE 5 MG tablet  Commonly known as: DELTASONE  Take by mouth daily: 20mg 3/22/22 -4/21, 15mg 4/22-5/22, 10mg 5/23-6/22, 5mg 6/23- thereafter     sodium bicarbonate 650 MG tablet  Take 2 tablets (1,300 mg total) by mouth 2 (two) times daily.     sulfamethoxazole-trimethoprim 400-80mg 400-80 mg per tablet  Commonly known as: BACTRIM,SEPTRA  Take 1 tablet by mouth once daily. Stop 9/16/22     tacrolimus 1 MG Cap  Commonly known as: PROGRAF  Take 5 capsules (5 mg total) by mouth every 12 (twelve) hours.     valGANciclovir 450 mg Tab  Commonly known as: VALCYTE  Take 1 tablet (450 mg total) by mouth every morning. Stop 6/16/22        CHANGE how you take these medications    metoprolol tartrate 50 MG tablet  Commonly known as: LOPRESSOR  Take 1 tablet (50 mg total) by mouth 2 (two) times daily. HOLD FOR SBP <120 or HR <60  What changed:   · when to take this  · additional instructions        CONTINUE taking these medications    ALPRAZolam 1 MG tablet  Commonly known as: XANAX  Take 1 tablet (1 mg total) by mouth 3 (three) times daily as needed for Anxiety.     amLODIPine 10 MG tablet  Commonly known as: NORVASC  Take 1 tablet (10 mg total) by mouth once daily.     fluticasone propionate 50 mcg/actuation nasal spray  Commonly known as: FLONASE  2 sprays (100 mcg total) by Each Nostril route as needed for Allergies.     sildenafiL 50 MG tablet  Commonly known as: VIAGRA  Take 1 tablet (50 mg total) by mouth daily as needed for Erectile Dysfunction.        STOP taking these  medications    calcitRIOL 0.5 MCG Cap  Commonly known as: ROCALTROL     calcium carbonate 500 mg calcium (1,250 mg) chewable tablet  Commonly known as: OS-SUSAN     ergocalciferol 50,000 unit Cap  Commonly known as: ERGOCALCIFEROL     gentamicin 0.1 % cream  Commonly known as: GARAMYCIN     lanthanum 1000 MG chewable tablet  Commonly known as: FOSRENOL     losartan 100 MG tablet  Commonly known as: COZAAR     potassium chloride SA 10 MEQ tablet  Commonly known as: K-DUR,KLOR-CON     torsemide 100 MG Tab  Commonly known as: DEMADEX           Where to Get Your Medications      These medications [facility]  [address] [facility] [address]    Hours: Mon-Fri 7a-7p, Sat-Sun 10a-4p Phone: [phone]   · amLODIPine 10 MG tablet  · cholecalciferol (vitamin D3) 50 mcg (2,000 unit) Tab  · cinacalcet 30 MG Tab  · famotidine 20 MG tablet  · magnesium oxide 400 mg (241.3 mg magnesium) tablet  · metoprolol tartrate 50 MG tablet  · mycophenolate 250 mg Cap  · nystatin 100,000 unit/mL suspension  · oxybutynin 5 MG Tab  · oxyCODONE 5 MG immediate release tablet  · predniSONE 5 MG tablet  · sodium bicarbonate 650 MG tablet  · sulfamethoxazole-trimethoprim 400-80mg 400-80 mg per tablet  · tacrolimus 1 MG Cap  · valGANciclovir 450 mg Tab          Pharmacy Interventions/Recommendations:  1) Transplant Immunosuppression: Induction thymo, and maintenance FK 5/5, MMF, and prednisone taper    2) Opportunistic Infection prophylaxis: Bactrim STOP 9/16/22, valcyte STOP 6/16/22, nystatin STOP 4/22/22    3) Osteoporosis Prevention measures (liver txp): n/a    4) Patient Counseling/Education:  Patient and caregiver educated.  Demonstrated the use of the BP cuff, thermometer.    5) Follow-Up/Discharge Needs:  Patient requesting meds to be sent to [facility] for future fills, needs ID follow up for remaining PEN G injections (due 3/28 and 4/4)    6) Patient Assistance Information: none    7) The following  medications have been placed on HOLD and should be restarted in the outpatient setting (when appropriate): none    Alverto and his caregiver verbalized their understanding and had the opportunity to ask questions.

## 2022-03-21 NOTE — PROCEDURES
Site cleaned with alcohol, lidocaine 1% used to numb area to place prolene 3.0 suture to site. Drain intact at time of removal. Patient tolerated procedure well. Will continue to monitor for complications.

## 2022-03-21 NOTE — CONSULTS
"[facility] - Transplant Stepdown  Infectious Disease  Consult Note    Patient Name: Alverto Ramirez Jr.  MRN: [id number]  Admission Date: 3/18/2022  Hospital Length of Stay: 2 days  Attending Physician: Victoriano Thomas MD  Primary Care Provider: Arnulfo Du MD     Isolation Status: No active isolations    Patient information was obtained from patient and ER records.      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  +RPR donor.  Patient lives in [place].    Recommendations:  -Pen G 2.4mu weekly x 3 weeks  -Spoke to RN and primary team; initial dose to be given 3/21/22; next doses 3/28/22 and 4/4/22  -Depending on if patient remains local in [place] or goes back to [place], will coordinate next doses on an outpatient basis with ID clinic here or in .        Thank you for your consult. I will sign off. Please contact us if you have any additional questions.    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  DBD, CMV D+/ R-, thymo induction, performed 3/18/22.  Infectious Disease consulted for"Donor RPR +. Received Penicillin pre recovery."  Lives in [place].      Past Medical History:   Diagnosis Date    Anemia in CKD (chronic kidney disease)     Atrial fibrillation     CKD (chronic kidney disease) stage 4, GFR 15 11/26/2014    Colon cancer screening 12/19/2014    Elevated PSA 11/26/2014    HTN (hypertension) diagnosed in his 40s 10/16/2014    Monoclonal " gammopathy 11/26/2014    Phobic anxiety disorder 11/26/2014    Proteinuria 11/26/2014       Past Surgical History:   Procedure Laterality Date    AV FISTULA PLACEMENT  11/2014    COLONOSCOPY N/A 12/29/2017    Procedure: COLONOSCOPY;  Surgeon: Tony Peacock III, MD;  Location: [facility];  Service: Endoscopy;  Laterality: N/A;    PERITONEAL CATHETER INSERTION      VASECTOMY      VASECTOMY         Review of patient's allergies indicates:  No Known Allergies    Medications:  Medications Prior to Admission   Medication Sig    amLODIPine (NORVASC) 10 MG tablet Take 1 tablet (10 mg total) by mouth once daily.    calcitRIOL (ROCALTROL) 0.5 MCG Cap TAKE 1 BY MOUTH DAILY    ergocalciferol (ERGOCALCIFEROL) 50,000 unit Cap TAKE 1 CAPSULE EVERY 7 DAYS    losartan (COZAAR) 100 MG tablet Take 1 tablet (100 mg total) by mouth once daily.    metoprolol tartrate (LOPRESSOR) 50 MG tablet Take 1 tablet (50 mg total) by mouth once daily.    potassium chloride SA (K-DUR,KLOR-CON) 10 MEQ tablet Take 1 tablet (10 mEq total) by mouth once daily.    sildenafiL (VIAGRA) 50 MG tablet Take 1 tablet (50 mg total) by mouth daily as needed for Erectile Dysfunction.    torsemide (DEMADEX) 100 MG Tab Take 2 tablets (200 mg total) by mouth once daily.    ALPRAZolam (XANAX) 1 MG tablet Take 1 tablet (1 mg total) by mouth 3 (three) times daily as needed for Anxiety.    calcium carbonate (OS-SUSAN) 500 mg calcium (1,250 mg) chewable tablet Take 1 tablet by mouth 3 (three) times daily.    fluticasone propionate (FLONASE) 50 mcg/actuation nasal spray 2 sprays (100 mcg total) by Each Nostril route as needed for Allergies.    gentamicin (GARAMYCIN) 0.1 % cream APPLY  TOPICALLY THREE TIMES A DAY    lanthanum (FOSRENOL) 1000 MG chewable tablet CHEW 2 TABLETS THREE TIMES A DAY WITH MEALS     Antibiotics (From admission, onward)                Start     Stop Route Frequency Ordered    03/21/22 1000  penicillin G benzathine (BICILLIN LA)  injection 2.4 Million Units         -- IM Once 22 1208    22 0800  sulfamethoxazole-trimethoprim 400-80mg per tablet 1 tablet         -- Oral Every morning 22 0325    22 0900  mupirocin 2 % ointment 1 g          0859 Nasl 2 times daily 22 0325          Antifungals (From admission, onward)                Start     Stop Route Frequency Ordered    22 0945  nystatin 100,000 unit/mL suspension 500,000 Units         -- MT 4 times daily after meals & nightly 22 0325          Antivirals (From admission, onward)          Stop Route Frequency     valGANciclovir         -- Oral Every morning             Immunization History   Administered Date(s) Administered    COVID-19, MRNA, LN-S, PF (Pfizer) (Purple Cap) 2021, 2021    Hepatitis A / Hepatitis B 10/21/2015, 2015, 2016    Influenza - Trivalent - PF (ADULT) 2014    Pneumococcal Conjugate - 13 Valent 2014    Tdap 2014       Family History       Problem Relation (Age of Onset)    Cancer Brother, Paternal Uncle    Cataracts Mother    Dementia Father    Diabetes Mother    Glaucoma Mother    Heart disease Father    Hypertension Sister    Kidney disease Sister          Social History     Socioeconomic History    Marital status:    Occupational History     Employer: retired   Tobacco Use    Smoking status: Former Smoker     Packs/day: 1.00     Years: 15.00     Pack years: 15.00     Types: Cigarettes     Quit date: 1984     Years since quittin.3    Smokeless tobacco: Never Used   Substance and Sexual Activity    Alcohol use: Yes     Alcohol/week: 5.0 - 7.0 standard drinks     Types: 5 - 7 Standard drinks or equivalent per week     Comment: stopped drinking    Drug use: No    Sexual activity: Yes     Partners: Female   Social History Narrative    Retired from [employer]     for 41 y.o.    2 children    No history of blood transfusions    No donors     Review  of Systems   Constitutional:  Negative for chills and fatigue.   HENT:  Negative for sore throat.    Eyes:  Negative for redness.   Respiratory:  Negative for cough and shortness of breath.    Cardiovascular:  Negative for chest pain.   Gastrointestinal:  Negative for abdominal distention and abdominal pain.   Genitourinary:  Negative for flank pain.   Skin:  Negative for color change.   Neurological:  Negative for headaches.   Psychiatric/Behavioral:  Negative for agitation, behavioral problems, confusion and decreased concentration.    Objective:     Vital Signs (Most Recent):  Temp: 97.8 °F (36.6 °C) (03/20/22 1633)  Pulse: 76 (03/20/22 1700)  Resp: 19 (03/20/22 1700)  BP: 131/71 (03/20/22 1700)  SpO2: 98 % (03/20/22 1700) Vital Signs (24h Range):  Temp:  [97.8 °F (36.6 °C)-98.4 °F (36.9 °C)] 97.8 °F (36.6 °C)  Pulse:  [66-98] 76  Resp:  [14-20] 19  SpO2:  [94 %-99 %] 98 %  BP: (131-176)/(71-95) 131/71     Weight: 99.8 kg (220 lb)  Body mass index is 33.95 kg/m².    Estimated Creatinine Clearance: 9 mL/min (A) (based on SCr of 8.3 mg/dL (H)).    Physical Exam  Vitals reviewed.   Constitutional:       General: He is not in acute distress.     Appearance: He is obese. He is not ill-appearing or toxic-appearing.   HENT:      Head: Normocephalic and atraumatic.      Right Ear: External ear normal.      Left Ear: External ear normal.      Nose: Nose normal.      Mouth/Throat:      Mouth: Mucous membranes are moist.      Pharynx: Oropharynx is clear.   Eyes:      Extraocular Movements: Extraocular movements intact.      Conjunctiva/sclera: Conjunctivae normal.   Cardiovascular:      Rate and Rhythm: Normal rate and regular rhythm.      Heart sounds: Normal heart sounds.   Pulmonary:      Effort: Pulmonary effort is normal.      Breath sounds: Normal breath sounds.   Abdominal:      General: Abdomen is flat.      Palpations: Abdomen is soft.   Musculoskeletal:         General: Normal range of motion.      Cervical back:  Normal range of motion.   Skin:     General: Skin is warm and dry.   Neurological:      Mental Status: He is alert and oriented to person, place, and time. Mental status is at baseline.   Psychiatric:         Mood and Affect: Mood normal.         Behavior: Behavior normal.         Thought Content: Thought content normal.         Judgment: Judgment normal.       Significant Labs: All pertinent labs within the past 24 hours have been reviewed.    Significant Imaging: I have reviewed all pertinent imaging results/findings within the past 24 hours.

## 2022-03-22 ENCOUNTER — TELEPHONE (OUTPATIENT)
Dept: TRANSPLANT | Facility: CLINIC | Age: 72
End: 2022-03-22

## 2022-03-22 ENCOUNTER — PATIENT OUTREACH (OUTPATIENT)
Dept: ADMINISTRATIVE | Facility: CLINIC | Age: 72
End: 2022-03-22
Payer: MEDICARE

## 2022-03-22 ENCOUNTER — CLINICAL SUPPORT (OUTPATIENT)
Dept: TRANSPLANT | Facility: CLINIC | Age: 72
DRG: 696 | End: 2022-03-22
Payer: MEDICARE

## 2022-03-22 ENCOUNTER — HOSPITAL ENCOUNTER (INPATIENT)
Facility: HOSPITAL | Age: 72
LOS: 2 days | Discharge: HOME | DRG: 696 | End: 2022-03-24
Attending: SURGERY | Admitting: TRANSPLANT SURGERY
Payer: MEDICARE

## 2022-03-22 VITALS
OXYGEN SATURATION: 97 % | HEART RATE: 108 BPM | WEIGHT: 210.13 LBS | SYSTOLIC BLOOD PRESSURE: 195 MMHG | TEMPERATURE: 97 F | SYSTOLIC BLOOD PRESSURE: 195 MMHG | RESPIRATION RATE: 16 BRPM | HEART RATE: 108 BPM | HEIGHT: 67 IN | RESPIRATION RATE: 16 BRPM | TEMPERATURE: 97 F | DIASTOLIC BLOOD PRESSURE: 91 MMHG | WEIGHT: 210.13 LBS | DIASTOLIC BLOOD PRESSURE: 91 MMHG | BODY MASS INDEX: 32.98 KG/M2 | OXYGEN SATURATION: 97 % | BODY MASS INDEX: 32.98 KG/M2 | HEIGHT: 67 IN

## 2022-03-22 DIAGNOSIS — E83.39 HYPOPHOSPHATEMIA: ICD-10-CM

## 2022-03-22 DIAGNOSIS — I10 PRIMARY HYPERTENSION: ICD-10-CM

## 2022-03-22 DIAGNOSIS — Z57.8 OCCUPATIONAL EXPOSURE TO OTHER RISK FACTORS: ICD-10-CM

## 2022-03-22 DIAGNOSIS — N18.9 ANEMIA OF RENAL DISEASE: ICD-10-CM

## 2022-03-22 DIAGNOSIS — Z91.89 AT RISK FOR OPPORTUNISTIC INFECTIONS: ICD-10-CM

## 2022-03-22 DIAGNOSIS — D63.1 ANEMIA OF RENAL DISEASE: ICD-10-CM

## 2022-03-22 DIAGNOSIS — Z94.0 KIDNEY REPLACED BY TRANSPLANT: Primary | ICD-10-CM

## 2022-03-22 DIAGNOSIS — R19.7 DIARRHEA, UNSPECIFIED TYPE: Primary | ICD-10-CM

## 2022-03-22 DIAGNOSIS — Z94.0 S/P KIDNEY TRANSPLANT: ICD-10-CM

## 2022-03-22 DIAGNOSIS — R33.9 URINARY RETENTION: ICD-10-CM

## 2022-03-22 DIAGNOSIS — Z79.60 LONG-TERM USE OF IMMUNOSUPPRESSANT MEDICATION: ICD-10-CM

## 2022-03-22 DIAGNOSIS — Z29.89 PROPHYLACTIC IMMUNOTHERAPY: ICD-10-CM

## 2022-03-22 LAB
BACTERIA #/AREA URNS AUTO: ABNORMAL /HPF
BILIRUB UR QL STRIP: NEGATIVE
CLARITY UR REFRACT.AUTO: ABNORMAL
COLOR UR AUTO: ABNORMAL
GLUCOSE UR QL STRIP: NEGATIVE
HGB UR QL STRIP: ABNORMAL
HIV 1+2 AB+HIV1 P24 AG SERPL QL IA: NEGATIVE
HYALINE CASTS UR QL AUTO: 0 /LPF
KETONES UR QL STRIP: NEGATIVE
LEUKOCYTE ESTERASE UR QL STRIP: ABNORMAL
MICROSCOPIC COMMENT: ABNORMAL
NITRITE UR QL STRIP: NEGATIVE
PH UR STRIP: 6 [PH] (ref 5–8)
PROT UR QL STRIP: ABNORMAL
RBC #/AREA URNS AUTO: >100 /HPF (ref 0–4)
SP GR UR STRIP: 1.01 (ref 1–1.03)
URN SPEC COLLECT METH UR: ABNORMAL
WBC #/AREA URNS AUTO: 14 /HPF (ref 0–5)

## 2022-03-22 PROCEDURE — G0378 HOSPITAL OBSERVATION PER HR: HCPCS

## 2022-03-22 PROCEDURE — 99214 PR OFFICE/OUTPT VISIT, EST, LEVL IV, 30-39 MIN: ICD-10-PCS | Mod: ,,,

## 2022-03-22 PROCEDURE — 25000003 PHARM REV CODE 250: Performed by: NURSE PRACTITIONER

## 2022-03-22 PROCEDURE — 99999 PR PBB SHADOW E&M-EST. PATIENT-LVL V: ICD-10-PCS | Mod: PBBFAC,,,

## 2022-03-22 PROCEDURE — 99999 PR PBB SHADOW E&M-EST. PATIENT-LVL III: CPT | Mod: PBBFAC,,,

## 2022-03-22 PROCEDURE — 63600175 PHARM REV CODE 636 W HCPCS: Performed by: NURSE PRACTITIONER

## 2022-03-22 PROCEDURE — 94761 N-INVAS EAR/PLS OXIMETRY MLT: CPT

## 2022-03-22 PROCEDURE — 96372 THER/PROPH/DIAG INJ SC/IM: CPT | Performed by: NURSE PRACTITIONER

## 2022-03-22 PROCEDURE — 25000003 PHARM REV CODE 250

## 2022-03-22 PROCEDURE — 96366 THER/PROPH/DIAG IV INF ADDON: CPT

## 2022-03-22 PROCEDURE — 99999 PR PBB SHADOW E&M-EST. PATIENT-LVL III: ICD-10-PCS | Mod: PBBFAC,,,

## 2022-03-22 PROCEDURE — 99214 OFFICE O/P EST MOD 30 MIN: CPT | Mod: ,,,

## 2022-03-22 PROCEDURE — 96361 HYDRATE IV INFUSION ADD-ON: CPT

## 2022-03-22 PROCEDURE — 96365 THER/PROPH/DIAG IV INF INIT: CPT

## 2022-03-22 PROCEDURE — 81001 URINALYSIS AUTO W/SCOPE: CPT | Performed by: NURSE PRACTITIONER

## 2022-03-22 PROCEDURE — 99999 PR PBB SHADOW E&M-EST. PATIENT-LVL V: CPT | Mod: PBBFAC,,,

## 2022-03-22 PROCEDURE — 87086 URINE CULTURE/COLONY COUNT: CPT | Performed by: NURSE PRACTITIONER

## 2022-03-22 RX ORDER — SODIUM BICARBONATE 650 MG/1
1300 TABLET ORAL 2 TIMES DAILY
Status: DISCONTINUED | OUTPATIENT
Start: 2022-03-22 | End: 2022-03-24 | Stop reason: HOSPADM

## 2022-03-22 RX ORDER — ONDANSETRON 8 MG/1
8 TABLET, ORALLY DISINTEGRATING ORAL EVERY 6 HOURS PRN
Status: DISCONTINUED | OUTPATIENT
Start: 2022-03-22 | End: 2022-03-24 | Stop reason: HOSPADM

## 2022-03-22 RX ORDER — SODIUM CHLORIDE 9 MG/ML
INJECTION, SOLUTION INTRAVENOUS CONTINUOUS
Status: DISCONTINUED | OUTPATIENT
Start: 2022-03-22 | End: 2022-03-22

## 2022-03-22 RX ORDER — CINACALCET 30 MG/1
30 TABLET, FILM COATED ORAL
Status: DISCONTINUED | OUTPATIENT
Start: 2022-03-23 | End: 2022-03-24 | Stop reason: HOSPADM

## 2022-03-22 RX ORDER — AMLODIPINE BESYLATE 10 MG/1
10 TABLET ORAL DAILY
Status: DISCONTINUED | OUTPATIENT
Start: 2022-03-22 | End: 2022-03-24

## 2022-03-22 RX ORDER — HEPARIN SODIUM 5000 [USP'U]/ML
5000 INJECTION, SOLUTION INTRAVENOUS; SUBCUTANEOUS EVERY 8 HOURS
Status: DISCONTINUED | OUTPATIENT
Start: 2022-03-22 | End: 2022-03-24 | Stop reason: HOSPADM

## 2022-03-22 RX ORDER — SODIUM CHLORIDE, SODIUM LACTATE, POTASSIUM CHLORIDE, CALCIUM CHLORIDE 600; 310; 30; 20 MG/100ML; MG/100ML; MG/100ML; MG/100ML
INJECTION, SOLUTION INTRAVENOUS CONTINUOUS
Status: ACTIVE | OUTPATIENT
Start: 2022-03-22 | End: 2022-03-23

## 2022-03-22 RX ORDER — MYCOPHENOLATE MOFETIL 250 MG/1
1000 CAPSULE ORAL 2 TIMES DAILY
Status: DISCONTINUED | OUTPATIENT
Start: 2022-03-22 | End: 2022-03-22

## 2022-03-22 RX ORDER — CHOLECALCIFEROL (VITAMIN D3) 25 MCG
2000 TABLET ORAL DAILY
Status: DISCONTINUED | OUTPATIENT
Start: 2022-03-22 | End: 2022-03-24 | Stop reason: HOSPADM

## 2022-03-22 RX ORDER — METOPROLOL TARTRATE 50 MG/1
50 TABLET ORAL 2 TIMES DAILY
Status: DISCONTINUED | OUTPATIENT
Start: 2022-03-22 | End: 2022-03-24 | Stop reason: HOSPADM

## 2022-03-22 RX ORDER — SODIUM CHLORIDE 0.9 % (FLUSH) 0.9 %
3 SYRINGE (ML) INJECTION
Status: DISCONTINUED | OUTPATIENT
Start: 2022-03-22 | End: 2022-03-24 | Stop reason: HOSPADM

## 2022-03-22 RX ORDER — PREDNISONE 20 MG/1
20 TABLET ORAL DAILY
Status: DISCONTINUED | OUTPATIENT
Start: 2022-03-22 | End: 2022-03-24 | Stop reason: HOSPADM

## 2022-03-22 RX ORDER — ACETAMINOPHEN 325 MG/1
650 TABLET ORAL EVERY 6 HOURS PRN
Status: DISCONTINUED | OUTPATIENT
Start: 2022-03-22 | End: 2022-03-24 | Stop reason: HOSPADM

## 2022-03-22 RX ORDER — TACROLIMUS 1 MG/1
5 CAPSULE ORAL 2 TIMES DAILY
Status: DISCONTINUED | OUTPATIENT
Start: 2022-03-22 | End: 2022-03-23

## 2022-03-22 RX ORDER — VALGANCICLOVIR 450 MG/1
450 TABLET, FILM COATED ORAL EVERY MORNING
Status: DISCONTINUED | OUTPATIENT
Start: 2022-03-23 | End: 2022-03-24 | Stop reason: HOSPADM

## 2022-03-22 RX ORDER — NYSTATIN 100000 [USP'U]/ML
500000 SUSPENSION ORAL
Status: DISCONTINUED | OUTPATIENT
Start: 2022-03-22 | End: 2022-03-24 | Stop reason: HOSPADM

## 2022-03-22 RX ORDER — ALPRAZOLAM 0.25 MG/1
1 TABLET ORAL 3 TIMES DAILY PRN
Status: DISCONTINUED | OUTPATIENT
Start: 2022-03-22 | End: 2022-03-24 | Stop reason: HOSPADM

## 2022-03-22 RX ORDER — FAMOTIDINE 20 MG/1
20 TABLET, FILM COATED ORAL NIGHTLY
Status: DISCONTINUED | OUTPATIENT
Start: 2022-03-22 | End: 2022-03-24 | Stop reason: HOSPADM

## 2022-03-22 RX ORDER — OXYCODONE HYDROCHLORIDE 5 MG/1
5 TABLET ORAL EVERY 6 HOURS PRN
Status: DISCONTINUED | OUTPATIENT
Start: 2022-03-22 | End: 2022-03-24 | Stop reason: HOSPADM

## 2022-03-22 RX ORDER — OXYBUTYNIN CHLORIDE 5 MG/1
5 TABLET ORAL 3 TIMES DAILY PRN
Status: DISCONTINUED | OUTPATIENT
Start: 2022-03-22 | End: 2022-03-24 | Stop reason: HOSPADM

## 2022-03-22 RX ORDER — SULFAMETHOXAZOLE AND TRIMETHOPRIM 400; 80 MG/1; MG/1
1 TABLET ORAL DAILY
Status: DISCONTINUED | OUTPATIENT
Start: 2022-03-22 | End: 2022-03-24 | Stop reason: HOSPADM

## 2022-03-22 RX ADMIN — SODIUM CHLORIDE: 0.9 INJECTION, SOLUTION INTRAVENOUS at 04:03

## 2022-03-22 RX ADMIN — METOPROLOL TARTRATE 50 MG: 50 TABLET, FILM COATED ORAL at 08:03

## 2022-03-22 RX ADMIN — POTASSIUM PHOSPHATE, MONOBASIC AND POTASSIUM PHOSPHATE, DIBASIC 30 MMOL: 224; 236 INJECTION, SOLUTION, CONCENTRATE INTRAVENOUS at 04:03

## 2022-03-22 RX ADMIN — TACROLIMUS 5 MG: 1 CAPSULE ORAL at 05:03

## 2022-03-22 RX ADMIN — NYSTATIN 500000 UNITS: 500000 SUSPENSION ORAL at 05:03

## 2022-03-22 RX ADMIN — SODIUM BICARBONATE 650 MG TABLET 1300 MG: at 08:03

## 2022-03-22 RX ADMIN — SODIUM CHLORIDE, SODIUM LACTATE, POTASSIUM CHLORIDE, AND CALCIUM CHLORIDE: .6; .31; .03; .02 INJECTION, SOLUTION INTRAVENOUS at 03:03

## 2022-03-22 RX ADMIN — FAMOTIDINE 20 MG: 20 TABLET, FILM COATED ORAL at 08:03

## 2022-03-22 RX ADMIN — HEPARIN SODIUM 5000 UNITS: 5000 INJECTION INTRAVENOUS; SUBCUTANEOUS at 08:03

## 2022-03-22 SDOH — SOCIAL DETERMINANTS OF HEALTH (SDOH): OCCUPATIONAL EXPOSURE TO OTHER RISK FACTORS: Z57.8

## 2022-03-22 NOTE — PROGRESS NOTES
Clinic Note: First Return to Clinic Post-  Kidney Transplant    Alverto Ramirez  is a 71 y.o. male  S/p  RIGHT KIDNEY  transplant on 3/19/2022 (Kidney) for Hypertensive Nephrosclerosis.      Discharge Course (Issues/Concerns): Patient with diarrhea overnight - up to 8 BMs and having trouble with urination.  Patient states he did not drink much fluids due to lab work - advised patient he can drink water overnight but not to take any medications until after labs have been drawn.      Objective:   Vitals:    03/22/22 0923   BP: (!) 195/91   Pulse: 108   Resp: 16   Temp: 97.3 °F (36.3 °C)       Met with patient and his caregiver in the clinic to review current medication list.     Current Outpatient Medications   Medication Sig Dispense Refill    ALPRAZolam (XANAX) 1 MG tablet Take 1 tablet (1 mg total) by mouth 3 (three) times daily as needed for Anxiety. 270 tablet 3    amLODIPine (NORVASC) 10 MG tablet Take 1 tablet (10 mg total) by mouth once daily. 30 tablet 11    cinacalcet (SENSIPAR) 30 MG Tab Take 1 tablet (30 mg total) by mouth daily with breakfast. 30 tablet 11    docusate sodium (COLACE) 100 MG capsule Take 1 capsule (100 mg total) by mouth 3 (three) times daily as needed for Constipation.  0    famotidine (PEPCID) 20 MG tablet Take 1 tablet (20 mg total) by mouth every evening. STOP 4/20/22. 30 tablet 1    fluticasone propionate (FLONASE) 50 mcg/actuation nasal spray 2 sprays (100 mcg total) by Each Nostril route as needed for Allergies. 16 g 3    magnesium oxide (MAG-OX) 400 mg (241.3 mg magnesium) tablet Take 2 tablets (800 mg total) by mouth 2 (two) times daily. 120 tablet 11    metoprolol tartrate (LOPRESSOR) 50 MG tablet Take 1 tablet (50 mg total) by mouth 2 (two) times daily. HOLD FOR SBP <120 or HR <60 60 tablet 11    mycophenolate (CELLCEPT) 250 mg Cap Take 4 capsules (1,000 mg total) by mouth 2 (two) times daily. 240 capsule 11    nystatin (MYCOSTATIN) 100,000 unit/mL suspension Take 5 mLs  (500,000 Units total) by mouth 3 (three) times daily after meals. 450 mL 0    oxybutynin (DITROPAN) 5 MG Tab Take 1 tablet (5 mg total) by mouth 3 (three) times daily as needed (bladder spasms). 10 tablet 0    oxyCODONE (ROXICODONE) 5 MG immediate release tablet Take 1 tablet (5 mg total) by mouth every 6 (six) hours as needed for Pain. 28 tablet 0    predniSONE (DELTASONE) 5 MG tablet Take by mouth daily: 20mg 3/22/22 -4/21, 15mg 4/22-5/22, 10mg 5/23-6/22, 5mg 6/23- thereafter 120 tablet 5    sildenafiL (VIAGRA) 50 MG tablet Take 1 tablet (50 mg total) by mouth daily as needed for Erectile Dysfunction. 30 tablet 11    sodium bicarbonate 650 MG tablet Take 2 tablets (1,300 mg total) by mouth 2 (two) times daily. 120 tablet 11    sulfamethoxazole-trimethoprim 400-80mg (BACTRIM,SEPTRA) 400-80 mg per tablet Take 1 tablet by mouth once daily. Stop 9/16/22 30 tablet 5    tacrolimus (PROGRAF) 1 MG Cap Take 5 capsules (5 mg total) by mouth every 12 (twelve) hours. 300 capsule 11    valGANciclovir (VALCYTE) 450 mg Tab Take 1 tablet (450 mg total) by mouth every morning. Stop 6/16/22 30 tablet 2    cholecalciferol, vitamin D3, (VITAMIN D3) 50 mcg (2,000 unit) Tab Take 1 tablet (2,000 Units total) by mouth once daily. 60 tablet 11     No current facility-administered medications for this visit.       Pharmacy Interventions/Recommendations:     1) Graft Function & Immunosuppression Issues: Patient received thymoglobulin induction.  Patient is on tacrolimus 5 mg BID, Cellcept 1000 mg BID and Prednisone taper. Patient's SCr down to 2.4 from 4.3  - patient states he has struggling with urine output. Patient states he is having some burning with urination.      2) Opportunistic Infection prophylaxis:   PCP ppx: Bactrim until 9/16/22  CMV ppx: Valcyte until 6/16/22  Fungal ppx: Nystatin until 4/22/22    3) Donor Serologies & Monitoring:     Donor CMV Status: Positive  Donor HCV Status: Negative  Donor HBcAb:  Negative  Donor HBV MARIAM: Negative  Donor HCV MARIAM: Negative  RPR + Donor - will need PCN x 3 (received a dose on 3/21 - next 3/28 & 4/4)    4) Pain Management & Bowel Regimen: Patient was discharged on oxycodone 5 mg q6 PRN. Patient states his pain is well managed - however he has had diarrhea all night - up to 8 bowel movements a night.  Will stop patient's supplement of mag for now due to the diarrhea.      5) Blood Pressure Management: Patient is on amlodipine 10 mg daily and metoprolol 50 mg BID for BP management. Patient and caregiver were slightly confused about the hold parameter on metoprolol - explained to them that if his SBP <120 or HR <60 then to hold metoprolol - reviewed multiple times.     6) Blood Sugar Management & Follow-up: N/A without meds    7) Electrolyte Management: Patient is on Sensipar 30 mg daily, Mag Ox 800 mg BID and Sodium Bicarbonate 1300 mg BID -- continue to monitor and adjust.       8) Osteoporosis Prevention Strategy (Liver Transplant): N/A    8) OTHER medication follow-up (patient assistance, held medications, etc): N/A    9) Sent an [facility] [employer] request for this patient as it is being offered to all freshly transplanted patients.  [facility]ble FAQ provided to patient and answered their questions about the monoclonal antibody.      10) Reinforced medication education conducted in the hospital, including medication indications, dosing, administration, side effects, monitoring-- including timing of immunosuppressant levels.     Patient received their FIRST fill of medications from [facility].  Discussed the process for obtaining refills of medications, including verifying the dose and mailing address to have medications delivered.     Alverto and his caregivers verbalized understanding and had the opportunity to ask questions.

## 2022-03-22 NOTE — TELEPHONE ENCOUNTER
Patient seen in clinic this AM post hospital dc'd. Patient unable to urinate. Patient reports no water intake overnight and this AM. Patient said he was told fasting meant no food or water.Eduycated patient and caregiver that not to stop water intake.  Per Dr Thomas, Bladder scan and freire to be placed. Bladder scan showed > 250ml.  Freire cath placed. Patient to see urology in 1 week to d/c freire and urodynamic study.  Patient also reports diarrhea over night and during clinic visit.     Per Dr Rosario, patient to be admitted for IV phos and fluids.   [facility] #[id number]  Patient called and informed of admission. Pt verbalized understanding. Pt is at [facility] and will be here within the hour.  [facility] notified.  [facility]OBERT notified.

## 2022-03-22 NOTE — ASSESSMENT & PLAN NOTE
- Maintenance IS with prograf, full dose MMF, and prednisone taper. cont to check tacrolimus level daily. Assess for toxicity and adjust level as needed  - If diarrhea persists, consider switching MMF to myfortic

## 2022-03-22 NOTE — PROGRESS NOTES
Patient arrived to the room, oriented to room and call lam. Breanne MARTINS notified of patients arrival. Will continue with admit.

## 2022-03-22 NOTE — H&P
[facility] - Transplant Stepdown  Kidney Transplant  H&P      Subjective:     Chief Complaint/Reason for Admission: Hyperphosphatemia, Diarrhea    History of Present Illness:  Alverto Ramirez is a 71yr old AA male now s/p DDRT 3/19/22 for HTN (Thymo, CIT 22h34m, KDPI 26%, CMV +/+, RPR donor +). Cr trending down, excellent uop. He presented for routine, outpatient post op appointment and reported diarrhea, x 8 episodes today, and unable to urinate. Banegas placed in clinic then patient was sent to the ER for evaluation. Last stool softeners/laxatives taken 3/22 AM. He denies fever, chills, SOB, chest pain, or new/worsening abdominal pain. Patient to be admitted for observation. Labs notable for hypophosphatemia (1.2). Cr still trending down. Check UA/urine culture due to retention. Plan for IVF and phos replacement.       Facility-Administered Medications Prior to Admission   Medication    penicillin G benzathine (BICILLIN LA) injection 2.4 Million Units     PTA Medications   Medication Sig    ALPRAZolam (XANAX) 1 MG tablet Take 1 tablet (1 mg total) by mouth 3 (three) times daily as needed for Anxiety.    amLODIPine (NORVASC) 10 MG tablet Take 1 tablet (10 mg total) by mouth once daily.    cholecalciferol, vitamin D3, (VITAMIN D3) 50 mcg (2,000 unit) Tab Take 1 tablet (2,000 Units total) by mouth once daily.    cinacalcet (SENSIPAR) 30 MG Tab Take 1 tablet (30 mg total) by mouth daily with breakfast.    docusate sodium (COLACE) 100 MG capsule Take 1 capsule (100 mg total) by mouth 3 (three) times daily as needed for Constipation.    famotidine (PEPCID) 20 MG tablet Take 1 tablet (20 mg total) by mouth every evening. STOP 4/20/22.    fluticasone propionate (FLONASE) 50 mcg/actuation nasal spray 2 sprays (100 mcg total) by Each Nostril route as needed for Allergies.    metoprolol tartrate (LOPRESSOR) 50 MG tablet Take 1 tablet (50 mg total) by mouth 2 (two) times daily. HOLD FOR SBP <120 or HR <60    mycophenolate  (CELLCEPT) 250 mg Cap Take 4 capsules (1,000 mg total) by mouth 2 (two) times daily.    nystatin (MYCOSTATIN) 100,000 unit/mL suspension Take 5 mLs (500,000 Units total) by mouth 3 (three) times daily after meals.    oxybutynin (DITROPAN) 5 MG Tab Take 1 tablet (5 mg total) by mouth 3 (three) times daily as needed (bladder spasms).    oxyCODONE (ROXICODONE) 5 MG immediate release tablet Take 1 tablet (5 mg total) by mouth every 6 (six) hours as needed for Pain.    predniSONE (DELTASONE) 5 MG tablet Take by mouth daily: 20mg 3/22/22 -4/21, 15mg 4/22-5/22, 10mg 5/23-6/22, 5mg 6/23- thereafter (Patient taking differently: Take by mouth daily: 20mg 3/22/22 -4/21, 15mg 4/22-5/22, 10mg 5/23-6/22, 5mg 6/23- thereafter)    sildenafiL (VIAGRA) 50 MG tablet Take 1 tablet (50 mg total) by mouth daily as needed for Erectile Dysfunction.    sodium bicarbonate 650 MG tablet Take 2 tablets (1,300 mg total) by mouth 2 (two) times daily.    sulfamethoxazole-trimethoprim 400-80mg (BACTRIM,SEPTRA) 400-80 mg per tablet Take 1 tablet by mouth once daily. Stop 9/16/22    tacrolimus (PROGRAF) 1 MG Cap Take 5 capsules (5 mg total) by mouth every 12 (twelve) hours.    valGANciclovir (VALCYTE) 450 mg Tab Take 1 tablet (450 mg total) by mouth every morning. Stop 6/16/22       Review of patient's allergies indicates:  No Known Allergies    Past Medical History:   Diagnosis Date    Anemia in CKD (chronic kidney disease)     Atrial fibrillation     CKD (chronic kidney disease) stage 4, GFR 15 11/26/2014    Colon cancer screening 12/19/2014    Elevated PSA 11/26/2014    HTN (hypertension) diagnosed in his 40s 10/16/2014    Monoclonal gammopathy 11/26/2014    Phobic anxiety disorder 11/26/2014    Proteinuria 11/26/2014     Past Surgical History:   Procedure Laterality Date    AV FISTULA PLACEMENT  11/2014    COLONOSCOPY N/A 12/29/2017    Procedure: COLONOSCOPY;  Surgeon: Tony Peacock III, MD;  Location: [facility];  Service:  Endoscopy;  Laterality: N/A;    KIDNEY TRANSPLANT Right 3/18/2022    Procedure: TRANSPLANT, KIDNEY;  Surgeon: Victoriano Thomas MD;  Location: [facility] OR [address] [place] [address];  Service: Transplant;  Laterality: Right;    PERITONEAL CATHETER INSERTION      VASECTOMY      VASECTOMY       Family History       Problem Relation (Age of Onset)    Cancer Brother, Paternal Uncle    Cataracts Mother    Dementia Father    Diabetes Mother    Glaucoma Mother    Heart disease Father    Hypertension Sister    Kidney disease Sister          Tobacco Use    Smoking status: Former Smoker     Packs/day: 1.00     Years: 15.00     Pack years: 15.00     Types: Cigarettes     Quit date: 1984     Years since quittin.3    Smokeless tobacco: Never Used   Substance and Sexual Activity    Alcohol use: Yes     Alcohol/week: 5.0 - 7.0 standard drinks     Types: 5 - 7 Standard drinks or equivalent per week     Comment: stopped drinking    Drug use: No    Sexual activity: Yes     Partners: Female        Review of Systems   Constitutional:  Negative for appetite change, fatigue and fever.   Respiratory:  Negative for cough, shortness of breath and wheezing.    Cardiovascular:  Negative for chest pain, palpitations and leg swelling.   Gastrointestinal:  Positive for diarrhea. Negative for abdominal distention, abdominal pain, nausea and vomiting.   Genitourinary:  Positive for difficulty urinating (had freire placed in clinic for rentention). Negative for decreased urine volume and dysuria.   Musculoskeletal:  Negative for arthralgias and back pain.   Skin:  Negative for wound.   Allergic/Immunologic: Positive for immunocompromised state.   Neurological:  Negative for dizziness and weakness.   Psychiatric/Behavioral:  Negative for confusion.    Objective:     Vital Signs (Most Recent):  Temp: 98.4 °F (36.9 °C) (22 1502)  Pulse: 82 (22 1502)  Resp: 15 (22 1502)  BP: 121/67 (22 1502)  SpO2: 98 % (22 1502)        There is  no height or weight on file to calculate BMI.     Physical Exam  Vitals and nursing note reviewed.   Constitutional:       General: He is not in acute distress.     Appearance: Normal appearance.   HENT:      Head: Normocephalic.      Mouth/Throat:      Mouth: Mucous membranes are moist.   Eyes:      Extraocular Movements: Extraocular movements intact.      Conjunctiva/sclera: Conjunctivae normal.   Cardiovascular:      Rate and Rhythm: Normal rate and regular rhythm.      Pulses: Normal pulses.      Heart sounds: Normal heart sounds.   Pulmonary:      Effort: Pulmonary effort is normal.      Breath sounds: Normal breath sounds.   Abdominal:      General: Bowel sounds are normal. There is no distension.      Palpations: Abdomen is soft.      Tenderness: There is no abdominal tenderness.      Comments: With healing rt KTX surgical scar. Staples. CDI, no SSI    Musculoskeletal:         General: Normal range of motion.      Cervical back: Normal range of motion and neck supple.   Skin:     General: Skin is warm and dry.   Neurological:      General: No focal deficit present.      Mental Status: He is alert and oriented to person, place, and time.      Motor: No weakness.   Psychiatric:         Mood and Affect: Mood normal.         Behavior: Behavior normal.         Thought Content: Thought content normal.         Judgment: Judgment normal.       Laboratory  CBC:   Recent Labs   Lab 03/20/22  0632 03/21/22  0624 03/22/22  0810   WBC 13.44* 8.06 6.09   RBC 3.36* 3.47* 3.35*   HGB 9.9* 10.3* 9.9*   HCT 30.2* 31.8* 30.3*    200 162   MCV 90 92 90   MCH 29.5 29.7 29.6   MCHC 32.8 32.4 32.7     CMP:   Recent Labs   Lab 03/18/22  1942 03/19/22  0331 03/19/22  0725 03/19/22  1510 03/20/22  0632 03/21/22  0624 03/22/22  0810   GLU 90   < > 178*   < > 123* 102 103   CALCIUM 8.7   < > 7.7*   < > 8.0* 8.9 8.9   ALBUMIN 3.2*   < > 2.6*   < > 2.7* 2.9* 2.7*   PROT 7.2  --  6.2  --   --   --   --       < > 136   < > 140  "142 143   K 3.5   < > 3.8   < > 3.5 3.6 3.5   CO2 24   < > 15*   < > 17* 20* 20*   CL 94*   < > 98   < > 107 110 113*   BUN 56*   < > 59*   < > 51* 42* 44*   CREATININE 16.0*   < > 16.0*   < > 8.3* 4.3* 2.4*   ALKPHOS 117  --  95  --   --   --   --    ALT 20  --  16  --   --   --   --    AST 13  --  16  --   --   --   --     < > = values in this interval not displayed.     Labs within the past 24 hours have been reviewed.    Diagnostic Results:  Pending     COVID pending     Assessment/Plan:     * Urinary retention  - freire placed outpatient 3/22  - Check UA/urine culture  - remove freire with voiding trial in AM     Hypophosphatemia  - Replace with IV, start PO once diarrhea improved    Diarrhea  - hold stool softeners/laxatives  - consider infectious stool workup if diarrhea persists after holding laxatives    Prophylactic immunotherapy  - See long-term use of immunosuppressant medication    Long-term use of immunosuppressant medication  - Maintenance IS with prograf, full dose MMF, and prednisone taper. cont to check tacrolimus level daily. Assess for toxicity and adjust level as needed  - If diarrhea persists, consider switching MMF to myfortic    At risk for opportunistic infections  - Continue OI prophylaxis per protocol    S/P kidney transplant  - Cr trending down  - Monitor uop  - see "urinary retention"    HTN (hypertension) diagnosed in his 40s  - restart home meds. Monitor.         Breanne Alejandra PA-C  Kidney Transplant  [facility] - Transplant Stepdown  "

## 2022-03-22 NOTE — PROGRESS NOTES
Placed freire cath per sterile technique. Pt. fara well. Urine return noted. Statlock device in place..

## 2022-03-22 NOTE — ASSESSMENT & PLAN NOTE
- freire placed outpatient 3/22  - Check UA/urine culture  - remove freire with voiding trial in AM

## 2022-03-22 NOTE — ASSESSMENT & PLAN NOTE
- hold stool softeners/laxatives  - consider infectious stool workup if diarrhea persists after holding laxatives

## 2022-03-22 NOTE — PROGRESS NOTES
"1ST NURSING VISIT POST DISCHARGE NOTE    1st RN appointment with Alverto Ramirez post discharge 3/21/22 s/p kidney transplant 3/19/22.  Patient's wife accompanied him.  Patient reports diarrhea.  Patient says that he that he is not sleeping well.  Incision intact with staples.  Patient has Banegas catheter.  Patient that he is able to explain daily incision care and showering instructions.  Reviewed I&O monitoring, measuring, and recording, and the need for hydration (i.e., at least 2 liters of water daily with minimal caffeine and no grapefruit products).  Medication list and rationale were reviewed.  Patient did bring blue medication card and medication bottles for review.  Patient reports that he has stopped Dulcolax and has not continued Colace.  Patient has had a bowel movement.  Patient expressed understanding of daily care including BID VS, medications, and I&O documentation.  Patient made aware of today's creatinine level: 2.4.  Patient aware that coordinator will review today's labs with a transplant physician and call the patient with any dose changes indicated.  Next lab appointment scheduled for 3/24/22.  First post-operative transplant team appointment with labs scheduled for 3/28/22.    Using the Kidney Transplant Patient Reference Manual, the patient submitted his open book "Self-assessment of Kidney Transplant Patient Knowledge" test, which was completed in the transplant clinic this morning before 1st nursing visit.  This test includes questions regarding critical dose medications commonly used after kidney transplant, medication dosing and side effects, importance of timed lab draws, important signs and symptoms to report 24/7 immediately post-transplant as well as how to contact the transplant team 24/7.    Test Score: 25/25    After completing the test, the patient was given a copy of the Self-assessment Answer Key to reinforce accurate learning of test content.  Patient expressed his understanding of " the value of the information included in the self-assessment test.    Patient given supplies for home collection urine mid-stream and instructed on proper collection method. Patient and caregiver verbalized understanding.    Patient unable to urinate-freire placed.  Patient with diarrhea x 8 overnight and several times while in clinic.

## 2022-03-22 NOTE — HPI
Alverto Ramirez is a 71yr old AA male now s/p DDRT 3/19/22 for HTN (Thymo, CIT 22h34m, KDPI 26%, CMV +/+, RPR donor +). Cr trending down, excellent uop. He presented for routine, outpatient post op appointment and reported diarrhea, x 8 episodes today, and unable to urinate. Banegas placed in clinic then patient was sent to the ER for evaluation. Last stool softeners/laxatives taken 3/22 AM. He denies fever, chills, SOB, chest pain, or new/worsening abdominal pain. Patient to be admitted for observation. Labs notable for hypophosphatemia (1.2). Cr still trending down. Check UA/urine culture due to retention. Plan for IVF and phos replacement.

## 2022-03-22 NOTE — TELEPHONE ENCOUNTER
----- Message from Deny Rosario MD sent at 3/22/2022 11:52 AM CDT -----  So he needs to be admitted to get IV replacement and to hydrate if he has this severe diarrhea. Obviously I was not aware that he had severe diarrhea  ----- Message -----  From: Yee Mack RN  Sent: 3/22/2022  11:51 AM CDT  To: MD Dr Abraham Bradshaw, patient was seen in clinic this morning. He is having diarrhea x 8 over night and several times while at clinic. Mag was stopped by pharmD. KPN?  ----- Message -----  From: Deny Rosario MD  Sent: 3/22/2022  10:05 AM CDT  To: [facility] Trans[facility] Clinical    Start  mg PO tid ASAP.  Encourage hydration  Verify he is taking sensipar

## 2022-03-22 NOTE — SUBJECTIVE & OBJECTIVE
Subjective:     Chief Complaint/Reason for Admission: Hyperphosphatemia, Diarrhea    History of Present Illness:  Alverto Ramirez is a 71yr old AA male now s/p DDRT 3/19/22 for HTN (Thymo, CIT 22h34m, KDPI 26%, CMV +/+, RPR donor +). Cr trending down, excellent uop. He presented for routine, outpatient post op appointment and reported diarrhea, x 8 episodes today, and unable to urinate. Banegas placed in clinic then patient was sent to the ER for evaluation. Last stool softeners/laxatives taken 3/22 AM. He denies fever, chills, SOB, chest pain, or new/worsening abdominal pain. Patient to be admitted for observation. Labs notable for hypophosphatemia (1.2). Cr still trending down. Check UA/urine culture due to retention. Plan for IVF and phos replacement.       Facility-Administered Medications Prior to Admission   Medication    penicillin G benzathine (BICILLIN LA) injection 2.4 Million Units     PTA Medications   Medication Sig    ALPRAZolam (XANAX) 1 MG tablet Take 1 tablet (1 mg total) by mouth 3 (three) times daily as needed for Anxiety.    amLODIPine (NORVASC) 10 MG tablet Take 1 tablet (10 mg total) by mouth once daily.    cholecalciferol, vitamin D3, (VITAMIN D3) 50 mcg (2,000 unit) Tab Take 1 tablet (2,000 Units total) by mouth once daily.    cinacalcet (SENSIPAR) 30 MG Tab Take 1 tablet (30 mg total) by mouth daily with breakfast.    docusate sodium (COLACE) 100 MG capsule Take 1 capsule (100 mg total) by mouth 3 (three) times daily as needed for Constipation.    famotidine (PEPCID) 20 MG tablet Take 1 tablet (20 mg total) by mouth every evening. STOP 4/20/22.    fluticasone propionate (FLONASE) 50 mcg/actuation nasal spray 2 sprays (100 mcg total) by Each Nostril route as needed for Allergies.    metoprolol tartrate (LOPRESSOR) 50 MG tablet Take 1 tablet (50 mg total) by mouth 2 (two) times daily. HOLD FOR SBP <120 or HR <60    mycophenolate (CELLCEPT) 250 mg Cap Take 4 capsules (1,000 mg total) by mouth 2  (two) times daily.    nystatin (MYCOSTATIN) 100,000 unit/mL suspension Take 5 mLs (500,000 Units total) by mouth 3 (three) times daily after meals.    oxybutynin (DITROPAN) 5 MG Tab Take 1 tablet (5 mg total) by mouth 3 (three) times daily as needed (bladder spasms).    oxyCODONE (ROXICODONE) 5 MG immediate release tablet Take 1 tablet (5 mg total) by mouth every 6 (six) hours as needed for Pain.    predniSONE (DELTASONE) 5 MG tablet Take by mouth daily: 20mg 3/22/22 -4/21, 15mg 4/22-5/22, 10mg 5/23-6/22, 5mg 6/23- thereafter (Patient taking differently: Take by mouth daily: 20mg 3/22/22 -4/21, 15mg 4/22-5/22, 10mg 5/23-6/22, 5mg 6/23- thereafter)    sildenafiL (VIAGRA) 50 MG tablet Take 1 tablet (50 mg total) by mouth daily as needed for Erectile Dysfunction.    sodium bicarbonate 650 MG tablet Take 2 tablets (1,300 mg total) by mouth 2 (two) times daily.    sulfamethoxazole-trimethoprim 400-80mg (BACTRIM,SEPTRA) 400-80 mg per tablet Take 1 tablet by mouth once daily. Stop 9/16/22    tacrolimus (PROGRAF) 1 MG Cap Take 5 capsules (5 mg total) by mouth every 12 (twelve) hours.    valGANciclovir (VALCYTE) 450 mg Tab Take 1 tablet (450 mg total) by mouth every morning. Stop 6/16/22       Review of patient's allergies indicates:  No Known Allergies    Past Medical History:   Diagnosis Date    Anemia in CKD (chronic kidney disease)     Atrial fibrillation     CKD (chronic kidney disease) stage 4, GFR 15 11/26/2014    Colon cancer screening 12/19/2014    Elevated PSA 11/26/2014    HTN (hypertension) diagnosed in his 40s 10/16/2014    Monoclonal gammopathy 11/26/2014    Phobic anxiety disorder 11/26/2014    Proteinuria 11/26/2014     Past Surgical History:   Procedure Laterality Date    AV FISTULA PLACEMENT  11/2014    COLONOSCOPY N/A 12/29/2017    Procedure: COLONOSCOPY;  Surgeon: Tony Peacock III, MD;  Location: [facility];  Service: Endoscopy;  Laterality: N/A;    KIDNEY TRANSPLANT Right 3/18/2022    Procedure:  TRANSPLANT, KIDNEY;  Surgeon: Victoriano Thomas MD;  Location: [facility] OR [address] [place] [address];  Service: Transplant;  Laterality: Right;    PERITONEAL CATHETER INSERTION      VASECTOMY      VASECTOMY       Family History       Problem Relation (Age of Onset)    Cancer Brother, Paternal Uncle    Cataracts Mother    Dementia Father    Diabetes Mother    Glaucoma Mother    Heart disease Father    Hypertension Sister    Kidney disease Sister          Tobacco Use    Smoking status: Former Smoker     Packs/day: 1.00     Years: 15.00     Pack years: 15.00     Types: Cigarettes     Quit date: 1984     Years since quittin.3    Smokeless tobacco: Never Used   Substance and Sexual Activity    Alcohol use: Yes     Alcohol/week: 5.0 - 7.0 standard drinks     Types: 5 - 7 Standard drinks or equivalent per week     Comment: stopped drinking    Drug use: No    Sexual activity: Yes     Partners: Female        Review of Systems   Constitutional:  Negative for appetite change, fatigue and fever.   Respiratory:  Negative for cough, shortness of breath and wheezing.    Cardiovascular:  Negative for chest pain, palpitations and leg swelling.   Gastrointestinal:  Positive for diarrhea. Negative for abdominal distention, abdominal pain, nausea and vomiting.   Genitourinary:  Positive for difficulty urinating (had freire placed in clinic for rentention). Negative for decreased urine volume and dysuria.   Musculoskeletal:  Negative for arthralgias and back pain.   Skin:  Negative for wound.   Allergic/Immunologic: Positive for immunocompromised state.   Neurological:  Negative for dizziness and weakness.   Psychiatric/Behavioral:  Negative for confusion.    Objective:     Vital Signs (Most Recent):  Temp: 98.4 °F (36.9 °C) (22 1502)  Pulse: 82 (22 1502)  Resp: 15 (22 1502)  BP: 121/67 (22 1502)  SpO2: 98 % (22 1502)        There is no height or weight on file to calculate BMI.     Physical Exam  Vitals and nursing note  reviewed.   Constitutional:       General: He is not in acute distress.     Appearance: Normal appearance.   HENT:      Head: Normocephalic.      Mouth/Throat:      Mouth: Mucous membranes are moist.   Eyes:      Extraocular Movements: Extraocular movements intact.      Conjunctiva/sclera: Conjunctivae normal.   Cardiovascular:      Rate and Rhythm: Normal rate and regular rhythm.      Pulses: Normal pulses.      Heart sounds: Normal heart sounds.   Pulmonary:      Effort: Pulmonary effort is normal.      Breath sounds: Normal breath sounds.   Abdominal:      General: Bowel sounds are normal. There is no distension.      Palpations: Abdomen is soft.      Tenderness: There is no abdominal tenderness.      Comments: With healing rt KTX surgical scar. Staples. CDI, no SSI    Musculoskeletal:         General: Normal range of motion.      Cervical back: Normal range of motion and neck supple.   Skin:     General: Skin is warm and dry.   Neurological:      General: No focal deficit present.      Mental Status: He is alert and oriented to person, place, and time.      Motor: No weakness.   Psychiatric:         Mood and Affect: Mood normal.         Behavior: Behavior normal.         Thought Content: Thought content normal.         Judgment: Judgment normal.       Laboratory  CBC:   Recent Labs   Lab 03/20/22  0632 03/21/22  0624 03/22/22  0810   WBC 13.44* 8.06 6.09   RBC 3.36* 3.47* 3.35*   HGB 9.9* 10.3* 9.9*   HCT 30.2* 31.8* 30.3*    200 162   MCV 90 92 90   MCH 29.5 29.7 29.6   MCHC 32.8 32.4 32.7     CMP:   Recent Labs   Lab 03/18/22  1942 03/19/22  0331 03/19/22  0725 03/19/22  1510 03/20/22  0632 03/21/22  0624 03/22/22  0810   GLU 90   < > 178*   < > 123* 102 103   CALCIUM 8.7   < > 7.7*   < > 8.0* 8.9 8.9   ALBUMIN 3.2*   < > 2.6*   < > 2.7* 2.9* 2.7*   PROT 7.2  --  6.2  --   --   --   --       < > 136   < > 140 142 143   K 3.5   < > 3.8   < > 3.5 3.6 3.5   CO2 24   < > 15*   < > 17* 20* 20*   CL 94*    < > 98   < > 107 110 113*   BUN 56*   < > 59*   < > 51* 42* 44*   CREATININE 16.0*   < > 16.0*   < > 8.3* 4.3* 2.4*   ALKPHOS 117  --  95  --   --   --   --    ALT 20  --  16  --   --   --   --    AST 13  --  16  --   --   --   --     < > = values in this interval not displayed.     Labs within the past 24 hours have been reviewed.    Diagnostic Results:  Pending     COVID pending

## 2022-03-23 ENCOUNTER — TELEPHONE (OUTPATIENT)
Dept: TRANSPLANT | Facility: CLINIC | Age: 72
End: 2022-03-23
Payer: MEDICARE

## 2022-03-23 DIAGNOSIS — Z57.8 OCCUPATIONAL EXPOSURE TO OTHER RISK FACTORS: ICD-10-CM

## 2022-03-23 DIAGNOSIS — Z94.0 KIDNEY REPLACED BY TRANSPLANT: ICD-10-CM

## 2022-03-23 DIAGNOSIS — Z91.89 AT RISK FOR OPPORTUNISTIC INFECTIONS: Primary | ICD-10-CM

## 2022-03-23 PROBLEM — D62 ACUTE BLOOD LOSS ANEMIA: Status: RESOLVED | Noted: 2022-03-21 | Resolved: 2022-03-23

## 2022-03-23 LAB
ALBUMIN SERPL BCP-MCNC: 2.5 G/DL (ref 3.5–5.2)
ANION GAP SERPL CALC-SCNC: 9 MMOL/L (ref 8–16)
BACTERIA FLD AEROBE CULT: NO GROWTH
BACTERIA UR CULT: NO GROWTH
BASOPHILS # BLD AUTO: 0.01 K/UL (ref 0–0.2)
BASOPHILS NFR BLD: 0.1 % (ref 0–1.9)
BUN SERPL-MCNC: 32 MG/DL (ref 8–23)
CALCIUM SERPL-MCNC: 8.3 MG/DL (ref 8.7–10.5)
CHLORIDE SERPL-SCNC: 109 MMOL/L (ref 95–110)
CO2 SERPL-SCNC: 20 MMOL/L (ref 23–29)
CREAT SERPL-MCNC: 1.7 MG/DL (ref 0.5–1.4)
DIFFERENTIAL METHOD: ABNORMAL
EOSINOPHIL # BLD AUTO: 0 K/UL (ref 0–0.5)
EOSINOPHIL NFR BLD: 0.5 % (ref 0–8)
ERYTHROCYTE [DISTWIDTH] IN BLOOD BY AUTOMATED COUNT: 15.7 % (ref 11.5–14.5)
EST. GFR  (AFRICAN AMERICAN): 45.2 ML/MIN/1.73 M^2
EST. GFR  (NON AFRICAN AMERICAN): 39.1 ML/MIN/1.73 M^2
GLUCOSE SERPL-MCNC: 96 MG/DL (ref 70–110)
GRAM STN SPEC: NORMAL
HCT VFR BLD AUTO: 27.9 % (ref 40–54)
HGB BLD-MCNC: 9 G/DL (ref 14–18)
IMM GRANULOCYTES # BLD AUTO: 0.14 K/UL (ref 0–0.04)
IMM GRANULOCYTES NFR BLD AUTO: 1.9 % (ref 0–0.5)
LYMPHOCYTES # BLD AUTO: 0.3 K/UL (ref 1–4.8)
LYMPHOCYTES NFR BLD: 3.7 % (ref 18–48)
MAGNESIUM SERPL-MCNC: 1.8 MG/DL (ref 1.6–2.6)
MCH RBC QN AUTO: 29.6 PG (ref 27–31)
MCHC RBC AUTO-ENTMCNC: 32.3 G/DL (ref 32–36)
MCV RBC AUTO: 92 FL (ref 82–98)
MONOCYTES # BLD AUTO: 0.5 K/UL (ref 0.3–1)
MONOCYTES NFR BLD: 6.1 % (ref 4–15)
NEUTROPHILS # BLD AUTO: 6.5 K/UL (ref 1.8–7.7)
NEUTROPHILS NFR BLD: 87.7 % (ref 38–73)
NRBC BLD-RTO: 0 /100 WBC
PHOSPHATE SERPL-MCNC: 1.8 MG/DL (ref 2.7–4.5)
PLATELET # BLD AUTO: 141 K/UL (ref 150–450)
PMV BLD AUTO: 12.2 FL (ref 9.2–12.9)
POTASSIUM SERPL-SCNC: 3.5 MMOL/L (ref 3.5–5.1)
RBC # BLD AUTO: 3.04 M/UL (ref 4.6–6.2)
SODIUM SERPL-SCNC: 138 MMOL/L (ref 136–145)
TACROLIMUS BLD-MCNC: 4.1 NG/ML (ref 5–15)
WBC # BLD AUTO: 7.37 K/UL (ref 3.9–12.7)

## 2022-03-23 PROCEDURE — 99233 SBSQ HOSP IP/OBS HIGH 50: CPT | Mod: 24,,, | Performed by: PHYSICIAN ASSISTANT

## 2022-03-23 PROCEDURE — 51798 US URINE CAPACITY MEASURE: CPT

## 2022-03-23 PROCEDURE — 25000003 PHARM REV CODE 250: Performed by: PHYSICIAN ASSISTANT

## 2022-03-23 PROCEDURE — 80069 RENAL FUNCTION PANEL: CPT | Performed by: NURSE PRACTITIONER

## 2022-03-23 PROCEDURE — 63600175 PHARM REV CODE 636 W HCPCS: Performed by: PHYSICIAN ASSISTANT

## 2022-03-23 PROCEDURE — 63600175 PHARM REV CODE 636 W HCPCS: Performed by: NURSE PRACTITIONER

## 2022-03-23 PROCEDURE — 80197 ASSAY OF TACROLIMUS: CPT | Performed by: NURSE PRACTITIONER

## 2022-03-23 PROCEDURE — 36415 COLL VENOUS BLD VENIPUNCTURE: CPT | Performed by: NURSE PRACTITIONER

## 2022-03-23 PROCEDURE — 96372 THER/PROPH/DIAG INJ SC/IM: CPT | Performed by: NURSE PRACTITIONER

## 2022-03-23 PROCEDURE — 25000003 PHARM REV CODE 250: Performed by: NURSE PRACTITIONER

## 2022-03-23 PROCEDURE — 20600001 HC STEP DOWN PRIVATE ROOM

## 2022-03-23 PROCEDURE — 85025 COMPLETE CBC W/AUTO DIFF WBC: CPT | Performed by: NURSE PRACTITIONER

## 2022-03-23 PROCEDURE — 25000003 PHARM REV CODE 250: Performed by: STUDENT IN AN ORGANIZED HEALTH CARE EDUCATION/TRAINING PROGRAM

## 2022-03-23 PROCEDURE — 83735 ASSAY OF MAGNESIUM: CPT | Performed by: NURSE PRACTITIONER

## 2022-03-23 PROCEDURE — 99233 PR SUBSEQUENT HOSPITAL CARE,LEVL III: ICD-10-PCS | Mod: 24,,, | Performed by: PHYSICIAN ASSISTANT

## 2022-03-23 RX ORDER — HEPARIN 100 UNIT/ML
500 SYRINGE INTRAVENOUS
Status: CANCELLED | OUTPATIENT
Start: 2022-03-23

## 2022-03-23 RX ORDER — TAMSULOSIN HYDROCHLORIDE 0.4 MG/1
0.4 CAPSULE ORAL NIGHTLY
Status: DISCONTINUED | OUTPATIENT
Start: 2022-03-23 | End: 2022-03-24 | Stop reason: HOSPADM

## 2022-03-23 RX ORDER — TACROLIMUS 1 MG/1
1 CAPSULE ORAL ONCE
Status: COMPLETED | OUTPATIENT
Start: 2022-03-23 | End: 2022-03-23

## 2022-03-23 RX ORDER — HYDRALAZINE HYDROCHLORIDE 20 MG/ML
10 INJECTION INTRAMUSCULAR; INTRAVENOUS EVERY 6 HOURS PRN
Status: DISCONTINUED | OUTPATIENT
Start: 2022-03-23 | End: 2022-03-24 | Stop reason: HOSPADM

## 2022-03-23 RX ORDER — MYCOPHENOLATE MOFETIL 250 MG/1
1000 CAPSULE ORAL 2 TIMES DAILY
Status: DISCONTINUED | OUTPATIENT
Start: 2022-03-23 | End: 2022-03-24 | Stop reason: HOSPADM

## 2022-03-23 RX ORDER — TACROLIMUS 1 MG/1
6 CAPSULE ORAL 2 TIMES DAILY
Status: DISCONTINUED | OUTPATIENT
Start: 2022-03-23 | End: 2022-03-24

## 2022-03-23 RX ORDER — SODIUM CHLORIDE 0.9 % (FLUSH) 0.9 %
10 SYRINGE (ML) INJECTION
Status: CANCELLED | OUTPATIENT
Start: 2022-03-23

## 2022-03-23 RX ADMIN — HEPARIN SODIUM 5000 UNITS: 5000 INJECTION INTRAVENOUS; SUBCUTANEOUS at 09:03

## 2022-03-23 RX ADMIN — METOPROLOL TARTRATE 50 MG: 50 TABLET, FILM COATED ORAL at 09:03

## 2022-03-23 RX ADMIN — HEPARIN SODIUM 5000 UNITS: 5000 INJECTION INTRAVENOUS; SUBCUTANEOUS at 04:03

## 2022-03-23 RX ADMIN — AMLODIPINE BESYLATE 10 MG: 10 TABLET ORAL at 09:03

## 2022-03-23 RX ADMIN — DIBASIC SODIUM PHOSPHATE, MONOBASIC POTASSIUM PHOSPHATE AND MONOBASIC SODIUM PHOSPHATE 1 TABLET: 852; 155; 130 TABLET ORAL at 09:03

## 2022-03-23 RX ADMIN — Medication 2000 UNITS: at 09:03

## 2022-03-23 RX ADMIN — NYSTATIN 500000 UNITS: 500000 SUSPENSION ORAL at 05:03

## 2022-03-23 RX ADMIN — CINACALCET 30 MG: 30 TABLET, FILM COATED ORAL at 09:03

## 2022-03-23 RX ADMIN — TACROLIMUS 5 MG: 1 CAPSULE ORAL at 09:03

## 2022-03-23 RX ADMIN — TACROLIMUS 6 MG: 1 CAPSULE ORAL at 05:03

## 2022-03-23 RX ADMIN — NYSTATIN 500000 UNITS: 500000 SUSPENSION ORAL at 09:03

## 2022-03-23 RX ADMIN — HEPARIN SODIUM 5000 UNITS: 5000 INJECTION INTRAVENOUS; SUBCUTANEOUS at 02:03

## 2022-03-23 RX ADMIN — VALGANCICLOVIR 450 MG: 450 TABLET, FILM COATED ORAL at 09:03

## 2022-03-23 RX ADMIN — POTASSIUM PHOSPHATE, MONOBASIC AND POTASSIUM PHOSPHATE, DIBASIC 30 MMOL: 224; 236 INJECTION, SOLUTION, CONCENTRATE INTRAVENOUS at 10:03

## 2022-03-23 RX ADMIN — NYSTATIN 500000 UNITS: 500000 SUSPENSION ORAL at 11:03

## 2022-03-23 RX ADMIN — MYCOPHENOLATE MOFETIL 1000 MG: 250 CAPSULE ORAL at 09:03

## 2022-03-23 RX ADMIN — PREDNISONE 20 MG: 20 TABLET ORAL at 09:03

## 2022-03-23 RX ADMIN — SODIUM BICARBONATE 650 MG TABLET 1300 MG: at 09:03

## 2022-03-23 RX ADMIN — TACROLIMUS 1 MG: 1 CAPSULE ORAL at 11:03

## 2022-03-23 RX ADMIN — OXYCODONE 5 MG: 5 TABLET ORAL at 09:03

## 2022-03-23 RX ADMIN — TAMSULOSIN HYDROCHLORIDE 0.4 MG: 0.4 CAPSULE ORAL at 09:03

## 2022-03-23 RX ADMIN — FAMOTIDINE 20 MG: 20 TABLET, FILM COATED ORAL at 09:03

## 2022-03-23 RX ADMIN — SULFAMETHOXAZOLE AND TRIMETHOPRIM 1 TABLET: 400; 80 TABLET ORAL at 09:03

## 2022-03-23 SDOH — SOCIAL DETERMINANTS OF HEALTH (SDOH): OCCUPATIONAL EXPOSURE TO OTHER RISK FACTORS: Z57.8

## 2022-03-23 NOTE — TELEPHONE ENCOUNTER
----- Message from Elijah Landon sent at 3/23/2022  8:49 AM CDT -----  Pt's wife states Rx are not available for  at pharmacy    [phone]

## 2022-03-23 NOTE — ASSESSMENT & PLAN NOTE
- freire placed outpatient 3/22  - UA/urine culture pending  - Freire removed AM 3/23 with failure to empty bladder  - Freire reinserted and flomax started  - Will keep freire in for 2 days and retrial removal

## 2022-03-23 NOTE — SUBJECTIVE & OBJECTIVE
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 Cr trending down, excellent uop. He presented for routine, outpatient post op appointment and reported diarrhea, x 8 episodes today, and unable to urinate. Freire placed in clinic then patient was sent to the ER for evaluation. Last stool softeners/laxatives taken 3/22 AM. He denies fever, chills, SOB, chest pain, or new/worsening abdominal pain. Patient to be admitted for observation. Labs notable for hypophosphatemia (1.2). Cr still trending down. Check UA/urine culture due to retention. Plan for IVF and phos replacement.     Mr. Ramirez is a 71 y.o. year old male who is status post Kidney Transplant - 3/19/2022  (#1).    His maintenance immunosuppression consists of:   Immunosuppressants (From admission, onward)                Start     Stop Route Frequency Ordered    03/23/22 1800  tacrolimus capsule 6 mg         -- Oral 2 times daily 03/23/22 1117    03/23/22 1130  tacrolimus capsule 1 mg         -- Oral Once 03/23/22 1117    03/23/22 0930  mycophenolate capsule 1,000 mg         -- Oral 2 times daily 03/23/22 0817            Hospital Course:  Interval History: Patient admitted yesterday with urinary retention and diarrhea. Freire was placed in ED and removed this AM. Patient unable to void more than 25-50 cc of urine at a time. Freire reinserted today with 500 cc output. Will keep freire in for 2 days, start flomax. He is feeling fine today. Diarrhea is improved. Cellcept restarted along with low dose k phos. Monitor.         Past Medical, Surgical, Family, and Social History:   Unchanged from H&P.    Scheduled Meds:   amLODIPine  10 mg Oral Daily    cinacalcet  30 mg Oral Daily with breakfast    famotidine  20 mg Oral QHS    heparin (porcine)  5,000 Units Subcutaneous Q8H    k phos di & mono-sod phos mono  250 mg Oral BID    metoprolol tartrate  50 mg Oral  BID    mycophenolate  1,000 mg Oral BID    nystatin  500,000 Units Oral TID PC    potassium phosphate IVPB  30 mmol Intravenous Once    predniSONE  20 mg Oral Daily    sodium bicarbonate  1,300 mg Oral BID    sulfamethoxazole-trimethoprim 400-80mg  1 tablet Oral Daily    tacrolimus  1 mg Oral Once    tacrolimus  6 mg Oral BID    tamsulosin  0.4 mg Oral QHS    valGANciclovir  450 mg Oral QAM    vitamin D  2,000 Units Oral Daily     Continuous Infusions:  PRN Meds:acetaminophen, ALPRAZolam, hydrALAZINE, ondansetron, oxybutynin, oxyCODONE, sodium chloride 0.9%    Intake/Output - Last 3 Shifts         03/21 0700  03/22 0659 03/22 0700 03/23 0659 03/23 0700 03/24 0659    P.O.  780     I.V. (mL/kg)  208.3 (2.2)     Other  0     IV Piggyback  36.1     Total Intake(mL/kg)  1024.4 (10.8)     Urine (mL/kg/hr)  2150 125 (0.3)    Emesis/NG output  0     Other  0     Stool  0     Blood  0     Total Output  2150 125    Net  -1125.6 -125           Urine Occurrence  0 x     Stool Occurrence  3 x     Emesis Occurrence  0 x              Review of Systems   Constitutional:  Negative for activity change and appetite change.   Respiratory:  Negative for shortness of breath.    Cardiovascular:  Negative for leg swelling.   Gastrointestinal:  Positive for abdominal distention. Negative for abdominal pain, nausea and vomiting.   Genitourinary:  Positive for difficulty urinating.   Skin:  Positive for wound.   Allergic/Immunologic: Positive for immunocompromised state.   Psychiatric/Behavioral:  Negative for confusion and decreased concentration.     Objective:     Vital Signs (Most Recent):  Temp: 99 °F (37.2 °C) (03/23/22 0823)  Pulse: 81 (03/23/22 0823)  Resp: 16 (03/23/22 0959)  BP: (!) 171/80 (03/23/22 0823)  SpO2: 99 % (03/23/22 0823)   Vital Signs (24h Range):  Temp:  [98.4 °F (36.9 °C)-99.4 °F (37.4 °C)] 99 °F (37.2 °C)  Pulse:  [70-82] 81  Resp:  [12-18] 16  SpO2:  [97 %-100 %] 99 %  BP: (121-171)/(67-80) 171/80     Weight: 95 kg  "(209 lb 7 oz)  Height: 5' 7" (170.2 cm)  Body mass index is 32.8 kg/m².    Physical Exam  Vitals and nursing note reviewed.   Cardiovascular:      Rate and Rhythm: Normal rate and regular rhythm.   Pulmonary:      Effort: Pulmonary effort is normal.   Abdominal:      General: There is distension.      Tenderness: There is no abdominal tenderness. There is no guarding or rebound.      Comments: Kidney incision clean, dry, intact with staples in place  PD site packed with gauze   Neurological:      Mental Status: He is alert and oriented to person, place, and time.   Psychiatric:         Mood and Affect: Mood normal.         Behavior: Behavior normal.         Thought Content: Thought content normal.         Judgment: Judgment normal.       Laboratory:  CBC:   Recent Labs   Lab 03/21/22  0624 03/22/22  0810 03/23/22  0658   WBC 8.06 6.09 7.37   RBC 3.47* 3.35* 3.04*   HGB 10.3* 9.9* 9.0*   HCT 31.8* 30.3* 27.9*    162 141*   MCV 92 90 92   MCH 29.7 29.6 29.6   MCHC 32.4 32.7 32.3     BMP:   Recent Labs   Lab 03/21/22  0624 03/22/22  0810 03/23/22  0658    103 96    143 138   K 3.6 3.5 3.5    113* 109   CO2 20* 20* 20*   BUN 42* 44* 32*   CREATININE 4.3* 2.4* 1.7*   CALCIUM 8.9 8.9 8.3*       Diagnostic Results:  None  "

## 2022-03-23 NOTE — PROGRESS NOTES
Admit Note     Met with patient and wife to assess needs. Patient is a 71 y.o.  male, admitted post kidney transplantation on 3/19/2022 for Urinary retention [R33.9].      Patient admitted from local [facility] on 3/22/2022 .  At this time, patient presents as alert and oriented x 4, pleasant, good eye contact, well groomed, recall good, concentration/judgement good, average intelligence, calm, communicative, cooperative and asking and answering questions appropriately.  At this time, patients caregiver presents as alert and oriented x 4, pleasant, good eye contact, well groomed, recall good, concentration/judgement good, average intelligence, calm, communicative, cooperative and asking and answering questions appropriately.    Household/Family Systems     Patient resides with patient's wife and son, at [address] [place] [id number].  Support system includes pt's wife, son Capo Ramirez ([phone]), and pt's niece Sara Gonzales ([phone]).  Patient does not have dependents that are need of being cared for.     Patients primary caregiver is Liana Ramirez, patients wife, phone number [phone].  Confirmed patients contact information is [phone] (home);   Telephone Information:   Mobile [phone]   Mobile [phone]   .    During admission, patient's caregiver plans to stay in patient's room.  Confirmed patient and patients caregivers do have access to reliable transportation.    Cognitive Status/Learning     Patient reports reading ability as college and states patient does have difficulty with seeing and wears bifocals.  Patient reports patient learns best by multisensory information.   Needed: No.   Highest education level: Attended College/Technical School    Vocation/Disability   .  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    Adherence     Patient reports a high level of adherence to patients health care regimen.  Adherence counseling and education provided. Patient verbalizes understanding.    Substance Use    Patient reports the following substance usage.    Tobacco: none, patient denies any use.  Quit in .  Alcohol: Pt reports drinking about 1/2 glass of red win occasionally 1-2 times per week.  Illicit Drugs/Non-prescribed Medications: none, patient denies any use.  Patient states clear understanding of the potential impact of substance use.  Substance abstinence/cessation counseling, education and resources provided and reviewed.     Services Utilizing/ADLS    Infusion Service: Prior to admission, patient utilizing? no  Home Health: Prior to admission, patient utilizing? no  DME: Prior to admission, yes - BP cuff  Pulmonary/Cardiac Rehab: Prior to admission, no  Dialysis:  Prior to admission, no  Transplant Specialty Pharmacy:  Prior to admission, yes; [facility].    Prior to admission, patient reports patient was independent with ADLS and was not driving.  Patient reports patient is not able to care for self at this time due to compromised medical condition (as documented in medical record) and physical weakness..  Patient indicates a willingness to care for self once medically cleared to do so.    Insurance/Medications    Insured by   Payer/Plan Subscr  Sex Relation Sub. Ins. ID Effective Group Num   1. AETNA MANAGED* PAULAADAM JR. 1950 Male Self [id number]                                   [address]      Primary Insurance (for UNOS reporting): Public Insurance - Medicare & Choice - Aetna Managed Medicare  Secondary Insurance (for UNOS reporting): None     Pt reports insurance is being paid for by the [employer]. KENNY provided education and information regarding this policy post transplant. Patient and Caregiver verbalized understanding and agreement.  KENNY  aware that pt has been getting financial assistance through the [employer] ([employer]). SW emailed Transplant Verification Form to [email or web address]. SW remains available to pt, pt's family, and transplant team at [phone].    Patient reports patient is able to obtain and afford medications at this time and at time of discharge.    Living Will/Healthcare Power of     Patient states patient does not have a LW and/or HCPA.   provided education regarding LW and HCPA and the completion of forms.    Coping/Mental Health    Patient is coping adequately with the aid of  family members.  Patient denies mental health difficulties.  Pt has history of anxiety symptoms and is prescribed Xanax 1 mg PRN.  Pt reports taking xanax nightly for sleep.    Discharge Planning    At time of discharge, patient plans to return to [facility] apartments under the care of wife and/or son.  Patients wife and son will transport patient.  Per rounds today, expected discharge date has not been medically determined at this time. Patient and patients caregiver  verbalize understanding and are involved in treatment planning and discharge process.    Additional Concerns    Patient's caretaker denies additional needs and/or concerns at this time. Patient is being followed for needs, education, resources, information, emotional support, supportive counseling, and for supportive and skilled discharge plan of care.  providing ongoing psychosocial support, education, resources and d/c planning as needed.  SW remains available.  remains available. Patient's caregiver verbalizes understanding and agreement with information reviewed,  availability and how to access available resources as needed. Patient denies additional needs and/or concerns at this time. Patient verbalizes understanding and agreement with information reviewed, social work availability, and how to access  available resources as needed.

## 2022-03-23 NOTE — HOSPITAL COURSE
Interval History: Patient admitted yesterday with urinary retention and diarrhea. Freire was placed in ED and removed this AM. Patient unable to void more than 25-50 cc of urine at a time. Freire reinserted today with 500 cc output. Will keep freire in for 2 days, start flomax. He is feeling fine today. Diarrhea is improved. Cellcept restarted along with low dose k phos. Monitor.

## 2022-03-23 NOTE — PLAN OF CARE
Pt awake, alert, and oriented. Pt freire was removed on night shift. First two voids combined was 25cc. Bladder scanned after next void of 50 ccs, it showed 0cc of urine in bladder. Pt requested to have freire put back in due to pressure on the bladder. When freire placed got a small clot and 500 ml of urine. Creatine is trending down. Painted RLQ incision with betadine and its open to air with staples. Reminded pt to drink water. Phosphorus replaced.

## 2022-03-23 NOTE — ASSESSMENT & PLAN NOTE
"- Cr improving   - with urinary rentention requiring freire placement  - see "urinary retention"  "

## 2022-03-23 NOTE — ASSESSMENT & PLAN NOTE
- Replace with IV prm  - Low dose k phos restarted   - Will monitor and adjust if diarrhea returns

## 2022-03-23 NOTE — PROGRESS NOTES
[facility] - Transplant Stepdown  Kidney Transplant  Progress Note      Reason for Follow-up: Reassessment of Kidney Transplant - 3/19/2022  (#1) recipient and management of immunosuppression.        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 Cr trending down, excellent uop. He presented for routine, outpatient post op appointment and reported diarrhea, x 8 episodes today, and unable to urinate. Freire placed in clinic then patient was sent to the ER for evaluation. Last stool softeners/laxatives taken 3/22 AM. He denies fever, chills, SOB, chest pain, or new/worsening abdominal pain. Patient to be admitted for observation. Labs notable for hypophosphatemia (1.2). Cr still trending down. Check UA/urine culture due to retention. Plan for IVF and phos replacement.     Mr. Ramirez is a 71 y.o. year old male who is status post Kidney Transplant - 3/19/2022  (#1).    His maintenance immunosuppression consists of:   Immunosuppressants (From admission, onward)                Start     Stop Route Frequency Ordered    03/23/22 1800  tacrolimus capsule 6 mg         -- Oral 2 times daily 03/23/22 1117    03/23/22 1130  tacrolimus capsule 1 mg         -- Oral Once 03/23/22 1117    03/23/22 0930  mycophenolate capsule 1,000 mg         -- Oral 2 times daily 03/23/22 0817            Hospital Course:  Interval History: Patient admitted yesterday with urinary retention and diarrhea. Freire was placed in ED and removed this AM. Patient unable to void more than 25-50 cc of urine at a time. Freire reinserted today with 500 cc output. Will keep freire in for 2 days, start flomax. He is feeling fine today. Diarrhea is improved. Cellcept restarted along with low dose k phos. Monitor.         Past Medical, Surgical, Family, and Social History:   Unchanged from H&P.    Scheduled Meds:   amLODIPine  10 mg Oral Daily    cinacalcet   30 mg Oral Daily with breakfast    famotidine  20 mg Oral QHS    heparin (porcine)  5,000 Units Subcutaneous Q8H    k phos di & mono-sod phos mono  250 mg Oral BID    metoprolol tartrate  50 mg Oral BID    mycophenolate  1,000 mg Oral BID    nystatin  500,000 Units Oral TID PC    potassium phosphate IVPB  30 mmol Intravenous Once    predniSONE  20 mg Oral Daily    sodium bicarbonate  1,300 mg Oral BID    sulfamethoxazole-trimethoprim 400-80mg  1 tablet Oral Daily    tacrolimus  1 mg Oral Once    tacrolimus  6 mg Oral BID    tamsulosin  0.4 mg Oral QHS    valGANciclovir  450 mg Oral QAM    vitamin D  2,000 Units Oral Daily     Continuous Infusions:  PRN Meds:acetaminophen, ALPRAZolam, hydrALAZINE, ondansetron, oxybutynin, oxyCODONE, sodium chloride 0.9%    Intake/Output - Last 3 Shifts         03/21 0700  03/22 0659 03/22 0700 03/23 0659 03/23 0700 03/24 0659    P.O.  780     I.V. (mL/kg)  208.3 (2.2)     Other  0     IV Piggyback  36.1     Total Intake(mL/kg)  1024.4 (10.8)     Urine (mL/kg/hr)  2150 125 (0.3)    Emesis/NG output  0     Other  0     Stool  0     Blood  0     Total Output  2150 125    Net  -1125.6 -125           Urine Occurrence  0 x     Stool Occurrence  3 x     Emesis Occurrence  0 x              Review of Systems   Constitutional:  Negative for activity change and appetite change.   Respiratory:  Negative for shortness of breath.    Cardiovascular:  Negative for leg swelling.   Gastrointestinal:  Positive for abdominal distention. Negative for abdominal pain, nausea and vomiting.   Genitourinary:  Positive for difficulty urinating.   Skin:  Positive for wound.   Allergic/Immunologic: Positive for immunocompromised state.   Psychiatric/Behavioral:  Negative for confusion and decreased concentration.     Objective:     Vital Signs (Most Recent):  Temp: 99 °F (37.2 °C) (03/23/22 0823)  Pulse: 81 (03/23/22 0823)  Resp: 16 (03/23/22 0959)  BP: (!) 171/80 (03/23/22 0823)  SpO2: 99 %  "(03/23/22 0823)   Vital Signs (24h Range):  Temp:  [98.4 °F (36.9 °C)-99.4 °F (37.4 °C)] 99 °F (37.2 °C)  Pulse:  [70-82] 81  Resp:  [12-18] 16  SpO2:  [97 %-100 %] 99 %  BP: (121-171)/(67-80) 171/80     Weight: 95 kg (209 lb 7 oz)  Height: 5' 7" (170.2 cm)  Body mass index is 32.8 kg/m².    Physical Exam  Vitals and nursing note reviewed.   Cardiovascular:      Rate and Rhythm: Normal rate and regular rhythm.   Pulmonary:      Effort: Pulmonary effort is normal.   Abdominal:      General: There is distension.      Tenderness: There is no abdominal tenderness. There is no guarding or rebound.      Comments: Kidney incision clean, dry, intact with staples in place  PD site packed with gauze   Neurological:      Mental Status: He is alert and oriented to person, place, and time.   Psychiatric:         Mood and Affect: Mood normal.         Behavior: Behavior normal.         Thought Content: Thought content normal.         Judgment: Judgment normal.       Laboratory:  CBC:   Recent Labs   Lab 03/21/22  0624 03/22/22  0810 03/23/22  0658   WBC 8.06 6.09 7.37   RBC 3.47* 3.35* 3.04*   HGB 10.3* 9.9* 9.0*   HCT 31.8* 30.3* 27.9*    162 141*   MCV 92 90 92   MCH 29.7 29.6 29.6   MCHC 32.4 32.7 32.3     BMP:   Recent Labs   Lab 03/21/22  0624 03/22/22  0810 03/23/22  0658    103 96    143 138   K 3.6 3.5 3.5    113* 109   CO2 20* 20* 20*   BUN 42* 44* 32*   CREATININE 4.3* 2.4* 1.7*   CALCIUM 8.9 8.9 8.3*       Diagnostic Results:  None    Assessment/Plan:     * Urinary retention  - freire placed outpatient 3/22  - UA/urine culture pending  - Freire removed AM 3/23 with failure to empty bladder  - Freire reinserted and flomax started  - Will keep freire in for 2 days and retrial removal    Hypophosphatemia  - Replace with IV prm  - Low dose k phos restarted   - Will monitor and adjust if diarrhea returns     Diarrhea  - hold stool softeners/laxatives  - Improved  - Restart low dose K phos  - " "Monitor    Anemia of renal disease  - H/H stable  - Monitor with daily labs      Prophylactic immunotherapy  - See long-term use of immunosuppressant medication    Long-term use of immunosuppressant medication  - Maintenance IS with prograf, full dose MMF, and prednisone taper. cont to check tacrolimus level daily. Assess for toxicity and adjust level as needed  - If diarrhea persists, consider switching MMF to myfortic    At risk for opportunistic infections  - Continue OI prophylaxis per protocol    S/P kidney transplant  - Cr improving   - with urinary rentention requiring freire placement  - see "urinary retention"    HTN (hypertension) diagnosed in his 40s  - continue current antihypertensive regiment. Monitor.         Discharge Planning: Not a candidate for discharge at this time. Hopeful dc tomorrow if diarrhea remains improved      Richa Bunch PAPortilloC  Kidney Transplant  [facility] - Transplant Stepdown  "

## 2022-03-23 NOTE — PROGRESS NOTES
Bladder scanned pt post void for residual. Bladder scanned multiple times and showed 0mL. ELIEZER Chaudhry notified. Instructed to keep watching over next few hours

## 2022-03-24 VITALS
SYSTOLIC BLOOD PRESSURE: 147 MMHG | HEIGHT: 67 IN | HEART RATE: 63 BPM | TEMPERATURE: 98 F | BODY MASS INDEX: 32.87 KG/M2 | WEIGHT: 209.44 LBS | OXYGEN SATURATION: 96 % | DIASTOLIC BLOOD PRESSURE: 72 MMHG | RESPIRATION RATE: 18 BRPM

## 2022-03-24 LAB
ALBUMIN SERPL BCP-MCNC: 2.3 G/DL (ref 3.5–5.2)
ANION GAP SERPL CALC-SCNC: 8 MMOL/L (ref 8–16)
BASOPHILS # BLD AUTO: 0.02 K/UL (ref 0–0.2)
BASOPHILS NFR BLD: 0.3 % (ref 0–1.9)
BUN SERPL-MCNC: 23 MG/DL (ref 8–23)
CALCIUM SERPL-MCNC: 8.1 MG/DL (ref 8.7–10.5)
CHLORIDE SERPL-SCNC: 110 MMOL/L (ref 95–110)
CLASS I ANTIBODIES - LUMINEX: NEGATIVE
CLASS II ANTIBODY COMMENTS - LUMINEX: NORMAL
CO2 SERPL-SCNC: 22 MMOL/L (ref 23–29)
CPRA %: 0
CREAT SERPL-MCNC: 1.5 MG/DL (ref 0.5–1.4)
DIFFERENTIAL METHOD: ABNORMAL
EOSINOPHIL # BLD AUTO: 0.1 K/UL (ref 0–0.5)
EOSINOPHIL NFR BLD: 1.1 % (ref 0–8)
ERYTHROCYTE [DISTWIDTH] IN BLOOD BY AUTOMATED COUNT: 15.2 % (ref 11.5–14.5)
EST. GFR  (AFRICAN AMERICAN): 52.6 ML/MIN/1.73 M^2
EST. GFR  (NON AFRICAN AMERICAN): 45.5 ML/MIN/1.73 M^2
GLUCOSE SERPL-MCNC: 90 MG/DL (ref 70–110)
HCT VFR BLD AUTO: 24.9 % (ref 40–54)
HGB BLD-MCNC: 8.1 G/DL (ref 14–18)
HPRA INTERPRETATION: NORMAL
IMM GRANULOCYTES # BLD AUTO: 0.32 K/UL (ref 0–0.04)
IMM GRANULOCYTES NFR BLD AUTO: 4.4 % (ref 0–0.5)
LYMPHOCYTES # BLD AUTO: 0.4 K/UL (ref 1–4.8)
LYMPHOCYTES NFR BLD: 4.8 % (ref 18–48)
MAGNESIUM SERPL-MCNC: 1.9 MG/DL (ref 1.6–2.6)
MCH RBC QN AUTO: 29.3 PG (ref 27–31)
MCHC RBC AUTO-ENTMCNC: 32.5 G/DL (ref 32–36)
MCV RBC AUTO: 90 FL (ref 82–98)
MONOCYTES # BLD AUTO: 0.5 K/UL (ref 0.3–1)
MONOCYTES NFR BLD: 7.2 % (ref 4–15)
NEUTROPHILS # BLD AUTO: 6 K/UL (ref 1.8–7.7)
NEUTROPHILS NFR BLD: 82.2 % (ref 38–73)
NRBC BLD-RTO: 0 /100 WBC
PHOSPHATE SERPL-MCNC: 2.2 MG/DL (ref 2.7–4.5)
PLATELET # BLD AUTO: 133 K/UL (ref 150–450)
PMV BLD AUTO: 11.9 FL (ref 9.2–12.9)
POTASSIUM SERPL-SCNC: 3.7 MMOL/L (ref 3.5–5.1)
RBC # BLD AUTO: 2.76 M/UL (ref 4.6–6.2)
SERUM COLLECTION DT - LUMINEX CLASS I: NORMAL
SERUM COLLECTION DT - LUMINEX CLASS II: NORMAL
SODIUM SERPL-SCNC: 140 MMOL/L (ref 136–145)
SPCL1 TESTING DATE: NORMAL
SPCL2 TESTING DATE: NORMAL
SPLUA TESTING DATE: NORMAL
TACROLIMUS BLD-MCNC: 5.2 NG/ML (ref 5–15)
WBC # BLD AUTO: 7.27 K/UL (ref 3.9–12.7)

## 2022-03-24 PROCEDURE — 99239 HOSP IP/OBS DSCHRG MGMT >30: CPT | Mod: 24,,, | Performed by: PHYSICIAN ASSISTANT

## 2022-03-24 PROCEDURE — 25000003 PHARM REV CODE 250: Performed by: PHYSICIAN ASSISTANT

## 2022-03-24 PROCEDURE — 63600175 PHARM REV CODE 636 W HCPCS: Performed by: NURSE PRACTITIONER

## 2022-03-24 PROCEDURE — 94760 N-INVAS EAR/PLS OXIMETRY 1: CPT

## 2022-03-24 PROCEDURE — 36415 COLL VENOUS BLD VENIPUNCTURE: CPT | Performed by: NURSE PRACTITIONER

## 2022-03-24 PROCEDURE — 63600175 PHARM REV CODE 636 W HCPCS: Performed by: PHYSICIAN ASSISTANT

## 2022-03-24 PROCEDURE — 80069 RENAL FUNCTION PANEL: CPT | Performed by: NURSE PRACTITIONER

## 2022-03-24 PROCEDURE — 80197 ASSAY OF TACROLIMUS: CPT | Performed by: NURSE PRACTITIONER

## 2022-03-24 PROCEDURE — 99239 PR HOSPITAL DISCHARGE DAY,>30 MIN: ICD-10-PCS | Mod: 24,,, | Performed by: PHYSICIAN ASSISTANT

## 2022-03-24 PROCEDURE — 25000003 PHARM REV CODE 250: Performed by: NURSE PRACTITIONER

## 2022-03-24 PROCEDURE — 85025 COMPLETE CBC W/AUTO DIFF WBC: CPT | Performed by: NURSE PRACTITIONER

## 2022-03-24 PROCEDURE — 83735 ASSAY OF MAGNESIUM: CPT | Performed by: NURSE PRACTITIONER

## 2022-03-24 RX ORDER — NIFEDIPINE 30 MG/1
60 TABLET, EXTENDED RELEASE ORAL DAILY
Status: DISCONTINUED | OUTPATIENT
Start: 2022-03-25 | End: 2022-03-24 | Stop reason: HOSPADM

## 2022-03-24 RX ORDER — TACROLIMUS 1 MG/1
1 CAPSULE ORAL ONCE
Status: COMPLETED | OUTPATIENT
Start: 2022-03-24 | End: 2022-03-24

## 2022-03-24 RX ORDER — NIFEDIPINE 60 MG/1
60 TABLET, EXTENDED RELEASE ORAL DAILY
Qty: 30 TABLET | Refills: 11 | Status: SHIPPED | OUTPATIENT
Start: 2022-03-24 | End: 2022-03-28 | Stop reason: SDUPTHER

## 2022-03-24 RX ORDER — TACROLIMUS 1 MG/1
7 CAPSULE ORAL 2 TIMES DAILY
Status: DISCONTINUED | OUTPATIENT
Start: 2022-03-24 | End: 2022-03-24 | Stop reason: HOSPADM

## 2022-03-24 RX ORDER — TAMSULOSIN HYDROCHLORIDE 0.4 MG/1
0.4 CAPSULE ORAL NIGHTLY
Qty: 30 CAPSULE | Refills: 11 | Status: SHIPPED | OUTPATIENT
Start: 2022-03-24 | End: 2022-05-09

## 2022-03-24 RX ORDER — TACROLIMUS 1 MG/1
7 CAPSULE ORAL EVERY 12 HOURS
Qty: 420 CAPSULE | Refills: 11 | Status: SHIPPED | OUTPATIENT
Start: 2022-03-24 | End: 2022-03-31 | Stop reason: SDUPTHER

## 2022-03-24 RX ADMIN — OXYCODONE 5 MG: 5 TABLET ORAL at 04:03

## 2022-03-24 RX ADMIN — TACROLIMUS 1 MG: 1 CAPSULE ORAL at 12:03

## 2022-03-24 RX ADMIN — DIBASIC SODIUM PHOSPHATE, MONOBASIC POTASSIUM PHOSPHATE AND MONOBASIC SODIUM PHOSPHATE 1 TABLET: 852; 155; 130 TABLET ORAL at 08:03

## 2022-03-24 RX ADMIN — SULFAMETHOXAZOLE AND TRIMETHOPRIM 1 TABLET: 400; 80 TABLET ORAL at 08:03

## 2022-03-24 RX ADMIN — AMLODIPINE BESYLATE 10 MG: 10 TABLET ORAL at 08:03

## 2022-03-24 RX ADMIN — HEPARIN SODIUM 5000 UNITS: 5000 INJECTION INTRAVENOUS; SUBCUTANEOUS at 05:03

## 2022-03-24 RX ADMIN — TACROLIMUS 6 MG: 1 CAPSULE ORAL at 08:03

## 2022-03-24 RX ADMIN — NYSTATIN 500000 UNITS: 500000 SUSPENSION ORAL at 08:03

## 2022-03-24 RX ADMIN — PREDNISONE 20 MG: 20 TABLET ORAL at 08:03

## 2022-03-24 RX ADMIN — Medication 2000 UNITS: at 08:03

## 2022-03-24 RX ADMIN — MYCOPHENOLATE MOFETIL 1000 MG: 250 CAPSULE ORAL at 08:03

## 2022-03-24 RX ADMIN — SODIUM CHLORIDE 500 ML: 0.9 INJECTION, SOLUTION INTRAVENOUS at 09:03

## 2022-03-24 RX ADMIN — METOPROLOL TARTRATE 50 MG: 50 TABLET, FILM COATED ORAL at 08:03

## 2022-03-24 RX ADMIN — SODIUM BICARBONATE 650 MG TABLET 1300 MG: at 08:03

## 2022-03-24 RX ADMIN — OXYCODONE 5 MG: 5 TABLET ORAL at 10:03

## 2022-03-24 RX ADMIN — CINACALCET 30 MG: 30 TABLET, FILM COATED ORAL at 08:03

## 2022-03-24 RX ADMIN — VALGANCICLOVIR 450 MG: 450 TABLET, FILM COATED ORAL at 06:03

## 2022-03-24 RX ADMIN — OXYBUTYNIN CHLORIDE 5 MG: 5 TABLET ORAL at 04:03

## 2022-03-24 NOTE — PLAN OF CARE
71 year-old male admitted 3/22/22 for abd pain and urinary retention. Pt has a hx of kidney txp 3/19/22 secondary to HTN; Afib, Ascites  -AAOx4, ambulates independently  -20 G Rt FA  -RLQ incision with staples WILSON  -1 PD site with staples WILSON  -1 PD site packed/wet to dry dressing  -freire in place/pt & spouse instructed on freire care  -500 ml bolus given  -Cr 1.5  -pt sitting up onside of bed, spouse at bedside, bed in lowest position, wheels locked, non-skid socks in place, call light within reach  -pending d/c

## 2022-03-24 NOTE — PLAN OF CARE
Pt aaox4. VSS on RA. Banegas catheter in place for retention. Banegas draining clear yellow urine, see flowsheets for I/Os. Phosphorus trending up. Cr trending down. RLQ staples CDI. Prn oxy given for pain and prn ditropan administered for bladder spasms. Pt up independently, nonskid socks in use when OOB. No c/o of nausea or SOB overnight. VS and assessments in flowsheets

## 2022-03-24 NOTE — PROGRESS NOTES
Discharge instructions and education given to pt. Pt verbalized understanding. Reviewed medication changes, new medications, future appointments and medication compliance. Pt informed to use Care Coordinator or call 24/7 [facility] on-call nurse for any further questions or concerns. Reviewed worsening signs or symptoms of kidney disease/urinary retention/infection. Peripheral IV removed, catheter intact. Personal items sent with pt. Pt declines  waiting for wheelchair services to provide transport. Pt will ambulate to car with spouse and son.Pt will go to pharmacy to  medications. Vitals signs stable at time of discharge.

## 2022-03-24 NOTE — DISCHARGE SUMMARY
[facility] - Transplant Stepdown  Kidney Transplant  Discharge Summary    Patient Name: Alverto Ramirez Jr.  MRN: [id number]  Admission Date: 3/22/2022  Hospital Length of Stay: 2 days  Discharge Date and Time:  03/24/2022 11:22 AM  Attending Physician: Moises Little MD   Discharging Provider: Richa Bunch PA-C  Primary Care Provider: Arnulfo Du MD    HPI:   Alverto Ramirez is a 71yr old AA male now s/p DDRT 3/19/22 for HTN (Thymo, CIT 22h34m, KDPI 26%, CMV +/+, RPR donor +). Cr trending down, excellent uop. He presented for routine, outpatient post op appointment and reported diarrhea, x 8 episodes today, and unable to urinate. Banegas placed in clinic then patient was sent to the ER for evaluation. Last stool softeners/laxatives taken 3/22 AM. He denies fever, chills, SOB, chest pain, or new/worsening abdominal pain. Patient to be admitted for observation. Labs notable for hypophosphatemia (1.2). Cr still trending down. Check UA/urine culture due to retention. Plan for IVF and phos replacement.       * No surgery found *     Hospital Course:    Interval History: Patient admitted with urinary retention and diarrhea. Banegas was placed in ED and then removed 3/23/22 AM. Patient unable to void more than 25-50 cc of urine at a time. Banegas reinserted with 500 cc output. He was started on flomax. Banegas will remain in place until Monday 3/28/22 when it will be pulled.  Diarrhea is resolved. Stool softeners will continue to be held. Cellcept restarted along with k phos 250 mg TID.    On day of discharge, patient feeling well without complaint. He has good UOP and improving creatinine. He is stable and ready for discharge. He will follow up with labs tomorrow 3/25/22 and transplant clinic appointment on [place] 3/28/22. Patient verbalized his understanding prior to discharge.     Goals of Care Treatment Preferences:  Code Status: Full Code      Final Active Diagnoses:    Diagnosis Date Noted POA    PRINCIPAL PROBLEM:  Urinary  retention [R33.9] 03/22/2022 Yes    Diarrhea [R19.7] 03/22/2022 Yes    Hypophosphatemia [E83.39] 03/22/2022 Yes    S/P kidney transplant [Z94.0] 03/21/2022 Not Applicable    At risk for opportunistic infections [Z91.89] 03/21/2022 Yes    Long-term use of immunosuppressant medication [Z79.899] 03/21/2022 Not Applicable    Prophylactic immunotherapy [Z29.8] 03/21/2022 Not Applicable    Anemia of renal disease [N18.9, D63.1] 03/21/2022 Yes    HTN (hypertension) diagnosed in his 40s [I10] 10/16/2014 Yes      Problems Resolved During this Admission:       Treatments: As above        Pending Diagnostic Studies:     None        Significant Diagnostic Studies: Labs:   BMP:   Recent Labs   Lab 03/23/22  0658 03/24/22  0701   GLU 96 90    140   K 3.5 3.7    110   CO2 20* 22*   BUN 32* 23   CREATININE 1.7* 1.5*   CALCIUM 8.3* 8.1*   MG 1.8 1.9    and CBC   Recent Labs   Lab 03/23/22  0658 03/24/22  0701   WBC 7.37 7.27   HGB 9.0* 8.1*   HCT 27.9* 24.9*   * 133*       Discharged Condition: good    Disposition: Home or Self Care    Follow Up: As above   Follow-up Information     Arnulfo Du MD Follow up on 4/1/2022.    Specialty: Internal Medicine  Why: Established pt's hospital visit on 4/1/22 @ 3:15pm. Please bring discharge summary, ID, insurance card, and medication list.  Contact information:  [address] [facility] [address] [phone]                       Patient Instructions:      Diet Adult Regular     Notify your health care provider if you experience any of the following:  temperature >100.4     Notify your health care provider if you experience any of the following:  persistent nausea and vomiting or diarrhea     Notify your health care provider if you experience any of the following:  severe uncontrolled pain     Notify your health care provider if you experience any of the following:  redness, tenderness, or signs of infection (pain, swelling, redness, odor or green/yellow  discharge around incision site)     Notify your health care provider if you experience any of the following:  difficulty breathing or increased cough     Notify your health care provider if you experience any of the following:  severe persistent headache     Notify your health care provider if you experience any of the following:  worsening rash     Notify your health care provider if you experience any of the following:  persistent dizziness, light-headedness, or visual disturbances     Notify your health care provider if you experience any of the following:  increased confusion or weakness     Notify your health care provider if you experience any of the following:   Order Comments: For any other concerning signs or symptoms  If you have not received your scheduled follow up within 24 hours of your discharge or for any questions or concerns, please call [phone] for further assistance.     Activity as tolerated     Medications:  Reconciled Home Medications:      Medication List      START taking these medications    k phos di & mono-sod phos mono 250 mg Tab  Commonly known as: K-PHOS-NEUTRAL  Take 1 tablet by mouth 3 (three) times daily.     NIFEdipine 60 MG (OSM) 24 hr tablet  Commonly known as: PROCARDIA-XL  Take 1 tablet (60 mg total) by mouth once daily.     tamsulosin 0.4 mg Cap  Commonly known as: FLOMAX  Take 1 capsule (0.4 mg total) by mouth every evening.        CHANGE how you take these medications    tacrolimus 1 MG Cap  Commonly known as: PROGRAF  Take 7 capsules (7 mg total) by mouth every 12 (twelve) hours.  What changed: how much to take        CONTINUE taking these medications    ALPRAZolam 1 MG tablet  Commonly known as: XANAX  Take 1 tablet (1 mg total) by mouth 3 (three) times daily as needed for Anxiety.     cinacalcet 30 MG Tab  Commonly known as: SENSIPAR  Take 1 tablet (30 mg total) by mouth daily with breakfast.     famotidine 20 MG tablet  Commonly known as: PEPCID  Take 1 tablet (20  mg total) by mouth every evening. STOP 4/20/22.     fluticasone propionate 50 mcg/actuation nasal spray  Commonly known as: FLONASE  2 sprays (100 mcg total) by Each Nostril route as needed for Allergies.     metoprolol tartrate 50 MG tablet  Commonly known as: LOPRESSOR  Take 1 tablet (50 mg total) by mouth 2 (two) times daily. HOLD FOR SBP <120 or HR <60     mycophenolate 250 mg Cap  Commonly known as: CELLCEPT  Take 4 capsules (1,000 mg total) by mouth 2 (two) times daily.     nystatin 100,000 unit/mL suspension  Commonly known as: MYCOSTATIN  Take 5 mLs (500,000 Units total) by mouth 3 (three) times daily after meals.     oxybutynin 5 MG Tab  Commonly known as: DITROPAN  Take 1 tablet (5 mg total) by mouth 3 (three) times daily as needed (bladder spasms).     oxyCODONE 5 MG immediate release tablet  Commonly known as: ROXICODONE  Take 1 tablet (5 mg total) by mouth every 6 (six) hours as needed for Pain.     predniSONE 5 MG tablet  Commonly known as: DELTASONE  Take by mouth daily: 20mg 3/22/22 -4/21, 15mg 4/22-5/22, 10mg 5/23-6/22, 5mg 6/23- thereafter     sildenafiL 50 MG tablet  Commonly known as: VIAGRA  Take 1 tablet (50 mg total) by mouth daily as needed for Erectile Dysfunction.     sodium bicarbonate 650 MG tablet  Take 2 tablets (1,300 mg total) by mouth 2 (two) times daily.     sulfamethoxazole-trimethoprim 400-80mg 400-80 mg per tablet  Commonly known as: BACTRIM,SEPTRA  Take 1 tablet by mouth once daily. Stop 9/16/22     valGANciclovir 450 mg Tab  Commonly known as: VALCYTE  Take 1 tablet (450 mg total) by mouth every morning. Stop 6/16/22     VITAMIN D3 50 mcg (2,000 unit) Tab  Generic drug: cholecalciferol (vitamin D3)  Take 1 tablet (2,000 Units total) by mouth once daily.        STOP taking these medications    amLODIPine 10 MG tablet  Commonly known as: NORVASC     calcium carbonate 500 mg calcium (1,250 mg) chewable tablet  Commonly known as: OS-SUSAN     docusate sodium 100 MG capsule  Commonly  known as: COLACE     lanthanum 1000 MG chewable tablet  Commonly known as: FOSRENOL     losartan 100 MG tablet  Commonly known as: COZAAR     torsemide 100 MG Tab  Commonly known as: DEMADEX          Time spent caring for patient (Greater than 1/2 spent in direct face-to-face contact): > 30 minutes    SHARMILA LoraC  Kidney Transplant  [facility] - Transplant Step[facility]

## 2022-03-24 NOTE — PROGRESS NOTES
Discharge Medication Note:    Hospital Course: 71yr old AA male now s/p DDRT 3/19/22 for HTN (Thymo, CIT 22h34m, KDPI 26%, CMV +/+, RPR donor +). Cr trending down, excellent uop. He presented for routine, outpatient post op appointment and reported diarrhea, x 8 episodes today, and unable to urinate. Banegas placed in clinic then patient was sent to the ER for evaluation. Last stool softeners/laxatives taken 3/22 AM. Patient admitted for observation. Labs notable for hypophosphatemia (1.2). Cr still trending down. IVF and phos replacement.      Patient admitted with urinary retention and diarrhea. Banegas was placed in ED and then removed 3/23/22 AM. He was started on flomax. Banegas will remain in place until Monday 3/28/22 when it will be pulled.  Diarrhea is resolved. Stool softeners will continue to be held. Cellcept restarted along with k phos 250 mg TID. Tacrolimus increased to 7/7 due to low levels. BP not at goal this admission so amlodipine changed to Nifedipine in addition to current metoprolol.      Met with Alverto Ramirez Jr. at discharge to review discharge medications and to update the blue medication card.           Medication List      START taking these medications    NIFEdipine 60 MG (OSM) 24 hr tablet  Commonly known as: PROCARDIA-XL  Take 1 tablet (60 mg total) by mouth once daily.     PHOSPHA 250 NEUTRAL 250 mg Tab  Generic drug: k phos di & mono-sod phos mono  Take 1 tablet by mouth 3 (three) times daily.     tamsulosin 0.4 mg Cap  Commonly known as: FLOMAX  Take 1 capsule (0.4 mg total) by mouth every evening.        CHANGE how you take these medications    tacrolimus 1 MG Cap  Commonly known as: PROGRAF  Take 7 capsules (7 mg total) by mouth every 12 (twelve) hours.  What changed: how much to take        CONTINUE taking these medications    ALPRAZolam 1 MG tablet  Commonly known as: XANAX  Take 1 tablet (1 mg total) by mouth 3 (three) times daily as needed for Anxiety.     cinacalcet 30 MG  Tab  Commonly known as: SENSIPAR  Take 1 tablet (30 mg total) by mouth daily with breakfast.     famotidine 20 MG tablet  Commonly known as: PEPCID  Take 1 tablet (20 mg total) by mouth every evening. STOP 4/20/22.     fluticasone propionate 50 mcg/actuation nasal spray  Commonly known as: FLONASE  2 sprays (100 mcg total) by Each Nostril route as needed for Allergies.     metoprolol tartrate 50 MG tablet  Commonly known as: LOPRESSOR  Take 1 tablet (50 mg total) by mouth 2 (two) times daily. HOLD FOR SBP <120 or HR <60     mycophenolate 250 mg Cap  Commonly known as: CELLCEPT  Take 4 capsules (1,000 mg total) by mouth 2 (two) times daily.     nystatin 100,000 unit/mL suspension  Commonly known as: MYCOSTATIN  Take 5 mLs (500,000 Units total) by mouth 3 (three) times daily after meals.     oxybutynin 5 MG Tab  Commonly known as: DITROPAN  Take 1 tablet (5 mg total) by mouth 3 (three) times daily as needed (bladder spasms).     oxyCODONE 5 MG immediate release tablet  Commonly known as: ROXICODONE  Take 1 tablet (5 mg total) by mouth every 6 (six) hours as needed for Pain.     predniSONE 5 MG tablet  Commonly known as: DELTASONE  Take by mouth daily: 20mg 3/22/22 -4/21, 15mg 4/22-5/22, 10mg 5/23-6/22, 5mg 6/23- thereafter     sildenafiL 50 MG tablet  Commonly known as: VIAGRA  Take 1 tablet (50 mg total) by mouth daily as needed for Erectile Dysfunction.     sodium bicarbonate 650 MG tablet  Take 2 tablets (1,300 mg total) by mouth 2 (two) times daily.     sulfamethoxazole-trimethoprim 400-80mg 400-80 mg per tablet  Commonly known as: BACTRIM,SEPTRA  Take 1 tablet by mouth once daily. Stop 9/16/22     valGANciclovir 450 mg Tab  Commonly known as: VALCYTE  Take 1 tablet (450 mg total) by mouth every morning. Stop 6/16/22     VITAMIN D3 50 mcg (2,000 unit) Tab  Generic drug: cholecalciferol (vitamin D3)  Take 1 tablet (2,000 Units total) by mouth once daily.        STOP taking these medications    amLODIPine 10 MG  tablet  Commonly known as: NORVASC     calcium carbonate 500 mg calcium (1,250 mg) chewable tablet  Commonly known as: OS-SUSAN     docusate sodium 100 MG capsule  Commonly known as: COLACE     lanthanum 1000 MG chewable tablet  Commonly known as: FOSRENOL     losartan 100 MG tablet  Commonly known as: COZAAR     torsemide 100 MG Tab  Commonly known as: DEMADEX           Where to Get Your Medications      These medications were sent to [facility]  [address] [facility] [facility] [address]    Hours: Mon-Fri 7a-7p, Sat-Sun 10a-4p Phone: [phone]   · NIFEdipine 60 MG (OSM) 24 hr tablet  · PHOSPHA 250 NEUTRAL 250 mg Tab  · tacrolimus 1 MG Cap  · tamsulosin 0.4 mg Cap            The following medications have been placed on HOLD and should be restarted in the outpatient setting (when appropriate): None     Alverto Ramirez Jr. verbalized understanding and had the opportunity to ask questions.

## 2022-03-24 NOTE — PROGRESS NOTES
Discharge Note:    Pt will discharge to [facility] under the care of Liana Vernari, patient's wife, phone number [phone]. Pt aware of, involved in, and coping well with this discharge plan. Pt did not have any concerns with the discharge plan at this time. No psychosocial needs indicated for discharge at this time.  SW remains available at [phone].

## 2022-03-25 ENCOUNTER — TELEPHONE (OUTPATIENT)
Dept: TRANSPLANT | Facility: CLINIC | Age: 72
End: 2022-03-25
Payer: MEDICARE

## 2022-03-25 ENCOUNTER — PATIENT OUTREACH (OUTPATIENT)
Dept: ADMINISTRATIVE | Facility: CLINIC | Age: 72
End: 2022-03-25
Payer: MEDICARE

## 2022-03-25 ENCOUNTER — LAB VISIT (OUTPATIENT)
Dept: LAB | Facility: HOSPITAL | Age: 72
End: 2022-03-25
Payer: MEDICARE

## 2022-03-25 DIAGNOSIS — Z94.0 KIDNEY REPLACED BY TRANSPLANT: ICD-10-CM

## 2022-03-25 PROBLEM — N18.31 STAGE 3A CHRONIC KIDNEY DISEASE: Status: ACTIVE | Noted: 2022-03-25

## 2022-03-25 LAB
ALBUMIN SERPL BCP-MCNC: 2.7 G/DL (ref 3.5–5.2)
ANION GAP SERPL CALC-SCNC: 7 MMOL/L (ref 8–16)
ANISOCYTOSIS BLD QL SMEAR: SLIGHT
BASOPHILS # BLD AUTO: ABNORMAL K/UL (ref 0–0.2)
BASOPHILS NFR BLD: 0 % (ref 0–1.9)
BUN SERPL-MCNC: 26 MG/DL (ref 8–23)
CALCIUM SERPL-MCNC: 8.3 MG/DL (ref 8.7–10.5)
CHLORIDE SERPL-SCNC: 110 MMOL/L (ref 95–110)
CO2 SERPL-SCNC: 22 MMOL/L (ref 23–29)
CREAT SERPL-MCNC: 1.5 MG/DL (ref 0.5–1.4)
DIFFERENTIAL METHOD: ABNORMAL
EOSINOPHIL # BLD AUTO: ABNORMAL K/UL (ref 0–0.5)
EOSINOPHIL NFR BLD: 2 % (ref 0–8)
ERYTHROCYTE [DISTWIDTH] IN BLOOD BY AUTOMATED COUNT: 15.4 % (ref 11.5–14.5)
EST. GFR  (AFRICAN AMERICAN): 52.6 ML/MIN/1.73 M^2
EST. GFR  (NON AFRICAN AMERICAN): 45.5 ML/MIN/1.73 M^2
GLUCOSE SERPL-MCNC: 94 MG/DL (ref 70–110)
HCT VFR BLD AUTO: 28.6 % (ref 40–54)
HGB BLD-MCNC: 9.3 G/DL (ref 14–18)
HYPOCHROMIA BLD QL SMEAR: ABNORMAL
IMM GRANULOCYTES # BLD AUTO: ABNORMAL K/UL (ref 0–0.04)
IMM GRANULOCYTES NFR BLD AUTO: ABNORMAL % (ref 0–0.5)
LYMPHOCYTES # BLD AUTO: ABNORMAL K/UL (ref 1–4.8)
LYMPHOCYTES NFR BLD: 6 % (ref 18–48)
MAGNESIUM SERPL-MCNC: 1.9 MG/DL (ref 1.6–2.6)
MCH RBC QN AUTO: 29.5 PG (ref 27–31)
MCHC RBC AUTO-ENTMCNC: 32.5 G/DL (ref 32–36)
MCV RBC AUTO: 91 FL (ref 82–98)
MONOCYTES # BLD AUTO: ABNORMAL K/UL (ref 0.3–1)
MONOCYTES NFR BLD: 4 % (ref 4–15)
NEUTROPHILS NFR BLD: 86 % (ref 38–73)
NEUTS BAND NFR BLD MANUAL: 2 %
NRBC BLD-RTO: 0 /100 WBC
PHOSPHATE SERPL-MCNC: 1.7 MG/DL (ref 2.7–4.5)
PLATELET # BLD AUTO: 178 K/UL (ref 150–450)
PLATELET BLD QL SMEAR: ABNORMAL
PMV BLD AUTO: 11.9 FL (ref 9.2–12.9)
POTASSIUM SERPL-SCNC: 3.8 MMOL/L (ref 3.5–5.1)
RBC # BLD AUTO: 3.15 M/UL (ref 4.6–6.2)
SODIUM SERPL-SCNC: 139 MMOL/L (ref 136–145)
TACROLIMUS BLD-MCNC: 7 NG/ML (ref 5–15)
WBC # BLD AUTO: 8.72 K/UL (ref 3.9–12.7)

## 2022-03-25 PROCEDURE — 83735 ASSAY OF MAGNESIUM: CPT | Performed by: INTERNAL MEDICINE

## 2022-03-25 PROCEDURE — 80197 ASSAY OF TACROLIMUS: CPT | Performed by: INTERNAL MEDICINE

## 2022-03-25 PROCEDURE — 85027 COMPLETE CBC AUTOMATED: CPT | Performed by: INTERNAL MEDICINE

## 2022-03-25 PROCEDURE — 36415 COLL VENOUS BLD VENIPUNCTURE: CPT | Performed by: INTERNAL MEDICINE

## 2022-03-25 PROCEDURE — 85007 BL SMEAR W/DIFF WBC COUNT: CPT | Performed by: INTERNAL MEDICINE

## 2022-03-25 PROCEDURE — 80069 RENAL FUNCTION PANEL: CPT | Performed by: INTERNAL MEDICINE

## 2022-03-25 NOTE — PROGRESS NOTES
Encourage high phosphorus meals. Is he tolerating KPN?  If he is, increase KPN to 2 tabs tid for 24 hrs and then go back to 1 tab tid

## 2022-03-25 NOTE — TELEPHONE ENCOUNTER
Spoke with patient regarding below. Patient reports no diarrhea. Will increase KPN 2 tabs TID next 3 doses only. High phos diet encouraged. Pt verbalized understanding. ----- Message from Deny Rosario MD sent at 3/25/2022  9:51 AM CDT -----  Encourage high phosphorus meals. Is he tolerating KPN?  If he is, increase KPN to 2 tabs tid for 24 hrs and then go back to 1 tab tid

## 2022-03-25 NOTE — PROGRESS NOTES
Kidney Post-Transplant Assessment    Referring Physician: Chris Adair  Current Nephrologist: Rajan Hayes    ORGAN: RIGHT KIDNEY  Donor Type: donation after brain death  PHS Increased Risk: yes  Cold Ischemia: 1,354 mins  Induction Medications: thymoglobulin    Subjective:     CC:  Reassessment of renal allograft function and management of chronic immunosuppression.    HPI:  Mr. Ramirez is a 71 y.o. 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 His most recent creatinine is 1.5. He takes mycophenolate mofetil, prednisone and tacrolimus for maintenance immunosuppression. His post transplant course has been complicated by urinary retention severe diarrhea and hypophosphatemia .    He is here with 2 care giver feels great    Banegas in placed    Apparently to be removed today. Had a already episode of urinary retention.    No chest pain or SOB    No nausea vomit or diarrhea            Current Outpatient Medications on File Prior to Visit   Medication Sig Dispense Refill    ALPRAZolam (XANAX) 1 MG tablet Take 1 tablet (1 mg total) by mouth 3 (three) times daily as needed for Anxiety. 270 tablet 3    cholecalciferol, vitamin D3, (VITAMIN D3) 50 mcg (2,000 unit) Tab Take 1 tablet (2,000 Units total) by mouth once daily. 60 tablet 11    cinacalcet (SENSIPAR) 30 MG Tab Take 1 tablet (30 mg total) by mouth daily with breakfast. 30 tablet 11    famotidine (PEPCID) 20 MG tablet Take 1 tablet (20 mg total) by mouth every evening. STOP 4/20/22. 30 tablet 1    fluticasone propionate (FLONASE) 50 mcg/actuation nasal spray 2 sprays (100 mcg total) by Each Nostril route as needed for Allergies. 16 g 3    k phos di & mono-sod phos mono (K-PHOS-NEUTRAL) 250 mg Tab Take 1 tablet by mouth 3 (three) times daily. 90 tablet 11    metoprolol tartrate (LOPRESSOR) 50 MG tablet Take 1 tablet (50 mg total) by mouth 2 (two) times daily. HOLD FOR SBP <120 or HR  <60 60 tablet 11    mycophenolate (CELLCEPT) 250 mg Cap Take 4 capsules (1,000 mg total) by mouth 2 (two) times daily. 240 capsule 11    NIFEdipine (PROCARDIA-XL) 60 MG (OSM) 24 hr tablet Take 1 tablet (60 mg total) by mouth once daily. 30 tablet 11    nystatin (MYCOSTATIN) 100,000 unit/mL suspension Take 5 mLs (500,000 Units total) by mouth 3 (three) times daily after meals. 450 mL 0    oxybutynin (DITROPAN) 5 MG Tab Take 1 tablet (5 mg total) by mouth 3 (three) times daily as needed (bladder spasms). 10 tablet 0    oxyCODONE (ROXICODONE) 5 MG immediate release tablet Take 1 tablet (5 mg total) by mouth every 6 (six) hours as needed for Pain. 28 tablet 0    predniSONE (DELTASONE) 5 MG tablet Take by mouth daily: 20mg 3/22/22 -4/21, 15mg 4/22-5/22, 10mg 5/23-6/22, 5mg 6/23- thereafter (Patient taking differently: Take by mouth daily: 20mg 3/22/22 -4/21, 15mg 4/22-5/22, 10mg 5/23-6/22, 5mg 6/23- thereafter) 120 tablet 5    sildenafiL (VIAGRA) 50 MG tablet Take 1 tablet (50 mg total) by mouth daily as needed for Erectile Dysfunction. 30 tablet 11    sodium bicarbonate 650 MG tablet Take 2 tablets (1,300 mg total) by mouth 2 (two) times daily. 120 tablet 11    sulfamethoxazole-trimethoprim 400-80mg (BACTRIM,SEPTRA) 400-80 mg per tablet Take 1 tablet by mouth once daily. Stop 9/16/22 30 tablet 5    tacrolimus (PROGRAF) 1 MG Cap Take 7 capsules (7 mg total) by mouth every 12 (twelve) hours. 420 capsule 11    tamsulosin (FLOMAX) 0.4 mg Cap Take 1 capsule (0.4 mg total) by mouth every evening. 30 capsule 11    valGANciclovir (VALCYTE) 450 mg Tab Take 1 tablet (450 mg total) by mouth every morning. Stop 6/16/22 30 tablet 2    [DISCONTINUED] amLODIPine (NORVASC) 10 MG tablet Take 1 tablet (10 mg total) by mouth once daily. 30 tablet 11    [DISCONTINUED] calcium carbonate (OS-SUSAN) 500 mg calcium (1,250 mg) chewable tablet Take 1 tablet by mouth 3 (three) times daily.      [DISCONTINUED] lanthanum (FOSRENOL)  1000 MG chewable tablet CHEW 2 TABLETS THREE TIMES A DAY WITH MEALS 540 tablet 4    [DISCONTINUED] losartan (COZAAR) 100 MG tablet Take 1 tablet (100 mg total) by mouth once daily. 90 tablet 3    [DISCONTINUED] torsemide (DEMADEX) 100 MG Tab Take 2 tablets (200 mg total) by mouth once daily. 180 tablet 3     Current Facility-Administered Medications on File Prior to Visit   Medication Dose Route Frequency Provider Last Rate Last Admin    penicillin G benzathine (BICILLIN LA) injection 2.4 Million Units  2.4 Million Units Intramuscular Weekly Sara Flanagan MD        [DISCONTINUED] acetaminophen tablet 650 mg  650 mg Oral Q6H PRN Megha Abreu DNP        [DISCONTINUED] ALPRAZolam tablet 1 mg  1 mg Oral TID PRN Megha Abreu DNP        [DISCONTINUED] cinacalcet tablet 30 mg  30 mg Oral Daily with breakfast Megha Abreu DNP   30 mg at 03/24/22 0852    [DISCONTINUED] famotidine tablet 20 mg  20 mg Oral QHS Megha Abreu DNP   20 mg at 03/23/22 2118    [DISCONTINUED] heparin (porcine) injection 5,000 Units  5,000 Units Subcutaneous Q8H Megha Abreu DNP   5,000 Units at 03/24/22 0500    [DISCONTINUED] hydrALAZINE injection 10 mg  10 mg Intravenous Q6H PRN Eileen Estrada PA-C        [DISCONTINUED] k phos di & mono-sod phos mono 250 mg tablet 1 tablet  250 mg Oral BID Richa Bunch PA-C   1 tablet at 03/24/22 0852    [DISCONTINUED] metoprolol tartrate (LOPRESSOR) tablet 50 mg  50 mg Oral BID Megha Abreu DNP   50 mg at 03/24/22 0852    [DISCONTINUED] mycophenolate capsule 1,000 mg  1,000 mg Oral BID Richa Bunch PA-C   1,000 mg at 03/24/22 0852    [DISCONTINUED] NIFEdipine 24 hr tablet 60 mg  60 mg Oral Daily Richa Bunch PA-C        [DISCONTINUED] nystatin 100,000 unit/mL suspension 500,000 Units  500,000 Units Oral TID  Megha Abreu DNP   500,000 Units at 03/24/22 0853    [DISCONTINUED] ondansetron disintegrating tablet 8 mg  8 mg Oral Q6H PRN Megha Abreu DNP         "[DISCONTINUED] oxybutynin tablet 5 mg  5 mg Oral TID PRN Megha Brady, DNP   5 mg at 03/24/22 0426    [DISCONTINUED] oxyCODONE immediate release tablet 5 mg  5 mg Oral Q6H PRN Megha Brady, DNP   5 mg at 03/24/22 1021    [DISCONTINUED] predniSONE tablet 20 mg  20 mg Oral Daily Megha Castroet, DNP   20 mg at 03/24/22 0852    [DISCONTINUED] sodium bicarbonate tablet 1,300 mg  1,300 mg Oral BID Megha Brady, DNP   1,300 mg at 03/24/22 0852    [DISCONTINUED] sodium chloride 0.9% flush 3 mL  3 mL Intravenous PRN Megha Abreu, DNP        [DISCONTINUED] sulfamethoxazole-trimethoprim 400-80mg per tablet 1 tablet  1 tablet Oral Daily Megha Castroet, DNP   1 tablet at 03/24/22 0852    [DISCONTINUED] tacrolimus capsule 7 mg  7 mg Oral BID Richa Bunch PA-C        [DISCONTINUED] tamsulosin 24 hr capsule 0.4 mg  0.4 mg Oral QHS Richa Bunch PA-C   0.4 mg at 03/23/22 2118    [DISCONTINUED] valGANciclovir tablet 450 mg  450 mg Oral QAM Megha Castroet, DNP   450 mg at 03/24/22 0618    [DISCONTINUED] vitamin D 1000 units tablet 2,000 Units  2,000 Units Oral Daily Megha Brady, DNP   2,000 Units at 03/24/22 0852        Review of Systems     Skin: no skin rash  CNS; no headaches, blurred vision, seizure, or syncope  ENT: No JVD,  Adenopathies,  nasal congestion. No oral lesions  Cardiac: No chest pain, dyspnea, claudication, edema or palpitations  Respiratory: No SOB, cough, hemoptysis   Gastro-intestinal: No diarrhea, constipation, abdominal pain, nausea, vomit. No ascitis  Genitourinary: no hematuria, dysuria, frequency, frequency  Musculoskeletal: joint pain, arthritis or vasculitic changes  Psych: alert awake, oriented, No cranial nerves deficit.      Objective:       Physical Exam     BP (!) 196/84 (BP Location: Right arm, Patient Position: Sitting, BP Method: Large (Automatic))   Pulse 75   Temp 97.3 °F (36.3 °C) (Temporal)   Resp 16   Ht 5' 7" (1.702 m)   Wt 91.6 kg (201 lb 15.1 oz)   SpO2 98%   BMI 31.63 " kg/m²       Head: normocephalic  Neck: No JVD, cervical axillary, or femoral adenopathies  Heart: no murmurs, Normal s1 and s2, No gallops, no rubs, No murmurs  Lungs; CTA, good respiratory effort, no crackles  Abdomen: soft, non tender, no splenomegaly or hepatomegaly, no massess, no bruits. Incision intact healing well (9 days posttransplant)   Extremities: No edema, skin rash, joint pain  SNC: awake, alert oriented. Cranial nerves are intact, no focalized, sensitivity and strength preserved    Banegas in placed clear yellow urine       Labs:  Lab Results   Component Value Date    WBC 8.72 03/25/2022    HGB 9.3 (L) 03/25/2022    HCT 28.6 (L) 03/25/2022     03/25/2022    K 3.8 03/25/2022     03/25/2022    CO2 22 (L) 03/25/2022    BUN 26 (H) 03/25/2022    CREATININE 1.5 (H) 03/25/2022    EGFRNONAA 45.5 (A) 03/25/2022    CALCIUM 8.3 (L) 03/25/2022    PHOS 1.7 (L) 03/25/2022    MG 1.9 03/25/2022    ALBUMIN 2.7 (L) 03/25/2022    AST 16 03/19/2022    ALT 16 03/19/2022    UTPCR 0.78 (H) 03/18/2022    .1 (H) 03/18/2022    TACROLIMUS 7.0 03/25/2022       No results found for: EXTANC, EXTWBC, EXTSEGS, EXTPLATELETS, EXTHEMOGLOBI, EXTHEMATOCRI, EXTCREATININ, EXTSODIUM, EXTPOTASSIUM, EXTBUN, EXTCO2, EXTCALCIUM, EXTPHOSPHORU, EXTGLUCOSE, EXTALBUMIN, EXTAST, EXTALT, EXTBILITOTAL, EXTLIPASE, EXTAMYLASE    No results found for: EXTCYCLOSLVL, EXTSIROLIMUS, EXTTACROLVL, EXTPROTCRE, EXTPTHINTACT, EXTPROTEINUA, EXTWBCUA, EXTRBCUA    Labs were reviewed with the patient    Assessment:     1. S/P kidney transplant    2. Stage 3a chronic kidney disease    3. Primary hypertension    4. Monoclonal gammopathy    5. HPTH (hyperparathyroidism)    6. At risk for opportunistic infections    7. Long-term use of immunosuppressant medication    8. Hypophosphatemia    9. Urinary retention        Plan:   Creatinine improved to 1.3  Will increase procardia to 60 mg PO bid  Will start mag oxide 400 bid  Education about diarrhea  Hold  "parameters for BP if less than 120 mm Hg  Also asked to call me if BP is persistently elevated >160 to 170 mm Hg  tacro level is pending   Same dose of MMF   Prednisone per protocol.   Will verify with surgery if Banegas is to be removed today. Apparently verbally surgeon advised to refer to urology but patient was "scheduled for Banegas removal today" ?      1. CKD stage 3a with improving kidney function. : will continue follow up as per our center guidelines. patient to continue close follow up with the local General nephrologist. Education provided in appropriate fluid intake, potassium intake. Continue with oral hydration.        2. Immunosuppression:prograf dose adjusted MMF 1000 bid. pred per protocol.    Lab Results   Component Value Date    TACROLIMUS 7.0 03/25/2022    TACROLIMUS 5.2 03/24/2022    TACROLIMUS 4.1 (L) 03/23/2022     No results found for: CYCLOSPORINE  @   Will closely monitor for toxicities, education provided about adherence to medicines and need to communicate any side effect to the transplant nurse or physician.    3. Allograft Function:stable at baseline for the patient. Continue follow up as per our guidelines and with the local General nephrologist. Communication will be sent today.  Lab Results   Component Value Date    CREATININE 1.3 03/28/2022    CREATININE 1.5 (H) 03/25/2022    CREATININE 1.5 (H) 03/24/2022     No results found for: AMYLASE, LIPASE    4. Hypertension management:  Continue with home blood pressure monitoring, low salt and healthy life discussed with the patient..    5. Metabolic Bone Disease/Secondary Hyperparathyroidism:calcium and phosphorus level discussed with the patient, patient will continue follow up with the general nephrologist for management of metabolic bone disease   calcium and phosphorus as per our center protocol. Will monitor PTH, Vit D level, calcium.      Lab Results   Component Value Date    .1 (H) 03/18/2022    CALCIUM 8.6 (L) 03/28/2022    " PHOS 1.7 (L) 03/28/2022    PHOS 1.7 (L) 03/25/2022    PHOS 2.2 (L) 03/24/2022       6. Electrolytes:KPN supplements.  reviewed with the patient, essentially within the normal range no need for acute changes today, will monitor as per our center guidelines.     Lab Results   Component Value Date     03/28/2022    K 4.3 03/28/2022     (H) 03/28/2022    CO2 22 (L) 03/28/2022    CO2 22 (L) 03/25/2022    CO2 22 (L) 03/24/2022       7. Anemia: will continue monitoring as per our center guidelines. No indication for acute intervention today.     Lab Results   Component Value Date    WBC 9.97 03/28/2022    HGB 9.3 (L) 03/28/2022    HCT 28.3 (L) 03/28/2022    MCV 92 03/28/2022     03/28/2022       8.Proteinuria: will continue with pr/cr ratio as per our center guidelines  Lab Results   Component Value Date    PROTEINURINE 102 (H) 03/18/2022    CREATRANDUR 130.0 03/18/2022    UTPCR 0.78 (H) 03/18/2022    UTPCR 5.38 (H) 02/01/2017    UTPCR 5.23 (H) 01/03/2017        9. BK virus infection screening: will continue with urine or blood PCR as per our guidelines to prevent BK virus viremia and allograft dysfunction  No results found for: BKVIRUSDNAUR, BKQUANTURINE, BKVIRUSLOG, BKVIRUSURINE, BKVIRUSPCRQB      10. Weight education: provided during the clinic visit.   Body mass index is 31.63 kg/m².       11.Patient safety education regarding immunosuppression including prophylaxis posttransplant for CMV, PCP : Education provided about vaccination and prevention of infections.    12.  Cytopenias: no significant cytopenias will monitor as per our guidelines. Medicine list reviewed including potential causes of drug-induced cytopenias     Lab Results   Component Value Date    WBC 8.72 03/25/2022    HGB 9.3 (L) 03/25/2022    HCT 28.6 (L) 03/25/2022    MCV 91 03/25/2022     03/25/2022       13. Post-transplant Prophylaxis; CMV Infection, PJP and Candida mucosistis and other indicated for this particular patient.  Valcyte and bactrim     I spoke with the patient for 30 minutes. More than half dedicated to counseling and education. All questions answered    Deny Rosario MD  Transplant Nephrology             Follow-up:   Clinic: return to transplant clinic weekly for the first month after transplant; every 2 weeks during months 2-3; then at 6-, 9-, 12-, 18-, 24-, and 36- months post-transplant to reassess for complications from immunosuppression toxicity and monitor for rejection.  Annually thereafter.    Labs: since patient remains at high risk for rejection and drug-related complications that warrant close monitoring, labs will be ordered as follows: continue twice weekly CBC, renal panel, and drug level for first month; then same labs once weekly through 3rd month post-transplant.  Urine for UA and protein/creatinine ratio monthly.  Serum BK - PCR at 1-, 3-, 6-, 9-, 12-, 18-, 24-, 36-, 48-, and 60 months post-transplant.  Hepatic panel at 1-, 2-, 3-, 6-, 9-, 12-, 18-, 24-, and 36- months post-transplant.    Deny Rosario MD       Education:   Material provided to the patient.  Patient reminded to call with any health changes since these can be early signs of significant complications.  Also, I advised the patient to be sure any new medications or changes of old medications are discussed with either a pharmacist or physician knowledgeable with transplant to avoid rejection/drug toxicity related to significant drug interactions.    Patient advised that it is recommended that all transplanted patients, and their close contacts and household members receive Covid vaccination.

## 2022-03-28 ENCOUNTER — OFFICE VISIT (OUTPATIENT)
Dept: TRANSPLANT | Facility: CLINIC | Age: 72
End: 2022-03-28
Payer: MEDICARE

## 2022-03-28 ENCOUNTER — LAB VISIT (OUTPATIENT)
Dept: LAB | Facility: HOSPITAL | Age: 72
End: 2022-03-28
Payer: MEDICARE

## 2022-03-28 ENCOUNTER — CLINICAL SUPPORT (OUTPATIENT)
Dept: INFECTIOUS DISEASES | Facility: CLINIC | Age: 72
End: 2022-03-28
Payer: MEDICARE

## 2022-03-28 ENCOUNTER — CLINICAL SUPPORT (OUTPATIENT)
Dept: TRANSPLANT | Facility: CLINIC | Age: 72
End: 2022-03-28
Payer: MEDICARE

## 2022-03-28 VITALS
HEART RATE: 75 BPM | DIASTOLIC BLOOD PRESSURE: 84 MMHG | BODY MASS INDEX: 31.7 KG/M2 | DIASTOLIC BLOOD PRESSURE: 84 MMHG | OXYGEN SATURATION: 98 % | HEIGHT: 67 IN | WEIGHT: 201.94 LBS | OXYGEN SATURATION: 98 % | RESPIRATION RATE: 16 BRPM | TEMPERATURE: 97 F | SYSTOLIC BLOOD PRESSURE: 196 MMHG | WEIGHT: 201.94 LBS | TEMPERATURE: 97 F | BODY MASS INDEX: 31.7 KG/M2 | HEART RATE: 75 BPM | HEIGHT: 67 IN | RESPIRATION RATE: 16 BRPM | SYSTOLIC BLOOD PRESSURE: 196 MMHG

## 2022-03-28 DIAGNOSIS — N18.31 STAGE 3A CHRONIC KIDNEY DISEASE: ICD-10-CM

## 2022-03-28 DIAGNOSIS — E83.39 HYPOPHOSPHATEMIA: ICD-10-CM

## 2022-03-28 DIAGNOSIS — Z94.0 KIDNEY REPLACED BY TRANSPLANT: ICD-10-CM

## 2022-03-28 DIAGNOSIS — Z94.0 S/P KIDNEY TRANSPLANT: Primary | ICD-10-CM

## 2022-03-28 DIAGNOSIS — I10 PRIMARY HYPERTENSION: ICD-10-CM

## 2022-03-28 DIAGNOSIS — Z79.60 LONG-TERM USE OF IMMUNOSUPPRESSANT MEDICATION: ICD-10-CM

## 2022-03-28 DIAGNOSIS — E21.3 HPTH (HYPERPARATHYROIDISM): ICD-10-CM

## 2022-03-28 DIAGNOSIS — Z91.89 AT RISK FOR OPPORTUNISTIC INFECTIONS: ICD-10-CM

## 2022-03-28 DIAGNOSIS — R33.9 URINARY RETENTION: ICD-10-CM

## 2022-03-28 DIAGNOSIS — D47.2 MONOCLONAL GAMMOPATHY: ICD-10-CM

## 2022-03-28 LAB
ALBUMIN SERPL BCP-MCNC: 2.9 G/DL (ref 3.5–5.2)
ANION GAP SERPL CALC-SCNC: 6 MMOL/L (ref 8–16)
ANISOCYTOSIS BLD QL SMEAR: SLIGHT
BACTERIA SPEC ANAEROBE CULT: NORMAL
BASOPHILS NFR BLD: 0 % (ref 0–1.9)
BUN SERPL-MCNC: 19 MG/DL (ref 8–23)
CALCIUM SERPL-MCNC: 8.6 MG/DL (ref 8.7–10.5)
CHLORIDE SERPL-SCNC: 112 MMOL/L (ref 95–110)
CO2 SERPL-SCNC: 22 MMOL/L (ref 23–29)
CREAT SERPL-MCNC: 1.3 MG/DL (ref 0.5–1.4)
DIFFERENTIAL METHOD: ABNORMAL
EOSINOPHIL NFR BLD: 1 % (ref 0–8)
ERYTHROCYTE [DISTWIDTH] IN BLOOD BY AUTOMATED COUNT: 15.8 % (ref 11.5–14.5)
EST. GFR  (AFRICAN AMERICAN): >60 ML/MIN/1.73 M^2
EST. GFR  (NON AFRICAN AMERICAN): 54.1 ML/MIN/1.73 M^2
GLUCOSE SERPL-MCNC: 95 MG/DL (ref 70–110)
HCT VFR BLD AUTO: 28.3 % (ref 40–54)
HGB BLD-MCNC: 9.3 G/DL (ref 14–18)
HYPOCHROMIA BLD QL SMEAR: ABNORMAL
IMM GRANULOCYTES # BLD AUTO: ABNORMAL K/UL (ref 0–0.04)
IMM GRANULOCYTES NFR BLD AUTO: ABNORMAL % (ref 0–0.5)
LYMPHOCYTES NFR BLD: 2 % (ref 18–48)
MAGNESIUM SERPL-MCNC: 1.4 MG/DL (ref 1.6–2.6)
MCH RBC QN AUTO: 30.1 PG (ref 27–31)
MCHC RBC AUTO-ENTMCNC: 32.9 G/DL (ref 32–36)
MCV RBC AUTO: 92 FL (ref 82–98)
METAMYELOCYTES NFR BLD MANUAL: 1 %
MONOCYTES NFR BLD: 4 % (ref 4–15)
MYELOCYTES NFR BLD MANUAL: 1 %
NEUTROPHILS NFR BLD: 89 % (ref 38–73)
NEUTS BAND NFR BLD MANUAL: 2 %
NRBC BLD-RTO: 0 /100 WBC
PHOSPHATE SERPL-MCNC: 1.7 MG/DL (ref 2.7–4.5)
PLATELET # BLD AUTO: 236 K/UL (ref 150–450)
PLATELET BLD QL SMEAR: ABNORMAL
PMV BLD AUTO: 11.3 FL (ref 9.2–12.9)
POTASSIUM SERPL-SCNC: 4.3 MMOL/L (ref 3.5–5.1)
RBC # BLD AUTO: 3.09 M/UL (ref 4.6–6.2)
SODIUM SERPL-SCNC: 140 MMOL/L (ref 136–145)
TACROLIMUS BLD-MCNC: 9.5 NG/ML (ref 5–15)
WBC # BLD AUTO: 9.97 K/UL (ref 3.9–12.7)

## 2022-03-28 PROCEDURE — 96372 THER/PROPH/DIAG INJ SC/IM: CPT | Mod: S$GLB,,, | Performed by: INTERNAL MEDICINE

## 2022-03-28 PROCEDURE — 3008F PR BODY MASS INDEX (BMI) DOCUMENTED: ICD-10-PCS | Mod: CPTII,S$GLB,, | Performed by: INTERNAL MEDICINE

## 2022-03-28 PROCEDURE — 96372 PR INJECTION,THERAP/PROPH/DIAG2ST, IM OR SUBCUT: ICD-10-PCS | Mod: S$GLB,,, | Performed by: INTERNAL MEDICINE

## 2022-03-28 PROCEDURE — 1126F AMNT PAIN NOTED NONE PRSNT: CPT | Mod: CPTII,S$GLB,, | Performed by: INTERNAL MEDICINE

## 2022-03-28 PROCEDURE — 80069 RENAL FUNCTION PANEL: CPT | Performed by: INTERNAL MEDICINE

## 2022-03-28 PROCEDURE — 99999 PR PBB SHADOW E&M-EST. PATIENT-LVL IV: ICD-10-PCS | Mod: PBBFAC,,,

## 2022-03-28 PROCEDURE — 99215 OFFICE O/P EST HI 40 MIN: CPT | Mod: S$GLB,,, | Performed by: INTERNAL MEDICINE

## 2022-03-28 PROCEDURE — 3079F DIAST BP 80-89 MM HG: CPT | Mod: CPTII,S$GLB,, | Performed by: INTERNAL MEDICINE

## 2022-03-28 PROCEDURE — 83735 ASSAY OF MAGNESIUM: CPT | Performed by: INTERNAL MEDICINE

## 2022-03-28 PROCEDURE — 99999 PR PBB SHADOW E&M-EST. PATIENT-LVL V: CPT | Mod: PBBFAC,,, | Performed by: INTERNAL MEDICINE

## 2022-03-28 PROCEDURE — 36415 COLL VENOUS BLD VENIPUNCTURE: CPT | Performed by: INTERNAL MEDICINE

## 2022-03-28 PROCEDURE — 1126F PR PAIN SEVERITY QUANTIFIED, NO PAIN PRESENT: ICD-10-PCS | Mod: CPTII,S$GLB,, | Performed by: INTERNAL MEDICINE

## 2022-03-28 PROCEDURE — 85007 BL SMEAR W/DIFF WBC COUNT: CPT | Performed by: INTERNAL MEDICINE

## 2022-03-28 PROCEDURE — 1101F PR PT FALLS ASSESS DOC 0-1 FALLS W/OUT INJ PAST YR: ICD-10-PCS | Mod: CPTII,S$GLB,, | Performed by: INTERNAL MEDICINE

## 2022-03-28 PROCEDURE — 3288F FALL RISK ASSESSMENT DOCD: CPT | Mod: CPTII,S$GLB,, | Performed by: INTERNAL MEDICINE

## 2022-03-28 PROCEDURE — 3077F SYST BP >= 140 MM HG: CPT | Mod: CPTII,S$GLB,, | Performed by: INTERNAL MEDICINE

## 2022-03-28 PROCEDURE — 1101F PT FALLS ASSESS-DOCD LE1/YR: CPT | Mod: CPTII,S$GLB,, | Performed by: INTERNAL MEDICINE

## 2022-03-28 PROCEDURE — 99215 PR OFFICE/OUTPT VISIT, EST, LEVL V, 40-54 MIN: ICD-10-PCS | Mod: S$GLB,,, | Performed by: INTERNAL MEDICINE

## 2022-03-28 PROCEDURE — 1111F DSCHRG MED/CURRENT MED MERGE: CPT | Mod: CPTII,S$GLB,, | Performed by: INTERNAL MEDICINE

## 2022-03-28 PROCEDURE — 3066F PR DOCUMENTATION OF TREATMENT FOR NEPHROPATHY: ICD-10-PCS | Mod: CPTII,S$GLB,, | Performed by: INTERNAL MEDICINE

## 2022-03-28 PROCEDURE — 80197 ASSAY OF TACROLIMUS: CPT | Performed by: INTERNAL MEDICINE

## 2022-03-28 PROCEDURE — 3077F PR MOST RECENT SYSTOLIC BLOOD PRESSURE >= 140 MM HG: ICD-10-PCS | Mod: CPTII,S$GLB,, | Performed by: INTERNAL MEDICINE

## 2022-03-28 PROCEDURE — 99999 PR PBB SHADOW E&M-EST. PATIENT-LVL III: CPT | Mod: PBBFAC,,,

## 2022-03-28 PROCEDURE — 1159F PR MEDICATION LIST DOCUMENTED IN MEDICAL RECORD: ICD-10-PCS | Mod: CPTII,S$GLB,, | Performed by: INTERNAL MEDICINE

## 2022-03-28 PROCEDURE — 3288F PR FALLS RISK ASSESSMENT DOCUMENTED: ICD-10-PCS | Mod: CPTII,S$GLB,, | Performed by: INTERNAL MEDICINE

## 2022-03-28 PROCEDURE — 99999 PR PBB SHADOW E&M-EST. PATIENT-LVL IV: CPT | Mod: PBBFAC,,,

## 2022-03-28 PROCEDURE — 1111F PR DISCHARGE MEDS RECONCILED W/ CURRENT OUTPATIENT MED LIST: ICD-10-PCS | Mod: CPTII,S$GLB,, | Performed by: INTERNAL MEDICINE

## 2022-03-28 PROCEDURE — 85027 COMPLETE CBC AUTOMATED: CPT | Performed by: INTERNAL MEDICINE

## 2022-03-28 PROCEDURE — 1159F MED LIST DOCD IN RCRD: CPT | Mod: CPTII,S$GLB,, | Performed by: INTERNAL MEDICINE

## 2022-03-28 PROCEDURE — 3066F NEPHROPATHY DOC TX: CPT | Mod: CPTII,S$GLB,, | Performed by: INTERNAL MEDICINE

## 2022-03-28 PROCEDURE — 99999 PR PBB SHADOW E&M-EST. PATIENT-LVL V: ICD-10-PCS | Mod: PBBFAC,,, | Performed by: INTERNAL MEDICINE

## 2022-03-28 PROCEDURE — 3079F PR MOST RECENT DIASTOLIC BLOOD PRESSURE 80-89 MM HG: ICD-10-PCS | Mod: CPTII,S$GLB,, | Performed by: INTERNAL MEDICINE

## 2022-03-28 PROCEDURE — 99999 PR PBB SHADOW E&M-EST. PATIENT-LVL III: ICD-10-PCS | Mod: PBBFAC,,,

## 2022-03-28 PROCEDURE — 3008F BODY MASS INDEX DOCD: CPT | Mod: CPTII,S$GLB,, | Performed by: INTERNAL MEDICINE

## 2022-03-28 RX ORDER — LANOLIN ALCOHOL/MO/W.PET/CERES
400 CREAM (GRAM) TOPICAL 2 TIMES DAILY
Qty: 60 TABLET | Refills: 2 | Status: SHIPPED | OUTPATIENT
Start: 2022-03-28 | End: 2022-05-12

## 2022-03-28 RX ORDER — NIFEDIPINE 60 MG/1
60 TABLET, EXTENDED RELEASE ORAL EVERY 12 HOURS
Qty: 60 TABLET | Refills: 11 | Status: SHIPPED | OUTPATIENT
Start: 2022-03-28 | End: 2022-04-25

## 2022-03-28 NOTE — LETTER
March 28, 2022        Rajan Hayes  [address] [place] [address]  Phone: [phone]  Fax: [phone]             [facility] Br[facility]e- Transplant [facility]  [address]  [facility] [address]  Phone: [phone]   Patient: Alverto Ramirez Jr.   MR Number: [id number]   YOB: 1950   Date of Visit: 3/28/2022       Dear Dr. Rajan Hayes    Thank you for referring Alverto Ramirez to me for evaluation. Attached you will find relevant portions of my assessment and plan of care.    If you have questions, please do not hesitate to call me. I look forward to following Alverto Ramirez along with you.    Sincerely,    Deny Rosario MD    Enclosure    If you would like to receive this communication electronically, please contact [email or web address] or [phone] to request [employer] access.    [employer] is a tool which provides read-only access to select patient information with whom you have a relationship. Its easy to use and provides real time access to review your patients record including encounter summaries, notes, results, and demographic information.    If you feel you have received this communication in error or would no longer like to receive these types of communications, please e-mail [email or web address]

## 2022-03-28 NOTE — PROGRESS NOTES
Banegas catheter removed per MD order, catheter tip intact. Patient tolerated well. Patient instructed to call coordinator if he has not urinated within 6-8 hours. Verbalized understanding.

## 2022-03-28 NOTE — PROGRESS NOTES
Patient received 2nd PCN 2.4 million unit injections IM to the right buttock.  Tolerated well and left in NAD

## 2022-03-28 NOTE — PROGRESS NOTES
Post Clinic Note:    SW spoke with patient, wife and son to follow up regarding any needs post transplant and assess coping. Pt is a kidney transplant recipient from 3/18/2022. Patient and Caregiver report currently staying in [address] apartments. Patient and Caregiver report adequate coping at this time.  Pt denies mental health concerns at this time.  Pt states plans of making contact with [employer] to gain access to online GMS account to extend premium payment assistance post-transplant.  Patient and Caregiver expressed no psychosocial needs or concerns at this time, report no issues with obtaining medications, and reports receiving medications from [facility]. Patient and Caregiver report knowing how to contact SW team if any additional needs arise and SW remains available at [phone].

## 2022-03-29 ENCOUNTER — TELEPHONE (OUTPATIENT)
Dept: TRANSPLANT | Facility: CLINIC | Age: 72
End: 2022-03-29
Payer: MEDICARE

## 2022-03-29 NOTE — TELEPHONE ENCOUNTER
Patient called asking about taking his Flomax. Patient stated he has been urinating a lot and feels he does not need to take the Flomax. Explained to patient that Flomax helps him empty his bladder but is not a diuretic. Patient verbalized understanding.  Patient also requesting refill on Xanax but only 15 or 30 pills.

## 2022-03-31 ENCOUNTER — LAB VISIT (OUTPATIENT)
Dept: LAB | Facility: HOSPITAL | Age: 72
End: 2022-03-31
Attending: INTERNAL MEDICINE
Payer: MEDICARE

## 2022-03-31 DIAGNOSIS — Z94.0 S/P KIDNEY TRANSPLANT: Primary | ICD-10-CM

## 2022-03-31 DIAGNOSIS — Z94.0 KIDNEY REPLACED BY TRANSPLANT: ICD-10-CM

## 2022-03-31 DIAGNOSIS — F40.9 PHOBIC ANXIETY DISORDER: ICD-10-CM

## 2022-03-31 LAB
ALBUMIN SERPL BCP-MCNC: 3.2 G/DL (ref 3.5–5.2)
ANION GAP SERPL CALC-SCNC: 9 MMOL/L (ref 8–16)
BASOPHILS # BLD AUTO: 0.03 K/UL (ref 0–0.2)
BASOPHILS NFR BLD: 0.3 % (ref 0–1.9)
BUN SERPL-MCNC: 16 MG/DL (ref 8–23)
CALCIUM SERPL-MCNC: 9 MG/DL (ref 8.7–10.5)
CHLORIDE SERPL-SCNC: 108 MMOL/L (ref 95–110)
CO2 SERPL-SCNC: 20 MMOL/L (ref 23–29)
CREAT SERPL-MCNC: 1.3 MG/DL (ref 0.5–1.4)
DIFFERENTIAL METHOD: ABNORMAL
EOSINOPHIL # BLD AUTO: 0.1 K/UL (ref 0–0.5)
EOSINOPHIL NFR BLD: 0.6 % (ref 0–8)
ERYTHROCYTE [DISTWIDTH] IN BLOOD BY AUTOMATED COUNT: 15.9 % (ref 11.5–14.5)
EST. GFR  (AFRICAN AMERICAN): >60 ML/MIN/1.73 M^2
EST. GFR  (NON AFRICAN AMERICAN): 54.1 ML/MIN/1.73 M^2
GLUCOSE SERPL-MCNC: 102 MG/DL (ref 70–110)
HCT VFR BLD AUTO: 31.5 % (ref 40–54)
HGB BLD-MCNC: 10.3 G/DL (ref 14–18)
IMM GRANULOCYTES # BLD AUTO: 0.3 K/UL (ref 0–0.04)
IMM GRANULOCYTES NFR BLD AUTO: 3.1 % (ref 0–0.5)
LYMPHOCYTES # BLD AUTO: 0.3 K/UL (ref 1–4.8)
LYMPHOCYTES NFR BLD: 3.2 % (ref 18–48)
MAGNESIUM SERPL-MCNC: 1.5 MG/DL (ref 1.6–2.6)
MCH RBC QN AUTO: 29.9 PG (ref 27–31)
MCHC RBC AUTO-ENTMCNC: 32.7 G/DL (ref 32–36)
MCV RBC AUTO: 92 FL (ref 82–98)
MONOCYTES # BLD AUTO: 0.6 K/UL (ref 0.3–1)
MONOCYTES NFR BLD: 6.6 % (ref 4–15)
NEUTROPHILS # BLD AUTO: 8.3 K/UL (ref 1.8–7.7)
NEUTROPHILS NFR BLD: 86.2 % (ref 38–73)
NRBC BLD-RTO: 0 /100 WBC
PHOSPHATE SERPL-MCNC: 2.2 MG/DL (ref 2.7–4.5)
PLATELET # BLD AUTO: 261 K/UL (ref 150–450)
PMV BLD AUTO: 10.7 FL (ref 9.2–12.9)
POTASSIUM SERPL-SCNC: 4.4 MMOL/L (ref 3.5–5.1)
RBC # BLD AUTO: 3.44 M/UL (ref 4.6–6.2)
SODIUM SERPL-SCNC: 137 MMOL/L (ref 136–145)
TACROLIMUS BLD-MCNC: 11.4 NG/ML (ref 5–15)
WBC # BLD AUTO: 9.66 K/UL (ref 3.9–12.7)

## 2022-03-31 PROCEDURE — 85025 COMPLETE CBC W/AUTO DIFF WBC: CPT | Performed by: INTERNAL MEDICINE

## 2022-03-31 PROCEDURE — 36415 COLL VENOUS BLD VENIPUNCTURE: CPT | Performed by: INTERNAL MEDICINE

## 2022-03-31 PROCEDURE — 83735 ASSAY OF MAGNESIUM: CPT | Performed by: INTERNAL MEDICINE

## 2022-03-31 PROCEDURE — 80197 ASSAY OF TACROLIMUS: CPT | Performed by: INTERNAL MEDICINE

## 2022-03-31 PROCEDURE — 80069 RENAL FUNCTION PANEL: CPT | Performed by: INTERNAL MEDICINE

## 2022-03-31 RX ORDER — TACROLIMUS 1 MG/1
6 CAPSULE ORAL EVERY 12 HOURS
Qty: 420 CAPSULE | Refills: 11 | Status: SHIPPED | OUTPATIENT
Start: 2022-03-31 | End: 2022-04-04

## 2022-03-31 NOTE — TELEPHONE ENCOUNTER
Spoke with pt regarding dose change. Tacro to 6/6. Pt verbalized understanding.----- Message from Dilcia Parker MD sent at 3/31/2022  1:21 PM CDT -----  Lower tacro to 6 mg BID

## 2022-03-31 NOTE — PHYSICIAN QUERY
PT Name: Alverto Ramirez Jr.  MR #: [id number]     Documentation Clarification      CDS/: Briseida Clark RN             Contact information: [email or web address]    This form is a permanent document in the medical record.     Query Date: March 31, 2022    By submitting this query, we are merely seeking further clarification of documentation. Please utilize your independent clinical judgment when addressing the question(s) below.    The Medical Record reflects the following:    Supporting Clinical Findings Location in Medical Record   s/p DDRT 3/19/22 for HTN (Thymo, CIT 22h34m, KDPI 26%, CMV +/+, RPR donor +). Cr trending down, excellent uop. He presented for routine, outpatient post  op appointment and reported diarrhea, x 8 episodes today, and unable to urinate.    Patient admitted with urinary retention and diarrhea. Banegas was placed in ED and then removed 3/23/22 AM. Patient unable to void more than  25-50 cc of urine at a time. Banegas reinserted with 500 cc output. He was started on flomax. Banegas will remain in place until Monday 3/28/22 when it will be pulled.  Diarrhea is resolved.    Discharge Summary 3/24       Transplant Kidney        Discharge Summary 3/24       Transplant Kidney   Urine Culture, Routine No growth          BUN 44 (H) 32 (H) 23   Cr 2.4 (H) 1.7 (H) 1.5 (H)   GFR  29.8 ! 45.2 ! 52.6 !     Lab 3/22         Lab 3/22- 3/24                                                                            Provider, please clarify the urinary retention diagnosis associated with above clinical findings.    [   ] Urinary retention is a complication of the Kidney Transplant Procedure     [   ] Urinary retention is due to anesthesia from the Kidney Transplant Procedure     [    ] Urinary retention affected the function of the transplanted organ, and is a complication     [ X  ] Urinary retention is not a complication     [   ] Other (please specify): ____________     [   ] Clinically undetermined

## 2022-04-01 RX ORDER — ALPRAZOLAM 1 MG/1
1 TABLET ORAL 3 TIMES DAILY PRN
Qty: 42 TABLET | Refills: 0 | Status: SHIPPED | OUTPATIENT
Start: 2022-04-01 | End: 2022-05-09 | Stop reason: SDUPTHER

## 2022-04-01 NOTE — TELEPHONE ENCOUNTER
Per Dr Higgins. Patient requesting refill on Xanax. Patient is staying at [facility] post kidney transplant and is experiencing anxiety. Pt to be prescribed 14 day supply and to follow up with Dr Hayes upon returning home. Pt verbalized understanding.

## 2022-04-04 ENCOUNTER — CLINICAL SUPPORT (OUTPATIENT)
Dept: INFECTIOUS DISEASES | Facility: CLINIC | Age: 72
End: 2022-04-04
Payer: MEDICARE

## 2022-04-04 ENCOUNTER — LAB VISIT (OUTPATIENT)
Dept: LAB | Facility: HOSPITAL | Age: 72
End: 2022-04-04
Attending: INTERNAL MEDICINE
Payer: MEDICARE

## 2022-04-04 ENCOUNTER — OFFICE VISIT (OUTPATIENT)
Dept: TRANSPLANT | Facility: CLINIC | Age: 72
End: 2022-04-04
Payer: MEDICARE

## 2022-04-04 VITALS
OXYGEN SATURATION: 99 % | HEIGHT: 67 IN | SYSTOLIC BLOOD PRESSURE: 154 MMHG | TEMPERATURE: 97 F | HEART RATE: 78 BPM | WEIGHT: 190.06 LBS | BODY MASS INDEX: 29.83 KG/M2 | DIASTOLIC BLOOD PRESSURE: 73 MMHG | RESPIRATION RATE: 16 BRPM

## 2022-04-04 DIAGNOSIS — Z79.60 LONG-TERM USE OF IMMUNOSUPPRESSANT MEDICATION: ICD-10-CM

## 2022-04-04 DIAGNOSIS — N18.31 STAGE 3A CHRONIC KIDNEY DISEASE: ICD-10-CM

## 2022-04-04 DIAGNOSIS — Z94.0 S/P KIDNEY TRANSPLANT: ICD-10-CM

## 2022-04-04 DIAGNOSIS — I10 PRIMARY HYPERTENSION: ICD-10-CM

## 2022-04-04 DIAGNOSIS — E78.5 HYPERLIPIDEMIA, UNSPECIFIED HYPERLIPIDEMIA TYPE: ICD-10-CM

## 2022-04-04 DIAGNOSIS — Z94.0 S/P KIDNEY TRANSPLANT: Primary | ICD-10-CM

## 2022-04-04 DIAGNOSIS — Z94.0 KIDNEY REPLACED BY TRANSPLANT: ICD-10-CM

## 2022-04-04 LAB
ALBUMIN SERPL BCP-MCNC: 3.5 G/DL (ref 3.5–5.2)
ANION GAP SERPL CALC-SCNC: 8 MMOL/L (ref 8–16)
BASOPHILS # BLD AUTO: 0.04 K/UL (ref 0–0.2)
BASOPHILS NFR BLD: 0.5 % (ref 0–1.9)
BUN SERPL-MCNC: 20 MG/DL (ref 8–23)
CALCIUM SERPL-MCNC: 8.8 MG/DL (ref 8.7–10.5)
CHLORIDE SERPL-SCNC: 107 MMOL/L (ref 95–110)
CO2 SERPL-SCNC: 21 MMOL/L (ref 23–29)
CREAT SERPL-MCNC: 1.7 MG/DL (ref 0.5–1.4)
DIFFERENTIAL METHOD: ABNORMAL
EOSINOPHIL # BLD AUTO: 0.1 K/UL (ref 0–0.5)
EOSINOPHIL NFR BLD: 0.7 % (ref 0–8)
ERYTHROCYTE [DISTWIDTH] IN BLOOD BY AUTOMATED COUNT: 16.1 % (ref 11.5–14.5)
EST. GFR  (AFRICAN AMERICAN): 45.2 ML/MIN/1.73 M^2
EST. GFR  (NON AFRICAN AMERICAN): 39.1 ML/MIN/1.73 M^2
GLUCOSE SERPL-MCNC: 97 MG/DL (ref 70–110)
HCT VFR BLD AUTO: 33.7 % (ref 40–54)
HGB BLD-MCNC: 10.7 G/DL (ref 14–18)
IMM GRANULOCYTES # BLD AUTO: 0.1 K/UL (ref 0–0.04)
IMM GRANULOCYTES NFR BLD AUTO: 1.2 % (ref 0–0.5)
LYMPHOCYTES # BLD AUTO: 0.5 K/UL (ref 1–4.8)
LYMPHOCYTES NFR BLD: 5.7 % (ref 18–48)
MAGNESIUM SERPL-MCNC: 1.5 MG/DL (ref 1.6–2.6)
MCH RBC QN AUTO: 29.6 PG (ref 27–31)
MCHC RBC AUTO-ENTMCNC: 31.8 G/DL (ref 32–36)
MCV RBC AUTO: 93 FL (ref 82–98)
MONOCYTES # BLD AUTO: 0.7 K/UL (ref 0.3–1)
MONOCYTES NFR BLD: 7.9 % (ref 4–15)
NEUTROPHILS # BLD AUTO: 6.9 K/UL (ref 1.8–7.7)
NEUTROPHILS NFR BLD: 84 % (ref 38–73)
NRBC BLD-RTO: 0 /100 WBC
PHOSPHATE SERPL-MCNC: 2.2 MG/DL (ref 2.7–4.5)
PLATELET # BLD AUTO: 241 K/UL (ref 150–450)
PMV BLD AUTO: 10.9 FL (ref 9.2–12.9)
POTASSIUM SERPL-SCNC: 4.2 MMOL/L (ref 3.5–5.1)
RBC # BLD AUTO: 3.62 M/UL (ref 4.6–6.2)
SODIUM SERPL-SCNC: 136 MMOL/L (ref 136–145)
TACROLIMUS BLD-MCNC: 9.4 NG/ML (ref 5–15)
WBC # BLD AUTO: 8.24 K/UL (ref 3.9–12.7)

## 2022-04-04 PROCEDURE — 3008F BODY MASS INDEX DOCD: CPT | Mod: CPTII,S$GLB,, | Performed by: NURSE PRACTITIONER

## 2022-04-04 PROCEDURE — 1126F PR PAIN SEVERITY QUANTIFIED, NO PAIN PRESENT: ICD-10-PCS | Mod: CPTII,S$GLB,, | Performed by: NURSE PRACTITIONER

## 2022-04-04 PROCEDURE — 80197 ASSAY OF TACROLIMUS: CPT | Performed by: INTERNAL MEDICINE

## 2022-04-04 PROCEDURE — 1126F AMNT PAIN NOTED NONE PRSNT: CPT | Mod: CPTII,S$GLB,, | Performed by: NURSE PRACTITIONER

## 2022-04-04 PROCEDURE — 99999 PR PBB SHADOW E&M-EST. PATIENT-LVL V: ICD-10-PCS | Mod: PBBFAC,,, | Performed by: NURSE PRACTITIONER

## 2022-04-04 PROCEDURE — 1159F MED LIST DOCD IN RCRD: CPT | Mod: CPTII,S$GLB,, | Performed by: NURSE PRACTITIONER

## 2022-04-04 PROCEDURE — 3077F SYST BP >= 140 MM HG: CPT | Mod: CPTII,S$GLB,, | Performed by: NURSE PRACTITIONER

## 2022-04-04 PROCEDURE — 36415 COLL VENOUS BLD VENIPUNCTURE: CPT | Performed by: INTERNAL MEDICINE

## 2022-04-04 PROCEDURE — 1111F PR DISCHARGE MEDS RECONCILED W/ CURRENT OUTPATIENT MED LIST: ICD-10-PCS | Mod: CPTII,S$GLB,, | Performed by: NURSE PRACTITIONER

## 2022-04-04 PROCEDURE — 3078F DIAST BP <80 MM HG: CPT | Mod: CPTII,S$GLB,, | Performed by: NURSE PRACTITIONER

## 2022-04-04 PROCEDURE — 3288F FALL RISK ASSESSMENT DOCD: CPT | Mod: CPTII,S$GLB,, | Performed by: NURSE PRACTITIONER

## 2022-04-04 PROCEDURE — 99215 PR OFFICE/OUTPT VISIT, EST, LEVL V, 40-54 MIN: ICD-10-PCS | Mod: S$GLB,,, | Performed by: NURSE PRACTITIONER

## 2022-04-04 PROCEDURE — 3078F PR MOST RECENT DIASTOLIC BLOOD PRESSURE < 80 MM HG: ICD-10-PCS | Mod: CPTII,S$GLB,, | Performed by: NURSE PRACTITIONER

## 2022-04-04 PROCEDURE — 3066F NEPHROPATHY DOC TX: CPT | Mod: CPTII,S$GLB,, | Performed by: NURSE PRACTITIONER

## 2022-04-04 PROCEDURE — 1111F DSCHRG MED/CURRENT MED MERGE: CPT | Mod: CPTII,S$GLB,, | Performed by: NURSE PRACTITIONER

## 2022-04-04 PROCEDURE — 99215 OFFICE O/P EST HI 40 MIN: CPT | Mod: S$GLB,,, | Performed by: NURSE PRACTITIONER

## 2022-04-04 PROCEDURE — 3008F PR BODY MASS INDEX (BMI) DOCUMENTED: ICD-10-PCS | Mod: CPTII,S$GLB,, | Performed by: NURSE PRACTITIONER

## 2022-04-04 PROCEDURE — 3288F PR FALLS RISK ASSESSMENT DOCUMENTED: ICD-10-PCS | Mod: CPTII,S$GLB,, | Performed by: NURSE PRACTITIONER

## 2022-04-04 PROCEDURE — 96372 THER/PROPH/DIAG INJ SC/IM: CPT | Mod: S$GLB,,, | Performed by: INTERNAL MEDICINE

## 2022-04-04 PROCEDURE — 83735 ASSAY OF MAGNESIUM: CPT | Performed by: INTERNAL MEDICINE

## 2022-04-04 PROCEDURE — 1101F PT FALLS ASSESS-DOCD LE1/YR: CPT | Mod: CPTII,S$GLB,, | Performed by: NURSE PRACTITIONER

## 2022-04-04 PROCEDURE — 96372 PR INJECTION,THERAP/PROPH/DIAG2ST, IM OR SUBCUT: ICD-10-PCS | Mod: S$GLB,,, | Performed by: INTERNAL MEDICINE

## 2022-04-04 PROCEDURE — 99999 PR PBB SHADOW E&M-EST. PATIENT-LVL V: CPT | Mod: PBBFAC,,, | Performed by: NURSE PRACTITIONER

## 2022-04-04 PROCEDURE — 3066F PR DOCUMENTATION OF TREATMENT FOR NEPHROPATHY: ICD-10-PCS | Mod: CPTII,S$GLB,, | Performed by: NURSE PRACTITIONER

## 2022-04-04 PROCEDURE — 80069 RENAL FUNCTION PANEL: CPT | Performed by: INTERNAL MEDICINE

## 2022-04-04 PROCEDURE — 1101F PR PT FALLS ASSESS DOC 0-1 FALLS W/OUT INJ PAST YR: ICD-10-PCS | Mod: CPTII,S$GLB,, | Performed by: NURSE PRACTITIONER

## 2022-04-04 PROCEDURE — 3077F PR MOST RECENT SYSTOLIC BLOOD PRESSURE >= 140 MM HG: ICD-10-PCS | Mod: CPTII,S$GLB,, | Performed by: NURSE PRACTITIONER

## 2022-04-04 PROCEDURE — 1159F PR MEDICATION LIST DOCUMENTED IN MEDICAL RECORD: ICD-10-PCS | Mod: CPTII,S$GLB,, | Performed by: NURSE PRACTITIONER

## 2022-04-04 PROCEDURE — 85025 COMPLETE CBC W/AUTO DIFF WBC: CPT | Performed by: INTERNAL MEDICINE

## 2022-04-04 RX ORDER — TACROLIMUS 1 MG/1
CAPSULE ORAL
Qty: 420 CAPSULE | Refills: 11 | Status: SHIPPED | OUTPATIENT
Start: 2022-04-04 | End: 2022-04-22

## 2022-04-04 NOTE — PROGRESS NOTES
Patient received Bicillin 2.4 million units in the right buttocks. Pt tolerated well. Pt asked to wait in the clinic 15 minutes after injection in the event of an allergic reaction. Pt verbalized understanding. Pt left in NAD.

## 2022-04-04 NOTE — LETTER
April 4, 2022        Rajan Hayes  [address] [place] [address]  Phone: [phone]  Fax: [phone]             Darin Briscoe- Transplant 1st Fl  [address]  [facility] [address]  Phone: [phone]   Patient: Alverto Ramirez Jr.   MR Number: [id number]   YOB: 1950   Date of Visit: 4/4/2022       Dear Dr. Rajan Hayes    Thank you for referring Alverto Ramirez to me for evaluation. Attached you will find relevant portions of my assessment and plan of care.    If you have questions, please do not hesitate to call me. I look forward to following Alverto Ramirez along with you.    Sincerely,    Judi Robert, NP    Enclosure    If you would like to receive this communication electronically, please contact [email or web address] or [phone] to request [employer] Link access.    [employer] Link is a tool which provides read-only access to select patient information with whom you have a relationship. Its easy to use and provides real time access to review your patients record including encounter summaries, notes, results, and demographic information.    If you feel you have received this communication in error or would no longer like to receive these types of communications, please e-mail [email or web address]

## 2022-04-04 NOTE — TELEPHONE ENCOUNTER
Spoke with pt regarding below. Tacro dose to 6/5. Labs Wed. Pt verbalized understanding.----- Message from iDlcia Parker MD sent at 4/4/2022  1:31 PM CDT -----  Please increase fluid intake and recheck renal panel in next few days.  Lower tacro from 6/6 to 6 mg QAM and 5 mg nightly.  Repeat atcro lvl and renal panel Wed

## 2022-04-06 ENCOUNTER — LAB VISIT (OUTPATIENT)
Dept: LAB | Facility: HOSPITAL | Age: 72
End: 2022-04-06
Attending: INTERNAL MEDICINE
Payer: MEDICARE

## 2022-04-06 DIAGNOSIS — Z94.0 KIDNEY REPLACED BY TRANSPLANT: ICD-10-CM

## 2022-04-06 LAB
ALBUMIN SERPL BCP-MCNC: 3.6 G/DL (ref 3.5–5.2)
ANION GAP SERPL CALC-SCNC: 11 MMOL/L (ref 8–16)
BUN SERPL-MCNC: 24 MG/DL (ref 8–23)
CALCIUM SERPL-MCNC: 8.8 MG/DL (ref 8.7–10.5)
CHLORIDE SERPL-SCNC: 108 MMOL/L (ref 95–110)
CO2 SERPL-SCNC: 19 MMOL/L (ref 23–29)
CREAT SERPL-MCNC: 1.4 MG/DL (ref 0.5–1.4)
EST. GFR  (AFRICAN AMERICAN): 57.2 ML/MIN/1.73 M^2
EST. GFR  (NON AFRICAN AMERICAN): 49.5 ML/MIN/1.73 M^2
GLUCOSE SERPL-MCNC: 95 MG/DL (ref 70–110)
PHOSPHATE SERPL-MCNC: 2.1 MG/DL (ref 2.7–4.5)
POTASSIUM SERPL-SCNC: 4.4 MMOL/L (ref 3.5–5.1)
SODIUM SERPL-SCNC: 138 MMOL/L (ref 136–145)
TACROLIMUS BLD-MCNC: 9.9 NG/ML (ref 5–15)

## 2022-04-06 PROCEDURE — 80069 RENAL FUNCTION PANEL: CPT | Performed by: INTERNAL MEDICINE

## 2022-04-06 PROCEDURE — 36415 COLL VENOUS BLD VENIPUNCTURE: CPT | Performed by: INTERNAL MEDICINE

## 2022-04-06 PROCEDURE — 80197 ASSAY OF TACROLIMUS: CPT | Performed by: INTERNAL MEDICINE

## 2022-04-07 ENCOUNTER — LAB VISIT (OUTPATIENT)
Dept: LAB | Facility: HOSPITAL | Age: 72
End: 2022-04-07
Attending: INTERNAL MEDICINE
Payer: MEDICARE

## 2022-04-07 ENCOUNTER — PROCEDURE VISIT (OUTPATIENT)
Dept: UROLOGY | Facility: CLINIC | Age: 72
End: 2022-04-07
Payer: MEDICARE

## 2022-04-07 VITALS
BODY MASS INDEX: 29.1 KG/M2 | RESPIRATION RATE: 16 BRPM | TEMPERATURE: 98 F | SYSTOLIC BLOOD PRESSURE: 149 MMHG | DIASTOLIC BLOOD PRESSURE: 70 MMHG | HEART RATE: 83 BPM | WEIGHT: 185.38 LBS | HEIGHT: 67 IN

## 2022-04-07 DIAGNOSIS — Z94.0 KIDNEY REPLACED BY TRANSPLANT: ICD-10-CM

## 2022-04-07 LAB
ALBUMIN SERPL BCP-MCNC: 3.4 G/DL (ref 3.5–5.2)
ANION GAP SERPL CALC-SCNC: 8 MMOL/L (ref 8–16)
BASOPHILS # BLD AUTO: 0.03 K/UL (ref 0–0.2)
BASOPHILS NFR BLD: 0.4 % (ref 0–1.9)
BUN SERPL-MCNC: 17 MG/DL (ref 8–23)
CALCIUM SERPL-MCNC: 8.6 MG/DL (ref 8.7–10.5)
CHLORIDE SERPL-SCNC: 109 MMOL/L (ref 95–110)
CO2 SERPL-SCNC: 20 MMOL/L (ref 23–29)
CREAT SERPL-MCNC: 1.3 MG/DL (ref 0.5–1.4)
DIFFERENTIAL METHOD: ABNORMAL
EOSINOPHIL # BLD AUTO: 0.1 K/UL (ref 0–0.5)
EOSINOPHIL NFR BLD: 1.3 % (ref 0–8)
ERYTHROCYTE [DISTWIDTH] IN BLOOD BY AUTOMATED COUNT: 16.6 % (ref 11.5–14.5)
EST. GFR  (AFRICAN AMERICAN): >60 ML/MIN/1.73 M^2
EST. GFR  (NON AFRICAN AMERICAN): 54.1 ML/MIN/1.73 M^2
GLUCOSE SERPL-MCNC: 97 MG/DL (ref 70–110)
HCT VFR BLD AUTO: 33.1 % (ref 40–54)
HGB BLD-MCNC: 11 G/DL (ref 14–18)
IMM GRANULOCYTES # BLD AUTO: 0.08 K/UL (ref 0–0.04)
IMM GRANULOCYTES NFR BLD AUTO: 1.2 % (ref 0–0.5)
LYMPHOCYTES # BLD AUTO: 0.5 K/UL (ref 1–4.8)
LYMPHOCYTES NFR BLD: 7.2 % (ref 18–48)
MAGNESIUM SERPL-MCNC: 1.8 MG/DL (ref 1.6–2.6)
MCH RBC QN AUTO: 30 PG (ref 27–31)
MCHC RBC AUTO-ENTMCNC: 33.2 G/DL (ref 32–36)
MCV RBC AUTO: 90 FL (ref 82–98)
MONOCYTES # BLD AUTO: 0.6 K/UL (ref 0.3–1)
MONOCYTES NFR BLD: 8.2 % (ref 4–15)
NEUTROPHILS # BLD AUTO: 5.7 K/UL (ref 1.8–7.7)
NEUTROPHILS NFR BLD: 81.7 % (ref 38–73)
NRBC BLD-RTO: 0 /100 WBC
PHOSPHATE SERPL-MCNC: 2.4 MG/DL (ref 2.7–4.5)
PLATELET # BLD AUTO: 202 K/UL (ref 150–450)
PMV BLD AUTO: 10 FL (ref 9.2–12.9)
POTASSIUM SERPL-SCNC: 4.1 MMOL/L (ref 3.5–5.1)
RBC # BLD AUTO: 3.67 M/UL (ref 4.6–6.2)
SODIUM SERPL-SCNC: 137 MMOL/L (ref 136–145)
TACROLIMUS BLD-MCNC: 8.4 NG/ML (ref 5–15)
WBC # BLD AUTO: 6.92 K/UL (ref 3.9–12.7)

## 2022-04-07 PROCEDURE — 80069 RENAL FUNCTION PANEL: CPT | Performed by: INTERNAL MEDICINE

## 2022-04-07 PROCEDURE — 83735 ASSAY OF MAGNESIUM: CPT | Performed by: INTERNAL MEDICINE

## 2022-04-07 PROCEDURE — 80197 ASSAY OF TACROLIMUS: CPT | Performed by: INTERNAL MEDICINE

## 2022-04-07 PROCEDURE — 85025 COMPLETE CBC W/AUTO DIFF WBC: CPT | Performed by: INTERNAL MEDICINE

## 2022-04-07 PROCEDURE — 36415 COLL VENOUS BLD VENIPUNCTURE: CPT | Performed by: INTERNAL MEDICINE

## 2022-04-07 RX ORDER — LIDOCAINE HYDROCHLORIDE 20 MG/ML
JELLY TOPICAL
Status: COMPLETED | OUTPATIENT
Start: 2022-04-07 | End: 2022-04-07

## 2022-04-07 RX ADMIN — LIDOCAINE HYDROCHLORIDE: 20 JELLY TOPICAL at 10:04

## 2022-04-07 NOTE — PATIENT INSTRUCTIONS
What to Expect After a Cystoscopy with Stent Removal  For the next 24-48 hours, you may feel a mild burning when you urinate. This burning is normal and expected. Drink plenty of water to dilute the urine to help relieve the burning sensation. You may also see a small amount of blood in your urine after the procedure.    Unless you are already taking antibiotics, you may be given an antibiotic after the test to prevent infection.    Signs and Symptoms to Report  Call the [facility] at [phone] if you develop any of the following:  Fever of 101 degrees or higher  Chills or persistent bleeding  Inability to urinate    After hours or on weekends, you may reach a urology resident on call at this number: [phone].

## 2022-04-07 NOTE — PROCEDURES
"Alverto Ramirez Jr. is a 71 y.o. male patient.    Temp: 98.1 °F (36.7 °C) (04/07/22 0956)  Pulse: 83 (04/07/22 0956)  Resp: 16 (04/07/22 0956)  BP: (!) 149/70 (04/07/22 0956)  Weight: 84.1 kg (185 lb 6.4 oz) (04/07/22 0956)  Height: 5' 7" (170.2 cm) (04/07/22 0956)       CYSTOSCOPY W/ STENT REMOVAL    Date/Time: 4/7/2022 10:17 AM  Performed by: Brian Pozo MD  Authorized by: Dustin Wilcox Jr., MD   Preparation: Patient was prepped and draped in the usual sterile fashion.  Local anesthesia used: yes    Anesthesia:  Local anesthesia used: yes  Local Anesthetic: topical anesthetic    Sedation:  Patient sedated: no    Patient tolerance: patient tolerated the procedure well with no immediate complications  Comments: Normal overall cysto. Normal bladder architecture without masses, erythema, trabeculations or stones. Single transplant stent visualized and easily removed with flex scope.           4/7/2022  "

## 2022-04-08 ENCOUNTER — TELEPHONE (OUTPATIENT)
Dept: TRANSPLANT | Facility: CLINIC | Age: 72
End: 2022-04-08
Payer: MEDICARE

## 2022-04-11 ENCOUNTER — OFFICE VISIT (OUTPATIENT)
Dept: TRANSPLANT | Facility: CLINIC | Age: 72
End: 2022-04-11
Payer: MEDICARE

## 2022-04-11 ENCOUNTER — TELEPHONE (OUTPATIENT)
Dept: NEPHROLOGY | Facility: CLINIC | Age: 72
End: 2022-04-11
Payer: MEDICARE

## 2022-04-11 ENCOUNTER — LAB VISIT (OUTPATIENT)
Dept: LAB | Facility: HOSPITAL | Age: 72
End: 2022-04-11
Attending: INTERNAL MEDICINE
Payer: MEDICARE

## 2022-04-11 VITALS
WEIGHT: 189.38 LBS | OXYGEN SATURATION: 98 % | DIASTOLIC BLOOD PRESSURE: 74 MMHG | RESPIRATION RATE: 16 BRPM | HEIGHT: 67 IN | BODY MASS INDEX: 29.72 KG/M2 | TEMPERATURE: 97 F | HEART RATE: 83 BPM | SYSTOLIC BLOOD PRESSURE: 143 MMHG

## 2022-04-11 DIAGNOSIS — N18.31 STAGE 3A CHRONIC KIDNEY DISEASE: ICD-10-CM

## 2022-04-11 DIAGNOSIS — E78.5 HYPERLIPIDEMIA, UNSPECIFIED HYPERLIPIDEMIA TYPE: ICD-10-CM

## 2022-04-11 DIAGNOSIS — Z94.0 KIDNEY REPLACED BY TRANSPLANT: ICD-10-CM

## 2022-04-11 DIAGNOSIS — Z79.60 LONG-TERM USE OF IMMUNOSUPPRESSANT MEDICATION: ICD-10-CM

## 2022-04-11 DIAGNOSIS — I10 PRIMARY HYPERTENSION: ICD-10-CM

## 2022-04-11 DIAGNOSIS — Z94.0 S/P KIDNEY TRANSPLANT: Primary | ICD-10-CM

## 2022-04-11 LAB
ALBUMIN SERPL BCP-MCNC: 3.7 G/DL (ref 3.5–5.2)
ANION GAP SERPL CALC-SCNC: 9 MMOL/L (ref 8–16)
BASOPHILS # BLD AUTO: 0.05 K/UL (ref 0–0.2)
BASOPHILS NFR BLD: 0.7 % (ref 0–1.9)
BUN SERPL-MCNC: 22 MG/DL (ref 8–23)
CALCIUM SERPL-MCNC: 8.8 MG/DL (ref 8.7–10.5)
CHLORIDE SERPL-SCNC: 109 MMOL/L (ref 95–110)
CO2 SERPL-SCNC: 20 MMOL/L (ref 23–29)
CREAT SERPL-MCNC: 1.3 MG/DL (ref 0.5–1.4)
DIFFERENTIAL METHOD: ABNORMAL
EOSINOPHIL # BLD AUTO: 0.1 K/UL (ref 0–0.5)
EOSINOPHIL NFR BLD: 1.7 % (ref 0–8)
ERYTHROCYTE [DISTWIDTH] IN BLOOD BY AUTOMATED COUNT: 16.6 % (ref 11.5–14.5)
EST. GFR  (AFRICAN AMERICAN): >60 ML/MIN/1.73 M^2
EST. GFR  (NON AFRICAN AMERICAN): 54.1 ML/MIN/1.73 M^2
GLUCOSE SERPL-MCNC: 100 MG/DL (ref 70–110)
HCT VFR BLD AUTO: 36 % (ref 40–54)
HGB BLD-MCNC: 11.6 G/DL (ref 14–18)
IMM GRANULOCYTES # BLD AUTO: 0.15 K/UL (ref 0–0.04)
IMM GRANULOCYTES NFR BLD AUTO: 2.1 % (ref 0–0.5)
LYMPHOCYTES # BLD AUTO: 0.6 K/UL (ref 1–4.8)
LYMPHOCYTES NFR BLD: 7.6 % (ref 18–48)
MAGNESIUM SERPL-MCNC: 1.5 MG/DL (ref 1.6–2.6)
MCH RBC QN AUTO: 29.5 PG (ref 27–31)
MCHC RBC AUTO-ENTMCNC: 32.2 G/DL (ref 32–36)
MCV RBC AUTO: 92 FL (ref 82–98)
MONOCYTES # BLD AUTO: 0.5 K/UL (ref 0.3–1)
MONOCYTES NFR BLD: 6.8 % (ref 4–15)
NEUTROPHILS # BLD AUTO: 5.8 K/UL (ref 1.8–7.7)
NEUTROPHILS NFR BLD: 81.1 % (ref 38–73)
NRBC BLD-RTO: 0 /100 WBC
PHOSPHATE SERPL-MCNC: 2.6 MG/DL (ref 2.7–4.5)
PLATELET # BLD AUTO: 205 K/UL (ref 150–450)
PMV BLD AUTO: 10.3 FL (ref 9.2–12.9)
POTASSIUM SERPL-SCNC: 4.2 MMOL/L (ref 3.5–5.1)
RBC # BLD AUTO: 3.93 M/UL (ref 4.6–6.2)
SODIUM SERPL-SCNC: 138 MMOL/L (ref 136–145)
TACROLIMUS BLD-MCNC: 8.4 NG/ML (ref 5–15)
WBC # BLD AUTO: 7.19 K/UL (ref 3.9–12.7)

## 2022-04-11 PROCEDURE — 85025 COMPLETE CBC W/AUTO DIFF WBC: CPT | Performed by: INTERNAL MEDICINE

## 2022-04-11 PROCEDURE — 1101F PT FALLS ASSESS-DOCD LE1/YR: CPT | Mod: CPTII,S$GLB,, | Performed by: NURSE PRACTITIONER

## 2022-04-11 PROCEDURE — 99215 PR OFFICE/OUTPT VISIT, EST, LEVL V, 40-54 MIN: ICD-10-PCS | Mod: S$GLB,,, | Performed by: NURSE PRACTITIONER

## 2022-04-11 PROCEDURE — 1126F PR PAIN SEVERITY QUANTIFIED, NO PAIN PRESENT: ICD-10-PCS | Mod: CPTII,S$GLB,, | Performed by: NURSE PRACTITIONER

## 2022-04-11 PROCEDURE — 99999 PR PBB SHADOW E&M-EST. PATIENT-LVL V: CPT | Mod: PBBFAC,,, | Performed by: NURSE PRACTITIONER

## 2022-04-11 PROCEDURE — 3078F DIAST BP <80 MM HG: CPT | Mod: CPTII,S$GLB,, | Performed by: NURSE PRACTITIONER

## 2022-04-11 PROCEDURE — 1111F DSCHRG MED/CURRENT MED MERGE: CPT | Mod: CPTII,S$GLB,, | Performed by: NURSE PRACTITIONER

## 2022-04-11 PROCEDURE — 3077F PR MOST RECENT SYSTOLIC BLOOD PRESSURE >= 140 MM HG: ICD-10-PCS | Mod: CPTII,S$GLB,, | Performed by: NURSE PRACTITIONER

## 2022-04-11 PROCEDURE — 80197 ASSAY OF TACROLIMUS: CPT | Performed by: INTERNAL MEDICINE

## 2022-04-11 PROCEDURE — 3066F PR DOCUMENTATION OF TREATMENT FOR NEPHROPATHY: ICD-10-PCS | Mod: CPTII,S$GLB,, | Performed by: NURSE PRACTITIONER

## 2022-04-11 PROCEDURE — 80069 RENAL FUNCTION PANEL: CPT | Performed by: INTERNAL MEDICINE

## 2022-04-11 PROCEDURE — 3066F NEPHROPATHY DOC TX: CPT | Mod: CPTII,S$GLB,, | Performed by: NURSE PRACTITIONER

## 2022-04-11 PROCEDURE — 1126F AMNT PAIN NOTED NONE PRSNT: CPT | Mod: CPTII,S$GLB,, | Performed by: NURSE PRACTITIONER

## 2022-04-11 PROCEDURE — 36415 COLL VENOUS BLD VENIPUNCTURE: CPT | Performed by: INTERNAL MEDICINE

## 2022-04-11 PROCEDURE — 3077F SYST BP >= 140 MM HG: CPT | Mod: CPTII,S$GLB,, | Performed by: NURSE PRACTITIONER

## 2022-04-11 PROCEDURE — 83735 ASSAY OF MAGNESIUM: CPT | Performed by: INTERNAL MEDICINE

## 2022-04-11 PROCEDURE — 3008F PR BODY MASS INDEX (BMI) DOCUMENTED: ICD-10-PCS | Mod: CPTII,S$GLB,, | Performed by: NURSE PRACTITIONER

## 2022-04-11 PROCEDURE — 3078F PR MOST RECENT DIASTOLIC BLOOD PRESSURE < 80 MM HG: ICD-10-PCS | Mod: CPTII,S$GLB,, | Performed by: NURSE PRACTITIONER

## 2022-04-11 PROCEDURE — 99215 OFFICE O/P EST HI 40 MIN: CPT | Mod: S$GLB,,, | Performed by: NURSE PRACTITIONER

## 2022-04-11 PROCEDURE — 1101F PR PT FALLS ASSESS DOC 0-1 FALLS W/OUT INJ PAST YR: ICD-10-PCS | Mod: CPTII,S$GLB,, | Performed by: NURSE PRACTITIONER

## 2022-04-11 PROCEDURE — 99999 PR PBB SHADOW E&M-EST. PATIENT-LVL V: ICD-10-PCS | Mod: PBBFAC,,, | Performed by: NURSE PRACTITIONER

## 2022-04-11 PROCEDURE — 3288F PR FALLS RISK ASSESSMENT DOCUMENTED: ICD-10-PCS | Mod: CPTII,S$GLB,, | Performed by: NURSE PRACTITIONER

## 2022-04-11 PROCEDURE — 1159F MED LIST DOCD IN RCRD: CPT | Mod: CPTII,S$GLB,, | Performed by: NURSE PRACTITIONER

## 2022-04-11 PROCEDURE — 3008F BODY MASS INDEX DOCD: CPT | Mod: CPTII,S$GLB,, | Performed by: NURSE PRACTITIONER

## 2022-04-11 PROCEDURE — 1111F PR DISCHARGE MEDS RECONCILED W/ CURRENT OUTPATIENT MED LIST: ICD-10-PCS | Mod: CPTII,S$GLB,, | Performed by: NURSE PRACTITIONER

## 2022-04-11 PROCEDURE — 3288F FALL RISK ASSESSMENT DOCD: CPT | Mod: CPTII,S$GLB,, | Performed by: NURSE PRACTITIONER

## 2022-04-11 PROCEDURE — 1159F PR MEDICATION LIST DOCUMENTED IN MEDICAL RECORD: ICD-10-PCS | Mod: CPTII,S$GLB,, | Performed by: NURSE PRACTITIONER

## 2022-04-11 RX ORDER — VALGANCICLOVIR 450 MG/1
900 TABLET, FILM COATED ORAL EVERY MORNING
Qty: 60 TABLET | Refills: 2 | Status: SHIPPED | OUTPATIENT
Start: 2022-04-11 | End: 2022-04-25

## 2022-04-11 NOTE — LETTER
April 11, 2022        Rajan Hayes  [address] [place] [address]  Phone: [phone]  Fax: [phone]             Darin Briscoe- Transplant [facility]  [address]  [facility] [address]  Phone: [phone]   Patient: Alverto Ramirez Jr.   MR Number: [id number]   YOB: 1950   Date of Visit: 4/11/2022       Dear Dr. Rajan Hayes    Thank you for referring Alverto Ramirez to me for evaluation. Attached you will find relevant portions of my assessment and plan of care.    If you have questions, please do not hesitate to call me. I look forward to following Alverto Ramirez along with you.    Sincerely,    Judi Robert, NP    Enclosure    If you would like to receive this communication electronically, please contact [email or web address] or [phone] to request [employer] access.    [employer] Link is a tool which provides read-only access to select patient information with whom you have a relationship. Its easy to use and provides real time access to review your patients record including encounter summaries, notes, results, and demographic information.    If you feel you have received this communication in error or would no longer like to receive these types of communications, please e-mail [email or web address]

## 2022-04-11 NOTE — PROGRESS NOTES
Kidney Post-Transplant Assessment    Referring Physician: Chris Adair  Current Nephrologist: Rajan Hayes    ORGAN: RIGHT KIDNEY  Donor Type: donation after brain death  PHS Increased Risk: yes  Cold Ischemia: 1,354 mins  Induction Medications: thymoglobulin    Subjective:     CC:  Reassessment of renal allograft function and management of chronic immunosuppression.    HPI:  Mr. Ramirez is a 71 y.o. 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 His most recent creatinine is 1.3. He takes mycophenolate mofetil, prednisone and tacrolimus for maintenance immunosuppression. His post transplant course has been complicated by urinary retention severe diarrhea and hypophosphatemia .    Hospitalization/ ED visits  None    Interval HX:  Reports overall doing well. His sleeping has improve. Incision c/d/i. Dressing to old PD sit with scant amount of drainage.     Intake 1.8-2.2L  UOP 2-2.5L  BP 130s-150s/80  Peripheral edema none  Weight: no scale but lost weight per patinet  Appetite good  Wound--staples  fx assessment doing well  Lab /diagnostic results reviewed with patient today.   All questions answered        Current Outpatient Medications:     ALPRAZolam (XANAX) 1 MG tablet, Take 1 tablet (1 mg total) by mouth 3 (three) times daily as needed for Anxiety., Disp: 42 tablet, Rfl: 0    cholecalciferol, vitamin D3, (VITAMIN D3) 50 mcg (2,000 unit) Tab, Take 1 tablet (2,000 Units total) by mouth once daily., Disp: 60 tablet, Rfl: 11    cinacalcet (SENSIPAR) 30 MG Tab, Take 1 tablet (30 mg total) by mouth daily with breakfast., Disp: 30 tablet, Rfl: 11    famotidine (PEPCID) 20 MG tablet, Take 1 tablet (20 mg total) by mouth every evening. STOP 4/20/22., Disp: 30 tablet, Rfl: 1    fluticasone propionate (FLONASE) 50 mcg/actuation nasal spray, 2 sprays (100 mcg total) by Each Nostril route as needed for Allergies., Disp: 16 g, Rfl: 3    k  phos di & mono-sod phos mono (K-PHOS-NEUTRAL) 250 mg Tab, Take 1 tablet by mouth 3 (three) times daily., Disp: 90 tablet, Rfl: 11    magnesium oxide (MAG-OX) 400 mg (241.3 mg magnesium) tablet, Take 1 tablet (400 mg total) by mouth 2 (two) times daily., Disp: 60 tablet, Rfl: 2    metoprolol tartrate (LOPRESSOR) 50 MG tablet, Take 1 tablet (50 mg total) by mouth 2 (two) times daily. HOLD FOR SBP <120 or HR <60, Disp: 60 tablet, Rfl: 11    mycophenolate (CELLCEPT) 250 mg Cap, Take 4 capsules (1,000 mg total) by mouth 2 (two) times daily., Disp: 240 capsule, Rfl: 11    NIFEdipine (PROCARDIA-XL) 60 MG (OSM) 24 hr tablet, Take 1 tablet (60 mg total) by mouth every 12 (twelve) hours., Disp: 60 tablet, Rfl: 11    nystatin (MYCOSTATIN) 100,000 unit/mL suspension, Take 5 mLs (500,000 Units total) by mouth 3 (three) times daily after meals., Disp: 450 mL, Rfl: 0    oxybutynin (DITROPAN) 5 MG Tab, Take 1 tablet (5 mg total) by mouth 3 (three) times daily as needed (bladder spasms)., Disp: 10 tablet, Rfl: 0    predniSONE (DELTASONE) 5 MG tablet, Take by mouth daily: 20mg 3/22/22 -4/21, 15mg 4/22-5/22, 10mg 5/23-6/22, 5mg 6/23- thereafter (Patient taking differently: Take by mouth daily: 20mg 3/22/22 -4/21, 15mg 4/22-5/22, 10mg 5/23-6/22, 5mg 6/23- thereafter), Disp: 120 tablet, Rfl: 5    sildenafiL (VIAGRA) 50 MG tablet, Take 1 tablet (50 mg total) by mouth daily as needed for Erectile Dysfunction., Disp: 30 tablet, Rfl: 11    sodium bicarbonate 650 MG tablet, Take 2 tablets (1,300 mg total) by mouth 2 (two) times daily., Disp: 120 tablet, Rfl: 11    sulfamethoxazole-trimethoprim 400-80mg (BACTRIM,SEPTRA) 400-80 mg per tablet, Take 1 tablet by mouth once daily. Stop 9/16/22, Disp: 30 tablet, Rfl: 5    tacrolimus (PROGRAF) 1 MG Cap, Take 6 capsules (6 mg total) by mouth every morning AND 5 capsules (5 mg total) every evening., Disp: 420 capsule, Rfl: 11    tamsulosin (FLOMAX) 0.4 mg Cap, Take 1 capsule (0.4 mg total)  "by mouth every evening., Disp: 30 capsule, Rfl: 11    oxyCODONE (ROXICODONE) 5 MG immediate release tablet, Take 1 tablet (5 mg total) by mouth every 6 (six) hours as needed for Pain., Disp: 28 tablet, Rfl: 0    valGANciclovir (VALCYTE) 450 mg Tab, Take 2 tablets (900 mg total) by mouth every morning. Stop 6/16/22, Disp: 60 tablet, Rfl: 2    Current Facility-Administered Medications:     penicillin G benzathine (BICILLIN LA) injection 2.4 Million Units, 2.4 Million Units, Intramuscular, Weekly, Sara Flanagan MD, 2.4 Million Units at 04/04/22 1133      Past Medical History:   Diagnosis Date    Anemia in CKD (chronic kidney disease)     Atrial fibrillation     CKD (chronic kidney disease) stage 4, GFR 15 11/26/2014    Colon cancer screening 12/19/2014    Elevated PSA 11/26/2014    HTN (hypertension) diagnosed in his 40s 10/16/2014    Monoclonal gammopathy 11/26/2014    Phobic anxiety disorder 11/26/2014    Proteinuria 11/26/2014    Stage 3a chronic kidney disease 3/25/2022         Review of Systems   Constitutional: Negative for appetite change, chills, fatigue and fever.   HENT: Negative for trouble swallowing.    Respiratory: Negative for cough, chest tightness, shortness of breath and wheezing.    Cardiovascular: Negative for chest pain, palpitations and leg swelling.   Gastrointestinal: Negative for abdominal pain, constipation, diarrhea and nausea.   Genitourinary: Negative for difficulty urinating, frequency and urgency.   Musculoskeletal: Negative for arthralgias and myalgias.   Skin: Negative for rash.   Allergic/Immunologic: Positive for immunocompromised state.   Neurological: Negative for dizziness, weakness, light-headedness and headaches.   Psychiatric/Behavioral: Negative for sleep disturbance.        Objective:   BP (!) 143/74 (BP Location: Right arm, Patient Position: Sitting, BP Method: Large (Automatic))   Pulse 83   Temp 97.3 °F (36.3 °C) (Temporal)   Resp 16   Ht 5' 7" (1.702 m)  "  Wt 85.9 kg (189 lb 6 oz)   SpO2 98%   BMI 29.66 kg/m²       Physical Exam  Constitutional:       General: He is not in acute distress.     Appearance: He is well-developed. He is not diaphoretic.   Cardiovascular:      Rate and Rhythm: Normal rate and regular rhythm.      Heart sounds: Normal heart sounds. No murmur heard.    No friction rub. No gallop.   Pulmonary:      Effort: Pulmonary effort is normal. No respiratory distress.      Breath sounds: Normal breath sounds. No wheezing or rales.   Abdominal:      General: Bowel sounds are normal. There is no distension.      Palpations: Abdomen is soft.      Tenderness: There is no abdominal tenderness.   Musculoskeletal:         General: No tenderness. Normal range of motion.   Skin:     General: Skin is warm and dry.      Findings: No rash.      Nails: There is no clubbing.   Neurological:      Mental Status: He is alert and oriented to person, place, and time.   Psychiatric:         Behavior: Behavior normal.            Labs:  Lab Results   Component Value Date    WBC 7.19 04/11/2022    HGB 11.6 (L) 04/11/2022    HCT 36.0 (L) 04/11/2022     04/11/2022    K 4.2 04/11/2022     04/11/2022    CO2 20 (L) 04/11/2022    BUN 22 04/11/2022    CREATININE 1.3 04/11/2022    EGFRNONAA 54.1 (A) 04/11/2022    CALCIUM 8.8 04/11/2022    PHOS 2.6 (L) 04/11/2022    MG 1.5 (L) 04/11/2022    ALBUMIN 3.7 04/11/2022    AST 16 03/19/2022    ALT 16 03/19/2022    UTPCR 0.78 (H) 03/18/2022    .1 (H) 03/18/2022    TACROLIMUS 8.4 04/11/2022       No results found for: EXTANC, EXTWBC, EXTSEGS, EXTPLATELETS, EXTHEMOGLOBI, EXTHEMATOCRI, EXTCREATININ, EXTSODIUM, EXTPOTASSIUM, EXTBUN, EXTCO2, EXTCALCIUM, EXTPHOSPHORU, EXTGLUCOSE, EXTALBUMIN, EXTAST, EXTALT, EXTBILITOTAL, EXTLIPASE, EXTAMYLASE    No results found for: EXTCYCLOSLVL, EXTSIROLIMUS, EXTTACROLVL, EXTPROTCRE, EXTPTHINTACT, EXTPROTEINUA, EXTWBCUA, EXTRBCUA    Labs were reviewed with the patient    Assessment:     1.  S/P kidney transplant    2. Stage 3a chronic kidney disease    3. Primary hypertension    4. Hyperlipidemia, unspecified hyperlipidemia type    5. Long-term use of immunosuppressant medication        Plan:     Continue current IS therapy regimen  improved Cr doing well.  Increased valcyte to 900mg dialy      1. CKD stage: will continue follow up as per our center guidelines. patient to continue close follow up with the local General nephrologist. Education provided in appropriate fluid intake, potassium intake. Continue with oral hydration.      2. Immunosuppression: Prograf trough 8.4, therapeutic target 8-10. Continue Prograf decreased 6/6, MMF 1000 Mg BID, and Prednisone   Lab Results   Component Value Date    TACROLIMUS 8.4 04/11/2022    TACROLIMUS 8.4 04/07/2022    TACROLIMUS 9.9 04/06/2022   Will closely monitor for toxicities, education provided about adherence to medicines and need to communicate any side effect to the transplant nurse or physician.      3. Allograft Function:stable at baseline for the patient. Continue follow up as per our guidelines and with the local General nephrologist. Communication will be sent today.     4/4/2022  POD 16   BUN 8 - 23 mg/dL 20   Creatinine 0.5 - 1.4 mg/dL 1.7 (A)   eGFR if African American >60 mL/min/1.73 m^2 45.2 (A)   Glucose 70 - 110 mg/dL 97       4. Hypertension management:  Continue with home blood pressure monitoring, low salt and healthy life discussed with the patient.  BP Readings from Last 3 Encounters:   04/11/22 (!) 143/74   04/07/22 (!) 149/70   04/04/22 (!) 154/73   MTP, nifedipine    5. Metabolic Bone Disease/Secondary Hyperparathyroidism:calcium and phosphorus level discussed with the patient, patient will continue follow up with the general nephrologist for management of metabolic bone disease calcium and phosphorus as per our center protocol. Will monitor PTH, Vit D level, calcium.   Lab Results   Component Value Date    .1 (H) 03/18/2022     CALCIUM 8.8 04/11/2022    PHOS 2.6 (L) 04/11/2022    PHOS 2.4 (L) 04/07/2022    PHOS 2.1 (L) 04/06/2022   senshawn, nazario, magox    6. Electrolytes: reviewed with the patient, essentially within the normal range no need for acute changes today, will monitor as per our center guidelines.  Lab Results   Component Value Date     04/11/2022    K 4.2 04/11/2022     04/11/2022    CO2 20 (L) 04/11/2022    CO2 20 (L) 04/07/2022    CO2 19 (L) 04/06/2022   nabicarb     7. Anemia: will continue monitoring as per our center guidelines. No indication for acute intervention today.  Lab Results   Component Value Date    WBC 7.19 04/11/2022    HGB 11.6 (L) 04/11/2022    HCT 36.0 (L) 04/11/2022    MCV 92 04/11/2022     04/11/2022       8.Proteinuria: will continue with pr/cr ratio as per our center guidelines  Lab Results   Component Value Date    PROTEINURINE 102 (H) 03/18/2022    CREATRANDUR 130.0 03/18/2022    UTPCR 0.78 (H) 03/18/2022    UTPCR 5.38 (H) 02/01/2017    UTPCR 5.23 (H) 01/03/2017        9. BK virus infection screening: will continue with urine or blood PCR as per our guidelines to prevent BK virus viremia and allograft dysfunction  No results found for: BKVIRUSDNAUR, BKQUANTURINE, BKVIRUSLOG, BKVIRUSURINE, BKVIRUSPCRQB      10. Weight education: provided during the clinic visit.  Body mass index is 29.66 kg/m².     11.Patient safety education regarding immunosuppression including prophylaxis posttransplant for CMV, PCP : Education provided about vaccination and prevention of infections.    12.  Cytopenias: no significant cytopenias will monitor as per our guidelines. Medicine list reviewed including potential causes of drug-induced cytopenias  Lab Results   Component Value Date    WBC 7.19 04/11/2022    HGB 11.6 (L) 04/11/2022    HCT 36.0 (L) 04/11/2022    MCV 92 04/11/2022     04/11/2022       13. Post-transplant Prophylaxis; CMV Infection, PJP and Candida mucosistis and other indicated for  this particular patient.   PCP PROPHYLAXIS: Bactrim until 9/16/22  CMV PROPHYLAXIS: Valcyte until 6/16/22  FUNGAL PROPHYLAXIS: Nystatin until 4/22/22      Exercise: reminded Telly of the importance of regular exercise for weight management, blood sugar and blood pressure management.  I also explained exercise has been shown to improve cardiovascular health, energy level, and sleep hygiene.  Lastly, I advised him that cardiovascular complications are leading cause of death for renal transplant recipients, and regular exercise can help lower this risk.       Follow-up:   Clinic: return to transplant clinic weekly for the first month after transplant; every 2 weeks during months 2-3; then at 6-, 9-, 12-, 18-, 24-, and 36- months post-transplant to reassess for complications from immunosuppression toxicity and monitor for rejection.  Annually thereafter.    Labs: since patient remains at high risk for rejection and drug-related complications that warrant close monitoring, labs will be ordered as follows: continue twice weekly CBC, renal panel, and drug level for first month; then same labs once weekly through 3rd month post-transplant.  Urine for UA and protein/creatinine ratio monthly.  Serum BK - PCR at 1-, 3-, 6-, 9-, 12-, 18-, 24-, 36-, 48-, and 60 months post-transplant.  Hepatic panel at 1-, 2-, 3-, 6-, 9-, 12-, 18-, 24-, and 36- months post-transplant.    Judi Robert NP       Education:   Material provided to the patient.  Patient reminded to call with any health changes since these can be early signs of significant complications.  Also, I advised the patient to be sure any new medications or changes of old medications are discussed with either a pharmacist or physician knowledgeable with transplant to avoid rejection/drug toxicity related to significant drug interactions.    Patient advised that it is recommended that all transplanted patients, and their close contacts and household members receive Covid  vaccination.

## 2022-04-11 NOTE — TELEPHONE ENCOUNTER
His pharmacy notified me that he was recently prescribed Procardia XL/nifedipine by Kidney Txp team. He was on Norvasc/Amlodipine prior to this. I just want to make sure that he should not take both, as they are in the same class of medications and can increase side effects if he takes BOTH. He should stop Norvasc and take newer medication, Procardia XL>    Thank you

## 2022-04-13 ENCOUNTER — OFFICE VISIT (OUTPATIENT)
Dept: TRANSPLANT | Facility: CLINIC | Age: 72
End: 2022-04-13
Payer: MEDICARE

## 2022-04-13 VITALS
RESPIRATION RATE: 16 BRPM | WEIGHT: 191.38 LBS | HEART RATE: 76 BPM | DIASTOLIC BLOOD PRESSURE: 72 MMHG | TEMPERATURE: 98 F | HEIGHT: 67 IN | OXYGEN SATURATION: 99 % | SYSTOLIC BLOOD PRESSURE: 135 MMHG | BODY MASS INDEX: 30.04 KG/M2

## 2022-04-13 DIAGNOSIS — Z94.0 KIDNEY REPLACED BY TRANSPLANT: ICD-10-CM

## 2022-04-13 DIAGNOSIS — Z51.81 ENCOUNTER FOR THERAPEUTIC DRUG MONITORING: Primary | ICD-10-CM

## 2022-04-13 PROCEDURE — 3008F BODY MASS INDEX DOCD: CPT | Mod: CPTII,S$GLB,, | Performed by: SURGERY

## 2022-04-13 PROCEDURE — 1160F PR REVIEW ALL MEDS BY PRESCRIBER/CLIN PHARMACIST DOCUMENTED: ICD-10-PCS | Mod: CPTII,S$GLB,, | Performed by: SURGERY

## 2022-04-13 PROCEDURE — 99999 PR PBB SHADOW E&M-EST. PATIENT-LVL V: ICD-10-PCS | Mod: PBBFAC,,, | Performed by: SURGERY

## 2022-04-13 PROCEDURE — 3066F PR DOCUMENTATION OF TREATMENT FOR NEPHROPATHY: ICD-10-PCS | Mod: CPTII,S$GLB,, | Performed by: SURGERY

## 2022-04-13 PROCEDURE — 99213 PR OFFICE/OUTPT VISIT, EST, LEVL III, 20-29 MIN: ICD-10-PCS | Mod: 24,S$GLB,, | Performed by: SURGERY

## 2022-04-13 PROCEDURE — 3008F PR BODY MASS INDEX (BMI) DOCUMENTED: ICD-10-PCS | Mod: CPTII,S$GLB,, | Performed by: SURGERY

## 2022-04-13 PROCEDURE — 1159F MED LIST DOCD IN RCRD: CPT | Mod: CPTII,S$GLB,, | Performed by: SURGERY

## 2022-04-13 PROCEDURE — 3078F DIAST BP <80 MM HG: CPT | Mod: CPTII,S$GLB,, | Performed by: SURGERY

## 2022-04-13 PROCEDURE — 1160F RVW MEDS BY RX/DR IN RCRD: CPT | Mod: CPTII,S$GLB,, | Performed by: SURGERY

## 2022-04-13 PROCEDURE — 3288F FALL RISK ASSESSMENT DOCD: CPT | Mod: CPTII,S$GLB,, | Performed by: SURGERY

## 2022-04-13 PROCEDURE — 1159F PR MEDICATION LIST DOCUMENTED IN MEDICAL RECORD: ICD-10-PCS | Mod: CPTII,S$GLB,, | Performed by: SURGERY

## 2022-04-13 PROCEDURE — 3066F NEPHROPATHY DOC TX: CPT | Mod: CPTII,S$GLB,, | Performed by: SURGERY

## 2022-04-13 PROCEDURE — 1111F PR DISCHARGE MEDS RECONCILED W/ CURRENT OUTPATIENT MED LIST: ICD-10-PCS | Mod: CPTII,S$GLB,, | Performed by: SURGERY

## 2022-04-13 PROCEDURE — 1101F PR PT FALLS ASSESS DOC 0-1 FALLS W/OUT INJ PAST YR: ICD-10-PCS | Mod: CPTII,S$GLB,, | Performed by: SURGERY

## 2022-04-13 PROCEDURE — 3078F PR MOST RECENT DIASTOLIC BLOOD PRESSURE < 80 MM HG: ICD-10-PCS | Mod: CPTII,S$GLB,, | Performed by: SURGERY

## 2022-04-13 PROCEDURE — 1101F PT FALLS ASSESS-DOCD LE1/YR: CPT | Mod: CPTII,S$GLB,, | Performed by: SURGERY

## 2022-04-13 PROCEDURE — 99213 OFFICE O/P EST LOW 20 MIN: CPT | Mod: 24,S$GLB,, | Performed by: SURGERY

## 2022-04-13 PROCEDURE — 3288F PR FALLS RISK ASSESSMENT DOCUMENTED: ICD-10-PCS | Mod: CPTII,S$GLB,, | Performed by: SURGERY

## 2022-04-13 PROCEDURE — 1126F PR PAIN SEVERITY QUANTIFIED, NO PAIN PRESENT: ICD-10-PCS | Mod: CPTII,S$GLB,, | Performed by: SURGERY

## 2022-04-13 PROCEDURE — 1126F AMNT PAIN NOTED NONE PRSNT: CPT | Mod: CPTII,S$GLB,, | Performed by: SURGERY

## 2022-04-13 PROCEDURE — 1111F DSCHRG MED/CURRENT MED MERGE: CPT | Mod: CPTII,S$GLB,, | Performed by: SURGERY

## 2022-04-13 PROCEDURE — 99999 PR PBB SHADOW E&M-EST. PATIENT-LVL V: CPT | Mod: PBBFAC,,, | Performed by: SURGERY

## 2022-04-13 PROCEDURE — 3075F PR MOST RECENT SYSTOLIC BLOOD PRESS GE 130-139MM HG: ICD-10-PCS | Mod: CPTII,S$GLB,, | Performed by: SURGERY

## 2022-04-13 PROCEDURE — 3075F SYST BP GE 130 - 139MM HG: CPT | Mod: CPTII,S$GLB,, | Performed by: SURGERY

## 2022-04-13 NOTE — LETTER
April 13, 2022        Rajan Hayes  [address] [place] [address]  Phone: [phone]  Fax: [phone]             Darin Briscoe- Transplant [facility]  [address]  [facility] [address]  Phone: [phone]   Patient: Alverto Ramirez Jr.   MR Number: [id number]   YOB: 1950   Date of Visit: 4/13/2022       Dear Dr. Rajan Hayes    Thank you for referring Alverto Ramirez to me for evaluation. Attached you will find relevant portions of my assessment and plan of care.    If you have questions, please do not hesitate to call me. I look forward to following Alverto Ramirez along with you.    Sincerely,    Jamil Chang MD    Enclosure    If you would like to receive this communication electronically, please contact [email or web address] or [phone] to request [employer] Link access.    [employer] Link is a tool which provides read-only access to select patient information with whom you have a relationship. Its easy to use and provides real time access to review your patients record including encounter summaries, notes, results, and demographic information.    If you feel you have received this communication in error or would no longer like to receive these types of communications, please e-mail [email or web address]

## 2022-04-13 NOTE — PROGRESS NOTES
Transplant Surgery  Kidney Transplant Recipient Follow-up    Referring Physician: Chris Adair  Current Nephrologist: Rajan Hayes    Subjective:     Chief Complaint: Alverto Ramirez is a 71 y.o. year old Black or  male who is status post Kidney transplant performed on 3/19/2022.    ORGAN:  RIGHT KIDNEY  Disease Etiology: Hypertensive Nephrosclerosis  Donor Type:  Donation after Brain Death  Donor CMV Status:  Positive  Donor HBcAB:  Negative  Donor HCV Status:  Negative    History of Present Illness: He reports no concerns.  From a transplant perspective, he reports normal urination.  Alverto is here for management of his immunosuppression medication.  Alverto states that his immunosuppression is being well tolerated.  Hypertension is not present.    External provider notes reviewed: No    Review of Systems   Constitutional: Negative for fatigue.   HENT: Negative for drooling, postnasal drip and sore throat.    Eyes: Negative for discharge and itching.   Respiratory: Negative for choking and stridor.    Gastrointestinal: Negative for rectal pain.   Endocrine: Negative for polydipsia.   Genitourinary: Negative for enuresis and genital sores.   Musculoskeletal: Negative for back pain, neck pain and neck stiffness.   Allergic/Immunologic: Positive for immunocompromised state.   Neurological: Negative for facial asymmetry and numbness.   Hematological: Negative for adenopathy.   Psychiatric/Behavioral: Negative for behavioral problems, self-injury and suicidal ideas.       Objective:     Physical Exam  Abdominal:          Comments: Healing well       Lab Results   Component Value Date    CREATININE 1.3 04/11/2022    BUN 22 04/11/2022     Lab Results   Component Value Date    WBC 7.19 04/11/2022    HGB 11.6 (L) 04/11/2022    HCT 36.0 (L) 04/11/2022    HCT 27 (L) 03/19/2022     04/11/2022     Lab Results   Component Value Date    TACROLIMUS 8.4 04/11/2022       Diagnostics:  The  following labs have been reviewed: CBC  CMP      Assessment and Plan:        · S/P Kidney transplant.  · Chronic immunosuppressive medications for rejection prophylaxis at target.  Plan: no adjustment needed.  · Continue monitoring symptoms, labs and drug levels for drug-related toxicity and side effects.  · Renal hypertension at target.    Additional testing to be completed according to the Kidney: Written Order Guideline for Kidney Transplant Follow-Up (KI-09)    Interpretation of tests and discussion of patient management involves all members of the multidisciplinary transplant team.  Patient advised that it is recommended that all transplant candidates, and their close contacts and household members receive Covid vaccination.  Follow-up: Patient reminded to call with any health changes, since these can be early signs of significant complications.  Also, I advised the patient to be sure any new medications or changes of old medications are discussed with either a pharmacist, or physician knowledgeable with transplant to avoid rejection/drug toxicity related to significant drug interactions.    MD MOHAN Jarrell Patient Status  Functional Status: 80% - Normal activity with effort: some symptoms of disease  Physical Capacity: Limited Mobility

## 2022-04-14 ENCOUNTER — LAB VISIT (OUTPATIENT)
Dept: LAB | Facility: HOSPITAL | Age: 72
End: 2022-04-14
Attending: INTERNAL MEDICINE
Payer: MEDICARE

## 2022-04-14 DIAGNOSIS — Z94.0 KIDNEY REPLACED BY TRANSPLANT: ICD-10-CM

## 2022-04-14 LAB
ALBUMIN SERPL BCP-MCNC: 3.8 G/DL (ref 3.5–5.2)
ANION GAP SERPL CALC-SCNC: 11 MMOL/L (ref 8–16)
BASOPHILS # BLD AUTO: 0.03 K/UL (ref 0–0.2)
BASOPHILS NFR BLD: 0.4 % (ref 0–1.9)
BILIRUB UR QL STRIP: NEGATIVE
BUN SERPL-MCNC: 22 MG/DL (ref 8–23)
CALCIUM SERPL-MCNC: 8.8 MG/DL (ref 8.7–10.5)
CHLORIDE SERPL-SCNC: 108 MMOL/L (ref 95–110)
CLARITY UR: CLEAR
CO2 SERPL-SCNC: 19 MMOL/L (ref 23–29)
COLOR UR: YELLOW
CREAT SERPL-MCNC: 1.3 MG/DL (ref 0.5–1.4)
CREAT UR-MCNC: 67 MG/DL (ref 23–375)
DIFFERENTIAL METHOD: ABNORMAL
EOSINOPHIL # BLD AUTO: 0.1 K/UL (ref 0–0.5)
EOSINOPHIL NFR BLD: 1.6 % (ref 0–8)
ERYTHROCYTE [DISTWIDTH] IN BLOOD BY AUTOMATED COUNT: 17 % (ref 11.5–14.5)
EST. GFR  (AFRICAN AMERICAN): >60 ML/MIN/1.73 M^2
EST. GFR  (NON AFRICAN AMERICAN): 54.1 ML/MIN/1.73 M^2
GLUCOSE SERPL-MCNC: 95 MG/DL (ref 70–110)
GLUCOSE UR QL STRIP: NEGATIVE
HCT VFR BLD AUTO: 37.2 % (ref 40–54)
HGB BLD-MCNC: 11.7 G/DL (ref 14–18)
HGB UR QL STRIP: NEGATIVE
IMM GRANULOCYTES # BLD AUTO: 0.16 K/UL (ref 0–0.04)
IMM GRANULOCYTES NFR BLD AUTO: 1.9 % (ref 0–0.5)
KETONES UR QL STRIP: NEGATIVE
LEUKOCYTE ESTERASE UR QL STRIP: NEGATIVE
LYMPHOCYTES # BLD AUTO: 0.6 K/UL (ref 1–4.8)
LYMPHOCYTES NFR BLD: 6.8 % (ref 18–48)
MAGNESIUM SERPL-MCNC: 1.6 MG/DL (ref 1.6–2.6)
MCH RBC QN AUTO: 29.8 PG (ref 27–31)
MCHC RBC AUTO-ENTMCNC: 31.5 G/DL (ref 32–36)
MCV RBC AUTO: 95 FL (ref 82–98)
MONOCYTES # BLD AUTO: 0.7 K/UL (ref 0.3–1)
MONOCYTES NFR BLD: 8.5 % (ref 4–15)
NEUTROPHILS # BLD AUTO: 6.7 K/UL (ref 1.8–7.7)
NEUTROPHILS NFR BLD: 80.8 % (ref 38–73)
NITRITE UR QL STRIP: NEGATIVE
NRBC BLD-RTO: 0 /100 WBC
PH UR STRIP: 7 [PH] (ref 5–8)
PHOSPHATE SERPL-MCNC: 2.3 MG/DL (ref 2.7–4.5)
PLATELET # BLD AUTO: 183 K/UL (ref 150–450)
PMV BLD AUTO: 10.7 FL (ref 9.2–12.9)
POTASSIUM SERPL-SCNC: 4.2 MMOL/L (ref 3.5–5.1)
PROT UR QL STRIP: NEGATIVE
PROT UR-MCNC: <7 MG/DL (ref 0–15)
PROT/CREAT UR: NORMAL MG/G{CREAT} (ref 0–0.2)
RBC # BLD AUTO: 3.93 M/UL (ref 4.6–6.2)
SODIUM SERPL-SCNC: 138 MMOL/L (ref 136–145)
SP GR UR STRIP: 1.01 (ref 1–1.03)
URN SPEC COLLECT METH UR: NORMAL
WBC # BLD AUTO: 8.24 K/UL (ref 3.9–12.7)

## 2022-04-14 PROCEDURE — 36415 COLL VENOUS BLD VENIPUNCTURE: CPT | Performed by: INTERNAL MEDICINE

## 2022-04-14 PROCEDURE — 80069 RENAL FUNCTION PANEL: CPT | Performed by: INTERNAL MEDICINE

## 2022-04-14 PROCEDURE — 81003 URINALYSIS AUTO W/O SCOPE: CPT | Performed by: INTERNAL MEDICINE

## 2022-04-14 PROCEDURE — 85025 COMPLETE CBC W/AUTO DIFF WBC: CPT | Performed by: INTERNAL MEDICINE

## 2022-04-14 PROCEDURE — 83735 ASSAY OF MAGNESIUM: CPT | Performed by: INTERNAL MEDICINE

## 2022-04-14 PROCEDURE — 80197 ASSAY OF TACROLIMUS: CPT | Performed by: INTERNAL MEDICINE

## 2022-04-14 PROCEDURE — 82570 ASSAY OF URINE CREATININE: CPT | Performed by: INTERNAL MEDICINE

## 2022-04-15 ENCOUNTER — NURSE TRIAGE (OUTPATIENT)
Dept: ADMINISTRATIVE | Facility: CLINIC | Age: 72
End: 2022-04-15
Payer: MEDICARE

## 2022-04-15 LAB — TACROLIMUS BLD-MCNC: 8.3 NG/ML (ref 5–15)

## 2022-04-15 NOTE — TELEPHONE ENCOUNTER
Patient calling regarding rx for Phosphorus. Reports his dose was changed and he is out of medication. [facility] closed today. Per protocol, transferred rx to [facility]. Verbalized understanding. Knows to call back with new or worsening symptoms.

## 2022-04-15 NOTE — TELEPHONE ENCOUNTER
Reason for Disposition   Patient has refills remaining on their prescription    Protocols used: MEDICATION QUESTION CALL-A-AH

## 2022-04-18 ENCOUNTER — TELEPHONE (OUTPATIENT)
Dept: TRANSPLANT | Facility: CLINIC | Age: 72
End: 2022-04-18
Payer: MEDICARE

## 2022-04-18 NOTE — TELEPHONE ENCOUNTER
Spoke with pt regarding labs scheduled for 4/19/22. Pt also requesting to come earlier for his clinic appt on Wed. But is ok if not. Will follow up with pt tomorrow.

## 2022-04-19 ENCOUNTER — PATIENT MESSAGE (OUTPATIENT)
Dept: TRANSPLANT | Facility: CLINIC | Age: 72
End: 2022-04-19
Payer: MEDICARE

## 2022-04-19 ENCOUNTER — TELEPHONE (OUTPATIENT)
Dept: TRANSPLANT | Facility: CLINIC | Age: 72
End: 2022-04-19
Payer: MEDICARE

## 2022-04-19 ENCOUNTER — LAB VISIT (OUTPATIENT)
Dept: LAB | Facility: HOSPITAL | Age: 72
End: 2022-04-19
Attending: INTERNAL MEDICINE
Payer: MEDICARE

## 2022-04-19 DIAGNOSIS — Z94.0 KIDNEY REPLACED BY TRANSPLANT: ICD-10-CM

## 2022-04-19 DIAGNOSIS — Z57.8 OCCUPATIONAL EXPOSURE TO OTHER RISK FACTORS: ICD-10-CM

## 2022-04-19 LAB
ALBUMIN SERPL BCP-MCNC: 4 G/DL (ref 3.5–5.2)
ALBUMIN SERPL BCP-MCNC: 4 G/DL (ref 3.5–5.2)
ALP SERPL-CCNC: 124 U/L (ref 55–135)
ALT SERPL W/O P-5'-P-CCNC: 46 U/L (ref 10–44)
ANION GAP SERPL CALC-SCNC: 11 MMOL/L (ref 8–16)
AST SERPL-CCNC: 17 U/L (ref 10–40)
BASOPHILS # BLD AUTO: 0.01 K/UL (ref 0–0.2)
BASOPHILS NFR BLD: 0.1 % (ref 0–1.9)
BILIRUB DIRECT SERPL-MCNC: 0.1 MG/DL (ref 0.1–0.3)
BILIRUB SERPL-MCNC: 0.3 MG/DL (ref 0.1–1)
BUN SERPL-MCNC: 28 MG/DL (ref 8–23)
CALCIUM SERPL-MCNC: 8.8 MG/DL (ref 8.7–10.5)
CHLORIDE SERPL-SCNC: 106 MMOL/L (ref 95–110)
CO2 SERPL-SCNC: 19 MMOL/L (ref 23–29)
CREAT SERPL-MCNC: 1.4 MG/DL (ref 0.5–1.4)
DIFFERENTIAL METHOD: ABNORMAL
EOSINOPHIL # BLD AUTO: 0.1 K/UL (ref 0–0.5)
EOSINOPHIL NFR BLD: 1.1 % (ref 0–8)
ERYTHROCYTE [DISTWIDTH] IN BLOOD BY AUTOMATED COUNT: 17 % (ref 11.5–14.5)
EST. GFR  (AFRICAN AMERICAN): 57.2 ML/MIN/1.73 M^2
EST. GFR  (NON AFRICAN AMERICAN): 49.5 ML/MIN/1.73 M^2
GLUCOSE SERPL-MCNC: 96 MG/DL (ref 70–110)
HCT VFR BLD AUTO: 38.8 % (ref 40–54)
HGB BLD-MCNC: 12.5 G/DL (ref 14–18)
IMM GRANULOCYTES # BLD AUTO: 0.16 K/UL (ref 0–0.04)
IMM GRANULOCYTES NFR BLD AUTO: 2 % (ref 0–0.5)
LYMPHOCYTES # BLD AUTO: 0.6 K/UL (ref 1–4.8)
LYMPHOCYTES NFR BLD: 7.8 % (ref 18–48)
MAGNESIUM SERPL-MCNC: 1.9 MG/DL (ref 1.6–2.6)
MCH RBC QN AUTO: 29.5 PG (ref 27–31)
MCHC RBC AUTO-ENTMCNC: 32.2 G/DL (ref 32–36)
MCV RBC AUTO: 92 FL (ref 82–98)
MONOCYTES # BLD AUTO: 0.5 K/UL (ref 0.3–1)
MONOCYTES NFR BLD: 6.2 % (ref 4–15)
NEUTROPHILS # BLD AUTO: 6.6 K/UL (ref 1.8–7.7)
NEUTROPHILS NFR BLD: 82.8 % (ref 38–73)
NRBC BLD-RTO: 0 /100 WBC
PHOSPHATE SERPL-MCNC: 2.5 MG/DL (ref 2.7–4.5)
PLATELET # BLD AUTO: 177 K/UL (ref 150–450)
PMV BLD AUTO: 10.6 FL (ref 9.2–12.9)
POTASSIUM SERPL-SCNC: 4 MMOL/L (ref 3.5–5.1)
PROT SERPL-MCNC: 6.9 G/DL (ref 6–8.4)
RBC # BLD AUTO: 4.24 M/UL (ref 4.6–6.2)
SODIUM SERPL-SCNC: 136 MMOL/L (ref 136–145)
WBC # BLD AUTO: 8.03 K/UL (ref 3.9–12.7)

## 2022-04-19 PROCEDURE — 80197 ASSAY OF TACROLIMUS: CPT | Performed by: INTERNAL MEDICINE

## 2022-04-19 PROCEDURE — 87522 HEPATITIS C REVRS TRNSCRPJ: CPT | Performed by: INTERNAL MEDICINE

## 2022-04-19 PROCEDURE — 84075 ASSAY ALKALINE PHOSPHATASE: CPT | Performed by: INTERNAL MEDICINE

## 2022-04-19 PROCEDURE — 83735 ASSAY OF MAGNESIUM: CPT | Performed by: INTERNAL MEDICINE

## 2022-04-19 PROCEDURE — 80069 RENAL FUNCTION PANEL: CPT | Performed by: INTERNAL MEDICINE

## 2022-04-19 PROCEDURE — 87517 HEPATITIS B DNA QUANT: CPT | Performed by: INTERNAL MEDICINE

## 2022-04-19 PROCEDURE — 87799 DETECT AGENT NOS DNA QUANT: CPT | Performed by: INTERNAL MEDICINE

## 2022-04-19 PROCEDURE — 87536 HIV-1 QUANT&REVRSE TRNSCRPJ: CPT | Performed by: INTERNAL MEDICINE

## 2022-04-19 PROCEDURE — 85025 COMPLETE CBC W/AUTO DIFF WBC: CPT | Performed by: INTERNAL MEDICINE

## 2022-04-19 PROCEDURE — 36415 COLL VENOUS BLD VENIPUNCTURE: CPT | Performed by: INTERNAL MEDICINE

## 2022-04-19 SDOH — SOCIAL DETERMINANTS OF HEALTH (SDOH): OCCUPATIONAL EXPOSURE TO OTHER RISK FACTORS: Z57.8

## 2022-04-20 ENCOUNTER — TELEPHONE (OUTPATIENT)
Dept: TRANSPLANT | Facility: CLINIC | Age: 72
End: 2022-04-20

## 2022-04-20 ENCOUNTER — OFFICE VISIT (OUTPATIENT)
Dept: TRANSPLANT | Facility: CLINIC | Age: 72
End: 2022-04-20
Payer: MEDICARE

## 2022-04-20 DIAGNOSIS — N18.9 ANEMIA OF RENAL DISEASE: ICD-10-CM

## 2022-04-20 DIAGNOSIS — I10 PRIMARY HYPERTENSION: ICD-10-CM

## 2022-04-20 DIAGNOSIS — E78.5 HYPERLIPIDEMIA, UNSPECIFIED HYPERLIPIDEMIA TYPE: ICD-10-CM

## 2022-04-20 DIAGNOSIS — D63.1 ANEMIA OF RENAL DISEASE: ICD-10-CM

## 2022-04-20 DIAGNOSIS — Z79.60 LONG-TERM USE OF IMMUNOSUPPRESSANT MEDICATION: ICD-10-CM

## 2022-04-20 DIAGNOSIS — Z94.0 S/P KIDNEY TRANSPLANT: Primary | ICD-10-CM

## 2022-04-20 DIAGNOSIS — N18.31 STAGE 3A CHRONIC KIDNEY DISEASE: ICD-10-CM

## 2022-04-20 LAB — TACROLIMUS BLD-MCNC: 12.4 NG/ML (ref 5–15)

## 2022-04-20 PROCEDURE — 1111F PR DISCHARGE MEDS RECONCILED W/ CURRENT OUTPATIENT MED LIST: ICD-10-PCS | Mod: CPTII,95,, | Performed by: NURSE PRACTITIONER

## 2022-04-20 PROCEDURE — 3066F NEPHROPATHY DOC TX: CPT | Mod: CPTII,95,, | Performed by: NURSE PRACTITIONER

## 2022-04-20 PROCEDURE — 1111F DSCHRG MED/CURRENT MED MERGE: CPT | Mod: CPTII,95,, | Performed by: NURSE PRACTITIONER

## 2022-04-20 PROCEDURE — 3066F PR DOCUMENTATION OF TREATMENT FOR NEPHROPATHY: ICD-10-PCS | Mod: CPTII,95,, | Performed by: NURSE PRACTITIONER

## 2022-04-20 PROCEDURE — 99214 PR OFFICE/OUTPT VISIT, EST, LEVL IV, 30-39 MIN: ICD-10-PCS | Mod: 95,,, | Performed by: NURSE PRACTITIONER

## 2022-04-20 PROCEDURE — 99214 OFFICE O/P EST MOD 30 MIN: CPT | Mod: 95,,, | Performed by: NURSE PRACTITIONER

## 2022-04-20 NOTE — LETTER
April 20, 2022        Rajan Hayes  [address] [place] [address]  Phone: [phone]  Fax: [phone]             Darin Briscoe- Transplant 1st Fl  [address]  [facility] [address]  Phone: [phone]   Patient: Alverto Ramirez Jr.   MR Number: [id number]   YOB: 1950   Date of Visit: 4/20/2022       Dear Dr. Rajan Hayes    Thank you for referring Alverto Ramirez to me for evaluation. Attached you will find relevant portions of my assessment and plan of care.    If you have questions, please do not hesitate to call me. I look forward to following Alverto Ramirez along with you.    Sincerely,    Judi Robert, NP    Enclosure    If you would like to receive this communication electronically, please contact [email or web address] or [phone] to request [employer]ista Link access.    [employer] Link is a tool which provides read-only access to select patient information with whom you have a relationship. Its easy to use and provides real time access to review your patients record including encounter summaries, notes, results, and demographic information.    If you feel you have received this communication in error or would no longer like to receive these types of communications, please e-mail [email or web address]

## 2022-04-20 NOTE — TELEPHONE ENCOUNTER
Spoke with pt regarding below orders. Redraw scheduled for tomorrow 4/21/22.----- Message from Dlicia Parker MD sent at 4/20/2022  1:12 PM CDT -----  Please verify this level is accurate, meds have not changed [ new ones added or removed], and repeat a 12 hr trough. Repeat hepatic panel in 2 weeks and instruct him to avoid excess tylenol (if taking any) or other OTCs.

## 2022-04-20 NOTE — PROGRESS NOTES
Kidney Post-Transplant Assessment    Referring Physician: Chris Adair  Current Nephrologist: Rajan Hayes    ORGAN: RIGHT KIDNEY  Donor Type: donation after brain death  PHS Increased Risk: yes  Cold Ischemia: 1,354 mins  Induction Medications: thymoglobulin    Subjective:   The patient location is: home  The chief complaint leading to consultation is: kidney transplant      Visit type: audiovisual    Face to Face time with patient: 20 minutes of total time spent on the encounter, which includes face to face time and non-face to face time preparing to see the patient (eg, review of tests), Obtaining and/or reviewing separately obtained history, Documenting clinical information in the electronic or other health record, Independently interpreting results (not separately reported) and communicating results to the patient/family/caregiver, or Care coordination (not separately reported).     Each patient to whom he or she provides medical services by telemedicine is:  (1) informed of the relationship between the physician and patient and the respective role of any other health care provider with respect to management of the patient; and (2) notified that he or she may decline to receive medical services by telemedicine and may withdraw from such care at any time.      CC:  Reassessment of renal allograft function and management of chronic immunosuppression.    HPI:  Mr. Ramirez is a 71 y.o. 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 His most recent creatinine is 1.3. He takes mycophenolate mofetil, prednisone and tacrolimus for maintenance immunosuppression. His post transplant course has been complicated by urinary retention severe diarrhea and hypophosphatemia .    Hospitalization/ ED visits  None    Interval HX:  Reports overall doing well.     Intake 12.2L  UOP 2-2.5L  BP 120s-140s/80  Peripheral edema none  Weight: stable  Appetite  good  Wound--healed  fx assessment doing well  Lab /diagnostic results reviewed with patient today.   All questions answered        Current Outpatient Medications:     ALPRAZolam (XANAX) 1 MG tablet, Take 1 tablet (1 mg total) by mouth 3 (three) times daily as needed for Anxiety., Disp: 42 tablet, Rfl: 0    cholecalciferol, vitamin D3, (VITAMIN D3) 50 mcg (2,000 unit) Tab, Take 1 tablet (2,000 Units total) by mouth once daily., Disp: 60 tablet, Rfl: 11    cinacalcet (SENSIPAR) 30 MG Tab, Take 1 tablet (30 mg total) by mouth daily with breakfast., Disp: 30 tablet, Rfl: 11    famotidine (PEPCID) 20 MG tablet, Take 1 tablet (20 mg total) by mouth every evening. STOP 4/20/22., Disp: 30 tablet, Rfl: 1    fluticasone propionate (FLONASE) 50 mcg/actuation nasal spray, 2 sprays (100 mcg total) by Each Nostril route as needed for Allergies., Disp: 16 g, Rfl: 3    k phos di & mono-sod phos mono (K-PHOS-NEUTRAL) 250 mg Tab, Take 1 tablet by mouth 3 (three) times daily., Disp: 90 tablet, Rfl: 11    magnesium oxide (MAG-OX) 400 mg (241.3 mg magnesium) tablet, Take 1 tablet (400 mg total) by mouth 2 (two) times daily., Disp: 60 tablet, Rfl: 2    metoprolol tartrate (LOPRESSOR) 50 MG tablet, Take 1 tablet (50 mg total) by mouth 2 (two) times daily. HOLD FOR SBP <120 or HR <60, Disp: 60 tablet, Rfl: 11    mycophenolate (CELLCEPT) 250 mg Cap, Take 4 capsules (1,000 mg total) by mouth 2 (two) times daily., Disp: 240 capsule, Rfl: 11    NIFEdipine (PROCARDIA-XL) 60 MG (OSM) 24 hr tablet, Take 1 tablet (60 mg total) by mouth every 12 (twelve) hours., Disp: 60 tablet, Rfl: 11    nystatin (MYCOSTATIN) 100,000 unit/mL suspension, Take 5 mLs (500,000 Units total) by mouth 3 (three) times daily after meals., Disp: 450 mL, Rfl: 0    oxybutynin (DITROPAN) 5 MG Tab, Take 1 tablet (5 mg total) by mouth 3 (three) times daily as needed (bladder spasms)., Disp: 10 tablet, Rfl: 0    oxyCODONE (ROXICODONE) 5 MG immediate release tablet,  Take 1 tablet (5 mg total) by mouth every 6 (six) hours as needed for Pain., Disp: 28 tablet, Rfl: 0    predniSONE (DELTASONE) 5 MG tablet, Take by mouth daily: 20mg 3/22/22 -4/21, 15mg 4/22-5/22, 10mg 5/23-6/22, 5mg 6/23- thereafter (Patient taking differently: Take by mouth daily: 20mg 3/22/22 -4/21, 15mg 4/22-5/22, 10mg 5/23-6/22, 5mg 6/23- thereafter), Disp: 120 tablet, Rfl: 5    sildenafiL (VIAGRA) 50 MG tablet, Take 1 tablet (50 mg total) by mouth daily as needed for Erectile Dysfunction., Disp: 30 tablet, Rfl: 11    sodium bicarbonate 650 MG tablet, Take 2 tablets (1,300 mg total) by mouth 2 (two) times daily., Disp: 120 tablet, Rfl: 11    sulfamethoxazole-trimethoprim 400-80mg (BACTRIM,SEPTRA) 400-80 mg per tablet, Take 1 tablet by mouth once daily. Stop 9/16/22, Disp: 30 tablet, Rfl: 5    tacrolimus (PROGRAF) 1 MG Cap, Take 6 capsules (6 mg total) by mouth every morning AND 5 capsules (5 mg total) every evening., Disp: 420 capsule, Rfl: 11    tamsulosin (FLOMAX) 0.4 mg Cap, Take 1 capsule (0.4 mg total) by mouth every evening., Disp: 30 capsule, Rfl: 11    valGANciclovir (VALCYTE) 450 mg Tab, Take 2 tablets (900 mg total) by mouth every morning. Stop 6/16/22, Disp: 60 tablet, Rfl: 2      Past Medical History:   Diagnosis Date    Anemia in CKD (chronic kidney disease)     Atrial fibrillation     CKD (chronic kidney disease) stage 4, GFR 15 11/26/2014    Colon cancer screening 12/19/2014    Elevated PSA 11/26/2014    HTN (hypertension) diagnosed in his 40s 10/16/2014    Monoclonal gammopathy 11/26/2014    Phobic anxiety disorder 11/26/2014    Proteinuria 11/26/2014    Stage 3a chronic kidney disease 3/25/2022         Review of Systems   Constitutional: Negative for appetite change, chills, fatigue and fever.   HENT: Negative for trouble swallowing.    Respiratory: Negative for cough, chest tightness, shortness of breath and wheezing.    Cardiovascular: Negative for chest pain, palpitations  and leg swelling.   Gastrointestinal: Negative for abdominal pain, constipation, diarrhea and nausea.   Genitourinary: Negative for difficulty urinating, frequency and urgency.   Musculoskeletal: Negative for arthralgias and myalgias.   Skin: Negative for rash.   Allergic/Immunologic: Positive for immunocompromised state.   Neurological: Negative for dizziness, weakness, light-headedness and headaches.   Psychiatric/Behavioral: Negative for sleep disturbance.        Objective:   There were no vitals taken for this visit.  Wt Readings from Last 3 Encounters:   04/13/22 86.8 kg (191 lb 5.8 oz)   04/11/22 85.9 kg (189 lb 6 oz)   04/07/22 84.1 kg (185 lb 6.4 oz)     Temp Readings from Last 3 Encounters:   04/13/22 97.7 °F (36.5 °C) (Temporal)   04/11/22 97.3 °F (36.3 °C) (Temporal)   04/07/22 98.1 °F (36.7 °C)     BP Readings from Last 3 Encounters:   04/13/22 135/72   04/11/22 (!) 143/74   04/07/22 (!) 149/70     Pulse Readings from Last 3 Encounters:   04/13/22 76   04/11/22 83   04/07/22 83         Physical Exam   Deferred due to AV visit    Labs:  Lab Results   Component Value Date    WBC 8.03 04/19/2022    HGB 12.5 (L) 04/19/2022    HCT 38.8 (L) 04/19/2022     04/19/2022    K 4.0 04/19/2022     04/19/2022    CO2 19 (L) 04/19/2022    BUN 28 (H) 04/19/2022    CREATININE 1.4 04/19/2022    EGFRNONAA 49.5 (A) 04/19/2022    CALCIUM 8.8 04/19/2022    PHOS 2.5 (L) 04/19/2022    MG 1.9 04/19/2022    ALBUMIN 4.0 04/19/2022    ALBUMIN 4.0 04/19/2022    AST 17 04/19/2022    ALT 46 (H) 04/19/2022    UTPCR Unable to calculate 04/19/2022    .1 (H) 03/18/2022    TACROLIMUS 12.4 04/19/2022       No results found for: EXTANC, EXTWBC, EXTSEGS, EXTPLATELETS, EXTHEMOGLOBI, EXTHEMATOCRI, EXTCREATININ, EXTSODIUM, EXTPOTASSIUM, EXTBUN, EXTCO2, EXTCALCIUM, EXTPHOSPHORU, EXTGLUCOSE, EXTALBUMIN, EXTAST, EXTALT, EXTBILITOTAL, EXTLIPASE, EXTAMYLASE    No results found for: EXTCYCLOSLVL, EXTSIROLIMUS, EXTTACROLVL, EXTPROTCRE,  EXTPTHINTACT, EXTPROTEINUA, EXTWBCUA, EXTRBCUA    Labs were reviewed with the patient    Assessment:     1. S/P kidney transplant    2. Stage 3a chronic kidney disease    3. Anemia of renal disease    4. Primary hypertension    5. Hyperlipidemia, unspecified hyperlipidemia type    6. Long-term use of immunosuppressant medication        Plan:     Continue current IS therapy regimen  Stop pepcid      1. CKD stage: will continue follow up as per our center guidelines. patient to continue close follow up with the local General nephrologist. Education provided in appropriate fluid intake, potassium intake. Continue with oral hydration.      2. Immunosuppression: Prograf trough 8.3, therapeutic target 8-10. Continue Prograf decreased 6/6, MMF 1000 Mg BID, and Prednisone   Lab Results   Component Value Date    TACROLIMUS 12.4 04/19/2022    TACROLIMUS 8.3 04/14/2022    TACROLIMUS 8.4 04/11/2022   Will closely monitor for toxicities, education provided about adherence to medicines and need to communicate any side effect to the transplant nurse or physician.      3. Allograft Function:stable at baseline for the patient. Continue follow up as per our guidelines and with the local General nephrologist. Communication will be sent today.      4/14/2022  POD 26   Creatinine 0.5 - 1.4 mg/dL 1.3   eGFR if African American >60 mL/min/1.73 m^2 >60.0   Glucose 70 - 110 mg/dL 95       4. Hypertension management:  Continue with home blood pressure monitoring, low salt and healthy life discussed with the patient.  BP Readings from Last 3 Encounters:   04/13/22 135/72   04/11/22 (!) 143/74   04/07/22 (!) 149/70   MTP, nifedipine    5. Metabolic Bone Disease/Secondary Hyperparathyroidism:calcium and phosphorus level discussed with the patient, patient will continue follow up with the general nephrologist for management of metabolic bone disease calcium and phosphorus as per our center protocol. Will monitor PTH, Vit D level, calcium.   Lab  Results   Component Value Date    .1 (H) 03/18/2022    CALCIUM 8.8 04/19/2022    PHOS 2.5 (L) 04/19/2022    PHOS 2.3 (L) 04/14/2022    PHOS 2.6 (L) 04/11/2022   senispar, kpn, magox    6. Electrolytes: reviewed with the patient, essentially within the normal range no need for acute changes today, will monitor as per our center guidelines.  Lab Results   Component Value Date     04/19/2022    K 4.0 04/19/2022     04/19/2022    CO2 19 (L) 04/19/2022    CO2 19 (L) 04/14/2022    CO2 20 (L) 04/11/2022   nabicarb     7. Anemia: will continue monitoring as per our center guidelines. No indication for acute intervention today.  Lab Results   Component Value Date    WBC 8.03 04/19/2022    HGB 12.5 (L) 04/19/2022    HCT 38.8 (L) 04/19/2022    MCV 92 04/19/2022     04/19/2022       8.Proteinuria: will continue with pr/cr ratio as per our center guidelines  Lab Results   Component Value Date    PROTEINURINE <7 04/19/2022    CREATRANDUR 53.0 04/19/2022    UTPCR Unable to calculate 04/19/2022    UTPCR Unable to calculate 04/14/2022    UTPCR 0.78 (H) 03/18/2022        9. BK virus infection screening: will continue with urine or blood PCR as per our guidelines to prevent BK virus viremia and allograft dysfunction  No results found for: BKVIRUSDNAUR, BKQUANTURINE, BKVIRUSLOG, BKVIRUSURINE, BKVIRUSPCRQB      10. Weight education: provided during the clinic visit.  There is no height or weight on file to calculate BMI.     11.Patient safety education regarding immunosuppression including prophylaxis posttransplant for CMV, PCP : Education provided about vaccination and prevention of infections.    12.  Cytopenias: no significant cytopenias will monitor as per our guidelines. Medicine list reviewed including potential causes of drug-induced cytopenias  Lab Results   Component Value Date    WBC 8.03 04/19/2022    HGB 12.5 (L) 04/19/2022    HCT 38.8 (L) 04/19/2022    MCV 92 04/19/2022     04/19/2022        13. Post-transplant Prophylaxis; CMV Infection, PJP and Candida mucosistis and other indicated for this particular patient.   PCP PROPHYLAXIS: Bactrim until 9/16/22  CMV PROPHYLAXIS: Valcyte until 6/16/22  FUNGAL PROPHYLAXIS: Nystatin until 4/22/22      Exercise: reminded Telly of the importance of regular exercise for weight management, blood sugar and blood pressure management.  I also explained exercise has been shown to improve cardiovascular health, energy level, and sleep hygiene.  Lastly, I advised him that cardiovascular complications are leading cause of death for renal transplant recipients, and regular exercise can help lower this risk.       Follow-up:   Clinic: return to transplant clinic weekly for the first month after transplant; every 2 weeks during months 2-3; then at 6-, 9-, 12-, 18-, 24-, and 36- months post-transplant to reassess for complications from immunosuppression toxicity and monitor for rejection.  Annually thereafter.    Labs: since patient remains at high risk for rejection and drug-related complications that warrant close monitoring, labs will be ordered as follows: continue twice weekly CBC, renal panel, and drug level for first month; then same labs once weekly through 3rd month post-transplant.  Urine for UA and protein/creatinine ratio monthly.  Serum BK - PCR at 1-, 3-, 6-, 9-, 12-, 18-, 24-, 36-, 48-, and 60 months post-transplant.  Hepatic panel at 1-, 2-, 3-, 6-, 9-, 12-, 18-, 24-, and 36- months post-transplant.    Judi Robert NP       Education:   Material provided to the patient.  Patient reminded to call with any health changes since these can be early signs of significant complications.  Also, I advised the patient to be sure any new medications or changes of old medications are discussed with either a pharmacist or physician knowledgeable with transplant to avoid rejection/drug toxicity related to significant drug interactions.    Patient advised that it is  recommended that all transplanted patients, and their close contacts and household members receive Covid vaccination.

## 2022-04-21 ENCOUNTER — LAB VISIT (OUTPATIENT)
Dept: LAB | Facility: HOSPITAL | Age: 72
End: 2022-04-21
Attending: INTERNAL MEDICINE
Payer: MEDICARE

## 2022-04-21 DIAGNOSIS — Z94.0 KIDNEY REPLACED BY TRANSPLANT: ICD-10-CM

## 2022-04-21 LAB
HCV RNA SERPL NAA+PROBE-ACNC: NORMAL IU/ML
HIV1 RNA # PLAS NAA DL=20: NORMAL COPIES/ML

## 2022-04-21 PROCEDURE — 36415 COLL VENOUS BLD VENIPUNCTURE: CPT | Performed by: INTERNAL MEDICINE

## 2022-04-21 PROCEDURE — 80197 ASSAY OF TACROLIMUS: CPT | Performed by: INTERNAL MEDICINE

## 2022-04-22 DIAGNOSIS — Z94.0 S/P KIDNEY TRANSPLANT: ICD-10-CM

## 2022-04-22 LAB
BKV DNA SERPL NAA+PROBE-ACNC: <125 COPIES/ML
BKV DNA SERPL NAA+PROBE-LOG#: <2.1 LOG (10) COPIES/ML
BKV DNA SERPL QL NAA+PROBE: NOT DETECTED
HBV DNA SERPL NAA+PROBE-ACNC: <10 IU/ML
HBV DNA SERPL NAA+PROBE-LOG IU: <1 LOG (10) IU/ML
HBV DNA SERPL QL NAA+PROBE: NOT DETECTED
TACROLIMUS BLD-MCNC: 11.1 NG/ML (ref 5–15)

## 2022-04-22 RX ORDER — TACROLIMUS 1 MG/1
CAPSULE ORAL
Qty: 420 CAPSULE | Refills: 11 | Status: SHIPPED | OUTPATIENT
Start: 2022-04-22 | End: 2022-05-18 | Stop reason: SDUPTHER

## 2022-04-22 NOTE — TELEPHONE ENCOUNTER
Spoke with patient regarding below orders. Tac dose to 5/4. Patient verbalized understanding.----- Message from Dilcia Parker MD sent at 4/22/2022  2:06 PM CDT -----  Lower tacro to 5 mg QAM and 4 mg nightly

## 2022-04-25 ENCOUNTER — TELEPHONE (OUTPATIENT)
Dept: HEMATOLOGY/ONCOLOGY | Facility: CLINIC | Age: 72
End: 2022-04-25
Payer: MEDICARE

## 2022-04-25 NOTE — TELEPHONE ENCOUNTER
----- Message from Melissa David sent at 4/25/2022  9:35 AM CDT -----  Contact: [phone]  Patient called, would like to get a call from Dr Hilliard's nurse in regard getting orders for blood work. Please call and advise. Thank you

## 2022-04-25 NOTE — TELEPHONE ENCOUNTER
Spoke to pt he states that he wanted to add Dr Babak rivera to the lab appt tomorrow informed him that we would need to schedule him an appt first informed him of the first available for both locations he states that he will take the cancer center 5/17 and labs on 5/16 pt verbalized understanding.

## 2022-04-26 ENCOUNTER — LAB VISIT (OUTPATIENT)
Dept: LAB | Facility: HOSPITAL | Age: 72
End: 2022-04-26
Attending: INTERNAL MEDICINE
Payer: MEDICARE

## 2022-04-26 DIAGNOSIS — Z94.0 KIDNEY REPLACED BY TRANSPLANT: ICD-10-CM

## 2022-04-26 LAB
ALBUMIN SERPL BCP-MCNC: 4.1 G/DL (ref 3.5–5.2)
ANION GAP SERPL CALC-SCNC: 10 MMOL/L (ref 8–16)
BASOPHILS # BLD AUTO: 0.03 K/UL (ref 0–0.2)
BASOPHILS NFR BLD: 0.4 % (ref 0–1.9)
BUN SERPL-MCNC: 24 MG/DL (ref 8–23)
CALCIUM SERPL-MCNC: 8.9 MG/DL (ref 8.7–10.5)
CHLORIDE SERPL-SCNC: 107 MMOL/L (ref 95–110)
CO2 SERPL-SCNC: 20 MMOL/L (ref 23–29)
CREAT SERPL-MCNC: 1.4 MG/DL (ref 0.5–1.4)
DIFFERENTIAL METHOD: ABNORMAL
EOSINOPHIL # BLD AUTO: 0.1 K/UL (ref 0–0.5)
EOSINOPHIL NFR BLD: 0.9 % (ref 0–8)
ERYTHROCYTE [DISTWIDTH] IN BLOOD BY AUTOMATED COUNT: 16.9 % (ref 11.5–14.5)
EST. GFR  (AFRICAN AMERICAN): 57.2 ML/MIN/1.73 M^2
EST. GFR  (NON AFRICAN AMERICAN): 49.5 ML/MIN/1.73 M^2
GLUCOSE SERPL-MCNC: 89 MG/DL (ref 70–110)
HCT VFR BLD AUTO: 40.9 % (ref 40–54)
HGB BLD-MCNC: 13.2 G/DL (ref 14–18)
IMM GRANULOCYTES # BLD AUTO: 0.1 K/UL (ref 0–0.04)
IMM GRANULOCYTES NFR BLD AUTO: 1.3 % (ref 0–0.5)
LYMPHOCYTES # BLD AUTO: 0.7 K/UL (ref 1–4.8)
LYMPHOCYTES NFR BLD: 8.4 % (ref 18–48)
MAGNESIUM SERPL-MCNC: 1.8 MG/DL (ref 1.6–2.6)
MCH RBC QN AUTO: 29.7 PG (ref 27–31)
MCHC RBC AUTO-ENTMCNC: 32.3 G/DL (ref 32–36)
MCV RBC AUTO: 92 FL (ref 82–98)
MONOCYTES # BLD AUTO: 0.4 K/UL (ref 0.3–1)
MONOCYTES NFR BLD: 5.3 % (ref 4–15)
NEUTROPHILS # BLD AUTO: 6.5 K/UL (ref 1.8–7.7)
NEUTROPHILS NFR BLD: 83.7 % (ref 38–73)
NRBC BLD-RTO: 0 /100 WBC
PHOSPHATE SERPL-MCNC: 2.3 MG/DL (ref 2.7–4.5)
PLATELET # BLD AUTO: 177 K/UL (ref 150–450)
PMV BLD AUTO: 10.9 FL (ref 9.2–12.9)
POTASSIUM SERPL-SCNC: 3.9 MMOL/L (ref 3.5–5.1)
RBC # BLD AUTO: 4.45 M/UL (ref 4.6–6.2)
SODIUM SERPL-SCNC: 137 MMOL/L (ref 136–145)
URATE SERPL-MCNC: 5.6 MG/DL (ref 3.4–7)
WBC # BLD AUTO: 7.75 K/UL (ref 3.9–12.7)

## 2022-04-26 PROCEDURE — 36415 COLL VENOUS BLD VENIPUNCTURE: CPT | Performed by: INTERNAL MEDICINE

## 2022-04-26 PROCEDURE — 83735 ASSAY OF MAGNESIUM: CPT | Performed by: INTERNAL MEDICINE

## 2022-04-26 PROCEDURE — 84550 ASSAY OF BLOOD/URIC ACID: CPT | Performed by: INTERNAL MEDICINE

## 2022-04-26 PROCEDURE — 80197 ASSAY OF TACROLIMUS: CPT | Performed by: INTERNAL MEDICINE

## 2022-04-26 PROCEDURE — 85025 COMPLETE CBC W/AUTO DIFF WBC: CPT | Performed by: INTERNAL MEDICINE

## 2022-04-26 PROCEDURE — 80069 RENAL FUNCTION PANEL: CPT | Performed by: INTERNAL MEDICINE

## 2022-04-27 LAB — TACROLIMUS BLD-MCNC: 9.5 NG/ML (ref 5–15)

## 2022-05-02 ENCOUNTER — TELEPHONE (OUTPATIENT)
Dept: TRANSPLANT | Facility: CLINIC | Age: 72
End: 2022-05-02
Payer: MEDICARE

## 2022-05-02 NOTE — TELEPHONE ENCOUNTER
KENNY attempted to contact pt for follow up. KENNY was only able to leave a message with contact information. KENNY remains available to pt and family.     ----- Message from Elijah Landon sent at 5/2/2022 10:21 AM CDT -----  Regarding: Speak to KENNY  Pt calling to speak to KENNY regarding joan .    Call: [phone]

## 2022-05-03 ENCOUNTER — LAB VISIT (OUTPATIENT)
Dept: LAB | Facility: HOSPITAL | Age: 72
End: 2022-05-03
Attending: INTERNAL MEDICINE
Payer: MEDICARE

## 2022-05-03 ENCOUNTER — TELEPHONE (OUTPATIENT)
Dept: TRANSPLANT | Facility: CLINIC | Age: 72
End: 2022-05-03
Payer: MEDICARE

## 2022-05-03 DIAGNOSIS — Z94.0 KIDNEY REPLACED BY TRANSPLANT: ICD-10-CM

## 2022-05-03 LAB
ALBUMIN SERPL BCP-MCNC: 3.9 G/DL (ref 3.5–5.2)
ANION GAP SERPL CALC-SCNC: 7 MMOL/L (ref 8–16)
BASOPHILS # BLD AUTO: 0.03 K/UL (ref 0–0.2)
BASOPHILS NFR BLD: 0.4 % (ref 0–1.9)
BUN SERPL-MCNC: 20 MG/DL (ref 8–23)
CALCIUM SERPL-MCNC: 8.8 MG/DL (ref 8.7–10.5)
CHLORIDE SERPL-SCNC: 108 MMOL/L (ref 95–110)
CO2 SERPL-SCNC: 22 MMOL/L (ref 23–29)
CREAT SERPL-MCNC: 1.2 MG/DL (ref 0.5–1.4)
DIFFERENTIAL METHOD: ABNORMAL
EOSINOPHIL # BLD AUTO: 0.1 K/UL (ref 0–0.5)
EOSINOPHIL NFR BLD: 0.7 % (ref 0–8)
ERYTHROCYTE [DISTWIDTH] IN BLOOD BY AUTOMATED COUNT: 16.3 % (ref 11.5–14.5)
EST. GFR  (AFRICAN AMERICAN): >60 ML/MIN/1.73 M^2
EST. GFR  (NON AFRICAN AMERICAN): 59.6 ML/MIN/1.73 M^2
GLUCOSE SERPL-MCNC: 91 MG/DL (ref 70–110)
HCT VFR BLD AUTO: 40.3 % (ref 40–54)
HGB BLD-MCNC: 13 G/DL (ref 14–18)
IMM GRANULOCYTES # BLD AUTO: 0.09 K/UL (ref 0–0.04)
IMM GRANULOCYTES NFR BLD AUTO: 1.2 % (ref 0–0.5)
LYMPHOCYTES # BLD AUTO: 0.6 K/UL (ref 1–4.8)
LYMPHOCYTES NFR BLD: 8.7 % (ref 18–48)
MAGNESIUM SERPL-MCNC: 1.8 MG/DL (ref 1.6–2.6)
MCH RBC QN AUTO: 29.5 PG (ref 27–31)
MCHC RBC AUTO-ENTMCNC: 32.3 G/DL (ref 32–36)
MCV RBC AUTO: 91 FL (ref 82–98)
MONOCYTES # BLD AUTO: 0.5 K/UL (ref 0.3–1)
MONOCYTES NFR BLD: 6.8 % (ref 4–15)
NEUTROPHILS # BLD AUTO: 6 K/UL (ref 1.8–7.7)
NEUTROPHILS NFR BLD: 82.2 % (ref 38–73)
NRBC BLD-RTO: 0 /100 WBC
PHOSPHATE SERPL-MCNC: 2.5 MG/DL (ref 2.7–4.5)
PLATELET # BLD AUTO: 189 K/UL (ref 150–450)
PMV BLD AUTO: 10.7 FL (ref 9.2–12.9)
POTASSIUM SERPL-SCNC: 4.1 MMOL/L (ref 3.5–5.1)
RBC # BLD AUTO: 4.41 M/UL (ref 4.6–6.2)
SODIUM SERPL-SCNC: 137 MMOL/L (ref 136–145)
WBC # BLD AUTO: 7.24 K/UL (ref 3.9–12.7)

## 2022-05-03 PROCEDURE — 83735 ASSAY OF MAGNESIUM: CPT | Performed by: INTERNAL MEDICINE

## 2022-05-03 PROCEDURE — 80069 RENAL FUNCTION PANEL: CPT | Performed by: INTERNAL MEDICINE

## 2022-05-03 PROCEDURE — 36415 COLL VENOUS BLD VENIPUNCTURE: CPT | Performed by: INTERNAL MEDICINE

## 2022-05-03 PROCEDURE — 80197 ASSAY OF TACROLIMUS: CPT | Performed by: INTERNAL MEDICINE

## 2022-05-03 PROCEDURE — 85025 COMPLETE CBC W/AUTO DIFF WBC: CPT | Performed by: INTERNAL MEDICINE

## 2022-05-03 NOTE — TELEPHONE ENCOUNTER
Spoke with pt regarding msg below. Patient stated he has been having trouble falling asleep. Also, feeling anxious, mostly at night. Patient is worried it could be a side effect from medications.  Patient asking for refill on Xanax. Will send Dr Hayes a message regarding refill.  Patient had some sleep aid at home he wanted to take. Benadryl/liquid. Patient will take tonight and will call pt tomorrow to see if it helped. Patient has clinic appt on Monday.  Returned pt call. Unable to leave .----- Message from Surya Avila sent at 5/3/2022  1:37 PM CDT -----  Regarding: speak to coordinator  Patient calling to speak to coordinator regarding symptoms he is having including side effects from Rx meds.        Call: [phone]

## 2022-05-04 LAB — TACROLIMUS BLD-MCNC: 8 NG/ML (ref 5–15)

## 2022-05-05 DIAGNOSIS — F40.9 PHOBIC ANXIETY DISORDER: ICD-10-CM

## 2022-05-05 DIAGNOSIS — Z94.0 S/P KIDNEY TRANSPLANT: ICD-10-CM

## 2022-05-05 RX ORDER — ALPRAZOLAM 1 MG/1
1 TABLET ORAL 3 TIMES DAILY PRN
Qty: 42 TABLET | Refills: 0 | Status: CANCELLED | OUTPATIENT
Start: 2022-05-05 | End: 2022-05-19

## 2022-05-05 NOTE — TELEPHONE ENCOUNTER
----- Message from Jennifer Baptiste sent at 5/5/2022  9:50 AM CDT -----  Contact: Alverto  Type:  RX Refill Request    Who Called: Alverto  Refill or New Rx: Refill  RX Name and Strength: Alprazolam (XANAX) 1 MG  How is the patient currently taking it? (ex. 1XDay):   Is this a 30 day or 90 day RX: 90  Preferred Pharmacy with phone number:   [id number] - [place] - [address] AT [facility] [address] [address]  Phone: [phone] Fax: [phone]  Local or Mail Order: Local  Ordering Provider: Dr. Hayes  Would the patient rather a call back or a response via My [facility]? Call back   Best Call Back Number: Please call him at [phone]  Additional Information:

## 2022-05-06 ENCOUNTER — TELEPHONE (OUTPATIENT)
Dept: NEPHROLOGY | Facility: CLINIC | Age: 72
End: 2022-05-06
Payer: MEDICARE

## 2022-05-06 ENCOUNTER — TELEPHONE (OUTPATIENT)
Dept: TRANSPLANT | Facility: CLINIC | Age: 72
End: 2022-05-06
Payer: MEDICARE

## 2022-05-06 DIAGNOSIS — Z94.0 S/P KIDNEY TRANSPLANT: Primary | ICD-10-CM

## 2022-05-06 RX ORDER — TRAZODONE HYDROCHLORIDE 50 MG/1
50 TABLET ORAL NIGHTLY PRN
Qty: 30 TABLET | Refills: 0 | Status: SHIPPED | OUTPATIENT
Start: 2022-05-06 | End: 2022-06-07 | Stop reason: SDUPTHER

## 2022-05-06 NOTE — TELEPHONE ENCOUNTER
Former PD pt of mine and is s/p kidney transplant 3/18/2022. He is under the care of Transplant Nephrology and his PCP. Dr. Bernstein was his previous Nephrologist and would give this pt Xanax. The pt is now requesting a refill of Xanax for sleep and anxiety.     Please let pt know I have sent trazodone to his pharmacy for sleep, as a temporary measure until he sees his PCP for insomnia. (swent to [facility] on [address])     Additionally he would like to start seeing Dr. Gillespie, please schedule an appointment for follow up with Dr. Gillespie in 3-4 weeks.    Thank you

## 2022-05-06 NOTE — TELEPHONE ENCOUNTER
Patient stated he needed refill on hos. He stated he was taking 1 tablet  4 times a day. RX is 1 tablet TID. Patient will start correct dose and stated he may run out d/t refill can not be before 5/15/22. Patient has clinic appt on Monday. Will follow up with [facility] dose then. Pt verbalized understanding.  Message sent to Dr Hayes for Zanax refill.----- Message from Surya Avila sent at 5/6/2022  1:07 PM CDT -----  Regarding: Rx refill  Patient calling to get Rx refill:    k phos di & mono-sod phos mono (K-PHOS-NEUTRAL) 250 mg Tab ([facility])  ALPRAZolam (XANAX) 1 MG tablet    Patient also would like to switch to Dr. MARIELA MCINTYRE due to his doctor not being accessible.      Call:  [phone] (Mobile          [id number] - [place]  [address] AT [facility] [address] [address]  Phone: [phone] Fax: [phone]

## 2022-05-09 ENCOUNTER — OFFICE VISIT (OUTPATIENT)
Dept: TRANSPLANT | Facility: CLINIC | Age: 72
End: 2022-05-09
Payer: MEDICARE

## 2022-05-09 VITALS
BODY MASS INDEX: 28.46 KG/M2 | HEART RATE: 72 BPM | OXYGEN SATURATION: 97 % | HEIGHT: 68 IN | DIASTOLIC BLOOD PRESSURE: 77 MMHG | WEIGHT: 187.81 LBS | SYSTOLIC BLOOD PRESSURE: 146 MMHG | RESPIRATION RATE: 16 BRPM | TEMPERATURE: 97 F

## 2022-05-09 DIAGNOSIS — I10 PRIMARY HYPERTENSION: ICD-10-CM

## 2022-05-09 DIAGNOSIS — Z94.0 S/P KIDNEY TRANSPLANT: Primary | ICD-10-CM

## 2022-05-09 DIAGNOSIS — Z79.60 LONG-TERM USE OF IMMUNOSUPPRESSANT MEDICATION: ICD-10-CM

## 2022-05-09 DIAGNOSIS — N18.31 STAGE 3A CHRONIC KIDNEY DISEASE: ICD-10-CM

## 2022-05-09 DIAGNOSIS — F40.9 PHOBIC ANXIETY DISORDER: ICD-10-CM

## 2022-05-09 PROCEDURE — 99215 PR OFFICE/OUTPT VISIT, EST, LEVL V, 40-54 MIN: ICD-10-PCS | Mod: S$GLB,,, | Performed by: NURSE PRACTITIONER

## 2022-05-09 PROCEDURE — 1159F PR MEDICATION LIST DOCUMENTED IN MEDICAL RECORD: ICD-10-PCS | Mod: CPTII,S$GLB,, | Performed by: NURSE PRACTITIONER

## 2022-05-09 PROCEDURE — 99999 PR PBB SHADOW E&M-EST. PATIENT-LVL IV: ICD-10-PCS | Mod: PBBFAC,,, | Performed by: NURSE PRACTITIONER

## 2022-05-09 PROCEDURE — 99999 PR PBB SHADOW E&M-EST. PATIENT-LVL IV: CPT | Mod: PBBFAC,,, | Performed by: NURSE PRACTITIONER

## 2022-05-09 PROCEDURE — 3078F PR MOST RECENT DIASTOLIC BLOOD PRESSURE < 80 MM HG: ICD-10-PCS | Mod: CPTII,S$GLB,, | Performed by: NURSE PRACTITIONER

## 2022-05-09 PROCEDURE — 3288F PR FALLS RISK ASSESSMENT DOCUMENTED: ICD-10-PCS | Mod: CPTII,S$GLB,, | Performed by: NURSE PRACTITIONER

## 2022-05-09 PROCEDURE — 99215 OFFICE O/P EST HI 40 MIN: CPT | Mod: S$GLB,,, | Performed by: NURSE PRACTITIONER

## 2022-05-09 PROCEDURE — 3008F BODY MASS INDEX DOCD: CPT | Mod: CPTII,S$GLB,, | Performed by: NURSE PRACTITIONER

## 2022-05-09 PROCEDURE — 3066F PR DOCUMENTATION OF TREATMENT FOR NEPHROPATHY: ICD-10-PCS | Mod: CPTII,S$GLB,, | Performed by: NURSE PRACTITIONER

## 2022-05-09 PROCEDURE — 1126F PR PAIN SEVERITY QUANTIFIED, NO PAIN PRESENT: ICD-10-PCS | Mod: CPTII,S$GLB,, | Performed by: NURSE PRACTITIONER

## 2022-05-09 PROCEDURE — 1101F PR PT FALLS ASSESS DOC 0-1 FALLS W/OUT INJ PAST YR: ICD-10-PCS | Mod: CPTII,S$GLB,, | Performed by: NURSE PRACTITIONER

## 2022-05-09 PROCEDURE — 1101F PT FALLS ASSESS-DOCD LE1/YR: CPT | Mod: CPTII,S$GLB,, | Performed by: NURSE PRACTITIONER

## 2022-05-09 PROCEDURE — 1126F AMNT PAIN NOTED NONE PRSNT: CPT | Mod: CPTII,S$GLB,, | Performed by: NURSE PRACTITIONER

## 2022-05-09 PROCEDURE — 3077F SYST BP >= 140 MM HG: CPT | Mod: CPTII,S$GLB,, | Performed by: NURSE PRACTITIONER

## 2022-05-09 PROCEDURE — 1159F MED LIST DOCD IN RCRD: CPT | Mod: CPTII,S$GLB,, | Performed by: NURSE PRACTITIONER

## 2022-05-09 PROCEDURE — 3008F PR BODY MASS INDEX (BMI) DOCUMENTED: ICD-10-PCS | Mod: CPTII,S$GLB,, | Performed by: NURSE PRACTITIONER

## 2022-05-09 PROCEDURE — 3288F FALL RISK ASSESSMENT DOCD: CPT | Mod: CPTII,S$GLB,, | Performed by: NURSE PRACTITIONER

## 2022-05-09 PROCEDURE — 3066F NEPHROPATHY DOC TX: CPT | Mod: CPTII,S$GLB,, | Performed by: NURSE PRACTITIONER

## 2022-05-09 PROCEDURE — 3077F PR MOST RECENT SYSTOLIC BLOOD PRESSURE >= 140 MM HG: ICD-10-PCS | Mod: CPTII,S$GLB,, | Performed by: NURSE PRACTITIONER

## 2022-05-09 PROCEDURE — 3078F DIAST BP <80 MM HG: CPT | Mod: CPTII,S$GLB,, | Performed by: NURSE PRACTITIONER

## 2022-05-09 RX ORDER — ALPRAZOLAM 1 MG/1
1 TABLET ORAL 3 TIMES DAILY PRN
Qty: 7 TABLET | Refills: 0 | Status: SHIPPED | OUTPATIENT
Start: 2022-05-09 | End: 2022-06-07 | Stop reason: SDUPTHER

## 2022-05-09 RX ORDER — SULFAMETHOXAZOLE AND TRIMETHOPRIM 400; 80 MG/1; MG/1
1 TABLET ORAL DAILY
Qty: 30 TABLET | Refills: 5 | Status: SHIPPED | OUTPATIENT
Start: 2022-05-09 | End: 2022-05-12

## 2022-05-09 RX ORDER — CHOLECALCIFEROL (VITAMIN D3) 50 MCG
2000 TABLET ORAL DAILY
Qty: 60 TABLET | Refills: 11 | Status: SHIPPED | OUTPATIENT
Start: 2022-05-09

## 2022-05-09 RX ORDER — PREDNISONE 5 MG/1
TABLET ORAL
Qty: 120 TABLET | Refills: 5 | Status: SHIPPED | OUTPATIENT
Start: 2022-05-09 | End: 2023-03-20

## 2022-05-09 NOTE — PROGRESS NOTES
Kidney Post-Transplant Assessment    Referring Physician: Chris Adair  Current Nephrologist: Rajan Hayes    ORGAN: RIGHT KIDNEY  Donor Type: donation after brain death  PHS Increased Risk: yes  Cold Ischemia: 1,354 mins  Induction Medications: thymoglobulin    Subjective:     CC:  Reassessment of renal allograft function and management of chronic immunosuppression.    HPI:  Mr. Ramirez is a 71 y.o. 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 His most recent creatinine is 1.3. He takes mycophenolate mofetil, prednisone and tacrolimus for maintenance immunosuppression. His post transplant course has been complicated by urinary retention severe diarrhea and hypophosphatemia .    Hospitalization/ ED visits  None    Interval HX:  Reports overall doing well. Does complain of ongoing anxiousness intermittently during the evening time. He reports needing xanax prior to transplant and a 3 month supply would last 6+ months. Currently out of mediation. Discussed with patient pains to follow with PCP soon.     Intake 2L  UOP 2-L  BP 130s-140s/80  Peripheral edema none  Weight: stable  Appetite good  Wound--healed--opening from PD site healing well superficial   fx assessment doing well  Lab /diagnostic results reviewed with patient today.   All questions answered        Current Outpatient Medications:     ALPRAZolam (XANAX) 1 MG tablet, Take 1 tablet (1 mg total) by mouth 3 (three) times daily as needed for Anxiety., Disp: 42 tablet, Rfl: 0    cholecalciferol, vitamin D3, (VITAMIN D3) 50 mcg (2,000 unit) Tab, Take 1 tablet (2,000 Units total) by mouth once daily., Disp: 60 tablet, Rfl: 11    cinacalcet (SENSIPAR) 30 MG Tab, Take 1 tablet (30 mg total) by mouth daily with breakfast., Disp: 90 tablet, Rfl: 3    famotidine (PEPCID) 20 MG tablet, Take 1 tablet (20 mg total) by mouth daily as needed for Heartburn., Disp: 90 tablet, Rfl: 0     fluticasone propionate (FLONASE) 50 mcg/actuation nasal spray, 2 sprays (100 mcg total) by Each Nostril route as needed for Allergies., Disp: 16 g, Rfl: 3    k phos di & mono-sod phos mono (K-PHOS-NEUTRAL) 250 mg Tab, Take 1 tablet by mouth 3 (three) times daily., Disp: 90 tablet, Rfl: 11    magnesium oxide (MAG-OX) 400 mg (241.3 mg magnesium) tablet, Take 1 tablet (400 mg total) by mouth 2 (two) times daily., Disp: 60 tablet, Rfl: 2    metoprolol tartrate (LOPRESSOR) 50 MG tablet, Take 1 tablet (50 mg total) by mouth 2 (two) times daily., Disp: 180 tablet, Rfl: 3    mycophenolate (CELLCEPT) 250 mg Cap, Take 4 capsules (1,000 mg total) by mouth 2 (two) times daily., Disp: 240 capsule, Rfl: 11    NIFEdipine (PROCARDIA-XL) 60 MG (OSM) 24 hr tablet, Take 1 tablet (60 mg total) by mouth 2 (two) times a day., Disp: 180 tablet, Rfl: 3    nystatin (MYCOSTATIN) 100,000 unit/mL suspension, Take 5 mLs (500,000 Units total) by mouth 3 (three) times daily after meals., Disp: 450 mL, Rfl: 0    predniSONE (DELTASONE) 5 MG tablet, Take by mouth daily: 20mg 3/22/22 -4/21, 15mg 4/22-5/22, 10mg 5/23-6/22, 5mg 6/23- thereafter (Patient taking differently: Take by mouth daily: 20mg 3/22/22 -4/21, 15mg 4/22-5/22, 10mg 5/23-6/22, 5mg 6/23- thereafter), Disp: 120 tablet, Rfl: 5    sildenafiL (VIAGRA) 50 MG tablet, Take 1 tablet (50 mg total) by mouth daily as needed for Erectile Dysfunction., Disp: 30 tablet, Rfl: 11    sodium bicarbonate 650 MG tablet, Take 2 tablets (1,300 mg total) by mouth 2 (two) times daily., Disp: 120 tablet, Rfl: 11    sulfamethoxazole-trimethoprim 400-80mg (BACTRIM,SEPTRA) 400-80 mg per tablet, Take 1 tablet by mouth once daily. Stop 9/16/22, Disp: 30 tablet, Rfl: 5    tacrolimus (PROGRAF) 1 MG Cap, Take 5 capsules (5 mg total) by mouth every morning AND 4 capsules (4 mg total) every evening., Disp: 420 capsule, Rfl: 11    tamsulosin (FLOMAX) 0.4 mg Cap, Take 1 capsule (0.4 mg total) by mouth every  "evening., Disp: 30 capsule, Rfl: 11    traZODone (DESYREL) 50 MG tablet, Take 1 tablet (50 mg total) by mouth nightly as needed for Insomnia., Disp: 30 tablet, Rfl: 0    valGANciclovir (VALCYTE) 450 mg Tab, Take 2 tablets (900 mg total) by mouth once daily., Disp: 104 tablet, Rfl: 0      Past Medical History:   Diagnosis Date    Anemia in CKD (chronic kidney disease)     Atrial fibrillation     CKD (chronic kidney disease) stage 4, GFR 15 11/26/2014    Colon cancer screening 12/19/2014    Elevated PSA 11/26/2014    HTN (hypertension) diagnosed in his 40s 10/16/2014    Monoclonal gammopathy 11/26/2014    Phobic anxiety disorder 11/26/2014    Proteinuria 11/26/2014    Stage 3a chronic kidney disease 3/25/2022         Review of Systems   Constitutional: Negative for appetite change, chills, fatigue and fever.   HENT: Negative for trouble swallowing.    Respiratory: Negative for cough, chest tightness, shortness of breath and wheezing.    Cardiovascular: Negative for chest pain, palpitations and leg swelling.   Gastrointestinal: Negative for abdominal pain, constipation, diarrhea and nausea.   Genitourinary: Negative for difficulty urinating, frequency and urgency.   Musculoskeletal: Negative for arthralgias and myalgias.   Skin: Negative for rash.   Allergic/Immunologic: Positive for immunocompromised state.   Neurological: Negative for dizziness, weakness, light-headedness and headaches.   Psychiatric/Behavioral: Negative for sleep disturbance.        Objective:   BP (!) 146/77 (BP Location: Right arm, Patient Position: Sitting, BP Method: Medium (Automatic))   Pulse 72   Temp 97.2 °F (36.2 °C) (Temporal)   Resp 16   Ht 5' 7.5" (1.715 m)   Wt 85.2 kg (187 lb 13.3 oz)   SpO2 97%   BMI 28.98 kg/m²     Physical Exam  Constitutional:       General: He is not in acute distress.     Appearance: He is well-developed. He is not diaphoretic.   Cardiovascular:      Rate and Rhythm: Normal rate and regular " rhythm.      Heart sounds: Normal heart sounds. No murmur heard.    No friction rub. No gallop.   Pulmonary:      Effort: Pulmonary effort is normal. No respiratory distress.      Breath sounds: Normal breath sounds. No wheezing or rales.   Abdominal:      General: Bowel sounds are normal. There is no distension.      Palpations: Abdomen is soft.      Tenderness: There is no abdominal tenderness.   Musculoskeletal:         General: No tenderness. Normal range of motion.   Skin:     General: Skin is warm and dry.      Findings: No rash.      Nails: There is no clubbing.   Neurological:      Mental Status: He is alert and oriented to person, place, and time.   Psychiatric:         Behavior: Behavior normal.            Labs:  Lab Results   Component Value Date    WBC 7.24 05/03/2022    HGB 13.0 (L) 05/03/2022    HCT 40.3 05/03/2022     05/03/2022    K 4.1 05/03/2022     05/03/2022    CO2 22 (L) 05/03/2022    BUN 20 05/03/2022    CREATININE 1.2 05/03/2022    EGFRNONAA 59.6 (A) 05/03/2022    CALCIUM 8.8 05/03/2022    PHOS 2.5 (L) 05/03/2022    MG 1.8 05/03/2022    ALBUMIN 3.9 05/03/2022    AST 17 04/19/2022    ALT 46 (H) 04/19/2022    UTPCR Unable to calculate 04/19/2022    .1 (H) 03/18/2022    TACROLIMUS 8.0 05/03/2022       No results found for: EXTANC, EXTWBC, EXTSEGS, EXTPLATELETS, EXTHEMOGLOBI, EXTHEMATOCRI, EXTCREATININ, EXTSODIUM, EXTPOTASSIUM, EXTBUN, EXTCO2, EXTCALCIUM, EXTPHOSPHORU, EXTGLUCOSE, EXTALBUMIN, EXTAST, EXTALT, EXTBILITOTAL, EXTLIPASE, EXTAMYLASE    No results found for: EXTCYCLOSLVL, EXTSIROLIMUS, EXTTACROLVL, EXTPROTCRE, EXTPTHINTACT, EXTPROTEINUA, EXTWBCUA, EXTRBCUA    Labs were reviewed with the patient    Assessment:     1. S/P kidney transplant    2. Primary hypertension    3. Long-term use of immunosuppressant medication    4. Stage 3a chronic kidney disease        Plan:     Continue current IS therapy regimen  Given 7 anax only and patient to f/u with PCP--educated patient  not to take xanax and trazodone at the same time--patient and family verbalized understanding.     1. CKD stage: will continue follow up as per our center guidelines. patient to continue close follow up with the local General nephrologist. Education provided in appropriate fluid intake, potassium intake. Continue with oral hydration.      2. Immunosuppression: Prograf trough 8, therapeutic target 8-10. Continue Prograf 5/4, MMF 1000 Mg BID, and Prednisone   Lab Results   Component Value Date    TACROLIMUS 8.0 05/03/2022    TACROLIMUS 9.5 04/26/2022    TACROLIMUS 11.1 04/21/2022   Will closely monitor for toxicities, education provided about adherence to medicines and need to communicate any side effect to the transplant nurse or physician.      3. Allograft Function:stable at baseline for the patient. Continue follow up as per our guidelines and with the local General nephrologist. Communication will be sent today.       5/3/2022  POD 45   Creatinine 0.5 - 1.4 mg/dL 1.2   eGFR if African American >60 mL/min/1.73 m^2 >60.0   Glucose 70 - 110 mg/dL 91       4. Hypertension management:  Continue with home blood pressure monitoring, low salt and healthy life discussed with the patient.  BP Readings from Last 3 Encounters:   05/09/22 (!) 146/77   04/13/22 135/72   04/11/22 (!) 143/74   MTP, nifedipine    5. Metabolic Bone Disease/Secondary Hyperparathyroidism:calcium and phosphorus level discussed with the patient, patient will continue follow up with the general nephrologist for management of metabolic bone disease calcium and phosphorus as per our center protocol. Will monitor PTH, Vit D level, calcium.   Lab Results   Component Value Date    .1 (H) 03/18/2022    CALCIUM 8.8 05/03/2022    PHOS 2.5 (L) 05/03/2022    PHOS 2.3 (L) 04/26/2022    PHOS 2.5 (L) 04/19/2022   sennazario escobar, magox    6. Electrolytes: reviewed with the patient, essentially within the normal range no need for acute changes today, will  monitor as per our center guidelines.  Lab Results   Component Value Date     05/03/2022    K 4.1 05/03/2022     05/03/2022    CO2 22 (L) 05/03/2022    CO2 20 (L) 04/26/2022    CO2 19 (L) 04/19/2022   nabicarb     7. Anemia: will continue monitoring as per our center guidelines. No indication for acute intervention today.  Lab Results   Component Value Date    WBC 7.24 05/03/2022    HGB 13.0 (L) 05/03/2022    HCT 40.3 05/03/2022    MCV 91 05/03/2022     05/03/2022       8.Proteinuria: will continue with pr/cr ratio as per our center guidelines  Lab Results   Component Value Date    PROTEINURINE <7 04/19/2022    CREATRANDUR 53.0 04/19/2022    UTPCR Unable to calculate 04/19/2022    UTPCR Unable to calculate 04/14/2022    UTPCR 0.78 (H) 03/18/2022        9. BK virus infection screening: will continue with urine or blood PCR as per our guidelines to prevent BK virus viremia and allograft dysfunction  Lab Results   Component Value Date    BKVIRUSPCRQB <125 04/19/2022         10. Weight education: provided during the clinic visit.  Body mass index is 28.98 kg/m².     11.Patient safety education regarding immunosuppression including prophylaxis posttransplant for CMV, PCP : Education provided about vaccination and prevention of infections.    12.  Cytopenias: no significant cytopenias will monitor as per our guidelines. Medicine list reviewed including potential causes of drug-induced cytopenias  Lab Results   Component Value Date    WBC 7.24 05/03/2022    HGB 13.0 (L) 05/03/2022    HCT 40.3 05/03/2022    MCV 91 05/03/2022     05/03/2022       13. Post-transplant Prophylaxis; CMV Infection, PJP and Candida mucosistis and other indicated for this particular patient.   PCP PROPHYLAXIS: Bactrim until 9/16/22  CMV PROPHYLAXIS: Valcyte until 6/16/22  FUNGAL PROPHYLAXIS: Nystatin until 4/22/22      Exercise: reminded Telly of the importance of regular exercise for weight management, blood sugar and  blood pressure management.  I also explained exercise has been shown to improve cardiovascular health, energy level, and sleep hygiene.  Lastly, I advised him that cardiovascular complications are leading cause of death for renal transplant recipients, and regular exercise can help lower this risk.       Follow-up:   Clinic: return to transplant clinic weekly for the first month after transplant; every 2 weeks during months 2-3; then at 6-, 9-, 12-, 18-, 24-, and 36- months post-transplant to reassess for complications from immunosuppression toxicity and monitor for rejection.  Annually thereafter.    Labs: since patient remains at high risk for rejection and drug-related complications that warrant close monitoring, labs will be ordered as follows: continue twice weekly CBC, renal panel, and drug level for first month; then same labs once weekly through 3rd month post-transplant.  Urine for UA and protein/creatinine ratio monthly.  Serum BK - PCR at 1-, 3-, 6-, 9-, 12-, 18-, 24-, 36-, 48-, and 60 months post-transplant.  Hepatic panel at 1-, 2-, 3-, 6-, 9-, 12-, 18-, 24-, and 36- months post-transplant.    Judi Robert NP       Education:   Material provided to the patient.  Patient reminded to call with any health changes since these can be early signs of significant complications.  Also, I advised the patient to be sure any new medications or changes of old medications are discussed with either a pharmacist or physician knowledgeable with transplant to avoid rejection/drug toxicity related to significant drug interactions.    Patient advised that it is recommended that all transplanted patients, and their close contacts and household members receive Covid vaccination.

## 2022-05-09 NOTE — TELEPHONE ENCOUNTER
Called and spoke with patient. Informed that the medication Trazodone has been called in. Patient states that the medication does not work and that he has not slept in 3 days. Also scheduled him in August with Dr. Gillespie. There are no appts available with in the next few weeks. Patient is going follow up in [place] today with transplant.

## 2022-05-09 NOTE — LETTER
May 9, 2022        Rajan Hayes  [address] [place] [address]  Phone: [phone]  Fax: [phone]             [facility]- Transplant [facility]  [address]  [facility] [address]  Phone: [phone]   Patient: Alverto Ramirez Jr.   MR Number: [id number]   YOB: 1950   Date of Visit: 5/9/2022       Dear Dr. Rajan Hayes    Thank you for referring Alverto Ramirez to me for evaluation. Attached you will find relevant portions of my assessment and plan of care.    If you have questions, please do not hesitate to call me. I look forward to following Alverto Ramirez along with you.    Sincerely,    Judi Robert, NP    Enclosure    If you would like to receive this communication electronically, please contact [email or web address] or [phone] to request Retail Info Link access.    Retail Info Link is a tool which provides read-only access to select patient information with whom you have a relationship. Its easy to use and provides real time access to review your patients record including encounter summaries, notes, results, and demographic information.    If you feel you have received this communication in error or would no longer like to receive these types of communications, please e-mail [email or web address]

## 2022-05-10 ENCOUNTER — LAB VISIT (OUTPATIENT)
Dept: LAB | Facility: HOSPITAL | Age: 72
End: 2022-05-10
Attending: INTERNAL MEDICINE
Payer: MEDICARE

## 2022-05-10 DIAGNOSIS — Z94.0 KIDNEY REPLACED BY TRANSPLANT: ICD-10-CM

## 2022-05-10 LAB
ALBUMIN SERPL BCP-MCNC: 4 G/DL (ref 3.5–5.2)
ANION GAP SERPL CALC-SCNC: 10 MMOL/L (ref 8–16)
BASOPHILS # BLD AUTO: 0.03 K/UL (ref 0–0.2)
BASOPHILS NFR BLD: 0.4 % (ref 0–1.9)
BUN SERPL-MCNC: 19 MG/DL (ref 8–23)
CALCIUM SERPL-MCNC: 8.7 MG/DL (ref 8.7–10.5)
CHLORIDE SERPL-SCNC: 107 MMOL/L (ref 95–110)
CO2 SERPL-SCNC: 19 MMOL/L (ref 23–29)
CREAT SERPL-MCNC: 1.4 MG/DL (ref 0.5–1.4)
DIFFERENTIAL METHOD: ABNORMAL
EOSINOPHIL # BLD AUTO: 0 K/UL (ref 0–0.5)
EOSINOPHIL NFR BLD: 0.6 % (ref 0–8)
ERYTHROCYTE [DISTWIDTH] IN BLOOD BY AUTOMATED COUNT: 15.7 % (ref 11.5–14.5)
EST. GFR  (AFRICAN AMERICAN): 57.2 ML/MIN/1.73 M^2
EST. GFR  (NON AFRICAN AMERICAN): 49.5 ML/MIN/1.73 M^2
GLUCOSE SERPL-MCNC: 99 MG/DL (ref 70–110)
HCT VFR BLD AUTO: 42.5 % (ref 40–54)
HGB BLD-MCNC: 13.4 G/DL (ref 14–18)
IMM GRANULOCYTES # BLD AUTO: 0.11 K/UL (ref 0–0.04)
IMM GRANULOCYTES NFR BLD AUTO: 1.6 % (ref 0–0.5)
LYMPHOCYTES # BLD AUTO: 0.7 K/UL (ref 1–4.8)
LYMPHOCYTES NFR BLD: 9.7 % (ref 18–48)
MAGNESIUM SERPL-MCNC: 1.7 MG/DL (ref 1.6–2.6)
MCH RBC QN AUTO: 29.3 PG (ref 27–31)
MCHC RBC AUTO-ENTMCNC: 31.5 G/DL (ref 32–36)
MCV RBC AUTO: 93 FL (ref 82–98)
MONOCYTES # BLD AUTO: 0.5 K/UL (ref 0.3–1)
MONOCYTES NFR BLD: 6.8 % (ref 4–15)
NEUTROPHILS # BLD AUTO: 5.7 K/UL (ref 1.8–7.7)
NEUTROPHILS NFR BLD: 80.9 % (ref 38–73)
NRBC BLD-RTO: 0 /100 WBC
PHOSPHATE SERPL-MCNC: 2.8 MG/DL (ref 2.7–4.5)
PLATELET # BLD AUTO: 202 K/UL (ref 150–450)
PMV BLD AUTO: 10.8 FL (ref 9.2–12.9)
POTASSIUM SERPL-SCNC: 4.2 MMOL/L (ref 3.5–5.1)
RBC # BLD AUTO: 4.57 M/UL (ref 4.6–6.2)
SODIUM SERPL-SCNC: 136 MMOL/L (ref 136–145)
WBC # BLD AUTO: 7.02 K/UL (ref 3.9–12.7)

## 2022-05-10 PROCEDURE — 83735 ASSAY OF MAGNESIUM: CPT | Performed by: INTERNAL MEDICINE

## 2022-05-10 PROCEDURE — 80197 ASSAY OF TACROLIMUS: CPT | Performed by: INTERNAL MEDICINE

## 2022-05-10 PROCEDURE — 85025 COMPLETE CBC W/AUTO DIFF WBC: CPT | Performed by: INTERNAL MEDICINE

## 2022-05-10 PROCEDURE — 80069 RENAL FUNCTION PANEL: CPT | Performed by: INTERNAL MEDICINE

## 2022-05-10 PROCEDURE — 36415 COLL VENOUS BLD VENIPUNCTURE: CPT | Performed by: INTERNAL MEDICINE

## 2022-05-11 ENCOUNTER — PATIENT MESSAGE (OUTPATIENT)
Dept: RESEARCH | Facility: CLINIC | Age: 72
End: 2022-05-11
Payer: MEDICARE

## 2022-05-11 LAB — TACROLIMUS BLD-MCNC: 8.7 NG/ML (ref 5–15)

## 2022-05-16 ENCOUNTER — LAB VISIT (OUTPATIENT)
Dept: LAB | Facility: HOSPITAL | Age: 72
End: 2022-05-16
Attending: INTERNAL MEDICINE
Payer: MEDICARE

## 2022-05-16 DIAGNOSIS — D47.2 MONOCLONAL GAMMOPATHY: ICD-10-CM

## 2022-05-16 DIAGNOSIS — N18.6 ANEMIA IN ESRD (END-STAGE RENAL DISEASE): Chronic | ICD-10-CM

## 2022-05-16 DIAGNOSIS — N18.6 ESRD ON DIALYSIS: ICD-10-CM

## 2022-05-16 DIAGNOSIS — D63.1 ANEMIA IN ESRD (END-STAGE RENAL DISEASE): Chronic | ICD-10-CM

## 2022-05-16 DIAGNOSIS — E87.20 ACIDOSIS: ICD-10-CM

## 2022-05-16 DIAGNOSIS — Z99.2 ESRD ON DIALYSIS: ICD-10-CM

## 2022-05-16 DIAGNOSIS — Z94.0 S/P KIDNEY TRANSPLANT: Primary | ICD-10-CM

## 2022-05-16 LAB
ALBUMIN SERPL BCP-MCNC: 3.9 G/DL (ref 3.5–5.2)
ALP SERPL-CCNC: 91 U/L (ref 55–135)
ALT SERPL W/O P-5'-P-CCNC: 42 U/L (ref 10–44)
ANION GAP SERPL CALC-SCNC: 13 MMOL/L (ref 8–16)
AST SERPL-CCNC: 16 U/L (ref 10–40)
BASOPHILS # BLD AUTO: 0.03 K/UL (ref 0–0.2)
BASOPHILS NFR BLD: 0.3 % (ref 0–1.9)
BILIRUB SERPL-MCNC: 0.4 MG/DL (ref 0.1–1)
BUN SERPL-MCNC: 21 MG/DL (ref 8–23)
CALCIUM SERPL-MCNC: 8.8 MG/DL (ref 8.7–10.5)
CHLORIDE SERPL-SCNC: 106 MMOL/L (ref 95–110)
CO2 SERPL-SCNC: 20 MMOL/L (ref 23–29)
CREAT SERPL-MCNC: 1.4 MG/DL (ref 0.5–1.4)
DIFFERENTIAL METHOD: ABNORMAL
EOSINOPHIL # BLD AUTO: 0 K/UL (ref 0–0.5)
EOSINOPHIL NFR BLD: 0.3 % (ref 0–8)
ERYTHROCYTE [DISTWIDTH] IN BLOOD BY AUTOMATED COUNT: 15.6 % (ref 11.5–14.5)
EST. GFR  (AFRICAN AMERICAN): 57 ML/MIN/1.73 M^2
EST. GFR  (NON AFRICAN AMERICAN): 49 ML/MIN/1.73 M^2
GLUCOSE SERPL-MCNC: 120 MG/DL (ref 70–110)
HCT VFR BLD AUTO: 43.3 % (ref 40–54)
HGB BLD-MCNC: 14.2 G/DL (ref 14–18)
IMM GRANULOCYTES # BLD AUTO: 0.13 K/UL (ref 0–0.04)
IMM GRANULOCYTES NFR BLD AUTO: 1.5 % (ref 0–0.5)
LYMPHOCYTES # BLD AUTO: 0.3 K/UL (ref 1–4.8)
LYMPHOCYTES NFR BLD: 3.9 % (ref 18–48)
MCH RBC QN AUTO: 30 PG (ref 27–31)
MCHC RBC AUTO-ENTMCNC: 32.8 G/DL (ref 32–36)
MCV RBC AUTO: 91 FL (ref 82–98)
MONOCYTES # BLD AUTO: 0.4 K/UL (ref 0.3–1)
MONOCYTES NFR BLD: 4.8 % (ref 4–15)
NEUTROPHILS # BLD AUTO: 7.8 K/UL (ref 1.8–7.7)
NEUTROPHILS NFR BLD: 89.2 % (ref 38–73)
NRBC BLD-RTO: 0 /100 WBC
PLATELET # BLD AUTO: 204 K/UL (ref 150–450)
PMV BLD AUTO: 10.1 FL (ref 9.2–12.9)
POTASSIUM SERPL-SCNC: 4.2 MMOL/L (ref 3.5–5.1)
PROT SERPL-MCNC: 6.8 G/DL (ref 6–8.4)
RBC # BLD AUTO: 4.74 M/UL (ref 4.6–6.2)
SODIUM SERPL-SCNC: 139 MMOL/L (ref 136–145)
WBC # BLD AUTO: 8.75 K/UL (ref 3.9–12.7)

## 2022-05-16 PROCEDURE — 80053 COMPREHEN METABOLIC PANEL: CPT | Performed by: INTERNAL MEDICINE

## 2022-05-16 PROCEDURE — 84165 PROTEIN E-PHORESIS SERUM: CPT | Mod: 26,,, | Performed by: PATHOLOGY

## 2022-05-16 PROCEDURE — 86334 IMMUNOFIX E-PHORESIS SERUM: CPT | Performed by: INTERNAL MEDICINE

## 2022-05-16 PROCEDURE — 85025 COMPLETE CBC W/AUTO DIFF WBC: CPT | Performed by: INTERNAL MEDICINE

## 2022-05-16 PROCEDURE — 84165 PATHOLOGIST INTERPRETATION SPE: ICD-10-PCS | Mod: 26,,, | Performed by: PATHOLOGY

## 2022-05-16 PROCEDURE — 86334 PATHOLOGIST INTERPRETATION IFE: ICD-10-PCS | Mod: 26,,, | Performed by: PATHOLOGY

## 2022-05-16 PROCEDURE — 86334 IMMUNOFIX E-PHORESIS SERUM: CPT | Mod: 26,,, | Performed by: PATHOLOGY

## 2022-05-16 PROCEDURE — 84165 PROTEIN E-PHORESIS SERUM: CPT | Performed by: INTERNAL MEDICINE

## 2022-05-16 PROCEDURE — 36415 COLL VENOUS BLD VENIPUNCTURE: CPT | Performed by: INTERNAL MEDICINE

## 2022-05-16 PROCEDURE — 83520 IMMUNOASSAY QUANT NOS NONAB: CPT | Mod: 59 | Performed by: INTERNAL MEDICINE

## 2022-05-16 NOTE — TELEPHONE ENCOUNTER
Patient to try OTC ear drops for the ringing. Patient stated it started about a week ago. Encouraged pt to call if ringing continues.----- Message from Norma Sandoval RN sent at 5/16/2022  3:15 PM CDT -----  Regarding: FW: call back    ----- Message -----  From: Stephen Abarca  Sent: 5/16/2022   2:34 PM CDT  To: [facility] Transplant Clinical  Subject: call back                                        Pt call in regards to rx refill for sodium bicarbonate 650 MG tablet pt also would like to know if he take ear drop for ringing in his ear requesting call back    [facility] - [facility], [place] - [address] [place] [address]        Call

## 2022-05-17 ENCOUNTER — OFFICE VISIT (OUTPATIENT)
Dept: HEMATOLOGY/ONCOLOGY | Facility: CLINIC | Age: 72
End: 2022-05-17
Payer: MEDICARE

## 2022-05-17 ENCOUNTER — LAB VISIT (OUTPATIENT)
Dept: LAB | Facility: HOSPITAL | Age: 72
End: 2022-05-17
Attending: INTERNAL MEDICINE
Payer: MEDICARE

## 2022-05-17 VITALS
DIASTOLIC BLOOD PRESSURE: 89 MMHG | HEIGHT: 67 IN | SYSTOLIC BLOOD PRESSURE: 146 MMHG | TEMPERATURE: 97 F | WEIGHT: 187.38 LBS | HEART RATE: 76 BPM | BODY MASS INDEX: 29.41 KG/M2 | OXYGEN SATURATION: 98 %

## 2022-05-17 DIAGNOSIS — D47.2 MONOCLONAL GAMMOPATHY: Primary | ICD-10-CM

## 2022-05-17 DIAGNOSIS — D63.1 ANEMIA IN ESRD (END-STAGE RENAL DISEASE): Chronic | ICD-10-CM

## 2022-05-17 DIAGNOSIS — Z94.0 KIDNEY REPLACED BY TRANSPLANT: ICD-10-CM

## 2022-05-17 DIAGNOSIS — N18.6 ANEMIA IN ESRD (END-STAGE RENAL DISEASE): Chronic | ICD-10-CM

## 2022-05-17 LAB
ALBUMIN SERPL BCP-MCNC: 3.9 G/DL (ref 3.5–5.2)
ALBUMIN SERPL BCP-MCNC: 3.9 G/DL (ref 3.5–5.2)
ALBUMIN SERPL ELPH-MCNC: 3.87 G/DL (ref 3.35–5.55)
ALP SERPL-CCNC: 86 U/L (ref 55–135)
ALPHA1 GLOB SERPL ELPH-MCNC: 0.34 G/DL (ref 0.17–0.41)
ALPHA2 GLOB SERPL ELPH-MCNC: 0.97 G/DL (ref 0.43–0.99)
ALT SERPL W/O P-5'-P-CCNC: 36 U/L (ref 10–44)
ANION GAP SERPL CALC-SCNC: 9 MMOL/L (ref 8–16)
AST SERPL-CCNC: 16 U/L (ref 10–40)
B-GLOBULIN SERPL ELPH-MCNC: 0.69 G/DL (ref 0.5–1.1)
BASOPHILS # BLD AUTO: 0.03 K/UL (ref 0–0.2)
BASOPHILS NFR BLD: 0.4 % (ref 0–1.9)
BILIRUB DIRECT SERPL-MCNC: 0.2 MG/DL (ref 0.1–0.3)
BILIRUB SERPL-MCNC: 0.5 MG/DL (ref 0.1–1)
BUN SERPL-MCNC: 19 MG/DL (ref 8–23)
CALCIUM SERPL-MCNC: 8.7 MG/DL (ref 8.7–10.5)
CHLORIDE SERPL-SCNC: 108 MMOL/L (ref 95–110)
CO2 SERPL-SCNC: 21 MMOL/L (ref 23–29)
CREAT SERPL-MCNC: 1.2 MG/DL (ref 0.5–1.4)
DIFFERENTIAL METHOD: ABNORMAL
EOSINOPHIL # BLD AUTO: 0.1 K/UL (ref 0–0.5)
EOSINOPHIL NFR BLD: 0.9 % (ref 0–8)
ERYTHROCYTE [DISTWIDTH] IN BLOOD BY AUTOMATED COUNT: 15.4 % (ref 11.5–14.5)
EST. GFR  (AFRICAN AMERICAN): >60 ML/MIN/1.73 M^2
EST. GFR  (NON AFRICAN AMERICAN): 59.6 ML/MIN/1.73 M^2
GAMMA GLOB SERPL ELPH-MCNC: 0.53 G/DL (ref 0.67–1.58)
GLUCOSE SERPL-MCNC: 93 MG/DL (ref 70–110)
HCT VFR BLD AUTO: 41.7 % (ref 40–54)
HGB BLD-MCNC: 13.6 G/DL (ref 14–18)
IMM GRANULOCYTES # BLD AUTO: 0.11 K/UL (ref 0–0.04)
IMM GRANULOCYTES NFR BLD AUTO: 1.6 % (ref 0–0.5)
INTERPRETATION SERPL IFE-IMP: NORMAL
KAPPA LC SER QL IA: 0.96 MG/DL (ref 0.33–1.94)
KAPPA LC/LAMBDA SER IA: 1.37 (ref 0.26–1.65)
LAMBDA LC SER QL IA: 0.7 MG/DL (ref 0.57–2.63)
LYMPHOCYTES # BLD AUTO: 0.6 K/UL (ref 1–4.8)
LYMPHOCYTES NFR BLD: 8.9 % (ref 18–48)
MAGNESIUM SERPL-MCNC: 1.7 MG/DL (ref 1.6–2.6)
MCH RBC QN AUTO: 29.3 PG (ref 27–31)
MCHC RBC AUTO-ENTMCNC: 32.6 G/DL (ref 32–36)
MCV RBC AUTO: 90 FL (ref 82–98)
MONOCYTES # BLD AUTO: 0.5 K/UL (ref 0.3–1)
MONOCYTES NFR BLD: 7.4 % (ref 4–15)
NEUTROPHILS # BLD AUTO: 5.7 K/UL (ref 1.8–7.7)
NEUTROPHILS NFR BLD: 80.8 % (ref 38–73)
NRBC BLD-RTO: 0 /100 WBC
PHOSPHATE SERPL-MCNC: 2.5 MG/DL (ref 2.7–4.5)
PLATELET # BLD AUTO: 206 K/UL (ref 150–450)
PMV BLD AUTO: 10.5 FL (ref 9.2–12.9)
POTASSIUM SERPL-SCNC: 4.4 MMOL/L (ref 3.5–5.1)
PROT SERPL-MCNC: 6.1 G/DL (ref 6–8.4)
PROT SERPL-MCNC: 6.4 G/DL (ref 6–8.4)
RBC # BLD AUTO: 4.64 M/UL (ref 4.6–6.2)
SODIUM SERPL-SCNC: 138 MMOL/L (ref 136–145)
WBC # BLD AUTO: 7.04 K/UL (ref 3.9–12.7)

## 2022-05-17 PROCEDURE — 36415 COLL VENOUS BLD VENIPUNCTURE: CPT | Performed by: INTERNAL MEDICINE

## 2022-05-17 PROCEDURE — 80197 ASSAY OF TACROLIMUS: CPT | Performed by: INTERNAL MEDICINE

## 2022-05-17 PROCEDURE — 99214 OFFICE O/P EST MOD 30 MIN: CPT | Mod: S$GLB,,,

## 2022-05-17 PROCEDURE — 85025 COMPLETE CBC W/AUTO DIFF WBC: CPT | Performed by: INTERNAL MEDICINE

## 2022-05-17 PROCEDURE — 99999 PR PBB SHADOW E&M-EST. PATIENT-LVL V: CPT | Mod: PBBFAC,,,

## 2022-05-17 PROCEDURE — 84075 ASSAY ALKALINE PHOSPHATASE: CPT | Performed by: INTERNAL MEDICINE

## 2022-05-17 PROCEDURE — 80069 RENAL FUNCTION PANEL: CPT | Performed by: INTERNAL MEDICINE

## 2022-05-17 PROCEDURE — 83735 ASSAY OF MAGNESIUM: CPT | Performed by: INTERNAL MEDICINE

## 2022-05-17 PROCEDURE — 99999 PR PBB SHADOW E&M-EST. PATIENT-LVL V: ICD-10-PCS | Mod: PBBFAC,,,

## 2022-05-17 PROCEDURE — 87799 DETECT AGENT NOS DNA QUANT: CPT | Performed by: INTERNAL MEDICINE

## 2022-05-17 PROCEDURE — 99214 PR OFFICE/OUTPT VISIT, EST, LEVL IV, 30-39 MIN: ICD-10-PCS | Mod: S$GLB,,,

## 2022-05-17 RX ORDER — SODIUM BICARBONATE 650 MG/1
1300 TABLET ORAL 2 TIMES DAILY
Qty: 120 TABLET | Refills: 11 | Status: SHIPPED | OUTPATIENT
Start: 2022-05-17 | End: 2022-05-25 | Stop reason: SDUPTHER

## 2022-05-17 NOTE — PROGRESS NOTES
Subjective:       Patient ID: Alverto Ramirez Jr. is a 71 y.o. male.    Chief Complaint: MGUS    HPI: Mr. Ramirez is a 71 year old male who is following up for his IgM monoclonal gammopathy (MGUS) and anemia in the setting of ESRD. Patient was on peritoneal dialysis. He recently had right kidney transplant on 3/19/22. He follows with transplant team in [facility]. He is on immunosuppressants. He is tolerating well despite inability to sleep due to steroids.    Today: Patient states he is feeling well. He has been ambulating well (took the stairs to the fourth floor). He hasn't been able to sleep due to steroids. He states he is fatigued due to that. He denies any fevers, night sweats, weight loss, melena, hematuria, bleeding elsewhere, congestion, dizziness. He has some tinnitus, but has used some ear drops with relief.     Social History     Socioeconomic History    Marital status:    Occupational History     Employer: retired   Tobacco Use    Smoking status: Former Smoker     Packs/day: 1.00     Years: 15.00     Pack years: 15.00     Types: Cigarettes     Quit date: 1984     Years since quittin.4    Smokeless tobacco: Never Used   Substance and Sexual Activity    Alcohol use: Yes     Alcohol/week: 5.0 - 7.0 standard drinks     Types: 5 - 7 Standard drinks or equivalent per week     Comment: stopped drinking    Drug use: No    Sexual activity: Yes     Partners: Female   Social History Narrative    Retired from [employer]     for 41 y.o.    2 children    No history of blood transfusions    No donors       Past Medical History:   Diagnosis Date    Anemia in CKD (chronic kidney disease)     Atrial fibrillation     CKD (chronic kidney disease) stage 4, GFR 15 2014    Colon cancer screening 2014    Elevated PSA 2014    HTN (hypertension) diagnosed in his 40s 10/16/2014    Monoclonal gammopathy 2014    Phobic anxiety disorder 2014    Proteinuria 2014     Stage 3a chronic kidney disease 3/25/2022       Family History   Problem Relation Age of Onset    Heart disease Father     Dementia Father     Diabetes Mother     Cataracts Mother     Glaucoma Mother     Hypertension Sister     Cancer Brother         prostate    Kidney disease Sister         ESRD    Cancer Paternal Uncle        Past Surgical History:   Procedure Laterality Date    AV FISTULA PLACEMENT  11/2014    COLONOSCOPY N/A 12/29/2017    Procedure: COLONOSCOPY;  Surgeon: Tony Peacock III, MD;  Location: [facility];  Service: Endoscopy;  Laterality: N/A;    KIDNEY TRANSPLANT Right 3/18/2022    Procedure: TRANSPLANT, KIDNEY;  Surgeon: Victoriano Thomas MD;  Location: [address];  Service: Transplant;  Laterality: Right;    PERITONEAL CATHETER INSERTION      VASECTOMY      VASECTOMY         Review of Systems   Constitutional: Positive for fatigue. Negative for activity change, appetite change, chills, diaphoresis, fever and unexpected weight change.   HENT: Negative for congestion, nosebleeds, postnasal drip and rhinorrhea.    Respiratory: Negative for cough and shortness of breath.    Cardiovascular: Negative for chest pain and leg swelling.   Gastrointestinal: Negative for blood in stool, constipation, diarrhea, nausea and vomiting.   Genitourinary: Negative for hematuria.   Musculoskeletal: Negative for arthralgias.   Skin: Negative for rash.   Neurological: Negative for dizziness, weakness, light-headedness, numbness and headaches.         Medication List with Changes/Refills   Current Medications    ALPRAZOLAM (XANAX) 1 MG TABLET    Take 1 tablet (1 mg total) by mouth 3 (three) times daily as needed for Anxiety.    CHOLECALCIFEROL, VITAMIN D3, (VITAMIN D3) 50 MCG (2,000 UNIT) TAB    Take 1 tablet (2,000 Units total) by mouth once daily.    CINACALCET (SENSIPAR) 30 MG TAB    Take 1 tablet (30 mg total) by mouth daily with breakfast.    FLUTICASONE PROPIONATE (FLONASE) 50 MCG/ACTUATION  NASAL SPRAY    2 sprays (100 mcg total) by Each Nostril route as needed for Allergies.    K PHOS DI & MONO-SOD PHOS MONO (K-PHOS-NEUTRAL) 250 MG TAB    Take 1 tablet by mouth 3 (three) times daily.    MAGNESIUM OXIDE (MAG-OX) 400 MG (241.3 MG MAGNESIUM) TABLET    Take 1 tablet (400 mg total) by mouth 2 (two) times daily.    METOPROLOL TARTRATE (LOPRESSOR) 50 MG TABLET    Take 1 tablet (50 mg total) by mouth 2 (two) times daily.    MYCOPHENOLATE (CELLCEPT) 250 MG CAP    Take 4 capsules (1,000 mg total) by mouth 2 (two) times daily.    NIFEDIPINE (PROCARDIA-XL) 60 MG (OSM) 24 HR TABLET    Take 1 tablet (60 mg total) by mouth 2 (two) times a day.    PREDNISONE (DELTASONE) 5 MG TABLET    Take by mouth daily: 20mg 3/22/22 -4/21, 15mg 4/22-5/22, 10mg 5/23-6/22, 5mg 6/23- thereafter    SILDENAFIL (VIAGRA) 50 MG TABLET    Take 1 tablet (50 mg total) by mouth daily as needed for Erectile Dysfunction.    SODIUM BICARBONATE 650 MG TABLET    Take 2 tablets (1,300 mg total) by mouth 2 (two) times daily.    SULFAMETHOXAZOLE-TRIMETHOPRIM 400-80MG (BACTRIM,SEPTRA) 400-80 MG PER TABLET    Take 1 tablet by mouth once daily.    TACROLIMUS (PROGRAF) 1 MG CAP    Take 5 capsules (5 mg total) by mouth every morning AND 4 capsules (4 mg total) every evening.    TAMSULOSIN (FLOMAX) 0.4 MG CAP    Take 1 capsule (0.4 mg total) by mouth once daily.    TRAZODONE (DESYREL) 50 MG TABLET    Take 1 tablet (50 mg total) by mouth nightly as needed for Insomnia.    VALGANCICLOVIR (VALCYTE) 450 MG TAB    Take 2 tablets (900 mg total) by mouth once daily.     Objective:     Vitals:    05/17/22 1552   BP: (!) 146/89   Pulse: 76   Temp:        Physical Exam  Constitutional:       General: He is not in acute distress.     Appearance: He is not ill-appearing, toxic-appearing or diaphoretic.   HENT:      Head: Normocephalic and atraumatic.   Eyes:      Conjunctiva/sclera: Conjunctivae normal.   Cardiovascular:      Rate and Rhythm: Normal rate.      Pulses:  Normal pulses.   Pulmonary:      Effort: Pulmonary effort is normal.   Abdominal:      General: Bowel sounds are normal.      Palpations: Abdomen is soft.   Musculoskeletal:      Right lower leg: No edema.      Left lower leg: No edema.   Skin:     General: Skin is warm and dry.      Coloration: Skin is not jaundiced or pale.      Findings: No bruising or erythema.   Neurological:      Mental Status: He is alert.      Motor: No weakness.      Gait: Gait normal.   Psychiatric:         Mood and Affect: Mood normal.         Behavior: Behavior normal.         Thought Content: Thought content normal.            Labs/Results:  Lab Results   Component Value Date    WBC 7.04 05/17/2022    RBC 4.64 05/17/2022    HGB 13.6 (L) 05/17/2022    HCT 41.7 05/17/2022    MCV 90 05/17/2022    MCH 29.3 05/17/2022    MCHC 32.6 05/17/2022    RDW 15.4 (H) 05/17/2022     05/17/2022    MPV 10.5 05/17/2022    GRAN 5.7 05/17/2022    GRAN 80.8 (H) 05/17/2022    LYMPH 0.6 (L) 05/17/2022    LYMPH 8.9 (L) 05/17/2022    MONO 0.5 05/17/2022    MONO 7.4 05/17/2022    EOS 0.1 05/17/2022    BASO 0.03 05/17/2022    EOSINOPHIL 0.9 05/17/2022    BASOPHIL 0.4 05/17/2022     CMP  Sodium   Date Value Ref Range Status   05/16/2022 139 136 - 145 mmol/L Final     Potassium   Date Value Ref Range Status   05/16/2022 4.2 3.5 - 5.1 mmol/L Final     Chloride   Date Value Ref Range Status   05/16/2022 106 95 - 110 mmol/L Final     CO2   Date Value Ref Range Status   05/16/2022 20 (L) 23 - 29 mmol/L Final     Glucose   Date Value Ref Range Status   05/16/2022 120 (H) 70 - 110 mg/dL Final     BUN   Date Value Ref Range Status   05/16/2022 21 8 - 23 mg/dL Final     Creatinine   Date Value Ref Range Status   05/16/2022 1.4 0.5 - 1.4 mg/dL Final     Calcium   Date Value Ref Range Status   05/16/2022 8.8 8.7 - 10.5 mg/dL Final     Total Protein   Date Value Ref Range Status   05/16/2022 6.8 6.0 - 8.4 g/dL Final     Albumin   Date Value Ref Range Status   05/16/2022  3.9 3.5 - 5.2 g/dL Final     Total Bilirubin   Date Value Ref Range Status   05/16/2022 0.4 0.1 - 1.0 mg/dL Final     Comment:     For infants and newborns, interpretation of results should be based  on gestational age, weight and in agreement with clinical  observations.    Premature Infant recommended reference ranges:  Up to 24 hours.............<8.0 mg/dL  Up to 48 hours............<12.0 mg/dL  3-5 days..................<15.0 mg/dL  6-29 days.................<15.0 mg/dL       Alkaline Phosphatase   Date Value Ref Range Status   05/16/2022 91 55 - 135 U/L Final     AST   Date Value Ref Range Status   05/16/2022 16 10 - 40 U/L Final     ALT   Date Value Ref Range Status   05/16/2022 42 10 - 44 U/L Final     Anion Gap   Date Value Ref Range Status   05/16/2022 13 8 - 16 mmol/L Final     eGFR if    Date Value Ref Range Status   05/16/2022 57 (A) >60 mL/min/1.73 m^2 Final     eGFR if non    Date Value Ref Range Status   05/16/2022 49 (A) >60 mL/min/1.73 m^2 Final     Comment:     Calculation used to obtain the estimated glomerular filtration  rate (eGFR) is the CKD-EPI equation.         Latest Reference Range & Units 05/16/22 10:47   Protein, Serum 6.0 - 8.4 g/dL 6.4 [1]   Albumin grams/dl 3.35 - 5.55 g/dL 3.87   Alpha-1 grams/dl 0.17 - 0.41 g/dL 0.34   Alpha-2 0.43 - 0.99 g/dL 0.97   Beta 0.50 - 1.10 g/dL 0.69   Gamma 0.67 - 1.58 g/dL 0.53 (L)   Kappa Free Light Chains 0.33 - 1.94 mg/dL 0.96   Lambda Free Light Chains 0.57 - 2.63 mg/dL 0.70   Kappa/Lambda FLC Ratio 0.26 - 1.65  1.37 [2]   Immunofix Interp.  SEE COMMENT [3]        Assessment:     Problem List Items Addressed This Visit        Oncology    Anemia in ESRD (end-stage renal disease) (Chronic)    Monoclonal gammopathy - Primary        Plan:     Monoclonal gammopathy  --continue to monitor every 3 months.   --IgM Monoclonal gammopathy of undetermined significance  --denies any B symptoms   --light chains have returned to  normal.     Anemia in ESRD (end-stage renal disease)  --kidney transplant on 3/19/22  --continue to follow with transplant team     Follow-Up: 3 months with labs prior (cbc cmp, zoran, spep, light chains)    Elham Sofia PA-C  Hematology Oncology

## 2022-05-18 DIAGNOSIS — Z94.0 S/P KIDNEY TRANSPLANT: ICD-10-CM

## 2022-05-18 LAB
PATHOLOGIST INTERPRETATION IFE: NORMAL
PATHOLOGIST INTERPRETATION SPE: NORMAL
TACROLIMUS BLD-MCNC: 8.5 NG/ML (ref 5–15)

## 2022-05-18 NOTE — TELEPHONE ENCOUNTER
----- Message from Surya Avila sent at 5/18/2022  1:18 PM CDT -----  Regarding: Rx refill  Patient calling to get Rx refill on tacrolimus (PROGRAF) 1 MG Cap. Patient enough for 3 or 4 days left.      Call: [phone] ([employer]          [id number] - [place] - [address] AT [facility] [address] [address]  Phone: [phone] Fax: [phone]

## 2022-05-19 RX ORDER — TACROLIMUS 1 MG/1
CAPSULE ORAL
Qty: 420 CAPSULE | Refills: 11 | Status: SHIPPED | OUTPATIENT
Start: 2022-05-19 | End: 2022-05-25

## 2022-05-19 NOTE — PROGRESS NOTES
Kidney Post-Transplant Assessment    Referring Physician: Chris Adair  Current Nephrologist: Rajan Hayes    ORGAN: RIGHT KIDNEY  Donor Type: donation after brain death  PHS Increased Risk: yes  Cold Ischemia: 1,354 mins  Induction Medications: thymoglobulin    Subjective:     CC:  Reassessment of renal allograft function and management of chronic immunosuppression.    HPI:  Mr. Ramirez is a 71 y.o. 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 His most recent creatinine is 1.2. He takes mycophenolate mofetil, prednisone and tacrolimus for maintenance immunosuppression. His post transplant course has been complicated by urinary retention, severe diarrhea, and hypophosphatemia.    Interval History:    Feels well, main complaint is trouble sleeping. Staying active, no CP or SOB with exertion. No diarrhea or vomiting. Tolerating IS without issue, no pain over allograft.    Intake- 2 L water daily  UOP-no issues  BP-at goal 133/78  Peripheral edema-none  Weight-187 lb, stable  Appetite--great  Wound-healed      Current Outpatient Medications   Medication Sig Dispense Refill    ALPRAZolam (XANAX) 1 MG tablet Take 1 tablet (1 mg total) by mouth 3 (three) times daily as needed for Anxiety. 7 tablet 0    cholecalciferol, vitamin D3, (VITAMIN D3) 50 mcg (2,000 unit) Tab Take 1 tablet (2,000 Units total) by mouth once daily. 60 tablet 11    cinacalcet (SENSIPAR) 30 MG Tab Take 1 tablet (30 mg total) by mouth daily with breakfast. 30 tablet 11    fluticasone propionate (FLONASE) 50 mcg/actuation nasal spray 2 sprays (100 mcg total) by Each Nostril route as needed for Allergies. 16 g 3    k phos di & mono-sod phos mono (K-PHOS-NEUTRAL) 250 mg Tab Take 1 tablet by mouth 3 (three) times daily. 90 tablet 11    magnesium oxide (MAG-OX) 400 mg (241.3 mg magnesium) tablet Take 1  tablet (400 mg total) by mouth 2 (two) times daily. 60 tablet 11    metoprolol tartrate (LOPRESSOR) 50 MG tablet Take 1 tablet (50 mg total) by mouth 2 (two) times daily. 180 tablet 3    mycophenolate (CELLCEPT) 250 mg Cap Take 4 capsules (1,000 mg total) by mouth 2 (two) times daily. 240 capsule 11    NIFEdipine (PROCARDIA-XL) 60 MG (OSM) 24 hr tablet Take 1 tablet (60 mg total) by mouth 2 (two) times a day. 180 tablet 3    predniSONE (DELTASONE) 5 MG tablet Take by mouth daily: 20mg 3/22/22 -4/21, 15mg 4/22-5/22, 10mg 5/23-6/22, 5mg 6/23- thereafter 120 tablet 5    sildenafiL (VIAGRA) 50 MG tablet Take 1 tablet (50 mg total) by mouth daily as needed for Erectile Dysfunction. 30 tablet 11    sodium bicarbonate 650 MG tablet Take 2 tablets (1,300 mg total) by mouth 2 (two) times daily. 120 tablet 11    sulfamethoxazole-trimethoprim 400-80mg (BACTRIM,SEPTRA) 400-80 mg per tablet Take 1 tablet by mouth once daily. 90 tablet 1    tacrolimus (PROGRAF) 1 MG Cap Take 5 capsules (5 mg total) by mouth every morning AND 4 capsules (4 mg total) every evening. 420 capsule 11    tamsulosin (FLOMAX) 0.4 mg Cap Take 1 capsule (0.4 mg total) by mouth once daily. 30 capsule 2    traZODone (DESYREL) 50 MG tablet Take 1 tablet (50 mg total) by mouth nightly as needed for Insomnia. 30 tablet 0    valGANciclovir (VALCYTE) 450 mg Tab Take 2 tablets (900 mg total) by mouth once daily. 104 tablet 0    sodium bicarbonate 650 MG tablet Take 2 tablets (1,300 mg total) by mouth 2 (two) times a day. 120 tablet 0     No current facility-administered medications for this visit.       Past Medical History:   Diagnosis Date    Anemia in CKD (chronic kidney disease)     Atrial fibrillation     CKD (chronic kidney disease) stage 4, GFR 15 11/26/2014    Colon cancer screening 12/19/2014    Elevated PSA 11/26/2014    HTN (hypertension) diagnosed in his 40s 10/16/2014    Monoclonal gammopathy 11/26/2014    Phobic anxiety disorder  "11/26/2014    Proteinuria 11/26/2014    Stage 3a chronic kidney disease 3/25/2022       Review of Systems   Constitutional: Negative for activity change, appetite change and fever.   HENT: Negative for congestion, mouth sores and sore throat.    Eyes: Negative for visual disturbance.   Respiratory: Negative for cough, chest tightness and shortness of breath.    Cardiovascular: Negative for chest pain, palpitations and leg swelling.   Gastrointestinal: Negative for abdominal distention, abdominal pain, constipation, diarrhea and nausea.   Genitourinary: Negative for difficulty urinating, frequency and hematuria.   Musculoskeletal: Negative for arthralgias and gait problem.   Skin: Negative for wound.   Allergic/Immunologic: Positive for immunocompromised state. Negative for environmental allergies and food allergies.   Neurological: Negative for dizziness, weakness and numbness.   Psychiatric/Behavioral: Negative for sleep disturbance. The patient is not nervous/anxious.        Objective:   Blood pressure 133/78, pulse 71, temperature 97.7 °F (36.5 °C), temperature source Temporal, resp. rate 16, height 5' 7.5" (1.715 m), weight 85 kg (187 lb 6.3 oz), SpO2 97 %.body mass index is 28.92 kg/m².    Physical Exam  Vitals and nursing note reviewed.   Constitutional:       Appearance: Normal appearance.   HENT:      Head: Normocephalic.   Cardiovascular:      Rate and Rhythm: Normal rate and regular rhythm.      Heart sounds: Normal heart sounds.   Pulmonary:      Effort: Pulmonary effort is normal.      Breath sounds: Normal breath sounds.   Abdominal:      General: Bowel sounds are normal. There is no distension.      Palpations: Abdomen is soft.      Tenderness: There is no abdominal tenderness.   Musculoskeletal:         General: Normal range of motion.   Skin:     General: Skin is warm and dry.   Neurological:      General: No focal deficit present.      Mental Status: He is alert.   Psychiatric:         Behavior: " Behavior normal.         Labs:  Lab Results   Component Value Date    WBC 7.04 05/17/2022    HGB 13.6 (L) 05/17/2022    HCT 41.7 05/17/2022     05/17/2022    K 4.4 05/17/2022     05/17/2022    CO2 21 (L) 05/17/2022    BUN 19 05/17/2022    CREATININE 1.2 05/17/2022    EGFRNONAA 59.6 (A) 05/17/2022    CALCIUM 8.7 05/17/2022    PHOS 2.5 (L) 05/17/2022    MG 1.7 05/17/2022    ALBUMIN 3.9 05/17/2022    ALBUMIN 3.9 05/17/2022    AST 16 05/17/2022    ALT 36 05/17/2022    UTPCR Unable to calculate 05/10/2022    .1 (H) 03/18/2022    TACROLIMUS 8.5 05/17/2022       Labs were reviewed with the patient    Assessment:     1. S/P kidney transplant    2. Primary hypertension    3. CKD (chronic kidney disease), stage II    4. 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 Long-term use of immunosuppressant medication    6. At risk for opportunistic infections        Plan:   1 month rx sodium bicarbonate send to [facility] pharmacy as they wait to receive mail order    Follow-up:   1. CKD stage: 2     2. Immunosuppression: Prograf trough 8.5, which is  therapeutic (target 8-10). Continue Prograf 5/4, MMF 1000 mg BID, and Prednisone taper. Will continue to monitor for drug toxicities    3. Allograft Function: Stable. Continue good po hydration.   Lab Results   Component Value Date    CREATININE 1.2 05/17/2022 5/17/2022  POD 59   eGFR if African American >60 mL/min/1.73 m^2 >60.0       4. Hypertension management: advise low salt diet and home BP monitoring    Lopressor 50 mg BID, Nifedipine 60 mg BID     5. Metabolic Bone Disease/Secondary Hyperparathyroidism:stable  Will monitor PTH, CA and Vit D/guidelines  D3 2,000 units QD, Sensipar 30 mg QD,  mg TID, MagOx 400 mg BID  Lab Results   Component Value Date    .1 (H) 03/18/2022    CALCIUM 8.7 05/17/2022    PHOS 2.5 (L) 05/17/2022 5/17/2022  POD 59   Magnesium 1.6 - 2.6 mg/dL 1.7       6. Electrolytes:  Will monitor /guidelines  Sodium bicarb 1300 mg  BID  Lab Results   Component Value Date     05/17/2022    K 4.4 05/17/2022     05/17/2022    CO2 21 (L) 05/17/2022       7. Anemia: stable. No need for intervention   Lab Results   Component Value Date    WBC 7.04 05/17/2022    HGB 13.6 (L) 05/17/2022    HCT 41.7 05/17/2022    MCV 90 05/17/2022     05/17/2022         8.  Cytopenias: no significant cytopenias will monitor as per our guidelines. Medicine list reviewed including potential causes of drug-induced cytopenias    9.Proteinuria: continue p/c ratio as per guidelines    5/10/2022  POD 52   Prot/Creat Ratio, Urine 0.00 - 0.20 Unable to calculate       10. BK virus infection screening:  will continue to monitor/ guidelines   5/17/2022  POD 59   BK Virus DNA, Blood Not detected Not detected   BK Virus DNA PCR, Quant, Blood <125 Copies/mL <125       11. Weight education: provided during the clinic visit   Body mass index is 28.92 kg/m².     12.Patient safety education regarding immunosuppression including prophylaxis posttransplant for CMV, PCP : Education provided about vaccination and prevention of infections     PCP PROPHYLAXIS: Bactrim until 9/16/22  CMV PROPHYLAXIS: Valcyte until 6/16/22  FUNGAL PROPHYLAXIS: Nystatin until 4/22/22         Follow-up:   Clinic: return to transplant clinic weekly for the first month after transplant; every 2 weeks during months 2-3; then at 6-, 9-, 12-, 18-, 24-, and 36- months post-transplant to reassess for complications from immunosuppression toxicity and monitor for rejection.  Annually thereafter.    Labs: since patient remains at high risk for rejection and drug-related complications that warrant close monitoring, labs will be ordered as follows: continue twice weekly CBC, renal panel, and drug level for first month; then same labs once weekly through 3rd month post-transplant.  Urine for UA and protein/creatinine ratio monthly.  Serum BK - PCR at 1-, 3-, 6-, 9-, 12-, 18-, 24-, 36-, 48-, and 60 months  post-transplant.  Hepatic panel at 1-, 2-, 3-, 6-, 9-, 12-, 18-, 24-, and 36- months post-transplant.    Radha Mcelroy NP       Education:   Material provided to the patient.  Patient reminded to call with any health changes since these can be early signs of significant complications.  Also, I advised the patient to be sure any new medications or changes of old medications are discussed with either a pharmacist or physician knowledgeable with transplant to avoid rejection/drug toxicity related to significant drug interactions.    Patient advised that it is recommended that all transplanted patients, and their close contacts and household members receive Covid vaccination.

## 2022-05-20 ENCOUNTER — TELEPHONE (OUTPATIENT)
Dept: TRANSPLANT | Facility: CLINIC | Age: 72
End: 2022-05-20
Payer: MEDICARE

## 2022-05-20 LAB
BKV DNA SERPL NAA+PROBE-ACNC: <125 COPIES/ML
BKV DNA SERPL NAA+PROBE-LOG#: <2.1 LOG (10) COPIES/ML
BKV DNA SERPL QL NAA+PROBE: NOT DETECTED

## 2022-05-20 NOTE — TELEPHONE ENCOUNTER
----- Message from Michelle Abadie, RN sent at 5/20/2022  4:19 PM CDT -----  Contact: [facility] with [facility] [phone]    ----- Message -----  From: Briseida Lancaster RN  Sent: 5/20/2022   1:12 PM CDT  To: [facility]-K[facility]ney Transplant Clinical      ----- Message -----  From: Marguerite Castillo  Sent: 5/20/2022   9:29 AM CDT  To: Jakob Lau V Staff    Requesting an RX refill or new RX.  Is this a refill or new RX: Refill  RX name and strength (copy/paste from chart): Famotidime 20 mg  Is this a 30 day or 90 day RX: 90  Pharmacy name and phone # (copy/paste from chart):  [facility] @  [phone]   The doctors have asked that we provide their patients with the following 2 reminders -- prescription refills can take up to 72 hours, and a friendly reminder that in the future you can use your [facility] account to request refills:       Ref # [id number]

## 2022-05-23 ENCOUNTER — OFFICE VISIT (OUTPATIENT)
Dept: TRANSPLANT | Facility: CLINIC | Age: 72
End: 2022-05-23
Payer: MEDICARE

## 2022-05-23 VITALS
BODY MASS INDEX: 28.4 KG/M2 | RESPIRATION RATE: 16 BRPM | HEART RATE: 71 BPM | WEIGHT: 187.38 LBS | DIASTOLIC BLOOD PRESSURE: 78 MMHG | OXYGEN SATURATION: 97 % | HEIGHT: 68 IN | TEMPERATURE: 98 F | SYSTOLIC BLOOD PRESSURE: 133 MMHG

## 2022-05-23 DIAGNOSIS — I10 PRIMARY HYPERTENSION: ICD-10-CM

## 2022-05-23 DIAGNOSIS — A53.9 POSITIVE RPR TEST IN CADAVERIC DONOR: ICD-10-CM

## 2022-05-23 DIAGNOSIS — Z94.0 S/P KIDNEY TRANSPLANT: Primary | ICD-10-CM

## 2022-05-23 DIAGNOSIS — Z00.5 POSITIVE RPR TEST IN CADAVERIC DONOR: ICD-10-CM

## 2022-05-23 DIAGNOSIS — Z91.89 AT RISK FOR OPPORTUNISTIC INFECTIONS: ICD-10-CM

## 2022-05-23 DIAGNOSIS — Z79.60 LONG-TERM USE OF IMMUNOSUPPRESSANT MEDICATION: ICD-10-CM

## 2022-05-23 DIAGNOSIS — N18.2 CKD (CHRONIC KIDNEY DISEASE), STAGE II: ICD-10-CM

## 2022-05-23 PROCEDURE — 99999 PR PBB SHADOW E&M-EST. PATIENT-LVL V: ICD-10-PCS | Mod: PBBFAC,,, | Performed by: NURSE PRACTITIONER

## 2022-05-23 PROCEDURE — 1126F AMNT PAIN NOTED NONE PRSNT: CPT | Mod: CPTII,S$GLB,, | Performed by: NURSE PRACTITIONER

## 2022-05-23 PROCEDURE — 3078F PR MOST RECENT DIASTOLIC BLOOD PRESSURE < 80 MM HG: ICD-10-PCS | Mod: CPTII,S$GLB,, | Performed by: NURSE PRACTITIONER

## 2022-05-23 PROCEDURE — 99999 PR PBB SHADOW E&M-EST. PATIENT-LVL V: CPT | Mod: PBBFAC,,, | Performed by: NURSE PRACTITIONER

## 2022-05-23 PROCEDURE — 1126F PR PAIN SEVERITY QUANTIFIED, NO PAIN PRESENT: ICD-10-PCS | Mod: CPTII,S$GLB,, | Performed by: NURSE PRACTITIONER

## 2022-05-23 PROCEDURE — 3075F PR MOST RECENT SYSTOLIC BLOOD PRESS GE 130-139MM HG: ICD-10-PCS | Mod: CPTII,S$GLB,, | Performed by: NURSE PRACTITIONER

## 2022-05-23 PROCEDURE — 1101F PT FALLS ASSESS-DOCD LE1/YR: CPT | Mod: CPTII,S$GLB,, | Performed by: NURSE PRACTITIONER

## 2022-05-23 PROCEDURE — 99215 PR OFFICE/OUTPT VISIT, EST, LEVL V, 40-54 MIN: ICD-10-PCS | Mod: S$GLB,,, | Performed by: NURSE PRACTITIONER

## 2022-05-23 PROCEDURE — 1160F RVW MEDS BY RX/DR IN RCRD: CPT | Mod: CPTII,S$GLB,, | Performed by: NURSE PRACTITIONER

## 2022-05-23 PROCEDURE — 3066F PR DOCUMENTATION OF TREATMENT FOR NEPHROPATHY: ICD-10-PCS | Mod: CPTII,S$GLB,, | Performed by: NURSE PRACTITIONER

## 2022-05-23 PROCEDURE — 1159F MED LIST DOCD IN RCRD: CPT | Mod: CPTII,S$GLB,, | Performed by: NURSE PRACTITIONER

## 2022-05-23 PROCEDURE — 3075F SYST BP GE 130 - 139MM HG: CPT | Mod: CPTII,S$GLB,, | Performed by: NURSE PRACTITIONER

## 2022-05-23 PROCEDURE — 99215 OFFICE O/P EST HI 40 MIN: CPT | Mod: S$GLB,,, | Performed by: NURSE PRACTITIONER

## 2022-05-23 PROCEDURE — 3288F PR FALLS RISK ASSESSMENT DOCUMENTED: ICD-10-PCS | Mod: CPTII,S$GLB,, | Performed by: NURSE PRACTITIONER

## 2022-05-23 PROCEDURE — 3008F PR BODY MASS INDEX (BMI) DOCUMENTED: ICD-10-PCS | Mod: CPTII,S$GLB,, | Performed by: NURSE PRACTITIONER

## 2022-05-23 PROCEDURE — 3078F DIAST BP <80 MM HG: CPT | Mod: CPTII,S$GLB,, | Performed by: NURSE PRACTITIONER

## 2022-05-23 PROCEDURE — 1159F PR MEDICATION LIST DOCUMENTED IN MEDICAL RECORD: ICD-10-PCS | Mod: CPTII,S$GLB,, | Performed by: NURSE PRACTITIONER

## 2022-05-23 PROCEDURE — 3288F FALL RISK ASSESSMENT DOCD: CPT | Mod: CPTII,S$GLB,, | Performed by: NURSE PRACTITIONER

## 2022-05-23 PROCEDURE — 3066F NEPHROPATHY DOC TX: CPT | Mod: CPTII,S$GLB,, | Performed by: NURSE PRACTITIONER

## 2022-05-23 PROCEDURE — 1160F PR REVIEW ALL MEDS BY PRESCRIBER/CLIN PHARMACIST DOCUMENTED: ICD-10-PCS | Mod: CPTII,S$GLB,, | Performed by: NURSE PRACTITIONER

## 2022-05-23 PROCEDURE — 1101F PR PT FALLS ASSESS DOC 0-1 FALLS W/OUT INJ PAST YR: ICD-10-PCS | Mod: CPTII,S$GLB,, | Performed by: NURSE PRACTITIONER

## 2022-05-23 PROCEDURE — 3008F BODY MASS INDEX DOCD: CPT | Mod: CPTII,S$GLB,, | Performed by: NURSE PRACTITIONER

## 2022-05-23 NOTE — PATIENT INSTRUCTIONS
It is my privilege to participate in your transplant care! Please be sure to let us know if you have any questions or concerns about your health care - we cannot help you if we do not know. Don't forget we are on call 24/7 for any emergencies.      Best Wishes,  Radha Mcelroy NP-C

## 2022-05-23 NOTE — LETTER
May 23, 2022        Rajan Hayes  [address] [address]  Phone: [phone]  Fax: [phone]             Darin Letty- [facility]  [address]  [facility] [address]  Phone: [phone]   Patient: Alverto Ramirez Jr.   MR Number: [id number]   YOB: 1950   Date of Visit: 5/23/2022       Dear Dr. Rajan Hayes    Thank you for referring Alverto Ramirez to me for evaluation. Attached you will find relevant portions of my assessment and plan of care.    If you have questions, please do not hesitate to call me. I look forward to following Alverto Ramirez along with you.    Sincerely,    Radha Mcelroy, PARAM    Enclosure    If you would like to receive this communication electronically, please contact [email or web address] or [phone] to request [employer] access.    [employer] is a tool which provides read-only access to select patient information with whom you have a relationship. Its easy to use and provides real time access to review your patients record including encounter summaries, notes, results, and demographic information.    If you feel you have received this communication in error or would no longer like to receive these types of communications, please e-mail [email or web address]

## 2022-05-24 ENCOUNTER — LAB VISIT (OUTPATIENT)
Dept: LAB | Facility: HOSPITAL | Age: 72
End: 2022-05-24
Attending: INTERNAL MEDICINE
Payer: MEDICARE

## 2022-05-24 DIAGNOSIS — Z94.0 KIDNEY REPLACED BY TRANSPLANT: ICD-10-CM

## 2022-05-24 DIAGNOSIS — E87.20 ACIDOSIS: ICD-10-CM

## 2022-05-24 DIAGNOSIS — Z94.0 S/P KIDNEY TRANSPLANT: ICD-10-CM

## 2022-05-24 LAB
ALBUMIN SERPL BCP-MCNC: 3.7 G/DL (ref 3.5–5.2)
ANION GAP SERPL CALC-SCNC: 10 MMOL/L (ref 8–16)
BASOPHILS # BLD AUTO: 0.03 K/UL (ref 0–0.2)
BASOPHILS NFR BLD: 0.4 % (ref 0–1.9)
BUN SERPL-MCNC: 19 MG/DL (ref 8–23)
CALCIUM SERPL-MCNC: 8.3 MG/DL (ref 8.7–10.5)
CHLORIDE SERPL-SCNC: 107 MMOL/L (ref 95–110)
CO2 SERPL-SCNC: 19 MMOL/L (ref 23–29)
CREAT SERPL-MCNC: 1.1 MG/DL (ref 0.5–1.4)
DIFFERENTIAL METHOD: ABNORMAL
EOSINOPHIL # BLD AUTO: 0.1 K/UL (ref 0–0.5)
EOSINOPHIL NFR BLD: 0.9 % (ref 0–8)
ERYTHROCYTE [DISTWIDTH] IN BLOOD BY AUTOMATED COUNT: 15.3 % (ref 11.5–14.5)
EST. GFR  (AFRICAN AMERICAN): >60 ML/MIN/1.73 M^2
EST. GFR  (NON AFRICAN AMERICAN): >60 ML/MIN/1.73 M^2
GLUCOSE SERPL-MCNC: 88 MG/DL (ref 70–110)
HCT VFR BLD AUTO: 42.6 % (ref 40–54)
HGB BLD-MCNC: 13.5 G/DL (ref 14–18)
IMM GRANULOCYTES # BLD AUTO: 0.13 K/UL (ref 0–0.04)
IMM GRANULOCYTES NFR BLD AUTO: 1.9 % (ref 0–0.5)
LYMPHOCYTES # BLD AUTO: 0.6 K/UL (ref 1–4.8)
LYMPHOCYTES NFR BLD: 7.9 % (ref 18–48)
MAGNESIUM SERPL-MCNC: 1.6 MG/DL (ref 1.6–2.6)
MCH RBC QN AUTO: 29.3 PG (ref 27–31)
MCHC RBC AUTO-ENTMCNC: 31.7 G/DL (ref 32–36)
MCV RBC AUTO: 93 FL (ref 82–98)
MONOCYTES # BLD AUTO: 0.4 K/UL (ref 0.3–1)
MONOCYTES NFR BLD: 6.1 % (ref 4–15)
NEUTROPHILS # BLD AUTO: 5.8 K/UL (ref 1.8–7.7)
NEUTROPHILS NFR BLD: 82.8 % (ref 38–73)
NRBC BLD-RTO: 0 /100 WBC
PHOSPHATE SERPL-MCNC: 3.1 MG/DL (ref 2.7–4.5)
PLATELET # BLD AUTO: 211 K/UL (ref 150–450)
PMV BLD AUTO: 11.3 FL (ref 9.2–12.9)
POTASSIUM SERPL-SCNC: 3.9 MMOL/L (ref 3.5–5.1)
RBC # BLD AUTO: 4.6 M/UL (ref 4.6–6.2)
SODIUM SERPL-SCNC: 136 MMOL/L (ref 136–145)
WBC # BLD AUTO: 7 K/UL (ref 3.9–12.7)

## 2022-05-24 PROCEDURE — 80197 ASSAY OF TACROLIMUS: CPT | Performed by: INTERNAL MEDICINE

## 2022-05-24 PROCEDURE — 85025 COMPLETE CBC W/AUTO DIFF WBC: CPT | Performed by: INTERNAL MEDICINE

## 2022-05-24 PROCEDURE — 80069 RENAL FUNCTION PANEL: CPT | Performed by: INTERNAL MEDICINE

## 2022-05-24 PROCEDURE — 36415 COLL VENOUS BLD VENIPUNCTURE: CPT | Performed by: INTERNAL MEDICINE

## 2022-05-24 PROCEDURE — 83735 ASSAY OF MAGNESIUM: CPT | Performed by: INTERNAL MEDICINE

## 2022-05-24 NOTE — PROGRESS NOTES
Calcium dropping for the fist time, please monitor Ca, if keeps dropping, sensipar needs to be switched to calcitriol.   NaHCO3 to 3 pills BID

## 2022-05-24 NOTE — TELEPHONE ENCOUNTER
----- Message from Ann Peterson MD sent at 5/24/2022  4:04 PM CDT -----  Calcium dropping for the fist time, please monitor Ca, if keeps dropping, sensipar needs to be switched to calcitriol.   NaHCO3 to 3 pills BID

## 2022-05-25 LAB — TACROLIMUS BLD-MCNC: 11.8 NG/ML (ref 5–15)

## 2022-05-25 NOTE — TELEPHONE ENCOUNTER
Spoke with patient regarding below.Tacro dose to 4/4 ----- Message from Dilcia Parker MD sent at 5/25/2022  1:45 PM CDT -----  Assess if this is 12 hr tacrolimus trough. If accurate, lower from 5/4 to 4 mg BID and repeat levl in 1 week,

## 2022-05-27 RX ORDER — SODIUM BICARBONATE 650 MG/1
1950 TABLET ORAL 2 TIMES DAILY
Qty: 180 TABLET | Refills: 11 | Status: SHIPPED | OUTPATIENT
Start: 2022-05-27 | End: 2022-08-02 | Stop reason: SDUPTHER

## 2022-05-27 RX ORDER — TACROLIMUS 1 MG/1
CAPSULE ORAL
Qty: 420 CAPSULE | Refills: 11 | Status: SHIPPED | OUTPATIENT
Start: 2022-05-27 | End: 2023-03-20

## 2022-05-31 ENCOUNTER — LAB VISIT (OUTPATIENT)
Dept: LAB | Facility: HOSPITAL | Age: 72
End: 2022-05-31
Attending: INTERNAL MEDICINE
Payer: MEDICARE

## 2022-05-31 DIAGNOSIS — Z94.0 KIDNEY REPLACED BY TRANSPLANT: ICD-10-CM

## 2022-05-31 LAB
ALBUMIN SERPL BCP-MCNC: 3.9 G/DL (ref 3.5–5.2)
ANION GAP SERPL CALC-SCNC: 10 MMOL/L (ref 8–16)
BASOPHILS # BLD AUTO: 0.04 K/UL (ref 0–0.2)
BASOPHILS NFR BLD: 0.5 % (ref 0–1.9)
BILIRUB UR QL STRIP: NEGATIVE
BUN SERPL-MCNC: 23 MG/DL (ref 8–23)
CALCIUM SERPL-MCNC: 8.5 MG/DL (ref 8.7–10.5)
CHLORIDE SERPL-SCNC: 107 MMOL/L (ref 95–110)
CLARITY UR: CLEAR
CO2 SERPL-SCNC: 20 MMOL/L (ref 23–29)
COLOR UR: YELLOW
CREAT SERPL-MCNC: 1.2 MG/DL (ref 0.5–1.4)
CREAT UR-MCNC: 59 MG/DL (ref 23–375)
DIFFERENTIAL METHOD: ABNORMAL
EOSINOPHIL # BLD AUTO: 0.1 K/UL (ref 0–0.5)
EOSINOPHIL NFR BLD: 0.9 % (ref 0–8)
ERYTHROCYTE [DISTWIDTH] IN BLOOD BY AUTOMATED COUNT: 15.3 % (ref 11.5–14.5)
EST. GFR  (AFRICAN AMERICAN): >60 ML/MIN/1.73 M^2
EST. GFR  (NON AFRICAN AMERICAN): 59.6 ML/MIN/1.73 M^2
GLUCOSE SERPL-MCNC: 92 MG/DL (ref 70–110)
GLUCOSE UR QL STRIP: NEGATIVE
HCT VFR BLD AUTO: 43.7 % (ref 40–54)
HGB BLD-MCNC: 14 G/DL (ref 14–18)
HGB UR QL STRIP: NEGATIVE
IMM GRANULOCYTES # BLD AUTO: 0.11 K/UL (ref 0–0.04)
IMM GRANULOCYTES NFR BLD AUTO: 1.4 % (ref 0–0.5)
KETONES UR QL STRIP: NEGATIVE
LEUKOCYTE ESTERASE UR QL STRIP: NEGATIVE
LYMPHOCYTES # BLD AUTO: 0.7 K/UL (ref 1–4.8)
LYMPHOCYTES NFR BLD: 8.4 % (ref 18–48)
MAGNESIUM SERPL-MCNC: 1.7 MG/DL (ref 1.6–2.6)
MCH RBC QN AUTO: 29.9 PG (ref 27–31)
MCHC RBC AUTO-ENTMCNC: 32 G/DL (ref 32–36)
MCV RBC AUTO: 93 FL (ref 82–98)
MONOCYTES # BLD AUTO: 0.5 K/UL (ref 0.3–1)
MONOCYTES NFR BLD: 6.2 % (ref 4–15)
NEUTROPHILS # BLD AUTO: 6.7 K/UL (ref 1.8–7.7)
NEUTROPHILS NFR BLD: 82.6 % (ref 38–73)
NITRITE UR QL STRIP: NEGATIVE
NRBC BLD-RTO: 0 /100 WBC
PH UR STRIP: 7 [PH] (ref 5–8)
PHOSPHATE SERPL-MCNC: 2.9 MG/DL (ref 2.7–4.5)
PLATELET # BLD AUTO: 188 K/UL (ref 150–450)
PMV BLD AUTO: 10.9 FL (ref 9.2–12.9)
POTASSIUM SERPL-SCNC: 4.2 MMOL/L (ref 3.5–5.1)
PROT UR QL STRIP: NEGATIVE
PROT UR-MCNC: 7 MG/DL (ref 0–15)
PROT/CREAT UR: 0.12 MG/G{CREAT} (ref 0–0.2)
RBC # BLD AUTO: 4.68 M/UL (ref 4.6–6.2)
SODIUM SERPL-SCNC: 137 MMOL/L (ref 136–145)
SP GR UR STRIP: 1.01 (ref 1–1.03)
URN SPEC COLLECT METH UR: NORMAL
WBC # BLD AUTO: 8.09 K/UL (ref 3.9–12.7)

## 2022-05-31 PROCEDURE — 80197 ASSAY OF TACROLIMUS: CPT | Performed by: INTERNAL MEDICINE

## 2022-05-31 PROCEDURE — 81003 URINALYSIS AUTO W/O SCOPE: CPT | Performed by: INTERNAL MEDICINE

## 2022-05-31 PROCEDURE — 84156 ASSAY OF PROTEIN URINE: CPT | Performed by: INTERNAL MEDICINE

## 2022-05-31 PROCEDURE — 36415 COLL VENOUS BLD VENIPUNCTURE: CPT | Performed by: INTERNAL MEDICINE

## 2022-05-31 PROCEDURE — 83735 ASSAY OF MAGNESIUM: CPT | Performed by: INTERNAL MEDICINE

## 2022-05-31 PROCEDURE — 85025 COMPLETE CBC W/AUTO DIFF WBC: CPT | Performed by: INTERNAL MEDICINE

## 2022-05-31 PROCEDURE — 80069 RENAL FUNCTION PANEL: CPT | Performed by: INTERNAL MEDICINE

## 2022-06-01 LAB — TACROLIMUS BLD-MCNC: 7.7 NG/ML (ref 5–15)

## 2022-06-06 ENCOUNTER — OFFICE VISIT (OUTPATIENT)
Dept: TRANSPLANT | Facility: CLINIC | Age: 72
End: 2022-06-06
Payer: MEDICARE

## 2022-06-06 VITALS
DIASTOLIC BLOOD PRESSURE: 82 MMHG | OXYGEN SATURATION: 97 % | HEIGHT: 68 IN | BODY MASS INDEX: 28.44 KG/M2 | RESPIRATION RATE: 16 BRPM | SYSTOLIC BLOOD PRESSURE: 147 MMHG | HEART RATE: 79 BPM | TEMPERATURE: 97 F | WEIGHT: 187.63 LBS

## 2022-06-06 DIAGNOSIS — Z94.0 S/P KIDNEY TRANSPLANT: Primary | ICD-10-CM

## 2022-06-06 DIAGNOSIS — N18.31 STAGE 3A CHRONIC KIDNEY DISEASE: ICD-10-CM

## 2022-06-06 DIAGNOSIS — I10 PRIMARY HYPERTENSION: ICD-10-CM

## 2022-06-06 DIAGNOSIS — Z79.60 LONG-TERM USE OF IMMUNOSUPPRESSANT MEDICATION: ICD-10-CM

## 2022-06-06 PROCEDURE — 3288F FALL RISK ASSESSMENT DOCD: CPT | Mod: CPTII,S$GLB,, | Performed by: NURSE PRACTITIONER

## 2022-06-06 PROCEDURE — 1126F PR PAIN SEVERITY QUANTIFIED, NO PAIN PRESENT: ICD-10-PCS | Mod: CPTII,S$GLB,, | Performed by: NURSE PRACTITIONER

## 2022-06-06 PROCEDURE — 1101F PT FALLS ASSESS-DOCD LE1/YR: CPT | Mod: CPTII,S$GLB,, | Performed by: NURSE PRACTITIONER

## 2022-06-06 PROCEDURE — 3066F NEPHROPATHY DOC TX: CPT | Mod: CPTII,S$GLB,, | Performed by: NURSE PRACTITIONER

## 2022-06-06 PROCEDURE — 1159F PR MEDICATION LIST DOCUMENTED IN MEDICAL RECORD: ICD-10-PCS | Mod: CPTII,S$GLB,, | Performed by: NURSE PRACTITIONER

## 2022-06-06 PROCEDURE — 1159F MED LIST DOCD IN RCRD: CPT | Mod: CPTII,S$GLB,, | Performed by: NURSE PRACTITIONER

## 2022-06-06 PROCEDURE — 3077F SYST BP >= 140 MM HG: CPT | Mod: CPTII,S$GLB,, | Performed by: NURSE PRACTITIONER

## 2022-06-06 PROCEDURE — 1126F AMNT PAIN NOTED NONE PRSNT: CPT | Mod: CPTII,S$GLB,, | Performed by: NURSE PRACTITIONER

## 2022-06-06 PROCEDURE — 99999 PR PBB SHADOW E&M-EST. PATIENT-LVL V: ICD-10-PCS | Mod: PBBFAC,,, | Performed by: NURSE PRACTITIONER

## 2022-06-06 PROCEDURE — 99215 OFFICE O/P EST HI 40 MIN: CPT | Mod: S$GLB,,, | Performed by: NURSE PRACTITIONER

## 2022-06-06 PROCEDURE — 99215 PR OFFICE/OUTPT VISIT, EST, LEVL V, 40-54 MIN: ICD-10-PCS | Mod: S$GLB,,, | Performed by: NURSE PRACTITIONER

## 2022-06-06 PROCEDURE — 3079F PR MOST RECENT DIASTOLIC BLOOD PRESSURE 80-89 MM HG: ICD-10-PCS | Mod: CPTII,S$GLB,, | Performed by: NURSE PRACTITIONER

## 2022-06-06 PROCEDURE — 3008F PR BODY MASS INDEX (BMI) DOCUMENTED: ICD-10-PCS | Mod: CPTII,S$GLB,, | Performed by: NURSE PRACTITIONER

## 2022-06-06 PROCEDURE — 1101F PR PT FALLS ASSESS DOC 0-1 FALLS W/OUT INJ PAST YR: ICD-10-PCS | Mod: CPTII,S$GLB,, | Performed by: NURSE PRACTITIONER

## 2022-06-06 PROCEDURE — 3079F DIAST BP 80-89 MM HG: CPT | Mod: CPTII,S$GLB,, | Performed by: NURSE PRACTITIONER

## 2022-06-06 PROCEDURE — 3288F PR FALLS RISK ASSESSMENT DOCUMENTED: ICD-10-PCS | Mod: CPTII,S$GLB,, | Performed by: NURSE PRACTITIONER

## 2022-06-06 PROCEDURE — 3066F PR DOCUMENTATION OF TREATMENT FOR NEPHROPATHY: ICD-10-PCS | Mod: CPTII,S$GLB,, | Performed by: NURSE PRACTITIONER

## 2022-06-06 PROCEDURE — 99999 PR PBB SHADOW E&M-EST. PATIENT-LVL V: CPT | Mod: PBBFAC,,, | Performed by: NURSE PRACTITIONER

## 2022-06-06 PROCEDURE — 3077F PR MOST RECENT SYSTOLIC BLOOD PRESSURE >= 140 MM HG: ICD-10-PCS | Mod: CPTII,S$GLB,, | Performed by: NURSE PRACTITIONER

## 2022-06-06 PROCEDURE — 3008F BODY MASS INDEX DOCD: CPT | Mod: CPTII,S$GLB,, | Performed by: NURSE PRACTITIONER

## 2022-06-06 NOTE — PROGRESS NOTES
Kidney Post-Transplant Assessment    Referring Physician: Chris Adair  Current Nephrologist: Rajan Hayes    ORGAN: RIGHT KIDNEY  Donor Type: donation after brain death  PHS Increased Risk: yes  Cold Ischemia: 1,354 mins  Induction Medications: thymoglobulin    Subjective:     CC:  Reassessment of renal allograft function and management of chronic immunosuppression.    HPI:  Mr. Ramirez is a 71 y.o. 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 His most recent creatinine is 1.3. He takes mycophenolate mofetil, prednisone and tacrolimus for maintenance immunosuppression. His post transplant course has been complicated by urinary retention severe diarrhea and hypophosphatemia .    Hospitalization/ ED visits  None    Interval HX:  Reports overall doing well. Does complain of ongoing anxiousness intermittently during the evening time. Given xanax RX 2 visits ago. He has appointment tomorrow with PCP to obtain addtional Rx.  He reports needing xanax prior to transplant and a 3 month supply would last 6+ months.    Intake 2L  UOP 2-L  BP 130s-140s/80  Peripheral edema none  Weight: stable  Appetite good  Wound--healed--opening from PD site healing well superficial--scabbing  fx assessment doing well  Lab /diagnostic results reviewed with patient today.   All questions answered        Current Outpatient Medications:     ALPRAZolam (XANAX) 1 MG tablet, Take 1 tablet (1 mg total) by mouth 3 (three) times daily as needed for Anxiety., Disp: 7 tablet, Rfl: 0    cholecalciferol, vitamin D3, (VITAMIN D3) 50 mcg (2,000 unit) Tab, Take 1 tablet (2,000 Units total) by mouth once daily., Disp: 60 tablet, Rfl: 11    cinacalcet (SENSIPAR) 30 MG Tab, Take 1 tablet (30 mg total) by mouth daily with breakfast., Disp: 30 tablet, Rfl: 11    fluticasone propionate (FLONASE) 50 mcg/actuation nasal spray, 2 sprays (100 mcg total) by Each Nostril route as  needed for Allergies., Disp: 16 g, Rfl: 3    k phos di & mono-sod phos mono (K-PHOS-NEUTRAL) 250 mg Tab, Take 1 tablet by mouth 3 (three) times daily., Disp: 90 tablet, Rfl: 11    magnesium oxide (MAG-OX) 400 mg (241.3 mg magnesium) tablet, Take 1 tablet (400 mg total) by mouth 2 (two) times daily., Disp: 60 tablet, Rfl: 11    metoprolol tartrate (LOPRESSOR) 50 MG tablet, Take 1 tablet (50 mg total) by mouth 2 (two) times daily., Disp: 180 tablet, Rfl: 3    mycophenolate (CELLCEPT) 250 mg Cap, Take 4 capsules (1,000 mg total) by mouth 2 (two) times daily., Disp: 240 capsule, Rfl: 11    NIFEdipine (PROCARDIA-XL) 60 MG (OSM) 24 hr tablet, Take 1 tablet (60 mg total) by mouth 2 (two) times a day., Disp: 180 tablet, Rfl: 3    predniSONE (DELTASONE) 5 MG tablet, Take by mouth daily: 20mg 3/22/22 -4/21, 15mg 4/22-5/22, 10mg 5/23-6/22, 5mg 6/23- thereafter, Disp: 120 tablet, Rfl: 5    sildenafiL (VIAGRA) 50 MG tablet, Take 1 tablet (50 mg total) by mouth daily as needed for Erectile Dysfunction., Disp: 30 tablet, Rfl: 11    sodium bicarbonate 650 MG tablet, Take 3 tablets (1,950 mg total) by mouth 2 (two) times daily., Disp: 180 tablet, Rfl: 11    sulfamethoxazole-trimethoprim 400-80mg (BACTRIM,SEPTRA) 400-80 mg per tablet, Take 1 tablet by mouth once daily., Disp: 90 tablet, Rfl: 1    tacrolimus (PROGRAF) 1 MG Cap, Take 4 capsules (4 mg total) by mouth every morning AND 4 capsules (4 mg total) every evening., Disp: 420 capsule, Rfl: 11    tamsulosin (FLOMAX) 0.4 mg Cap, Take 1 capsule (0.4 mg total) by mouth once daily., Disp: 30 capsule, Rfl: 2    traZODone (DESYREL) 50 MG tablet, Take 1 tablet (50 mg total) by mouth nightly as needed for Insomnia., Disp: 30 tablet, Rfl: 0    valGANciclovir (VALCYTE) 450 mg Tab, Take 2 tablets (900 mg total) by mouth once daily., Disp: 104 tablet, Rfl: 0      Past Medical History:   Diagnosis Date    Anemia in CKD (chronic kidney disease)     Atrial fibrillation     CKD  "(chronic kidney disease) stage 4, GFR 15 11/26/2014    Colon cancer screening 12/19/2014    Elevated PSA 11/26/2014    HTN (hypertension) diagnosed in his 40s 10/16/2014    Monoclonal gammopathy 11/26/2014    Phobic anxiety disorder 11/26/2014    Proteinuria 11/26/2014    Stage 3a chronic kidney disease 3/25/2022         Review of Systems   Constitutional: Negative for appetite change, chills, fatigue and fever.   HENT: Negative for trouble swallowing.    Respiratory: Negative for cough, chest tightness, shortness of breath and wheezing.    Cardiovascular: Negative for chest pain, palpitations and leg swelling.   Gastrointestinal: Negative for abdominal pain, constipation, diarrhea and nausea.   Genitourinary: Negative for difficulty urinating, frequency and urgency.   Musculoskeletal: Negative for arthralgias and myalgias.   Skin: Negative for rash.   Allergic/Immunologic: Positive for immunocompromised state.   Neurological: Negative for dizziness, weakness, light-headedness and headaches.   Psychiatric/Behavioral: Negative for sleep disturbance.        Objective:   BP (!) 147/82 (BP Location: Right arm, Patient Position: Sitting, BP Method: Medium (Automatic))   Pulse 79   Temp 97.2 °F (36.2 °C) (Temporal)   Resp 16   Ht 5' 7.5" (1.715 m)   Wt 85.1 kg (187 lb 9.8 oz)   SpO2 97%   BMI 28.95 kg/m²     Physical Exam  Constitutional:       General: He is not in acute distress.     Appearance: He is well-developed. He is not diaphoretic.   Cardiovascular:      Rate and Rhythm: Normal rate and regular rhythm.      Heart sounds: Normal heart sounds. No murmur heard.    No friction rub. No gallop.   Pulmonary:      Effort: Pulmonary effort is normal. No respiratory distress.      Breath sounds: Normal breath sounds. No wheezing or rales.   Abdominal:      General: Bowel sounds are normal. There is no distension.      Palpations: Abdomen is soft.      Tenderness: There is no abdominal tenderness.      " Hernia: A hernia is present.   Musculoskeletal:         General: No tenderness. Normal range of motion.   Skin:     General: Skin is warm and dry.      Findings: No rash.      Nails: There is no clubbing.   Neurological:      Mental Status: He is alert and oriented to person, place, and time.   Psychiatric:         Behavior: Behavior normal.            Labs:  Lab Results   Component Value Date    WBC 8.09 05/31/2022    HGB 14.0 05/31/2022    HCT 43.7 05/31/2022     05/31/2022    K 4.2 05/31/2022     05/31/2022    CO2 20 (L) 05/31/2022    BUN 23 05/31/2022    CREATININE 1.2 05/31/2022    EGFRNONAA 59.6 (A) 05/31/2022    CALCIUM 8.5 (L) 05/31/2022    PHOS 2.9 05/31/2022    MG 1.7 05/31/2022    ALBUMIN 3.9 05/31/2022    AST 16 05/17/2022    ALT 36 05/17/2022    UTPCR 0.12 05/31/2022    .1 (H) 03/18/2022    TACROLIMUS 7.7 05/31/2022       No results found for: EXTANC, EXTWBC, EXTSEGS, EXTPLATELETS, EXTHEMOGLOBI, EXTHEMATOCRI, EXTCREATININ, EXTSODIUM, EXTPOTASSIUM, EXTBUN, EXTCO2, EXTCALCIUM, EXTPHOSPHORU, EXTGLUCOSE, EXTALBUMIN, EXTAST, EXTALT, EXTBILITOTAL, EXTLIPASE, EXTAMYLASE    No results found for: EXTCYCLOSLVL, EXTSIROLIMUS, EXTTACROLVL, EXTPROTCRE, EXTPTHINTACT, EXTPROTEINUA, EXTWBCUA, EXTRBCUA    Labs were reviewed with the patient    Assessment:     1. S/P kidney transplant    2. Stage 3a chronic kidney disease    3. Primary hypertension    4. Long-term use of immunosuppressant medication        Plan:     Continue current IS therapy regimen  Patient plans to PCP tomorrow--he reports he is going to discuss his anxiety with the PCP (gave a small courtesy  refill of xanax on 5/9/22 with instructions to f/u with PCP)  Hernia noted left of umbilicus--given ED precautions     1. CKD stage: will continue follow up as per our center guidelines. patient to continue close follow up with the local General nephrologist. Education provided in appropriate fluid intake, potassium intake. Continue with oral  hydration.      2. Immunosuppression: Prograf trough 7.7, therapeutic target 8-10. Continue Prograf 4/4, MMF 1000 Mg BID, and Prednisone   Lab Results   Component Value Date    TACROLIMUS 7.7 05/31/2022    TACROLIMUS 11.8 05/24/2022    TACROLIMUS 8.5 05/17/2022   Will closely monitor for toxicities, education provided about adherence to medicines and need to communicate any side effect to the transplant nurse or physician.      3. Allograft Function:stable at baseline for the patient. Continue follow up as per our guidelines and with the local General nephrologist. Communication will be sent today.      5/31/2022  POD 73   Creatinine 0.5 - 1.4 mg/dL 1.2   eGFR if African American >60 mL/min/1.73 m^2 >60.0   Glucose 70 - 110 mg/dL 92           4. Hypertension management:  Continue with home blood pressure monitoring, low salt and healthy life discussed with the patient.  BP Readings from Last 3 Encounters:   06/06/22 (!) 147/82   05/23/22 133/78   05/17/22 (!) 146/89   MTP, nifedipine    5. Metabolic Bone Disease/Secondary Hyperparathyroidism:calcium and phosphorus level discussed with the patient, patient will continue follow up with the general nephrologist for management of metabolic bone disease calcium and phosphorus as per our center protocol. Will monitor PTH, Vit D level, calcium.   Lab Results   Component Value Date    .1 (H) 03/18/2022    CALCIUM 8.5 (L) 05/31/2022    PHOS 2.9 05/31/2022    PHOS 3.1 05/24/2022    PHOS 2.5 (L) 05/17/2022   senispar, kpn, magox    6. Electrolytes: reviewed with the patient, essentially within the normal range no need for acute changes today, will monitor as per our center guidelines.  Lab Results   Component Value Date     05/31/2022    K 4.2 05/31/2022     05/31/2022    CO2 20 (L) 05/31/2022    CO2 19 (L) 05/24/2022    CO2 21 (L) 05/17/2022   nabicarb     7. Anemia: will continue monitoring as per our center guidelines. No indication for acute intervention  today.  Lab Results   Component Value Date    WBC 8.09 05/31/2022    HGB 14.0 05/31/2022    HCT 43.7 05/31/2022    MCV 93 05/31/2022     05/31/2022       8.Proteinuria: will continue with pr/cr ratio as per our center guidelines  Lab Results   Component Value Date    PROTEINURINE 7 05/31/2022    CREATRANDUR 59.0 05/31/2022    UTPCR 0.12 05/31/2022    UTPCR Unable to calculate 05/10/2022    UTPCR Unable to calculate 04/19/2022        9. BK virus infection screening: will continue with urine or blood PCR as per our guidelines to prevent BK virus viremia and allograft dysfunction  Lab Results   Component Value Date    BKVIRUSPCRQB <125 05/17/2022         10. Weight education: provided during the clinic visit.  Body mass index is 28.95 kg/m².     11.Patient safety education regarding immunosuppression including prophylaxis posttransplant for CMV, PCP : Education provided about vaccination and prevention of infections.    12.  Cytopenias: no significant cytopenias will monitor as per our guidelines. Medicine list reviewed including potential causes of drug-induced cytopenias  Lab Results   Component Value Date    WBC 8.09 05/31/2022    HGB 14.0 05/31/2022    HCT 43.7 05/31/2022    MCV 93 05/31/2022     05/31/2022       13. Post-transplant Prophylaxis; CMV Infection, PJP and Candida mucosistis and other indicated for this particular patient.   PCP PROPHYLAXIS: Bactrim until 9/16/22  CMV PROPHYLAXIS: Valcyte until 6/16/22  FUNGAL PROPHYLAXIS: Nystatin until 4/22/22      Exercise: reminded Telly of the importance of regular exercise for weight management, blood sugar and blood pressure management.  I also explained exercise has been shown to improve cardiovascular health, energy level, and sleep hygiene.  Lastly, I advised him that cardiovascular complications are leading cause of death for renal transplant recipients, and regular exercise can help lower this risk.       Follow-up:   Clinic: return to  transplant clinic weekly for the first month after transplant; every 2 weeks during months 2-3; then at 6-, 9-, 12-, 18-, 24-, and 36- months post-transplant to reassess for complications from immunosuppression toxicity and monitor for rejection.  Annually thereafter.    Labs: since patient remains at high risk for rejection and drug-related complications that warrant close monitoring, labs will be ordered as follows: continue twice weekly CBC, renal panel, and drug level for first month; then same labs once weekly through 3rd month post-transplant.  Urine for UA and protein/creatinine ratio monthly.  Serum BK - PCR at 1-, 3-, 6-, 9-, 12-, 18-, 24-, 36-, 48-, and 60 months post-transplant.  Hepatic panel at 1-, 2-, 3-, 6-, 9-, 12-, 18-, 24-, and 36- months post-transplant.    Judi Robert NP       Education:   Material provided to the patient.  Patient reminded to call with any health changes since these can be early signs of significant complications.  Also, I advised the patient to be sure any new medications or changes of old medications are discussed with either a pharmacist or physician knowledgeable with transplant to avoid rejection/drug toxicity related to significant drug interactions.    Patient advised that it is recommended that all transplanted patients, and their close contacts and household members receive Covid vaccination.

## 2022-06-06 NOTE — LETTER
June 6, 2022        Rajan Hayes  [address] [place] [address]  Phone: [phone]  Fax: [phone]             [facility]- Transplant [facility] Fl  [address]  [facility] [address]  Phone: [phone]   Patient: Alverto Ramirez Jr.   MR Number: [id number]   YOB: 1950   Date of Visit: 6/6/2022       Dear Dr. Rajan Hayes    Thank you for referring Alverto Ramirez to me for evaluation. Attached you will find relevant portions of my assessment and plan of care.    If you have questions, please do not hesitate to call me. I look forward to following Alverto Ramirez along with you.    Sincerely,    Judi Robert, NP    Enclosure    If you would like to receive this communication electronically, please contact [email or web address] or [phone] to request [employer] Link access.    [employer] Link is a tool which provides read-only access to select patient information with whom you have a relationship. Its easy to use and provides real time access to review your patients record including encounter summaries, notes, results, and demographic information.    If you feel you have received this communication in error or would no longer like to receive these types of communications, please e-mail [email or web address]

## 2022-06-07 ENCOUNTER — OFFICE VISIT (OUTPATIENT)
Dept: INTERNAL MEDICINE | Facility: CLINIC | Age: 72
End: 2022-06-07
Payer: MEDICARE

## 2022-06-07 ENCOUNTER — LAB VISIT (OUTPATIENT)
Dept: LAB | Facility: HOSPITAL | Age: 72
End: 2022-06-07
Attending: INTERNAL MEDICINE
Payer: MEDICARE

## 2022-06-07 VITALS
OXYGEN SATURATION: 98 % | TEMPERATURE: 99 F | HEIGHT: 68 IN | WEIGHT: 188.06 LBS | BODY MASS INDEX: 28.5 KG/M2 | HEART RATE: 83 BPM | SYSTOLIC BLOOD PRESSURE: 138 MMHG | DIASTOLIC BLOOD PRESSURE: 72 MMHG

## 2022-06-07 DIAGNOSIS — I10 ESSENTIAL HYPERTENSION: Chronic | ICD-10-CM

## 2022-06-07 DIAGNOSIS — F51.04 PSYCHOPHYSIOLOGICAL INSOMNIA: Primary | ICD-10-CM

## 2022-06-07 DIAGNOSIS — F41.9 ANXIETY: ICD-10-CM

## 2022-06-07 DIAGNOSIS — Z94.0 KIDNEY REPLACED BY TRANSPLANT: ICD-10-CM

## 2022-06-07 DIAGNOSIS — Z94.0 S/P KIDNEY TRANSPLANT: ICD-10-CM

## 2022-06-07 DIAGNOSIS — N18.6 END-STAGE RENAL DISEASE (ESRD): ICD-10-CM

## 2022-06-07 DIAGNOSIS — E78.2 MIXED HYPERLIPIDEMIA: Chronic | ICD-10-CM

## 2022-06-07 DIAGNOSIS — F51.04 PSYCHOPHYSIOLOGICAL INSOMNIA: ICD-10-CM

## 2022-06-07 PROBLEM — N18.9 ANEMIA OF RENAL DISEASE: Status: RESOLVED | Noted: 2022-03-21 | Resolved: 2022-06-07

## 2022-06-07 PROBLEM — Z91.89 AT RISK FOR OPPORTUNISTIC INFECTIONS: Status: RESOLVED | Noted: 2022-03-21 | Resolved: 2022-06-07

## 2022-06-07 PROBLEM — R33.9 URINARY RETENTION: Status: RESOLVED | Noted: 2022-03-22 | Resolved: 2022-06-07

## 2022-06-07 PROBLEM — D63.1 ANEMIA OF RENAL DISEASE: Status: RESOLVED | Noted: 2022-03-21 | Resolved: 2022-06-07

## 2022-06-07 PROBLEM — R19.7 DIARRHEA: Status: RESOLVED | Noted: 2022-03-22 | Resolved: 2022-06-07

## 2022-06-07 PROBLEM — Z29.89 PROPHYLACTIC IMMUNOTHERAPY: Status: RESOLVED | Noted: 2022-03-21 | Resolved: 2022-06-07

## 2022-06-07 PROBLEM — E83.39 HYPOPHOSPHATEMIA: Status: RESOLVED | Noted: 2022-03-22 | Resolved: 2022-06-07

## 2022-06-07 LAB
ALBUMIN SERPL BCP-MCNC: 4 G/DL (ref 3.5–5.2)
ANION GAP SERPL CALC-SCNC: 11 MMOL/L (ref 8–16)
BASOPHILS # BLD AUTO: 0.03 K/UL (ref 0–0.2)
BASOPHILS NFR BLD: 0.4 % (ref 0–1.9)
BUN SERPL-MCNC: 15 MG/DL (ref 8–23)
CALCIUM SERPL-MCNC: 8.7 MG/DL (ref 8.7–10.5)
CHLORIDE SERPL-SCNC: 107 MMOL/L (ref 95–110)
CO2 SERPL-SCNC: 19 MMOL/L (ref 23–29)
CREAT SERPL-MCNC: 1.3 MG/DL (ref 0.5–1.4)
DIFFERENTIAL METHOD: ABNORMAL
EOSINOPHIL # BLD AUTO: 0.1 K/UL (ref 0–0.5)
EOSINOPHIL NFR BLD: 1.3 % (ref 0–8)
ERYTHROCYTE [DISTWIDTH] IN BLOOD BY AUTOMATED COUNT: 15.1 % (ref 11.5–14.5)
EST. GFR  (AFRICAN AMERICAN): >60 ML/MIN/1.73 M^2
EST. GFR  (NON AFRICAN AMERICAN): 54.1 ML/MIN/1.73 M^2
GLUCOSE SERPL-MCNC: 92 MG/DL (ref 70–110)
HCT VFR BLD AUTO: 45.4 % (ref 40–54)
HGB BLD-MCNC: 14.5 G/DL (ref 14–18)
IMM GRANULOCYTES # BLD AUTO: 0.08 K/UL (ref 0–0.04)
IMM GRANULOCYTES NFR BLD AUTO: 1.2 % (ref 0–0.5)
LYMPHOCYTES # BLD AUTO: 0.6 K/UL (ref 1–4.8)
LYMPHOCYTES NFR BLD: 9.3 % (ref 18–48)
MAGNESIUM SERPL-MCNC: 1.6 MG/DL (ref 1.6–2.6)
MCH RBC QN AUTO: 29.4 PG (ref 27–31)
MCHC RBC AUTO-ENTMCNC: 31.9 G/DL (ref 32–36)
MCV RBC AUTO: 92 FL (ref 82–98)
MONOCYTES # BLD AUTO: 0.5 K/UL (ref 0.3–1)
MONOCYTES NFR BLD: 7 % (ref 4–15)
NEUTROPHILS # BLD AUTO: 5.5 K/UL (ref 1.8–7.7)
NEUTROPHILS NFR BLD: 80.8 % (ref 38–73)
NRBC BLD-RTO: 0 /100 WBC
PHOSPHATE SERPL-MCNC: 2.9 MG/DL (ref 2.7–4.5)
PLATELET # BLD AUTO: 199 K/UL (ref 150–450)
PMV BLD AUTO: 10.8 FL (ref 9.2–12.9)
POTASSIUM SERPL-SCNC: 4.1 MMOL/L (ref 3.5–5.1)
RBC # BLD AUTO: 4.93 M/UL (ref 4.6–6.2)
SODIUM SERPL-SCNC: 137 MMOL/L (ref 136–145)
WBC # BLD AUTO: 6.76 K/UL (ref 3.9–12.7)

## 2022-06-07 PROCEDURE — 3075F SYST BP GE 130 - 139MM HG: CPT | Mod: CPTII,S$GLB,, | Performed by: FAMILY MEDICINE

## 2022-06-07 PROCEDURE — 1160F RVW MEDS BY RX/DR IN RCRD: CPT | Mod: CPTII,S$GLB,, | Performed by: FAMILY MEDICINE

## 2022-06-07 PROCEDURE — 1126F PR PAIN SEVERITY QUANTIFIED, NO PAIN PRESENT: ICD-10-PCS | Mod: CPTII,S$GLB,, | Performed by: FAMILY MEDICINE

## 2022-06-07 PROCEDURE — 80197 ASSAY OF TACROLIMUS: CPT | Performed by: INTERNAL MEDICINE

## 2022-06-07 PROCEDURE — 3008F PR BODY MASS INDEX (BMI) DOCUMENTED: ICD-10-PCS | Mod: CPTII,S$GLB,, | Performed by: FAMILY MEDICINE

## 2022-06-07 PROCEDURE — 99999 PR PBB SHADOW E&M-EST. PATIENT-LVL IV: CPT | Mod: PBBFAC,,, | Performed by: FAMILY MEDICINE

## 2022-06-07 PROCEDURE — 3066F PR DOCUMENTATION OF TREATMENT FOR NEPHROPATHY: ICD-10-PCS | Mod: CPTII,S$GLB,, | Performed by: FAMILY MEDICINE

## 2022-06-07 PROCEDURE — 99999 PR PBB SHADOW E&M-EST. PATIENT-LVL IV: ICD-10-PCS | Mod: PBBFAC,,, | Performed by: FAMILY MEDICINE

## 2022-06-07 PROCEDURE — 85025 COMPLETE CBC W/AUTO DIFF WBC: CPT | Performed by: INTERNAL MEDICINE

## 2022-06-07 PROCEDURE — 3078F DIAST BP <80 MM HG: CPT | Mod: CPTII,S$GLB,, | Performed by: FAMILY MEDICINE

## 2022-06-07 PROCEDURE — 1126F AMNT PAIN NOTED NONE PRSNT: CPT | Mod: CPTII,S$GLB,, | Performed by: FAMILY MEDICINE

## 2022-06-07 PROCEDURE — 80069 RENAL FUNCTION PANEL: CPT | Performed by: INTERNAL MEDICINE

## 2022-06-07 PROCEDURE — 3066F NEPHROPATHY DOC TX: CPT | Mod: CPTII,S$GLB,, | Performed by: FAMILY MEDICINE

## 2022-06-07 PROCEDURE — 1159F PR MEDICATION LIST DOCUMENTED IN MEDICAL RECORD: ICD-10-PCS | Mod: CPTII,S$GLB,, | Performed by: FAMILY MEDICINE

## 2022-06-07 PROCEDURE — 1159F MED LIST DOCD IN RCRD: CPT | Mod: CPTII,S$GLB,, | Performed by: FAMILY MEDICINE

## 2022-06-07 PROCEDURE — 83735 ASSAY OF MAGNESIUM: CPT | Performed by: INTERNAL MEDICINE

## 2022-06-07 PROCEDURE — 1160F PR REVIEW ALL MEDS BY PRESCRIBER/CLIN PHARMACIST DOCUMENTED: ICD-10-PCS | Mod: CPTII,S$GLB,, | Performed by: FAMILY MEDICINE

## 2022-06-07 PROCEDURE — 99204 PR OFFICE/OUTPT VISIT, NEW, LEVL IV, 45-59 MIN: ICD-10-PCS | Mod: S$GLB,,, | Performed by: FAMILY MEDICINE

## 2022-06-07 PROCEDURE — 3075F PR MOST RECENT SYSTOLIC BLOOD PRESS GE 130-139MM HG: ICD-10-PCS | Mod: CPTII,S$GLB,, | Performed by: FAMILY MEDICINE

## 2022-06-07 PROCEDURE — 3078F PR MOST RECENT DIASTOLIC BLOOD PRESSURE < 80 MM HG: ICD-10-PCS | Mod: CPTII,S$GLB,, | Performed by: FAMILY MEDICINE

## 2022-06-07 PROCEDURE — 99204 OFFICE O/P NEW MOD 45 MIN: CPT | Mod: S$GLB,,, | Performed by: FAMILY MEDICINE

## 2022-06-07 PROCEDURE — 36415 COLL VENOUS BLD VENIPUNCTURE: CPT | Performed by: INTERNAL MEDICINE

## 2022-06-07 PROCEDURE — 3008F BODY MASS INDEX DOCD: CPT | Mod: CPTII,S$GLB,, | Performed by: FAMILY MEDICINE

## 2022-06-07 RX ORDER — TRAZODONE HYDROCHLORIDE 50 MG/1
50 TABLET ORAL NIGHTLY
Qty: 90 TABLET | Refills: 3 | Status: SHIPPED | OUTPATIENT
Start: 2022-06-07 | End: 2022-06-07 | Stop reason: SDUPTHER

## 2022-06-07 RX ORDER — TRAZODONE HYDROCHLORIDE 50 MG/1
TABLET ORAL
Refills: 0 | OUTPATIENT
Start: 2022-06-07

## 2022-06-07 RX ORDER — TRAZODONE HYDROCHLORIDE 50 MG/1
50 TABLET ORAL NIGHTLY
Qty: 90 TABLET | Refills: 3 | Status: SHIPPED | OUTPATIENT
Start: 2022-06-07 | End: 2022-09-12 | Stop reason: SDUPTHER

## 2022-06-07 RX ORDER — ALPRAZOLAM 1 MG/1
1 TABLET ORAL DAILY PRN
Qty: 30 TABLET | Refills: 0 | Status: SHIPPED | OUTPATIENT
Start: 2022-06-07 | End: 2022-09-12 | Stop reason: SDUPTHER

## 2022-06-07 RX ORDER — ALPRAZOLAM 1 MG/1
1 TABLET ORAL DAILY PRN
Qty: 30 TABLET | Refills: 0 | Status: SHIPPED | OUTPATIENT
Start: 2022-06-07 | End: 2022-06-07 | Stop reason: SDUPTHER

## 2022-06-07 NOTE — TELEPHONE ENCOUNTER
No new care gaps identified.  [facility] Embedded Care Gaps. Reference number: [id number]. 6/07/2022   5:39:31 PM CDT

## 2022-06-07 NOTE — TELEPHONE ENCOUNTER
[facility] Note  Quick DC. Inappropriate Request   Refill request requires further review by MD: NO   Medication Therapy Plan: Pharmacy is requesting new script(s) for the following medications without required information, (sig/ frequency/qty/etc)     [facility] action(s):  Quick Discontinue      Encounter Details:    Pharmacies have been requesting medications for patients without required information, (sig, frequency, qty, etc.). In addition, requests are sent for medication(s) pt. are currently not taking, and medications patients have never taken.    We have spoken to the pharmacies about these request types and advised their teams previously that we are unable to assess these New Script requests and require all details for these requests. This is a known issue and has been reported.       Automatic Epic Generated Protocol Data Below:   Requested Prescriptions   Pending Prescriptions Disp Refills    traZODone (DESYREL) 50 MG tablet [Pharmacy Med Name: TRAZODONE HCL TABS 50MG]  0            Appointments      Date Provider   Last Visit   6/7/2022 Derrell Wagner MD   Next Visit   9/12/2022 Derrell Wagner MD        Note composed:5:54 PM 06/07/2022

## 2022-06-08 ENCOUNTER — TELEPHONE (OUTPATIENT)
Dept: TRANSPLANT | Facility: CLINIC | Age: 72
End: 2022-06-08
Payer: MEDICARE

## 2022-06-08 LAB — TACROLIMUS BLD-MCNC: 7.8 NG/ML (ref 5–15)

## 2022-06-08 NOTE — TELEPHONE ENCOUNTER
SW returned pt's call regarding the below concern. KENNY emailed AKF-Information packet and AKF-Handbook to pt and son. These pamphlets provide step by step instruction which are detailed to help pt access premium assistance joan. KENNY remains available to pt and family.       ----- Message from Phillip Robert sent at 6/3/2022  1:33 PM CDT -----  Contact: Patient  Patient Requesting call back in regards to receiving more information about the kidney foundation and grants.         Patient @[phone]

## 2022-06-14 ENCOUNTER — LAB VISIT (OUTPATIENT)
Dept: LAB | Facility: HOSPITAL | Age: 72
End: 2022-06-14
Attending: INTERNAL MEDICINE
Payer: MEDICARE

## 2022-06-14 DIAGNOSIS — Z94.0 KIDNEY REPLACED BY TRANSPLANT: ICD-10-CM

## 2022-06-14 LAB
ALBUMIN SERPL BCP-MCNC: 3.9 G/DL (ref 3.5–5.2)
ALBUMIN SERPL BCP-MCNC: 3.9 G/DL (ref 3.5–5.2)
ALP SERPL-CCNC: 90 U/L (ref 55–135)
ALT SERPL W/O P-5'-P-CCNC: 50 U/L (ref 10–44)
ANION GAP SERPL CALC-SCNC: 10 MMOL/L (ref 8–16)
AST SERPL-CCNC: 22 U/L (ref 10–40)
BASOPHILS # BLD AUTO: 0.04 K/UL (ref 0–0.2)
BASOPHILS NFR BLD: 0.6 % (ref 0–1.9)
BILIRUB DIRECT SERPL-MCNC: 0.1 MG/DL (ref 0.1–0.3)
BILIRUB SERPL-MCNC: 0.3 MG/DL (ref 0.1–1)
BUN SERPL-MCNC: 21 MG/DL (ref 8–23)
CALCIUM SERPL-MCNC: 8.4 MG/DL (ref 8.7–10.5)
CHLORIDE SERPL-SCNC: 105 MMOL/L (ref 95–110)
CHOLEST SERPL-MCNC: 180 MG/DL (ref 120–199)
CHOLEST/HDLC SERPL: 3.1 {RATIO} (ref 2–5)
CO2 SERPL-SCNC: 18 MMOL/L (ref 23–29)
CREAT SERPL-MCNC: 1.4 MG/DL (ref 0.5–1.4)
DIFFERENTIAL METHOD: ABNORMAL
EOSINOPHIL # BLD AUTO: 0.1 K/UL (ref 0–0.5)
EOSINOPHIL NFR BLD: 1 % (ref 0–8)
ERYTHROCYTE [DISTWIDTH] IN BLOOD BY AUTOMATED COUNT: 14.8 % (ref 11.5–14.5)
EST. GFR  (AFRICAN AMERICAN): 57.2 ML/MIN/1.73 M^2
EST. GFR  (NON AFRICAN AMERICAN): 49.5 ML/MIN/1.73 M^2
GLUCOSE SERPL-MCNC: 96 MG/DL (ref 70–110)
HCT VFR BLD AUTO: 42.2 % (ref 40–54)
HDLC SERPL-MCNC: 58 MG/DL (ref 40–75)
HDLC SERPL: 32.2 % (ref 20–50)
HGB BLD-MCNC: 13.4 G/DL (ref 14–18)
IMM GRANULOCYTES # BLD AUTO: 0.11 K/UL (ref 0–0.04)
IMM GRANULOCYTES NFR BLD AUTO: 1.6 % (ref 0–0.5)
LDLC SERPL CALC-MCNC: 84.6 MG/DL (ref 63–159)
LYMPHOCYTES # BLD AUTO: 0.6 K/UL (ref 1–4.8)
LYMPHOCYTES NFR BLD: 8.5 % (ref 18–48)
MAGNESIUM SERPL-MCNC: 1.7 MG/DL (ref 1.6–2.6)
MCH RBC QN AUTO: 29.7 PG (ref 27–31)
MCHC RBC AUTO-ENTMCNC: 31.8 G/DL (ref 32–36)
MCV RBC AUTO: 94 FL (ref 82–98)
MONOCYTES # BLD AUTO: 0.4 K/UL (ref 0.3–1)
MONOCYTES NFR BLD: 6.4 % (ref 4–15)
NEUTROPHILS # BLD AUTO: 5.6 K/UL (ref 1.8–7.7)
NEUTROPHILS NFR BLD: 81.9 % (ref 38–73)
NONHDLC SERPL-MCNC: 122 MG/DL
NRBC BLD-RTO: 0 /100 WBC
PHOSPHATE SERPL-MCNC: 3.5 MG/DL (ref 2.7–4.5)
PLATELET # BLD AUTO: 192 K/UL (ref 150–450)
PMV BLD AUTO: 11 FL (ref 9.2–12.9)
POTASSIUM SERPL-SCNC: 3.8 MMOL/L (ref 3.5–5.1)
PROT SERPL-MCNC: 6.1 G/DL (ref 6–8.4)
RBC # BLD AUTO: 4.51 M/UL (ref 4.6–6.2)
SODIUM SERPL-SCNC: 133 MMOL/L (ref 136–145)
TRIGL SERPL-MCNC: 187 MG/DL (ref 30–150)
WBC # BLD AUTO: 6.86 K/UL (ref 3.9–12.7)

## 2022-06-14 PROCEDURE — 36415 COLL VENOUS BLD VENIPUNCTURE: CPT | Performed by: INTERNAL MEDICINE

## 2022-06-14 PROCEDURE — 85025 COMPLETE CBC W/AUTO DIFF WBC: CPT | Performed by: INTERNAL MEDICINE

## 2022-06-14 PROCEDURE — 87799 DETECT AGENT NOS DNA QUANT: CPT | Performed by: INTERNAL MEDICINE

## 2022-06-14 PROCEDURE — 80061 LIPID PANEL: CPT | Performed by: INTERNAL MEDICINE

## 2022-06-14 PROCEDURE — 80069 RENAL FUNCTION PANEL: CPT | Performed by: INTERNAL MEDICINE

## 2022-06-14 PROCEDURE — 80197 ASSAY OF TACROLIMUS: CPT | Performed by: INTERNAL MEDICINE

## 2022-06-14 PROCEDURE — 83735 ASSAY OF MAGNESIUM: CPT | Performed by: INTERNAL MEDICINE

## 2022-06-14 PROCEDURE — 84075 ASSAY ALKALINE PHOSPHATASE: CPT | Performed by: INTERNAL MEDICINE

## 2022-06-15 ENCOUNTER — PATIENT MESSAGE (OUTPATIENT)
Dept: TRANSPLANT | Facility: CLINIC | Age: 72
End: 2022-06-15
Payer: MEDICARE

## 2022-06-15 DIAGNOSIS — Z94.0 S/P KIDNEY TRANSPLANT: Primary | ICD-10-CM

## 2022-06-15 LAB — TACROLIMUS BLD-MCNC: 7.6 NG/ML (ref 5–15)

## 2022-06-15 RX ORDER — CALCITRIOL 0.25 UG/1
0.25 CAPSULE ORAL DAILY
Qty: 90 CAPSULE | Refills: 3 | Status: SHIPPED | OUTPATIENT
Start: 2022-06-15 | End: 2023-06-06

## 2022-06-15 RX ORDER — MYCOPHENOLATE MOFETIL 250 MG/1
1000 CAPSULE ORAL 2 TIMES DAILY
Qty: 240 CAPSULE | Refills: 11 | Status: SHIPPED | OUTPATIENT
Start: 2022-06-15 | End: 2022-12-27 | Stop reason: DRUGHIGH

## 2022-06-15 NOTE — TELEPHONE ENCOUNTER
Spoke with patient regarding below orders. Stop Sensipar. Start Calcitriol. Patient will continue same dose of NaHco3 1950 mg BID.Patient verbalized understanding. ----- Message from Ann Peterson MD sent at 6/15/2022  9:53 AM CDT -----  Low Ca: please stop sensipar. Start calcitriol 0.25 mcg daily   Start NaHCO3 1300 mg BID  How much water he takes per day? His Na is dropping.

## 2022-06-15 NOTE — PROGRESS NOTES
Low Ca: please stop sensipar. Start calcitriol 0.25 mcg daily   Start NaHCO3 1300 mg BID  How much water he takes per day? His Na is dropping.

## 2022-06-15 NOTE — TELEPHONE ENCOUNTER
----- Message from Ann Peterson MD sent at 6/15/2022  1:44 PM CDT -----  No please continue the same dose. I did nt se it on med list.   ----- Message -----  From: Yee Mack RN  Sent: 6/15/2022   1:27 PM CDT  To: Ann Peterson MD    Just to clarify  Patient is currently taking NaHCO3 1950 mg BID. Did you want to lower the dose?  Also, patient reports his water intake has not increased but he is not sure of the amount he is drinking.   ----- Message -----  From: Ann Peterson MD  Sent: 6/15/2022   9:53 AM CDT  To: [facility] Post-Kidney Transplant Clinical    Low Ca: please stop sensipar. Start calcitriol 0.25 mcg daily   Start NaHCO3 1300 mg BID  How much water he takes per day? His Na is dropping.

## 2022-06-20 ENCOUNTER — OFFICE VISIT (OUTPATIENT)
Dept: TRANSPLANT | Facility: CLINIC | Age: 72
End: 2022-06-20
Payer: MEDICARE

## 2022-06-20 VITALS
WEIGHT: 189.63 LBS | DIASTOLIC BLOOD PRESSURE: 78 MMHG | HEIGHT: 68 IN | BODY MASS INDEX: 28.74 KG/M2 | RESPIRATION RATE: 16 BRPM | SYSTOLIC BLOOD PRESSURE: 144 MMHG | TEMPERATURE: 97 F | HEART RATE: 72 BPM | OXYGEN SATURATION: 97 %

## 2022-06-20 DIAGNOSIS — E21.3 HPTH (HYPERPARATHYROIDISM): ICD-10-CM

## 2022-06-20 DIAGNOSIS — E78.2 MIXED HYPERLIPIDEMIA: Chronic | ICD-10-CM

## 2022-06-20 DIAGNOSIS — Z94.0 S/P KIDNEY TRANSPLANT: Primary | ICD-10-CM

## 2022-06-20 DIAGNOSIS — Z79.60 LONG-TERM USE OF IMMUNOSUPPRESSANT MEDICATION: ICD-10-CM

## 2022-06-20 DIAGNOSIS — I10 ESSENTIAL HYPERTENSION: Chronic | ICD-10-CM

## 2022-06-20 DIAGNOSIS — F41.9 ANXIETY: Chronic | ICD-10-CM

## 2022-06-20 PROCEDURE — 3078F DIAST BP <80 MM HG: CPT | Mod: CPTII,S$GLB,, | Performed by: NURSE PRACTITIONER

## 2022-06-20 PROCEDURE — 3288F PR FALLS RISK ASSESSMENT DOCUMENTED: ICD-10-PCS | Mod: CPTII,S$GLB,, | Performed by: NURSE PRACTITIONER

## 2022-06-20 PROCEDURE — 1126F PR PAIN SEVERITY QUANTIFIED, NO PAIN PRESENT: ICD-10-PCS | Mod: CPTII,S$GLB,, | Performed by: NURSE PRACTITIONER

## 2022-06-20 PROCEDURE — 1101F PT FALLS ASSESS-DOCD LE1/YR: CPT | Mod: CPTII,S$GLB,, | Performed by: NURSE PRACTITIONER

## 2022-06-20 PROCEDURE — 1159F PR MEDICATION LIST DOCUMENTED IN MEDICAL RECORD: ICD-10-PCS | Mod: CPTII,S$GLB,, | Performed by: NURSE PRACTITIONER

## 2022-06-20 PROCEDURE — 3008F PR BODY MASS INDEX (BMI) DOCUMENTED: ICD-10-PCS | Mod: CPTII,S$GLB,, | Performed by: NURSE PRACTITIONER

## 2022-06-20 PROCEDURE — 1101F PR PT FALLS ASSESS DOC 0-1 FALLS W/OUT INJ PAST YR: ICD-10-PCS | Mod: CPTII,S$GLB,, | Performed by: NURSE PRACTITIONER

## 2022-06-20 PROCEDURE — 1126F AMNT PAIN NOTED NONE PRSNT: CPT | Mod: CPTII,S$GLB,, | Performed by: NURSE PRACTITIONER

## 2022-06-20 PROCEDURE — 3078F PR MOST RECENT DIASTOLIC BLOOD PRESSURE < 80 MM HG: ICD-10-PCS | Mod: CPTII,S$GLB,, | Performed by: NURSE PRACTITIONER

## 2022-06-20 PROCEDURE — 3066F PR DOCUMENTATION OF TREATMENT FOR NEPHROPATHY: ICD-10-PCS | Mod: CPTII,S$GLB,, | Performed by: NURSE PRACTITIONER

## 2022-06-20 PROCEDURE — 3066F NEPHROPATHY DOC TX: CPT | Mod: CPTII,S$GLB,, | Performed by: NURSE PRACTITIONER

## 2022-06-20 PROCEDURE — 99214 OFFICE O/P EST MOD 30 MIN: CPT | Mod: S$GLB,,, | Performed by: NURSE PRACTITIONER

## 2022-06-20 PROCEDURE — 3077F SYST BP >= 140 MM HG: CPT | Mod: CPTII,S$GLB,, | Performed by: NURSE PRACTITIONER

## 2022-06-20 PROCEDURE — 3288F FALL RISK ASSESSMENT DOCD: CPT | Mod: CPTII,S$GLB,, | Performed by: NURSE PRACTITIONER

## 2022-06-20 PROCEDURE — 1159F MED LIST DOCD IN RCRD: CPT | Mod: CPTII,S$GLB,, | Performed by: NURSE PRACTITIONER

## 2022-06-20 PROCEDURE — 99999 PR PBB SHADOW E&M-EST. PATIENT-LVL IV: ICD-10-PCS | Mod: PBBFAC,,, | Performed by: NURSE PRACTITIONER

## 2022-06-20 PROCEDURE — 99214 PR OFFICE/OUTPT VISIT, EST, LEVL IV, 30-39 MIN: ICD-10-PCS | Mod: S$GLB,,, | Performed by: NURSE PRACTITIONER

## 2022-06-20 PROCEDURE — 99999 PR PBB SHADOW E&M-EST. PATIENT-LVL IV: CPT | Mod: PBBFAC,,, | Performed by: NURSE PRACTITIONER

## 2022-06-20 PROCEDURE — 3008F BODY MASS INDEX DOCD: CPT | Mod: CPTII,S$GLB,, | Performed by: NURSE PRACTITIONER

## 2022-06-20 PROCEDURE — 3077F PR MOST RECENT SYSTOLIC BLOOD PRESSURE >= 140 MM HG: ICD-10-PCS | Mod: CPTII,S$GLB,, | Performed by: NURSE PRACTITIONER

## 2022-06-20 NOTE — LETTER
June 20, 2022        aRjan Hayes  [address] [place] [address]  Phone: [phone]  Fax: [phone]             [facility]e- Transplant [facility] Fl  [address]  [facility] [address]  Phone: [phone]   Patient: Alverto Ramirez Jr.   MR Number: [id number]   YOB: 1950   Date of Visit: 6/20/2022       Dear Dr. Rajan Hayes    Thank you for referring Alverto Ramirez to me for evaluation. Attached you will find relevant portions of my assessment and plan of care.    If you have questions, please do not hesitate to call me. I look forward to following Alverto Ramirez along with you.    Sincerely,    Judi Robert, NP    Enclosure    If you would like to receive this communication electronically, please contact [email or web address] or [phone] to request [employer] Link access.    [employer] Link is a tool which provides read-only access to select patient information with whom you have a relationship. Its easy to use and provides real time access to review your patients record including encounter summaries, notes, results, and demographic information.    If you feel you have received this communication in error or would no longer like to receive these types of communications, please e-mail [email or web address]

## 2022-06-20 NOTE — PROGRESS NOTES
Kidney Post-Transplant Assessment    Referring Physician: Chris Adair  Current Nephrologist: aRjan Hayes    ORGAN: RIGHT KIDNEY  Donor Type: donation after brain death  PHS Increased Risk: yes  Cold Ischemia: 1,354 mins  Induction Medications: thymoglobulin    Subjective:     CC:  Reassessment of renal allograft function and management of chronic immunosuppression.    HPI:  Mr. Ramirez is a 71 y.o. 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 His most recent creatinine is 1.3. He takes mycophenolate mofetil, prednisone and tacrolimus for maintenance immunosuppression. His post transplant course has been complicated by urinary retention severe diarrhea and hypophosphatemia .    Hospitalization/ ED visits  None    Interval HX:  Reports overall doing well. Reports his ongoing anxiousness has improved since starting medication with his PCP. Still with some insomnia and plans on discussion with his PCP. fx assessment doing well. He decrease his oral water intake to 2L, tiny scab to old PD site--looks well. Reports BP 130s/80s at home, weight stable, good UOP, no edema, no abd pain, fever, or chills.  Lab /diagnostic results reviewed with patient today.   All questions answered        Current Outpatient Medications:     ALPRAZolam (XANAX) 1 MG tablet, Take 1 tablet (1 mg total) by mouth daily as needed for Anxiety., Disp: 30 tablet, Rfl: 0    cholecalciferol, vitamin D3, (VITAMIN D3) 50 mcg (2,000 unit) Tab, Take 1 tablet (2,000 Units total) by mouth once daily., Disp: 60 tablet, Rfl: 11    magnesium oxide (MAG-OX) 400 mg (241.3 mg magnesium) tablet, Take 1 tablet (400 mg total) by mouth 2 (two) times daily., Disp: 60 tablet, Rfl: 11    metoprolol tartrate (LOPRESSOR) 50 MG tablet, Take 1 tablet (50 mg total) by mouth 2 (two) times daily., Disp: 180 tablet, Rfl: 3    mycophenolate (CELLCEPT) 250 mg Cap, Take 4 capsules (1,000 mg total)  by mouth 2 (two) times daily., Disp: 240 capsule, Rfl: 11    NIFEdipine (PROCARDIA-XL) 60 MG (OSM) 24 hr tablet, Take 1 tablet (60 mg total) by mouth 2 (two) times a day., Disp: 180 tablet, Rfl: 3    predniSONE (DELTASONE) 5 MG tablet, Take by mouth daily: 20mg 3/22/22 -4/21, 15mg 4/22-5/22, 10mg 5/23-6/22, 5mg 6/23- thereafter, Disp: 120 tablet, Rfl: 5    sildenafiL (VIAGRA) 50 MG tablet, Take 1 tablet (50 mg total) by mouth daily as needed for Erectile Dysfunction., Disp: 30 tablet, Rfl: 11    sodium bicarbonate 650 MG tablet, Take 3 tablets (1,950 mg total) by mouth 2 (two) times daily., Disp: 180 tablet, Rfl: 11    sulfamethoxazole-trimethoprim 400-80mg (BACTRIM,SEPTRA) 400-80 mg per tablet, Take 1 tablet by mouth once daily., Disp: 90 tablet, Rfl: 1    tacrolimus (PROGRAF) 1 MG Cap, Take 4 capsules (4 mg total) by mouth every morning AND 4 capsules (4 mg total) every evening., Disp: 420 capsule, Rfl: 11    traZODone (DESYREL) 50 MG tablet, Take 1 tablet (50 mg total) by mouth every evening., Disp: 90 tablet, Rfl: 3    calcitRIOL (ROCALTROL) 0.25 MCG Cap, Take 1 capsule (0.25 mcg total) by mouth once daily. (Patient not taking: Reported on 6/20/2022), Disp: 90 capsule, Rfl: 3    k phos di & mono-sod phos mono (K-PHOS-NEUTRAL) 250 mg Tab, Take 1 tablet by mouth 2 (two) times a day., Disp: 90 tablet, Rfl: 11      Past Medical History:   Diagnosis Date    Anemia in CKD (chronic kidney disease)     Atrial fibrillation     CKD (chronic kidney disease) stage 4, GFR 15 11/26/2014    Colon cancer screening 12/19/2014    Elevated PSA 11/26/2014    HTN (hypertension) diagnosed in his 40s 10/16/2014    Monoclonal gammopathy 11/26/2014    Phobic anxiety disorder 11/26/2014    Proteinuria 11/26/2014    Stage 3a chronic kidney disease 3/25/2022         Review of Systems   Constitutional: Negative for appetite change, chills, fatigue and fever.   HENT: Negative for trouble swallowing.    Respiratory:  "Negative for cough, chest tightness, shortness of breath and wheezing.    Cardiovascular: Negative for chest pain, palpitations and leg swelling.   Gastrointestinal: Negative for abdominal pain, constipation, diarrhea and nausea.   Genitourinary: Negative for difficulty urinating, frequency and urgency.   Musculoskeletal: Negative for arthralgias and myalgias.   Skin: Negative for rash.   Allergic/Immunologic: Positive for immunocompromised state.   Neurological: Negative for dizziness, weakness, light-headedness and headaches.   Psychiatric/Behavioral: Negative for sleep disturbance.        Objective:   BP (!) 144/78 (BP Location: Right arm, Patient Position: Sitting, BP Method: Medium (Automatic))   Pulse 72   Temp 97.3 °F (36.3 °C) (Temporal)   Resp 16   Ht 5' 7.5" (1.715 m)   Wt 86 kg (189 lb 9.5 oz)   SpO2 97%   BMI 29.26 kg/m²     Physical Exam  Constitutional:       General: He is not in acute distress.     Appearance: He is well-developed. He is not diaphoretic.   Cardiovascular:      Rate and Rhythm: Normal rate and regular rhythm.      Heart sounds: Normal heart sounds. No murmur heard.    No friction rub. No gallop.   Pulmonary:      Effort: Pulmonary effort is normal. No respiratory distress.      Breath sounds: Normal breath sounds. No wheezing or rales.   Abdominal:      General: Bowel sounds are normal. There is no distension.      Palpations: Abdomen is soft.      Tenderness: There is no abdominal tenderness.      Hernia: A hernia is present.   Musculoskeletal:         General: No tenderness. Normal range of motion.   Skin:     General: Skin is warm and dry.      Findings: No rash.      Nails: There is no clubbing.   Neurological:      Mental Status: He is alert and oriented to person, place, and time.   Psychiatric:         Behavior: Behavior normal.            Labs:  Lab Results   Component Value Date    WBC 6.86 06/14/2022    HGB 13.4 (L) 06/14/2022    HCT 42.2 06/14/2022     (L) " 06/14/2022    K 3.8 06/14/2022     06/14/2022    CO2 18 (L) 06/14/2022    BUN 21 06/14/2022    CREATININE 1.4 06/14/2022    EGFRNONAA 49.5 (A) 06/14/2022    CALCIUM 8.4 (L) 06/14/2022    PHOS 3.5 06/14/2022    MG 1.7 06/14/2022    ALBUMIN 3.9 06/14/2022    ALBUMIN 3.9 06/14/2022    AST 22 06/14/2022    ALT 50 (H) 06/14/2022    UTPCR Unable to calculate 06/14/2022    .1 (H) 03/18/2022    TACROLIMUS 7.6 06/14/2022       No results found for: EXTANC, EXTWBC, EXTSEGS, EXTPLATELETS, EXTHEMOGLOBI, EXTHEMATOCRI, EXTCREATININ, EXTSODIUM, EXTPOTASSIUM, EXTBUN, EXTCO2, EXTCALCIUM, EXTPHOSPHORU, EXTGLUCOSE, EXTALBUMIN, EXTAST, EXTALT, EXTBILITOTAL, EXTLIPASE, EXTAMYLASE    No results found for: EXTCYCLOSLVL, EXTSIROLIMUS, EXTTACROLVL, EXTPROTCRE, EXTPTHINTACT, EXTPROTEINUA, EXTWBCUA, EXTRBCUA    Labs were reviewed with the patient    Assessment:     1. S/P kidney transplant    2. Long-term use of immunosuppressant medication    3. Essential hypertension    4. Mixed hyperlipidemia    5. Anxiety    6. HPTH (hyperparathyroidism)        Plan:     Continue current IS therapy regimen  Low Ca: sensipar was stopped and patient was Started calcitriol 0.25 mcg daily   CO 2--18 NaHCO3 1950 mg BID  Phos 3.5--decreased KPN to BID  Hernia noted left of umbilicus--given ED precautions       1. CKD stage: will continue follow up as per our center guidelines. patient to continue close follow up with the local General nephrologist. Education provided in appropriate fluid intake, potassium intake. Continue with oral hydration.      2. Immunosuppression: Prograf trough 7.6, therapeutic target 8-10. Continue Prograf 4/4, MMF 1000 Mg BID, and Prednisone   Lab Results   Component Value Date    TACROLIMUS 7.6 06/14/2022    TACROLIMUS 7.8 06/07/2022    TACROLIMUS 7.7 05/31/2022   Will closely monitor for toxicities, education provided about adherence to medicines and need to communicate any side effect to the transplant nurse or  physician.      3. Allograft Function:stable at baseline for the patient. Continue follow up as per our guidelines and with the local General nephrologist. Communication will be sent today.      5/31/2022  POD 73   Creatinine 0.5 - 1.4 mg/dL 1.2   eGFR if African American >60 mL/min/1.73 m^2 >60.0   Glucose 70 - 110 mg/dL 92           4. Hypertension management:  Continue with home blood pressure monitoring, low salt and healthy life discussed with the patient.  BP Readings from Last 3 Encounters:   06/20/22 (!) 144/78   06/07/22 138/72   06/06/22 (!) 147/82   MTP, nifedipine    5. Metabolic Bone Disease/Secondary Hyperparathyroidism:calcium and phosphorus level discussed with the patient, patient will continue follow up with the general nephrologist for management of metabolic bone disease calcium and phosphorus as per our center protocol. Will monitor PTH, Vit D level, calcium.   Lab Results   Component Value Date    .1 (H) 03/18/2022    CALCIUM 8.4 (L) 06/14/2022    PHOS 3.5 06/14/2022    PHOS 2.9 06/07/2022    PHOS 2.9 05/31/2022   Calcitrol, kpn, magox    6. Electrolytes: reviewed with the patient, essentially within the normal range no need for acute changes today, will monitor as per our center guidelines.  Lab Results   Component Value Date     (L) 06/14/2022    K 3.8 06/14/2022     06/14/2022    CO2 18 (L) 06/14/2022    CO2 19 (L) 06/07/2022    CO2 20 (L) 05/31/2022   nabicarb     7. Anemia: will continue monitoring as per our center guidelines. No indication for acute intervention today.  Lab Results   Component Value Date    WBC 6.86 06/14/2022    HGB 13.4 (L) 06/14/2022    HCT 42.2 06/14/2022    MCV 94 06/14/2022     06/14/2022       8.Proteinuria: will continue with pr/cr ratio as per our center guidelines  Lab Results   Component Value Date    PROTEINURINE <7 06/14/2022    CREATRANDUR 36.0 06/14/2022    UTPCR Unable to calculate 06/14/2022    UTPCR 0.12 05/31/2022    [facility]  Unable to calculate 05/10/2022        9. BK virus infection screening: will continue with urine or blood PCR as per our guidelines to prevent BK virus viremia and allograft dysfunction  Lab Results   Component Value Date    BKVIRUSPCRQB <125 06/14/2022         10. Weight education: provided during the clinic visit.  Body mass index is 29.26 kg/m².     11.Patient safety education regarding immunosuppression including prophylaxis posttransplant for CMV, PCP : Education provided about vaccination and prevention of infections.    12.  Cytopenias: no significant cytopenias will monitor as per our guidelines. Medicine list reviewed including potential causes of drug-induced cytopenias  Lab Results   Component Value Date    WBC 6.86 06/14/2022    HGB 13.4 (L) 06/14/2022    HCT 42.2 06/14/2022    MCV 94 06/14/2022     06/14/2022       13. Post-transplant Prophylaxis; CMV Infection, PJP and Candida mucosistis and other indicated for this particular patient.   PCP PROPHYLAXIS: Bactrim until 9/16/22      Exercise: reminded Telly of the importance of regular exercise for weight management, blood sugar and blood pressure management.  I also explained exercise has been shown to improve cardiovascular health, energy level, and sleep hygiene.  Lastly, I advised him that cardiovascular complications are leading cause of death for renal transplant recipients, and regular exercise can help lower this risk.       Follow-up:   Clinic: return to transplant clinic weekly for the first month after transplant; every 2 weeks during months 2-3; then at 6-, 9-, 12-, 18-, 24-, and 36- months post-transplant to reassess for complications from immunosuppression toxicity and monitor for rejection.  Annually thereafter.    Labs: since patient remains at high risk for rejection and drug-related complications that warrant close monitoring, labs will be ordered as follows: continue twice weekly CBC, renal panel, and drug level for first  month; then same labs once weekly through 3rd month post-transplant.  Urine for UA and protein/creatinine ratio monthly.  Serum BK - PCR at 1-, 3-, 6-, 9-, 12-, 18-, 24-, 36-, 48-, and 60 months post-transplant.  Hepatic panel at 1-, 2-, 3-, 6-, 9-, 12-, 18-, 24-, and 36- months post-transplant.    Judi Robert NP       Education:   Material provided to the patient.  Patient reminded to call with any health changes since these can be early signs of significant complications.  Also, I advised the patient to be sure any new medications or changes of old medications are discussed with either a pharmacist or physician knowledgeable with transplant to avoid rejection/drug toxicity related to significant drug interactions.    Patient advised that it is recommended that all transplanted patients, and their close contacts and household members receive Covid vaccination.

## 2022-07-14 ENCOUNTER — LAB VISIT (OUTPATIENT)
Dept: LAB | Facility: HOSPITAL | Age: 72
End: 2022-07-14
Attending: INTERNAL MEDICINE
Payer: MEDICARE

## 2022-07-14 DIAGNOSIS — Z94.0 KIDNEY REPLACED BY TRANSPLANT: ICD-10-CM

## 2022-07-14 LAB
ALBUMIN SERPL BCP-MCNC: 3.7 G/DL (ref 3.5–5.2)
ALBUMIN SERPL BCP-MCNC: 3.7 G/DL (ref 3.5–5.2)
ALP SERPL-CCNC: 100 U/L (ref 55–135)
ALT SERPL W/O P-5'-P-CCNC: 37 U/L (ref 10–44)
ANION GAP SERPL CALC-SCNC: 11 MMOL/L (ref 8–16)
AST SERPL-CCNC: 26 U/L (ref 10–40)
BASOPHILS # BLD AUTO: 0.03 K/UL (ref 0–0.2)
BASOPHILS NFR BLD: 0.7 % (ref 0–1.9)
BILIRUB DIRECT SERPL-MCNC: 0.2 MG/DL (ref 0.1–0.3)
BILIRUB SERPL-MCNC: 0.5 MG/DL (ref 0.1–1)
BUN SERPL-MCNC: 17 MG/DL (ref 8–23)
CALCIUM SERPL-MCNC: 9.1 MG/DL (ref 8.7–10.5)
CHLORIDE SERPL-SCNC: 106 MMOL/L (ref 95–110)
CO2 SERPL-SCNC: 18 MMOL/L (ref 23–29)
CREAT SERPL-MCNC: 1.3 MG/DL (ref 0.5–1.4)
DIFFERENTIAL METHOD: ABNORMAL
EOSINOPHIL # BLD AUTO: 0.1 K/UL (ref 0–0.5)
EOSINOPHIL NFR BLD: 2 % (ref 0–8)
ERYTHROCYTE [DISTWIDTH] IN BLOOD BY AUTOMATED COUNT: 14.5 % (ref 11.5–14.5)
EST. GFR  (AFRICAN AMERICAN): >60 ML/MIN/1.73 M^2
EST. GFR  (NON AFRICAN AMERICAN): 54.1 ML/MIN/1.73 M^2
GLUCOSE SERPL-MCNC: 104 MG/DL (ref 70–110)
HCT VFR BLD AUTO: 44.1 % (ref 40–54)
HGB BLD-MCNC: 14 G/DL (ref 14–18)
IMM GRANULOCYTES # BLD AUTO: 0.07 K/UL (ref 0–0.04)
IMM GRANULOCYTES NFR BLD AUTO: 1.7 % (ref 0–0.5)
LYMPHOCYTES # BLD AUTO: 0.4 K/UL (ref 1–4.8)
LYMPHOCYTES NFR BLD: 9.5 % (ref 18–48)
MAGNESIUM SERPL-MCNC: 1.6 MG/DL (ref 1.6–2.6)
MCH RBC QN AUTO: 29.2 PG (ref 27–31)
MCHC RBC AUTO-ENTMCNC: 31.7 G/DL (ref 32–36)
MCV RBC AUTO: 92 FL (ref 82–98)
MONOCYTES # BLD AUTO: 0.8 K/UL (ref 0.3–1)
MONOCYTES NFR BLD: 19.2 % (ref 4–15)
NEUTROPHILS # BLD AUTO: 2.7 K/UL (ref 1.8–7.7)
NEUTROPHILS NFR BLD: 66.9 % (ref 38–73)
NRBC BLD-RTO: 0 /100 WBC
PHOSPHATE SERPL-MCNC: 2.2 MG/DL (ref 2.7–4.5)
PLATELET # BLD AUTO: 146 K/UL (ref 150–450)
PMV BLD AUTO: 11.5 FL (ref 9.2–12.9)
POTASSIUM SERPL-SCNC: 4.1 MMOL/L (ref 3.5–5.1)
PROT SERPL-MCNC: 6.5 G/DL (ref 6–8.4)
RBC # BLD AUTO: 4.8 M/UL (ref 4.6–6.2)
SODIUM SERPL-SCNC: 135 MMOL/L (ref 136–145)
WBC # BLD AUTO: 4.01 K/UL (ref 3.9–12.7)

## 2022-07-14 PROCEDURE — 87799 DETECT AGENT NOS DNA QUANT: CPT | Performed by: INTERNAL MEDICINE

## 2022-07-14 PROCEDURE — 36415 COLL VENOUS BLD VENIPUNCTURE: CPT | Performed by: INTERNAL MEDICINE

## 2022-07-14 PROCEDURE — 85025 COMPLETE CBC W/AUTO DIFF WBC: CPT | Performed by: INTERNAL MEDICINE

## 2022-07-14 PROCEDURE — 80197 ASSAY OF TACROLIMUS: CPT | Performed by: INTERNAL MEDICINE

## 2022-07-14 PROCEDURE — 83735 ASSAY OF MAGNESIUM: CPT | Performed by: INTERNAL MEDICINE

## 2022-07-14 PROCEDURE — 84075 ASSAY ALKALINE PHOSPHATASE: CPT | Performed by: INTERNAL MEDICINE

## 2022-07-14 PROCEDURE — 80069 RENAL FUNCTION PANEL: CPT | Performed by: INTERNAL MEDICINE

## 2022-07-15 LAB — TACROLIMUS BLD-MCNC: 6.6 NG/ML (ref 5–15)

## 2022-07-20 ENCOUNTER — TELEPHONE (OUTPATIENT)
Dept: HEMATOLOGY/ONCOLOGY | Facility: CLINIC | Age: 72
End: 2022-07-20
Payer: MEDICARE

## 2022-07-20 NOTE — TELEPHONE ENCOUNTER
Spoke with patient who asks about what this appt is about.  I explained it was for follow up for his anemia. He acknowledged. Call ended well.

## 2022-07-26 ENCOUNTER — PATIENT MESSAGE (OUTPATIENT)
Dept: TRANSPLANT | Facility: CLINIC | Age: 72
End: 2022-07-26
Payer: MEDICARE

## 2022-08-02 DIAGNOSIS — E87.20 ACIDOSIS: ICD-10-CM

## 2022-08-02 DIAGNOSIS — Z94.0 S/P KIDNEY TRANSPLANT: ICD-10-CM

## 2022-08-02 RX ORDER — SODIUM BICARBONATE 650 MG/1
1950 TABLET ORAL 2 TIMES DAILY
Qty: 180 TABLET | Refills: 11 | Status: SHIPPED | OUTPATIENT
Start: 2022-08-02 | End: 2023-08-18 | Stop reason: SDUPTHER

## 2022-08-11 NOTE — PROGRESS NOTES
"Subjective:       Patient ID: Alverto Ramirez Jr. is a 72 y.o. male.    Chief Complaint: CKD    HPI  Patient presents to clinic today for routine follow-up.  Pt was previously ESRD on PD.  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  He takes mycophenolate mofetil, prednisone and tacrolimus for maintenance immunosuppression.  His post transplant course has been complicated by urinary retention severe diarrhea and hypophosphatemia.  He states resolved at present.    Pt was seen and examined today in clinic.  Pt denies taking NSAIDs, no GI losses, no abx use, no recent infections, no dysuria, no cardiopulmonary sx's.  Laboratory studies and medications were reviewed.  Since pt's last office visit, states doing well with no specific or new complaints voiced.  All Nephrology related questions were answered to her satisfaction.    The past medical, family and social histories were reviewed for this encounter.    Review of Systems   Constitutional: Negative.    HENT: Negative.    Respiratory: Negative for shortness of breath.    Cardiovascular: Negative.  Negative for leg swelling.   Gastrointestinal: Negative.    Genitourinary: Negative.    Musculoskeletal: Negative.    Skin: Negative.    Neurological: Negative for dizziness, light-headedness and headaches.   Psychiatric/Behavioral: Negative.           height is 5' 7.5" (1.715 m) and weight is 83.7 kg (184 lb 9.6 oz). His blood pressure is 134/70 and his pulse is 80.     Lab Results   Component Value Date    WBC 3.75 (L) 08/17/2022    HGB 13.5 (L) 08/17/2022    HCT 43.5 08/17/2022    MCV 92 08/17/2022     08/17/2022        CMP  Sodium   Date Value Ref Range Status   08/17/2022 140 136 - 145 mmol/L Final     Potassium   Date Value Ref Range Status   08/17/2022 4.0 3.5 - 5.1 mmol/L Final     Chloride   Date Value Ref Range Status   08/17/2022 109 95 - 110 mmol/L Final     CO2   Date Value Ref Range Status   08/17/2022 21 (L) 23 - 29 mmol/L " Final     Glucose   Date Value Ref Range Status   08/17/2022 99 70 - 110 mg/dL Final     BUN   Date Value Ref Range Status   08/17/2022 13 8 - 23 mg/dL Final     Creatinine   Date Value Ref Range Status   08/17/2022 1.5 (H) 0.5 - 1.4 mg/dL Final     Calcium   Date Value Ref Range Status   08/17/2022 9.6 8.7 - 10.5 mg/dL Final     Total Protein   Date Value Ref Range Status   08/12/2022 6.7 6.0 - 8.4 g/dL Final     Albumin   Date Value Ref Range Status   08/17/2022 4.0 3.5 - 5.2 g/dL Final     Total Bilirubin   Date Value Ref Range Status   08/12/2022 0.6 0.1 - 1.0 mg/dL Final     Comment:     For infants and newborns, interpretation of results should be based  on gestational age, weight and in agreement with clinical  observations.    Premature Infant recommended reference ranges:  Up to 24 hours.............<8.0 mg/dL  Up to 48 hours............<12.0 mg/dL  3-5 days..................<15.0 mg/dL  6-29 days.................<15.0 mg/dL       Alkaline Phosphatase   Date Value Ref Range Status   08/12/2022 94 55 - 135 U/L Final     AST   Date Value Ref Range Status   08/12/2022 18 10 - 40 U/L Final     ALT   Date Value Ref Range Status   08/12/2022 31 10 - 44 U/L Final     Anion Gap   Date Value Ref Range Status   08/17/2022 10 8 - 16 mmol/L Final     eGFR if    Date Value Ref Range Status   07/14/2022 >60.0 >60 mL/min/1.73 m^2 Final     eGFR if non    Date Value Ref Range Status   07/14/2022 54.1 (A) >60 mL/min/1.73 m^2 Final     Comment:     Calculation used to obtain the estimated glomerular filtration  rate (eGFR) is the CKD-EPI equation.          Current Outpatient Medications on File Prior to Visit   Medication Sig Dispense Refill    ALPRAZolam (XANAX) 1 MG tablet Take 1 tablet (1 mg total) by mouth daily as needed for Anxiety. 30 tablet 0    calcitRIOL (ROCALTROL) 0.25 MCG Cap Take 1 capsule (0.25 mcg total) by mouth once daily. 90 capsule 3    cholecalciferol, vitamin D3,  (VITAMIN D3) 50 mcg (2,000 unit) Tab Take 1 tablet (2,000 Units total) by mouth once daily. 60 tablet 11    k phos di & mono-sod phos mono (K-PHOS-NEUTRAL) 250 mg Tab Take 1 tablet by mouth 2 (two) times a day. 90 tablet 11    magnesium oxide (MAG-OX) 400 mg (241.3 mg magnesium) tablet Take 1 tablet (400 mg total) by mouth 2 (two) times daily. 60 tablet 11    metoprolol tartrate (LOPRESSOR) 50 MG tablet Take 1 tablet (50 mg total) by mouth 2 (two) times daily. 180 tablet 3    mycophenolate (CELLCEPT) 250 mg Cap Take 4 capsules (1,000 mg total) by mouth 2 (two) times daily. 240 capsule 11    NIFEdipine (PROCARDIA-XL) 60 MG (OSM) 24 hr tablet Take 1 tablet (60 mg total) by mouth 2 (two) times a day. 180 tablet 3    predniSONE (DELTASONE) 5 MG tablet Take by mouth daily: 20mg 3/22/22 -4/21, 15mg 4/22-5/22, 10mg 5/23-6/22, 5mg 6/23- thereafter 120 tablet 5    sildenafiL (VIAGRA) 50 MG tablet Take 1 tablet (50 mg total) by mouth daily as needed for Erectile Dysfunction. 30 tablet 11    sodium bicarbonate 650 MG tablet Take 3 tablets (1,950 mg total) by mouth 2 (two) times daily. 180 tablet 11    sulfamethoxazole-trimethoprim 400-80mg (BACTRIM,SEPTRA) 400-80 mg per tablet Take 1 tablet by mouth once daily. 90 tablet 1    tacrolimus (PROGRAF) 1 MG Cap Take 4 capsules (4 mg total) by mouth every morning AND 4 capsules (4 mg total) every evening. 420 capsule 11    traZODone (DESYREL) 50 MG tablet Take 1 tablet (50 mg total) by mouth every evening. 90 tablet 3    [DISCONTINUED] amLODIPine (NORVASC) 10 MG tablet Take 1 tablet (10 mg total) by mouth once daily. 30 tablet 11    [DISCONTINUED] calcium carbonate (OS-SUSAN) 500 mg calcium (1,250 mg) chewable tablet Take 1 tablet by mouth 3 (three) times daily.      [DISCONTINUED] lanthanum (FOSRENOL) 1000 MG chewable tablet CHEW 2 TABLETS THREE TIMES A DAY WITH MEALS 540 tablet 4    [DISCONTINUED] losartan (COZAAR) 100 MG tablet Take 1 tablet (100 mg total) by mouth  once daily. 90 tablet 3    [DISCONTINUED] torsemide (DEMADEX) 100 MG Tab Take 2 tablets (200 mg total) by mouth once daily. 180 tablet 3     No current facility-administered medications on file prior to visit.       Objective:        Physical Exam  Vitals and nursing note reviewed.   Constitutional:       Appearance: Normal appearance.   HENT:      Head: Normocephalic and atraumatic.   Eyes:      Extraocular Movements: Extraocular movements intact.      Pupils: Pupils are equal, round, and reactive to light.   Cardiovascular:      Rate and Rhythm: Normal rate and regular rhythm.   Pulmonary:      Effort: Pulmonary effort is normal.   Abdominal:      General: Bowel sounds are normal.      Palpations: Abdomen is soft.   Musculoskeletal:         General: Normal range of motion.      Right lower leg: No edema.      Left lower leg: No edema.   Skin:     General: Skin is warm and dry.   Neurological:      General: No focal deficit present.      Mental Status: He is alert and oriented to person, place, and time.   Psychiatric:         Mood and Affect: Mood normal.         Behavior: Behavior normal.         Thought Content: Thought content normal.         Judgment: Judgment normal.           Assessment:       1. S/P kidney transplant    2. Stage 3a chronic kidney disease    3. Parenchymal renal hypertension, stage 1-4 or unspecified chronic kidney disease    4. Long-term use of immunosuppressant medication    5. Chronic kidney disease, stage 3a         Plan:       1. 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  He takes mycophenolate mofetil, prednisone and tacrolimus for maintenance immunosuppression. Tacrolimus 7.9; continue current regimen.  BK virus not detected ; labs 7/14/22.  Followed by Transplant.      2.  Previously on PD; received KTX on 3/19/22; see above.     3.  Blood pressure is well controlled on current regimen.  Continue meds a prescribed.    4.  See above.    Return  to clinic in 3 months    45 minutes of total time spent on the encounter, which includes face to face time and non-face to face time preparing to see the patient (eg, review of tests), obtaining and/or reviewing separately obtained history, documenting clinical information in the electronic or other health record, Independently interpreting results and communicating results to the patient/family/caregiver, or care coordination.    Dilcia Saavedra, MANNP-C

## 2022-08-12 ENCOUNTER — LAB VISIT (OUTPATIENT)
Dept: LAB | Facility: HOSPITAL | Age: 72
End: 2022-08-12
Payer: MEDICARE

## 2022-08-12 DIAGNOSIS — D47.2 MONOCLONAL GAMMOPATHY: ICD-10-CM

## 2022-08-12 DIAGNOSIS — Z94.0 S/P KIDNEY TRANSPLANT: ICD-10-CM

## 2022-08-12 DIAGNOSIS — D63.1 ANEMIA IN ESRD (END-STAGE RENAL DISEASE): Chronic | ICD-10-CM

## 2022-08-12 DIAGNOSIS — N18.6 ANEMIA IN ESRD (END-STAGE RENAL DISEASE): Chronic | ICD-10-CM

## 2022-08-12 LAB
ALBUMIN SERPL BCP-MCNC: 4 G/DL (ref 3.5–5.2)
ALP SERPL-CCNC: 94 U/L (ref 55–135)
ALT SERPL W/O P-5'-P-CCNC: 31 U/L (ref 10–44)
ANION GAP SERPL CALC-SCNC: 12 MMOL/L (ref 8–16)
AST SERPL-CCNC: 18 U/L (ref 10–40)
BASOPHILS # BLD AUTO: 0.02 K/UL (ref 0–0.2)
BASOPHILS NFR BLD: 0.5 % (ref 0–1.9)
BILIRUB SERPL-MCNC: 0.6 MG/DL (ref 0.1–1)
BUN SERPL-MCNC: 18 MG/DL (ref 8–23)
CALCIUM SERPL-MCNC: 9.6 MG/DL (ref 8.7–10.5)
CHLORIDE SERPL-SCNC: 108 MMOL/L (ref 95–110)
CO2 SERPL-SCNC: 18 MMOL/L (ref 23–29)
CREAT SERPL-MCNC: 1.3 MG/DL (ref 0.5–1.4)
DIFFERENTIAL METHOD: ABNORMAL
EOSINOPHIL # BLD AUTO: 0.1 K/UL (ref 0–0.5)
EOSINOPHIL NFR BLD: 1.8 % (ref 0–8)
ERYTHROCYTE [DISTWIDTH] IN BLOOD BY AUTOMATED COUNT: 15.1 % (ref 11.5–14.5)
EST. GFR  (NO RACE VARIABLE): 58 ML/MIN/1.73 M^2
GLUCOSE SERPL-MCNC: 102 MG/DL (ref 70–110)
HCT VFR BLD AUTO: 40.9 % (ref 40–54)
HGB BLD-MCNC: 13.3 G/DL (ref 14–18)
IMM GRANULOCYTES # BLD AUTO: 0.03 K/UL (ref 0–0.04)
IMM GRANULOCYTES NFR BLD AUTO: 0.8 % (ref 0–0.5)
LYMPHOCYTES # BLD AUTO: 0.8 K/UL (ref 1–4.8)
LYMPHOCYTES NFR BLD: 21.7 % (ref 18–48)
MCH RBC QN AUTO: 28.8 PG (ref 27–31)
MCHC RBC AUTO-ENTMCNC: 32.5 G/DL (ref 32–36)
MCV RBC AUTO: 89 FL (ref 82–98)
MONOCYTES # BLD AUTO: 1 K/UL (ref 0.3–1)
MONOCYTES NFR BLD: 26.7 % (ref 4–15)
NEUTROPHILS # BLD AUTO: 1.9 K/UL (ref 1.8–7.7)
NEUTROPHILS NFR BLD: 48.5 % (ref 38–73)
NRBC BLD-RTO: 0 /100 WBC
PLATELET # BLD AUTO: 157 K/UL (ref 150–450)
PMV BLD AUTO: 10.1 FL (ref 9.2–12.9)
POTASSIUM SERPL-SCNC: 3.9 MMOL/L (ref 3.5–5.1)
PROT SERPL-MCNC: 6.7 G/DL (ref 6–8.4)
RBC # BLD AUTO: 4.62 M/UL (ref 4.6–6.2)
SODIUM SERPL-SCNC: 138 MMOL/L (ref 136–145)
WBC # BLD AUTO: 3.82 K/UL (ref 3.9–12.7)

## 2022-08-12 PROCEDURE — 84165 PATHOLOGIST INTERPRETATION SPE: ICD-10-PCS | Mod: 26,,, | Performed by: PATHOLOGY

## 2022-08-12 PROCEDURE — 86334 IMMUNOFIX E-PHORESIS SERUM: CPT

## 2022-08-12 PROCEDURE — 83520 IMMUNOASSAY QUANT NOS NONAB: CPT | Mod: 59

## 2022-08-12 PROCEDURE — 84165 PROTEIN E-PHORESIS SERUM: CPT | Mod: 26,,, | Performed by: PATHOLOGY

## 2022-08-12 PROCEDURE — 80053 COMPREHEN METABOLIC PANEL: CPT

## 2022-08-12 PROCEDURE — 86334 IMMUNOFIX E-PHORESIS SERUM: CPT | Mod: 26,,, | Performed by: PATHOLOGY

## 2022-08-12 PROCEDURE — 36415 COLL VENOUS BLD VENIPUNCTURE: CPT

## 2022-08-12 PROCEDURE — 84165 PROTEIN E-PHORESIS SERUM: CPT

## 2022-08-12 PROCEDURE — 80197 ASSAY OF TACROLIMUS: CPT | Performed by: INTERNAL MEDICINE

## 2022-08-12 PROCEDURE — 86334 PATHOLOGIST INTERPRETATION IFE: ICD-10-PCS | Mod: 26,,, | Performed by: PATHOLOGY

## 2022-08-12 PROCEDURE — 85025 COMPLETE CBC W/AUTO DIFF WBC: CPT

## 2022-08-13 LAB — TACROLIMUS BLD-MCNC: 9.8 NG/ML (ref 5–15)

## 2022-08-15 LAB
ALBUMIN SERPL ELPH-MCNC: 3.92 G/DL (ref 3.35–5.55)
ALPHA1 GLOB SERPL ELPH-MCNC: 0.37 G/DL (ref 0.17–0.41)
ALPHA2 GLOB SERPL ELPH-MCNC: 0.88 G/DL (ref 0.43–0.99)
B-GLOBULIN SERPL ELPH-MCNC: 0.73 G/DL (ref 0.5–1.1)
GAMMA GLOB SERPL ELPH-MCNC: 0.51 G/DL (ref 0.67–1.58)
INTERPRETATION SERPL IFE-IMP: NORMAL
KAPPA LC SER QL IA: 1.35 MG/DL (ref 0.33–1.94)
KAPPA LC/LAMBDA SER IA: 1.3 (ref 0.26–1.65)
LAMBDA LC SER QL IA: 1.04 MG/DL (ref 0.57–2.63)
PROT SERPL-MCNC: 6.4 G/DL (ref 6–8.4)

## 2022-08-16 ENCOUNTER — OFFICE VISIT (OUTPATIENT)
Dept: HEMATOLOGY/ONCOLOGY | Facility: CLINIC | Age: 72
End: 2022-08-16
Payer: MEDICARE

## 2022-08-16 VITALS
TEMPERATURE: 99 F | HEIGHT: 68 IN | SYSTOLIC BLOOD PRESSURE: 137 MMHG | BODY MASS INDEX: 28.1 KG/M2 | HEART RATE: 71 BPM | DIASTOLIC BLOOD PRESSURE: 74 MMHG | WEIGHT: 185.44 LBS

## 2022-08-16 DIAGNOSIS — D47.2 MONOCLONAL GAMMOPATHY: Primary | ICD-10-CM

## 2022-08-16 DIAGNOSIS — D63.1 ANEMIA IN ESRD (END-STAGE RENAL DISEASE): ICD-10-CM

## 2022-08-16 DIAGNOSIS — Z99.2 ESRD ON DIALYSIS: ICD-10-CM

## 2022-08-16 DIAGNOSIS — N18.6 ANEMIA IN ESRD (END-STAGE RENAL DISEASE): ICD-10-CM

## 2022-08-16 DIAGNOSIS — N18.6 ESRD ON DIALYSIS: ICD-10-CM

## 2022-08-16 LAB
PATHOLOGIST INTERPRETATION IFE: NORMAL
PATHOLOGIST INTERPRETATION SPE: NORMAL

## 2022-08-16 PROCEDURE — 3066F NEPHROPATHY DOC TX: CPT | Mod: CPTII,S$GLB,, | Performed by: NURSE PRACTITIONER

## 2022-08-16 PROCEDURE — 99999 PR PBB SHADOW E&M-EST. PATIENT-LVL IV: CPT | Mod: PBBFAC,,, | Performed by: NURSE PRACTITIONER

## 2022-08-16 PROCEDURE — 3008F BODY MASS INDEX DOCD: CPT | Mod: CPTII,S$GLB,, | Performed by: NURSE PRACTITIONER

## 2022-08-16 PROCEDURE — 3078F PR MOST RECENT DIASTOLIC BLOOD PRESSURE < 80 MM HG: ICD-10-PCS | Mod: CPTII,S$GLB,, | Performed by: NURSE PRACTITIONER

## 2022-08-16 PROCEDURE — 3288F FALL RISK ASSESSMENT DOCD: CPT | Mod: CPTII,S$GLB,, | Performed by: NURSE PRACTITIONER

## 2022-08-16 PROCEDURE — 1101F PT FALLS ASSESS-DOCD LE1/YR: CPT | Mod: CPTII,S$GLB,, | Performed by: NURSE PRACTITIONER

## 2022-08-16 PROCEDURE — 3008F PR BODY MASS INDEX (BMI) DOCUMENTED: ICD-10-PCS | Mod: CPTII,S$GLB,, | Performed by: NURSE PRACTITIONER

## 2022-08-16 PROCEDURE — 3066F PR DOCUMENTATION OF TREATMENT FOR NEPHROPATHY: ICD-10-PCS | Mod: CPTII,S$GLB,, | Performed by: NURSE PRACTITIONER

## 2022-08-16 PROCEDURE — 1101F PR PT FALLS ASSESS DOC 0-1 FALLS W/OUT INJ PAST YR: ICD-10-PCS | Mod: CPTII,S$GLB,, | Performed by: NURSE PRACTITIONER

## 2022-08-16 PROCEDURE — 1159F MED LIST DOCD IN RCRD: CPT | Mod: CPTII,S$GLB,, | Performed by: NURSE PRACTITIONER

## 2022-08-16 PROCEDURE — 3288F PR FALLS RISK ASSESSMENT DOCUMENTED: ICD-10-PCS | Mod: CPTII,S$GLB,, | Performed by: NURSE PRACTITIONER

## 2022-08-16 PROCEDURE — 3075F SYST BP GE 130 - 139MM HG: CPT | Mod: CPTII,S$GLB,, | Performed by: NURSE PRACTITIONER

## 2022-08-16 PROCEDURE — 1126F AMNT PAIN NOTED NONE PRSNT: CPT | Mod: CPTII,S$GLB,, | Performed by: NURSE PRACTITIONER

## 2022-08-16 PROCEDURE — 1160F RVW MEDS BY RX/DR IN RCRD: CPT | Mod: CPTII,S$GLB,, | Performed by: NURSE PRACTITIONER

## 2022-08-16 PROCEDURE — 99999 PR PBB SHADOW E&M-EST. PATIENT-LVL IV: ICD-10-PCS | Mod: PBBFAC,,, | Performed by: NURSE PRACTITIONER

## 2022-08-16 PROCEDURE — 99214 OFFICE O/P EST MOD 30 MIN: CPT | Mod: S$GLB,,, | Performed by: NURSE PRACTITIONER

## 2022-08-16 PROCEDURE — 1159F PR MEDICATION LIST DOCUMENTED IN MEDICAL RECORD: ICD-10-PCS | Mod: CPTII,S$GLB,, | Performed by: NURSE PRACTITIONER

## 2022-08-16 PROCEDURE — 1126F PR PAIN SEVERITY QUANTIFIED, NO PAIN PRESENT: ICD-10-PCS | Mod: CPTII,S$GLB,, | Performed by: NURSE PRACTITIONER

## 2022-08-16 PROCEDURE — 1160F PR REVIEW ALL MEDS BY PRESCRIBER/CLIN PHARMACIST DOCUMENTED: ICD-10-PCS | Mod: CPTII,S$GLB,, | Performed by: NURSE PRACTITIONER

## 2022-08-16 PROCEDURE — 3075F PR MOST RECENT SYSTOLIC BLOOD PRESS GE 130-139MM HG: ICD-10-PCS | Mod: CPTII,S$GLB,, | Performed by: NURSE PRACTITIONER

## 2022-08-16 PROCEDURE — 99214 PR OFFICE/OUTPT VISIT, EST, LEVL IV, 30-39 MIN: ICD-10-PCS | Mod: S$GLB,,, | Performed by: NURSE PRACTITIONER

## 2022-08-16 PROCEDURE — 3078F DIAST BP <80 MM HG: CPT | Mod: CPTII,S$GLB,, | Performed by: NURSE PRACTITIONER

## 2022-08-16 NOTE — ASSESSMENT & PLAN NOTE
Anemia of chronic kidney disease.  Most recent hemoglobin noted above 10 per dL no indication for erythropoietin stimulating agent.  Will continue to monitor.

## 2022-08-16 NOTE — ASSESSMENT & PLAN NOTE
IgM monoclonal gammopathy. Will repeat protein electrophoresis and immunofixation.  Will continue to monitor every 3 months or sooner as needed.

## 2022-08-16 NOTE — PROGRESS NOTES
Subjective:       Patient ID: Alverto Ramirez Jr. is a 72 y.o. male.    Chief Complaint:   1. Monoclonal gammopathy  IgM kappa     Current Treatment:  Observation    Treatment History:    HPI: This is a 72-year-old  male with medical history significant for afib, anemia secondary to CKD, hypertensive nephropathy, and IgM monoclonal gammopathy of undetermined significance that was diagnosed in 2018 and since has been monitored.      He recently underwent kidney transplantation on 3/19/2022.      His primary Hematologist/Oncologist is Dr. Hilliard.    Interval History: Patient presents for follow up on labs. He presents with his wife; his only complaint is upset stomach which he believes is caused by steroid use. Advised him to eat a meal with his steroids. He asks if he can take Pepcid; advised him that reflux symptoms can cause nausea, and Pepcid could be effective in that instance.     Reviewed labs with patient:   CBC:   Recent Labs   Lab 22  0830   WBC 3.82 L   RBC 4.62   Hemoglobin 13.3 L   Hematocrit 40.9   Platelets 157   MCV 89   MCH 28.8   MCHC 32.5     CMP:  Recent Labs   Lab 22  0830   Glucose 102   Calcium 9.6   Albumin 4.0   Total Protein 6.7   Sodium 138   Potassium 3.9   CO2 18 L   Chloride 108   BUN 18   Creatinine 1.3   Alkaline Phosphatase 94   ALT 31   AST 18   Total Bilirubin 0.6     Discussed with him that his kappa and lambda light chains and light chain ratio have been normal since his transplant.     Social History     Socioeconomic History    Marital status:    Occupational History     Employer: retired   Tobacco Use    Smoking status: Former Smoker     Packs/day: 1.00     Years: 15.00     Pack years: 15.00     Types: Cigarettes     Quit date: 1984     Years since quittin.7    Smokeless tobacco: Never Used   Substance and Sexual Activity    Alcohol use: Yes     Alcohol/week: 5.0 - 7.0 standard drinks     Types: 5 - 7 Standard drinks or equivalent  per week     Comment: stopped drinking    Drug use: No    Sexual activity: Yes     Partners: Female   Social History Narrative    Retired from [employer]     for 41 y.o.    2 children    No history of blood transfusions    No donors     Past Medical History:   Diagnosis Date    Anemia in CKD (chronic kidney disease)     Atrial fibrillation     CKD (chronic kidney disease) stage 4, GFR 15 11/26/2014    Colon cancer screening 12/19/2014    Elevated PSA 11/26/2014    HTN (hypertension) diagnosed in his 40s 10/16/2014    Monoclonal gammopathy 11/26/2014    Phobic anxiety disorder 11/26/2014    Proteinuria 11/26/2014    Stage 3a chronic kidney disease 3/25/2022     Family History   Problem Relation Age of Onset    Heart disease Father     Dementia Father     Diabetes Mother     Cataracts Mother     Glaucoma Mother     Hypertension Sister     Cancer Brother         prostate    Kidney disease Sister         ESRD    Cancer Paternal Uncle      Past Surgical History:   Procedure Laterality Date    AV FISTULA PLACEMENT  11/2014    COLONOSCOPY N/A 12/29/2017    Procedure: COLONOSCOPY;  Surgeon: Tony Peacock III, MD;  Location: [facility];  Service: Endoscopy;  Laterality: N/A;    KIDNEY TRANSPLANT Right 3/18/2022    Procedure: TRANSPLANT, KIDNEY;  Surgeon: Victoriano Thomas MD;  Location: [address];  Service: Transplant;  Laterality: Right;    PERITONEAL CATHETER INSERTION      VASECTOMY      VASECTOMY       Review of Systems   Constitutional: Negative for appetite change and fatigue.   HENT: Negative for mouth sores, rhinorrhea and sore throat.    Eyes: Negative.    Respiratory: Negative.    Cardiovascular: Negative.    Gastrointestinal: Negative for constipation, diarrhea, nausea and vomiting.   Endocrine: Negative.    Genitourinary: Negative.    Musculoskeletal: Negative.    Integumentary:  Negative.   Allergic/Immunologic: Negative.    Neurological: Negative for weakness and  numbness.   Hematological: Negative.    Psychiatric/Behavioral: Negative.        Medication List with Changes/Refills   Current Medications    ALPRAZOLAM (XANAX) 1 MG TABLET    Take 1 tablet (1 mg total) by mouth daily as needed for Anxiety.    CALCITRIOL (ROCALTROL) 0.25 MCG CAP    Take 1 capsule (0.25 mcg total) by mouth once daily.    CHOLECALCIFEROL, VITAMIN D3, (VITAMIN D3) 50 MCG (2,000 UNIT) TAB    Take 1 tablet (2,000 Units total) by mouth once daily.    K PHOS DI & MONO-SOD PHOS MONO (K-PHOS-NEUTRAL) 250 MG TAB    Take 1 tablet by mouth 2 (two) times a day.    MAGNESIUM OXIDE (MAG-OX) 400 MG (241.3 MG MAGNESIUM) TABLET    Take 1 tablet (400 mg total) by mouth 2 (two) times daily.    METOPROLOL TARTRATE (LOPRESSOR) 50 MG TABLET    Take 1 tablet (50 mg total) by mouth 2 (two) times daily.    MYCOPHENOLATE (CELLCEPT) 250 MG CAP    Take 4 capsules (1,000 mg total) by mouth 2 (two) times daily.    NIFEDIPINE (PROCARDIA-XL) 60 MG (OSM) 24 HR TABLET    Take 1 tablet (60 mg total) by mouth 2 (two) times a day.    PREDNISONE (DELTASONE) 5 MG TABLET    Take by mouth daily: 20mg 3/22/22 -4/21, 15mg 4/22-5/22, 10mg 5/23-6/22, 5mg 6/23- thereafter    SILDENAFIL (VIAGRA) 50 MG TABLET    Take 1 tablet (50 mg total) by mouth daily as needed for Erectile Dysfunction.    SODIUM BICARBONATE 650 MG TABLET    Take 3 tablets (1,950 mg total) by mouth 2 (two) times daily.    SULFAMETHOXAZOLE-TRIMETHOPRIM 400-80MG (BACTRIM,SEPTRA) 400-80 MG PER TABLET    Take 1 tablet by mouth once daily.    TACROLIMUS (PROGRAF) 1 MG CAP    Take 4 capsules (4 mg total) by mouth every morning AND 4 capsules (4 mg total) every evening.    TRAZODONE (DESYREL) 50 MG TABLET    Take 1 tablet (50 mg total) by mouth every evening.     Objective:     Vitals:    08/16/22 1416   BP: 137/74   Pulse: 71   Temp: 98.6 °F (37 °C)     Physical Exam  Vitals reviewed.   Constitutional:       Appearance: Normal appearance.   HENT:      Head: Normocephalic.       Mouth/Throat:      Comments: Wearing mask    Eyes:      Extraocular Movements: Extraocular movements intact.      Pupils: Pupils are equal, round, and reactive to light.      Comments: Glasses     Cardiovascular:      Rate and Rhythm: Normal rate and regular rhythm.      Heart sounds: Normal heart sounds.   Pulmonary:      Effort: Pulmonary effort is normal.      Breath sounds: Normal breath sounds.   Abdominal:      General: Bowel sounds are normal.      Palpations: Abdomen is soft.      Comments: rounded     Genitourinary:     Comments: deferred    Musculoskeletal:         General: Normal range of motion.      Cervical back: Normal range of motion and neck supple.   Skin:     General: Skin is warm and dry.   Neurological:      Mental Status: He is alert and oriented to person, place, and time.   Psychiatric:         Behavior: Behavior normal.         Thought Content: Thought content normal.          (0) Fully active, able to carry on all predisease performance without restriction  Assessment:     Problem List Items Addressed This Visit        Renal/    ESRD on dialysis     Underwent kidney transplantation on 3/19/2022. Follows with transplant team.               Oncology    Anemia in ESRD (end-stage renal disease) (Chronic)     Anemia of chronic kidney disease.  Most recent hemoglobin noted above 10 per dL no indication for erythropoietin stimulating agent.  Will continue to monitor.           Monoclonal gammopathy - Primary     IgM monoclonal gammopathy. Will repeat protein electrophoresis and immunofixation.  Will continue to monitor every 3 months or sooner as needed.                Plan:     Monoclonal gammopathy    Anemia in ESRD (end-stage renal disease)    ESRD on dialysis    Labs reviewed.   Continue follow up with transplant team.   Follow up in 3-4 months with SPEP, DAYSI, FLC, CBC and Comprehensive Metabolic Panel.    I will review assessment/plan with collaborating physician Dr. Hilliard.      Mikaela  SARHA King

## 2022-08-17 ENCOUNTER — LAB VISIT (OUTPATIENT)
Dept: LAB | Facility: HOSPITAL | Age: 72
End: 2022-08-17
Attending: INTERNAL MEDICINE
Payer: MEDICARE

## 2022-08-17 DIAGNOSIS — Z94.0 KIDNEY REPLACED BY TRANSPLANT: ICD-10-CM

## 2022-08-17 LAB
ALBUMIN SERPL BCP-MCNC: 4 G/DL (ref 3.5–5.2)
ANION GAP SERPL CALC-SCNC: 10 MMOL/L (ref 8–16)
BASOPHILS # BLD AUTO: 0.02 K/UL (ref 0–0.2)
BASOPHILS NFR BLD: 0.5 % (ref 0–1.9)
BUN SERPL-MCNC: 13 MG/DL (ref 8–23)
CALCIUM SERPL-MCNC: 9.6 MG/DL (ref 8.7–10.5)
CHLORIDE SERPL-SCNC: 109 MMOL/L (ref 95–110)
CO2 SERPL-SCNC: 21 MMOL/L (ref 23–29)
CREAT SERPL-MCNC: 1.5 MG/DL (ref 0.5–1.4)
DIFFERENTIAL METHOD: ABNORMAL
EOSINOPHIL # BLD AUTO: 0.1 K/UL (ref 0–0.5)
EOSINOPHIL NFR BLD: 2.4 % (ref 0–8)
ERYTHROCYTE [DISTWIDTH] IN BLOOD BY AUTOMATED COUNT: 14.9 % (ref 11.5–14.5)
EST. GFR  (NO RACE VARIABLE): 48.6 ML/MIN/1.73 M^2
GLUCOSE SERPL-MCNC: 99 MG/DL (ref 70–110)
HCT VFR BLD AUTO: 43.5 % (ref 40–54)
HGB BLD-MCNC: 13.5 G/DL (ref 14–18)
IMM GRANULOCYTES # BLD AUTO: 0.04 K/UL (ref 0–0.04)
IMM GRANULOCYTES NFR BLD AUTO: 1.1 % (ref 0–0.5)
LYMPHOCYTES # BLD AUTO: 0.8 K/UL (ref 1–4.8)
LYMPHOCYTES NFR BLD: 22.4 % (ref 18–48)
MAGNESIUM SERPL-MCNC: 1.7 MG/DL (ref 1.6–2.6)
MCH RBC QN AUTO: 28.5 PG (ref 27–31)
MCHC RBC AUTO-ENTMCNC: 31 G/DL (ref 32–36)
MCV RBC AUTO: 92 FL (ref 82–98)
MONOCYTES # BLD AUTO: 0.9 K/UL (ref 0.3–1)
MONOCYTES NFR BLD: 22.7 % (ref 4–15)
NEUTROPHILS # BLD AUTO: 1.9 K/UL (ref 1.8–7.7)
NEUTROPHILS NFR BLD: 50.9 % (ref 38–73)
NRBC BLD-RTO: 0 /100 WBC
PHOSPHATE SERPL-MCNC: 2.6 MG/DL (ref 2.7–4.5)
PLATELET # BLD AUTO: 179 K/UL (ref 150–450)
PMV BLD AUTO: 11.5 FL (ref 9.2–12.9)
POTASSIUM SERPL-SCNC: 4 MMOL/L (ref 3.5–5.1)
RBC # BLD AUTO: 4.74 M/UL (ref 4.6–6.2)
SODIUM SERPL-SCNC: 140 MMOL/L (ref 136–145)
WBC # BLD AUTO: 3.75 K/UL (ref 3.9–12.7)

## 2022-08-17 PROCEDURE — 85025 COMPLETE CBC W/AUTO DIFF WBC: CPT | Performed by: INTERNAL MEDICINE

## 2022-08-17 PROCEDURE — 80069 RENAL FUNCTION PANEL: CPT | Performed by: INTERNAL MEDICINE

## 2022-08-17 PROCEDURE — 83735 ASSAY OF MAGNESIUM: CPT | Performed by: INTERNAL MEDICINE

## 2022-08-17 PROCEDURE — 36415 COLL VENOUS BLD VENIPUNCTURE: CPT | Performed by: INTERNAL MEDICINE

## 2022-08-17 PROCEDURE — 80197 ASSAY OF TACROLIMUS: CPT | Performed by: INTERNAL MEDICINE

## 2022-08-18 ENCOUNTER — TELEPHONE (OUTPATIENT)
Dept: TRANSPLANT | Facility: CLINIC | Age: 72
End: 2022-08-18
Payer: MEDICARE

## 2022-08-18 LAB — TACROLIMUS BLD-MCNC: 7.9 NG/ML (ref 5–15)

## 2022-08-18 NOTE — TELEPHONE ENCOUNTER
Spoke with patient regarding below. ----- Message from Dilcia Parker MD sent at 8/18/2022  1:53 PM CDT -----  Results reviewed; no changes at present.

## 2022-08-19 ENCOUNTER — OFFICE VISIT (OUTPATIENT)
Dept: NEPHROLOGY | Facility: CLINIC | Age: 72
End: 2022-08-19
Payer: MEDICARE

## 2022-08-19 VITALS
HEART RATE: 80 BPM | SYSTOLIC BLOOD PRESSURE: 134 MMHG | WEIGHT: 184.63 LBS | DIASTOLIC BLOOD PRESSURE: 70 MMHG | HEIGHT: 68 IN | BODY MASS INDEX: 27.98 KG/M2

## 2022-08-19 DIAGNOSIS — N18.31 CHRONIC KIDNEY DISEASE, STAGE 3A: ICD-10-CM

## 2022-08-19 DIAGNOSIS — Z79.60 LONG-TERM USE OF IMMUNOSUPPRESSANT MEDICATION: ICD-10-CM

## 2022-08-19 DIAGNOSIS — N18.31 STAGE 3A CHRONIC KIDNEY DISEASE: ICD-10-CM

## 2022-08-19 DIAGNOSIS — I12.9 PARENCHYMAL RENAL HYPERTENSION, STAGE 1-4 OR UNSPECIFIED CHRONIC KIDNEY DISEASE: ICD-10-CM

## 2022-08-19 DIAGNOSIS — Z94.0 S/P KIDNEY TRANSPLANT: Primary | ICD-10-CM

## 2022-08-19 PROCEDURE — 1159F PR MEDICATION LIST DOCUMENTED IN MEDICAL RECORD: ICD-10-PCS | Mod: CPTII,S$GLB,, | Performed by: NURSE PRACTITIONER

## 2022-08-19 PROCEDURE — 3008F PR BODY MASS INDEX (BMI) DOCUMENTED: ICD-10-PCS | Mod: CPTII,S$GLB,, | Performed by: NURSE PRACTITIONER

## 2022-08-19 PROCEDURE — 99999 PR PBB SHADOW E&M-EST. PATIENT-LVL IV: ICD-10-PCS | Mod: PBBFAC,,, | Performed by: NURSE PRACTITIONER

## 2022-08-19 PROCEDURE — 1160F RVW MEDS BY RX/DR IN RCRD: CPT | Mod: CPTII,S$GLB,, | Performed by: NURSE PRACTITIONER

## 2022-08-19 PROCEDURE — 99204 OFFICE O/P NEW MOD 45 MIN: CPT | Mod: S$GLB,,, | Performed by: NURSE PRACTITIONER

## 2022-08-19 PROCEDURE — 3078F DIAST BP <80 MM HG: CPT | Mod: CPTII,S$GLB,, | Performed by: NURSE PRACTITIONER

## 2022-08-19 PROCEDURE — 3066F PR DOCUMENTATION OF TREATMENT FOR NEPHROPATHY: ICD-10-PCS | Mod: CPTII,S$GLB,, | Performed by: NURSE PRACTITIONER

## 2022-08-19 PROCEDURE — 1101F PR PT FALLS ASSESS DOC 0-1 FALLS W/OUT INJ PAST YR: ICD-10-PCS | Mod: CPTII,S$GLB,, | Performed by: NURSE PRACTITIONER

## 2022-08-19 PROCEDURE — 3075F PR MOST RECENT SYSTOLIC BLOOD PRESS GE 130-139MM HG: ICD-10-PCS | Mod: CPTII,S$GLB,, | Performed by: NURSE PRACTITIONER

## 2022-08-19 PROCEDURE — 3078F PR MOST RECENT DIASTOLIC BLOOD PRESSURE < 80 MM HG: ICD-10-PCS | Mod: CPTII,S$GLB,, | Performed by: NURSE PRACTITIONER

## 2022-08-19 PROCEDURE — 1159F MED LIST DOCD IN RCRD: CPT | Mod: CPTII,S$GLB,, | Performed by: NURSE PRACTITIONER

## 2022-08-19 PROCEDURE — 99999 PR PBB SHADOW E&M-EST. PATIENT-LVL IV: CPT | Mod: PBBFAC,,, | Performed by: NURSE PRACTITIONER

## 2022-08-19 PROCEDURE — 3008F BODY MASS INDEX DOCD: CPT | Mod: CPTII,S$GLB,, | Performed by: NURSE PRACTITIONER

## 2022-08-19 PROCEDURE — 3066F NEPHROPATHY DOC TX: CPT | Mod: CPTII,S$GLB,, | Performed by: NURSE PRACTITIONER

## 2022-08-19 PROCEDURE — 1126F AMNT PAIN NOTED NONE PRSNT: CPT | Mod: CPTII,S$GLB,, | Performed by: NURSE PRACTITIONER

## 2022-08-19 PROCEDURE — 3288F PR FALLS RISK ASSESSMENT DOCUMENTED: ICD-10-PCS | Mod: CPTII,S$GLB,, | Performed by: NURSE PRACTITIONER

## 2022-08-19 PROCEDURE — 1160F PR REVIEW ALL MEDS BY PRESCRIBER/CLIN PHARMACIST DOCUMENTED: ICD-10-PCS | Mod: CPTII,S$GLB,, | Performed by: NURSE PRACTITIONER

## 2022-08-19 PROCEDURE — 3075F SYST BP GE 130 - 139MM HG: CPT | Mod: CPTII,S$GLB,, | Performed by: NURSE PRACTITIONER

## 2022-08-19 PROCEDURE — 3288F FALL RISK ASSESSMENT DOCD: CPT | Mod: CPTII,S$GLB,, | Performed by: NURSE PRACTITIONER

## 2022-08-19 PROCEDURE — 1126F PR PAIN SEVERITY QUANTIFIED, NO PAIN PRESENT: ICD-10-PCS | Mod: CPTII,S$GLB,, | Performed by: NURSE PRACTITIONER

## 2022-08-19 PROCEDURE — 99204 PR OFFICE/OUTPT VISIT, NEW, LEVL IV, 45-59 MIN: ICD-10-PCS | Mod: S$GLB,,, | Performed by: NURSE PRACTITIONER

## 2022-08-19 PROCEDURE — 1101F PT FALLS ASSESS-DOCD LE1/YR: CPT | Mod: CPTII,S$GLB,, | Performed by: NURSE PRACTITIONER

## 2022-08-19 NOTE — PATIENT INSTRUCTIONS
Continue all medications as reviewed today  Continue all immunosuppressants as prescribed  Keep blood pressure controlled  Keep blood sugar controlled  Low sodium diet: avoid/limit salt, processed foods, fried foods, fast foods  Increase Water (4 bottle), flavored water/diluted juice daily as tolerated with no swelling/shortness of breath  Avoid NSAIDS: Advil, Ibuprofen, Aleve, Naproxen, Meloxicam, etc. (Aspirin is ok), Tylenol is Best  Follow up in 3 months and have labs drawn 1-2 weeks prior to visit   Follow up with Transplant team as scheduled

## 2022-09-02 ENCOUNTER — LAB VISIT (OUTPATIENT)
Dept: LAB | Facility: HOSPITAL | Age: 72
End: 2022-09-02
Attending: INTERNAL MEDICINE
Payer: MEDICARE

## 2022-09-02 DIAGNOSIS — Z94.0 KIDNEY REPLACED BY TRANSPLANT: ICD-10-CM

## 2022-09-02 LAB
ALBUMIN SERPL BCP-MCNC: 4.1 G/DL (ref 3.5–5.2)
ALBUMIN SERPL BCP-MCNC: 4.1 G/DL (ref 3.5–5.2)
ALP SERPL-CCNC: 94 U/L (ref 55–135)
ALT SERPL W/O P-5'-P-CCNC: 26 U/L (ref 10–44)
ANION GAP SERPL CALC-SCNC: 10 MMOL/L (ref 8–16)
AST SERPL-CCNC: 16 U/L (ref 10–40)
BASOPHILS # BLD AUTO: 0.02 K/UL (ref 0–0.2)
BASOPHILS NFR BLD: 0.5 % (ref 0–1.9)
BILIRUB DIRECT SERPL-MCNC: 0.2 MG/DL (ref 0.1–0.3)
BILIRUB SERPL-MCNC: 0.4 MG/DL (ref 0.1–1)
BUN SERPL-MCNC: 14 MG/DL (ref 8–23)
CALCIUM SERPL-MCNC: 9.8 MG/DL (ref 8.7–10.5)
CHLORIDE SERPL-SCNC: 110 MMOL/L (ref 95–110)
CO2 SERPL-SCNC: 19 MMOL/L (ref 23–29)
CREAT SERPL-MCNC: 1.2 MG/DL (ref 0.5–1.4)
DIFFERENTIAL METHOD: ABNORMAL
EOSINOPHIL # BLD AUTO: 0.1 K/UL (ref 0–0.5)
EOSINOPHIL NFR BLD: 3 % (ref 0–8)
ERYTHROCYTE [DISTWIDTH] IN BLOOD BY AUTOMATED COUNT: 14.6 % (ref 11.5–14.5)
EST. GFR  (NO RACE VARIABLE): >60 ML/MIN/1.73 M^2
GLUCOSE SERPL-MCNC: 97 MG/DL (ref 70–110)
HCT VFR BLD AUTO: 42.9 % (ref 40–54)
HGB BLD-MCNC: 13.5 G/DL (ref 14–18)
IMM GRANULOCYTES # BLD AUTO: 0.02 K/UL (ref 0–0.04)
IMM GRANULOCYTES NFR BLD AUTO: 0.5 % (ref 0–0.5)
LYMPHOCYTES # BLD AUTO: 0.9 K/UL (ref 1–4.8)
LYMPHOCYTES NFR BLD: 25.3 % (ref 18–48)
MAGNESIUM SERPL-MCNC: 1.7 MG/DL (ref 1.6–2.6)
MCH RBC QN AUTO: 28.3 PG (ref 27–31)
MCHC RBC AUTO-ENTMCNC: 31.5 G/DL (ref 32–36)
MCV RBC AUTO: 90 FL (ref 82–98)
MONOCYTES # BLD AUTO: 0.8 K/UL (ref 0.3–1)
MONOCYTES NFR BLD: 22.5 % (ref 4–15)
NEUTROPHILS # BLD AUTO: 1.8 K/UL (ref 1.8–7.7)
NEUTROPHILS NFR BLD: 48.2 % (ref 38–73)
NRBC BLD-RTO: 0 /100 WBC
PHOSPHATE SERPL-MCNC: 2.7 MG/DL (ref 2.7–4.5)
PLATELET # BLD AUTO: 191 K/UL (ref 150–450)
PMV BLD AUTO: 10.7 FL (ref 9.2–12.9)
POTASSIUM SERPL-SCNC: 3.8 MMOL/L (ref 3.5–5.1)
PROT SERPL-MCNC: 6.9 G/DL (ref 6–8.4)
RBC # BLD AUTO: 4.77 M/UL (ref 4.6–6.2)
SODIUM SERPL-SCNC: 139 MMOL/L (ref 136–145)
WBC # BLD AUTO: 3.64 K/UL (ref 3.9–12.7)

## 2022-09-02 PROCEDURE — 84075 ASSAY ALKALINE PHOSPHATASE: CPT | Performed by: INTERNAL MEDICINE

## 2022-09-02 PROCEDURE — 83735 ASSAY OF MAGNESIUM: CPT | Performed by: INTERNAL MEDICINE

## 2022-09-02 PROCEDURE — 36415 COLL VENOUS BLD VENIPUNCTURE: CPT | Performed by: INTERNAL MEDICINE

## 2022-09-02 PROCEDURE — 80069 RENAL FUNCTION PANEL: CPT | Performed by: INTERNAL MEDICINE

## 2022-09-02 PROCEDURE — 80197 ASSAY OF TACROLIMUS: CPT | Performed by: INTERNAL MEDICINE

## 2022-09-02 PROCEDURE — 87799 DETECT AGENT NOS DNA QUANT: CPT | Performed by: INTERNAL MEDICINE

## 2022-09-02 PROCEDURE — 85025 COMPLETE CBC W/AUTO DIFF WBC: CPT | Performed by: INTERNAL MEDICINE

## 2022-09-03 LAB — TACROLIMUS BLD-MCNC: 9.4 NG/ML (ref 5–15)

## 2022-09-09 ENCOUNTER — OFFICE VISIT (OUTPATIENT)
Dept: TRANSPLANT | Facility: CLINIC | Age: 72
End: 2022-09-09
Payer: MEDICARE

## 2022-09-09 VITALS
TEMPERATURE: 97 F | RESPIRATION RATE: 16 BRPM | HEART RATE: 73 BPM | DIASTOLIC BLOOD PRESSURE: 75 MMHG | BODY MASS INDEX: 27.6 KG/M2 | OXYGEN SATURATION: 96 % | WEIGHT: 182.13 LBS | SYSTOLIC BLOOD PRESSURE: 143 MMHG | HEIGHT: 68 IN

## 2022-09-09 DIAGNOSIS — E78.2 MIXED HYPERLIPIDEMIA: Chronic | ICD-10-CM

## 2022-09-09 DIAGNOSIS — Z79.60 LONG-TERM USE OF IMMUNOSUPPRESSANT MEDICATION: ICD-10-CM

## 2022-09-09 DIAGNOSIS — I10 ESSENTIAL HYPERTENSION: Chronic | ICD-10-CM

## 2022-09-09 DIAGNOSIS — Z94.0 S/P KIDNEY TRANSPLANT: Primary | ICD-10-CM

## 2022-09-09 PROCEDURE — 3008F PR BODY MASS INDEX (BMI) DOCUMENTED: ICD-10-PCS | Mod: CPTII,S$GLB,, | Performed by: NURSE PRACTITIONER

## 2022-09-09 PROCEDURE — 3288F PR FALLS RISK ASSESSMENT DOCUMENTED: ICD-10-PCS | Mod: CPTII,S$GLB,, | Performed by: NURSE PRACTITIONER

## 2022-09-09 PROCEDURE — 3078F PR MOST RECENT DIASTOLIC BLOOD PRESSURE < 80 MM HG: ICD-10-PCS | Mod: CPTII,S$GLB,, | Performed by: NURSE PRACTITIONER

## 2022-09-09 PROCEDURE — 3078F DIAST BP <80 MM HG: CPT | Mod: CPTII,S$GLB,, | Performed by: NURSE PRACTITIONER

## 2022-09-09 PROCEDURE — 3008F BODY MASS INDEX DOCD: CPT | Mod: CPTII,S$GLB,, | Performed by: NURSE PRACTITIONER

## 2022-09-09 PROCEDURE — 3066F NEPHROPATHY DOC TX: CPT | Mod: CPTII,S$GLB,, | Performed by: NURSE PRACTITIONER

## 2022-09-09 PROCEDURE — 1159F PR MEDICATION LIST DOCUMENTED IN MEDICAL RECORD: ICD-10-PCS | Mod: CPTII,S$GLB,, | Performed by: NURSE PRACTITIONER

## 2022-09-09 PROCEDURE — 3288F FALL RISK ASSESSMENT DOCD: CPT | Mod: CPTII,S$GLB,, | Performed by: NURSE PRACTITIONER

## 2022-09-09 PROCEDURE — 1101F PT FALLS ASSESS-DOCD LE1/YR: CPT | Mod: CPTII,S$GLB,, | Performed by: NURSE PRACTITIONER

## 2022-09-09 PROCEDURE — 1126F PR PAIN SEVERITY QUANTIFIED, NO PAIN PRESENT: ICD-10-PCS | Mod: CPTII,S$GLB,, | Performed by: NURSE PRACTITIONER

## 2022-09-09 PROCEDURE — 99215 OFFICE O/P EST HI 40 MIN: CPT | Mod: S$GLB,,, | Performed by: NURSE PRACTITIONER

## 2022-09-09 PROCEDURE — 99215 PR OFFICE/OUTPT VISIT, EST, LEVL V, 40-54 MIN: ICD-10-PCS | Mod: S$GLB,,, | Performed by: NURSE PRACTITIONER

## 2022-09-09 PROCEDURE — 99999 PR PBB SHADOW E&M-EST. PATIENT-LVL V: CPT | Mod: PBBFAC,,, | Performed by: NURSE PRACTITIONER

## 2022-09-09 PROCEDURE — 3077F SYST BP >= 140 MM HG: CPT | Mod: CPTII,S$GLB,, | Performed by: NURSE PRACTITIONER

## 2022-09-09 PROCEDURE — 1159F MED LIST DOCD IN RCRD: CPT | Mod: CPTII,S$GLB,, | Performed by: NURSE PRACTITIONER

## 2022-09-09 PROCEDURE — 3066F PR DOCUMENTATION OF TREATMENT FOR NEPHROPATHY: ICD-10-PCS | Mod: CPTII,S$GLB,, | Performed by: NURSE PRACTITIONER

## 2022-09-09 PROCEDURE — 1126F AMNT PAIN NOTED NONE PRSNT: CPT | Mod: CPTII,S$GLB,, | Performed by: NURSE PRACTITIONER

## 2022-09-09 PROCEDURE — 99999 PR PBB SHADOW E&M-EST. PATIENT-LVL V: ICD-10-PCS | Mod: PBBFAC,,, | Performed by: NURSE PRACTITIONER

## 2022-09-09 PROCEDURE — 3077F PR MOST RECENT SYSTOLIC BLOOD PRESSURE >= 140 MM HG: ICD-10-PCS | Mod: CPTII,S$GLB,, | Performed by: NURSE PRACTITIONER

## 2022-09-09 PROCEDURE — 1101F PR PT FALLS ASSESS DOC 0-1 FALLS W/OUT INJ PAST YR: ICD-10-PCS | Mod: CPTII,S$GLB,, | Performed by: NURSE PRACTITIONER

## 2022-09-09 NOTE — LETTER
September 9, 2022        Dilcia Saavedra  [address] [place] [address]  Phone: [phone]  Fax: [phone]             [facility]- Transplant 1st Fl  [address]  [facility] [address]  Phone: [phone]   Patient: Alverto Ramirez Jr.   MR Number: [id number]   YOB: 1950   Date of Visit: 9/9/2022       Dear Dr. Dilcia Saavedra    Thank you for referring Alverto Ramirez to me for evaluation. Attached you will find relevant portions of my assessment and plan of care.    If you have questions, please do not hesitate to call me. I look forward to following Alverot Ramirez along with you.    Sincerely,    Judi Robert, NP    Enclosure    If you would like to receive this communication electronically, please contact [email or web address] or [phone] to request [employer] access.    [employer] is a tool which provides read-only access to select patient information with whom you have a relationship. Its easy to use and provides real time access to review your patients record including encounter summaries, notes, results, and demographic information.    If you feel you have received this communication in error or would no longer like to receive these types of communications, please e-mail [email or web address]

## 2022-09-09 NOTE — PROGRESS NOTES
Kidney Post-Transplant Assessment    Referring Physician: Chris Adair  Current Nephrologist: Rajan Hayes    ORGAN: RIGHT KIDNEY  Donor Type: donation after brain death  PHS Increased Risk: yes  Cold Ischemia: 1,354 mins  Induction Medications: thymoglobulin    Subjective:     CC:  Reassessment of renal allograft function and management of chronic immunosuppression.    HPI:  Mr. Ramirez is a 72 y.o. 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 His most recent creatinine is 1.3. He takes mycophenolate mofetil, prednisone and tacrolimus for maintenance immunosuppression. His post transplant course has been complicated by urinary retention severe diarrhea and hypophosphatemia  .    Hospitalization/ ED visits  None    Interval HX:  Reports overall doing well. Discussed he is about to make his 6 months. Patient wishing to more activities. Discussed precautions moving forward.  Reports BP 130s/80s at home, weight stable, good UOP, no edema, no abd pain, fever, or chills.  Lab /diagnostic results reviewed with patient today.   All questions answered        Current Outpatient Medications:     calcitRIOL (ROCALTROL) 0.25 MCG Cap, Take 1 capsule (0.25 mcg total) by mouth once daily., Disp: 90 capsule, Rfl: 3    cholecalciferol, vitamin D3, (VITAMIN D3) 50 mcg (2,000 unit) Tab, Take 1 tablet (2,000 Units total) by mouth once daily., Disp: 60 tablet, Rfl: 11    k phos di & mono-sod phos mono (K-PHOS-NEUTRAL) 250 mg Tab, Take 1 tablet by mouth 2 (two) times a day., Disp: 90 tablet, Rfl: 11    magnesium oxide (MAG-OX) 400 mg (241.3 mg magnesium) tablet, Take 1 tablet (400 mg total) by mouth 2 (two) times daily., Disp: 60 tablet, Rfl: 11    metoprolol tartrate (LOPRESSOR) 50 MG tablet, Take 1 tablet (50 mg total) by mouth 2 (two) times daily., Disp: 180 tablet, Rfl: 3    mycophenolate (CELLCEPT) 250 mg Cap, Take 4 capsules (1,000 mg total) by mouth 2  (two) times daily., Disp: 240 capsule, Rfl: 11    NIFEdipine (PROCARDIA-XL) 60 MG (OSM) 24 hr tablet, Take 1 tablet (60 mg total) by mouth 2 (two) times a day., Disp: 180 tablet, Rfl: 3    predniSONE (DELTASONE) 5 MG tablet, Take by mouth daily: 20mg 3/22/22 -4/21, 15mg 4/22-5/22, 10mg 5/23-6/22, 5mg 6/23- thereafter, Disp: 120 tablet, Rfl: 5    sildenafiL (VIAGRA) 50 MG tablet, Take 1 tablet (50 mg total) by mouth daily as needed for Erectile Dysfunction., Disp: 30 tablet, Rfl: 11    sodium bicarbonate 650 MG tablet, Take 3 tablets (1,950 mg total) by mouth 2 (two) times daily., Disp: 180 tablet, Rfl: 11    sulfamethoxazole-trimethoprim 400-80mg (BACTRIM,SEPTRA) 400-80 mg per tablet, Take 1 tablet by mouth once daily., Disp: 90 tablet, Rfl: 1    tacrolimus (PROGRAF) 1 MG Cap, Take 4 capsules (4 mg total) by mouth every morning AND 4 capsules (4 mg total) every evening., Disp: 420 capsule, Rfl: 11    traZODone (DESYREL) 50 MG tablet, Take 1 tablet (50 mg total) by mouth every evening., Disp: 90 tablet, Rfl: 3    ALPRAZolam (XANAX) 1 MG tablet, Take 1 tablet (1 mg total) by mouth daily as needed for Anxiety. (Patient not taking: Reported on 9/9/2022), Disp: 30 tablet, Rfl: 0      Past Medical History:   Diagnosis Date    Anemia in CKD (chronic kidney disease)     Atrial fibrillation     CKD (chronic kidney disease) stage 4, GFR 15 11/26/2014    Colon cancer screening 12/19/2014    Elevated PSA 11/26/2014    HTN (hypertension) diagnosed in his 40s 10/16/2014    Monoclonal gammopathy 11/26/2014    Phobic anxiety disorder 11/26/2014    Proteinuria 11/26/2014    Stage 3a chronic kidney disease 3/25/2022         Review of Systems   Constitutional:  Negative for appetite change, chills, fatigue and fever.   HENT:  Negative for trouble swallowing.    Respiratory:  Negative for cough, chest tightness, shortness of breath and wheezing.    Cardiovascular:  Negative for chest pain, palpitations and leg swelling.  "  Gastrointestinal:  Negative for abdominal pain, constipation, diarrhea and nausea.   Genitourinary:  Negative for difficulty urinating, frequency and urgency.   Musculoskeletal:  Negative for arthralgias and myalgias.   Skin:  Negative for rash.   Allergic/Immunologic: Positive for immunocompromised state.   Neurological:  Negative for dizziness, weakness, light-headedness and headaches.   Psychiatric/Behavioral:  Negative for sleep disturbance.       Objective:   BP (!) 143/75 (BP Location: Right arm, Patient Position: Sitting, BP Method: Medium (Automatic))   Pulse 73   Temp 97.2 °F (36.2 °C) (Temporal)   Resp 16   Ht 5' 7.5" (1.715 m)   Wt 82.6 kg (182 lb 1.6 oz)   SpO2 96%   BMI 28.10 kg/m²     Physical Exam  Constitutional:       General: He is not in acute distress.     Appearance: He is well-developed. He is not diaphoretic.   Cardiovascular:      Rate and Rhythm: Normal rate and regular rhythm.      Heart sounds: Normal heart sounds. No murmur heard.    No friction rub. No gallop.   Pulmonary:      Effort: Pulmonary effort is normal. No respiratory distress.      Breath sounds: Normal breath sounds. No wheezing or rales.   Abdominal:      General: Bowel sounds are normal. There is no distension.      Palpations: Abdomen is soft.      Tenderness: There is no abdominal tenderness.      Hernia: A hernia is present.   Musculoskeletal:         General: No tenderness. Normal range of motion.   Skin:     General: Skin is warm and dry.      Findings: No rash.      Nails: There is no clubbing.   Neurological:      Mental Status: He is alert and oriented to person, place, and time.   Psychiatric:         Behavior: Behavior normal.          Labs:  Lab Results   Component Value Date    WBC 3.64 (L) 09/02/2022    HGB 13.5 (L) 09/02/2022    HCT 42.9 09/02/2022     09/02/2022    K 3.8 09/02/2022     09/02/2022    CO2 19 (L) 09/02/2022    BUN 14 09/02/2022    CREATININE 1.2 09/02/2022    EGFRNONAA 54.1 " (A) 07/14/2022    CALCIUM 9.8 09/02/2022    PHOS 2.7 09/02/2022    MG 1.7 09/02/2022    ALBUMIN 4.1 09/02/2022    ALBUMIN 4.1 09/02/2022    AST 16 09/02/2022    ALT 26 09/02/2022    UTPCR 0.12 09/02/2022    .1 (H) 03/18/2022    TACROLIMUS 9.4 09/02/2022       No results found for: EXTANC, EXTWBC, EXTSEGS, EXTPLATELETS, EXTHEMOGLOBI, EXTHEMATOCRI, EXTCREATININ, EXTSODIUM, EXTPOTASSIUM, EXTBUN, EXTCO2, EXTCALCIUM, EXTPHOSPHORU, EXTGLUCOSE, EXTALBUMIN, EXTAST, EXTALT, EXTBILITOTAL, EXTLIPASE, EXTAMYLASE    No results found for: EXTCYCLOSLVL, EXTSIROLIMUS, EXTTACROLVL, EXTPROTCRE, EXTPTHINTACT, EXTPROTEINUA, EXTWBCUA, EXTRBCUA    Labs were reviewed with the patient    Assessment:     1. S/P kidney transplant    2. Long-term use of immunosuppressant medication    3. Essential hypertension    4. Mixed hyperlipidemia        Plan:     Continue current IS therapy regimen  Hernia noted left of umbilicus--given ED precautions       1. CKD stage: will continue follow up as per our center guidelines. patient to continue close follow up with the local General nephrologist. Education provided in appropriate fluid intake, potassium intake. Continue with oral hydration.      2. Immunosuppression: Prograf trough 9.4, therapeutic target 7-9. Continue Prograf 4/4, MMF 1000 Mg BID, and Prednisone   Lab Results   Component Value Date    TACROLIMUS 9.4 09/02/2022    TACROLIMUS 7.9 08/17/2022    TACROLIMUS 9.8 08/12/2022   Will closely monitor for toxicities, education provided about adherence to medicines and need to communicate any side effect to the transplant nurse or physician.      3. Allograft Function:stable at baseline for the patient. Continue follow up as per our guidelines and with the local General nephrologist. Communication will be sent today.       9/2/2022  5mo 2wk   Creatinine 0.5 - 1.4 mg/dL 1.2   eGFR >60 mL/min/1.73 m^2 >60.0   Glucose 70 - 110 mg/dL 97       4. Hypertension management:  Continue with home blood  pressure monitoring, low salt and healthy life discussed with the patient.  BP Readings from Last 3 Encounters:   09/09/22 (!) 143/75   08/19/22 134/70   08/16/22 137/74   MTP, nifedipine    5. Metabolic Bone Disease/Secondary Hyperparathyroidism:calcium and phosphorus level discussed with the patient, patient will continue follow up with the general nephrologist for management of metabolic bone disease calcium and phosphorus as per our center protocol. Will monitor PTH, Vit D level, calcium.   Lab Results   Component Value Date    .1 (H) 03/18/2022    CALCIUM 9.8 09/02/2022    PHOS 2.7 09/02/2022    PHOS 2.6 (L) 08/17/2022    PHOS 2.2 (L) 07/14/2022   Calcitrol, kpn, magox    6. Electrolytes: reviewed with the patient, essentially within the normal range no need for acute changes today, will monitor as per our center guidelines.  Lab Results   Component Value Date     09/02/2022    K 3.8 09/02/2022     09/02/2022    CO2 19 (L) 09/02/2022    CO2 21 (L) 08/17/2022    CO2 18 (L) 08/12/2022   nabicarb     7. Anemia: will continue monitoring as per our center guidelines. No indication for acute intervention today.  Lab Results   Component Value Date    WBC 3.64 (L) 09/02/2022    HGB 13.5 (L) 09/02/2022    HCT 42.9 09/02/2022    MCV 90 09/02/2022     09/02/2022       8.Proteinuria: will continue with pr/cr ratio as per our center guidelines  Lab Results   Component Value Date    PROTEINURINE 8 09/02/2022    CREATRANDUR 67.0 09/02/2022    UTPCR 0.12 09/02/2022    UTPCR 0.09 08/12/2022    UTPCR Unable to calculate 06/14/2022        9. BK virus infection screening: will continue with urine or blood PCR as per our guidelines to prevent BK virus viremia and allograft dysfunction  Lab Results   Component Value Date    BKVIRUSPCRQB <125 09/02/2022         10. Weight education: provided during the clinic visit.  Body mass index is 28.1 kg/m².     11.Patient safety education regarding immunosuppression  including prophylaxis posttransplant for CMV, PCP : Education provided about vaccination and prevention of infections.    12.  Cytopenias: no significant cytopenias will monitor as per our guidelines. Medicine list reviewed including potential causes of drug-induced cytopenias  Lab Results   Component Value Date    WBC 3.64 (L) 09/02/2022    HGB 13.5 (L) 09/02/2022    HCT 42.9 09/02/2022    MCV 90 09/02/2022     09/02/2022       13. Post-transplant Prophylaxis; CMV Infection, PJP and Candida mucosistis and other indicated for this particular patient.   PCP PROPHYLAXIS: Bactrim until 9/16/22      Exercise: reminded Telly of the importance of regular exercise for weight management, blood sugar and blood pressure management.  I also explained exercise has been shown to improve cardiovascular health, energy level, and sleep hygiene.  Lastly, I advised him that cardiovascular complications are leading cause of death for renal transplant recipients, and regular exercise can help lower this risk.       Follow-up:   Clinic: return to transplant clinic weekly for the first month after transplant; every 2 weeks during months 2-3; then at 6-, 9-, 12-, 18-, 24-, and 36- months post-transplant to reassess for complications from immunosuppression toxicity and monitor for rejection.  Annually thereafter.    Labs: since patient remains at high risk for rejection and drug-related complications that warrant close monitoring, labs will be ordered as follows: continue twice weekly CBC, renal panel, and drug level for first month; then same labs once weekly through 3rd month post-transplant.  Urine for UA and protein/creatinine ratio monthly.  Serum BK - PCR at 1-, 3-, 6-, 9-, 12-, 18-, 24-, 36-, 48-, and 60 months post-transplant.  Hepatic panel at 1-, 2-, 3-, 6-, 9-, 12-, 18-, 24-, and 36- months post-transplant.    Judi Robert NP       Education:   Material provided to the patient.  Patient reminded to call with any  health changes since these can be early signs of significant complications.  Also, I advised the patient to be sure any new medications or changes of old medications are discussed with either a pharmacist or physician knowledgeable with transplant to avoid rejection/drug toxicity related to significant drug interactions.    Patient advised that it is recommended that all transplanted patients, and their close contacts and household members receive Covid vaccination.

## 2022-09-12 ENCOUNTER — NURSE TRIAGE (OUTPATIENT)
Dept: ADMINISTRATIVE | Facility: CLINIC | Age: 72
End: 2022-09-12
Payer: MEDICARE

## 2022-09-12 ENCOUNTER — OFFICE VISIT (OUTPATIENT)
Dept: INTERNAL MEDICINE | Facility: CLINIC | Age: 72
End: 2022-09-12
Payer: MEDICARE

## 2022-09-12 VITALS
HEART RATE: 87 BPM | DIASTOLIC BLOOD PRESSURE: 72 MMHG | SYSTOLIC BLOOD PRESSURE: 110 MMHG | BODY MASS INDEX: 27.8 KG/M2 | HEIGHT: 68 IN | TEMPERATURE: 99 F | OXYGEN SATURATION: 98 % | WEIGHT: 183.44 LBS

## 2022-09-12 DIAGNOSIS — F41.9 ANXIETY: ICD-10-CM

## 2022-09-12 DIAGNOSIS — Z23 NEED FOR INFLUENZA VACCINATION: ICD-10-CM

## 2022-09-12 DIAGNOSIS — L73.9 NASAL FOLLICULITIS: ICD-10-CM

## 2022-09-12 DIAGNOSIS — F51.04 PSYCHOPHYSIOLOGICAL INSOMNIA: Primary | ICD-10-CM

## 2022-09-12 PROCEDURE — 1159F MED LIST DOCD IN RCRD: CPT | Mod: CPTII,S$GLB,, | Performed by: FAMILY MEDICINE

## 2022-09-12 PROCEDURE — 99214 OFFICE O/P EST MOD 30 MIN: CPT | Mod: S$GLB,,, | Performed by: FAMILY MEDICINE

## 2022-09-12 PROCEDURE — 3066F NEPHROPATHY DOC TX: CPT | Mod: CPTII,S$GLB,, | Performed by: FAMILY MEDICINE

## 2022-09-12 PROCEDURE — 3008F BODY MASS INDEX DOCD: CPT | Mod: CPTII,S$GLB,, | Performed by: FAMILY MEDICINE

## 2022-09-12 PROCEDURE — 3066F PR DOCUMENTATION OF TREATMENT FOR NEPHROPATHY: ICD-10-PCS | Mod: CPTII,S$GLB,, | Performed by: FAMILY MEDICINE

## 2022-09-12 PROCEDURE — 99999 PR PBB SHADOW E&M-EST. PATIENT-LVL IV: ICD-10-PCS | Mod: PBBFAC,,, | Performed by: FAMILY MEDICINE

## 2022-09-12 PROCEDURE — 1126F PR PAIN SEVERITY QUANTIFIED, NO PAIN PRESENT: ICD-10-PCS | Mod: CPTII,S$GLB,, | Performed by: FAMILY MEDICINE

## 2022-09-12 PROCEDURE — 1159F PR MEDICATION LIST DOCUMENTED IN MEDICAL RECORD: ICD-10-PCS | Mod: CPTII,S$GLB,, | Performed by: FAMILY MEDICINE

## 2022-09-12 PROCEDURE — 3074F PR MOST RECENT SYSTOLIC BLOOD PRESSURE < 130 MM HG: ICD-10-PCS | Mod: CPTII,S$GLB,, | Performed by: FAMILY MEDICINE

## 2022-09-12 PROCEDURE — 3074F SYST BP LT 130 MM HG: CPT | Mod: CPTII,S$GLB,, | Performed by: FAMILY MEDICINE

## 2022-09-12 PROCEDURE — 3078F PR MOST RECENT DIASTOLIC BLOOD PRESSURE < 80 MM HG: ICD-10-PCS | Mod: CPTII,S$GLB,, | Performed by: FAMILY MEDICINE

## 2022-09-12 PROCEDURE — 1126F AMNT PAIN NOTED NONE PRSNT: CPT | Mod: CPTII,S$GLB,, | Performed by: FAMILY MEDICINE

## 2022-09-12 PROCEDURE — 99999 PR PBB SHADOW E&M-EST. PATIENT-LVL IV: CPT | Mod: PBBFAC,,, | Performed by: FAMILY MEDICINE

## 2022-09-12 PROCEDURE — 3008F PR BODY MASS INDEX (BMI) DOCUMENTED: ICD-10-PCS | Mod: CPTII,S$GLB,, | Performed by: FAMILY MEDICINE

## 2022-09-12 PROCEDURE — 3078F DIAST BP <80 MM HG: CPT | Mod: CPTII,S$GLB,, | Performed by: FAMILY MEDICINE

## 2022-09-12 PROCEDURE — 99214 PR OFFICE/OUTPT VISIT, EST, LEVL IV, 30-39 MIN: ICD-10-PCS | Mod: S$GLB,,, | Performed by: FAMILY MEDICINE

## 2022-09-12 RX ORDER — TRAZODONE HYDROCHLORIDE 50 MG/1
50-100 TABLET ORAL NIGHTLY
Qty: 180 TABLET | Refills: 3 | Status: SHIPPED | OUTPATIENT
Start: 2022-09-12 | End: 2023-10-04

## 2022-09-12 RX ORDER — ALPRAZOLAM 1 MG/1
1 TABLET ORAL DAILY PRN
Qty: 30 TABLET | Refills: 0 | Status: SHIPPED | OUTPATIENT
Start: 2022-09-12 | End: 2022-12-12 | Stop reason: SDUPTHER

## 2022-09-12 RX ORDER — MUPIROCIN 20 MG/G
OINTMENT TOPICAL DAILY
Qty: 30 G | Refills: 3 | Status: SHIPPED | OUTPATIENT
Start: 2022-09-12

## 2022-09-12 RX ORDER — METOPROLOL SUCCINATE 50 MG/1
50 TABLET, EXTENDED RELEASE ORAL 2 TIMES DAILY
COMMUNITY
Start: 2022-08-29 | End: 2023-03-07

## 2022-09-12 NOTE — PROGRESS NOTES
"Subjective:   Patient ID: Alverto Ramirez Jr. is a 72 y.o. male.  Chief Complaint:  No chief complaint on file.    Presents for 3 month follow-up on insomnia and anxiety  Also followed by [facility]     Medical history:  - End-stage renal disease.  Underwent successful transplant March 2022. Recent renal function panel stable. Up-to-date visit with Nephrology ind Transplant.  Reports compliance with recommended medication regimen.  - Hypertension.  Controlled on Procardia 60 mg twice a day Lopressor 50 mg twice a day.  Reports compliance.  Denies side effects.  No shortness of breath or swelling.  - Mixed hyperlipidemia.  Currently not on any medications.  Last lipid panel with LDL 84. Denies chest pain or claudication.  - Erectile dysfunction.  Stable with symptoms controlled on takes Viagra daily as needed.    Anxiety follow-up  On  Xanax 1 mg daily as needed.    Reviewed prescription monitoring program which shows 30 pills last about 3 months.    No suspicion for abusive or diversion behaviors.  Effective with symptoms controlled on present medication and dose.   Denies any side effects.      Tolerating current treatment well.   Happy with medication.    Wants to continue.  No behaviors to suggest inappropriate use of prescribed medications.  [place] Board of Pharmacy Controlled Prescription Drug Monitoring database was queried and showed no activity to suggest abuse, diversion, or other inappropriate use of prescription medications.     Insomnia follow-up  On trazodone 50 mg nightly  Reports majority of nights needs to take 2 tablets per 100 mg dose you have best effect  Denies any side effects.      Tolerating current treatment well.   Happy with medication.    Wants to continue.  Questions if he could be prescribed for the higher dose    Additional complaint of recurrent "bump" in left nostril   Usually last review days and resolved  Has been occurring more frequently   No significant " "pain, redness, discharge, or other constitutional symptoms    Health maintenance needs include shingles vaccines, COVID booster, and flu vaccine    No additional complaints or concerns today.       Review of Systems   Constitutional:  Negative for activity change, appetite change, chills, diaphoresis, fatigue and fever.   Eyes:  Negative for visual disturbance.   Respiratory:  Negative for cough, chest tightness and shortness of breath.    Cardiovascular:  Negative for chest pain, palpitations and leg swelling.   Gastrointestinal:  Negative for abdominal pain, constipation, diarrhea, nausea and vomiting.   Endocrine: Negative for cold intolerance and heat intolerance.   Genitourinary:  Negative for difficulty urinating.   Musculoskeletal:  Negative for arthralgias, myalgias and neck pain.   Skin:  Negative for rash.   Neurological:  Negative for dizziness, tremors, syncope, weakness, light-headedness, numbness and headaches.   Hematological:  Does not bruise/bleed easily.   Psychiatric/Behavioral:  Positive for sleep disturbance. Negative for agitation, behavioral problems, confusion, decreased concentration, dysphoric mood, hallucinations, self-injury and suicidal ideas. The patient is nervous/anxious. The patient is not hyperactive.      Objective:   /72 (BP Location: Right arm, Patient Position: Sitting, BP Method: Large (Manual))   Pulse 87   Temp 98.8 °F (37.1 °C) (Tympanic)   Ht 5' 7.5" (1.715 m)   Wt 83.2 kg (183 lb 6.8 oz)   SpO2 98%   BMI 28.30 kg/m²     Physical Exam  Vitals and nursing note reviewed.   Constitutional:       General: He is not in acute distress.     Appearance: Normal appearance. He is well-developed and overweight. He is not ill-appearing, toxic-appearing or diaphoretic.   Neck:      Thyroid: No thyroid mass, thyromegaly or thyroid tenderness.   Cardiovascular:      Rate and Rhythm: Normal rate and regular rhythm.   Pulmonary:      Effort: Pulmonary effort is normal. No " tachypnea, accessory muscle usage or respiratory distress.   Musculoskeletal:      Right lower leg: No edema.      Left lower leg: No edema.   Skin:     Findings: No abrasion, burn, ecchymosis, laceration or rash.   Neurological:      Mental Status: He is alert and oriented to person, place, and time.      Coordination: Coordination is intact.      Gait: Gait is intact.   Psychiatric:         Attention and Perception: Attention and perception normal. He is attentive.         Mood and Affect: Mood and affect normal. Mood is not anxious or depressed.         Speech: Speech normal.         Behavior: Behavior normal. Behavior is cooperative.         Thought Content: Thought content normal.         Cognition and Memory: Cognition normal.         Judgment: Judgment normal.       Assessment:       ICD-10-CM ICD-9-CM   1. Psychophysiological insomnia  F51.04 307.42   2. Anxiety  F41.9 300.00   3. Need for influenza vaccination  Z23 V04.81   4. Nasal folliculitis  L73.9 704.8     Plan:   Psychophysiological insomnia  -     traZODone (DESYREL) 50 MG tablet; Take 1-2 tablets ( mg total) by mouth every evening.  Dispense: 180 tablet; Refill: 3  Stable, no side effects, symptoms controlled on present medication.  Continue trazodone  mg nightly    Anxiety  -     ALPRAZolam (XANAX) 1 MG tablet; Take 1 tablet (1 mg total) by mouth daily as needed for Anxiety.  Dispense: 30 tablet; Refill: 0  Stable, no side effects, mood and symptoms well controlled on present medication .  Continue Xanax 1 mg nightly as needed    Nasal folliculitis  -     mupirocin (BACTROBAN) 2 % ointment; by Nasal route once daily.  Dispense: 30 g; Refill: 3  Recurrent nasal folliculitis by history  Unremarkable nasal exam today   Trial of Bactroban appointment with next flare up  If symptoms persist, ENT referral     RHM  -     Influenza - Quadrivalent (Adjuvanted)  Flu vaccine today   Recommend COVID booster and shingles vaccine through pharmacy      Follow up with any/all specialist as scheduled     Return to clinic 3 months or sooner if needed

## 2022-09-13 ENCOUNTER — TELEPHONE (OUTPATIENT)
Dept: TRANSPLANT | Facility: CLINIC | Age: 72
End: 2022-09-13

## 2022-09-27 ENCOUNTER — LAB VISIT (OUTPATIENT)
Dept: LAB | Facility: HOSPITAL | Age: 72
End: 2022-09-27
Attending: UROLOGY
Payer: MEDICARE

## 2022-09-27 DIAGNOSIS — R97.20 ELEVATED PSA: ICD-10-CM

## 2022-09-27 LAB — COMPLEXED PSA SERPL-MCNC: 3.8 NG/ML (ref 0–4)

## 2022-09-27 PROCEDURE — 84153 ASSAY OF PSA TOTAL: CPT | Performed by: UROLOGY

## 2022-09-27 PROCEDURE — 36415 COLL VENOUS BLD VENIPUNCTURE: CPT | Performed by: UROLOGY

## 2022-10-03 ENCOUNTER — OFFICE VISIT (OUTPATIENT)
Dept: UROLOGY | Facility: CLINIC | Age: 72
End: 2022-10-03
Payer: MEDICARE

## 2022-10-03 ENCOUNTER — LAB VISIT (OUTPATIENT)
Dept: LAB | Facility: HOSPITAL | Age: 72
End: 2022-10-03
Attending: INTERNAL MEDICINE
Payer: MEDICARE

## 2022-10-03 VITALS
BODY MASS INDEX: 28.72 KG/M2 | HEART RATE: 74 BPM | WEIGHT: 183 LBS | SYSTOLIC BLOOD PRESSURE: 141 MMHG | HEIGHT: 67 IN | DIASTOLIC BLOOD PRESSURE: 81 MMHG

## 2022-10-03 DIAGNOSIS — N40.1 BPH WITH OBSTRUCTION/LOWER URINARY TRACT SYMPTOMS: Primary | ICD-10-CM

## 2022-10-03 DIAGNOSIS — N13.8 BPH WITH OBSTRUCTION/LOWER URINARY TRACT SYMPTOMS: Primary | ICD-10-CM

## 2022-10-03 DIAGNOSIS — Z94.0 KIDNEY REPLACED BY TRANSPLANT: ICD-10-CM

## 2022-10-03 DIAGNOSIS — L60.0 IGTN (INGROWING TOE NAIL): ICD-10-CM

## 2022-10-03 DIAGNOSIS — N52.9 ERECTILE DYSFUNCTION, UNSPECIFIED ERECTILE DYSFUNCTION TYPE: ICD-10-CM

## 2022-10-03 DIAGNOSIS — Z12.5 PROSTATE CANCER SCREENING: ICD-10-CM

## 2022-10-03 LAB
ALBUMIN SERPL BCP-MCNC: 4.2 G/DL (ref 3.5–5.2)
ALBUMIN SERPL BCP-MCNC: 4.2 G/DL (ref 3.5–5.2)
ALP SERPL-CCNC: 104 U/L (ref 55–135)
ALT SERPL W/O P-5'-P-CCNC: 27 U/L (ref 10–44)
ANION GAP SERPL CALC-SCNC: 11 MMOL/L (ref 8–16)
AST SERPL-CCNC: 18 U/L (ref 10–40)
BASOPHILS # BLD AUTO: 0.03 K/UL (ref 0–0.2)
BASOPHILS NFR BLD: 0.6 % (ref 0–1.9)
BILIRUB DIRECT SERPL-MCNC: 0.2 MG/DL (ref 0.1–0.3)
BILIRUB SERPL-MCNC: 0.4 MG/DL (ref 0.1–1)
BUN SERPL-MCNC: 15 MG/DL (ref 8–23)
CALCIUM SERPL-MCNC: 9.7 MG/DL (ref 8.7–10.5)
CHLORIDE SERPL-SCNC: 108 MMOL/L (ref 95–110)
CO2 SERPL-SCNC: 19 MMOL/L (ref 23–29)
CREAT SERPL-MCNC: 1.2 MG/DL (ref 0.5–1.4)
DIFFERENTIAL METHOD: ABNORMAL
EOSINOPHIL # BLD AUTO: 0.1 K/UL (ref 0–0.5)
EOSINOPHIL NFR BLD: 1.6 % (ref 0–8)
ERYTHROCYTE [DISTWIDTH] IN BLOOD BY AUTOMATED COUNT: 14.6 % (ref 11.5–14.5)
EST. GFR  (NO RACE VARIABLE): >60 ML/MIN/1.73 M^2
GLUCOSE SERPL-MCNC: 98 MG/DL (ref 70–110)
HCT VFR BLD AUTO: 45.6 % (ref 40–54)
HGB BLD-MCNC: 14.6 G/DL (ref 14–18)
IMM GRANULOCYTES # BLD AUTO: 0.02 K/UL (ref 0–0.04)
IMM GRANULOCYTES NFR BLD AUTO: 0.4 % (ref 0–0.5)
LYMPHOCYTES # BLD AUTO: 1 K/UL (ref 1–4.8)
LYMPHOCYTES NFR BLD: 21.3 % (ref 18–48)
MAGNESIUM SERPL-MCNC: 1.6 MG/DL (ref 1.6–2.6)
MCH RBC QN AUTO: 28.2 PG (ref 27–31)
MCHC RBC AUTO-ENTMCNC: 32 G/DL (ref 32–36)
MCV RBC AUTO: 88 FL (ref 82–98)
MONOCYTES # BLD AUTO: 0.9 K/UL (ref 0.3–1)
MONOCYTES NFR BLD: 17.6 % (ref 4–15)
NEUTROPHILS # BLD AUTO: 2.9 K/UL (ref 1.8–7.7)
NEUTROPHILS NFR BLD: 58.5 % (ref 38–73)
NRBC BLD-RTO: 0 /100 WBC
PHOSPHATE SERPL-MCNC: 2.5 MG/DL (ref 2.7–4.5)
PLATELET # BLD AUTO: 182 K/UL (ref 150–450)
PMV BLD AUTO: 11.9 FL (ref 9.2–12.9)
POTASSIUM SERPL-SCNC: 3.8 MMOL/L (ref 3.5–5.1)
PROT SERPL-MCNC: 6.7 G/DL (ref 6–8.4)
RBC # BLD AUTO: 5.17 M/UL (ref 4.6–6.2)
SODIUM SERPL-SCNC: 138 MMOL/L (ref 136–145)
WBC # BLD AUTO: 4.88 K/UL (ref 3.9–12.7)

## 2022-10-03 PROCEDURE — 84075 ASSAY ALKALINE PHOSPHATASE: CPT | Performed by: INTERNAL MEDICINE

## 2022-10-03 PROCEDURE — 3288F FALL RISK ASSESSMENT DOCD: CPT | Mod: CPTII,S$GLB,, | Performed by: UROLOGY

## 2022-10-03 PROCEDURE — 3066F NEPHROPATHY DOC TX: CPT | Mod: CPTII,S$GLB,, | Performed by: UROLOGY

## 2022-10-03 PROCEDURE — 3066F PR DOCUMENTATION OF TREATMENT FOR NEPHROPATHY: ICD-10-PCS | Mod: CPTII,S$GLB,, | Performed by: UROLOGY

## 2022-10-03 PROCEDURE — 87799 DETECT AGENT NOS DNA QUANT: CPT | Performed by: INTERNAL MEDICINE

## 2022-10-03 PROCEDURE — 3079F DIAST BP 80-89 MM HG: CPT | Mod: CPTII,S$GLB,, | Performed by: UROLOGY

## 2022-10-03 PROCEDURE — 3079F PR MOST RECENT DIASTOLIC BLOOD PRESSURE 80-89 MM HG: ICD-10-PCS | Mod: CPTII,S$GLB,, | Performed by: UROLOGY

## 2022-10-03 PROCEDURE — 80197 ASSAY OF TACROLIMUS: CPT | Performed by: INTERNAL MEDICINE

## 2022-10-03 PROCEDURE — 99999 PR PBB SHADOW E&M-EST. PATIENT-LVL IV: CPT | Mod: PBBFAC,,, | Performed by: UROLOGY

## 2022-10-03 PROCEDURE — 99214 OFFICE O/P EST MOD 30 MIN: CPT | Mod: S$GLB,,, | Performed by: UROLOGY

## 2022-10-03 PROCEDURE — 83735 ASSAY OF MAGNESIUM: CPT | Performed by: INTERNAL MEDICINE

## 2022-10-03 PROCEDURE — 99214 PR OFFICE/OUTPT VISIT, EST, LEVL IV, 30-39 MIN: ICD-10-PCS | Mod: S$GLB,,, | Performed by: UROLOGY

## 2022-10-03 PROCEDURE — 1101F PT FALLS ASSESS-DOCD LE1/YR: CPT | Mod: CPTII,S$GLB,, | Performed by: UROLOGY

## 2022-10-03 PROCEDURE — 1126F AMNT PAIN NOTED NONE PRSNT: CPT | Mod: CPTII,S$GLB,, | Performed by: UROLOGY

## 2022-10-03 PROCEDURE — 85025 COMPLETE CBC W/AUTO DIFF WBC: CPT | Performed by: INTERNAL MEDICINE

## 2022-10-03 PROCEDURE — 1101F PR PT FALLS ASSESS DOC 0-1 FALLS W/OUT INJ PAST YR: ICD-10-PCS | Mod: CPTII,S$GLB,, | Performed by: UROLOGY

## 2022-10-03 PROCEDURE — 99999 PR PBB SHADOW E&M-EST. PATIENT-LVL IV: ICD-10-PCS | Mod: PBBFAC,,, | Performed by: UROLOGY

## 2022-10-03 PROCEDURE — 36415 COLL VENOUS BLD VENIPUNCTURE: CPT | Performed by: INTERNAL MEDICINE

## 2022-10-03 PROCEDURE — 3288F PR FALLS RISK ASSESSMENT DOCUMENTED: ICD-10-PCS | Mod: CPTII,S$GLB,, | Performed by: UROLOGY

## 2022-10-03 PROCEDURE — 3077F SYST BP >= 140 MM HG: CPT | Mod: CPTII,S$GLB,, | Performed by: UROLOGY

## 2022-10-03 PROCEDURE — 3077F PR MOST RECENT SYSTOLIC BLOOD PRESSURE >= 140 MM HG: ICD-10-PCS | Mod: CPTII,S$GLB,, | Performed by: UROLOGY

## 2022-10-03 PROCEDURE — 80069 RENAL FUNCTION PANEL: CPT | Performed by: INTERNAL MEDICINE

## 2022-10-03 PROCEDURE — 1126F PR PAIN SEVERITY QUANTIFIED, NO PAIN PRESENT: ICD-10-PCS | Mod: CPTII,S$GLB,, | Performed by: UROLOGY

## 2022-10-03 RX ORDER — SILDENAFIL 100 MG/1
100 TABLET, FILM COATED ORAL DAILY PRN
Qty: 30 TABLET | Refills: 11 | Status: SHIPPED | OUTPATIENT
Start: 2022-10-03 | End: 2023-10-02 | Stop reason: SDUPTHER

## 2022-10-03 NOTE — PROGRESS NOTES
Chief Complaint   Patient presents with    Annual Exam         History of Present Illness:   Alverto Ramirez Jr. is a 72 y.o. male here for follow up.     10/3/22-Had a renal transplant about 6 months ago. Good urinary stream. No gross hematuria. Not currently on any prostate medications. Viagra works fair at 50mg dose. No nocturia.   9/27/21- He states that he is on PD. He is on the transplant list. He was taking finasteride, but stopped 7 months ago due to worsened ED. He has been urinating a little more since membrane change with PD. No dysuria or gross hematuria. 100mg sildenafil not working as well as he would like.    9/21/20: PSA not rising on last check.  20mg sildenafil not sufficient.  Reviewed history in detail. Stream is good.  My mom's neighbor.  9/16/19: Doing well no complaints.  Taking 1-2 of the sildenafil 20.  Reviewed history in detail.   9/10/18: Voiding fine with cardura off his list.  PSA dropped this last year.  7/17/17: Having some ED problems.  Still taking doxazosin, voiding well.  7/11/16: Doing well voiding fine  6/29/15: No problems in interim.  Has an AV fistula in left arm now. PSA unchanged at 3.6.  No urinary bother doing fine.  Taking saw palmetto and pumpkin seed.   12/29/14: 63 yo man being evaluated for kidney transplant had an elevated PSA of 4.2 and is here for screening.  Had a AV fistula surgery last week.  No abd/pelvic pain and no exac/rel factors.  No hematuria.  No urolithiasis history.  No prostate cancer family history but his brother had some sort of prostate surgery.  No urinary bother.  Still makes urine and is not on dialysis.  Feels good in general.    Review of Systems   Constitutional:  Negative for chills and fever.   Cardiovascular:  Negative for chest pain.   Genitourinary:  Negative for difficulty urinating.   Psychiatric/Behavioral:  Negative for confusion.    All other systems reviewed and are negative.    Current Outpatient Medications   Medication Sig  Dispense Refill    ALPRAZolam (XANAX) 1 MG tablet Take 1 tablet (1 mg total) by mouth daily as needed for Anxiety. 30 tablet 0    calcitRIOL (ROCALTROL) 0.25 MCG Cap Take 1 capsule (0.25 mcg total) by mouth once daily. 90 capsule 3    cholecalciferol, vitamin D3, (VITAMIN D3) 50 mcg (2,000 unit) Tab Take 1 tablet (2,000 Units total) by mouth once daily. 60 tablet 11    k phos di & mono-sod phos mono (K-PHOS-NEUTRAL) 250 mg Tab Take 1 tablet by mouth 2 (two) times a day. 90 tablet 11    magnesium oxide (MAG-OX) 400 mg (241.3 mg magnesium) tablet Take 1 tablet (400 mg total) by mouth 2 (two) times daily. 60 tablet 11    metoprolol succinate (TOPROL-XL) 50 MG 24 hr tablet Take 50 mg by mouth 2 (two) times daily.      mupirocin (BACTROBAN) 2 % ointment by Nasal route once daily. 30 g 3    mycophenolate (CELLCEPT) 250 mg Cap Take 4 capsules (1,000 mg total) by mouth 2 (two) times daily. 240 capsule 11    NIFEdipine (PROCARDIA-XL) 60 MG (OSM) 24 hr tablet Take 1 tablet (60 mg total) by mouth 2 (two) times a day. 180 tablet 3    predniSONE (DELTASONE) 5 MG tablet Take by mouth daily: 20mg 3/22/22 -4/21, 15mg 4/22-5/22, 10mg 5/23-6/22, 5mg 6/23- thereafter 120 tablet 5    sodium bicarbonate 650 MG tablet Take 3 tablets (1,950 mg total) by mouth 2 (two) times daily. 180 tablet 11    tacrolimus (PROGRAF) 1 MG Cap Take 4 capsules (4 mg total) by mouth every morning AND 4 capsules (4 mg total) every evening. 420 capsule 11    traZODone (DESYREL) 50 MG tablet Take 1-2 tablets ( mg total) by mouth every evening. 180 tablet 3    sildenafiL (VIAGRA) 100 MG tablet Take 1 tablet (100 mg total) by mouth daily as needed for Erectile Dysfunction. 30 tablet 11     No current facility-administered medications for this visit.       Review of patient's allergies indicates:  No Known Allergies    Past medical, family, and social history reviewed as documented in chart with pertinent positive medical, family, and social history detailed  in HPI.    Physical Exam  Vitals:    10/03/22 0939   BP: (!) 141/81   Pulse: 74       General: Well-developed, well-nourished, in no acute distress  HEENT: Normocephalic, atraumatic, extraocular eye movements in tact  Neck: supple, trachea midline, no cervical or supraclavicular adenopathy  Respirations: Even and unlabored  Back: Midline spine, no CVA tenderness  Rectal: 10/3/22-40gram prostate without nodules or tenderness. No gross blood.   Extremities: Moves all extremities equally, atraumatic, no clubbing, cyanosis, edema  Skin: warm and dry, no lesions  Psych: normal affect  Neuro: Alert and oriented x 3. Cranial nerves II-XII grossly intact    Urinalysis    PSA    3/15: 3.6    6/15: 3.6    6/16: 4.2    7/17: 4.9    9/18: 4.1    9/19: 4.6    5/20: 4.2    9/21  4.7  9/22: 3.8    Assessment:   1. BPH with obstruction/lower urinary tract symptoms        2. Erectile dysfunction, unspecified erectile dysfunction type        3. Prostate cancer screening  PSA, Screening      4. IGTN (ingrowing toe nail)  Ambulatory referral/consult to Podiatry              Plan:  BPH with obstruction/lower urinary tract symptoms    Erectile dysfunction, unspecified erectile dysfunction type    Prostate cancer screening  -     PSA, Screening; Future; Expected date: 10/03/2022    IGTN (ingrowing toe nail)  -     Ambulatory referral/consult to Podiatry; Future; Expected date: 10/10/2022    Other orders  -     sildenafiL (VIAGRA) 100 MG tablet; Take 1 tablet (100 mg total) by mouth daily as needed for Erectile Dysfunction.  Dispense: 30 tablet; Refill: 11        Follow up in about 1 year (around 10/3/2023) for labs before visit.

## 2022-10-04 LAB — TACROLIMUS BLD-MCNC: 8.6 NG/ML (ref 5–15)

## 2022-10-05 ENCOUNTER — OFFICE VISIT (OUTPATIENT)
Dept: PODIATRY | Facility: CLINIC | Age: 72
End: 2022-10-05
Payer: MEDICARE

## 2022-10-05 VITALS — BODY MASS INDEX: 28.72 KG/M2 | WEIGHT: 183 LBS | HEIGHT: 67 IN

## 2022-10-05 DIAGNOSIS — B35.1 ONYCHOMYCOSIS: Primary | ICD-10-CM

## 2022-10-05 PROCEDURE — 99203 PR OFFICE/OUTPT VISIT, NEW, LEVL III, 30-44 MIN: ICD-10-PCS | Mod: S$GLB,,, | Performed by: PODIATRIST

## 2022-10-05 PROCEDURE — 1101F PT FALLS ASSESS-DOCD LE1/YR: CPT | Mod: CPTII,S$GLB,, | Performed by: PODIATRIST

## 2022-10-05 PROCEDURE — 1101F PR PT FALLS ASSESS DOC 0-1 FALLS W/OUT INJ PAST YR: ICD-10-PCS | Mod: CPTII,S$GLB,, | Performed by: PODIATRIST

## 2022-10-05 PROCEDURE — 3288F PR FALLS RISK ASSESSMENT DOCUMENTED: ICD-10-PCS | Mod: CPTII,S$GLB,, | Performed by: PODIATRIST

## 2022-10-05 PROCEDURE — 99203 OFFICE O/P NEW LOW 30 MIN: CPT | Mod: S$GLB,,, | Performed by: PODIATRIST

## 2022-10-05 PROCEDURE — 1160F RVW MEDS BY RX/DR IN RCRD: CPT | Mod: CPTII,S$GLB,, | Performed by: PODIATRIST

## 2022-10-05 PROCEDURE — 99999 PR PBB SHADOW E&M-EST. PATIENT-LVL III: ICD-10-PCS | Mod: PBBFAC,,, | Performed by: PODIATRIST

## 2022-10-05 PROCEDURE — 99999 PR PBB SHADOW E&M-EST. PATIENT-LVL III: CPT | Mod: PBBFAC,,, | Performed by: PODIATRIST

## 2022-10-05 PROCEDURE — 3066F NEPHROPATHY DOC TX: CPT | Mod: CPTII,S$GLB,, | Performed by: PODIATRIST

## 2022-10-05 PROCEDURE — 1160F PR REVIEW ALL MEDS BY PRESCRIBER/CLIN PHARMACIST DOCUMENTED: ICD-10-PCS | Mod: CPTII,S$GLB,, | Performed by: PODIATRIST

## 2022-10-05 PROCEDURE — 3288F FALL RISK ASSESSMENT DOCD: CPT | Mod: CPTII,S$GLB,, | Performed by: PODIATRIST

## 2022-10-05 PROCEDURE — 1159F MED LIST DOCD IN RCRD: CPT | Mod: CPTII,S$GLB,, | Performed by: PODIATRIST

## 2022-10-05 PROCEDURE — 1159F PR MEDICATION LIST DOCUMENTED IN MEDICAL RECORD: ICD-10-PCS | Mod: CPTII,S$GLB,, | Performed by: PODIATRIST

## 2022-10-05 PROCEDURE — 3066F PR DOCUMENTATION OF TREATMENT FOR NEPHROPATHY: ICD-10-PCS | Mod: CPTII,S$GLB,, | Performed by: PODIATRIST

## 2022-10-05 NOTE — PROGRESS NOTES
Subjective:     Patient ID: Alverto Ramirez Jr. is a 72 y.o. male.    Chief Complaint: Nail Care (Non-diabetic pt wears slippers,  py c/o 0/10 pain, PCP Dr. Wagner last seen 9-12-22)    Alverto is a 72 y.o. male who presents to the clinic complaining of thick and discolored toenails on both feet. Alverto is inquiring about treatment options. Patient states he had kidney transplant March this year. Patient has no other pedal complaints a this time.     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    CALCITRIOL (ROCALTROL) 0.25 MCG CAP    Take 1 capsule (0.25 mcg total) by mouth once daily.    CHOLECALCIFEROL, VITAMIN D3, (VITAMIN D3) 50 MCG (2,000 UNIT) TAB    Take 1 tablet (2,000 Units total) by mouth once daily.    K PHOS DI & MONO-SOD PHOS MONO (K-PHOS-NEUTRAL) 250 MG TAB    Take 1 tablet by mouth 2 (two) times a day.    MAGNESIUM OXIDE (MAG-OX) 400 MG (241.3 MG MAGNESIUM) TABLET    Take 1 tablet (400 mg total) by mouth 2 (two) times daily.    METOPROLOL SUCCINATE (TOPROL-XL) 50 MG 24 HR TABLET    Take 50 mg by mouth 2 (two) times daily.    MUPIROCIN (BACTROBAN) 2 % OINTMENT    by Nasal route once daily.    MYCOPHENOLATE (CELLCEPT) 250 MG CAP    Take 4 capsules (1,000 mg total) by mouth 2 (two) times daily.    NIFEDIPINE (PROCARDIA-XL) 60 MG (OSM) 24 HR TABLET    Take 1 tablet (60 mg total) by mouth 2 (two) times a day.    PREDNISONE (DELTASONE) 5 MG TABLET     Take by mouth daily: 20mg 3/22/22 -, 15mg -, 10mg -, 5mg - thereafter    SILDENAFIL (VIAGRA) 100 MG TABLET    Take 1 tablet (100 mg total) by mouth daily as needed for Erectile Dysfunction.    SODIUM BICARBONATE 650 MG TABLET    Take 3 tablets (1,950 mg total) by mouth 2 (two) times daily.    SULFAMETHOXAZOLE-TRIMETHOPRIM 400-80MG (BACTRIM,SEPTRA) 400-80 MG PER TABLET    Take 1 tablet by mouth once daily.    TACROLIMUS (PROGRAF) 1 MG CAP    Take 4 capsules (4 mg total) by mouth every morning AND 4 capsules (4 mg total) every evening.    TRAZODONE (DESYREL) 50 MG TABLET    Take 1-2 tablets ( mg total) by mouth every evening.       Review of patient's allergies indicates:  No Known Allergies    Past Surgical History:   Procedure Laterality Date    AV FISTULA PLACEMENT  2014    COLONOSCOPY N/A 2017    Procedure: COLONOSCOPY;  Surgeon: Tony Peacock III, MD;  Location: [facility];  Service: Endoscopy;  Laterality: N/A;    KIDNEY TRANSPLANT Right 3/18/2022    Procedure: TRANSPLANT, KIDNEY;  Surgeon: Victoriano Thomas MD;  Location: [address];  Service: Transplant;  Laterality: Right;    PERITONEAL CATHETER INSERTION      VASECTOMY      VASECTOMY         Family History   Problem Relation Age of Onset    Heart disease Father     Dementia Father     Diabetes Mother     Cataracts Mother     Glaucoma Mother     Hypertension Sister     Cancer Brother         prostate    Kidney disease Sister         ESRD    Cancer Paternal Uncle        Social History     Socioeconomic History    Marital status:    Occupational History     Employer: retired   Tobacco Use    Smoking status: Former     Packs/day: 1.00     Years: 15.00     Pack years: 15.00     Types: Cigarettes     Quit date: 1984     Years since quittin.8    Smokeless tobacco: Never   Substance and Sexual Activity    Alcohol use: Yes     Alcohol/week: 5.0 - 7.0 standard drinks     Types: 5 - 7 Standard drinks or  "equivalent per week     Comment: stopped drinking    Drug use: No    Sexual activity: Yes     Partners: Female   Social History Narrative    Retired from [employer]     for 41 y.o.    2 children    No history of blood transfusions    No donors       Vitals:    10/05/22 1348   Weight: 83 kg (182 lb 15.7 oz)   Height: 5' 7" (1.702 m)       Hemoglobin A1C   Date Value Ref Range Status   11/26/2014 5.5 4.5 - 6.2 % Final       Review of Systems   Constitutional:  Negative for chills and fever.   Respiratory:  Negative for shortness of breath.    Cardiovascular:  Negative for chest pain, palpitations, orthopnea, claudication and leg swelling.   Gastrointestinal:  Negative for diarrhea, nausea and vomiting.   Musculoskeletal:  Negative for joint pain.   Skin:  Negative for rash.   Neurological:  Negative for dizziness, tingling, sensory change, focal weakness and weakness.   Psychiatric/Behavioral: Negative.             Objective:      PHYSICAL EXAM: Apperance: Alert and orient in no distress,well developed, and with good attention to grooming and body habits  LOWER EXTREMITY EXAM:  VASCULAR: Dorsalis pedis pulses 2/4 bilateral and Posterior Tibial pulses 2/4 bilateral. Capillary fill time <4 seconds bilateral. No edema observed bilateral. Varicosities absent bilateral. Skin temperature of the lower extremities is warm to warm, proximal to distal. Hair growth WNL bilateral.  DERMATOLOGICAL: No skin rashes, subcutaneous nodules, lesions, or ulcers observed bilateral. Nails 1,2,3 bilateral elongated, thickened, and discolored with subungual debris. Nails 4,5 bilateral normal length and thickness. Webspaces 1,2,3,4 bilateral clean, dry and without evidence of break in skin integrity.  NEUROLOGICAL: Light touch, sharp-dull, proprioception all present and equal bilaterally.     MUSCULOSKELETAL: Muscle strength is 5/5 for foot inverters, everters, plantarflexors, and dorsiflexors. Muscle tone is normal. Negative pain " on palpation of nails bilateral.         Assessment:       ICD-10-CM ICD-9-CM   1. Onychomycosis  B35.1 110.1       Plan:   Onychomycosis  -     Ambulatory referral/consult to Podiatry    I counseled the patient on his conditions, regarding findings of my examination, my impressions, and usual treatment plan.   Patient instructed to spray all shoes with Lysol disinfectant spray and let dry before wearing. Patient instructed to wash all socks in hot water and bleach.  Discuss treatment options for nail fungus.  I explained that fungus lives in a warm dark moist environment and therefore patient should make every attempt to keep feet clean and dry.  We discussed drying feet thoroughly after shower particularly between the toes and then applying powder between the toes and in the shoes.    For fungal toenails I prescribed topical medication to be used daily for up to a year.  We also discussed oral Lamisil but I did not recommend it as a first line of treatment since it is an internal medicine that may potentially have side effects, including liver problems. Patient elects for OTC topical treatment.   With patient's permission, nail clippings obtained for fungal nail culture.   Patient to return as needed.        Ana Maria Rodriguez DPM  [facility]

## 2022-11-11 ENCOUNTER — LAB VISIT (OUTPATIENT)
Dept: LAB | Facility: HOSPITAL | Age: 72
End: 2022-11-11
Attending: INTERNAL MEDICINE
Payer: MEDICARE

## 2022-11-11 DIAGNOSIS — N18.31 CHRONIC KIDNEY DISEASE, STAGE 3A: ICD-10-CM

## 2022-11-11 DIAGNOSIS — Z94.0 S/P KIDNEY TRANSPLANT: ICD-10-CM

## 2022-11-11 DIAGNOSIS — Z94.0 KIDNEY REPLACED BY TRANSPLANT: ICD-10-CM

## 2022-11-11 LAB
25(OH)D3+25(OH)D2 SERPL-MCNC: 44 NG/ML (ref 30–96)
ALBUMIN SERPL BCP-MCNC: 4 G/DL (ref 3.5–5.2)
ALBUMIN SERPL BCP-MCNC: 4 G/DL (ref 3.5–5.2)
ANION GAP SERPL CALC-SCNC: 13 MMOL/L (ref 8–16)
ANION GAP SERPL CALC-SCNC: 13 MMOL/L (ref 8–16)
BASOPHILS # BLD AUTO: 0.02 K/UL (ref 0–0.2)
BASOPHILS NFR BLD: 0.4 % (ref 0–1.9)
BUN SERPL-MCNC: 13 MG/DL (ref 8–23)
BUN SERPL-MCNC: 13 MG/DL (ref 8–23)
CALCIUM SERPL-MCNC: 10.1 MG/DL (ref 8.7–10.5)
CALCIUM SERPL-MCNC: 10.1 MG/DL (ref 8.7–10.5)
CHLORIDE SERPL-SCNC: 109 MMOL/L (ref 95–110)
CHLORIDE SERPL-SCNC: 109 MMOL/L (ref 95–110)
CO2 SERPL-SCNC: 18 MMOL/L (ref 23–29)
CO2 SERPL-SCNC: 18 MMOL/L (ref 23–29)
CREAT SERPL-MCNC: 1.2 MG/DL (ref 0.5–1.4)
CREAT SERPL-MCNC: 1.2 MG/DL (ref 0.5–1.4)
DIFFERENTIAL METHOD: ABNORMAL
EOSINOPHIL # BLD AUTO: 0.1 K/UL (ref 0–0.5)
EOSINOPHIL NFR BLD: 1.8 % (ref 0–8)
ERYTHROCYTE [DISTWIDTH] IN BLOOD BY AUTOMATED COUNT: 14.3 % (ref 11.5–14.5)
EST. GFR  (NO RACE VARIABLE): >60 ML/MIN/1.73 M^2
EST. GFR  (NO RACE VARIABLE): >60 ML/MIN/1.73 M^2
GLUCOSE SERPL-MCNC: 98 MG/DL (ref 70–110)
GLUCOSE SERPL-MCNC: 98 MG/DL (ref 70–110)
HCT VFR BLD AUTO: 48.5 % (ref 40–54)
HGB BLD-MCNC: 15.2 G/DL (ref 14–18)
IMM GRANULOCYTES # BLD AUTO: 0.04 K/UL (ref 0–0.04)
IMM GRANULOCYTES NFR BLD AUTO: 0.7 % (ref 0–0.5)
LYMPHOCYTES # BLD AUTO: 1 K/UL (ref 1–4.8)
LYMPHOCYTES NFR BLD: 17.1 % (ref 18–48)
MAGNESIUM SERPL-MCNC: 1.6 MG/DL (ref 1.6–2.6)
MAGNESIUM SERPL-MCNC: 1.6 MG/DL (ref 1.6–2.6)
MCH RBC QN AUTO: 27.9 PG (ref 27–31)
MCHC RBC AUTO-ENTMCNC: 31.3 G/DL (ref 32–36)
MCV RBC AUTO: 89 FL (ref 82–98)
MONOCYTES # BLD AUTO: 0.9 K/UL (ref 0.3–1)
MONOCYTES NFR BLD: 16 % (ref 4–15)
NEUTROPHILS # BLD AUTO: 3.6 K/UL (ref 1.8–7.7)
NEUTROPHILS NFR BLD: 64 % (ref 38–73)
NRBC BLD-RTO: 0 /100 WBC
PHOSPHATE SERPL-MCNC: 2.6 MG/DL (ref 2.7–4.5)
PHOSPHATE SERPL-MCNC: 2.6 MG/DL (ref 2.7–4.5)
PLATELET # BLD AUTO: 206 K/UL (ref 150–450)
PMV BLD AUTO: 11 FL (ref 9.2–12.9)
POTASSIUM SERPL-SCNC: 3.7 MMOL/L (ref 3.5–5.1)
POTASSIUM SERPL-SCNC: 3.7 MMOL/L (ref 3.5–5.1)
PTH-INTACT SERPL-MCNC: 132.7 PG/ML (ref 9–77)
RBC # BLD AUTO: 5.44 M/UL (ref 4.6–6.2)
SODIUM SERPL-SCNC: 140 MMOL/L (ref 136–145)
SODIUM SERPL-SCNC: 140 MMOL/L (ref 136–145)
WBC # BLD AUTO: 5.63 K/UL (ref 3.9–12.7)

## 2022-11-11 PROCEDURE — 83735 ASSAY OF MAGNESIUM: CPT | Performed by: INTERNAL MEDICINE

## 2022-11-11 PROCEDURE — 83970 ASSAY OF PARATHORMONE: CPT | Performed by: NURSE PRACTITIONER

## 2022-11-11 PROCEDURE — 80069 RENAL FUNCTION PANEL: CPT | Performed by: INTERNAL MEDICINE

## 2022-11-11 PROCEDURE — 85025 COMPLETE CBC W/AUTO DIFF WBC: CPT | Performed by: INTERNAL MEDICINE

## 2022-11-11 PROCEDURE — 36415 COLL VENOUS BLD VENIPUNCTURE: CPT | Performed by: NURSE PRACTITIONER

## 2022-11-11 PROCEDURE — 82306 VITAMIN D 25 HYDROXY: CPT | Performed by: NURSE PRACTITIONER

## 2022-11-11 PROCEDURE — 80197 ASSAY OF TACROLIMUS: CPT | Performed by: INTERNAL MEDICINE

## 2022-11-12 LAB
TACROLIMUS BLD-MCNC: 7.1 NG/ML (ref 5–15)
TACROLIMUS BLD-MCNC: 7.1 NG/ML (ref 5–15)

## 2022-11-18 ENCOUNTER — OFFICE VISIT (OUTPATIENT)
Dept: NEPHROLOGY | Facility: CLINIC | Age: 72
End: 2022-11-18
Payer: MEDICARE

## 2022-11-18 ENCOUNTER — IMMUNIZATION (OUTPATIENT)
Dept: PRIMARY CARE CLINIC | Facility: CLINIC | Age: 72
End: 2022-11-18
Payer: MEDICARE

## 2022-11-18 VITALS
BODY MASS INDEX: 28.74 KG/M2 | HEART RATE: 74 BPM | SYSTOLIC BLOOD PRESSURE: 130 MMHG | WEIGHT: 189.63 LBS | HEIGHT: 68 IN | DIASTOLIC BLOOD PRESSURE: 60 MMHG

## 2022-11-18 DIAGNOSIS — E87.20 ACIDOSIS: ICD-10-CM

## 2022-11-18 DIAGNOSIS — E87.20 METABOLIC ACIDOSIS: ICD-10-CM

## 2022-11-18 DIAGNOSIS — I10 ESSENTIAL HYPERTENSION: Chronic | ICD-10-CM

## 2022-11-18 DIAGNOSIS — Z94.0 S/P KIDNEY TRANSPLANT: Primary | ICD-10-CM

## 2022-11-18 DIAGNOSIS — N18.31 STAGE 3A CHRONIC KIDNEY DISEASE: ICD-10-CM

## 2022-11-18 DIAGNOSIS — E83.39 HYPOPHOSPHATEMIA: ICD-10-CM

## 2022-11-18 DIAGNOSIS — Z79.60 LONG-TERM USE OF IMMUNOSUPPRESSANT MEDICATION: ICD-10-CM

## 2022-11-18 DIAGNOSIS — D84.9 IMMUNOCOMPROMISED: ICD-10-CM

## 2022-11-18 DIAGNOSIS — Z23 NEED FOR VACCINATION: Primary | ICD-10-CM

## 2022-11-18 PROCEDURE — 99999 PR PBB SHADOW E&M-EST. PATIENT-LVL IV: CPT | Mod: PBBFAC,,, | Performed by: NURSE PRACTITIONER

## 2022-11-18 PROCEDURE — 3075F SYST BP GE 130 - 139MM HG: CPT | Mod: CPTII,S$GLB,, | Performed by: NURSE PRACTITIONER

## 2022-11-18 PROCEDURE — 1160F PR REVIEW ALL MEDS BY PRESCRIBER/CLIN PHARMACIST DOCUMENTED: ICD-10-PCS | Mod: CPTII,S$GLB,, | Performed by: NURSE PRACTITIONER

## 2022-11-18 PROCEDURE — 3008F PR BODY MASS INDEX (BMI) DOCUMENTED: ICD-10-PCS | Mod: CPTII,S$GLB,, | Performed by: NURSE PRACTITIONER

## 2022-11-18 PROCEDURE — 1101F PT FALLS ASSESS-DOCD LE1/YR: CPT | Mod: CPTII,S$GLB,, | Performed by: NURSE PRACTITIONER

## 2022-11-18 PROCEDURE — 3075F PR MOST RECENT SYSTOLIC BLOOD PRESS GE 130-139MM HG: ICD-10-PCS | Mod: CPTII,S$GLB,, | Performed by: NURSE PRACTITIONER

## 2022-11-18 PROCEDURE — 1160F RVW MEDS BY RX/DR IN RCRD: CPT | Mod: CPTII,S$GLB,, | Performed by: NURSE PRACTITIONER

## 2022-11-18 PROCEDURE — 99215 OFFICE O/P EST HI 40 MIN: CPT | Mod: S$GLB,,, | Performed by: NURSE PRACTITIONER

## 2022-11-18 PROCEDURE — 3078F PR MOST RECENT DIASTOLIC BLOOD PRESSURE < 80 MM HG: ICD-10-PCS | Mod: CPTII,S$GLB,, | Performed by: NURSE PRACTITIONER

## 2022-11-18 PROCEDURE — 3078F DIAST BP <80 MM HG: CPT | Mod: CPTII,S$GLB,, | Performed by: NURSE PRACTITIONER

## 2022-11-18 PROCEDURE — 1126F AMNT PAIN NOTED NONE PRSNT: CPT | Mod: CPTII,S$GLB,, | Performed by: NURSE PRACTITIONER

## 2022-11-18 PROCEDURE — 99215 PR OFFICE/OUTPT VISIT, EST, LEVL V, 40-54 MIN: ICD-10-PCS | Mod: S$GLB,,, | Performed by: NURSE PRACTITIONER

## 2022-11-18 PROCEDURE — 91312 COVID-19, MRNA, LNP-S, BIVALENT BOOSTER, PF, 30 MCG/0.3 ML DOSE: CPT | Mod: PBBFAC | Performed by: FAMILY MEDICINE

## 2022-11-18 PROCEDURE — 0124A COVID-19, MRNA, LNP-S, BIVALENT BOOSTER, PF, 30 MCG/0.3 ML DOSE: CPT | Mod: CV19,PBBFAC | Performed by: FAMILY MEDICINE

## 2022-11-18 PROCEDURE — 3288F PR FALLS RISK ASSESSMENT DOCUMENTED: ICD-10-PCS | Mod: CPTII,S$GLB,, | Performed by: NURSE PRACTITIONER

## 2022-11-18 PROCEDURE — 3008F BODY MASS INDEX DOCD: CPT | Mod: CPTII,S$GLB,, | Performed by: NURSE PRACTITIONER

## 2022-11-18 PROCEDURE — 1159F PR MEDICATION LIST DOCUMENTED IN MEDICAL RECORD: ICD-10-PCS | Mod: CPTII,S$GLB,, | Performed by: NURSE PRACTITIONER

## 2022-11-18 PROCEDURE — 1101F PR PT FALLS ASSESS DOC 0-1 FALLS W/OUT INJ PAST YR: ICD-10-PCS | Mod: CPTII,S$GLB,, | Performed by: NURSE PRACTITIONER

## 2022-11-18 PROCEDURE — 3288F FALL RISK ASSESSMENT DOCD: CPT | Mod: CPTII,S$GLB,, | Performed by: NURSE PRACTITIONER

## 2022-11-18 PROCEDURE — 3066F NEPHROPATHY DOC TX: CPT | Mod: CPTII,S$GLB,, | Performed by: NURSE PRACTITIONER

## 2022-11-18 PROCEDURE — 99999 PR PBB SHADOW E&M-EST. PATIENT-LVL IV: ICD-10-PCS | Mod: PBBFAC,,, | Performed by: NURSE PRACTITIONER

## 2022-11-18 PROCEDURE — 1126F PR PAIN SEVERITY QUANTIFIED, NO PAIN PRESENT: ICD-10-PCS | Mod: CPTII,S$GLB,, | Performed by: NURSE PRACTITIONER

## 2022-11-18 PROCEDURE — 3066F PR DOCUMENTATION OF TREATMENT FOR NEPHROPATHY: ICD-10-PCS | Mod: CPTII,S$GLB,, | Performed by: NURSE PRACTITIONER

## 2022-11-18 PROCEDURE — 1159F MED LIST DOCD IN RCRD: CPT | Mod: CPTII,S$GLB,, | Performed by: NURSE PRACTITIONER

## 2022-11-18 NOTE — PATIENT INSTRUCTIONS
Continue all medications as reviewed today:    Keep blood pressure controlled  Low sodium diet: avoid/limit salt, processed foods (boxed or canned), fried foods, fast foods, etc as discussed    Keep blood sugar controlled  Low carbohydrate/sugar diet: avoid/limit sugary drinks, white breads, pasta, rice, etc as discussed    Drink water, flavored water/diluted juice (drink to thirst) daily as tolerated with no swelling/shortness of breath    Avoid NSAIDS: Advil, Ibuprofen, Aleve, Naproxen, Meloxicam, etc. (Aspirin is ok), Tylenol is Best    Exercise as tolerated     Follow up in 4 months and have labs drawn 1-2 weeks prior to visit

## 2022-11-21 DIAGNOSIS — E83.42 HYPOMAGNESEMIA: Primary | ICD-10-CM

## 2022-11-21 RX ORDER — LANOLIN ALCOHOL/MO/W.PET/CERES
400 CREAM (GRAM) TOPICAL 2 TIMES DAILY
Qty: 60 TABLET | Refills: 11 | Status: SHIPPED | OUTPATIENT
Start: 2022-11-21 | End: 2024-01-05 | Stop reason: SDUPTHER

## 2022-11-21 NOTE — TELEPHONE ENCOUNTER
----- Message from Angelina Poe sent at 11/21/2022  9:41 AM CST -----  Contact: Pharmacy  Requesting an RX refill or new RX.  Is this a refill or new RX:   RX name and strength magnesium oxide (MAG-OX) 400 mg (241.3 mg magnesium) tablet      Is this a 30 day or 90 day RX:   Pharmacy name and phone #[id number] - [place] - [address] AT [facility] [place]  The doctors have asked that we provide their patients with the following 2 reminders -- prescription refills can take up to 72 hours, and a friendly reminder that in the future you can use your [facility] account to request refills: NA

## 2022-11-23 NOTE — TELEPHONE ENCOUNTER
Spoke with patient states he has already spoken with his transplant nurse tonight.  He states they discussed him stopping lopressor.  Per patient he does not need any further assistance.   Reason for Disposition   Caller has already spoken with another triager and has no further questions.     Patient states he spoke with Transplant nurse tonight.    Protocols used: No Contact or Duplicate Contact Call-A-AH     CBC, CMP

## 2022-12-12 ENCOUNTER — OFFICE VISIT (OUTPATIENT)
Dept: INTERNAL MEDICINE | Facility: CLINIC | Age: 72
End: 2022-12-12
Payer: MEDICARE

## 2022-12-12 VITALS
BODY MASS INDEX: 29.57 KG/M2 | WEIGHT: 195.13 LBS | HEIGHT: 68 IN | HEART RATE: 93 BPM | SYSTOLIC BLOOD PRESSURE: 118 MMHG | OXYGEN SATURATION: 97 % | DIASTOLIC BLOOD PRESSURE: 78 MMHG

## 2022-12-12 DIAGNOSIS — L72.3 SEBACEOUS CYST: ICD-10-CM

## 2022-12-12 DIAGNOSIS — F41.9 ANXIETY: Primary | Chronic | ICD-10-CM

## 2022-12-12 DIAGNOSIS — F51.04 PSYCHOPHYSIOLOGICAL INSOMNIA: Chronic | ICD-10-CM

## 2022-12-12 DIAGNOSIS — J34.3 HYPERTROPHY OF BOTH INFERIOR NASAL TURBINATES: ICD-10-CM

## 2022-12-12 PROCEDURE — 1160F RVW MEDS BY RX/DR IN RCRD: CPT | Mod: CPTII,S$GLB,, | Performed by: FAMILY MEDICINE

## 2022-12-12 PROCEDURE — 3066F NEPHROPATHY DOC TX: CPT | Mod: CPTII,S$GLB,, | Performed by: FAMILY MEDICINE

## 2022-12-12 PROCEDURE — 1126F AMNT PAIN NOTED NONE PRSNT: CPT | Mod: CPTII,S$GLB,, | Performed by: FAMILY MEDICINE

## 2022-12-12 PROCEDURE — 3008F BODY MASS INDEX DOCD: CPT | Mod: CPTII,S$GLB,, | Performed by: FAMILY MEDICINE

## 2022-12-12 PROCEDURE — 99999 PR PBB SHADOW E&M-EST. PATIENT-LVL IV: CPT | Mod: PBBFAC,,, | Performed by: FAMILY MEDICINE

## 2022-12-12 PROCEDURE — 3066F PR DOCUMENTATION OF TREATMENT FOR NEPHROPATHY: ICD-10-PCS | Mod: CPTII,S$GLB,, | Performed by: FAMILY MEDICINE

## 2022-12-12 PROCEDURE — 99214 PR OFFICE/OUTPT VISIT, EST, LEVL IV, 30-39 MIN: ICD-10-PCS | Mod: S$GLB,,, | Performed by: FAMILY MEDICINE

## 2022-12-12 PROCEDURE — 99999 PR PBB SHADOW E&M-EST. PATIENT-LVL IV: ICD-10-PCS | Mod: PBBFAC,,, | Performed by: FAMILY MEDICINE

## 2022-12-12 PROCEDURE — 99214 OFFICE O/P EST MOD 30 MIN: CPT | Mod: S$GLB,,, | Performed by: FAMILY MEDICINE

## 2022-12-12 PROCEDURE — 1126F PR PAIN SEVERITY QUANTIFIED, NO PAIN PRESENT: ICD-10-PCS | Mod: CPTII,S$GLB,, | Performed by: FAMILY MEDICINE

## 2022-12-12 PROCEDURE — 1159F MED LIST DOCD IN RCRD: CPT | Mod: CPTII,S$GLB,, | Performed by: FAMILY MEDICINE

## 2022-12-12 PROCEDURE — 3008F PR BODY MASS INDEX (BMI) DOCUMENTED: ICD-10-PCS | Mod: CPTII,S$GLB,, | Performed by: FAMILY MEDICINE

## 2022-12-12 PROCEDURE — 3078F PR MOST RECENT DIASTOLIC BLOOD PRESSURE < 80 MM HG: ICD-10-PCS | Mod: CPTII,S$GLB,, | Performed by: FAMILY MEDICINE

## 2022-12-12 PROCEDURE — 3074F PR MOST RECENT SYSTOLIC BLOOD PRESSURE < 130 MM HG: ICD-10-PCS | Mod: CPTII,S$GLB,, | Performed by: FAMILY MEDICINE

## 2022-12-12 PROCEDURE — 1160F PR REVIEW ALL MEDS BY PRESCRIBER/CLIN PHARMACIST DOCUMENTED: ICD-10-PCS | Mod: CPTII,S$GLB,, | Performed by: FAMILY MEDICINE

## 2022-12-12 PROCEDURE — 3078F DIAST BP <80 MM HG: CPT | Mod: CPTII,S$GLB,, | Performed by: FAMILY MEDICINE

## 2022-12-12 PROCEDURE — 1159F PR MEDICATION LIST DOCUMENTED IN MEDICAL RECORD: ICD-10-PCS | Mod: CPTII,S$GLB,, | Performed by: FAMILY MEDICINE

## 2022-12-12 PROCEDURE — 3074F SYST BP LT 130 MM HG: CPT | Mod: CPTII,S$GLB,, | Performed by: FAMILY MEDICINE

## 2022-12-12 RX ORDER — ALPRAZOLAM 1 MG/1
1 TABLET ORAL DAILY PRN
Qty: 90 TABLET | Refills: 0 | Status: SHIPPED | OUTPATIENT
Start: 2022-12-12 | End: 2023-05-22

## 2022-12-12 NOTE — PROGRESS NOTES
Subjective:   Patient ID: Alverto Ramirez Jr. is a 72 y.o. male.  Chief Complaint:  Follow-up    Presents for 3 month follow-up on insomnia and anxiety  [facility]     Medical history:  - End-stage renal disease.  Underwent successful transplant March 2022. Recent renal function panel stable. Up-to-date visit with Nephrology ind Transplant.  Reports compliance with recommended medication regimen.  - Hypertension.  Controlled on Procardia 60 mg twice a day Lopressor 50 mg twice a day.  Reports compliance.  Denies side effects.  No shortness of breath or swelling.  - Mixed hyperlipidemia.  Currently not on any medications.  Last lipid panel with LDL 84. Denies chest pain or claudication.  - Erectile dysfunction.  Stable with symptoms controlled on takes Viagra daily as needed.     Anxiety follow-up  On  Xanax 1 mg daily as needed.    Reviewed prescription monitoring program which shows 30 pills last about 3 months.    However, upon further questioning takes usually every day but then goes 6 weeks or more without medication until the comes in for his refills   He would rather have a larger amount of pills to avoid running out prior to his visits  No suspicion for abusive or diversion behaviors.  Effective with symptoms controlled on present medication and dose.   Denies any side effects.      Tolerating current treatment well.   Happy with medication.    Wants to continue.  No behaviors to suggest inappropriate use of prescribed medications.  [place] Board of Pharmacy Controlled Prescription Drug Monitoring database was queried and showed no activity to suggest abuse, diversion, or other inappropriate use of prescription medications.      Insomnia follow-up  On trazodone 50 mg nightly  Reports majority of nights needs to take 2 tablets per 100 mg dose you have best effect  Denies any side effects.      Tolerating current treatment well.   Happy with medication.    Wants to  "continue.     Last visit treated with Bactroban for nasal folliculitis   Still reports some obstructive-type symptoms right greater than left nasal passage    Finally wants 2nd opinion regarding possible cyst on his left lower anterior leg and if anything should be done for it    Review of Systems   Constitutional:  Negative for activity change, appetite change, chills, diaphoresis, fatigue and fever.   Eyes:  Negative for visual disturbance.   Respiratory:  Negative for cough, chest tightness and shortness of breath.    Cardiovascular:  Negative for chest pain, palpitations and leg swelling.   Gastrointestinal:  Negative for abdominal pain, constipation, diarrhea, nausea and vomiting.   Endocrine: Negative for cold intolerance and heat intolerance.   Genitourinary:  Negative for difficulty urinating.   Musculoskeletal:  Negative for arthralgias, myalgias and neck pain.   Skin:  Negative for rash.   Neurological:  Negative for dizziness, tremors, syncope, weakness, light-headedness, numbness and headaches.   Hematological:  Does not bruise/bleed easily.   Psychiatric/Behavioral:  Negative for agitation, behavioral problems, confusion, decreased concentration, dysphoric mood, hallucinations, self-injury, sleep disturbance and suicidal ideas. The patient is not nervous/anxious and is not hyperactive.      Objective:   /78 (BP Location: Right arm, Patient Position: Sitting, BP Method: Large (Manual))   Pulse 93   Ht 5' 7.5" (1.715 m)   Wt 88.5 kg (195 lb 1.7 oz)   SpO2 97%   BMI 30.11 kg/m²     Physical Exam  Vitals and nursing note reviewed.   Constitutional:       Appearance: Normal appearance. He is well-developed. He is obese.   HENT:      Nose:      Right Turbinates: Enlarged, swollen and pale.      Left Turbinates: Enlarged, swollen and pale.      Right Sinus: No maxillary sinus tenderness.      Left Sinus: No maxillary sinus tenderness or frontal sinus tenderness.   Cardiovascular:      Rate and " Rhythm: Normal rate and regular rhythm.   Pulmonary:      Effort: Pulmonary effort is normal.   Musculoskeletal:      Right lower leg: No edema.      Left lower leg: No edema.   Skin:     Findings: No rash.      Comments:   Left mid anterior tibial area with firm easily mobile cyst versus lipoma and subcutaneous tissue  Not red, warm, or tender   Neurological:      Mental Status: He is alert and oriented to person, place, and time.      Coordination: Coordination is intact.      Gait: Gait is intact.   Psychiatric:         Attention and Perception: Attention normal. He is attentive.         Mood and Affect: Mood and affect normal. Mood is not anxious or depressed.         Speech: Speech normal.         Behavior: Behavior normal. Behavior is cooperative.         Thought Content: Thought content normal.         Cognition and Memory: Cognition normal.         Judgment: Judgment normal.       Assessment:       ICD-10-CM ICD-9-CM   1. Anxiety  F41.9 300.00   2. Psychophysiological insomnia  F51.04 307.42   3. Hypertrophy of both inferior nasal turbinates  J34.3 478.0   4. Sebaceous cyst  L72.3 706.2     Plan:   Anxiety  Psychophysiological insomnia  -     ALPRAZolam (XANAX) 1 MG tablet; Take 1 tablet (1 mg total) by mouth daily as needed for Anxiety or Insomnia.  Dispense: 90 tablet; Refill: 0  Stable, no side effects, mood and symptoms well controlled on present medication .  Continue trazodone 50 mg 1-2 tablets nightly   Continue Xanax 1 mg daily as needed    Hypertrophy of both inferior nasal turbinates  Offered referral to ENT to consider possible reduction procedure   Patient declines at this time     Sebaceous cyst  Rediscussed unless rapid enlargement, tenderness, swelling, or redness okay to defer any treatment or excision at this time     Follow up with any/all specialists scheduled    Return to clinic 3 months or sooner if needed

## 2022-12-13 ENCOUNTER — OFFICE VISIT (OUTPATIENT)
Dept: OPHTHALMOLOGY | Facility: CLINIC | Age: 72
End: 2022-12-13
Payer: MEDICARE

## 2022-12-13 DIAGNOSIS — H52.4 HYPEROPIA WITH ASTIGMATISM AND PRESBYOPIA, BILATERAL: ICD-10-CM

## 2022-12-13 DIAGNOSIS — H52.03 HYPEROPIA WITH ASTIGMATISM AND PRESBYOPIA, BILATERAL: ICD-10-CM

## 2022-12-13 DIAGNOSIS — H52.203 HYPEROPIA WITH ASTIGMATISM AND PRESBYOPIA, BILATERAL: ICD-10-CM

## 2022-12-13 DIAGNOSIS — H04.201 RIGHT EPIPHORA: ICD-10-CM

## 2022-12-13 DIAGNOSIS — Z83.511 FAMILY HISTORY OF GLAUCOMA IN MOTHER: ICD-10-CM

## 2022-12-13 DIAGNOSIS — H25.13 NUCLEAR SCLEROSIS, BILATERAL: Primary | ICD-10-CM

## 2022-12-13 PROCEDURE — 1159F PR MEDICATION LIST DOCUMENTED IN MEDICAL RECORD: ICD-10-PCS | Mod: CPTII,S$GLB,, | Performed by: OPTOMETRIST

## 2022-12-13 PROCEDURE — 1160F PR REVIEW ALL MEDS BY PRESCRIBER/CLIN PHARMACIST DOCUMENTED: ICD-10-PCS | Mod: CPTII,S$GLB,, | Performed by: OPTOMETRIST

## 2022-12-13 PROCEDURE — 1159F MED LIST DOCD IN RCRD: CPT | Mod: CPTII,S$GLB,, | Performed by: OPTOMETRIST

## 2022-12-13 PROCEDURE — 99999 PR PBB SHADOW E&M-EST. PATIENT-LVL III: ICD-10-PCS | Mod: PBBFAC,,, | Performed by: OPTOMETRIST

## 2022-12-13 PROCEDURE — 99999 PR PBB SHADOW E&M-EST. PATIENT-LVL III: CPT | Mod: PBBFAC,,, | Performed by: OPTOMETRIST

## 2022-12-13 PROCEDURE — 92015 DETERMINE REFRACTIVE STATE: CPT | Mod: S$GLB,,, | Performed by: OPTOMETRIST

## 2022-12-13 PROCEDURE — 1126F PR PAIN SEVERITY QUANTIFIED, NO PAIN PRESENT: ICD-10-PCS | Mod: CPTII,S$GLB,, | Performed by: OPTOMETRIST

## 2022-12-13 PROCEDURE — 92014 COMPRE OPH EXAM EST PT 1/>: CPT | Mod: S$GLB,,, | Performed by: OPTOMETRIST

## 2022-12-13 PROCEDURE — 92014 PR EYE EXAM, EST PATIENT,COMPREHESV: ICD-10-PCS | Mod: S$GLB,,, | Performed by: OPTOMETRIST

## 2022-12-13 PROCEDURE — 92015 PR REFRACTION: ICD-10-PCS | Mod: S$GLB,,, | Performed by: OPTOMETRIST

## 2022-12-13 PROCEDURE — 3066F PR DOCUMENTATION OF TREATMENT FOR NEPHROPATHY: ICD-10-PCS | Mod: CPTII,S$GLB,, | Performed by: OPTOMETRIST

## 2022-12-13 PROCEDURE — 3066F NEPHROPATHY DOC TX: CPT | Mod: CPTII,S$GLB,, | Performed by: OPTOMETRIST

## 2022-12-13 PROCEDURE — 1160F RVW MEDS BY RX/DR IN RCRD: CPT | Mod: CPTII,S$GLB,, | Performed by: OPTOMETRIST

## 2022-12-13 PROCEDURE — 1126F AMNT PAIN NOTED NONE PRSNT: CPT | Mod: CPTII,S$GLB,, | Performed by: OPTOMETRIST

## 2022-12-13 NOTE — PROGRESS NOTES
HPI     Annual Exam            Comments: Patient here for routine eye exam           Comments    Vision changes since last eye exam?: No     Any eye pain today: No    Other ocular symptoms: Watery OD    Interested in contact lens fitting today? No    Patient states he had a kidney transplant in March and has been on   steroids since then.              Last edited by Chanelle Hilton MA on 12/13/2022  9:04 AM.            Assessment /Plan     For exam results, see Encounter Report.    Nuclear sclerosis, bilateral  Cataracts not significantly affecting activities of daily living and therefore surgery is not indicated at this time.   Will continue to monitor over the next 12 months. Pt to call or RTC with any significant change in vision prior to next visit.     Right epiphora  Lastacaft trial, coupon given   Pt to trial drops for 1 month   If not improved, consider referral for D&I    Family history of glaucoma in mother  IOP stable OU   Baseline gOCT stable OD, slight thinning OS on GCL compared to OD  No evidence of the disease at this time   Monitor 6 months     Hyperopia with astigmatism and presbyopia, bilateral  Eyeglass Final Rx       Eyeglass Final Rx         Sphere Cylinder Axis Add    Right +0.75 +1.75 105 +2.50    Left +1.00 +1.50 090 +2.50      Expiration Date: 12/13/2023                  Pt on Steroids from Kidney Transplant March 2022    RTC 6 months for 24-2 HVF, Pach and IOP check or PRN if any problems.   Discussed above and answered questions.

## 2022-12-19 ENCOUNTER — LAB VISIT (OUTPATIENT)
Dept: LAB | Facility: HOSPITAL | Age: 72
End: 2022-12-19
Attending: INTERNAL MEDICINE
Payer: MEDICARE

## 2022-12-19 DIAGNOSIS — Z94.0 KIDNEY REPLACED BY TRANSPLANT: ICD-10-CM

## 2022-12-19 LAB
ALBUMIN SERPL BCP-MCNC: 4 G/DL (ref 3.5–5.2)
ALBUMIN SERPL BCP-MCNC: 4 G/DL (ref 3.5–5.2)
ALP SERPL-CCNC: 119 U/L (ref 55–135)
ALT SERPL W/O P-5'-P-CCNC: 25 U/L (ref 10–44)
ANION GAP SERPL CALC-SCNC: 12 MMOL/L (ref 8–16)
AST SERPL-CCNC: 16 U/L (ref 10–40)
BASOPHILS # BLD AUTO: 0.03 K/UL (ref 0–0.2)
BASOPHILS NFR BLD: 0.5 % (ref 0–1.9)
BILIRUB DIRECT SERPL-MCNC: 0.2 MG/DL (ref 0.1–0.3)
BILIRUB SERPL-MCNC: 0.5 MG/DL (ref 0.1–1)
BUN SERPL-MCNC: 21 MG/DL (ref 8–23)
CALCIUM SERPL-MCNC: 9.4 MG/DL (ref 8.7–10.5)
CHLORIDE SERPL-SCNC: 106 MMOL/L (ref 95–110)
CO2 SERPL-SCNC: 20 MMOL/L (ref 23–29)
CREAT SERPL-MCNC: 1 MG/DL (ref 0.5–1.4)
DIFFERENTIAL METHOD: ABNORMAL
EOSINOPHIL # BLD AUTO: 0.1 K/UL (ref 0–0.5)
EOSINOPHIL NFR BLD: 2.4 % (ref 0–8)
ERYTHROCYTE [DISTWIDTH] IN BLOOD BY AUTOMATED COUNT: 14.6 % (ref 11.5–14.5)
EST. GFR  (NO RACE VARIABLE): >60 ML/MIN/1.73 M^2
GLUCOSE SERPL-MCNC: 89 MG/DL (ref 70–110)
HCT VFR BLD AUTO: 46.8 % (ref 40–54)
HGB BLD-MCNC: 15 G/DL (ref 14–18)
IMM GRANULOCYTES # BLD AUTO: 0.06 K/UL (ref 0–0.04)
IMM GRANULOCYTES NFR BLD AUTO: 1 % (ref 0–0.5)
LYMPHOCYTES # BLD AUTO: 1.1 K/UL (ref 1–4.8)
LYMPHOCYTES NFR BLD: 18.9 % (ref 18–48)
MAGNESIUM SERPL-MCNC: 1.7 MG/DL (ref 1.6–2.6)
MCH RBC QN AUTO: 28 PG (ref 27–31)
MCHC RBC AUTO-ENTMCNC: 32.1 G/DL (ref 32–36)
MCV RBC AUTO: 87 FL (ref 82–98)
MONOCYTES # BLD AUTO: 0.9 K/UL (ref 0.3–1)
MONOCYTES NFR BLD: 15.7 % (ref 4–15)
NEUTROPHILS # BLD AUTO: 3.6 K/UL (ref 1.8–7.7)
NEUTROPHILS NFR BLD: 61.5 % (ref 38–73)
NRBC BLD-RTO: 0 /100 WBC
PHOSPHATE SERPL-MCNC: 2.5 MG/DL (ref 2.7–4.5)
PLATELET # BLD AUTO: 207 K/UL (ref 150–450)
PMV BLD AUTO: 11.7 FL (ref 9.2–12.9)
POTASSIUM SERPL-SCNC: 3.7 MMOL/L (ref 3.5–5.1)
PROT SERPL-MCNC: 7 G/DL (ref 6–8.4)
RBC # BLD AUTO: 5.36 M/UL (ref 4.6–6.2)
SODIUM SERPL-SCNC: 138 MMOL/L (ref 136–145)
WBC # BLD AUTO: 5.87 K/UL (ref 3.9–12.7)

## 2022-12-19 PROCEDURE — 83735 ASSAY OF MAGNESIUM: CPT | Performed by: INTERNAL MEDICINE

## 2022-12-19 PROCEDURE — 85025 COMPLETE CBC W/AUTO DIFF WBC: CPT | Performed by: INTERNAL MEDICINE

## 2022-12-19 PROCEDURE — 80069 RENAL FUNCTION PANEL: CPT | Performed by: INTERNAL MEDICINE

## 2022-12-19 PROCEDURE — 36415 COLL VENOUS BLD VENIPUNCTURE: CPT | Performed by: INTERNAL MEDICINE

## 2022-12-19 PROCEDURE — 84155 ASSAY OF PROTEIN SERUM: CPT | Performed by: INTERNAL MEDICINE

## 2022-12-19 PROCEDURE — 84075 ASSAY ALKALINE PHOSPHATASE: CPT | Performed by: INTERNAL MEDICINE

## 2022-12-19 PROCEDURE — 80197 ASSAY OF TACROLIMUS: CPT | Performed by: INTERNAL MEDICINE

## 2022-12-19 PROCEDURE — 87799 DETECT AGENT NOS DNA QUANT: CPT | Performed by: INTERNAL MEDICINE

## 2022-12-20 LAB — TACROLIMUS BLD-MCNC: 7.2 NG/ML (ref 5–15)

## 2022-12-22 DIAGNOSIS — Z94.0 S/P KIDNEY TRANSPLANT: ICD-10-CM

## 2022-12-22 LAB
CYTOMEGALOVIRUS LOG (IU/ML): 1.96 LOGIU/ML
CYTOMEGALOVIRUS PCR, QUANT: 92 IU/ML

## 2022-12-22 NOTE — PROGRESS NOTES
Continue with weekly serum cmv pcr for now   Lower MMF to 750 bid  Does he have any symptoms of a flu like illness?

## 2022-12-22 NOTE — TELEPHONE ENCOUNTER
Spoke with patient regarding below. Patient denies any symptoms. Cellcept dose to 750 mg BID. Labs scheduled. ----- Message from Deny Rosario MD sent at 12/22/2022  9:25 AM CST -----  Continue with weekly serum cmv pcr for now   Lower MMF to 750 bid  Does he have any symptoms of a flu like illness?

## 2022-12-27 RX ORDER — MYCOPHENOLATE MOFETIL 250 MG/1
750 CAPSULE ORAL 2 TIMES DAILY
Qty: 240 CAPSULE | Refills: 11 | Status: SHIPPED | OUTPATIENT
Start: 2022-12-27 | End: 2023-03-20

## 2022-12-29 ENCOUNTER — LAB VISIT (OUTPATIENT)
Dept: LAB | Facility: HOSPITAL | Age: 72
End: 2022-12-29
Attending: NURSE PRACTITIONER
Payer: MEDICARE

## 2022-12-29 DIAGNOSIS — Z94.0 KIDNEY REPLACED BY TRANSPLANT: ICD-10-CM

## 2022-12-29 PROCEDURE — 36415 COLL VENOUS BLD VENIPUNCTURE: CPT | Performed by: INTERNAL MEDICINE

## 2022-12-30 ENCOUNTER — OFFICE VISIT (OUTPATIENT)
Dept: TRANSPLANT | Facility: CLINIC | Age: 72
End: 2022-12-30
Payer: MEDICARE

## 2022-12-30 VITALS
OXYGEN SATURATION: 96 % | SYSTOLIC BLOOD PRESSURE: 157 MMHG | RESPIRATION RATE: 16 BRPM | WEIGHT: 186.75 LBS | DIASTOLIC BLOOD PRESSURE: 76 MMHG | HEIGHT: 68 IN | BODY MASS INDEX: 28.3 KG/M2 | TEMPERATURE: 97 F | HEART RATE: 68 BPM

## 2022-12-30 DIAGNOSIS — Z94.0 IMMUNOSUPPRESSIVE MANAGEMENT ENCOUNTER FOLLOWING KIDNEY TRANSPLANT: ICD-10-CM

## 2022-12-30 DIAGNOSIS — Z94.0 DECEASED-DONOR KIDNEY TRANSPLANT: ICD-10-CM

## 2022-12-30 DIAGNOSIS — I12.9 RENAL HYPERTENSION: ICD-10-CM

## 2022-12-30 DIAGNOSIS — Z79.899 IMMUNOSUPPRESSIVE MANAGEMENT ENCOUNTER FOLLOWING KIDNEY TRANSPLANT: ICD-10-CM

## 2022-12-30 DIAGNOSIS — N18.2 CHRONIC KIDNEY DISEASE (CKD), STAGE II (MILD): Primary | ICD-10-CM

## 2022-12-30 DIAGNOSIS — Z91.89 AT RISK FOR OPPORTUNISTIC INFECTIONS: ICD-10-CM

## 2022-12-30 LAB
CMV DNA SPEC QL NAA+PROBE: DETECTED
CYTOMEGALOVIRUS LOG (IU/ML): <1.7 LOGIU/ML
CYTOMEGALOVIRUS PCR, QUANT: <50 IU/ML

## 2022-12-30 PROCEDURE — 1126F AMNT PAIN NOTED NONE PRSNT: CPT | Mod: CPTII,S$GLB,, | Performed by: INTERNAL MEDICINE

## 2022-12-30 PROCEDURE — 99215 PR OFFICE/OUTPT VISIT, EST, LEVL V, 40-54 MIN: ICD-10-PCS | Mod: S$GLB,,, | Performed by: INTERNAL MEDICINE

## 2022-12-30 PROCEDURE — 1101F PT FALLS ASSESS-DOCD LE1/YR: CPT | Mod: CPTII,S$GLB,, | Performed by: INTERNAL MEDICINE

## 2022-12-30 PROCEDURE — 3078F DIAST BP <80 MM HG: CPT | Mod: CPTII,S$GLB,, | Performed by: INTERNAL MEDICINE

## 2022-12-30 PROCEDURE — 3078F PR MOST RECENT DIASTOLIC BLOOD PRESSURE < 80 MM HG: ICD-10-PCS | Mod: CPTII,S$GLB,, | Performed by: INTERNAL MEDICINE

## 2022-12-30 PROCEDURE — 3008F PR BODY MASS INDEX (BMI) DOCUMENTED: ICD-10-PCS | Mod: CPTII,S$GLB,, | Performed by: INTERNAL MEDICINE

## 2022-12-30 PROCEDURE — 3066F NEPHROPATHY DOC TX: CPT | Mod: CPTII,S$GLB,, | Performed by: INTERNAL MEDICINE

## 2022-12-30 PROCEDURE — 3066F PR DOCUMENTATION OF TREATMENT FOR NEPHROPATHY: ICD-10-PCS | Mod: CPTII,S$GLB,, | Performed by: INTERNAL MEDICINE

## 2022-12-30 PROCEDURE — 1160F PR REVIEW ALL MEDS BY PRESCRIBER/CLIN PHARMACIST DOCUMENTED: ICD-10-PCS | Mod: CPTII,S$GLB,, | Performed by: INTERNAL MEDICINE

## 2022-12-30 PROCEDURE — 99999 PR PBB SHADOW E&M-EST. PATIENT-LVL V: ICD-10-PCS | Mod: PBBFAC,,, | Performed by: INTERNAL MEDICINE

## 2022-12-30 PROCEDURE — 99215 OFFICE O/P EST HI 40 MIN: CPT | Mod: S$GLB,,, | Performed by: INTERNAL MEDICINE

## 2022-12-30 PROCEDURE — 99999 PR PBB SHADOW E&M-EST. PATIENT-LVL V: CPT | Mod: PBBFAC,,, | Performed by: INTERNAL MEDICINE

## 2022-12-30 PROCEDURE — 3077F SYST BP >= 140 MM HG: CPT | Mod: CPTII,S$GLB,, | Performed by: INTERNAL MEDICINE

## 2022-12-30 PROCEDURE — 1159F PR MEDICATION LIST DOCUMENTED IN MEDICAL RECORD: ICD-10-PCS | Mod: CPTII,S$GLB,, | Performed by: INTERNAL MEDICINE

## 2022-12-30 PROCEDURE — 1159F MED LIST DOCD IN RCRD: CPT | Mod: CPTII,S$GLB,, | Performed by: INTERNAL MEDICINE

## 2022-12-30 PROCEDURE — 3077F PR MOST RECENT SYSTOLIC BLOOD PRESSURE >= 140 MM HG: ICD-10-PCS | Mod: CPTII,S$GLB,, | Performed by: INTERNAL MEDICINE

## 2022-12-30 PROCEDURE — 3008F BODY MASS INDEX DOCD: CPT | Mod: CPTII,S$GLB,, | Performed by: INTERNAL MEDICINE

## 2022-12-30 PROCEDURE — 1160F RVW MEDS BY RX/DR IN RCRD: CPT | Mod: CPTII,S$GLB,, | Performed by: INTERNAL MEDICINE

## 2022-12-30 PROCEDURE — 3288F FALL RISK ASSESSMENT DOCD: CPT | Mod: CPTII,S$GLB,, | Performed by: INTERNAL MEDICINE

## 2022-12-30 PROCEDURE — 1126F PR PAIN SEVERITY QUANTIFIED, NO PAIN PRESENT: ICD-10-PCS | Mod: CPTII,S$GLB,, | Performed by: INTERNAL MEDICINE

## 2022-12-30 PROCEDURE — 1101F PR PT FALLS ASSESS DOC 0-1 FALLS W/OUT INJ PAST YR: ICD-10-PCS | Mod: CPTII,S$GLB,, | Performed by: INTERNAL MEDICINE

## 2022-12-30 PROCEDURE — 3288F PR FALLS RISK ASSESSMENT DOCUMENTED: ICD-10-PCS | Mod: CPTII,S$GLB,, | Performed by: INTERNAL MEDICINE

## 2022-12-30 NOTE — PROGRESS NOTES
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    HPI:  Mr. Ramirez is a 72 y.o. 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  He has CKD stage 2 - GFR 60-89 and his baseline creatinine is between 1-1.2. He takes mycophenolate mofetil, prednisone, and tacrolimus for maintenance immunosuppression.      POST TRANSPLANT UPDATE 12/30/2022   Alverto feels well and has no complaints. He notes rare twinge of pain overlying allograft that resolves quickly. He is concerned about CMV test that has been ordered.  He denies recent health changes, CP, SOB, GI issues, edema, LUTS.  HTN: he is not sure of home BPs, but thinks they are running 140/80s. He is aware to record .  Prompted by my ROS, he inquired about treatment of ED since "blue pill" was not helping.  He reports seeing nephrology regularly.    Current Outpatient Medications   Medication Sig    ALPRAZolam (XANAX) 1 MG tablet Take 1 tablet (1 mg total) by mouth daily as needed for Anxiety or Insomnia.    calcitRIOL (ROCALTROL) 0.25 MCG Cap Take 1 capsule (0.25 mcg total) by mouth once daily.    cholecalciferol, vitamin D3, (VITAMIN D3) 50 mcg (2,000 unit) Tab Take 1 tablet (2,000 Units total) by mouth once daily.    k phos di & mono-sod phos mono (K-PHOS-NEUTRAL) 250 mg Tab Take 1 tablet by mouth 2 (two) times a day.    magnesium oxide (MAG-OX) 400 mg (241.3 mg magnesium) tablet Take 1 tablet (400 mg total) by mouth 2 (two) times daily.    metoprolol succinate (TOPROL-XL) 50 MG 24 hr tablet Take 50 mg by mouth 2 (two) times daily.    mupirocin (BACTROBAN) 2 % ointment by Nasal route " "once daily. (Patient taking differently: by Nasal route daily as needed.)    mycophenolate (CELLCEPT) 250 mg Cap Take 3 capsules (750 mg total) by mouth 2 (two) times daily.    NIFEdipine (PROCARDIA-XL) 60 MG (OSM) 24 hr tablet Take 1 tablet (60 mg total) by mouth 2 (two) times a day.    predniSONE (DELTASONE) 5 MG tablet Take by mouth daily: 20mg 3/22/22 -4/21, 15mg 4/22-5/22, 10mg 5/23-6/22, 5mg 6/23- thereafter    sildenafiL (VIAGRA) 100 MG tablet Take 1 tablet (100 mg total) by mouth daily as needed for Erectile Dysfunction.    sodium bicarbonate 650 MG tablet Take 3 tablets (1,950 mg total) by mouth 2 (two) times daily.    tacrolimus (PROGRAF) 1 MG Cap Take 4 capsules (4 mg total) by mouth every morning AND 4 capsules (4 mg total) every evening.    traZODone (DESYREL) 50 MG tablet Take 1-2 tablets ( mg total) by mouth every evening.     No current facility-administered medications for this visit.       Review of Systems   Constitutional: Negative.    HENT: Negative.     Respiratory:  Negative for shortness of breath.    Cardiovascular:  Negative for chest pain and leg swelling.   Gastrointestinal:  Negative for abdominal pain, nausea and vomiting.   Genitourinary:  Negative for difficulty urinating.   Skin:  Negative for rash.   Allergic/Immunologic: Positive for immunocompromised state.     Objective:     Blood pressure (!) 157/76, pulse 68, temperature 97.2 °F (36.2 °C), temperature source Temporal, resp. rate 16, height 5' 7.5" (1.715 m), weight 84.7 kg (186 lb 11.7 oz), SpO2 96 %.body mass index is 28.81 kg/m².    Physical Exam  Constitutional:       Appearance: He is well-developed.   HENT:      Head: Normocephalic.   Eyes:      Conjunctiva/sclera: Conjunctivae normal.   Cardiovascular:      Rate and Rhythm: Normal rate and regular rhythm.   Pulmonary:      Effort: Pulmonary effort is normal.      Breath sounds: Normal breath sounds.   Abdominal:      Palpations: Abdomen is soft. There is no mass.    "   Tenderness: There is no abdominal tenderness.   Skin:     General: Skin is dry.      Findings: No rash.   Neurological:      Mental Status: He is alert and oriented to person, place, and time.   Psychiatric:         Judgment: Judgment normal.       Labs:  Recent Labs   Lab 05/04/21  0658 06/21/21  0955 03/18/22  1942 03/18/22 2004 06/07/22  0800 06/14/22  0820 06/14/22  0831 07/14/22  0808 08/12/22  0820 09/02/22  0824 10/03/22  0835 10/03/22  0838 11/11/22  0914 11/11/22  0928 12/19/22  0830 12/19/22  0837   WBC  --    < > 7.97   < > 6.76  --  6.86 4.01   < > 3.64 L  --  4.88 5.63  --   --  5.87   Hemoglobin  --    < > 10.5 L   < > 14.5  --  13.4 L 14.0   < > 13.5 L  --  14.6 15.2  --   --  15.0   POC Hematocrit  --   --   --    < >  --   --   --   --   --   --   --   --   --   --   --   --    Hematocrit  --    < > 32.8 L   < > 45.4  --  42.2 44.1   < > 42.9  --  45.6 48.5  --   --  46.8   Sodium  --    < > 136   < > 137  --  133 L 135 L   < > 139  --  138 140  140  --   --  138   Potassium  --    < > 3.5   < > 4.1  --  3.8 4.1   < > 3.8  --  3.8 3.7  3.7  --   --  3.7   Chloride  --    < > 94 L   < > 107  --  105 106   < > 110  --  108 109  109  --   --  106   CO2  --    < > 24   < > 19 L  --  18 L 18 L   < > 19 L  --  19 L 18 L  18 L  --   --  20 L   BUN  --    < > 56 H   < > 15  --  21 17   < > 14  --  15 13  13  --   --  21   Creatinine  --    < > 16.0 H   < > 1.3  --  1.4 1.3   < > 1.2  --  1.2 1.2  1.2  --   --  1.0   eGFR if non   --    < > 2.6 A   < > 54.1 A  --  49.5 A 54.1 A  --   --   --   --   --   --   --   --    Calcium  --    < > 8.7   < > 8.7  --  8.4 L 9.1   < > 9.8  --  9.7 10.1  10.1  --   --  9.4   Phosphorus  --   --  6.4 H   < > 2.9  --  3.5 2.2 L   < > 2.7  --  2.5 L 2.6 L  2.6 L  --   --  2.5 L   Magnesium  --   --   --    < > 1.6  --  1.7 1.6   < > 1.7  --  1.6 1.6  1.6  --   --  1.7   Albumin  --    < > 3.2 L   < > 4.0  --  3.9  3.9 3.7  3.7   < > 4.1  4.1   --  4.2  4.2 4.0  4.0  --   --  4.0  4.0   AST  --    < > 13   < >  --   --  22 26   < > 16  --  18  --   --   --  16   ALT  --    < > 20   < >  --   --  50 H 37   < > 26  --  27  --   --   --  25   Prot/Creat Ratio, Urine  --   --   --    < >  --    < >  --   --    < >  --  0.10  --   --  0.11 0.12  --    PTH, Intact 430.0 H  --  728.1 H  --   --   --   --   --   --   --   --   --  132.7 H  --   --   --    Tacrolimus Lvl  --   --   --    < > 7.8  --  7.6 6.6   < > 9.4  --  8.6 7.1  7.1  --   --  7.2    < > = values in this interval not displayed.     Labs were reviewed with the patient.    Assessment:     1. Chronic kidney disease (CKD), stage II (mild)    2. -donor kidney transplant    3. Immunosuppressive management encounter following kidney transplant    4. At risk for opportunistic infections    5. Renal hypertension      Plan:   RTC 3 mos  Await CMV PCR    CKD II post DDKT 3/19/22, doing well  Recent Labs   Lab 22  0800 22  0831 22  0808 22  0830 10/03/22  0838 22  0914 22  0837   Creatinine 1.3 1.4 1.3   < > 1.2 1.2  1.2 1.0   eGFR if non  54.1 A 49.5 A 54.1 A  --   --   --   --    eGFR if  >60.0 57.2 A >60.0  --   --   --   --     < > = values in this interval not displayed.      Screening for proteinuria & urinary abnormalities - no concerns noted  Recent Labs   Lab 22  0928 22  0830   Protein, UA Negative Negative   Glucose, UA Negative Negative   Occult Blood UA Negative Negative   Leukocytes, UA Negative Negative   Prot/Creat Ratio, Urine 0.11 0.12       Encounter for Monitoring Immunosuppression post Transplant  Recent Labs   Lab 10/03/22  0838 22  0914 22  0837   Tacrolimus Lvl 8.6 7.1  7.1 7.2   -Target level for Alverto is 7-9  -Continue MMF + Pred in addition to tacrolimus  -Recheck as per guidelines.  -Monitor for side effects and toxicities, given narrow therapeutic window and significant risk  of AE     Evaluation for bone marrow suppression (r/t immunosuppression toxicity or infection)  Lab Results   Component Value Date    WBC 5.87 12/19/2022    HGB 15.0 12/19/2022    HCT 46.8 12/19/2022     12/19/2022     At Risk for Opportunistic Infections  Recent + CMV PCR (low level)- I explained nature of CMV and that he had already take prophy. Info provided in AVS. No symptoms to suggest infection, thus await PCR result already collected.  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      Renal hypertension  -Encouraged to monitor and emphasized goal is <130/80    Metabolic bone disease [Secondary hyperparathyroidism/SPTH, Phosphorus metabolism disorders]   -Levels acceptable; nep also monitoring  Recent Labs   Lab 05/04/21  0658 06/21/21  0955 03/18/22  1942 03/19/22  0331 10/03/22  0838 11/11/22  0914 12/19/22  0837   Albumin  --    < > 3.2 L   < > 4.2  4.2 4.0  4.0 4.0  4.0   Calcium  --    < > 8.7   < > 9.7 10.1  10.1 9.4   Phosphorus  --   --  6.4 H   < > 2.5 L 2.6 L  2.6 L 2.5 L   Magnesium  --   --   --    < > 1.6 1.6  1.6 1.7   Vit D, 25-Hydroxy  --   --  29 L  --   --  44  --    PTH, Intact 430.0 H  --  728.1 H  --   --  132.7 H  --     < > = values in this interval not displayed.     Erectile dysfunction  -Advised can see Dr. Kevin Salvador if wants to pursue further treatment      Follow-up:   Clinic: return to transplant clinic weekly for the first month after transplant; every 2 weeks during months 2-3; then at 6-, 9-, 12-, 18-, 24-, and 36- months post-transplant to reassess for complications from immunosuppression toxicity and monitor for rejection.  Annually thereafter.    Labs: since patient remains at high risk for rejection and drug-related complications that warrant close monitoring,  labs will be ordered as follows: continue twice weekly CBC, renal panel, and drug level for first month; then same labs once weekly through 3rd month post-transplant.  Urine for UA and protein/creatinine ratio monthly.  Serum BK - PCR at 1-, 3-, 6-, 9-, 12-, 18-, 24-, 36- 48-, and 60 months post-transplant.  Hepatic panel at 1-, 2-, 3-, 6-, 9-, 12-, 18-, 24-, and 36- months post-transplant.    Dilcia Parker MD       [facility] Patient Status  Functional Status: 90% - Able to carry on normal activity: minor symptoms of disease  Physical Capacity: No Limitations

## 2022-12-30 NOTE — LETTER
December 30, 2022        Dilcia Saavedra  [address] [place] [address]  Phone: [phone]  Fax: [phone]             [facility] 1st Fl  [address]  [place] [address]  Phone: [phone]   Patient: Alverto Ramirez Jr.   MR Number: [id number]   YOB: 1950   Date of Visit: 12/30/2022       Dear Dr. Dilcia Saavedra    Thank you for referring Alverto Ramirez to me for evaluation. Attached you will find relevant portions of my assessment and plan of care.    If you have questions, please do not hesitate to call me. I look forward to following Alverto Ramirez along with you.    Sincerely,    Dilcia Parker MD    Enclosure    If you would like to receive this communication electronically, please contact [email or web address] or [phone] to request [facility] access.    [facility] is a tool which provides read-only access to select patient information with whom you have a relationship. Its easy to use and provides real time access to review your patients record including encounter summaries, notes, results, and demographic information.    If you feel you have received this communication in error or would no longer like to receive these types of communications, please e-mail [email or web address]

## 2022-12-30 NOTE — PATIENT INSTRUCTIONS
"Cytomegalovirus   The Basics   Written by the doctors and editors at [facility]   What is cytomegalovirus (CMV)? -- Cytomegalovirus is a virus that can cause a fever, loss of energy, and other symptoms. Doctors also call it "CMV."  CMV is very common. Many people have the virus without knowing it. Some people don't get sick when they have it, but others do.  CMV can cause serious illness in people with conditions that weaken the body's infection-fighting system (called the "immune system"). These conditions include:  An organ transplant, such as a new kidney or lung  A stem cell transplant - Stem cells are special cells that can turn into many different types of cells. For transplant, they can be taken from bone marrow or blood.  HIV - This is the virus that causes AIDS.  Being sick in the hospital for a long time, especially in the intensive care unit (also called the "ICU")  Being born early - Babies who are born early ("premature") are at risk, especially if they are very small.  CMV can be very serious for people with these conditions. It can even be life-threatening.  Babies can also get CMV when they are in their mother's womb. This is called "congenital CMV." Babies with congenital CMV can have serious health problems, such as hearing or vision loss, having a small head and brain, or being smaller than normal.  What are the symptoms of CMV? -- Many people who have the virus do not have symptoms. If symptoms happen, they are usually mild in people with healthy immune systems. People with weak immune systems can have more severe symptoms.  The most common symptom of CMV is an illness called "mononucleosis" or "mono." But CMV is not always the cause of mono. Testing is needed to figure out if CMV is the cause.  Common symptoms of mono from CMV include:  Fever  Loss of energy  Body aches  Sore throat  Swollen glands in the neck - This is more common in children than adults.  Less common CMV symptoms can happen in " specific parts of the body. These symptoms are more likely to happen in people with weak immune systems. They can include:  Digestive system problems, such as diarrhea and belly pain  Lung problems, such as trouble breathing and dry cough  Problems with the liver, eyes, nervous system, or heart  Will I need tests? -- Yes. There are several tests, but you probably will not need all of them. Tests can include:  Blood tests  Tests on a sample of fluid from your lungs or other part of the body  Tests on a sample of tissue from an affected body part  How is CMV treated? -- People with healthy immune systems who get sick with CMV do not usually need treatment. They usually feel better in a few days or weeks. Medicines like ibuprofen (sample brand names: Advil, Motrin) can relieve mild symptoms of CMV. If you take these medicines, be sure to follow the directions on the label.  Doctors can treat severe CMV symptoms with medicines, such as ganciclovir (brand name: Cytovene) or valganciclovir (brand name: Valcyte). These are usually only needed for people with CMV who have weak immune systems. For serious infections, the person might need to stay in the hospital and get their medicine by IV. (An IV is a thin tube that goes into a vein.) For mild infections, the medicines can be taken as a pill.   When a person gets an organ or bone marrow transplant, doctors might give a pill, such as valganciclovir or letermovir (brand name: Prevymis), to help prevent CMV.   What if I am pregnant? -- Tell your doctor or nurse if you have a fever, sore throat, and body aches. They might do a blood test to check for CMV. You might also have the test if you have a condition that weakens your immune system. This is because you can give the CMV infection to your unborn baby.  CMV can cause serious health problems in some  babies, so it is important to know if you have it. That way, doctors can look for problems in the baby and treat them  if they happen. Problems can include:  Hearing loss  Seizures  Eye problems  Problems with learning and growing  Most people don't know if they have CMV or not. But if you are pregnant or trying to get pregnant, it's a good idea to do things that reduce your chances of getting it, just in case. You can:  Avoid kissing toddlers and children on the mouth.  Avoid sharing eating or drinking utensils, drinks, or food with toddlers or young children.  Wash your hands well after changing diapers or wiping a child's nose or face.  Avoid having sex if your partner is sick with a CMV infection. CMV can be spread by sex.  All topics are updated as new evidence becomes available and our peer review process is complete.  This topic retrieved from [employer] on: Sep 21, 2021.  Topic 66178 Version 6.0  Release: 29.4.2 - C29.263  © 2021 UpToDate, Inc. and/or its affiliates. All rights reserved.  Consumer Information Use and Disclaimer   This information is not specific medical advice and does not replace information you receive from your health care provider. This is only a brief summary of general information. It does NOT include all information about conditions, illnesses, injuries, tests, procedures, treatments, therapies, discharge instructions or life-style choices that may apply to you. You must talk with your health care provider for complete information about your health and treatment options. This information should not be used to decide whether or not to accept your health care provider's advice, instructions or recommendations. Only your health care provider has the knowledge and training to provide advice that is right for you. The use of this information is governed by the [employer] End User License Agreement, available at https://www.[employer].Heekya/en/solutions/[employer]/about/debby.The use of [employer] content is governed by the [employer] Terms of Use. ©2021 UpToDate, Inc. All rights reserved.  Copyright   © 2021  [employer] and/or its affiliates. All rights reserved.

## 2023-01-06 ENCOUNTER — LAB VISIT (OUTPATIENT)
Dept: LAB | Facility: HOSPITAL | Age: 73
End: 2023-01-06
Attending: NURSE PRACTITIONER
Payer: MEDICARE

## 2023-01-06 DIAGNOSIS — D63.1 ANEMIA IN ESRD (END-STAGE RENAL DISEASE): ICD-10-CM

## 2023-01-06 DIAGNOSIS — Z99.2 ESRD ON DIALYSIS: ICD-10-CM

## 2023-01-06 DIAGNOSIS — N18.6 ANEMIA IN ESRD (END-STAGE RENAL DISEASE): ICD-10-CM

## 2023-01-06 DIAGNOSIS — D47.2 MONOCLONAL GAMMOPATHY: ICD-10-CM

## 2023-01-06 DIAGNOSIS — Z94.0 KIDNEY REPLACED BY TRANSPLANT: ICD-10-CM

## 2023-01-06 DIAGNOSIS — N18.6 ESRD ON DIALYSIS: ICD-10-CM

## 2023-01-06 LAB
ALBUMIN SERPL BCP-MCNC: 3.9 G/DL (ref 3.5–5.2)
ALP SERPL-CCNC: 99 U/L (ref 55–135)
ALT SERPL W/O P-5'-P-CCNC: 28 U/L (ref 10–44)
ANION GAP SERPL CALC-SCNC: 10 MMOL/L (ref 8–16)
AST SERPL-CCNC: 18 U/L (ref 10–40)
BILIRUB SERPL-MCNC: 0.5 MG/DL (ref 0.1–1)
BUN SERPL-MCNC: 15 MG/DL (ref 8–23)
CALCIUM SERPL-MCNC: 9.4 MG/DL (ref 8.7–10.5)
CHLORIDE SERPL-SCNC: 106 MMOL/L (ref 95–110)
CO2 SERPL-SCNC: 21 MMOL/L (ref 23–29)
CREAT SERPL-MCNC: 1.3 MG/DL (ref 0.5–1.4)
ERYTHROCYTE [DISTWIDTH] IN BLOOD BY AUTOMATED COUNT: 14.5 % (ref 11.5–14.5)
EST. GFR  (NO RACE VARIABLE): 58 ML/MIN/1.73 M^2
GLUCOSE SERPL-MCNC: 122 MG/DL (ref 70–110)
HCT VFR BLD AUTO: 44.4 % (ref 40–54)
HGB BLD-MCNC: 14.6 G/DL (ref 14–18)
IMM GRANULOCYTES # BLD AUTO: 0.04 K/UL (ref 0–0.04)
MCH RBC QN AUTO: 28 PG (ref 27–31)
MCHC RBC AUTO-ENTMCNC: 32.9 G/DL (ref 32–36)
MCV RBC AUTO: 85 FL (ref 82–98)
NEUTROPHILS # BLD AUTO: 3.6 K/UL (ref 1.8–7.7)
PLATELET # BLD AUTO: 193 K/UL (ref 150–450)
PMV BLD AUTO: 9.7 FL (ref 9.2–12.9)
POTASSIUM SERPL-SCNC: 3.9 MMOL/L (ref 3.5–5.1)
PROT SERPL-MCNC: 6.9 G/DL (ref 6–8.4)
RBC # BLD AUTO: 5.22 M/UL (ref 4.6–6.2)
SODIUM SERPL-SCNC: 137 MMOL/L (ref 136–145)
WBC # BLD AUTO: 5.51 K/UL (ref 3.9–12.7)

## 2023-01-06 PROCEDURE — 86334 IMMUNOFIX E-PHORESIS SERUM: CPT | Performed by: NURSE PRACTITIONER

## 2023-01-06 PROCEDURE — 80053 COMPREHEN METABOLIC PANEL: CPT | Performed by: NURSE PRACTITIONER

## 2023-01-06 PROCEDURE — 83521 IG LIGHT CHAINS FREE EACH: CPT | Mod: 59 | Performed by: NURSE PRACTITIONER

## 2023-01-06 PROCEDURE — 85027 COMPLETE CBC AUTOMATED: CPT | Performed by: NURSE PRACTITIONER

## 2023-01-06 PROCEDURE — 84165 PROTEIN E-PHORESIS SERUM: CPT | Performed by: NURSE PRACTITIONER

## 2023-01-06 PROCEDURE — 84165 PROTEIN E-PHORESIS SERUM: CPT | Mod: 26,,, | Performed by: PATHOLOGY

## 2023-01-06 PROCEDURE — 86334 IMMUNOFIX E-PHORESIS SERUM: CPT | Mod: 26,,, | Performed by: PATHOLOGY

## 2023-01-06 PROCEDURE — 86334 PATHOLOGIST INTERPRETATION IFE: ICD-10-PCS | Mod: 26,,, | Performed by: PATHOLOGY

## 2023-01-06 PROCEDURE — 84165 PATHOLOGIST INTERPRETATION SPE: ICD-10-PCS | Mod: 26,,, | Performed by: PATHOLOGY

## 2023-01-06 PROCEDURE — 36415 COLL VENOUS BLD VENIPUNCTURE: CPT | Performed by: NURSE PRACTITIONER

## 2023-01-06 NOTE — PROGRESS NOTES
Subjective:       Patient ID: Alverto Ramirez Jr. is a 72 y.o. male.    Chief Complaint:   1. Monoclonal gammopathy  IgM kappa      2. Anemia in ESRD (end-stage renal disease)        3. ESRD on dialysis          Current Treatment:  Observation     Treatment History:    HPI: This is a 72-year-old  male with medical history significant for afib, anemia secondary to CKD, hypertensive nephropathy, and IgM monoclonal gammopathy of undetermined significance that was diagnosed in 2018 and since has been monitored.       He was on peritoneal dialysis and underwent kidney transplantation on 3/19/2022. He follows with the transplant team in [facility].     Interval History: Patient presents for follow up on labs. He presents with his wife and has no complaints today other than sinus issues and watery right eye. He is currently taking an antibiotic for nasal folliculitis. He states his appetite post transplant is returning to normal. He enjoys not having to go to dialysis three times a week.     Reviewed labs with patient:   CBC:   Recent Labs   Lab 23  1116   WBC 5.51   RBC 5.22   Hemoglobin 14.6   Hematocrit 44.4   Platelets 193   MCV 85   MCH 28.0   MCHC 32.9     CMP:  Recent Labs   Lab 23  1116   Glucose 122 H   Calcium 9.4   Albumin 3.9   Total Protein 6.9   Sodium 137   Potassium 3.9   CO2 21 L   Chloride 106   BUN 15   Creatinine 1.3   Alkaline Phosphatase 99   ALT 28   AST 18   Total Bilirubin 0.5     SPEP demonstrates normal FLC with slightly elevated ratio at 1.75. Interpretation pending at time of visit; however, most recent paraprotein measured 0.13g/dL.     Social History     Socioeconomic History    Marital status:    Occupational History     Employer: retired   Tobacco Use    Smoking status: Former     Packs/day: 1.00     Years: 15.00     Pack years: 15.00     Types: Cigarettes     Quit date: 1984     Years since quittin.1    Smokeless tobacco: Never   Substance and Sexual  Activity    Alcohol use: Yes     Alcohol/week: 5.0 - 7.0 standard drinks     Types: 5 - 7 Standard drinks or equivalent per week     Comment: stopped drinking    Drug use: No    Sexual activity: Yes     Partners: Female   Social History Narrative    Retired from [employer]     for 41 y.o.    2 children    No history of blood transfusions    No donors     Past Medical History:   Diagnosis Date    Anemia in CKD (chronic kidney disease)     Atrial fibrillation     CKD (chronic kidney disease) stage 4, GFR 15 11/26/2014    Colon cancer screening 12/19/2014    Elevated PSA 11/26/2014    HTN (hypertension) diagnosed in his 40s 10/16/2014    Monoclonal gammopathy 11/26/2014    Phobic anxiety disorder 11/26/2014    Proteinuria 11/26/2014    Stage 3a chronic kidney disease 3/25/2022     Family History   Problem Relation Age of Onset    Heart disease Father     Dementia Father     Diabetes Mother     Cataracts Mother     Glaucoma Mother     Hypertension Sister     Cancer Brother         prostate    Kidney disease Sister         ESRD    Cancer Paternal Uncle      Past Surgical History:   Procedure Laterality Date    AV FISTULA PLACEMENT  11/2014    COLONOSCOPY N/A 12/29/2017    Procedure: COLONOSCOPY;  Surgeon: Tony Peacock III, MD;  Location: [facility];  Service: Endoscopy;  Laterality: N/A;    KIDNEY TRANSPLANT Right 3/18/2022    Procedure: TRANSPLANT, KIDNEY;  Surgeon: Victoriano Thomas MD;  Location: [address];  Service: Transplant;  Laterality: Right;    PERITONEAL CATHETER INSERTION      VASECTOMY      VASECTOMY       Review of Systems   Constitutional:  Negative for appetite change and fatigue.   HENT:  Negative for mouth sores, rhinorrhea and sore throat.    Eyes: Negative.    Respiratory: Negative.     Cardiovascular: Negative.    Gastrointestinal:  Negative for constipation, diarrhea, nausea and vomiting.   Endocrine: Negative.    Genitourinary: Negative.    Musculoskeletal: Negative.     Integumentary:  Negative.   Allergic/Immunologic: Negative.    Neurological:  Negative for weakness and numbness.   Hematological: Negative.    Psychiatric/Behavioral: Negative.         Medication List with Changes/Refills   Current Medications    ALPRAZOLAM (XANAX) 1 MG TABLET    Take 1 tablet (1 mg total) by mouth daily as needed for Anxiety or Insomnia.    CALCITRIOL (ROCALTROL) 0.25 MCG CAP    Take 1 capsule (0.25 mcg total) by mouth once daily.    CHOLECALCIFEROL, VITAMIN D3, (VITAMIN D3) 50 MCG (2,000 UNIT) TAB    Take 1 tablet (2,000 Units total) by mouth once daily.    K PHOS DI & MONO-SOD PHOS MONO (K-PHOS-NEUTRAL) 250 MG TAB    Take 1 tablet by mouth 2 (two) times a day.    MAGNESIUM OXIDE (MAG-OX) 400 MG (241.3 MG MAGNESIUM) TABLET    Take 1 tablet (400 mg total) by mouth 2 (two) times daily.    METOPROLOL SUCCINATE (TOPROL-XL) 50 MG 24 HR TABLET    Take 50 mg by mouth 2 (two) times daily.    MUPIROCIN (BACTROBAN) 2 % OINTMENT    by Nasal route once daily.    MYCOPHENOLATE (CELLCEPT) 250 MG CAP    Take 3 capsules (750 mg total) by mouth 2 (two) times daily.    NIFEDIPINE (PROCARDIA-XL) 60 MG (OSM) 24 HR TABLET    Take 1 tablet (60 mg total) by mouth 2 (two) times a day.    PREDNISONE (DELTASONE) 5 MG TABLET    Take by mouth daily: 20mg 3/22/22 -4/21, 15mg 4/22-5/22, 10mg 5/23-6/22, 5mg 6/23- thereafter    SILDENAFIL (VIAGRA) 100 MG TABLET    Take 1 tablet (100 mg total) by mouth daily as needed for Erectile Dysfunction.    SODIUM BICARBONATE 650 MG TABLET    Take 3 tablets (1,950 mg total) by mouth 2 (two) times daily.    TACROLIMUS (PROGRAF) 1 MG CAP    Take 4 capsules (4 mg total) by mouth every morning AND 4 capsules (4 mg total) every evening.    TRAZODONE (DESYREL) 50 MG TABLET    Take 1-2 tablets ( mg total) by mouth every evening.     Objective:     Vitals:    01/10/23 1401   BP: 138/78   Pulse: 71   Temp: 97.8 °F (36.6 °C)     Physical Exam  Vitals reviewed.   Constitutional:        Appearance: Normal appearance.   HENT:      Head: Normocephalic.      Mouth/Throat:      Comments: Wearing mask    Eyes:      Extraocular Movements: Extraocular movements intact.      Pupils: Pupils are equal, round, and reactive to light.   Cardiovascular:      Rate and Rhythm: Normal rate and regular rhythm.      Heart sounds: Normal heart sounds.   Pulmonary:      Effort: Pulmonary effort is normal.      Breath sounds: Normal breath sounds.   Abdominal:      General: Bowel sounds are normal.      Palpations: Abdomen is soft.      Comments: rounded     Genitourinary:     Comments: deferred    Musculoskeletal:         General: Normal range of motion.      Cervical back: Normal range of motion and neck supple.   Skin:     General: Skin is warm and dry.   Neurological:      Mental Status: He is alert and oriented to person, place, and time.   Psychiatric:         Behavior: Behavior normal.         Thought Content: Thought content normal.        (1) Restricted in physically strenuous activity, ambulatory and able to do work of light nature  Assessment:     Problem List Items Addressed This Visit          Renal/    Kidney transplant status     Underwent kidney transplantation on 3/19/2022. Follows with transplant team.             Oncology    Anemia in ESRD (end-stage renal disease) (Chronic)     Anemia of chronic kidney disease.  Most recent hemoglobin noted above 10 per dL no indication for erythropoietin stimulating agent.  Will continue to monitor.         Monoclonal gammopathy - Primary     IgM monoclonal gammopathy. Will repeat protein electrophoresis and immunofixation.  Will continue to monitor every 3 months or sooner as needed.           Plan:     Monoclonal gammopathy    Anemia in ESRD (end-stage renal disease)    Kidney transplant status    Labs reviewed; light chains normal since transplant.   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   Follow up in  4 months  with  SPEP, DAYSI, FLC, CBC, and Comprehensive  Metabolic Panel.    Route Chart for Scheduling    Med Onc Chart Routing      Follow up with physician    Follow up with ROBERT 4 months. see plan note   Infusion scheduling note    Injection scheduling note    Labs CBC, CMP, SPEP, free light chains and other   Lab interval:  see plan note   Imaging None      Pharmacy appointment No pharmacy appointment needed      Other referrals No additional referrals needed       I will review assessment/plan with collaborating physician.      SARAH Silver

## 2023-01-09 ENCOUNTER — TELEPHONE (OUTPATIENT)
Dept: TRANSPLANT | Facility: CLINIC | Age: 73
End: 2023-01-09
Payer: MEDICARE

## 2023-01-09 LAB
ALBUMIN SERPL ELPH-MCNC: 4.01 G/DL (ref 3.35–5.55)
ALPHA1 GLOB SERPL ELPH-MCNC: 0.34 G/DL (ref 0.17–0.41)
ALPHA2 GLOB SERPL ELPH-MCNC: 0.88 G/DL (ref 0.43–0.99)
B-GLOBULIN SERPL ELPH-MCNC: 0.68 G/DL (ref 0.5–1.1)
CYTOMEGALOVIRUS LOG (IU/ML): 1.96 LOGIU/ML
CYTOMEGALOVIRUS PCR, QUANT: 92 IU/ML
GAMMA GLOB SERPL ELPH-MCNC: 0.59 G/DL (ref 0.67–1.58)
INTERPRETATION SERPL IFE-IMP: NORMAL
KAPPA LC SER QL IA: 1.33 MG/DL (ref 0.33–1.94)
KAPPA LC/LAMBDA SER IA: 1.75 (ref 0.26–1.65)
LAMBDA LC SER QL IA: 0.76 MG/DL (ref 0.57–2.63)
PROT SERPL-MCNC: 6.5 G/DL (ref 6–8.4)

## 2023-01-09 NOTE — TELEPHONE ENCOUNTER
----- Message from Dilcia Parker MD sent at 1/9/2023  3:08 PM CST -----  Low level CMV viremia noted - CBC ok, any symptoms or low grade fever?

## 2023-01-10 ENCOUNTER — OFFICE VISIT (OUTPATIENT)
Dept: HEMATOLOGY/ONCOLOGY | Facility: CLINIC | Age: 73
End: 2023-01-10
Payer: MEDICARE

## 2023-01-10 VITALS
BODY MASS INDEX: 28.5 KG/M2 | HEIGHT: 68 IN | OXYGEN SATURATION: 98 % | HEART RATE: 71 BPM | TEMPERATURE: 98 F | WEIGHT: 188.06 LBS | SYSTOLIC BLOOD PRESSURE: 138 MMHG | DIASTOLIC BLOOD PRESSURE: 78 MMHG

## 2023-01-10 DIAGNOSIS — D47.2 MONOCLONAL GAMMOPATHY: Primary | ICD-10-CM

## 2023-01-10 DIAGNOSIS — D63.1 ANEMIA IN ESRD (END-STAGE RENAL DISEASE): ICD-10-CM

## 2023-01-10 DIAGNOSIS — Z94.0 KIDNEY TRANSPLANT STATUS: ICD-10-CM

## 2023-01-10 DIAGNOSIS — N18.6 ANEMIA IN ESRD (END-STAGE RENAL DISEASE): ICD-10-CM

## 2023-01-10 LAB
PATHOLOGIST INTERPRETATION IFE: NORMAL
PATHOLOGIST INTERPRETATION SPE: NORMAL

## 2023-01-10 PROCEDURE — 1159F MED LIST DOCD IN RCRD: CPT | Mod: CPTII,S$GLB,, | Performed by: NURSE PRACTITIONER

## 2023-01-10 PROCEDURE — 99213 OFFICE O/P EST LOW 20 MIN: CPT | Mod: S$GLB,,, | Performed by: NURSE PRACTITIONER

## 2023-01-10 PROCEDURE — 1101F PR PT FALLS ASSESS DOC 0-1 FALLS W/OUT INJ PAST YR: ICD-10-PCS | Mod: CPTII,S$GLB,, | Performed by: NURSE PRACTITIONER

## 2023-01-10 PROCEDURE — 3075F PR MOST RECENT SYSTOLIC BLOOD PRESS GE 130-139MM HG: ICD-10-PCS | Mod: CPTII,S$GLB,, | Performed by: NURSE PRACTITIONER

## 2023-01-10 PROCEDURE — 1126F AMNT PAIN NOTED NONE PRSNT: CPT | Mod: CPTII,S$GLB,, | Performed by: NURSE PRACTITIONER

## 2023-01-10 PROCEDURE — 3008F PR BODY MASS INDEX (BMI) DOCUMENTED: ICD-10-PCS | Mod: CPTII,S$GLB,, | Performed by: NURSE PRACTITIONER

## 2023-01-10 PROCEDURE — 3075F SYST BP GE 130 - 139MM HG: CPT | Mod: CPTII,S$GLB,, | Performed by: NURSE PRACTITIONER

## 2023-01-10 PROCEDURE — 3078F PR MOST RECENT DIASTOLIC BLOOD PRESSURE < 80 MM HG: ICD-10-PCS | Mod: CPTII,S$GLB,, | Performed by: NURSE PRACTITIONER

## 2023-01-10 PROCEDURE — 1159F PR MEDICATION LIST DOCUMENTED IN MEDICAL RECORD: ICD-10-PCS | Mod: CPTII,S$GLB,, | Performed by: NURSE PRACTITIONER

## 2023-01-10 PROCEDURE — 99999 PR PBB SHADOW E&M-EST. PATIENT-LVL IV: CPT | Mod: PBBFAC,,, | Performed by: NURSE PRACTITIONER

## 2023-01-10 PROCEDURE — 3078F DIAST BP <80 MM HG: CPT | Mod: CPTII,S$GLB,, | Performed by: NURSE PRACTITIONER

## 2023-01-10 PROCEDURE — 1126F PR PAIN SEVERITY QUANTIFIED, NO PAIN PRESENT: ICD-10-PCS | Mod: CPTII,S$GLB,, | Performed by: NURSE PRACTITIONER

## 2023-01-10 PROCEDURE — 3288F PR FALLS RISK ASSESSMENT DOCUMENTED: ICD-10-PCS | Mod: CPTII,S$GLB,, | Performed by: NURSE PRACTITIONER

## 2023-01-10 PROCEDURE — 3008F BODY MASS INDEX DOCD: CPT | Mod: CPTII,S$GLB,, | Performed by: NURSE PRACTITIONER

## 2023-01-10 PROCEDURE — 1101F PT FALLS ASSESS-DOCD LE1/YR: CPT | Mod: CPTII,S$GLB,, | Performed by: NURSE PRACTITIONER

## 2023-01-10 PROCEDURE — 99213 PR OFFICE/OUTPT VISIT, EST, LEVL III, 20-29 MIN: ICD-10-PCS | Mod: S$GLB,,, | Performed by: NURSE PRACTITIONER

## 2023-01-10 PROCEDURE — 99999 PR PBB SHADOW E&M-EST. PATIENT-LVL IV: ICD-10-PCS | Mod: PBBFAC,,, | Performed by: NURSE PRACTITIONER

## 2023-01-10 PROCEDURE — 1160F PR REVIEW ALL MEDS BY PRESCRIBER/CLIN PHARMACIST DOCUMENTED: ICD-10-PCS | Mod: CPTII,S$GLB,, | Performed by: NURSE PRACTITIONER

## 2023-01-10 PROCEDURE — 1160F RVW MEDS BY RX/DR IN RCRD: CPT | Mod: CPTII,S$GLB,, | Performed by: NURSE PRACTITIONER

## 2023-01-10 PROCEDURE — 3288F FALL RISK ASSESSMENT DOCD: CPT | Mod: CPTII,S$GLB,, | Performed by: NURSE PRACTITIONER

## 2023-01-18 ENCOUNTER — LAB VISIT (OUTPATIENT)
Dept: LAB | Facility: HOSPITAL | Age: 73
End: 2023-01-18
Attending: INTERNAL MEDICINE
Payer: MEDICARE

## 2023-01-18 DIAGNOSIS — Z94.0 KIDNEY REPLACED BY TRANSPLANT: ICD-10-CM

## 2023-01-18 LAB
ALBUMIN SERPL BCP-MCNC: 3.9 G/DL (ref 3.5–5.2)
ANION GAP SERPL CALC-SCNC: 10 MMOL/L (ref 8–16)
BASOPHILS # BLD AUTO: 0.03 K/UL (ref 0–0.2)
BASOPHILS NFR BLD: 0.5 % (ref 0–1.9)
BUN SERPL-MCNC: 14 MG/DL (ref 8–23)
CALCIUM SERPL-MCNC: 9.8 MG/DL (ref 8.7–10.5)
CHLORIDE SERPL-SCNC: 107 MMOL/L (ref 95–110)
CO2 SERPL-SCNC: 21 MMOL/L (ref 23–29)
CREAT SERPL-MCNC: 1.2 MG/DL (ref 0.5–1.4)
DIFFERENTIAL METHOD: ABNORMAL
EOSINOPHIL # BLD AUTO: 0.1 K/UL (ref 0–0.5)
EOSINOPHIL NFR BLD: 2.3 % (ref 0–8)
ERYTHROCYTE [DISTWIDTH] IN BLOOD BY AUTOMATED COUNT: 14.6 % (ref 11.5–14.5)
EST. GFR  (NO RACE VARIABLE): >60 ML/MIN/1.73 M^2
GLUCOSE SERPL-MCNC: 97 MG/DL (ref 70–110)
HCT VFR BLD AUTO: 46 % (ref 40–54)
HGB BLD-MCNC: 14.5 G/DL (ref 14–18)
IMM GRANULOCYTES # BLD AUTO: 0.03 K/UL (ref 0–0.04)
IMM GRANULOCYTES NFR BLD AUTO: 0.5 % (ref 0–0.5)
LYMPHOCYTES # BLD AUTO: 1.2 K/UL (ref 1–4.8)
LYMPHOCYTES NFR BLD: 21.6 % (ref 18–48)
MAGNESIUM SERPL-MCNC: 1.7 MG/DL (ref 1.6–2.6)
MCH RBC QN AUTO: 28 PG (ref 27–31)
MCHC RBC AUTO-ENTMCNC: 31.5 G/DL (ref 32–36)
MCV RBC AUTO: 89 FL (ref 82–98)
MONOCYTES # BLD AUTO: 0.9 K/UL (ref 0.3–1)
MONOCYTES NFR BLD: 15.6 % (ref 4–15)
NEUTROPHILS # BLD AUTO: 3.4 K/UL (ref 1.8–7.7)
NEUTROPHILS NFR BLD: 59.5 % (ref 38–73)
NRBC BLD-RTO: 0 /100 WBC
PHOSPHATE SERPL-MCNC: 2.9 MG/DL (ref 2.7–4.5)
PLATELET # BLD AUTO: 202 K/UL (ref 150–450)
PMV BLD AUTO: 11 FL (ref 9.2–12.9)
POTASSIUM SERPL-SCNC: 3.8 MMOL/L (ref 3.5–5.1)
RBC # BLD AUTO: 5.18 M/UL (ref 4.6–6.2)
SODIUM SERPL-SCNC: 138 MMOL/L (ref 136–145)
WBC # BLD AUTO: 5.69 K/UL (ref 3.9–12.7)

## 2023-01-18 PROCEDURE — 36415 COLL VENOUS BLD VENIPUNCTURE: CPT | Performed by: INTERNAL MEDICINE

## 2023-01-18 PROCEDURE — 83735 ASSAY OF MAGNESIUM: CPT | Performed by: INTERNAL MEDICINE

## 2023-01-18 PROCEDURE — 85025 COMPLETE CBC W/AUTO DIFF WBC: CPT | Performed by: INTERNAL MEDICINE

## 2023-01-18 PROCEDURE — 80197 ASSAY OF TACROLIMUS: CPT | Performed by: INTERNAL MEDICINE

## 2023-01-18 PROCEDURE — 80069 RENAL FUNCTION PANEL: CPT | Performed by: INTERNAL MEDICINE

## 2023-01-19 LAB — TACROLIMUS BLD-MCNC: 8.9 NG/ML (ref 5–15)

## 2023-02-13 ENCOUNTER — LAB VISIT (OUTPATIENT)
Dept: LAB | Facility: HOSPITAL | Age: 73
End: 2023-02-13
Attending: INTERNAL MEDICINE
Payer: MEDICARE

## 2023-02-13 DIAGNOSIS — Z94.0 KIDNEY REPLACED BY TRANSPLANT: ICD-10-CM

## 2023-02-13 LAB
ALBUMIN SERPL BCP-MCNC: 4.1 G/DL (ref 3.5–5.2)
ANION GAP SERPL CALC-SCNC: 9 MMOL/L (ref 8–16)
BASOPHILS # BLD AUTO: 0.01 K/UL (ref 0–0.2)
BASOPHILS NFR BLD: 0.2 % (ref 0–1.9)
BUN SERPL-MCNC: 15 MG/DL (ref 8–23)
CALCIUM SERPL-MCNC: 9.7 MG/DL (ref 8.7–10.5)
CHLORIDE SERPL-SCNC: 109 MMOL/L (ref 95–110)
CO2 SERPL-SCNC: 20 MMOL/L (ref 23–29)
CREAT SERPL-MCNC: 1.1 MG/DL (ref 0.5–1.4)
DIFFERENTIAL METHOD: ABNORMAL
EOSINOPHIL # BLD AUTO: 0.1 K/UL (ref 0–0.5)
EOSINOPHIL NFR BLD: 1.6 % (ref 0–8)
ERYTHROCYTE [DISTWIDTH] IN BLOOD BY AUTOMATED COUNT: 15.2 % (ref 11.5–14.5)
EST. GFR  (NO RACE VARIABLE): >60 ML/MIN/1.73 M^2
GLUCOSE SERPL-MCNC: 98 MG/DL (ref 70–110)
HCT VFR BLD AUTO: 46.9 % (ref 40–54)
HGB BLD-MCNC: 14.7 G/DL (ref 14–18)
IMM GRANULOCYTES # BLD AUTO: 0.09 K/UL (ref 0–0.04)
IMM GRANULOCYTES NFR BLD AUTO: 1.4 % (ref 0–0.5)
LYMPHOCYTES # BLD AUTO: 1.5 K/UL (ref 1–4.8)
LYMPHOCYTES NFR BLD: 23.7 % (ref 18–48)
MAGNESIUM SERPL-MCNC: 1.8 MG/DL (ref 1.6–2.6)
MCH RBC QN AUTO: 27.7 PG (ref 27–31)
MCHC RBC AUTO-ENTMCNC: 31.3 G/DL (ref 32–36)
MCV RBC AUTO: 88 FL (ref 82–98)
MONOCYTES # BLD AUTO: 0.9 K/UL (ref 0.3–1)
MONOCYTES NFR BLD: 14.2 % (ref 4–15)
NEUTROPHILS # BLD AUTO: 3.7 K/UL (ref 1.8–7.7)
NEUTROPHILS NFR BLD: 58.9 % (ref 38–73)
NRBC BLD-RTO: 0 /100 WBC
PHOSPHATE SERPL-MCNC: 3 MG/DL (ref 2.7–4.5)
PLATELET # BLD AUTO: 193 K/UL (ref 150–450)
PMV BLD AUTO: 11.2 FL (ref 9.2–12.9)
POTASSIUM SERPL-SCNC: 4 MMOL/L (ref 3.5–5.1)
RBC # BLD AUTO: 5.31 M/UL (ref 4.6–6.2)
SODIUM SERPL-SCNC: 138 MMOL/L (ref 136–145)
WBC # BLD AUTO: 6.21 K/UL (ref 3.9–12.7)

## 2023-02-13 PROCEDURE — 36415 COLL VENOUS BLD VENIPUNCTURE: CPT | Performed by: INTERNAL MEDICINE

## 2023-02-13 PROCEDURE — 80197 ASSAY OF TACROLIMUS: CPT | Performed by: INTERNAL MEDICINE

## 2023-02-13 PROCEDURE — 85025 COMPLETE CBC W/AUTO DIFF WBC: CPT | Performed by: INTERNAL MEDICINE

## 2023-02-13 PROCEDURE — 83735 ASSAY OF MAGNESIUM: CPT | Performed by: INTERNAL MEDICINE

## 2023-02-13 PROCEDURE — 80069 RENAL FUNCTION PANEL: CPT | Performed by: INTERNAL MEDICINE

## 2023-02-14 LAB — TACROLIMUS BLD-MCNC: 7.1 NG/ML (ref 5–15)

## 2023-03-13 ENCOUNTER — OFFICE VISIT (OUTPATIENT)
Dept: INTERNAL MEDICINE | Facility: CLINIC | Age: 73
End: 2023-03-13
Payer: MEDICARE

## 2023-03-13 ENCOUNTER — LAB VISIT (OUTPATIENT)
Dept: LAB | Facility: HOSPITAL | Age: 73
End: 2023-03-13
Attending: INTERNAL MEDICINE
Payer: MEDICARE

## 2023-03-13 VITALS
BODY MASS INDEX: 28.94 KG/M2 | HEIGHT: 68 IN | WEIGHT: 190.94 LBS | TEMPERATURE: 97 F | HEART RATE: 75 BPM | DIASTOLIC BLOOD PRESSURE: 74 MMHG | SYSTOLIC BLOOD PRESSURE: 128 MMHG | OXYGEN SATURATION: 97 %

## 2023-03-13 DIAGNOSIS — N18.6 END-STAGE RENAL DISEASE (ESRD): ICD-10-CM

## 2023-03-13 DIAGNOSIS — E21.3 HPTH (HYPERPARATHYROIDISM): ICD-10-CM

## 2023-03-13 DIAGNOSIS — D63.1 ANEMIA IN ESRD (END-STAGE RENAL DISEASE): Chronic | ICD-10-CM

## 2023-03-13 DIAGNOSIS — F51.04 PSYCHOPHYSIOLOGICAL INSOMNIA: Chronic | ICD-10-CM

## 2023-03-13 DIAGNOSIS — N18.6 ANEMIA IN ESRD (END-STAGE RENAL DISEASE): Chronic | ICD-10-CM

## 2023-03-13 DIAGNOSIS — F41.9 ANXIETY: Primary | Chronic | ICD-10-CM

## 2023-03-13 DIAGNOSIS — Z94.0 KIDNEY REPLACED BY TRANSPLANT: ICD-10-CM

## 2023-03-13 DIAGNOSIS — Z94.0 S/P KIDNEY TRANSPLANT: ICD-10-CM

## 2023-03-13 DIAGNOSIS — E78.2 MIXED HYPERLIPIDEMIA: Chronic | ICD-10-CM

## 2023-03-13 DIAGNOSIS — I10 ESSENTIAL HYPERTENSION: Chronic | ICD-10-CM

## 2023-03-13 LAB
ALBUMIN SERPL BCP-MCNC: 3.9 G/DL (ref 3.5–5.2)
ALBUMIN SERPL BCP-MCNC: 3.9 G/DL (ref 3.5–5.2)
ALP SERPL-CCNC: 101 U/L (ref 55–135)
ALT SERPL W/O P-5'-P-CCNC: 35 U/L (ref 10–44)
ANION GAP SERPL CALC-SCNC: 12 MMOL/L (ref 8–16)
AST SERPL-CCNC: 20 U/L (ref 10–40)
BASOPHILS # BLD AUTO: 0.02 K/UL (ref 0–0.2)
BASOPHILS NFR BLD: 0.3 % (ref 0–1.9)
BILIRUB DIRECT SERPL-MCNC: 0.1 MG/DL (ref 0.1–0.3)
BILIRUB SERPL-MCNC: 0.3 MG/DL (ref 0.1–1)
BUN SERPL-MCNC: 16 MG/DL (ref 8–23)
CALCIUM SERPL-MCNC: 9.9 MG/DL (ref 8.7–10.5)
CHLORIDE SERPL-SCNC: 106 MMOL/L (ref 95–110)
CO2 SERPL-SCNC: 21 MMOL/L (ref 23–29)
CREAT SERPL-MCNC: 1.2 MG/DL (ref 0.5–1.4)
DIFFERENTIAL METHOD: ABNORMAL
EOSINOPHIL # BLD AUTO: 0.2 K/UL (ref 0–0.5)
EOSINOPHIL NFR BLD: 2.4 % (ref 0–8)
ERYTHROCYTE [DISTWIDTH] IN BLOOD BY AUTOMATED COUNT: 14.6 % (ref 11.5–14.5)
EST. GFR  (NO RACE VARIABLE): >60 ML/MIN/1.73 M^2
GLUCOSE SERPL-MCNC: 87 MG/DL (ref 70–110)
HCT VFR BLD AUTO: 48.7 % (ref 40–54)
HGB BLD-MCNC: 15.3 G/DL (ref 14–18)
IMM GRANULOCYTES # BLD AUTO: 0.09 K/UL (ref 0–0.04)
IMM GRANULOCYTES NFR BLD AUTO: 1.4 % (ref 0–0.5)
LYMPHOCYTES # BLD AUTO: 1.6 K/UL (ref 1–4.8)
LYMPHOCYTES NFR BLD: 23.9 % (ref 18–48)
MAGNESIUM SERPL-MCNC: 1.7 MG/DL (ref 1.6–2.6)
MCH RBC QN AUTO: 27.8 PG (ref 27–31)
MCHC RBC AUTO-ENTMCNC: 31.4 G/DL (ref 32–36)
MCV RBC AUTO: 89 FL (ref 82–98)
MONOCYTES # BLD AUTO: 1 K/UL (ref 0.3–1)
MONOCYTES NFR BLD: 15.2 % (ref 4–15)
NEUTROPHILS # BLD AUTO: 3.7 K/UL (ref 1.8–7.7)
NEUTROPHILS NFR BLD: 56.8 % (ref 38–73)
NRBC BLD-RTO: 0 /100 WBC
PHOSPHATE SERPL-MCNC: 2.5 MG/DL (ref 2.7–4.5)
PLATELET # BLD AUTO: 191 K/UL (ref 150–450)
PMV BLD AUTO: 10.3 FL (ref 9.2–12.9)
POTASSIUM SERPL-SCNC: 3.8 MMOL/L (ref 3.5–5.1)
PROT SERPL-MCNC: 6.9 G/DL (ref 6–8.4)
RBC # BLD AUTO: 5.5 M/UL (ref 4.6–6.2)
SODIUM SERPL-SCNC: 139 MMOL/L (ref 136–145)
WBC # BLD AUTO: 6.57 K/UL (ref 3.9–12.7)

## 2023-03-13 PROCEDURE — 99999 PR PBB SHADOW E&M-EST. PATIENT-LVL V: CPT | Mod: PBBFAC,,, | Performed by: FAMILY MEDICINE

## 2023-03-13 PROCEDURE — 3078F PR MOST RECENT DIASTOLIC BLOOD PRESSURE < 80 MM HG: ICD-10-PCS | Mod: CPTII,S$GLB,, | Performed by: FAMILY MEDICINE

## 2023-03-13 PROCEDURE — 3078F DIAST BP <80 MM HG: CPT | Mod: CPTII,S$GLB,, | Performed by: FAMILY MEDICINE

## 2023-03-13 PROCEDURE — 99214 PR OFFICE/OUTPT VISIT, EST, LEVL IV, 30-39 MIN: ICD-10-PCS | Mod: S$GLB,,, | Performed by: FAMILY MEDICINE

## 2023-03-13 PROCEDURE — 85025 COMPLETE CBC W/AUTO DIFF WBC: CPT | Performed by: INTERNAL MEDICINE

## 2023-03-13 PROCEDURE — 1126F PR PAIN SEVERITY QUANTIFIED, NO PAIN PRESENT: ICD-10-PCS | Mod: CPTII,S$GLB,, | Performed by: FAMILY MEDICINE

## 2023-03-13 PROCEDURE — 1159F MED LIST DOCD IN RCRD: CPT | Mod: CPTII,S$GLB,, | Performed by: FAMILY MEDICINE

## 2023-03-13 PROCEDURE — 80197 ASSAY OF TACROLIMUS: CPT | Performed by: INTERNAL MEDICINE

## 2023-03-13 PROCEDURE — 3074F PR MOST RECENT SYSTOLIC BLOOD PRESSURE < 130 MM HG: ICD-10-PCS | Mod: CPTII,S$GLB,, | Performed by: FAMILY MEDICINE

## 2023-03-13 PROCEDURE — 36415 COLL VENOUS BLD VENIPUNCTURE: CPT | Performed by: INTERNAL MEDICINE

## 2023-03-13 PROCEDURE — 84075 ASSAY ALKALINE PHOSPHATASE: CPT | Performed by: INTERNAL MEDICINE

## 2023-03-13 PROCEDURE — 99999 PR PBB SHADOW E&M-EST. PATIENT-LVL V: ICD-10-PCS | Mod: PBBFAC,,, | Performed by: FAMILY MEDICINE

## 2023-03-13 PROCEDURE — 3008F PR BODY MASS INDEX (BMI) DOCUMENTED: ICD-10-PCS | Mod: CPTII,S$GLB,, | Performed by: FAMILY MEDICINE

## 2023-03-13 PROCEDURE — 87799 DETECT AGENT NOS DNA QUANT: CPT | Performed by: INTERNAL MEDICINE

## 2023-03-13 PROCEDURE — 1160F PR REVIEW ALL MEDS BY PRESCRIBER/CLIN PHARMACIST DOCUMENTED: ICD-10-PCS | Mod: CPTII,S$GLB,, | Performed by: FAMILY MEDICINE

## 2023-03-13 PROCEDURE — 3074F SYST BP LT 130 MM HG: CPT | Mod: CPTII,S$GLB,, | Performed by: FAMILY MEDICINE

## 2023-03-13 PROCEDURE — 99214 OFFICE O/P EST MOD 30 MIN: CPT | Mod: S$GLB,,, | Performed by: FAMILY MEDICINE

## 2023-03-13 PROCEDURE — 1160F RVW MEDS BY RX/DR IN RCRD: CPT | Mod: CPTII,S$GLB,, | Performed by: FAMILY MEDICINE

## 2023-03-13 PROCEDURE — 1126F AMNT PAIN NOTED NONE PRSNT: CPT | Mod: CPTII,S$GLB,, | Performed by: FAMILY MEDICINE

## 2023-03-13 PROCEDURE — 1159F PR MEDICATION LIST DOCUMENTED IN MEDICAL RECORD: ICD-10-PCS | Mod: CPTII,S$GLB,, | Performed by: FAMILY MEDICINE

## 2023-03-13 PROCEDURE — 80069 RENAL FUNCTION PANEL: CPT | Performed by: INTERNAL MEDICINE

## 2023-03-13 PROCEDURE — 3008F BODY MASS INDEX DOCD: CPT | Mod: CPTII,S$GLB,, | Performed by: FAMILY MEDICINE

## 2023-03-13 PROCEDURE — 83735 ASSAY OF MAGNESIUM: CPT | Performed by: INTERNAL MEDICINE

## 2023-03-13 NOTE — PROGRESS NOTES
Subjective:   Patient ID: Alverto Ramirez Jr. is a 72 y.o. male.  Chief Complaint:  Follow-up    Presents for 3 month follow-up on insomnia and anxiety  [facility]     Medical history:  - End-stage renal disease.  Underwent successful transplant March 2022. Recent renal function panel stable. Up-to-date visit with Nephrology ind Transplant.  Reports compliance with recommended medication regimen.  - Hypertension.  Controlled on Procardia 60 mg twice a day Lopressor 50 mg twice a day.  Reports compliance.  Denies side effects.  No shortness of breath or swelling.  - Mixed hyperlipidemia.  Currently not on any medications.  Last lipid panel with LDL 84. Denies chest pain or claudication.  - Erectile dysfunction.  Stable with symptoms controlled on takes Viagra daily as needed.     Anxiety follow-up  On  Xanax 1 mg daily as needed.    Using Xanax more frequently than he previously was 3 months ago  However, based on reported number of pills in pill bottle at home, still averaging less than daily use  Reports medication is effective for symptom control.  No suspicion for abusive or diversion behaviors.  Denies any side effects.      Tolerating current treatment well.   Happy with medication.    Wants to continue.  No behaviors to suggest inappropriate use of prescribed medications.  [place] Board of Pharmacy Controlled Prescription Drug Monitoring database was queried and showed no activity to suggest abuse, diversion, or other inappropriate use of prescription medications.      Insomnia follow-up  On trazodone 50 mg nightly  Denies any side effects.      Tolerating current treatment well.   Reports still effective to help sleep difficulty  Happy with medication.    Wants to continue.    No new complaints or concerns today    Review of Systems   Constitutional:  Negative for activity change, appetite change, chills, fatigue and fever.   Respiratory:  Negative for chest tightness,  "shortness of breath and wheezing.    Cardiovascular:  Negative for chest pain, palpitations and leg swelling.   Gastrointestinal:  Negative for abdominal pain, constipation, diarrhea, nausea and vomiting.   Genitourinary:  Negative for difficulty urinating.   Musculoskeletal:  Negative for arthralgias and myalgias.   Skin:  Negative for rash.   Neurological:  Negative for dizziness, syncope, weakness, light-headedness, numbness and headaches.   Psychiatric/Behavioral:  Positive for sleep disturbance. Negative for agitation, behavioral problems, confusion, decreased concentration, dysphoric mood, hallucinations, self-injury and suicidal ideas. The patient is nervous/anxious. The patient is not hyperactive.      Objective:   /74 (BP Location: Right arm, Patient Position: Sitting, BP Method: Large (Manual))   Pulse 75   Temp 97.2 °F (36.2 °C) (Tympanic)   Ht 5' 7.5" (1.715 m)   Wt 86.6 kg (190 lb 14.7 oz)   SpO2 97%   BMI 29.46 kg/m²     Physical Exam  Vitals and nursing note reviewed.   Constitutional:       Appearance: Normal appearance. He is obese.   Cardiovascular:      Rate and Rhythm: Normal rate and regular rhythm.      Pulses: Normal pulses.      Heart sounds: Normal heart sounds.   Pulmonary:      Effort: Pulmonary effort is normal.      Breath sounds: Normal breath sounds.   Skin:     General: Skin is warm and dry.   Neurological:      General: No focal deficit present.      Mental Status: He is alert and oriented to person, place, and time.   Psychiatric:         Mood and Affect: Mood normal.         Behavior: Behavior normal.         Thought Content: Thought content normal.         Judgment: Judgment normal.     Assessment:       ICD-10-CM ICD-9-CM   1. Anxiety  F41.9 300.00   2. Psychophysiological insomnia  F51.04 307.42   3. Essential hypertension  I10 401.9   4. Mixed hyperlipidemia  E78.2 272.2   5. End-stage renal disease (ESRD)  N18.6 585.6   6. HPTH (hyperparathyroidism)  E21.3 252.00 "   7. Anemia in ESRD (end-stage renal disease)  N18.6 285.21    D63.1 585.6   8. S/P kidney transplant  Z94.0 V42.0     Plan:   Anxiety  Stable.  Symptoms controlled.    Continue Xanax 1 mg daily as needed     Psychophysiological insomnia  Stable.  Symptoms controlled.    Continue trazodone 50 mg nightly     Essential hypertension  Controlled.  Stable.  Asymptomatic.  BP at goal.    Continue Procardia 60 mg twice a day  Continue Lopressor 50 mg twice a day    Mixed hyperlipidemia  Stable.  Asymptomatic.    Native LDL 84   Statin treatment not recommended     End-stage renal disease (ESRD)  HPTH (hyperparathyroidism)  Anemia in ESRD (end-stage renal disease)  S/P kidney transplant  Continue per Nephrology and transplant team     Return to clinic 3 months for follow-up on anxiety/insomnia or sooner if needed    Shona Mcneal, MS4    I hereby acknowledge that I am relying upon documentation authored by a medical student working under my supervision and further I hereby attest that I have verified the student documentation or findings by personally performing the physical exam and medical decision making activities of the Evaluation and Management service to be billed.  Derrell Wagner

## 2023-03-14 LAB — TACROLIMUS BLD-MCNC: 9 NG/ML (ref 5–15)

## 2023-03-17 NOTE — PROGRESS NOTES
Patient was seen in listed '[facility]' transplant clinic for continued evaluation for kidney, kidney/pancreas or pancreas only transplant. The patient attended a group education session that discussed/reviewed the following aspects of transplantation: evaluation and selection committee process, [facility] waitlist management/multiple listings, types of organs offered (KDPI < 85%, KDPI > 85%, PHS increased risk, DCD), financial aspects, surgical procedures, dietary instruction pre- and post-transplant, health maintenance pre- and post-transplant, post-transplant hospitalization and outpatient follow-up, potential to participate in a research protocol, and medication management and side effects. A question and answer session was provided after the presentation.     The patient was seen by all members of the multi-disciplinary team to include: Nephrologist/PA, , Transplant Coordinator, and .      The patient reviewed and signed all consents for evaluation which were witnessed and sent to scanning into the EPIC chart.     The patient was given an education book and plan for further evaluation based on her individual assessment.      The patient was encouraged to call with any questions or concerns.   Home

## 2023-03-20 ENCOUNTER — OFFICE VISIT (OUTPATIENT)
Dept: TRANSPLANT | Facility: CLINIC | Age: 73
End: 2023-03-20
Payer: MEDICARE

## 2023-03-20 ENCOUNTER — LAB VISIT (OUTPATIENT)
Dept: LAB | Facility: HOSPITAL | Age: 73
End: 2023-03-20
Attending: INTERNAL MEDICINE
Payer: MEDICARE

## 2023-03-20 VITALS
TEMPERATURE: 97 F | BODY MASS INDEX: 28.9 KG/M2 | OXYGEN SATURATION: 98 % | HEIGHT: 68 IN | WEIGHT: 190.69 LBS | SYSTOLIC BLOOD PRESSURE: 145 MMHG | RESPIRATION RATE: 16 BRPM | DIASTOLIC BLOOD PRESSURE: 68 MMHG | HEART RATE: 72 BPM

## 2023-03-20 DIAGNOSIS — B25.9 CYTOMEGALOVIRUS (CMV) VIREMIA: ICD-10-CM

## 2023-03-20 DIAGNOSIS — I10 ESSENTIAL HYPERTENSION: Chronic | ICD-10-CM

## 2023-03-20 DIAGNOSIS — E83.9 CHRONIC KIDNEY DISEASE-MINERAL AND BONE DISORDER: ICD-10-CM

## 2023-03-20 DIAGNOSIS — E78.2 MIXED HYPERLIPIDEMIA: Chronic | ICD-10-CM

## 2023-03-20 DIAGNOSIS — E87.8 ELECTROLYTE ABNORMALITY: ICD-10-CM

## 2023-03-20 DIAGNOSIS — Z79.60 LONG-TERM USE OF IMMUNOSUPPRESSANT MEDICATION: ICD-10-CM

## 2023-03-20 DIAGNOSIS — N18.9 CHRONIC KIDNEY DISEASE-MINERAL AND BONE DISORDER: ICD-10-CM

## 2023-03-20 DIAGNOSIS — Z29.89 PROPHYLACTIC IMMUNOTHERAPY: ICD-10-CM

## 2023-03-20 DIAGNOSIS — M89.9 CHRONIC KIDNEY DISEASE-MINERAL AND BONE DISORDER: ICD-10-CM

## 2023-03-20 DIAGNOSIS — N18.6 ANEMIA IN ESRD (END-STAGE RENAL DISEASE): Chronic | ICD-10-CM

## 2023-03-20 DIAGNOSIS — Z94.0 S/P KIDNEY TRANSPLANT: Primary | ICD-10-CM

## 2023-03-20 DIAGNOSIS — D47.2 MONOCLONAL GAMMOPATHY: ICD-10-CM

## 2023-03-20 DIAGNOSIS — D63.1 ANEMIA IN ESRD (END-STAGE RENAL DISEASE): Chronic | ICD-10-CM

## 2023-03-20 PROCEDURE — 1159F MED LIST DOCD IN RCRD: CPT | Mod: CPTII,S$GLB,, | Performed by: INTERNAL MEDICINE

## 2023-03-20 PROCEDURE — 3078F DIAST BP <80 MM HG: CPT | Mod: CPTII,S$GLB,, | Performed by: INTERNAL MEDICINE

## 2023-03-20 PROCEDURE — 99999 PR PBB SHADOW E&M-EST. PATIENT-LVL V: ICD-10-PCS | Mod: PBBFAC,,, | Performed by: INTERNAL MEDICINE

## 2023-03-20 PROCEDURE — 1101F PR PT FALLS ASSESS DOC 0-1 FALLS W/OUT INJ PAST YR: ICD-10-PCS | Mod: CPTII,S$GLB,, | Performed by: INTERNAL MEDICINE

## 2023-03-20 PROCEDURE — 1159F PR MEDICATION LIST DOCUMENTED IN MEDICAL RECORD: ICD-10-PCS | Mod: CPTII,S$GLB,, | Performed by: INTERNAL MEDICINE

## 2023-03-20 PROCEDURE — 1126F AMNT PAIN NOTED NONE PRSNT: CPT | Mod: CPTII,S$GLB,, | Performed by: INTERNAL MEDICINE

## 2023-03-20 PROCEDURE — 99215 PR OFFICE/OUTPT VISIT, EST, LEVL V, 40-54 MIN: ICD-10-PCS | Mod: GC,S$GLB,, | Performed by: INTERNAL MEDICINE

## 2023-03-20 PROCEDURE — 3066F PR DOCUMENTATION OF TREATMENT FOR NEPHROPATHY: ICD-10-PCS | Mod: CPTII,S$GLB,, | Performed by: INTERNAL MEDICINE

## 2023-03-20 PROCEDURE — 1160F PR REVIEW ALL MEDS BY PRESCRIBER/CLIN PHARMACIST DOCUMENTED: ICD-10-PCS | Mod: CPTII,S$GLB,, | Performed by: INTERNAL MEDICINE

## 2023-03-20 PROCEDURE — 3288F FALL RISK ASSESSMENT DOCD: CPT | Mod: CPTII,S$GLB,, | Performed by: INTERNAL MEDICINE

## 2023-03-20 PROCEDURE — 3077F SYST BP >= 140 MM HG: CPT | Mod: CPTII,S$GLB,, | Performed by: INTERNAL MEDICINE

## 2023-03-20 PROCEDURE — 3078F PR MOST RECENT DIASTOLIC BLOOD PRESSURE < 80 MM HG: ICD-10-PCS | Mod: CPTII,S$GLB,, | Performed by: INTERNAL MEDICINE

## 2023-03-20 PROCEDURE — 3066F NEPHROPATHY DOC TX: CPT | Mod: CPTII,S$GLB,, | Performed by: INTERNAL MEDICINE

## 2023-03-20 PROCEDURE — 3008F BODY MASS INDEX DOCD: CPT | Mod: CPTII,S$GLB,, | Performed by: INTERNAL MEDICINE

## 2023-03-20 PROCEDURE — 36415 COLL VENOUS BLD VENIPUNCTURE: CPT | Performed by: INTERNAL MEDICINE

## 2023-03-20 PROCEDURE — 99215 OFFICE O/P EST HI 40 MIN: CPT | Mod: GC,S$GLB,, | Performed by: INTERNAL MEDICINE

## 2023-03-20 PROCEDURE — 99999 PR PBB SHADOW E&M-EST. PATIENT-LVL V: CPT | Mod: PBBFAC,,, | Performed by: INTERNAL MEDICINE

## 2023-03-20 PROCEDURE — 3077F PR MOST RECENT SYSTOLIC BLOOD PRESSURE >= 140 MM HG: ICD-10-PCS | Mod: CPTII,S$GLB,, | Performed by: INTERNAL MEDICINE

## 2023-03-20 PROCEDURE — 1160F RVW MEDS BY RX/DR IN RCRD: CPT | Mod: CPTII,S$GLB,, | Performed by: INTERNAL MEDICINE

## 2023-03-20 PROCEDURE — 3288F PR FALLS RISK ASSESSMENT DOCUMENTED: ICD-10-PCS | Mod: CPTII,S$GLB,, | Performed by: INTERNAL MEDICINE

## 2023-03-20 PROCEDURE — 1126F PR PAIN SEVERITY QUANTIFIED, NO PAIN PRESENT: ICD-10-PCS | Mod: CPTII,S$GLB,, | Performed by: INTERNAL MEDICINE

## 2023-03-20 PROCEDURE — 3008F PR BODY MASS INDEX (BMI) DOCUMENTED: ICD-10-PCS | Mod: CPTII,S$GLB,, | Performed by: INTERNAL MEDICINE

## 2023-03-20 PROCEDURE — 1101F PT FALLS ASSESS-DOCD LE1/YR: CPT | Mod: CPTII,S$GLB,, | Performed by: INTERNAL MEDICINE

## 2023-03-20 RX ORDER — TACROLIMUS 1 MG/1
CAPSULE ORAL
Qty: 210 CAPSULE | Refills: 11 | Status: SHIPPED | OUTPATIENT
Start: 2023-03-20 | End: 2023-04-20

## 2023-03-20 RX ORDER — PREDNISONE 5 MG/1
5 TABLET ORAL DAILY
Qty: 91 TABLET | Refills: 3 | Status: SHIPPED | OUTPATIENT
Start: 2023-03-20 | End: 2024-01-08 | Stop reason: SDUPTHER

## 2023-03-20 RX ORDER — MYCOPHENOLATE MOFETIL 250 MG/1
750 CAPSULE ORAL 2 TIMES DAILY
Qty: 240 CAPSULE | Refills: 11 | Status: SHIPPED | OUTPATIENT
Start: 2023-03-20 | End: 2024-01-08 | Stop reason: SDUPTHER

## 2023-03-20 NOTE — PROGRESS NOTES
Post-Transplant Assessment    Referring Physician: Chris Adair  Current Nephrologist: Dilcia Saavedra    ORGAN: RIGHT KIDNEY  Donor Type: donation after brain death  PHS Increased Risk: yes  Cold Ischemia: 1,354 mins  Induction Medications: thymoglobulin    Chief Complaint   Patient presents with    Kidney Transplant Reassessment     History of present illness:  Patient is a 72 y.o. male.   Presents to the clinic today for medical conditions listed below.  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 OR notable for requiring tedious dissection of adherent and calcified native artery.  Post op US benign and rest of post op course has been benign.  BL sCr 1.1-1.3     Long-Term Use of Immunosuppressant Medication 3/21/2022    On tac, MMF, pred for maintenance IS     Prophylactic Immunotherapy 3/21/2022   Chronic Kidney Disease-Mineral and Bone Disorder 1/8/2015    On calcitriol and vit d     Monoclonal Gammopathy 11/26/2014    Stable Ig and following with hem/onc for surveillance q4m     Essential Hypertension 10/16/2014    Controlled on metoprolol, nifedipine       Today:  Reports no new medical complaints. Says he is doing well. Discussed maintenance health follow ups, including dermatology evaluation. Discussed following up with primary care, general nephrology, and hem/onc.     Review of Systems   Constitutional: Negative.    HENT: Negative.     Eyes: Negative.    Respiratory: Negative.     Cardiovascular: Negative.    Gastrointestinal: Negative.    Genitourinary: Negative.    Musculoskeletal: Negative.    Skin: Negative.    Neurological: Negative.    Endo/Heme/Allergies: Negative.    Psychiatric/Behavioral: Negative.       History:  Past Medical History:    Diagnosis Date    Anemia in CKD (chronic kidney disease)     Atrial fibrillation     CKD (chronic kidney disease) stage 4, GFR 15 11/26/2014    Colon cancer screening 12/19/2014    Elevated PSA 11/26/2014    HTN (hypertension) diagnosed in his 40s 10/16/2014    Monoclonal gammopathy 11/26/2014    Phobic anxiety disorder 11/26/2014    Proteinuria 11/26/2014    Stage 3a chronic kidney disease 3/25/2022      Past Surgical History:   Procedure Laterality Date    AV FISTULA PLACEMENT  11/2014    COLONOSCOPY N/A 12/29/2017    Procedure: COLONOSCOPY;  Surgeon: Tony Peacock III, MD;  Location: [facility] ENDO;  Service: Endoscopy;  Laterality: N/A;    KIDNEY TRANSPLANT Right 3/18/2022    Procedure: TRANSPLANT, KIDNEY;  Surgeon: Victoriano Thomas MD;  Location: [facility] OR [address] [place] [address];  Service: Transplant;  Laterality: Right;    PERITONEAL CATHETER INSERTION      VASECTOMY      VASECTOMY          Current Outpatient Medications:     calcitRIOL (ROCALTROL) 0.25 MCG Cap, Take 1 capsule (0.25 mcg total) by mouth once daily., Disp: 90 capsule, Rfl: 3    cholecalciferol, vitamin D3, (VITAMIN D3) 50 mcg (2,000 unit) Tab, Take 1 tablet (2,000 Units total) by mouth once daily., Disp: 60 tablet, Rfl: 11    k phos di & mono-sod phos mono (K-PHOS-NEUTRAL) 250 mg Tab, Take 1 tablet by mouth 2 (two) times a day., Disp: 90 tablet, Rfl: 11    magnesium oxide (MAG-OX) 400 mg (241.3 mg magnesium) tablet, Take 1 tablet (400 mg total) by mouth 2 (two) times daily., Disp: 60 tablet, Rfl: 11    metoprolol succinate (TOPROL-XL) 50 MG 24 hr tablet, Take 1 tablet (50 mg total) by mouth 2 (two) times daily., Disp: 180 tablet, Rfl: 3    NIFEdipine (PROCARDIA-XL) 60 MG (OSM) 24 hr tablet, Take 1 tablet (60 mg total) by mouth 2 (two) times a day., Disp: 180 tablet, Rfl: 3    sildenafiL (VIAGRA) 100 MG tablet, Take 1 tablet (100 mg total) by mouth daily as needed for Erectile Dysfunction., Disp: 30 tablet, Rfl: 11    sodium bicarbonate 650 MG tablet, Take 3  tablets (1,950 mg total) by mouth 2 (two) times daily., Disp: 180 tablet, Rfl: 11    traZODone (DESYREL) 50 MG tablet, Take 1-2 tablets ( mg total) by mouth every evening., Disp: 180 tablet, Rfl: 3    ALPRAZolam (XANAX) 1 MG tablet, Take 1 tablet (1 mg total) by mouth daily as needed for Anxiety or Insomnia., Disp: 90 tablet, Rfl: 0    mupirocin (BACTROBAN) 2 % ointment, by Nasal route once daily. (Patient not taking: Reported on 3/20/2023), Disp: 30 g, Rfl: 3    mycophenolate (CELLCEPT) 250 mg Cap, Take 3 capsules (750 mg total) by mouth 2 (two) times daily., Disp: 240 capsule, Rfl: 11    predniSONE (DELTASONE) 5 MG tablet, Take 1 tablet (5 mg total) by mouth once daily. Take by mouth daily: 20mg 3/22/22 -, 15mg -, 10mg -, 5mg - thereafter, Disp: 91 tablet, Rfl: 3    tacrolimus (PROGRAF) 1 MG Cap, Take 4 capsules (4 mg total) by mouth every morning AND 3 capsules (3 mg total) every evening., Disp: 210 capsule, Rfl: 11  Review of patient's allergies indicates:  No Known Allergies   Social History     Tobacco Use    Smoking status: Former     Packs/day: 1.00     Years: 15.00     Pack years: 15.00     Types: Cigarettes     Quit date: 1984     Years since quittin.3    Smokeless tobacco: Never   Substance Use Topics    Alcohol use: Yes     Alcohol/week: 5.0 - 7.0 standard drinks     Types: 5 - 7 Standard drinks or equivalent per week     Comment: stopped drinking      Family History   Problem Relation Age of Onset    Heart disease Father     Dementia Father     Diabetes Mother     Cataracts Mother     Glaucoma Mother     Hypertension Sister     Cancer Brother         prostate    Kidney disease Sister         ESRD    Cancer Paternal Uncle         Physical Exam :  Vitals:    23 1030   BP: (!) 145/68   Pulse: 72   Resp: 16   Temp: 97.3 °F (36.3 °C)     Physical Exam  Vitals and nursing note reviewed.   Constitutional:       General: He is not in acute distress.     Appearance:  He is obese.   Eyes:      General: No scleral icterus.  Cardiovascular:      Rate and Rhythm: Normal rate.   Pulmonary:      Effort: Pulmonary effort is normal. No respiratory distress.   Abdominal:      General: There is no distension.      Palpations: Abdomen is soft.   Musculoskeletal:         General: Deformity (LUE AVF) present.      Right lower leg: No edema.      Left lower leg: No edema.   Skin:     Coloration: Skin is not jaundiced.   Neurological:      Mental Status: He is alert.       Labs reviewed   Images Reviewed    Assessment:    1. S/P kidney transplant    2. Cytomegalovirus (CMV) viremia    3. Long-term use of immunosuppressant medication    4. Prophylactic immunotherapy    5. Chronic kidney disease-mineral and bone disorder    6. Electrolyte abnormality    7. Essential hypertension    8. Mixed hyperlipidemia    9. Anemia in ESRD (end-stage renal disease)    10. Monoclonal gammopathy        Plan:  CMV level today though has no symptoms to suggest infection  Decreased tac to 4/3  Cont /750 and pred 5    1. CKD stage 2: BL sCr 1.1-1.3  Will continue follow up as per our center guidelines. patient to continue close follow up with the local General nephrologist. Education provided in appropriate fluid intake, potassium intake. Continue with oral hydration.    2. Immunosuppression: tac, mmf, pred  Will closely monitor for toxicities, education provided about adherence to medicines and need to communicate any side effect to the transplant nurse or physician.  Lab Results   Component Value Date    TACROLIMUS 9.0 03/13/2023    TACROLIMUS 7.1 02/13/2023    TACROLIMUS 8.9 01/18/2023     No results found for: CYCLOSPORINE    3. Allograft Function:  Stable at baseline for the patient. Continue follow up as per our guidelines and with the local General nephrologist. Communication will be sent today.  Lab Results   Component Value Date    CREATININE 1.2 03/13/2023    CREATININE 1.1 02/13/2023    CREATININE  1.2 01/18/2023     No results found for: AMYLASE, LIPASE    4. Hypertension management:  on nifedipine and metoprolol  Continue with home blood pressure monitoring, low salt and healthy life discussed with the patient    5. Metabolic Bone Disease/Secondary Hyperparathyroidism: on calcitriol and vit d  Calcium and phosphorus level discussed with the patient, patient will continue follow up with the general nephrologist for management of metabolic bone disease. Calcium and phosphorus as per our center protocol.  Lab Results   Component Value Date    .7 (H) 11/11/2022    CALCIUM 9.9 03/13/2023    PHOS 2.5 (L) 03/13/2023    PHOS 3.0 02/13/2023    PHOS 2.9 01/18/2023       6. Electrolytes: on kphos and mag  Reviewed with the patient, essentially within the normal range no need for acute changes today, will monitor as per our center guidelines.  Lab Results   Component Value Date     03/13/2023    K 3.8 03/13/2023     03/13/2023    CO2 21 (L) 03/13/2023    CO2 20 (L) 02/13/2023    CO2 21 (L) 01/18/2023       7. Anemia:   Will continue monitoring as per our center guidelines. No indication for acute intervention today.  Lab Results   Component Value Date    WBC 6.57 03/13/2023    HGB 15.3 03/13/2023    HCT 48.7 03/13/2023    MCV 89 03/13/2023     03/13/2023       8.Proteinuria:   Will continue with pr/cr ratio as per our center guidelines  Lab Results   Component Value Date    PROTEINURINE <7 03/13/2023    CREATRANDUR 65.0 03/13/2023    UTPCR Unable to calculate 03/13/2023    UTPCR 0.12 12/19/2022    UTPCR 0.11 11/11/2022        9. BK virus infection screening:   Will continue with urine or blood PCR as per our guidelines to prevent BK virus viremia and allograft dysfunction  Lab Results   Component Value Date    BKVIRUSPCRQB <125 03/13/2023         10. Weight education:   Provided during the clinic visit.  Body mass index is 29.43 kg/m².       11.Patient safety education regarding  immunosuppression including prophylaxis posttransplant for CMV, PCP :   Education provided about vaccination and prevention of infections.    12.  Cytopenias:   No significant cytopenias will monitor as per our guidelines. Medicine list reviewed including potential causes of drug-induced cytopenias  Lab Results   Component Value Date    WBC 6.57 03/13/2023    HGB 15.3 03/13/2023    HCT 48.7 03/13/2023    MCV 89 03/13/2023     03/13/2023       13. Post-transplant Prophylaxis: CMV Infection, PJP and Candida mucosistis and other indicated for this particular patient.     I spoke with the patient for 30 minutes. More than half dedicated to counseling and education. All questions answered    No follow-ups on file.     Orders Placed This Encounter   Procedures    CMV DNA, quantitative, PCR     Medications Discontinued During This Encounter   Medication Reason    predniSONE (DELTASONE) 5 MG tablet     tacrolimus (PROGRAF) 1 MG Cap     mycophenolate (CELLCEPT) 250 mg Cap       Future Appointments   Date Time Provider Department Center   4/6/2023 10:35 AM LABORATORY, [facility] HGV LAB [facility]   4/10/2023  1:30 PM SARAH Silver [facility] HEM ONC [facility]   4/13/2023  9:30 AM LABORATORY, [facility] ONL LAB [facility]   4/13/2023  9:40 AM SPECIMEN, [facility] SPECLAB [facility]   4/20/2023 10:00 AM Dilcia Saavedra NP [facility]   6/13/2023  1:40 PM Derrell Wagner MD HG IM [facility]   6/29/2023 10:00 AM FIELDS, VISUAL-ONE [facility] OPHTHAL [facility]   6/29/2023 10:30 AM Srikanth Yost OD [facility] OPHTHAL [facility]   9/28/2023  9:55 AM LABORATORY, V [facility] LAB [facility]   10/2/2023 10:45 AM Tesha Perez MD [facility] UROLOGY [facility]       Rodrigo Weaver .      STAFF  Alverto Ashley was discussed with Dr. Weaver as outlined.  I have personally seen, interviewed and evaluated him, reviewed the information in this note, and agree with the findings listed in the attached encounter.

## 2023-03-20 NOTE — LETTER
March 20, 2023        Dilcia Saavedra  [address] [place] [address]  Phone: [phone]  Fax: [phone]             [facility] Transplant 1st Fl  [address]  [place] [address]  Phone: [phone]   Patient: Alverto Ramirez Jr.   MR Number: [id number]   YOB: 1950   Date of Visit: 3/20/2023       Dear Dr. Dilcia Saavedra    Thank you for referring Alverto Ramirez to me for evaluation. Attached you will find relevant portions of my assessment and plan of care.    If you have questions, please do not hesitate to call me. I look forward to following Alverto Ramirez along with you.    Sincerely,    Dilcia Parker MD    Enclosure    If you would like to receive this communication electronically, please contact [email or web address] or [phone] to request [employer] Link access.    [employer] Link is a tool which provides read-only access to select patient information with whom you have a relationship. Its easy to use and provides real time access to review your patients record including encounter summaries, notes, results, and demographic information.    If you feel you have received this communication in error or would no longer like to receive these types of communications, please e-mail [email or web address]

## 2023-03-20 NOTE — PATIENT INSTRUCTIONS
Congratulations on reaching another anniversary after transplant! I expect you will have many more!    -You will need a team to care for you the best way possible! For your health, you should follow with your general nephrologist, primary care provider/PCP, dentist and eye doctor, and GYN (for ladies) for routine/preventative care. If you are a diabetic, you should see a podiatrist for regular foot checks. If you need help establishing with one, let us know.    -Your tacrolimus and mycophenolate should be billed through medicare part B, whereas other meds go to part D.    -Remember to keep transplant posted about medication changes or new developments in your health. These may warrant changing your immunosuppression. We are not automatically notified of changes in your condition, so please call or message us with any updates.    -Don't forget we have pharmacist, dietician and social workers that are able to help you, at your request.    -We recommend you stay up to date with vaccines - your primary care provider will help with this, since vaccine recommendations vary by age and get updated regularly. You should get a yearly influenza vaccine. It does not protect you against all infections, and you can still get the flu. The influenza vaccine has been shown to help keep patients from having as severe disease compared to a group of unvaccinated patients.    DOs and DON'Ts FOR TRANSPLANT PATIENTS USING OVER THE COUNTER REMEDIES    Acne  - Clean affected areas with Panoxyl foaming wash [or any other wash containing 10% benzoyl peroxide] - follow direction on package insert and beware it can bleach fabrics and hair.  - You may also try Differin [adapalene gel  0.1%] to the affected areas as per package insert as well.  - A good moisturizer may be needed if skin dries out. Cetaphil and Cerave make ones often recommended by dermatologists.  - Don't forget to protect your skin from the ski, since you are at increased risk  of skin cancer.    Hair Loss - Minoxidil (Rogaine) topically on scalp. Note it wears off if you stop using, so plan on stayingon it as long as you want the effects to last.    Arthritis   - We do not recommend NSAIDS  by mouth such as Motrin, Advil, Aleve, naprosen, BC powders, etc. They can cause harm to the kidney. If in doubt, please check!  - Acetaminophen up to 3000 mg (3 grams) every 24 hrs is safe. May people try 1 gram (2 extra strength) tabs/caps every 8 hours.  - Topical medicines are OK: Voltaren (diclofenac) gel, lidocaine gel, lidocaine patches, salon pas patches  - Avoid Turmeric (active ingredient: curcumin) as it interacts with your immunosuppression    Fever or pain   - Tylenol (acetaminophen).   - For pain you can try salon pas or lidocaine patches to be applied directly to area of pain.   - Diclofenac [Voltaren] gel is safe to use for a few weeks.    - We do not recommend NSAIDS  by mouth such as Motrin or Advil (ibuprofen), Aleve or naprosen (naproxen), BC powders, etc. If in doubt, please check as there are several others that can be prescribed!    Cough Suppressant - Dextromethorphan Hydrobromide 30 mg or cough drops (Halls or equivalent generic)    Nasal congestion   - Saline nasal spray is very safe.   - Oxymetazoline (Afrin), but should ONLY USE FOR 3 DAYS.   - Topical Vicks vaporub or Vicks nasal inhaler is also acceptable.  - Note tablet form decongestants (Sudafed, phenylephrine) can raise blood pressure and are not recommended    Allergy symptoms - Antihistamines are safe: diphenhydramine (Benadryl), loratadine (Claritin), cetrizine (Zyrtec). These can also help dry up drainage.    For sore throat -  salt water gargles, sore throat sprays like Chloraseptic or sore throat lozenges are safe for temporary relief    For thick secretions (thick mucous) - Guaifenesin (Mucinex), saline nasal spray     To prevent colds - low dose vitamin C is probably OK, but can increase risk of kidney stones.  Zinc lozenges (not tablets) taken through the day may help minimize. Let the lozenge dissolve in the back of your throat. Note: Zinc can inhibit the virus from multiplying in your throat, but it is not proven to prevent colds.    For memory Loss - OK to try Prevagen    Indigestion  - famotidine (Pepcid) 20 mg daily is generally safe. Calcium carbonate (Tums and many other brands) can cause high calcium and should be taken only after taking to your provider about your calcium levels. High calcium can hurt the kidney. Avoid cimetidine (Tagamet) as it can interfere with your immunosuppression and cause toxic levels. A few doses of pepto bismol should be fine, but continued indigestion should be evaluated by your provider.    Nausea or vomiting - these symptoms can indicate there is something wrong with your stomach and should be evaluated if they last more than 6-8 hours OR anytime you cannot keep your medicines down. Not being able to take your medicine can cause complications.     Diarrhea -  Pepto-Bismol, even Metamucil can help liquid stools. Diarrhea can be a sign of infection, so please let your provider know if it continues past 1-2 days for full evaluation. Avoid Imodium unless you have spoken to a provider.    Constipation- Colace, Dulcolax, MiraLax are safe to take regardless of your kidney function.  Please check with the provider before using magnesium or phosphorus containing supplements such as magnesium citrate, milk of magnesium, or Fleet's Phospho-Soda.  These remedies may cause harm, depending on the degree of kidney insufficiency you have.  Your doctor can advise if year magnesium and phosphorus levels are low enough to take these.    Herbal and natural remedies - Some herbals are very safe and effective while others are proven to cause renal failure or interact with your medication. Do NOT take any natural or herbal remedies without discussing with transplant.    If you develop fever or shortness of  breath, or start to feel worse, you need to get checked out by a provider in a clinic or go to ED, depending on the severity of your symptoms.    As always, feel free to ask any questions and raise any concerns regarding your health care.    Best Wishes,  Dr. Matilde Parker and your entire transplant team

## 2023-04-06 ENCOUNTER — LAB VISIT (OUTPATIENT)
Dept: LAB | Facility: HOSPITAL | Age: 73
End: 2023-04-06
Attending: FAMILY MEDICINE
Payer: MEDICARE

## 2023-04-06 DIAGNOSIS — Z94.0 KIDNEY TRANSPLANT STATUS: ICD-10-CM

## 2023-04-06 DIAGNOSIS — D63.1 ANEMIA IN ESRD (END-STAGE RENAL DISEASE): ICD-10-CM

## 2023-04-06 DIAGNOSIS — D47.2 MONOCLONAL GAMMOPATHY: ICD-10-CM

## 2023-04-06 DIAGNOSIS — N18.6 ANEMIA IN ESRD (END-STAGE RENAL DISEASE): ICD-10-CM

## 2023-04-06 PROBLEM — Z99.2 ESRD ON DIALYSIS: Status: ACTIVE | Noted: 2022-03-25

## 2023-04-06 LAB
ALBUMIN SERPL BCP-MCNC: 4 G/DL (ref 3.5–5.2)
ALP SERPL-CCNC: 106 U/L (ref 55–135)
ALT SERPL W/O P-5'-P-CCNC: 32 U/L (ref 10–44)
ANION GAP SERPL CALC-SCNC: 11 MMOL/L (ref 8–16)
AST SERPL-CCNC: 17 U/L (ref 10–40)
BILIRUB SERPL-MCNC: 0.4 MG/DL (ref 0.1–1)
BUN SERPL-MCNC: 14 MG/DL (ref 8–23)
CALCIUM SERPL-MCNC: 9.6 MG/DL (ref 8.7–10.5)
CHLORIDE SERPL-SCNC: 107 MMOL/L (ref 95–110)
CO2 SERPL-SCNC: 22 MMOL/L (ref 23–29)
CREAT SERPL-MCNC: 1.3 MG/DL (ref 0.5–1.4)
ERYTHROCYTE [DISTWIDTH] IN BLOOD BY AUTOMATED COUNT: 14.8 % (ref 11.5–14.5)
EST. GFR  (NO RACE VARIABLE): 58 ML/MIN/1.73 M^2
GLUCOSE SERPL-MCNC: 128 MG/DL (ref 70–110)
HCT VFR BLD AUTO: 45.7 % (ref 40–54)
HGB BLD-MCNC: 15 G/DL (ref 14–18)
IMM GRANULOCYTES # BLD AUTO: 0.07 K/UL (ref 0–0.04)
MCH RBC QN AUTO: 28.1 PG (ref 27–31)
MCHC RBC AUTO-ENTMCNC: 32.8 G/DL (ref 32–36)
MCV RBC AUTO: 86 FL (ref 82–98)
NEUTROPHILS # BLD AUTO: 4.6 K/UL (ref 1.8–7.7)
PLATELET # BLD AUTO: 163 K/UL (ref 150–450)
PMV BLD AUTO: 10.4 FL (ref 9.2–12.9)
POTASSIUM SERPL-SCNC: 3.6 MMOL/L (ref 3.5–5.1)
PROT SERPL-MCNC: 7 G/DL (ref 6–8.4)
RBC # BLD AUTO: 5.33 M/UL (ref 4.6–6.2)
SODIUM SERPL-SCNC: 140 MMOL/L (ref 136–145)
WBC # BLD AUTO: 7.4 K/UL (ref 3.9–12.7)

## 2023-04-06 PROCEDURE — 86334 IMMUNOFIX E-PHORESIS SERUM: CPT | Mod: 26,,, | Performed by: PATHOLOGY

## 2023-04-06 PROCEDURE — 80053 COMPREHEN METABOLIC PANEL: CPT | Performed by: NURSE PRACTITIONER

## 2023-04-06 PROCEDURE — 84165 PATHOLOGIST INTERPRETATION SPE: ICD-10-PCS | Mod: 26,,, | Performed by: PATHOLOGY

## 2023-04-06 PROCEDURE — 83521 IG LIGHT CHAINS FREE EACH: CPT | Mod: 59 | Performed by: NURSE PRACTITIONER

## 2023-04-06 PROCEDURE — 86334 PATHOLOGIST INTERPRETATION IFE: ICD-10-PCS | Mod: 26,,, | Performed by: PATHOLOGY

## 2023-04-06 PROCEDURE — 84165 PROTEIN E-PHORESIS SERUM: CPT | Performed by: NURSE PRACTITIONER

## 2023-04-06 PROCEDURE — 85027 COMPLETE CBC AUTOMATED: CPT | Performed by: NURSE PRACTITIONER

## 2023-04-06 PROCEDURE — 36415 COLL VENOUS BLD VENIPUNCTURE: CPT | Performed by: NURSE PRACTITIONER

## 2023-04-06 PROCEDURE — 86334 IMMUNOFIX E-PHORESIS SERUM: CPT | Performed by: NURSE PRACTITIONER

## 2023-04-06 PROCEDURE — 84165 PROTEIN E-PHORESIS SERUM: CPT | Mod: 26,,, | Performed by: PATHOLOGY

## 2023-04-06 NOTE — PROGRESS NOTES
Subjective:       Patient ID: Alverto Ramirez Jr. is a 72 y.o. male.    Chief Complaint:   1. Monoclonal gammopathy  IgM kappa      2. Anemia in ESRD (end-stage renal disease)        3. Kidney transplant status          Current Treatment:  Observation     Treatment History:    HPI: This is a 72-year-old  male with medical history significant for afib, anemia secondary to CKD, hypertensive nephropathy, and IgM monoclonal gammopathy of undetermined significance that was diagnosed in 2018 and since has been monitored.       He was on peritoneal dialysis and underwent kidney transplantation on 3/19/2022. He follows with the transplant team in [facility].     Interval History: Patient presents for follow up on labs. He presents with his wife and has no complaints today. He reports a good appetite and normal Bms. He denies fatigue, weakness, dizziness, SOB, and lightheadedness. He does report sinus headaches recently. He also reports being cold more often than not; he states this began when he started dialysis. He is no longer anemic after having kidney transplant in 3/2022. SPEP/DAYSI interpretations pending at time of visit; however, kappa and lambda FLC are WNL; ratio now WNL as well. His BP is elevated today; he states his SBPs are 120s at home. He does admit that his Bps are higher when he goes to the doctor. He may have white coat hypertension.     Reviewed labs with patient:   CBC:   Recent Labs   Lab 04/06/23  0936   WBC 7.40   RBC 5.33   Hemoglobin 15.0   Hematocrit 45.7   Platelets 163   MCV 86   MCH 28.1   MCHC 32.8     CMP:  Recent Labs   Lab 04/06/23  0936   Glucose 128 H   Calcium 9.6   Albumin 4.0   Total Protein 7.0   Sodium 140   Potassium 3.6   CO2 22 L   Chloride 107   BUN 14   Creatinine 1.3   Alkaline Phosphatase 106   ALT 32   AST 17   Total Bilirubin 0.4     Social History     Socioeconomic History    Marital status:    Occupational History     Employer: retired   Tobacco Use    Smoking  status: Former     Packs/day: 1.00     Years: 15.00     Pack years: 15.00     Types: Cigarettes     Quit date: 1984     Years since quittin.3    Smokeless tobacco: Never   Substance and Sexual Activity    Alcohol use: Yes     Alcohol/week: 5.0 - 7.0 standard drinks     Types: 5 - 7 Standard drinks or equivalent per week     Comment: stopped drinking    Drug use: No    Sexual activity: Yes     Partners: Female   Social History Narrative    Retired from [employer]     for 41 y.o.    2 children    No history of blood transfusions    No donors     Past Medical History:   Diagnosis Date    Anemia in CKD (chronic kidney disease)     Atrial fibrillation     CKD (chronic kidney disease) stage 4, GFR 15 2014    Colon cancer screening 2014    Elevated PSA 2014    HTN (hypertension) diagnosed in his 40s 10/16/2014    Monoclonal gammopathy 2014    Phobic anxiety disorder 2014    Proteinuria 2014    Stage 3a chronic kidney disease 3/25/2022     Family History   Problem Relation Age of Onset    Heart disease Father     Dementia Father     Diabetes Mother     Cataracts Mother     Glaucoma Mother     Hypertension Sister     Cancer Brother         prostate    Kidney disease Sister         ESRD    Cancer Paternal Uncle      Past Surgical History:   Procedure Laterality Date    AV FISTULA PLACEMENT  2014    COLONOSCOPY N/A 2017    Procedure: COLONOSCOPY;  Surgeon: Tony Peacock III, MD;  Location: [facility];  Service: Endoscopy;  Laterality: N/A;    KIDNEY TRANSPLANT Right 3/18/2022    Procedure: TRANSPLANT, KIDNEY;  Surgeon: Victoriano Thomas MD;  Location: [address];  Service: Transplant;  Laterality: Right;    PERITONEAL CATHETER INSERTION      VASECTOMY      VASECTOMY       Review of Systems   Constitutional:  Negative for appetite change and fatigue.   HENT:  Negative for mouth sores, rhinorrhea and sore throat.    Eyes: Negative.    Respiratory:  Negative  for shortness of breath.    Cardiovascular: Negative.    Gastrointestinal:  Negative for constipation, diarrhea, nausea and vomiting.   Endocrine: Positive for cold intolerance (since starting dialysis).   Genitourinary: Negative.    Musculoskeletal: Negative.    Integumentary:  Negative.   Allergic/Immunologic: Negative.    Neurological:  Positive for headaches (sinus headaches). Negative for dizziness, weakness, light-headedness and numbness.   Hematological: Negative.    Psychiatric/Behavioral: Negative.         Medication List with Changes/Refills   Current Medications    ALPRAZOLAM (XANAX) 1 MG TABLET    Take 1 tablet (1 mg total) by mouth daily as needed for Anxiety or Insomnia.    CALCITRIOL (ROCALTROL) 0.25 MCG CAP    Take 1 capsule (0.25 mcg total) by mouth once daily.    CHOLECALCIFEROL, VITAMIN D3, (VITAMIN D3) 50 MCG (2,000 UNIT) TAB    Take 1 tablet (2,000 Units total) by mouth once daily.    K PHOS DI & MONO-SOD PHOS MONO (K-PHOS-NEUTRAL) 250 MG TAB    Take 1 tablet by mouth 2 (two) times a day.    MAGNESIUM OXIDE (MAG-OX) 400 MG (241.3 MG MAGNESIUM) TABLET    Take 1 tablet (400 mg total) by mouth 2 (two) times daily.    METOPROLOL SUCCINATE (TOPROL-XL) 50 MG 24 HR TABLET    Take 1 tablet (50 mg total) by mouth 2 (two) times daily.    MUPIROCIN (BACTROBAN) 2 % OINTMENT    by Nasal route once daily.    MYCOPHENOLATE (CELLCEPT) 250 MG CAP    Take 3 capsules (750 mg total) by mouth 2 (two) times daily.    NIFEDIPINE (PROCARDIA-XL) 60 MG (OSM) 24 HR TABLET    Take 1 tablet (60 mg total) by mouth 2 (two) times a day.    PREDNISONE (DELTASONE) 5 MG TABLET    Take 1 tablet (5 mg total) by mouth once daily. Take by mouth daily: 20mg 3/22/22 -4/21, 15mg 4/22-5/22, 10mg 5/23-6/22, 5mg 6/23- thereafter    SILDENAFIL (VIAGRA) 100 MG TABLET    Take 1 tablet (100 mg total) by mouth daily as needed for Erectile Dysfunction.    SODIUM BICARBONATE 650 MG TABLET    Take 3 tablets (1,950 mg total) by mouth 2 (two) times  daily.    TACROLIMUS (PROGRAF) 1 MG CAP    Take 4 capsules (4 mg total) by mouth every morning AND 3 capsules (3 mg total) every evening.    TRAZODONE (DESYREL) 50 MG TABLET    Take 1-2 tablets ( mg total) by mouth every evening.     Objective:     Vitals:    04/10/23 1333   BP: (!) 155/79   Pulse: 72   Resp: 18   Temp: 97.9 °F (36.6 °C)     Physical Exam  Vitals reviewed.   Constitutional:       Appearance: Normal appearance.   HENT:      Head: Normocephalic.      Mouth/Throat:      Comments: Wearing mask    Eyes:      Extraocular Movements: Extraocular movements intact.      Pupils: Pupils are equal, round, and reactive to light.      Comments: Glasses       Cardiovascular:      Rate and Rhythm: Normal rate and regular rhythm.      Heart sounds: Normal heart sounds.   Pulmonary:      Effort: Pulmonary effort is normal.      Breath sounds: Normal breath sounds.   Abdominal:      General: Bowel sounds are normal.      Palpations: Abdomen is soft.      Comments: rounded     Genitourinary:     Comments: deferred    Musculoskeletal:         General: Normal range of motion.      Cervical back: Normal range of motion and neck supple.   Skin:     General: Skin is warm and dry.   Neurological:      Mental Status: He is alert and oriented to person, place, and time.   Psychiatric:         Behavior: Behavior normal.         Thought Content: Thought content normal.        (1) Restricted in physically strenuous activity, ambulatory and able to do work of light nature  Assessment:     Problem List Items Addressed This Visit          Renal/    Kidney transplant status     Underwent kidney transplantation on 3/19/2022. Follows with transplant team.               Oncology    Anemia in ESRD (end-stage renal disease) (Chronic)     Anemia of chronic kidney disease.  Most recent hemoglobin noted above 10 per dL no indication for erythropoietin stimulating agent.  Will continue to monitor.           Monoclonal gammopathy -  Primary     IgM monoclonal gammopathy. Will repeat protein electrophoresis and immunofixation.  Will continue to monitor every 3 months or sooner as needed.             Plan:     Monoclonal gammopathy    Anemia in ESRD (end-stage renal disease)    Kidney transplant status    Labs reviewed; light chains & ratio normal.   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   Follow up in 4 months  with  SPEP, DAYSI, FLC, CBC, and Comprehensive Metabolic Panel.    Route Chart for Scheduling    Med Onc Chart Routing      Follow up with physician    Follow up with ROBERT 4 months. [facility]   Infusion scheduling note    Injection scheduling note    Labs CBC, CMP, SPEP, free light chains and other   Scheduling:  Preferred lab:  Lab interval:  4 months, 3-4 days prior at [facility]   Imaging None      Pharmacy appointment No pharmacy appointment needed      Other referrals  No additional referrals needed         I will review assessment/plan with collaborating physician.      SARAH Silver

## 2023-04-10 ENCOUNTER — OFFICE VISIT (OUTPATIENT)
Dept: HEMATOLOGY/ONCOLOGY | Facility: CLINIC | Age: 73
End: 2023-04-10
Payer: MEDICARE

## 2023-04-10 VITALS
WEIGHT: 192 LBS | HEART RATE: 72 BPM | BODY MASS INDEX: 30.13 KG/M2 | HEIGHT: 67 IN | RESPIRATION RATE: 18 BRPM | OXYGEN SATURATION: 98 % | TEMPERATURE: 98 F | SYSTOLIC BLOOD PRESSURE: 155 MMHG | DIASTOLIC BLOOD PRESSURE: 79 MMHG

## 2023-04-10 DIAGNOSIS — N18.6 ANEMIA IN ESRD (END-STAGE RENAL DISEASE): ICD-10-CM

## 2023-04-10 DIAGNOSIS — D63.1 ANEMIA IN ESRD (END-STAGE RENAL DISEASE): ICD-10-CM

## 2023-04-10 DIAGNOSIS — D47.2 MONOCLONAL GAMMOPATHY: Primary | ICD-10-CM

## 2023-04-10 DIAGNOSIS — Z94.0 KIDNEY TRANSPLANT STATUS: ICD-10-CM

## 2023-04-10 LAB
ALBUMIN SERPL ELPH-MCNC: 3.97 G/DL (ref 3.35–5.55)
ALPHA1 GLOB SERPL ELPH-MCNC: 0.31 G/DL (ref 0.17–0.41)
ALPHA2 GLOB SERPL ELPH-MCNC: 0.84 G/DL (ref 0.43–0.99)
B-GLOBULIN SERPL ELPH-MCNC: 0.65 G/DL (ref 0.5–1.1)
GAMMA GLOB SERPL ELPH-MCNC: 0.63 G/DL (ref 0.67–1.58)
INTERPRETATION SERPL IFE-IMP: NORMAL
KAPPA LC SER QL IA: 1.61 MG/DL (ref 0.33–1.94)
KAPPA LC/LAMBDA SER IA: 1.45 (ref 0.26–1.65)
LAMBDA LC SER QL IA: 1.11 MG/DL (ref 0.57–2.63)
PROT SERPL-MCNC: 6.4 G/DL (ref 6–8.4)

## 2023-04-10 PROCEDURE — 1101F PR PT FALLS ASSESS DOC 0-1 FALLS W/OUT INJ PAST YR: ICD-10-PCS | Mod: CPTII,S$GLB,, | Performed by: NURSE PRACTITIONER

## 2023-04-10 PROCEDURE — 1160F PR REVIEW ALL MEDS BY PRESCRIBER/CLIN PHARMACIST DOCUMENTED: ICD-10-PCS | Mod: CPTII,S$GLB,, | Performed by: NURSE PRACTITIONER

## 2023-04-10 PROCEDURE — 3066F PR DOCUMENTATION OF TREATMENT FOR NEPHROPATHY: ICD-10-PCS | Mod: CPTII,S$GLB,, | Performed by: NURSE PRACTITIONER

## 2023-04-10 PROCEDURE — 99999 PR PBB SHADOW E&M-EST. PATIENT-LVL IV: CPT | Mod: PBBFAC,,, | Performed by: NURSE PRACTITIONER

## 2023-04-10 PROCEDURE — 3288F FALL RISK ASSESSMENT DOCD: CPT | Mod: CPTII,S$GLB,, | Performed by: NURSE PRACTITIONER

## 2023-04-10 PROCEDURE — 1101F PT FALLS ASSESS-DOCD LE1/YR: CPT | Mod: CPTII,S$GLB,, | Performed by: NURSE PRACTITIONER

## 2023-04-10 PROCEDURE — 99214 PR OFFICE/OUTPT VISIT, EST, LEVL IV, 30-39 MIN: ICD-10-PCS | Mod: S$GLB,,, | Performed by: NURSE PRACTITIONER

## 2023-04-10 PROCEDURE — 3078F DIAST BP <80 MM HG: CPT | Mod: CPTII,S$GLB,, | Performed by: NURSE PRACTITIONER

## 2023-04-10 PROCEDURE — 1126F AMNT PAIN NOTED NONE PRSNT: CPT | Mod: CPTII,S$GLB,, | Performed by: NURSE PRACTITIONER

## 2023-04-10 PROCEDURE — 3288F PR FALLS RISK ASSESSMENT DOCUMENTED: ICD-10-PCS | Mod: CPTII,S$GLB,, | Performed by: NURSE PRACTITIONER

## 2023-04-10 PROCEDURE — 99214 OFFICE O/P EST MOD 30 MIN: CPT | Mod: S$GLB,,, | Performed by: NURSE PRACTITIONER

## 2023-04-10 PROCEDURE — 3078F PR MOST RECENT DIASTOLIC BLOOD PRESSURE < 80 MM HG: ICD-10-PCS | Mod: CPTII,S$GLB,, | Performed by: NURSE PRACTITIONER

## 2023-04-10 PROCEDURE — 3008F BODY MASS INDEX DOCD: CPT | Mod: CPTII,S$GLB,, | Performed by: NURSE PRACTITIONER

## 2023-04-10 PROCEDURE — 3077F PR MOST RECENT SYSTOLIC BLOOD PRESSURE >= 140 MM HG: ICD-10-PCS | Mod: CPTII,S$GLB,, | Performed by: NURSE PRACTITIONER

## 2023-04-10 PROCEDURE — 1160F RVW MEDS BY RX/DR IN RCRD: CPT | Mod: CPTII,S$GLB,, | Performed by: NURSE PRACTITIONER

## 2023-04-10 PROCEDURE — 1126F PR PAIN SEVERITY QUANTIFIED, NO PAIN PRESENT: ICD-10-PCS | Mod: CPTII,S$GLB,, | Performed by: NURSE PRACTITIONER

## 2023-04-10 PROCEDURE — 99999 PR PBB SHADOW E&M-EST. PATIENT-LVL IV: ICD-10-PCS | Mod: PBBFAC,,, | Performed by: NURSE PRACTITIONER

## 2023-04-10 PROCEDURE — 3008F PR BODY MASS INDEX (BMI) DOCUMENTED: ICD-10-PCS | Mod: CPTII,S$GLB,, | Performed by: NURSE PRACTITIONER

## 2023-04-10 PROCEDURE — 1159F PR MEDICATION LIST DOCUMENTED IN MEDICAL RECORD: ICD-10-PCS | Mod: CPTII,S$GLB,, | Performed by: NURSE PRACTITIONER

## 2023-04-10 PROCEDURE — 3066F NEPHROPATHY DOC TX: CPT | Mod: CPTII,S$GLB,, | Performed by: NURSE PRACTITIONER

## 2023-04-10 PROCEDURE — 1159F MED LIST DOCD IN RCRD: CPT | Mod: CPTII,S$GLB,, | Performed by: NURSE PRACTITIONER

## 2023-04-10 PROCEDURE — 3077F SYST BP >= 140 MM HG: CPT | Mod: CPTII,S$GLB,, | Performed by: NURSE PRACTITIONER

## 2023-04-11 LAB
PATHOLOGIST INTERPRETATION IFE: NORMAL
PATHOLOGIST INTERPRETATION SPE: NORMAL

## 2023-04-13 ENCOUNTER — LAB VISIT (OUTPATIENT)
Dept: LAB | Facility: HOSPITAL | Age: 73
End: 2023-04-13
Attending: FAMILY MEDICINE
Payer: MEDICARE

## 2023-04-13 DIAGNOSIS — Z94.0 S/P KIDNEY TRANSPLANT: ICD-10-CM

## 2023-04-13 LAB
ALBUMIN SERPL BCP-MCNC: 4 G/DL (ref 3.5–5.2)
ANION GAP SERPL CALC-SCNC: 11 MMOL/L (ref 8–16)
BUN SERPL-MCNC: 16 MG/DL (ref 8–23)
CALCIUM SERPL-MCNC: 9.8 MG/DL (ref 8.7–10.5)
CHLORIDE SERPL-SCNC: 106 MMOL/L (ref 95–110)
CO2 SERPL-SCNC: 20 MMOL/L (ref 23–29)
CREAT SERPL-MCNC: 1.2 MG/DL (ref 0.5–1.4)
EST. GFR  (NO RACE VARIABLE): >60 ML/MIN/1.73 M^2
GLUCOSE SERPL-MCNC: 125 MG/DL (ref 70–110)
MAGNESIUM SERPL-MCNC: 1.8 MG/DL (ref 1.6–2.6)
PHOSPHATE SERPL-MCNC: 2.5 MG/DL (ref 2.7–4.5)
POTASSIUM SERPL-SCNC: 3.6 MMOL/L (ref 3.5–5.1)
PTH-INTACT SERPL-MCNC: 135.5 PG/ML (ref 9–77)
SODIUM SERPL-SCNC: 137 MMOL/L (ref 136–145)

## 2023-04-13 PROCEDURE — 36415 COLL VENOUS BLD VENIPUNCTURE: CPT | Performed by: NURSE PRACTITIONER

## 2023-04-13 PROCEDURE — 83970 ASSAY OF PARATHORMONE: CPT | Performed by: NURSE PRACTITIONER

## 2023-04-13 PROCEDURE — 83735 ASSAY OF MAGNESIUM: CPT | Performed by: NURSE PRACTITIONER

## 2023-04-13 PROCEDURE — 80069 RENAL FUNCTION PANEL: CPT | Performed by: NURSE PRACTITIONER

## 2023-04-13 PROCEDURE — 80197 ASSAY OF TACROLIMUS: CPT | Performed by: NURSE PRACTITIONER

## 2023-04-14 LAB — TACROLIMUS BLD-MCNC: 9.5 NG/ML (ref 5–15)

## 2023-04-14 NOTE — PROGRESS NOTES
"Subjective:       Patient ID: Alverto Ramirez Jr. is a 72 y.o. male.    Chief Complaint: s/p Kidney transplant, CKD    HPI  Patient presents to clinic today for routine follow-up.  Pt was previously ESRD on PD.  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  He takes mycophenolate mofetil, prednisone and tacrolimus for maintenance immunosuppression.  His post transplant course has been complicated by urinary retention severe diarrhea and hypophosphatemia.  He is followed by  Transplant team and Urology.     Pt was seen and examined today in clinic.  Pt denies taking NSAIDs, no GI losses, no abx use, no recent infections, no dysuria, no cardiopulmonary sx's.  Laboratory studies and medications were reviewed.  Since pt's last office visit, states doing well with no specific or new complaints voiced.  All Nephrology related questions were answered to her satisfaction.    The past medical, family and social histories were reviewed for this encounter.    Review of Systems   Constitutional: Negative.    HENT: Negative.     Respiratory:  Negative for shortness of breath.    Cardiovascular: Negative.  Negative for leg swelling.   Gastrointestinal: Negative.    Genitourinary: Negative.    Musculoskeletal: Negative.    Skin: Negative.    Neurological:  Negative for dizziness, light-headedness and headaches.   Psychiatric/Behavioral: Negative.          height is 5' 7.5" (1.715 m) and weight is 86.8 kg (191 lb 5.8 oz). His blood pressure is 140/62 (abnormal) and his pulse is 70.     Lab Results   Component Value Date    WBC 7.40 04/06/2023    HGB 15.0 04/06/2023    HCT 45.7 04/06/2023    MCV 86 04/06/2023     04/06/2023        CMP  Sodium   Date Value Ref Range Status   04/13/2023 137 136 - 145 mmol/L Final     Potassium   Date Value Ref Range Status   04/13/2023 3.6 3.5 - 5.1 mmol/L Final     Chloride   Date Value Ref Range Status   04/13/2023 106 95 - 110 mmol/L Final     CO2   Date Value Ref " Range Status   04/13/2023 20 (L) 23 - 29 mmol/L Final     Glucose   Date Value Ref Range Status   04/13/2023 125 (H) 70 - 110 mg/dL Final     BUN   Date Value Ref Range Status   04/13/2023 16 8 - 23 mg/dL Final     Creatinine   Date Value Ref Range Status   04/13/2023 1.2 0.5 - 1.4 mg/dL Final     Calcium   Date Value Ref Range Status   04/13/2023 9.8 8.7 - 10.5 mg/dL Final     Total Protein   Date Value Ref Range Status   04/06/2023 7.0 6.0 - 8.4 g/dL Final     Albumin   Date Value Ref Range Status   04/13/2023 4.0 3.5 - 5.2 g/dL Final     Total Bilirubin   Date Value Ref Range Status   04/06/2023 0.4 0.1 - 1.0 mg/dL Final     Comment:     For infants and newborns, interpretation of results should be based  on gestational age, weight and in agreement with clinical  observations.    Premature Infant recommended reference ranges:  Up to 24 hours.............<8.0 mg/dL  Up to 48 hours............<12.0 mg/dL  3-5 days..................<15.0 mg/dL  6-29 days.................<15.0 mg/dL       Alkaline Phosphatase   Date Value Ref Range Status   04/06/2023 106 55 - 135 U/L Final     AST   Date Value Ref Range Status   04/06/2023 17 10 - 40 U/L Final     ALT   Date Value Ref Range Status   04/06/2023 32 10 - 44 U/L Final     Anion Gap   Date Value Ref Range Status   04/13/2023 11 8 - 16 mmol/L Final     eGFR if    Date Value Ref Range Status   07/14/2022 >60.0 >60 mL/min/1.73 m^2 Final     eGFR if non    Date Value Ref Range Status   07/14/2022 54.1 (A) >60 mL/min/1.73 m^2 Final     Comment:     Calculation used to obtain the estimated glomerular filtration  rate (eGFR) is the CKD-EPI equation.          Current Outpatient Medications on File Prior to Visit   Medication Sig Dispense Refill    calcitRIOL (ROCALTROL) 0.25 MCG Cap Take 1 capsule (0.25 mcg total) by mouth once daily. 90 capsule 3    cholecalciferol, vitamin D3, (VITAMIN D3) 50 mcg (2,000 unit) Tab Take 1 tablet (2,000 Units  total) by mouth once daily. 60 tablet 11    k phos di & mono-sod phos mono (K-PHOS-NEUTRAL) 250 mg Tab Take 1 tablet by mouth 2 (two) times a day. 90 tablet 11    magnesium oxide (MAG-OX) 400 mg (241.3 mg magnesium) tablet Take 1 tablet (400 mg total) by mouth 2 (two) times daily. 60 tablet 11    metoprolol succinate (TOPROL-XL) 50 MG 24 hr tablet Take 1 tablet (50 mg total) by mouth 2 (two) times daily. 180 tablet 3    mupirocin (BACTROBAN) 2 % ointment by Nasal route once daily. 30 g 3    mycophenolate (CELLCEPT) 250 mg Cap Take 3 capsules (750 mg total) by mouth 2 (two) times daily. 240 capsule 11    NIFEdipine (PROCARDIA-XL) 60 MG (OSM) 24 hr tablet Take 1 tablet (60 mg total) by mouth 2 (two) times a day. 180 tablet 3    predniSONE (DELTASONE) 5 MG tablet Take 1 tablet (5 mg total) by mouth once daily. Take by mouth daily: 20mg 3/22/22 -4/21, 15mg 4/22-5/22, 10mg 5/23-6/22, 5mg 6/23- thereafter 91 tablet 3    sildenafiL (VIAGRA) 100 MG tablet Take 1 tablet (100 mg total) by mouth daily as needed for Erectile Dysfunction. 30 tablet 11    sodium bicarbonate 650 MG tablet Take 3 tablets (1,950 mg total) by mouth 2 (two) times daily. 180 tablet 11    traZODone (DESYREL) 50 MG tablet Take 1-2 tablets ( mg total) by mouth every evening. 180 tablet 3    [DISCONTINUED] tacrolimus (PROGRAF) 1 MG Cap Take 4 capsules (4 mg total) by mouth every morning AND 3 capsules (3 mg total) every evening. 210 capsule 11    ALPRAZolam (XANAX) 1 MG tablet Take 1 tablet (1 mg total) by mouth daily as needed for Anxiety or Insomnia. 90 tablet 0    [DISCONTINUED] amLODIPine (NORVASC) 10 MG tablet Take 1 tablet (10 mg total) by mouth once daily. 30 tablet 11    [DISCONTINUED] calcium carbonate (OS-SUSAN) 500 mg calcium (1,250 mg) chewable tablet Take 1 tablet by mouth 3 (three) times daily.      [DISCONTINUED] lanthanum (FOSRENOL) 1000 MG chewable tablet CHEW 2 TABLETS THREE TIMES A DAY WITH MEALS 540 tablet 4    [DISCONTINUED]  losartan (COZAAR) 100 MG tablet Take 1 tablet (100 mg total) by mouth once daily. 90 tablet 3    [DISCONTINUED] torsemide (DEMADEX) 100 MG Tab Take 2 tablets (200 mg total) by mouth once daily. 180 tablet 3     No current facility-administered medications on file prior to visit.       Objective:        Physical Exam  Vitals and nursing note reviewed.   Constitutional:       Appearance: Normal appearance.   HENT:      Head: Normocephalic and atraumatic.   Eyes:      Extraocular Movements: Extraocular movements intact.      Pupils: Pupils are equal, round, and reactive to light.   Cardiovascular:      Rate and Rhythm: Normal rate and regular rhythm.   Pulmonary:      Effort: Pulmonary effort is normal.   Abdominal:      General: Bowel sounds are normal.      Palpations: Abdomen is soft.   Musculoskeletal:         General: Normal range of motion.      Right lower leg: No edema.      Left lower leg: No edema.   Skin:     General: Skin is warm and dry.   Neurological:      General: No focal deficit present.      Mental Status: He is alert and oriented to person, place, and time.   Psychiatric:         Mood and Affect: Mood normal.         Behavior: Behavior normal.         Thought Content: Thought content normal.         Judgment: Judgment normal.         Assessment:       1. S/P kidney transplant    2. Immunocompromised    3. Long-term use of immunosuppressant medication    4. CKD (chronic kidney disease) stage 2, GFR 60-89 ml/min    5. Essential hypertension    6. Metabolic acidosis    7. Hypophosphatemia          Plan:       1-4. 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  He takes mycophenolate mofetil, prednisone and tacrolimus for maintenance immunosuppression. Tacrolimus 9.5; states was decreased to 4 in am and 3 in pm with last transplant visit on 3/20/23.  Will decrease to 3 in am and 3 in pm with noted increase in Tac level despite decrease 1 month ago.  BK virus not  detected ; labs 10/3/22.  Followed by Transplant.      5.  Blood pressure is well controlled on current regimen; stable.  Continue meds a prescribed.    6. CO2 decreased to 20, potassium 3.6, Na 137.  Good BP control. Continue sodium bicarbonate as prescribed.  Discussed s/s to be aware of; pt denies peripheral edema, weakness, headache, confusion, n/v, difficulty breathing. Encouraged and discussed maintaining DASH diet.    7. Phosphorous levels remain stable, but decreased.  Most recent 2.5.  Ca is stable at 9.8. Continue K Phos Neutral as prescribed.      Return to clinic in 3 months    40 minutes of total time spent on the encounter, which includes face to face time and non-face to face time preparing to see the patient (eg, review of tests), obtaining and/or reviewing separately obtained history, documenting clinical information in the electronic or other health record, Independently interpreting results and communicating results to the patient/family/caregiver, or care coordination.    Dilcia Saavedra, ADONAY-C

## 2023-04-20 ENCOUNTER — OFFICE VISIT (OUTPATIENT)
Dept: NEPHROLOGY | Facility: CLINIC | Age: 73
End: 2023-04-20
Payer: MEDICARE

## 2023-04-20 VITALS
WEIGHT: 191.38 LBS | DIASTOLIC BLOOD PRESSURE: 62 MMHG | BODY MASS INDEX: 29.01 KG/M2 | HEART RATE: 70 BPM | SYSTOLIC BLOOD PRESSURE: 140 MMHG | HEIGHT: 68 IN

## 2023-04-20 DIAGNOSIS — E87.20 METABOLIC ACIDOSIS: ICD-10-CM

## 2023-04-20 DIAGNOSIS — N18.2 CKD (CHRONIC KIDNEY DISEASE) STAGE 2, GFR 60-89 ML/MIN: ICD-10-CM

## 2023-04-20 DIAGNOSIS — Z79.60 LONG-TERM USE OF IMMUNOSUPPRESSANT MEDICATION: ICD-10-CM

## 2023-04-20 DIAGNOSIS — I10 ESSENTIAL HYPERTENSION: ICD-10-CM

## 2023-04-20 DIAGNOSIS — E83.39 HYPOPHOSPHATEMIA: ICD-10-CM

## 2023-04-20 DIAGNOSIS — Z94.0 S/P KIDNEY TRANSPLANT: Primary | ICD-10-CM

## 2023-04-20 DIAGNOSIS — D84.9 IMMUNOCOMPROMISED: ICD-10-CM

## 2023-04-20 PROCEDURE — 3077F SYST BP >= 140 MM HG: CPT | Mod: CPTII,S$GLB,, | Performed by: NURSE PRACTITIONER

## 2023-04-20 PROCEDURE — 3078F PR MOST RECENT DIASTOLIC BLOOD PRESSURE < 80 MM HG: ICD-10-PCS | Mod: CPTII,S$GLB,, | Performed by: NURSE PRACTITIONER

## 2023-04-20 PROCEDURE — 99215 PR OFFICE/OUTPT VISIT, EST, LEVL V, 40-54 MIN: ICD-10-PCS | Mod: S$GLB,,, | Performed by: NURSE PRACTITIONER

## 2023-04-20 PROCEDURE — 99215 OFFICE O/P EST HI 40 MIN: CPT | Mod: S$GLB,,, | Performed by: NURSE PRACTITIONER

## 2023-04-20 PROCEDURE — 1101F PR PT FALLS ASSESS DOC 0-1 FALLS W/OUT INJ PAST YR: ICD-10-PCS | Mod: CPTII,S$GLB,, | Performed by: NURSE PRACTITIONER

## 2023-04-20 PROCEDURE — 1126F PR PAIN SEVERITY QUANTIFIED, NO PAIN PRESENT: ICD-10-PCS | Mod: CPTII,S$GLB,, | Performed by: NURSE PRACTITIONER

## 2023-04-20 PROCEDURE — 1159F MED LIST DOCD IN RCRD: CPT | Mod: CPTII,S$GLB,, | Performed by: NURSE PRACTITIONER

## 2023-04-20 PROCEDURE — 1160F PR REVIEW ALL MEDS BY PRESCRIBER/CLIN PHARMACIST DOCUMENTED: ICD-10-PCS | Mod: CPTII,S$GLB,, | Performed by: NURSE PRACTITIONER

## 2023-04-20 PROCEDURE — 1126F AMNT PAIN NOTED NONE PRSNT: CPT | Mod: CPTII,S$GLB,, | Performed by: NURSE PRACTITIONER

## 2023-04-20 PROCEDURE — 3008F BODY MASS INDEX DOCD: CPT | Mod: CPTII,S$GLB,, | Performed by: NURSE PRACTITIONER

## 2023-04-20 PROCEDURE — 1159F PR MEDICATION LIST DOCUMENTED IN MEDICAL RECORD: ICD-10-PCS | Mod: CPTII,S$GLB,, | Performed by: NURSE PRACTITIONER

## 2023-04-20 PROCEDURE — 99999 PR PBB SHADOW E&M-EST. PATIENT-LVL IV: ICD-10-PCS | Mod: PBBFAC,,, | Performed by: NURSE PRACTITIONER

## 2023-04-20 PROCEDURE — 3288F FALL RISK ASSESSMENT DOCD: CPT | Mod: CPTII,S$GLB,, | Performed by: NURSE PRACTITIONER

## 2023-04-20 PROCEDURE — 1160F RVW MEDS BY RX/DR IN RCRD: CPT | Mod: CPTII,S$GLB,, | Performed by: NURSE PRACTITIONER

## 2023-04-20 PROCEDURE — 3008F PR BODY MASS INDEX (BMI) DOCUMENTED: ICD-10-PCS | Mod: CPTII,S$GLB,, | Performed by: NURSE PRACTITIONER

## 2023-04-20 PROCEDURE — 3078F DIAST BP <80 MM HG: CPT | Mod: CPTII,S$GLB,, | Performed by: NURSE PRACTITIONER

## 2023-04-20 PROCEDURE — 3066F PR DOCUMENTATION OF TREATMENT FOR NEPHROPATHY: ICD-10-PCS | Mod: CPTII,S$GLB,, | Performed by: NURSE PRACTITIONER

## 2023-04-20 PROCEDURE — 3288F PR FALLS RISK ASSESSMENT DOCUMENTED: ICD-10-PCS | Mod: CPTII,S$GLB,, | Performed by: NURSE PRACTITIONER

## 2023-04-20 PROCEDURE — 3077F PR MOST RECENT SYSTOLIC BLOOD PRESSURE >= 140 MM HG: ICD-10-PCS | Mod: CPTII,S$GLB,, | Performed by: NURSE PRACTITIONER

## 2023-04-20 PROCEDURE — 99999 PR PBB SHADOW E&M-EST. PATIENT-LVL IV: CPT | Mod: PBBFAC,,, | Performed by: NURSE PRACTITIONER

## 2023-04-20 PROCEDURE — 1101F PT FALLS ASSESS-DOCD LE1/YR: CPT | Mod: CPTII,S$GLB,, | Performed by: NURSE PRACTITIONER

## 2023-04-20 PROCEDURE — 3066F NEPHROPATHY DOC TX: CPT | Mod: CPTII,S$GLB,, | Performed by: NURSE PRACTITIONER

## 2023-04-20 RX ORDER — TACROLIMUS 1 MG/1
CAPSULE ORAL
Qty: 210 CAPSULE | Refills: 11 | Status: SHIPPED | OUTPATIENT
Start: 2023-04-20 | End: 2024-01-05 | Stop reason: SDUPTHER

## 2023-04-20 NOTE — PATIENT INSTRUCTIONS
Continue all medications as reviewed today:  Decrease Prograf dose to 3 in am and 3 in pm    Keep blood pressure controlled  Low sodium diet: avoid/limit salt, processed foods (boxed or canned), fried foods, fast foods, etc as discussed    Keep blood sugar controlled  Low carbohydrate/sugar diet: avoid/limit sugary drinks, white breads, pasta, rice, etc as discussed    Drink water, flavored water/diluted juice (drink to thirst) daily as tolerated with no swelling/shortness of breath    Avoid NSAIDS: Advil, Ibuprofen, Aleve, Naproxen, Meloxicam, etc. (Aspirin is ok), Tylenol is Best    Exercise as tolerated     Follow up in 3 months and have labs drawn 1-2 weeks prior to visit

## 2023-04-21 ENCOUNTER — TELEPHONE (OUTPATIENT)
Dept: TRANSPLANT | Facility: CLINIC | Age: 73
End: 2023-04-21
Payer: MEDICARE

## 2023-04-21 NOTE — TELEPHONE ENCOUNTER
----- Message from Faraz Goodman sent at 4/21/2023  4:45 PM CDT -----  Regarding: Refill Request  Patient called in to request a refill on his med k phos di & mono-sod phos mono (K-PHOS-NEUTRAL) 250 mg Tab. Patient has new preferred pharmacy for this med , provided below:             [facility] [address]  P; [phone]            Contact: [phone]

## 2023-04-25 ENCOUNTER — TELEPHONE (OUTPATIENT)
Dept: TRANSPLANT | Facility: CLINIC | Age: 73
End: 2023-04-25
Payer: MEDICARE

## 2023-04-25 NOTE — TELEPHONE ENCOUNTER
----- Message from Joyce Schultz sent at 4/25/2023  3:31 PM CDT -----  Contact: Alverto  Type:  RX Refill Request    Who Called:  Alverto  Refill or New Rx: Refill  RX Name and Strength: Nifedipine 60mg  How is the patient currently taking it? (ex. 1XDay): 2x  Is this a 30 day or 90 day RX: 90 days  Preferred Pharmacy with phone number:   [id number] - [place] - [address] AT [facility] [place] [address]  Phone: [phone] Fax: [phone]  Local or Mail Order: Local, as he wouldn't get mail order in time  Ordering Provider: Dilcia Saavedra,. NP  Would the patient rather a call back or a response via [facility]?  Call  Best Call Back Number: [phone]  Additional Information: Patient has 4 more days left but wants it sent in before he runs out.

## 2023-04-25 NOTE — TELEPHONE ENCOUNTER
----- Message from Lia Blanchard RN sent at 4/24/2023  9:06 AM CDT -----  Regarding: FW: Rx refills    ----- Message -----  From: Surya Avila  Sent: 4/24/2023   8:06 AM CDT  To: [facility] Post-Kidney Transplant Clinical  Subject: Rx refills                                       RX Name: NIFEdipine (PROCARDIA-XL) 60 MG (OSM) 24 hr tablet    How is it taken:Take 1 tablet (60 mg total) by mouth 2 (two) times a day. - Oral    Quantity: 180 tablets       Preferred Pharmacy with phone number:      [id number] - [place] - [address] AT [facility] [place] [address]  Phone: [phone] Fax: [phone]           Contact Preference:    [phone] (mobile)    Addl info:

## 2023-04-26 RX ORDER — NIFEDIPINE 60 MG/1
60 TABLET, EXTENDED RELEASE ORAL 2 TIMES DAILY
Qty: 180 TABLET | Refills: 3 | Status: SHIPPED | OUTPATIENT
Start: 2023-04-26 | End: 2024-01-08 | Stop reason: SDUPTHER

## 2023-04-26 NOTE — TELEPHONE ENCOUNTER
----- Message from Joyce Schultz sent at 4/26/2023 11:12 AM CDT -----  Contact: Bernardo/ [facility] stated that for the Prednisone, Tacrolimus, and the Mycophenolate they need a Prior Authorization for all 3. Please call the Pre Service Dept at [phone].

## 2023-05-02 ENCOUNTER — TELEPHONE (OUTPATIENT)
Dept: NEPHROLOGY | Facility: CLINIC | Age: 73
End: 2023-05-02
Payer: MEDICARE

## 2023-05-02 NOTE — TELEPHONE ENCOUNTER
Patient is asking if a prior authorization is being worked on already. Please contact when possible with updates.

## 2023-05-02 NOTE — TELEPHONE ENCOUNTER
----- Message from Faraz Goodman sent at 5/2/2023  4:41 PM CDT -----  Regarding: Refill Request  Patient called in requesting assistance with obtaining his refill on med Prograf. He states pharmacy informed him this med needs a Prior Auth completed. Requested a call back for further assistance.                          Contact: [phone] ([id number])

## 2023-05-03 NOTE — TELEPHONE ENCOUNTER
Called pharmacy, spoke with Carolina. She says patient should be able to get his Prograf filled today. The medication is showing that it is too soon to be filled, but she will speak with a third party that can clear the account and refill it. I informed patient and told him if he has more trouble to give us a call back.

## 2023-05-04 DIAGNOSIS — Z94.0 S/P KIDNEY TRANSPLANT: Primary | ICD-10-CM

## 2023-05-05 ENCOUNTER — LAB VISIT (OUTPATIENT)
Dept: LAB | Facility: HOSPITAL | Age: 73
End: 2023-05-05
Attending: INTERNAL MEDICINE
Payer: MEDICARE

## 2023-05-05 DIAGNOSIS — Z94.0 S/P KIDNEY TRANSPLANT: ICD-10-CM

## 2023-05-05 PROCEDURE — 36415 COLL VENOUS BLD VENIPUNCTURE: CPT | Performed by: INTERNAL MEDICINE

## 2023-05-08 LAB
CMV DNA SPEC QL NAA+PROBE: NOT DETECTED
CYTOMEGALOVIRUS LOG (IU/ML): NOT DETECTED LOGIU/ML
CYTOMEGALOVIRUS PCR, QUANT: NOT DETECTED IU/ML

## 2023-05-18 DIAGNOSIS — F41.9 ANXIETY: Chronic | ICD-10-CM

## 2023-05-18 NOTE — TELEPHONE ENCOUNTER
No care due was identified.  [facility] Embedded Care Due Messages. Reference number: [id number].   5/18/2023 8:57:47 AM CDT

## 2023-05-22 RX ORDER — ALPRAZOLAM 1 MG/1
1 TABLET ORAL DAILY PRN
Qty: 90 TABLET | Refills: 0 | Status: SHIPPED | OUTPATIENT
Start: 2023-05-22 | End: 2023-08-03 | Stop reason: SDUPTHER

## 2023-05-25 NOTE — PROGRESS NOTES
Patient was seen in listed 'round rust[facility]' transplant clinic for continued evaluation for kidney, kidney/pancreas or pancreas only transplant. The patient attended a group education session that discussed/reviewed the following aspects of transplantation: evaluation and selection committee process, [facility]OS waitlist management/multiple listings, types of organs offered (KDPI < 85%, KDPI > 85%, PHS increased risk, DCD), financial aspects, surgical procedures, dietary instruction pre- and post-transplant, health maintenance pre- and post-transplant, post-transplant hospitalization and outpatient follow-up, potential to participate in a research protocol, and medication management and side effects. A question and answer session was provided after the presentation.     The patient was seen by all members of the multi-disciplinary team to include: Nephrologist/PA, , Transplant Coordinator, and .      The patient reviewed and signed all consents for evaluation which were witnessed and sent to scanning into the EPIC chart.     The patient was given an education book and plan for further evaluation based on her individual assessment.      The patient was encouraged to call with any questions or concerns.   Yes

## 2023-05-30 ENCOUNTER — TELEPHONE (OUTPATIENT)
Dept: TRANSPLANT | Facility: CLINIC | Age: 73
End: 2023-05-30
Payer: MEDICARE

## 2023-05-30 NOTE — TELEPHONE ENCOUNTER
Spoke with Dilcia with [facility].  PA for [employer] completed and approved from 5/30/23-5/30/24.  Auth # [id number]. Patient was made aware that MMF has been approved.       ----- Message from Faraz Goodman sent at 5/30/2023 12:20 PM CDT -----  Regarding: Medication Concern  Patient called in to inform his nurse that he having a problem with obtaining his post kidney txp meds. Requesting to speak with nurse by phone to discuss further. He specifies Cellcept is almost out.                        Contact: [phone]

## 2023-05-30 NOTE — TELEPHONE ENCOUNTER
[employer] approved from 5/30/2023-5/30/2024.  Authorization # [id number]    ----- Message from Lindsay YU PharmD sent at 5/30/2023 12:08 PM CDT -----  Regarding: Patient needing assistance with BvsD med coverage  Hello,    Patient needs help getting his meds covered under part B. Currently filling @ [facility] in [place]. Phone number to Melisa part B is [phone] and his ID is [id number]. Thank you!    Tl Jiang, PharmD  [facility] - Transplant  [address] [place] [address]  P: [phone] F: [phone]  Ext. [phone]

## 2023-06-05 DIAGNOSIS — Z94.0 S/P KIDNEY TRANSPLANT: ICD-10-CM

## 2023-06-06 ENCOUNTER — TELEPHONE (OUTPATIENT)
Dept: NEPHROLOGY | Facility: CLINIC | Age: 73
End: 2023-06-06
Payer: MEDICARE

## 2023-06-06 RX ORDER — CALCITRIOL 0.25 UG/1
CAPSULE ORAL
Qty: 90 CAPSULE | Refills: 3 | Status: SHIPPED | OUTPATIENT
Start: 2023-06-06 | End: 2023-07-27

## 2023-06-06 NOTE — TELEPHONE ENCOUNTER
I'm working in Pulmonary today and it takes a long time to get the status of a PA. Can you check on it for me or I can handle it tomorrow? Pulmonary is busy today. Thanks

## 2023-06-06 NOTE — TELEPHONE ENCOUNTER
Patients Tacrolimus will need a form [facility] for medicare B vs D coverage. They will fax me the form for determination.

## 2023-06-06 NOTE — TELEPHONE ENCOUNTER
----- Message from Ara Pozo sent at 6/6/2023  9:10 AM CDT -----  Contact: bhupinder/ [facility]  Bhupinder with [facility] pharmacy is calling to speak with the nurse regarding PA. Reports following up on the PA that was sent over for the medication tacrolimus (PROGRAF) 1 MG Cap. Please give bhupinder a call back at [phone]  Thanks isela

## 2023-06-27 ENCOUNTER — TELEPHONE (OUTPATIENT)
Dept: OPHTHALMOLOGY | Facility: CLINIC | Age: 73
End: 2023-06-27
Payer: MEDICARE

## 2023-06-27 NOTE — TELEPHONE ENCOUNTER
----- Message from Oneyda Goodman sent at 6/27/2023  9:07 AM CDT -----  Contact: Alverto  Type:  Sooner Apoointment Request    Caller is requesting a sooner appointment.  Caller will not accept being placed on the waitlist and is requesting a message be sent to doctor.  Name of Caller: Alverto  When is the first available appointment? unknown  Symptoms: REV HVF pach IOP ck 6m  Would the patient rather a call back or a response via [facility]? call  Best Call Back Number:[phone]  Additional Information: Patient request to schedule soonest available appointment. Please call patient back to assist.

## 2023-07-20 ENCOUNTER — LAB VISIT (OUTPATIENT)
Dept: LAB | Facility: HOSPITAL | Age: 73
End: 2023-07-20
Attending: NURSE PRACTITIONER
Payer: MEDICARE

## 2023-07-20 DIAGNOSIS — Z79.60 LONG-TERM USE OF IMMUNOSUPPRESSANT MEDICATION: ICD-10-CM

## 2023-07-20 DIAGNOSIS — Z94.0 S/P KIDNEY TRANSPLANT: ICD-10-CM

## 2023-07-20 DIAGNOSIS — D84.9 IMMUNOCOMPROMISED: ICD-10-CM

## 2023-07-20 LAB
ALBUMIN SERPL BCP-MCNC: 3.9 G/DL (ref 3.5–5.2)
ANION GAP SERPL CALC-SCNC: 10 MMOL/L (ref 8–16)
BUN SERPL-MCNC: 15 MG/DL (ref 8–23)
CALCIUM SERPL-MCNC: 9.5 MG/DL (ref 8.7–10.5)
CHLORIDE SERPL-SCNC: 107 MMOL/L (ref 95–110)
CO2 SERPL-SCNC: 20 MMOL/L (ref 23–29)
CREAT SERPL-MCNC: 1.4 MG/DL (ref 0.5–1.4)
EST. GFR  (NO RACE VARIABLE): 52.7 ML/MIN/1.73 M^2
GLUCOSE SERPL-MCNC: 128 MG/DL (ref 70–110)
MAGNESIUM SERPL-MCNC: 1.7 MG/DL (ref 1.6–2.6)
PHOSPHATE SERPL-MCNC: 2.6 MG/DL (ref 2.7–4.5)
POTASSIUM SERPL-SCNC: 3.8 MMOL/L (ref 3.5–5.1)
PTH-INTACT SERPL-MCNC: 140.9 PG/ML (ref 9–77)
SODIUM SERPL-SCNC: 137 MMOL/L (ref 136–145)

## 2023-07-20 PROCEDURE — 80197 ASSAY OF TACROLIMUS: CPT | Performed by: NURSE PRACTITIONER

## 2023-07-20 PROCEDURE — 80069 RENAL FUNCTION PANEL: CPT | Performed by: NURSE PRACTITIONER

## 2023-07-20 PROCEDURE — 83735 ASSAY OF MAGNESIUM: CPT | Performed by: NURSE PRACTITIONER

## 2023-07-20 PROCEDURE — 36415 COLL VENOUS BLD VENIPUNCTURE: CPT | Performed by: NURSE PRACTITIONER

## 2023-07-20 PROCEDURE — 83970 ASSAY OF PARATHORMONE: CPT | Performed by: NURSE PRACTITIONER

## 2023-07-21 LAB — TACROLIMUS BLD-MCNC: 8.7 NG/ML (ref 5–15)

## 2023-07-24 DIAGNOSIS — Z94.0 KIDNEY REPLACED BY TRANSPLANT: Primary | ICD-10-CM

## 2023-07-27 ENCOUNTER — OFFICE VISIT (OUTPATIENT)
Dept: NEPHROLOGY | Facility: CLINIC | Age: 73
End: 2023-07-27
Payer: MEDICARE

## 2023-07-27 VITALS
WEIGHT: 194.69 LBS | SYSTOLIC BLOOD PRESSURE: 148 MMHG | DIASTOLIC BLOOD PRESSURE: 62 MMHG | BODY MASS INDEX: 30.56 KG/M2 | HEIGHT: 67 IN | HEART RATE: 70 BPM

## 2023-07-27 DIAGNOSIS — E83.39 HYPOPHOSPHATEMIA: ICD-10-CM

## 2023-07-27 DIAGNOSIS — E83.42 HYPOMAGNESEMIA: ICD-10-CM

## 2023-07-27 DIAGNOSIS — N25.81 SECONDARY HYPERPARATHYROIDISM: ICD-10-CM

## 2023-07-27 DIAGNOSIS — E87.20 METABOLIC ACIDOSIS: ICD-10-CM

## 2023-07-27 DIAGNOSIS — D47.2 MONOCLONAL GAMMOPATHY: ICD-10-CM

## 2023-07-27 DIAGNOSIS — I10 ESSENTIAL HYPERTENSION: ICD-10-CM

## 2023-07-27 DIAGNOSIS — N18.31 STAGE 3A CHRONIC KIDNEY DISEASE: ICD-10-CM

## 2023-07-27 DIAGNOSIS — Z94.0 S/P KIDNEY TRANSPLANT: Primary | ICD-10-CM

## 2023-07-27 DIAGNOSIS — Z79.60 LONG-TERM USE OF IMMUNOSUPPRESSANT MEDICATION: ICD-10-CM

## 2023-07-27 DIAGNOSIS — N18.31 CHRONIC KIDNEY DISEASE, STAGE 3A: ICD-10-CM

## 2023-07-27 DIAGNOSIS — B25.9 CYTOMEGALOVIRUS (CMV) VIREMIA: ICD-10-CM

## 2023-07-27 DIAGNOSIS — D84.9 IMMUNOCOMPROMISED: ICD-10-CM

## 2023-07-27 PROCEDURE — 99215 OFFICE O/P EST HI 40 MIN: CPT | Mod: S$GLB,,, | Performed by: NURSE PRACTITIONER

## 2023-07-27 PROCEDURE — 3078F DIAST BP <80 MM HG: CPT | Mod: CPTII,S$GLB,, | Performed by: NURSE PRACTITIONER

## 2023-07-27 PROCEDURE — 1159F MED LIST DOCD IN RCRD: CPT | Mod: CPTII,S$GLB,, | Performed by: NURSE PRACTITIONER

## 2023-07-27 PROCEDURE — 99999 PR PBB SHADOW E&M-EST. PATIENT-LVL IV: ICD-10-PCS | Mod: PBBFAC,,, | Performed by: NURSE PRACTITIONER

## 2023-07-27 PROCEDURE — 3077F SYST BP >= 140 MM HG: CPT | Mod: CPTII,S$GLB,, | Performed by: NURSE PRACTITIONER

## 2023-07-27 PROCEDURE — 3066F NEPHROPATHY DOC TX: CPT | Mod: CPTII,S$GLB,, | Performed by: NURSE PRACTITIONER

## 2023-07-27 PROCEDURE — 99999 PR PBB SHADOW E&M-EST. PATIENT-LVL IV: CPT | Mod: PBBFAC,,, | Performed by: NURSE PRACTITIONER

## 2023-07-27 PROCEDURE — 3078F PR MOST RECENT DIASTOLIC BLOOD PRESSURE < 80 MM HG: ICD-10-PCS | Mod: CPTII,S$GLB,, | Performed by: NURSE PRACTITIONER

## 2023-07-27 PROCEDURE — 3008F BODY MASS INDEX DOCD: CPT | Mod: CPTII,S$GLB,, | Performed by: NURSE PRACTITIONER

## 2023-07-27 PROCEDURE — 99215 PR OFFICE/OUTPT VISIT, EST, LEVL V, 40-54 MIN: ICD-10-PCS | Mod: S$GLB,,, | Performed by: NURSE PRACTITIONER

## 2023-07-27 PROCEDURE — 3077F PR MOST RECENT SYSTOLIC BLOOD PRESSURE >= 140 MM HG: ICD-10-PCS | Mod: CPTII,S$GLB,, | Performed by: NURSE PRACTITIONER

## 2023-07-27 PROCEDURE — 3288F PR FALLS RISK ASSESSMENT DOCUMENTED: ICD-10-PCS | Mod: CPTII,S$GLB,, | Performed by: NURSE PRACTITIONER

## 2023-07-27 PROCEDURE — 1101F PR PT FALLS ASSESS DOC 0-1 FALLS W/OUT INJ PAST YR: ICD-10-PCS | Mod: CPTII,S$GLB,, | Performed by: NURSE PRACTITIONER

## 2023-07-27 PROCEDURE — 3066F PR DOCUMENTATION OF TREATMENT FOR NEPHROPATHY: ICD-10-PCS | Mod: CPTII,S$GLB,, | Performed by: NURSE PRACTITIONER

## 2023-07-27 PROCEDURE — 1159F PR MEDICATION LIST DOCUMENTED IN MEDICAL RECORD: ICD-10-PCS | Mod: CPTII,S$GLB,, | Performed by: NURSE PRACTITIONER

## 2023-07-27 PROCEDURE — 1126F PR PAIN SEVERITY QUANTIFIED, NO PAIN PRESENT: ICD-10-PCS | Mod: CPTII,S$GLB,, | Performed by: NURSE PRACTITIONER

## 2023-07-27 PROCEDURE — 1126F AMNT PAIN NOTED NONE PRSNT: CPT | Mod: CPTII,S$GLB,, | Performed by: NURSE PRACTITIONER

## 2023-07-27 PROCEDURE — 3288F FALL RISK ASSESSMENT DOCD: CPT | Mod: CPTII,S$GLB,, | Performed by: NURSE PRACTITIONER

## 2023-07-27 PROCEDURE — 1101F PT FALLS ASSESS-DOCD LE1/YR: CPT | Mod: CPTII,S$GLB,, | Performed by: NURSE PRACTITIONER

## 2023-07-27 PROCEDURE — 1160F PR REVIEW ALL MEDS BY PRESCRIBER/CLIN PHARMACIST DOCUMENTED: ICD-10-PCS | Mod: CPTII,S$GLB,, | Performed by: NURSE PRACTITIONER

## 2023-07-27 PROCEDURE — 1160F RVW MEDS BY RX/DR IN RCRD: CPT | Mod: CPTII,S$GLB,, | Performed by: NURSE PRACTITIONER

## 2023-07-27 PROCEDURE — 3008F PR BODY MASS INDEX (BMI) DOCUMENTED: ICD-10-PCS | Mod: CPTII,S$GLB,, | Performed by: NURSE PRACTITIONER

## 2023-07-27 RX ORDER — CALCITRIOL 0.5 UG/1
0.5 CAPSULE ORAL
Qty: 90 CAPSULE | Refills: 3 | Status: SHIPPED | OUTPATIENT
Start: 2023-07-28 | End: 2025-04-18

## 2023-07-27 NOTE — PROGRESS NOTES
"Subjective:       Patient ID: Alverto Ramirez Jr. is a 72 y.o. male.    Chief Complaint: s/p Kidney transplant, CKD    HPI  Patient presents to clinic today for routine follow-up.  Pt was previously ESRD on PD for 5 yrs prior to transplant; LUE AVF in place.  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  He takes mycophenolate mofetil, prednisone and tacrolimus for maintenance immunosuppression.  His post transplant course has been complicated by urinary retention severe diarrhea and hypophosphatemia.  He is followed by  Transplant team and Urology.     Pt was seen and examined today in clinic.  Pt denies taking NSAIDs, no GI losses, no abx use, no recent infections, no dysuria, no cardiopulmonary sx's.  Laboratory studies and medications were reviewed.  Since pt's last office visit, states doing well with no specific or new complaints voiced.  All Nephrology related questions were answered to her satisfaction.    The past medical, family and social histories were reviewed for this encounter.    Review of Systems   Constitutional: Negative.    HENT: Negative.     Respiratory:  Negative for shortness of breath.    Cardiovascular: Negative.  Negative for leg swelling.   Gastrointestinal: Negative.    Genitourinary: Negative.    Musculoskeletal: Negative.    Skin: Negative.    Neurological:  Negative for dizziness, light-headedness and headaches.   Psychiatric/Behavioral: Negative.          height is 5' 7" (1.702 m) and weight is 88.3 kg (194 lb 10.7 oz). His blood pressure is 148/62 (abnormal) and his pulse is 70.     Lab Results   Component Value Date    WBC 7.40 04/06/2023    HGB 15.0 04/06/2023    HCT 45.7 04/06/2023    MCV 86 04/06/2023     04/06/2023        CMP  Sodium   Date Value Ref Range Status   07/20/2023 137 136 - 145 mmol/L Final     Potassium   Date Value Ref Range Status   07/20/2023 3.8 3.5 - 5.1 mmol/L Final     Chloride   Date Value Ref Range Status   07/20/2023 107 95 - " 110 mmol/L Final     CO2   Date Value Ref Range Status   07/20/2023 20 (L) 23 - 29 mmol/L Final     Glucose   Date Value Ref Range Status   07/20/2023 128 (H) 70 - 110 mg/dL Final     BUN   Date Value Ref Range Status   07/20/2023 15 8 - 23 mg/dL Final     Creatinine   Date Value Ref Range Status   07/20/2023 1.4 0.5 - 1.4 mg/dL Final     Calcium   Date Value Ref Range Status   07/20/2023 9.5 8.7 - 10.5 mg/dL Final     Total Protein   Date Value Ref Range Status   04/06/2023 7.0 6.0 - 8.4 g/dL Final     Albumin   Date Value Ref Range Status   07/20/2023 3.9 3.5 - 5.2 g/dL Final     Total Bilirubin   Date Value Ref Range Status   04/06/2023 0.4 0.1 - 1.0 mg/dL Final     Comment:     For infants and newborns, interpretation of results should be based  on gestational age, weight and in agreement with clinical  observations.    Premature Infant recommended reference ranges:  Up to 24 hours.............<8.0 mg/dL  Up to 48 hours............<12.0 mg/dL  3-5 days..................<15.0 mg/dL  6-29 days.................<15.0 mg/dL       Alkaline Phosphatase   Date Value Ref Range Status   04/06/2023 106 55 - 135 U/L Final     AST   Date Value Ref Range Status   04/06/2023 17 10 - 40 U/L Final     ALT   Date Value Ref Range Status   04/06/2023 32 10 - 44 U/L Final     Anion Gap   Date Value Ref Range Status   07/20/2023 10 8 - 16 mmol/L Final     eGFR if    Date Value Ref Range Status   07/14/2022 >60.0 >60 mL/min/1.73 m^2 Final     eGFR if non    Date Value Ref Range Status   07/14/2022 54.1 (A) >60 mL/min/1.73 m^2 Final     Comment:     Calculation used to obtain the estimated glomerular filtration  rate (eGFR) is the CKD-EPI equation.          Current Outpatient Medications on File Prior to Visit   Medication Sig Dispense Refill    ALPRAZolam (XANAX) 1 MG tablet Take 1 tablet (1 mg total) by mouth daily as needed for Anxiety or Insomnia. 90 tablet 0    cholecalciferol, vitamin D3, (VITAMIN  D3) 50 mcg (2,000 unit) Tab Take 1 tablet (2,000 Units total) by mouth once daily. 60 tablet 11    magnesium oxide (MAG-OX) 400 mg (241.3 mg magnesium) tablet Take 1 tablet (400 mg total) by mouth 2 (two) times daily. 60 tablet 11    metoprolol succinate (TOPROL-XL) 50 MG 24 hr tablet Take 1 tablet (50 mg total) by mouth 2 (two) times daily. 180 tablet 3    mupirocin (BACTROBAN) 2 % ointment by Nasal route once daily. 30 g 3    mycophenolate (CELLCEPT) 250 mg Cap Take 3 capsules (750 mg total) by mouth 2 (two) times daily. 240 capsule 11    NIFEdipine (PROCARDIA-XL) 60 MG (OSM) 24 hr tablet Take 1 tablet (60 mg total) by mouth 2 (two) times a day. 180 tablet 3    predniSONE (DELTASONE) 5 MG tablet Take 1 tablet (5 mg total) by mouth once daily. Take by mouth daily: 20mg 3/22/22 -4/21, 15mg 4/22-5/22, 10mg 5/23-6/22, 5mg 6/23- thereafter 91 tablet 3    sildenafiL (VIAGRA) 100 MG tablet Take 1 tablet (100 mg total) by mouth daily as needed for Erectile Dysfunction. 30 tablet 11    sodium bicarbonate 650 MG tablet Take 3 tablets (1,950 mg total) by mouth 2 (two) times daily. 180 tablet 11    tacrolimus (PROGRAF) 1 MG Cap Take 3 capsules (3 mg total) by mouth every morning AND 3 capsules (3 mg total) every evening. 210 capsule 11    traZODone (DESYREL) 50 MG tablet Take 1-2 tablets ( mg total) by mouth every evening. 180 tablet 3    [DISCONTINUED] calcitRIOL (ROCALTROL) 0.25 MCG Cap TAKE 1 CAPSULE BY MOUTH DAILY 90 capsule 3    k phos di & mono-sod phos mono (K-PHOS-NEUTRAL) 250 mg Tab Take 1 tablet by mouth 2 (two) times a day. 62 tablet 11    [DISCONTINUED] amLODIPine (NORVASC) 10 MG tablet Take 1 tablet (10 mg total) by mouth once daily. 30 tablet 11    [DISCONTINUED] calcium carbonate (OS-SUSAN) 500 mg calcium (1,250 mg) chewable tablet Take 1 tablet by mouth 3 (three) times daily.      [DISCONTINUED] lanthanum (FOSRENOL) 1000 MG chewable tablet CHEW 2 TABLETS THREE TIMES A DAY WITH MEALS 540 tablet 4     [DISCONTINUED] losartan (COZAAR) 100 MG tablet Take 1 tablet (100 mg total) by mouth once daily. 90 tablet 3    [DISCONTINUED] torsemide (DEMADEX) 100 MG Tab Take 2 tablets (200 mg total) by mouth once daily. 180 tablet 3     No current facility-administered medications on file prior to visit.       Objective:        Physical Exam  Vitals and nursing note reviewed.   Constitutional:       Appearance: Normal appearance.   HENT:      Head: Normocephalic and atraumatic.   Eyes:      Extraocular Movements: Extraocular movements intact.      Pupils: Pupils are equal, round, and reactive to light.   Cardiovascular:      Rate and Rhythm: Normal rate and regular rhythm.   Pulmonary:      Effort: Pulmonary effort is normal.   Abdominal:      General: Bowel sounds are normal.      Palpations: Abdomen is soft.   Musculoskeletal:         General: Normal range of motion.      Right lower leg: No edema.      Left lower leg: No edema.   Skin:     General: Skin is warm and dry.   Neurological:      General: No focal deficit present.      Mental Status: He is alert and oriented to person, place, and time.   Psychiatric:         Mood and Affect: Mood normal.         Behavior: Behavior normal.         Thought Content: Thought content normal.         Judgment: Judgment normal.         Assessment:       1. S/P kidney transplant    2. Immunocompromised    3. Long-term use of immunosuppressant medication    4. Stage 3a chronic kidney disease    5. Monoclonal gammopathy    6. Cytomegalovirus (CMV) viremia    7. Essential hypertension    8. Hypomagnesemia    9. Metabolic acidosis    10. Secondary hyperparathyroidism    11. Hypophosphatemia    12. Chronic kidney disease, stage 3a          Plan:       1-3. 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  He takes mycophenolate mofetil, prednisone and tacrolimus for maintenance immunosuppression. Continue on Tacrolimus 3 in am and 3 in pm. BK virus not  detected.  Followed by Transplant.     4. Cr ranges  1.1-1.4. Continue maintenance IS as prescribed.  Urinalysis no protein, occ blood or glucose noted.  Hazy appearance, 1+ leukocytes with mod Bacteria; however, no significant RBC, WBC/clumps noted.  He is asymptomatic and denies foul odor, dysuria, urgency, frequency or hesitancy; afebrile.  No proteinuria noted per PC ratio. Denies SOB, CP, BLE edema, or any further urinary sx today in clinic. Discussed avoiding dehydration and excessive overhydration,  avoidance of NSAID use, good BP and BG control and avoiding high Na diet to preserve kidney function.       5-6. Followed by Hem/Onc; IgM monoclonal gammopathy of undetermined significance that was diagnosed in 2018. SPEP/DAYSI interpretations WNL with noted gamma protein 0.63; kappa and lambda FLC are WNL; ratio now WNL as well. CMV not detected. Continue to f/u q 4 mos as scheduled.    7.  Blood pressure is stable and controlled on current regimen.  Continue metoprolol and nifedipine a prescribed.    8. iPTH 140.9, Ca 9.5, Alb 3.9; increase Calcitriol to 0.50 mcg M/W/F/Rod and Vit D3 as prescribed.    9. CO2 20, potassium 3.8, Na 137.  Good BP control. Continue sodium bicarbonate as prescribed.  Discussed s/s to be aware of; pt denies peripheral edema, weakness, headache, confusion, n/v, difficulty breathing. Encouraged and discussed maintaining DASH diet.    10. Magnesium levels remain stable, 1.7; continue Mag Ox as prescribed.     11. Phosphorous levels remain stable.  Most recent 2.6; pt state may have missed a few doses and has taken as prescribed since.  Ca is stable at 9.5. Continue K Phos as prescribed.      Return to clinic in 3 months    40 minutes of total time spent on the encounter, which includes face to face time and non-face to face time preparing to see the patient (eg, review of tests), obtaining and/or reviewing separately obtained history, documenting clinical information in the electronic or  other health record, Independently interpreting results and communicating results to the patient/family/caregiver, or care coordination.    Dilcia Saavedra, ADONAY-C

## 2023-07-27 NOTE — PATIENT INSTRUCTIONS
Continue all medications as reviewed today:  Continue 0.25 mcg (2 pills to equal 0.5 mcg dose) on Mon/Wed/Fri/Sun UNTIL new prescription arrives then   Start 0.50 mcg (1 pill) Mon/Wed/Fri/Sun     Keep blood pressure controlled  Low sodium diet: avoid/limit salt, processed foods (boxed or canned), fried foods, fast foods, etc as discussed    Keep blood sugar controlled  Low carbohydrate/sugar diet: avoid/limit sugary drinks, sweets, white breads, pasta, rice, etc as discussed    Drink water, flavored water/diluted juice (drink to thirst) daily as tolerated with no swelling/shortness of breath  Urine pale/light yellow    Avoid NSAIDS: Advil, Ibuprofen, Aleve, Naproxen, Meloxicam, etc. (Aspirin is ok), Tylenol is Best    Exercise as tolerated     Follow up in 3 months and have labs drawn 1-2 weeks prior to visit

## 2023-07-31 ENCOUNTER — TELEPHONE (OUTPATIENT)
Dept: INTERNAL MEDICINE | Facility: CLINIC | Age: 73
End: 2023-07-31
Payer: MEDICARE

## 2023-07-31 NOTE — TELEPHONE ENCOUNTER
----- Message from Shelley Charlton sent at 7/31/2023 12:25 PM CDT -----  Contact: Alverto Church is calling to get an appt for 3 month f/u. Please call back at [phone]        Thanks   SW

## 2023-07-31 NOTE — TELEPHONE ENCOUNTER
Spoke with pt; pt stated he accidentally cancel appt that was scheduled for 8/3 & wanted to reschedule for same date; MA scheduled appt as requested /LD

## 2023-08-02 ENCOUNTER — TELEPHONE (OUTPATIENT)
Dept: INTERNAL MEDICINE | Facility: CLINIC | Age: 73
End: 2023-08-02
Payer: MEDICARE

## 2023-08-02 NOTE — TELEPHONE ENCOUNTER
La with pt inform him to discuss lab with PCP on his visit on 08/03/2023----- Message from Domenico Kruger sent at 8/2/2023  3:08 PM CDT -----  Regarding: pt call back  Name of Who is Calling:ADAM MITCHELL JR. [[id number]]        What is the request in detail: Pt inquiring if he needs any lab work before appt tomorrow. Please advise           Can the clinic reply by [facility]: no         What Number to [facility] Back [facility]:Telephone Information:  Mobile          [phone]

## 2023-08-03 ENCOUNTER — OFFICE VISIT (OUTPATIENT)
Dept: INTERNAL MEDICINE | Facility: CLINIC | Age: 73
End: 2023-08-03
Payer: MEDICARE

## 2023-08-03 ENCOUNTER — LAB VISIT (OUTPATIENT)
Dept: LAB | Facility: HOSPITAL | Age: 73
End: 2023-08-03
Attending: FAMILY MEDICINE
Payer: MEDICARE

## 2023-08-03 VITALS
OXYGEN SATURATION: 96 % | BODY MASS INDEX: 31.01 KG/M2 | WEIGHT: 197.56 LBS | HEART RATE: 70 BPM | HEIGHT: 67 IN | TEMPERATURE: 97 F | DIASTOLIC BLOOD PRESSURE: 74 MMHG | SYSTOLIC BLOOD PRESSURE: 136 MMHG

## 2023-08-03 DIAGNOSIS — Z94.0 KIDNEY TRANSPLANT STATUS: ICD-10-CM

## 2023-08-03 DIAGNOSIS — D63.1 ANEMIA IN ESRD (END-STAGE RENAL DISEASE): ICD-10-CM

## 2023-08-03 DIAGNOSIS — D47.2 MONOCLONAL GAMMOPATHY: ICD-10-CM

## 2023-08-03 DIAGNOSIS — R73.9 ELEVATED SERUM GLUCOSE: ICD-10-CM

## 2023-08-03 DIAGNOSIS — F41.9 ANXIETY: Primary | ICD-10-CM

## 2023-08-03 DIAGNOSIS — N18.6 ANEMIA IN ESRD (END-STAGE RENAL DISEASE): ICD-10-CM

## 2023-08-03 DIAGNOSIS — F51.04 PSYCHOPHYSIOLOGICAL INSOMNIA: ICD-10-CM

## 2023-08-03 LAB
ALBUMIN SERPL BCP-MCNC: 4.1 G/DL (ref 3.5–5.2)
ALP SERPL-CCNC: 92 U/L (ref 55–135)
ALT SERPL W/O P-5'-P-CCNC: 27 U/L (ref 10–44)
ANION GAP SERPL CALC-SCNC: 11 MMOL/L (ref 8–16)
AST SERPL-CCNC: 20 U/L (ref 10–40)
BILIRUB SERPL-MCNC: 0.5 MG/DL (ref 0.1–1)
BUN SERPL-MCNC: 16 MG/DL (ref 8–23)
CALCIUM SERPL-MCNC: 9.8 MG/DL (ref 8.7–10.5)
CHLORIDE SERPL-SCNC: 105 MMOL/L (ref 95–110)
CO2 SERPL-SCNC: 22 MMOL/L (ref 23–29)
CREAT SERPL-MCNC: 1.3 MG/DL (ref 0.5–1.4)
ERYTHROCYTE [DISTWIDTH] IN BLOOD BY AUTOMATED COUNT: 15.1 % (ref 11.5–14.5)
EST. GFR  (NO RACE VARIABLE): 57 ML/MIN/1.73 M^2
ESTIMATED AVG GLUCOSE: 111 MG/DL (ref 68–131)
GLUCOSE SERPL-MCNC: 141 MG/DL (ref 70–110)
HBA1C MFR BLD: 5.5 % (ref 4–5.6)
HCT VFR BLD AUTO: 46.6 % (ref 40–54)
HGB BLD-MCNC: 15.3 G/DL (ref 14–18)
IMM GRANULOCYTES # BLD AUTO: 0.03 K/UL (ref 0–0.04)
MCH RBC QN AUTO: 28.4 PG (ref 27–31)
MCHC RBC AUTO-ENTMCNC: 32.8 G/DL (ref 32–36)
MCV RBC AUTO: 87 FL (ref 82–98)
NEUTROPHILS # BLD AUTO: 4.7 K/UL (ref 1.8–7.7)
PLATELET # BLD AUTO: 181 K/UL (ref 150–450)
PMV BLD AUTO: 10 FL (ref 9.2–12.9)
POTASSIUM SERPL-SCNC: 4 MMOL/L (ref 3.5–5.1)
PROT SERPL-MCNC: 7.1 G/DL (ref 6–8.4)
RBC # BLD AUTO: 5.39 M/UL (ref 4.6–6.2)
SODIUM SERPL-SCNC: 138 MMOL/L (ref 136–145)
WBC # BLD AUTO: 6.8 K/UL (ref 3.9–12.7)

## 2023-08-03 PROCEDURE — 83521 IG LIGHT CHAINS FREE EACH: CPT | Mod: 59 | Performed by: NURSE PRACTITIONER

## 2023-08-03 PROCEDURE — 1126F AMNT PAIN NOTED NONE PRSNT: CPT | Mod: CPTII,S$GLB,, | Performed by: FAMILY MEDICINE

## 2023-08-03 PROCEDURE — 86334 IMMUNOFIX E-PHORESIS SERUM: CPT | Performed by: NURSE PRACTITIONER

## 2023-08-03 PROCEDURE — 80053 COMPREHEN METABOLIC PANEL: CPT | Performed by: NURSE PRACTITIONER

## 2023-08-03 PROCEDURE — 83036 HEMOGLOBIN GLYCOSYLATED A1C: CPT | Performed by: FAMILY MEDICINE

## 2023-08-03 PROCEDURE — 1159F MED LIST DOCD IN RCRD: CPT | Mod: CPTII,S$GLB,, | Performed by: FAMILY MEDICINE

## 2023-08-03 PROCEDURE — 36415 COLL VENOUS BLD VENIPUNCTURE: CPT | Performed by: NURSE PRACTITIONER

## 2023-08-03 PROCEDURE — 99214 PR OFFICE/OUTPT VISIT, EST, LEVL IV, 30-39 MIN: ICD-10-PCS | Mod: S$GLB,,, | Performed by: FAMILY MEDICINE

## 2023-08-03 PROCEDURE — 3066F NEPHROPATHY DOC TX: CPT | Mod: CPTII,S$GLB,, | Performed by: FAMILY MEDICINE

## 2023-08-03 PROCEDURE — 86334 IMMUNOFIX E-PHORESIS SERUM: CPT | Mod: 26,,, | Performed by: PATHOLOGY

## 2023-08-03 PROCEDURE — 36415 COLL VENOUS BLD VENIPUNCTURE: CPT | Performed by: FAMILY MEDICINE

## 2023-08-03 PROCEDURE — 1159F PR MEDICATION LIST DOCUMENTED IN MEDICAL RECORD: ICD-10-PCS | Mod: CPTII,S$GLB,, | Performed by: FAMILY MEDICINE

## 2023-08-03 PROCEDURE — 85027 COMPLETE CBC AUTOMATED: CPT | Performed by: NURSE PRACTITIONER

## 2023-08-03 PROCEDURE — 99999 PR PBB SHADOW E&M-EST. PATIENT-LVL III: CPT | Mod: PBBFAC,,, | Performed by: FAMILY MEDICINE

## 2023-08-03 PROCEDURE — 3075F PR MOST RECENT SYSTOLIC BLOOD PRESS GE 130-139MM HG: ICD-10-PCS | Mod: CPTII,S$GLB,, | Performed by: FAMILY MEDICINE

## 2023-08-03 PROCEDURE — 3075F SYST BP GE 130 - 139MM HG: CPT | Mod: CPTII,S$GLB,, | Performed by: FAMILY MEDICINE

## 2023-08-03 PROCEDURE — 3078F PR MOST RECENT DIASTOLIC BLOOD PRESSURE < 80 MM HG: ICD-10-PCS | Mod: CPTII,S$GLB,, | Performed by: FAMILY MEDICINE

## 2023-08-03 PROCEDURE — 1126F PR PAIN SEVERITY QUANTIFIED, NO PAIN PRESENT: ICD-10-PCS | Mod: CPTII,S$GLB,, | Performed by: FAMILY MEDICINE

## 2023-08-03 PROCEDURE — 1160F RVW MEDS BY RX/DR IN RCRD: CPT | Mod: CPTII,S$GLB,, | Performed by: FAMILY MEDICINE

## 2023-08-03 PROCEDURE — 84165 PATHOLOGIST INTERPRETATION SPE: ICD-10-PCS | Mod: 26,,, | Performed by: PATHOLOGY

## 2023-08-03 PROCEDURE — 84165 PROTEIN E-PHORESIS SERUM: CPT | Mod: 26,,, | Performed by: PATHOLOGY

## 2023-08-03 PROCEDURE — 99214 OFFICE O/P EST MOD 30 MIN: CPT | Mod: S$GLB,,, | Performed by: FAMILY MEDICINE

## 2023-08-03 PROCEDURE — 3078F DIAST BP <80 MM HG: CPT | Mod: CPTII,S$GLB,, | Performed by: FAMILY MEDICINE

## 2023-08-03 PROCEDURE — 84165 PROTEIN E-PHORESIS SERUM: CPT | Performed by: NURSE PRACTITIONER

## 2023-08-03 PROCEDURE — 3066F PR DOCUMENTATION OF TREATMENT FOR NEPHROPATHY: ICD-10-PCS | Mod: CPTII,S$GLB,, | Performed by: FAMILY MEDICINE

## 2023-08-03 PROCEDURE — 86334 PATHOLOGIST INTERPRETATION IFE: ICD-10-PCS | Mod: 26,,, | Performed by: PATHOLOGY

## 2023-08-03 PROCEDURE — 99999 PR PBB SHADOW E&M-EST. PATIENT-LVL III: ICD-10-PCS | Mod: PBBFAC,,, | Performed by: FAMILY MEDICINE

## 2023-08-03 PROCEDURE — 1160F PR REVIEW ALL MEDS BY PRESCRIBER/CLIN PHARMACIST DOCUMENTED: ICD-10-PCS | Mod: CPTII,S$GLB,, | Performed by: FAMILY MEDICINE

## 2023-08-03 RX ORDER — ALPRAZOLAM 1 MG/1
1 TABLET ORAL DAILY PRN
Qty: 90 TABLET | Refills: 0 | Status: SHIPPED | OUTPATIENT
Start: 2023-08-03 | End: 2024-02-07 | Stop reason: SDUPTHER

## 2023-08-03 NOTE — PROGRESS NOTES
Subjective:   Patient ID: Alverto Ramirez Jr. is a 73 y.o. male.  Chief Complaint:  3mth f/u    Presents for 3 month follow-up on insomnia and anxiety  Also followed by [facility]     Medical history:  - End-stage renal disease.  Underwent successful transplant March 2022. Recent renal function panel stable. Up-to-date visit with Nephrology ind Transplant.  Reports compliance with recommended medication regimen.  - Hypertension.  Controlled on Procardia 60 mg twice a day Lopressor 50 mg twice a day.  Reports compliance.  Denies side effects.  No shortness of breath or swelling.  - Mixed hyperlipidemia.  Currently not on any medications.  Last lipid panel with LDL 84. Denies chest pain or claudication.  - Erectile dysfunction.  Stable with symptoms controlled on takes Viagra daily as needed.     Anxiety follow-up  On  Xanax 1 mg daily as needed.   Averages 1 pill daily with 90 pills lasting approximately 3 months   Reports medication is effective for symptom control.  No suspicion for abusive or diversion behaviors.  Denies any side effects.      Tolerating current treatment well.   Happy with medication.    Wants to continue.  No behaviors to suggest inappropriate use of prescribed medications.  [place] Board of Pharmacy Controlled Prescription Drug Monitoring database was queried and showed no activity to suggest abuse, diversion, or other inappropriate use of prescription medications.      Insomnia follow-up  On trazodone 50 mg nightly  Denies any side effects.      Tolerating current treatment well.   Reports still effective to help sleep difficulty  Happy with medication.    Wants to continue.     Patient had blood work done prior to today's visit   CBC normal   CMP with EGFR stable in stage IIIA range, liver function tests normal, glucose 140  No recent A1c on file   Denies any symptoms of hypo or hyperglycemia    Review of Systems   Constitutional:  Negative for activity change, appetite  "change, chills, fatigue and fever.   Respiratory:  Negative for shortness of breath.    Cardiovascular:  Negative for chest pain, palpitations and leg swelling.   Gastrointestinal:  Negative for abdominal pain, constipation, diarrhea, nausea and vomiting.   Genitourinary:  Negative for difficulty urinating.   Musculoskeletal:  Negative for arthralgias and myalgias.   Skin:  Negative for rash.   Neurological:  Negative for dizziness, weakness, light-headedness, numbness and headaches.   Psychiatric/Behavioral:  Negative for agitation, behavioral problems, confusion, decreased concentration, dysphoric mood, hallucinations, self-injury, sleep disturbance and suicidal ideas. The patient is not nervous/anxious and is not hyperactive.      Objective:   /74 (BP Location: Right arm, Patient Position: Sitting, BP Method: Large (Manual))   Pulse 70   Temp 97.4 °F (36.3 °C) (Tympanic)   Ht 5' 7" (1.702 m)   Wt 89.6 kg (197 lb 8.5 oz)   SpO2 96%   BMI 30.94 kg/m²     Physical Exam  Vitals and nursing note reviewed.   Constitutional:       Appearance: Normal appearance. He is well-developed. He is obese.   Neurological:      Mental Status: He is alert and oriented to person, place, and time.      Coordination: Coordination is intact.      Gait: Gait is intact.   Psychiatric:         Attention and Perception: Attention normal. He is attentive.         Mood and Affect: Mood and affect normal. Mood is not anxious or depressed.         Speech: Speech normal.         Behavior: Behavior normal. Behavior is cooperative.         Thought Content: Thought content normal.         Cognition and Memory: Cognition normal.         Judgment: Judgment normal.       Assessment:       ICD-10-CM ICD-9-CM   1. Anxiety  F41.9 300.00   2. Psychophysiological insomnia  F51.04 307.42   3. Elevated serum glucose  R73.9 790.29     Plan:   Anxiety  -     ALPRAZolam (XANAX) 1 MG tablet; Take 1 tablet (1 mg total) by mouth daily as needed for " Anxiety or Insomnia.  Dispense: 90 tablet; Refill: 0  Stable.  Symptoms controlled.    Continue Xanax 1 mg daily as needed     Psychophysiological insomnia  Stable.  Symptoms controlled.    Continue trazodone 50 mg nightly     Elevated serum glucose  -     Hemoglobin A1C; Future; Expected date: 08/03/2023  Check A1c  Treat for prediabetes diabetes if indicated    Follow up with any/all specialists as scheduled    Return to clinic 6 months or sooner if needed

## 2023-08-04 LAB
ALBUMIN SERPL ELPH-MCNC: 4.06 G/DL (ref 3.35–5.55)
ALPHA1 GLOB SERPL ELPH-MCNC: 0.31 G/DL (ref 0.17–0.41)
ALPHA2 GLOB SERPL ELPH-MCNC: 0.88 G/DL (ref 0.43–0.99)
B-GLOBULIN SERPL ELPH-MCNC: 0.69 G/DL (ref 0.5–1.1)
GAMMA GLOB SERPL ELPH-MCNC: 0.75 G/DL (ref 0.67–1.58)
INTERPRETATION SERPL IFE-IMP: NORMAL
KAPPA LC SER QL IA: 1.66 MG/DL (ref 0.33–1.94)
KAPPA LC/LAMBDA SER IA: 1.68 (ref 0.26–1.65)
LAMBDA LC SER QL IA: 0.99 MG/DL (ref 0.57–2.63)
PROT SERPL-MCNC: 6.7 G/DL (ref 6–8.4)

## 2023-08-07 LAB
PATHOLOGIST INTERPRETATION IFE: NORMAL
PATHOLOGIST INTERPRETATION SPE: NORMAL

## 2023-08-09 NOTE — PROGRESS NOTES
Subjective:       Patient ID: Alverto Ramirez Jr. is a 73 y.o. male.    Chief Complaint:   1. Monoclonal gammopathy  IgM kappa      2. Anemia in ESRD (end-stage renal disease)        3. Kidney transplant status          Current Treatment:  Observation     Treatment History:    HPI: This is a 73-year-old  male with  afib, anemia secondary to CKD, hypertensive nephropathy, and IgM monoclonal gammopathy of undetermined significance that was diagnosed in 2018 and since has been monitored.       He was on peritoneal dialysis and underwent kidney transplantation on 3/19/2022. He follows with the transplant team in [facility].     Interval History: Patient presents for follow up on labs. He presents with his wife and has no complaints today. He states he had been eating a lot of sweets lately; he cut back about 2-3 weeks ago. He reports a good appetite and normal Bms. No anemia; WBC, plts WNL. CMP reveals improvement in eGFR to 57mL/min. Explained that staying adequately hydrated, maintaining good control of BP and BG, and avoiding NSAIDs all help to preserve/improve kidney function. DAYSI demonstrates faint IgM kappa and free lambda light chains not visible on corresponding SPEP and cannot be measured. Kappa and lambda FLCs WNL with slightly elevated ratio. He reports taking MagOx BID 1hr before taking his other meds and asks if this can be changed. He also asks if his distended abdomen will ever go away. Per his wife, the transplant team has discussed his abdomen with him. Advised him to follow up with them regarding avoiding interactions between MagOx and his other medications. He is scheduled to see them next month.     Reviewed labs with patient:   CBC:   Recent Labs   Lab 08/03/23  1342   WBC 6.80   RBC 5.39   Hemoglobin 15.3   Hematocrit 46.6   Platelets 181   MCV 87   MCH 28.4   MCHC 32.8     CMP:  Recent Labs   Lab 08/03/23  1342   Glucose 141 H   Calcium 9.8   Albumin 4.1   Total Protein 7.1   Sodium 138    Potassium 4.0   CO2 22 L   Chloride 105   BUN 16   Creatinine 1.3   Alkaline Phosphatase 92   ALT 27   AST 20   Total Bilirubin 0.5     Social History     Socioeconomic History    Marital status:    Occupational History     Employer: retired   Tobacco Use    Smoking status: Former     Current packs/day: 0.00     Average packs/day: 1 pack/day for 15.0 years (15.0 ttl pk-yrs)     Types: Cigarettes     Start date: 1969     Quit date: 1984     Years since quittin.7    Smokeless tobacco: Never   Substance and Sexual Activity    Alcohol use: Yes     Alcohol/week: 5.0 - 7.0 standard drinks of alcohol     Types: 5 - 7 Standard drinks or equivalent per week     Comment: stopped drinking    Drug use: No    Sexual activity: Yes     Partners: Female   Social History Narrative    Retired from [employer]     for 41 y.o.    2 children    No history of blood transfusions    No donors     Past Medical History:   Diagnosis Date    Anemia in CKD (chronic kidney disease)     Atrial fibrillation     CKD (chronic kidney disease) stage 4, GFR 15 2014    Colon cancer screening 2014    Elevated PSA 2014    HTN (hypertension) diagnosed in his 40s 10/16/2014    Monoclonal gammopathy 2014    Phobic anxiety disorder 2014    Proteinuria 2014    Stage 3a chronic kidney disease 3/25/2022     Family History   Problem Relation Age of Onset    Heart disease Father     Dementia Father     Diabetes Mother     Cataracts Mother     Glaucoma Mother     Hypertension Sister     Cancer Brother         prostate    Kidney disease Sister         ESRD    Cancer Paternal Uncle      Past Surgical History:   Procedure Laterality Date    AV FISTULA PLACEMENT  2014    COLONOSCOPY N/A 2017    Procedure: COLONOSCOPY;  Surgeon: Tony Peacock III, MD;  Location: [facility];  Service: Endoscopy;  Laterality: N/A;    KIDNEY TRANSPLANT Right 3/18/2022    Procedure: TRANSPLANT, KIDNEY;   "Surgeon: Victoriano Thomas MD;  Location: [address];  Service: Transplant;  Laterality: Right;    PERITONEAL CATHETER INSERTION      VASECTOMY      VASECTOMY       Review of Systems   Constitutional:  Negative for appetite change and fatigue.   HENT:  Negative for mouth sores, rhinorrhea and sore throat.    Eyes: Negative.    Respiratory:  Negative for shortness of breath.    Cardiovascular: Negative.    Gastrointestinal:  Negative for constipation, diarrhea, nausea and vomiting.   Endocrine: Positive for cold intolerance (since starting dialysis).   Genitourinary: Negative.    Musculoskeletal: Negative.    Integumentary:  Negative.   Allergic/Immunologic: Negative.    Neurological:  Positive for headaches (sinus headaches "every now and then"). Negative for dizziness, weakness, light-headedness and numbness.   Hematological: Negative.    Psychiatric/Behavioral: Negative.         Medication List with Changes/Refills   Current Medications    ALPRAZOLAM (XANAX) 1 MG TABLET    Take 1 tablet (1 mg total) by mouth daily as needed for Anxiety or Insomnia.    CALCITRIOL (ROCALTROL) 0.5 MCG CAP    Take 1 capsule (0.5 mcg total) by mouth every Mon, Wed, Fri, Sun.    CHOLECALCIFEROL, VITAMIN D3, (VITAMIN D3) 50 MCG (2,000 UNIT) TAB    Take 1 tablet (2,000 Units total) by mouth once daily.    K PHOS DI & MONO-SOD PHOS MONO (K-PHOS-NEUTRAL) 250 MG TAB    Take 1 tablet by mouth 2 (two) times a day.    MAGNESIUM OXIDE (MAG-OX) 400 MG (241.3 MG MAGNESIUM) TABLET    Take 1 tablet (400 mg total) by mouth 2 (two) times daily.    METOPROLOL SUCCINATE (TOPROL-XL) 50 MG 24 HR TABLET    Take 1 tablet (50 mg total) by mouth 2 (two) times daily.    MUPIROCIN (BACTROBAN) 2 % OINTMENT    by Nasal route once daily.    MYCOPHENOLATE (CELLCEPT) 250 MG CAP    Take 3 capsules (750 mg total) by mouth 2 (two) times daily.    NIFEDIPINE (PROCARDIA-XL) 60 MG (OSM) 24 HR TABLET    Take 1 tablet (60 mg total) by mouth 2 (two) times a day.    " PREDNISONE (DELTASONE) 5 MG TABLET    Take 1 tablet (5 mg total) by mouth once daily. Take by mouth daily: 20mg 3/22/22 -4/21, 15mg 4/22-5/22, 10mg 5/23-6/22, 5mg 6/23- thereafter    SILDENAFIL (VIAGRA) 100 MG TABLET    Take 1 tablet (100 mg total) by mouth daily as needed for Erectile Dysfunction.    SODIUM BICARBONATE 650 MG TABLET    Take 3 tablets (1,950 mg total) by mouth 2 (two) times daily.    TACROLIMUS (PROGRAF) 1 MG CAP    Take 3 capsules (3 mg total) by mouth every morning AND 3 capsules (3 mg total) every evening.    TRAZODONE (DESYREL) 50 MG TABLET    Take 1-2 tablets ( mg total) by mouth every evening.     Objective:     Vitals:    08/10/23 1325   BP: (!) 150/85   Pulse: 73   Temp: 98.6 °F (37 °C)     Physical Exam  Vitals reviewed.   Constitutional:       Appearance: Normal appearance.   HENT:      Head: Normocephalic.   Eyes:      Extraocular Movements: Extraocular movements intact.      Pupils: Pupils are equal, round, and reactive to light.      Comments:        Cardiovascular:      Rate and Rhythm: Normal rate and regular rhythm.      Heart sounds: Normal heart sounds.   Pulmonary:      Effort: Pulmonary effort is normal.      Breath sounds: Normal breath sounds.   Abdominal:      General: Bowel sounds are normal.      Palpations: Abdomen is soft.      Comments: rounded     Genitourinary:     Comments: deferred    Musculoskeletal:         General: Normal range of motion.      Cervical back: Normal range of motion and neck supple.   Skin:     General: Skin is warm and dry.   Neurological:      Mental Status: He is alert and oriented to person, place, and time.   Psychiatric:         Behavior: Behavior normal.         Thought Content: Thought content normal.        (1) Restricted in physically strenuous activity, ambulatory and able to do work of light nature  Assessment:     Problem List Items Addressed This Visit          Renal/    Kidney transplant status     Underwent kidney  transplantation on 3/19/2022. Follows with transplant team.             Oncology    Anemia in ESRD (end-stage renal disease) (Chronic)     Anemia of chronic kidney disease resolved s/p kidney transplant. Normal RBC, H&H.   Will continue to monitor.         Monoclonal gammopathy - Primary     IgM monoclonal gammopathy. Will repeat protein electrophoresis and immunofixation.  Will continue to monitor every 4 months or sooner as needed.           Plan:     Monoclonal gammopathy    Anemia in ESRD (end-stage renal disease)    Kidney transplant status    Labs reviewed; anemia resolved; faint monoclonal protein not measurable.   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   Follow up in 4 months with SPEP, DAYSI, FLC, CBC, and Comprehensive Metabolic Panel.    Route Chart for Scheduling    Med Onc Chart Routing      Follow up with physician    Follow up with ROBERT 4 months. Mikaela at [facility]   Infusion scheduling note    Injection scheduling note    Labs CBC, CMP, other, SPEP and free light chains   Scheduling:  Preferred lab:  Lab interval:  and DAYSI in 4 months, 3-4 days prior at [facility]   Imaging None      Pharmacy appointment No pharmacy appointment needed      Other referrals     No additional referrals needed         I will review assessment/plan with collaborating physician.      SARHA Silver

## 2023-08-09 NOTE — ASSESSMENT & PLAN NOTE
IgM monoclonal gammopathy. Will repeat protein electrophoresis and immunofixation.  Will continue to monitor every 4 months or sooner as needed.

## 2023-08-09 NOTE — ASSESSMENT & PLAN NOTE
Anemia of chronic kidney disease resolved s/p kidney transplant. Normal RBC, H&H.   Will continue to monitor.

## 2023-08-10 ENCOUNTER — OFFICE VISIT (OUTPATIENT)
Dept: HEMATOLOGY/ONCOLOGY | Facility: CLINIC | Age: 73
End: 2023-08-10
Payer: MEDICARE

## 2023-08-10 VITALS
DIASTOLIC BLOOD PRESSURE: 85 MMHG | OXYGEN SATURATION: 98 % | BODY MASS INDEX: 29.67 KG/M2 | SYSTOLIC BLOOD PRESSURE: 150 MMHG | HEART RATE: 73 BPM | TEMPERATURE: 99 F | HEIGHT: 68 IN | WEIGHT: 195.75 LBS

## 2023-08-10 DIAGNOSIS — N18.6 ANEMIA IN ESRD (END-STAGE RENAL DISEASE): ICD-10-CM

## 2023-08-10 DIAGNOSIS — D47.2 MONOCLONAL GAMMOPATHY: Primary | ICD-10-CM

## 2023-08-10 DIAGNOSIS — D63.1 ANEMIA IN ESRD (END-STAGE RENAL DISEASE): ICD-10-CM

## 2023-08-10 DIAGNOSIS — Z94.0 KIDNEY TRANSPLANT STATUS: ICD-10-CM

## 2023-08-10 PROCEDURE — 3044F HG A1C LEVEL LT 7.0%: CPT | Mod: CPTII,S$GLB,, | Performed by: NURSE PRACTITIONER

## 2023-08-10 PROCEDURE — 99214 PR OFFICE/OUTPT VISIT, EST, LEVL IV, 30-39 MIN: ICD-10-PCS | Mod: S$GLB,,, | Performed by: NURSE PRACTITIONER

## 2023-08-10 PROCEDURE — 99999 PR PBB SHADOW E&M-EST. PATIENT-LVL IV: CPT | Mod: PBBFAC,,, | Performed by: NURSE PRACTITIONER

## 2023-08-10 PROCEDURE — 3079F DIAST BP 80-89 MM HG: CPT | Mod: CPTII,S$GLB,, | Performed by: NURSE PRACTITIONER

## 2023-08-10 PROCEDURE — 1160F PR REVIEW ALL MEDS BY PRESCRIBER/CLIN PHARMACIST DOCUMENTED: ICD-10-PCS | Mod: CPTII,S$GLB,, | Performed by: NURSE PRACTITIONER

## 2023-08-10 PROCEDURE — 1126F AMNT PAIN NOTED NONE PRSNT: CPT | Mod: CPTII,S$GLB,, | Performed by: NURSE PRACTITIONER

## 2023-08-10 PROCEDURE — 3044F PR MOST RECENT HEMOGLOBIN A1C LEVEL <7.0%: ICD-10-PCS | Mod: CPTII,S$GLB,, | Performed by: NURSE PRACTITIONER

## 2023-08-10 PROCEDURE — 99999 PR PBB SHADOW E&M-EST. PATIENT-LVL IV: ICD-10-PCS | Mod: PBBFAC,,, | Performed by: NURSE PRACTITIONER

## 2023-08-10 PROCEDURE — 3288F PR FALLS RISK ASSESSMENT DOCUMENTED: ICD-10-PCS | Mod: CPTII,S$GLB,, | Performed by: NURSE PRACTITIONER

## 2023-08-10 PROCEDURE — 3066F PR DOCUMENTATION OF TREATMENT FOR NEPHROPATHY: ICD-10-PCS | Mod: CPTII,S$GLB,, | Performed by: NURSE PRACTITIONER

## 2023-08-10 PROCEDURE — 3077F SYST BP >= 140 MM HG: CPT | Mod: CPTII,S$GLB,, | Performed by: NURSE PRACTITIONER

## 2023-08-10 PROCEDURE — 1160F RVW MEDS BY RX/DR IN RCRD: CPT | Mod: CPTII,S$GLB,, | Performed by: NURSE PRACTITIONER

## 2023-08-10 PROCEDURE — 3077F PR MOST RECENT SYSTOLIC BLOOD PRESSURE >= 140 MM HG: ICD-10-PCS | Mod: CPTII,S$GLB,, | Performed by: NURSE PRACTITIONER

## 2023-08-10 PROCEDURE — 3079F PR MOST RECENT DIASTOLIC BLOOD PRESSURE 80-89 MM HG: ICD-10-PCS | Mod: CPTII,S$GLB,, | Performed by: NURSE PRACTITIONER

## 2023-08-10 PROCEDURE — 1101F PR PT FALLS ASSESS DOC 0-1 FALLS W/OUT INJ PAST YR: ICD-10-PCS | Mod: CPTII,S$GLB,, | Performed by: NURSE PRACTITIONER

## 2023-08-10 PROCEDURE — 1159F PR MEDICATION LIST DOCUMENTED IN MEDICAL RECORD: ICD-10-PCS | Mod: CPTII,S$GLB,, | Performed by: NURSE PRACTITIONER

## 2023-08-10 PROCEDURE — 3288F FALL RISK ASSESSMENT DOCD: CPT | Mod: CPTII,S$GLB,, | Performed by: NURSE PRACTITIONER

## 2023-08-10 PROCEDURE — 1101F PT FALLS ASSESS-DOCD LE1/YR: CPT | Mod: CPTII,S$GLB,, | Performed by: NURSE PRACTITIONER

## 2023-08-10 PROCEDURE — 99214 OFFICE O/P EST MOD 30 MIN: CPT | Mod: S$GLB,,, | Performed by: NURSE PRACTITIONER

## 2023-08-10 PROCEDURE — 1126F PR PAIN SEVERITY QUANTIFIED, NO PAIN PRESENT: ICD-10-PCS | Mod: CPTII,S$GLB,, | Performed by: NURSE PRACTITIONER

## 2023-08-10 PROCEDURE — 1159F MED LIST DOCD IN RCRD: CPT | Mod: CPTII,S$GLB,, | Performed by: NURSE PRACTITIONER

## 2023-08-10 PROCEDURE — 3066F NEPHROPATHY DOC TX: CPT | Mod: CPTII,S$GLB,, | Performed by: NURSE PRACTITIONER

## 2023-08-15 NOTE — TELEPHONE ENCOUNTER
[facility] Hospitalist Discharge Summary      Admission date     8/4/2023 11:49 AM    /Discharge date/  8/15/2023    Discharge Disposition  Skilled nursing facility    Discharge Diagnosis  Acute on chronic combined systolic and diastolic heart failure (CMD)   marino on ckd  Anemia of chronic dz  Diabetes  Copd   a fib  Recent aicd infecton /  mrsa bacteremia  pneumonia      Consultants  dr tawanna mcknight podiatry    Hospital Course  73 year old male with h/o HTN, CAD s/p PCI,  Ischemic cardiomyopathy, HFrEF, sp ICD, chronic respiratory failure on 2L NC, COPD, PAD, HLP, afib on eliquis, DM2 ,who was recently admitted for MRSA bacteremia, AICD infection s/p explant 7/18, acute osteomyelitis R hallux s/p amputation on 7/25, and was dc'd on 7/28 on cefepime and flagyl x 1 week which he has completed and damptomycin x 4 weeks total.    He now comes in with c/o SOB and \"not feeling well\" today.  He has a lifevest on now.    States that today he was feeling a headache, a bit nauseous and worsening shortness of breath and when he checked his blood pressure it was low so rehab called 911.      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  The SOB is worse with exertion.  On 3L NC currently. Also c/o headache, nausea  No vomiting or diarrhea. No abd pain.  No fevers or chills. No LE edema. No dysuria or hematuria. No focal weakness.   He did get IV fluids enroute since his BP was low    He had CTA done in ED today and it showed ROMERO PNA as well as CHF, also showed new nodular opacity 12x9 mm RLL, recommend CT chest in 3 months    Labs with BNP 09153, Hg 8.8, Cr 1.6, Troponin ok    CRP 11. BG elevated   UA with large blood, glucose and few bacteria   Hypotensive  Refill request for Xanax should be sent to Dr Hayes. Dr Hayes notified of refill request.   in ED to 89/45 initially    CXR with small bilateral pleural effusions     Assessment and plan  1. Acute on chronic HFrEF 2/2 ICM (EF 30%) - improving   bnp 97605's on 8/9   - c/w po spirolactone and torsemide 60 mg bid    Off iv lasix since 8/8    dw Dr olea 8/11 , will hold torsemide given cr uptrend.  bnp 9100's  - dw card apn.  Cr downtrend 8/12,  resume torsemide at 60 mg daily   dw dr stacy today, pt cr 1.7 today. Per card, dc with torsemide 60 mg daily.  Ok to have higher cr for better diuresis and cr 1.5 to 2 is acceptable  - life vest    2. CAD   - s/p PCI LCx and LAD (with occluded LAD stent)    on eliquis and plavix    3. Parox AFib - on eliquis     4. PAD   - s/p R 1st digit amputation   - site healing well per podiatry    Fu dr mcknight podiatry 1 week     5. CKD 3 - creat trending down to 1.46 on 8/9 to 1.5 to 1.78    Holding torsemide 8/11 pm  Cr improves to 1.37 so will resume diuretic at lower dose since 8/12   cr 1.4 to 1.5 to 1.7 today    Per dr stacy, will allow for cr 1.5 t 2 for better diuresis     6. Unstageable coccyx pressure ulcer POA   - c/w local wound care per wound care RN     7. Chr hypoxemic respiratory failure - stable   - titrate down FiO2 to keep sats > 95%    currently on 2 liter o2 nc    8. COPD - stable     9. Hx recent MRSA bacteremia     10. Possible ROMERO pneumonia   - c/w iv vanco and cefepime, last dose 8/11   then resume dapto for mrsa bacteremia till 8/15 today dw dr martinez, will dc picc line after iv abx is done  procal 0.24 on 8/9     11. Debility - PT eval noted   - needs to be mobilized out of bed daily    OT/PT to reassess for different rory requests by pt    Awake insurance auth for rory     12. DM 2   - better controlled today   - has been on insulin (lantus and novolog) for several years   - c/w current insulin regimen lantus 30 u at bedtime and lispro 9 u with meals     dw pt and rn and sw.  dw dr stacy and dr fransisco ROSE who clear pt for  dc      Physical exam  Pt seen and examined on the day of discharge.   Pt sits in chair. Comfortable. Denies sob, cp, gu or gi sx  Vitals with min/max:    Vital Last Value 24 Hour Range   Temperature 98.1 °F (36.7 °C) (08/15/23 1426) Temp  Min: 98.1 °F (36.7 °C)  Max: 98.2 °F (36.8 °C)   Pulse 69 (08/15/23 1426) Pulse  Min: 69  Max: 77   Respiratory 16 (08/15/23 0543) Resp  Min: 16  Max: 16   Non-Invasive  Blood Pressure 114/59 (08/15/23 1426) BP  Min: 91/49  Max: 134/64   Pulse Oximetry 98 % (08/15/23 1426) SpO2  Min: 97 %  Max: 100 %   Arterial   Blood Pressure   No data recorded     General: patient not in acute distress. On 2 liter  HEENT:   Normal conjunctiva.   Respiratory: decreased bs at bases  Cardiovascular: Regular rate and rhythm.  trace peripheral edema.  Gastrointestinal: Abdomen soft, non tender, non distended. + bs  Genitourinary: No suprapubic tenderness.   Musculoskeletal: Moves all 4 extremities.  Right foot with bandage  Neurology: awke, follow commands    Skin: Warm.   Psychiatry: Cooperative       Labs Results  Recent Labs     08/13/23  0534 08/14/23  0352 08/15/23  0453   WBC 7.4 8.9 8.3   RBC 3.26* 3.83* 3.73*   HGB 8.9* 10.1* 10.0*   HCT 28.7* 33.4* 32.3*    202 193   MCV 88.0 87.2 86.6   MCH 27.3 26.4 26.8   MCHC 31.0* 30.2* 31.0*   NRBCRE 0 0 0         Recent Labs     08/13/23  0534 08/14/23 0352 08/15/23  0453   SODIUM 141 139 137   POTASSIUM 3.4 3.9 3.7   MG 2.1  --   --    CO2 29 32 32   ANIONGAP 7 8 8   GLUCOSE 180* 184* 178*   BUN 38* 48* 50*   CREATININE 1.44* 1.58* 1.73*   CALCIUM 7.6* 8.8 8.8        Recent Labs   Lab 08/11/23  0545   NTPROB 9,151*        No results for input(s): \"INR\", \"PT\", \"PTT\" in the last 72 hours.    Recent Labs   Lab 08/14/23  1643 08/14/23  2056 08/15/23  0742 08/15/23  1114   GLUCOSE BEDSIDE 141* 226* 189* 179*       Cultures  Microbiology Results     None           Pathology  * Cannot find OR log *      Pertinent Imaging and Procedures    IR PICC  LINE EXCHANGE    Result Date: 8/11/2023  Narrative: Exam: Fluoroscopic guided PICC line exchange Clinical Indication: PICC for long-term inotropes, PICC line pulled back into the right subclavian vein. : Micah Velasquez M.D. Medications: Local anesthetic Estimated Blood Loss: Minimal Complications:None Fluoroscopy time: 0.6 minutes Radiation Dose: 2 mGy PROCEDURE NOTES: This procedure was performed using sterile technique employing the following: Sterile gown, sterile gloves, caps, masks, large sterile barrier, hand hygiene and 2% chlorhexidine. The existing PICC was cut.  A guidewire was advanced through the PICC centrally.  The previous PICC fragment was removed.  Over the Glidewire a 5 Bulgarian peel-away sheath was placed in the right arm.  A 5 Bulgarian dual lumen  PICC was advanced through the peel-away sheath and under fluoroscopic guidance was positioned with the catheter tip at the cavoatrial junction. The peel-away sheath was removed and the catheter secured into place. A fluoro spot film of the chest was obtained and stored in PACS to document final position of the catheter. The patient tolerated the procedure well without complications.     Impression: Impression: Successful exchange of a PICC via the right arm. Electronically Signed by: MICAH VELASQUEZ M.D. Signed on: 8/11/2023 3:59 PM Workstation ID: [id number]    XR CHEST PA AND LATERAL 2 VIEWS    Result Date: 8/9/2023  Narrative: DICTATING PHYSICIAN:  Brayden Orantes MD EXAM:  XR CHEST PA AND LATERAL 2 VIEWS  8/8/2023 6:41 PM HISTORY:  Pneumonia COMPARISON: 08/05/2023, 08/04/2023 FINDINGS: Portable view of the chest. Support devices: None. Cardiac silhouette and mediastinum: Moderate cardiomegaly. Pulmonary vessels: Minimal pulmonary vascular prominence. Lungs: Consolidative and interstitial opacities bilaterally, slightly progressed from prior exam. Pleura: Trace bilateral effusions.  No pneumothorax. Visualized abdomen: No abnormality. Chest  wall and Bones: No destructive lesions.     Impression: Patchy consolidative and interstitial opacities bilaterally, mildly progressed from prior exam. Electronically Signed by: HIEU HARTMANN MD Signed on: 8/9/2023 12:53 AM Workstation ID: [id number]    XR CHEST AP OR PA    Result Date: 8/5/2023  Narrative: EXAMINATION: Chest radiograph, anteroposterior view HISTORY: PICC tip placement s/p retraction COMPARISON: 08/04/2023.     Impression: FINDINGS / IMPRESSION: The right arm PICC has been retracted and now appears to terminate in the right subclavian vein. There is no evidence of pneumothorax. The small right pleural effusion appears unchanged. There has been interval increase in opacity in the left mid lung. Left lung base opacity appears unchanged. The heart and mediastinum appear stable. Electronic devices project over the chest. Electronically Signed by: EVERETTE NOE MD Signed on: 8/5/2023 6:56 AM Workstation ID: [id number]    CTA CHEST PULMONARY EMBOLISM    Result Date: 8/4/2023  Narrative: PROCEDURE: CTA CHEST PULMONARY EMBOLISM HISTORY: Shortness of breath. TECHNIQUE: CT angiogram of the chest with intravenous contrast as per pulmonary embolus protocol. Images were obtained after intravenous administration of 75 mL of Omnipaque 350. Maximum intensity projections (MIPs) performed. COMPARISON:  CT chest 07/27/2022 FINDINGS: Limited examination due to noise and streak artifact from components of a wearable automatic external defibrillator as well as blurring from respiratory motion. Pulmonary Angiogram: Limited evaluation for pulmonary emboli as described above. Within limitation(s), no evidence of pulmonary emboli to the segmental level. Pulmonary emboli to more distal branches cannot be excluded. The main pulmonary artery is borderline dilated measuring 3 cm in diameter. Systemic Vessels: No thoracic aortic aneurysm. Right upper extremity PICC is looped in the inferior right internal jugular vein  before descending inferiorly with tip in the right brachiocephalic vein. Reflux of contrast material into the dilated IVC and hepatic veins compatible with right heart failure. Heart & Pericardium: Mild global cardiomegaly. Coronary artery calcifications and/or stents. No pericardial effusion. Mediastinum & Jenelle: No adenopathy. Lungs, Airways, & Pleurae: Patchy airspace consolidation in the left upper lobe. Mild diffuse groundglass haziness in both lungs, interlobular septal thickening, bronchial wall thickening, and moderate pleural effusions compatible with pulmonary edema. Mild upper lung zone predominant centrilobular emphysema. Subpleural nodular opacity in the lateral right lower lobe measuring 12 x 9 mm (304/76), new from the prior study. A few thin linear secretions in the trachea. Chest Wall & Neck Base: Simple lipoma in the right anterior chest wall measuring 3.1 x 1.3 cm. Healing fractures of the right anterior 7th and 8th ribs. Degenerative disc disease affects T8-T9. Diffuse body wall edema compatible with hypervolemia and/or 3rd spacing. Upper Abdomen: Enlargement of the incompletely imaged spleen measuring 15.3 cm in AP dimension. Subcentimeter calcified stone in the gallbladder infundibulum/neck.     Impression: 1.   No evidence of pulmonary emboli to the segmental level.  More distal pulmonary branches are not well evaluated for emboli due to limitations above. 2.   Findings compatible with pulmonary edema secondary to congestive heart failure.  3.   Patchy airspace consolidation left upper lobe suspicious for superimposed pneumonia. 4.   New subpleural nodular opacity in the lateral right lower lobe measuring 12 x 9 mm.  This can be infectious or inflammatory etiology although neoplasm is not excluded.  Recommend follow-up CT chest in 3 months. 5.   Healing fractures of the right 7th and 8th ribs. 6.   Splenomegaly and other findings as above. Electronically Signed by: LOPEZ RICHARDSON M.D. Signed on:  8/4/2023 3:04 PM Workstation ID: [id number]    CT HEAD WO CONTRAST    Result Date: 8/4/2023  Narrative: EXAMINATION: Computed tomography (CT) of the head without contrast HISTORY: 73 years Male headache, nausea TECHNIQUE: CT of the head was performed without contrast according to standard protocol. COMPARISON: None FINDINGS: No acute intra- or extra-axial fluid collections are identified. There is moderate cerebral volume loss, without a definite lobar predilection, with compensatory enlargement of the ventricles and sulci. The basilar cisterns are patent. No mass effect or midline shift is seen. The gray-white matter differentiation is normal. Periventricular white matter hypoattenuation is favored to represent sequelae of chronic small vessel ischemic disease. There is vascular calcification of the carotid siphons and vertebral arteries. Other than bilateral cataract extractions, the visualized portions of the orbits, paranasal sinuses, and mastoids appear normal. No acute fracture is identified.     Impression: No acute intracranial abnormality. Electronically Signed by: LOPEZ HANDY MD Signed on: 8/4/2023 2:55 PM Workstation ID: [id number]    XR CHEST PA OR AP 1 VIEW    Result Date: 8/4/2023  Narrative: EXAMINATION: Chest Radiograph, anteroposterior view HISTORY: Shortness of breath. COMPARISON: 07/26/2023. FINDINGS: There are suspected small bilateral pleural effusions which appear more pronounced compared to the prior exam. There are persistent hazy interstitial opacities in both lungs which may be due to edema. No pulmonary consolidation is seen. No pneumothorax is identified. There is unchanged cardiomegaly. There is a right arm PICC which loops likely in the internal jugular vein and then extends to the superior vena cava. Right paratracheal opacity is related to vasculature. Multiple electronic devices project over the chest.     Impression: Small bilateral pleural effusions, suspected  interstitial pulmonary edema, and cardiomegaly. Findings may be due to congestive heart failure with little or no significant change since the prior exam. Electronically Signed by: EVERETTE NOE MD Signed on: 8/4/2023 12:34 PM Workstation ID: [id number]    XR CHEST POST TUBE OR LINE PLACEMENT 1 VIEW    Result Date: 7/26/2023  Narrative: EXAM: XR CHEST POST TUBE OR LINE PLACEMENT 1 VIEW CLINICAL INDICATION: 1518 COMPARISON: 07/09/2023 FINDINGS: There is a right-sided PICC line with the tip in history vena cava.  There are infiltrates in the left hilar region and bilateral lower lung fields slightly improved when compared to the previous exam.  Mild cardiomegaly noted.  Bones grossly negative.     Impression:  Placement of right-sided PICC line with continued infiltrates in the chest however somewhat improved Electronically Signed by: ESTEFANÍA HARVEY M.D Signed on: 7/26/2023 3:53 PM Workstation ID: [id number]    XR FOOT 3 OR MORE VIEWS RIGHT    Result Date: 7/25/2023  Narrative: XR FOOT 3 OR MORE VIEWS RIGHT CLINICAL INDICATION: POSTOP FILM.  MRSA BACTEREMIA COMPARISON: Postop. TECHNIQUE: AP, oblique and lateral views. FINDINGS: There is amputation of the 1st metatarsal head with compared to the previous MRI of the right foot.  Overlying soft tissues are within normal limits.  There are vascular calcifications over the dorsum the ankle and posterior ankle.  The 2nd, 3rd, 4th and 5th digits are within normal limits.  The talus and calcaneus are within normal limits..     Impression: Amputation of the 1st metatarsal head noted.  Vascular calcifications noted Electronically Signed by: ESTEFANÍA HARVYE M.D Signed on: 7/25/2023 4:24 PM Workstation ID: [id number]    EP Case    Result Date: 7/17/2023  Narrative: Extraction of infected ICD transvenous system.  1.  Plastics consult for closure 2.  Follow up cultures, appreciate ID assistance 3.  Further care per referring Cardiologist Dr. Colindres       IR PICC LINE  EXCHANGE   Final Result by Messi Velasquez MD (08/11 9776)   Impression: Successful exchange of a PICC via the right arm.      Electronically Signed by: MESSI VELASQUEZ M.D.    Signed on: 8/11/2023 3:59 PM    Workstation ID: [id number]      XR CHEST PA AND LATERAL 2 VIEWS   Final Result by Hieu Hartmann MD (08/09 0053)   Patchy consolidative and interstitial opacities bilaterally, mildly   progressed from prior exam.      Electronically Signed by: HIEU HARTMANN MD    Signed on: 8/9/2023 12:53 AM    Workstation ID: [id number]      XR CHEST AP OR PA   Final Result by Cruz Snyder MD (08/05 0656)   FINDINGS / IMPRESSION:      The right arm PICC has been retracted and now appears to terminate in the   right subclavian vein. There is no evidence of pneumothorax. The small   right pleural effusion appears unchanged. There has been interval increase   in opacity in the left mid lung. Left lung base opacity appears unchanged.   The heart and mediastinum appear stable. Electronic devices project over   the chest.      Electronically Signed by: CRUZ SNYDER MD    Signed on: 8/5/2023 6:56 AM    Workstation ID: [id number]      CT HEAD WO CONTRAST   Final Result by Tru Handy MD (08/04 3512)      No acute intracranial abnormality.      Electronically Signed by: TRU HANDY MD    Signed on: 8/4/2023 2:55 PM    Workstation ID: [id number]      CTA CHEST PULMONARY EMBOLISM   Final Result by Tru Knight MD (08/04 1906)      1.   No evidence of pulmonary emboli to the segmental level.  More distal   pulmonary branches are not well evaluated for emboli due to limitations   above.   2.   Findings compatible with pulmonary edema secondary to congestive heart   failure.     3.   Patchy airspace consolidation left upper lobe suspicious for   superimposed pneumonia.   4.   New subpleural nodular opacity in the lateral right lower lobe   measuring 12 x 9 mm.  This can be infectious or inflammatory  etiology   although neoplasm is not excluded.  Recommend follow-up CT chest in 3   months.   5.   Healing fractures of the right 7th and 8th ribs.   6.   Splenomegaly and other findings as above.      Electronically Signed by: LOPEZ RICHARDSON M.D.    Signed on: 8/4/2023 3:04 PM    Workstation ID: [id number]      XR CHEST PA OR AP 1 VIEW   Final Result by Everette Snyder MD (08/04 1234)      Small bilateral pleural effusions, suspected interstitial pulmonary edema,   and cardiomegaly. Findings may be due to congestive heart failure with   little or no significant change since the prior exam.      Electronically Signed by: EVERETTE SNYDER MD    Signed on: 8/4/2023 12:34 PM    Workstation ID: [id number]               Test Results Pending at Discharge          Discharge Medications     Summary of your Discharge Medications      Take these Medications      Details   acetaminophen 325 MG tablet  Commonly known as: TYLENOL   Take 650 mg by mouth every 6 hours as needed for Pain.     AMIODarone 200 MG tablet  Commonly known as: PACERONE   Take 1 tablet by mouth daily.     apixaBAN 5 MG Tab  Commonly known as: ELIQUIS   Take 1 tablet by mouth every 12 hours.     B-12 SL   Place 1 tablet under the tongue daily.     bisacodyl 5 MG EC tablet  Commonly known as: DULCOLAX   Take 1 tablet by mouth daily as needed for Constipation.     cholecalciferol 25 mcg (1,000 units) tablet  Commonly known as: VITAMIN D   Take 2,000 Units by mouth daily. 2 tablets (=2000 units)     clopidogrel 75 MG tablet  Commonly known as: PLAVIX   Take 1 tablet by mouth every other day.     ferrous sulfate 325 (65 FE) MG tablet   Take 325 mg by mouth daily (with breakfast).     losartan 25 MG tablet  Commonly known as: COZAAR   Take 0.5 tablets by mouth daily. Do not start before August 10, 2023.     melatonin 3 MG   Take 1 tablet by mouth nightly as needed (insomnia).     metoPROLOL succinate 25 MG 24 hr tablet  Commonly known as: TOPROL-XL   Take 1  tablet by mouth daily.     pantoprazole 40 MG tablet  Commonly known as: PROTONIX   Take 1 tablet by mouth daily.     spironolactone 25 MG tablet  Commonly known as: ALDACTONE   Take 0.5 tablets by mouth daily. Do not start before August 10, 2023.     tamsulosin 0.4 MG Cap  Commonly known as: FLOMAX   Take 0.4 mg by mouth at bedtime.     Torsemide 60 MG Tab  Start taking on: August 16, 2023   Take 60 mg by mouth daily. Do not start before August 16, 2023.        ASK your doctor about these medications      Details   insulin lispro 100 UNIT/ML sliding scale injection  Ask about: Which instructions should I use?   Low dose sliding scale                Discharge Instructions  Follow ups:  [facility] and dr stacy 2 weeks  Dr mcknight podiatry 1 week    Primary Care Physician    Informed Joon Cantu MD and apn at [facility] details by perfect serve today    Time spent on discharge and coordination of care: 35 mins      Dr Socorro Jackson MD  [facility]  Contact by Perfect Serve

## 2023-08-18 DIAGNOSIS — Z94.0 S/P KIDNEY TRANSPLANT: ICD-10-CM

## 2023-08-18 DIAGNOSIS — E87.20 ACIDOSIS: ICD-10-CM

## 2023-08-18 NOTE — TELEPHONE ENCOUNTER
----- Message from Tyesha Gutierrez sent at 8/18/2023  3:16 PM CDT -----  Regarding: med refill  The patient called requesting refill of sodium bicarbonate 650 MG tablet    No further information provided      Patient can be contacted @# [phone] (home)

## 2023-08-21 RX ORDER — SODIUM BICARBONATE 650 MG/1
1950 TABLET ORAL 2 TIMES DAILY
Qty: 180 TABLET | Refills: 11 | Status: SHIPPED | OUTPATIENT
Start: 2023-08-21 | End: 2024-01-05 | Stop reason: SDUPTHER

## 2023-09-05 ENCOUNTER — LAB VISIT (OUTPATIENT)
Dept: LAB | Facility: HOSPITAL | Age: 73
End: 2023-09-05
Attending: INTERNAL MEDICINE
Payer: MEDICARE

## 2023-09-05 DIAGNOSIS — Z94.0 KIDNEY REPLACED BY TRANSPLANT: ICD-10-CM

## 2023-09-05 LAB
ALBUMIN SERPL BCP-MCNC: 4.2 G/DL (ref 3.5–5.2)
ANION GAP SERPL CALC-SCNC: 10 MMOL/L (ref 8–16)
BASOPHILS # BLD AUTO: 0.02 K/UL (ref 0–0.2)
BASOPHILS NFR BLD: 0.3 % (ref 0–1.9)
BUN SERPL-MCNC: 13 MG/DL (ref 8–23)
CALCIUM SERPL-MCNC: 9.7 MG/DL (ref 8.7–10.5)
CHLORIDE SERPL-SCNC: 106 MMOL/L (ref 95–110)
CO2 SERPL-SCNC: 24 MMOL/L (ref 23–29)
CREAT SERPL-MCNC: 1.2 MG/DL (ref 0.5–1.4)
DIFFERENTIAL METHOD: ABNORMAL
EOSINOPHIL # BLD AUTO: 0.1 K/UL (ref 0–0.5)
EOSINOPHIL NFR BLD: 1.3 % (ref 0–8)
ERYTHROCYTE [DISTWIDTH] IN BLOOD BY AUTOMATED COUNT: 15.4 % (ref 11.5–14.5)
EST. GFR  (NO RACE VARIABLE): >60 ML/MIN/1.73 M^2
GLUCOSE SERPL-MCNC: 91 MG/DL (ref 70–110)
HCT VFR BLD AUTO: 51.5 % (ref 40–54)
HGB BLD-MCNC: 15.9 G/DL (ref 14–18)
IMM GRANULOCYTES # BLD AUTO: 0.04 K/UL (ref 0–0.04)
IMM GRANULOCYTES NFR BLD AUTO: 0.6 % (ref 0–0.5)
LYMPHOCYTES # BLD AUTO: 2.1 K/UL (ref 1–4.8)
LYMPHOCYTES NFR BLD: 31.4 % (ref 18–48)
MCH RBC QN AUTO: 27.8 PG (ref 27–31)
MCHC RBC AUTO-ENTMCNC: 30.9 G/DL (ref 32–36)
MCV RBC AUTO: 90 FL (ref 82–98)
MONOCYTES # BLD AUTO: 1 K/UL (ref 0.3–1)
MONOCYTES NFR BLD: 14.5 % (ref 4–15)
NEUTROPHILS # BLD AUTO: 3.5 K/UL (ref 1.8–7.7)
NEUTROPHILS NFR BLD: 51.9 % (ref 38–73)
NRBC BLD-RTO: 0 /100 WBC
PHOSPHATE SERPL-MCNC: 2.7 MG/DL (ref 2.7–4.5)
PLATELET # BLD AUTO: 181 K/UL (ref 150–450)
PMV BLD AUTO: 11.2 FL (ref 9.2–12.9)
POTASSIUM SERPL-SCNC: 4.1 MMOL/L (ref 3.5–5.1)
RBC # BLD AUTO: 5.71 M/UL (ref 4.6–6.2)
SODIUM SERPL-SCNC: 140 MMOL/L (ref 136–145)
WBC # BLD AUTO: 6.68 K/UL (ref 3.9–12.7)

## 2023-09-05 PROCEDURE — 87799 DETECT AGENT NOS DNA QUANT: CPT | Performed by: INTERNAL MEDICINE

## 2023-09-05 PROCEDURE — 85025 COMPLETE CBC W/AUTO DIFF WBC: CPT | Performed by: INTERNAL MEDICINE

## 2023-09-05 PROCEDURE — 80197 ASSAY OF TACROLIMUS: CPT | Performed by: INTERNAL MEDICINE

## 2023-09-05 PROCEDURE — 80069 RENAL FUNCTION PANEL: CPT | Performed by: INTERNAL MEDICINE

## 2023-09-06 ENCOUNTER — PATIENT MESSAGE (OUTPATIENT)
Dept: TRANSPLANT | Facility: CLINIC | Age: 73
End: 2023-09-06
Payer: MEDICARE

## 2023-09-06 DIAGNOSIS — Z94.0 KIDNEY REPLACED BY TRANSPLANT: Primary | ICD-10-CM

## 2023-09-06 LAB — TACROLIMUS BLD-MCNC: 6.2 NG/ML (ref 5–15)

## 2023-09-07 ENCOUNTER — LAB VISIT (OUTPATIENT)
Dept: LAB | Facility: HOSPITAL | Age: 73
End: 2023-09-07
Attending: FAMILY MEDICINE
Payer: MEDICARE

## 2023-09-07 DIAGNOSIS — Z94.0 KIDNEY REPLACED BY TRANSPLANT: ICD-10-CM

## 2023-09-07 PROCEDURE — 36415 COLL VENOUS BLD VENIPUNCTURE: CPT | Performed by: INTERNAL MEDICINE

## 2023-09-07 PROCEDURE — 80197 ASSAY OF TACROLIMUS: CPT | Performed by: INTERNAL MEDICINE

## 2023-09-08 ENCOUNTER — PATIENT MESSAGE (OUTPATIENT)
Dept: TRANSPLANT | Facility: CLINIC | Age: 73
End: 2023-09-08
Payer: MEDICARE

## 2023-09-08 LAB
BKV DNA SERPL NAA+PROBE-ACNC: <125 COPIES/ML
BKV DNA SERPL NAA+PROBE-LOG#: <2.1 LOG (10) COPIES/ML
BKV DNA SERPL QL NAA+PROBE: NOT DETECTED
TACROLIMUS BLD-MCNC: 7.2 NG/ML (ref 5–15)

## 2023-09-11 ENCOUNTER — OFFICE VISIT (OUTPATIENT)
Dept: TRANSPLANT | Facility: CLINIC | Age: 73
End: 2023-09-11
Payer: MEDICARE

## 2023-09-11 VITALS
TEMPERATURE: 97 F | SYSTOLIC BLOOD PRESSURE: 163 MMHG | WEIGHT: 192.88 LBS | HEART RATE: 71 BPM | HEIGHT: 67 IN | RESPIRATION RATE: 18 BRPM | OXYGEN SATURATION: 97 % | DIASTOLIC BLOOD PRESSURE: 85 MMHG | BODY MASS INDEX: 30.27 KG/M2

## 2023-09-11 DIAGNOSIS — N18.2 CHRONIC KIDNEY DISEASE (CKD), STAGE II (MILD): Primary | ICD-10-CM

## 2023-09-11 DIAGNOSIS — I12.9 RENAL HYPERTENSION: ICD-10-CM

## 2023-09-11 DIAGNOSIS — D84.9 IMMUNOSUPPRESSED STATUS: ICD-10-CM

## 2023-09-11 DIAGNOSIS — Z79.899 IMMUNOSUPPRESSIVE MANAGEMENT ENCOUNTER FOLLOWING KIDNEY TRANSPLANT: ICD-10-CM

## 2023-09-11 DIAGNOSIS — Z29.89 PROPHYLACTIC IMMUNOTHERAPY: ICD-10-CM

## 2023-09-11 DIAGNOSIS — E83.42 HYPOMAGNESEMIA: ICD-10-CM

## 2023-09-11 DIAGNOSIS — Z91.89 AT RISK FOR OPPORTUNISTIC INFECTIONS: ICD-10-CM

## 2023-09-11 DIAGNOSIS — E87.20 METABOLIC ACIDOSIS: ICD-10-CM

## 2023-09-11 DIAGNOSIS — Z94.0 IMMUNOSUPPRESSIVE MANAGEMENT ENCOUNTER FOLLOWING KIDNEY TRANSPLANT: ICD-10-CM

## 2023-09-11 DIAGNOSIS — Z94.0 DECEASED-DONOR KIDNEY TRANSPLANT: ICD-10-CM

## 2023-09-11 DIAGNOSIS — E83.39 HYPOPHOSPHATEMIA: ICD-10-CM

## 2023-09-11 PROCEDURE — 3288F PR FALLS RISK ASSESSMENT DOCUMENTED: ICD-10-PCS | Mod: CPTII,S$GLB,, | Performed by: INTERNAL MEDICINE

## 2023-09-11 PROCEDURE — 3044F HG A1C LEVEL LT 7.0%: CPT | Mod: CPTII,S$GLB,, | Performed by: INTERNAL MEDICINE

## 2023-09-11 PROCEDURE — 3044F PR MOST RECENT HEMOGLOBIN A1C LEVEL <7.0%: ICD-10-PCS | Mod: CPTII,S$GLB,, | Performed by: INTERNAL MEDICINE

## 2023-09-11 PROCEDURE — 1159F PR MEDICATION LIST DOCUMENTED IN MEDICAL RECORD: ICD-10-PCS | Mod: CPTII,S$GLB,, | Performed by: INTERNAL MEDICINE

## 2023-09-11 PROCEDURE — 99999 PR PBB SHADOW E&M-EST. PATIENT-LVL V: CPT | Mod: PBBFAC,,, | Performed by: INTERNAL MEDICINE

## 2023-09-11 PROCEDURE — 3079F DIAST BP 80-89 MM HG: CPT | Mod: CPTII,S$GLB,, | Performed by: INTERNAL MEDICINE

## 2023-09-11 PROCEDURE — 3288F FALL RISK ASSESSMENT DOCD: CPT | Mod: CPTII,S$GLB,, | Performed by: INTERNAL MEDICINE

## 2023-09-11 PROCEDURE — 99215 PR OFFICE/OUTPT VISIT, EST, LEVL V, 40-54 MIN: ICD-10-PCS | Mod: S$GLB,,, | Performed by: INTERNAL MEDICINE

## 2023-09-11 PROCEDURE — 3077F SYST BP >= 140 MM HG: CPT | Mod: CPTII,S$GLB,, | Performed by: INTERNAL MEDICINE

## 2023-09-11 PROCEDURE — 3077F PR MOST RECENT SYSTOLIC BLOOD PRESSURE >= 140 MM HG: ICD-10-PCS | Mod: CPTII,S$GLB,, | Performed by: INTERNAL MEDICINE

## 2023-09-11 PROCEDURE — 99215 OFFICE O/P EST HI 40 MIN: CPT | Mod: S$GLB,,, | Performed by: INTERNAL MEDICINE

## 2023-09-11 PROCEDURE — 1101F PR PT FALLS ASSESS DOC 0-1 FALLS W/OUT INJ PAST YR: ICD-10-PCS | Mod: CPTII,S$GLB,, | Performed by: INTERNAL MEDICINE

## 2023-09-11 PROCEDURE — 1126F AMNT PAIN NOTED NONE PRSNT: CPT | Mod: CPTII,S$GLB,, | Performed by: INTERNAL MEDICINE

## 2023-09-11 PROCEDURE — 1101F PT FALLS ASSESS-DOCD LE1/YR: CPT | Mod: CPTII,S$GLB,, | Performed by: INTERNAL MEDICINE

## 2023-09-11 PROCEDURE — 1159F MED LIST DOCD IN RCRD: CPT | Mod: CPTII,S$GLB,, | Performed by: INTERNAL MEDICINE

## 2023-09-11 PROCEDURE — 3079F PR MOST RECENT DIASTOLIC BLOOD PRESSURE 80-89 MM HG: ICD-10-PCS | Mod: CPTII,S$GLB,, | Performed by: INTERNAL MEDICINE

## 2023-09-11 PROCEDURE — 3066F PR DOCUMENTATION OF TREATMENT FOR NEPHROPATHY: ICD-10-PCS | Mod: CPTII,S$GLB,, | Performed by: INTERNAL MEDICINE

## 2023-09-11 PROCEDURE — 1126F PR PAIN SEVERITY QUANTIFIED, NO PAIN PRESENT: ICD-10-PCS | Mod: CPTII,S$GLB,, | Performed by: INTERNAL MEDICINE

## 2023-09-11 PROCEDURE — 3066F NEPHROPATHY DOC TX: CPT | Mod: CPTII,S$GLB,, | Performed by: INTERNAL MEDICINE

## 2023-09-11 PROCEDURE — 99999 PR PBB SHADOW E&M-EST. PATIENT-LVL V: ICD-10-PCS | Mod: PBBFAC,,, | Performed by: INTERNAL MEDICINE

## 2023-09-11 NOTE — PATIENT INSTRUCTIONS
Plan to get covid booster and flu vaccine in near future.    DOs and DON'Ts FOR TRANSPLANT PATIENTS USING OVER THE COUNTER REMEDIES    Acne  - Clean affected areas with Panoxyl foaming wash [or any other wash containing 10% benzoyl peroxide] - follow direction on package insert and beware it can bleach fabrics and hair.  - You may also try Differin [adapalene gel  0.1%] to the affected areas as per package insert as well.  - A good moisturizer may be needed if skin dries out. Cetaphil and Cerave make ones often recommended by dermatologists.  - Don't forget to protect your skin from the ski, since you are at increased risk of skin cancer.    Hair Loss - Minoxidil (Rogaine) topically on scalp. Note it wears off if you stop using, so plan on stayingon it as long as you want the effects to last.    Arthritis   - We do not recommend NSAIDS  by mouth such as Motrin, Advil, Aleve, naprosen, BC powders, etc. They can cause harm to the kidney. If in doubt, please check!  - Acetaminophen up to 3000 mg (3 grams) every 24 hrs is safe. May people try 1 gram (2 extra strength) tabs/caps every 8 hours.  - Topical medicines are OK: Voltaren (diclofenac) gel, lidocaine gel, lidocaine patches, salon pas patches  - Avoid Turmeric (active ingredient: curcumin) as it interacts with your immunosuppression    Fever or pain   - Tylenol (acetaminophen).   - For pain you can try salon pas or lidocaine patches to be applied directly to area of pain.   - Diclofenac [Voltaren] gel is safe to use for a few weeks.    - We do not recommend NSAIDS  by mouth such as Motrin or Advil (ibuprofen), Aleve or naprosen (naproxen), BC powders, etc. If in doubt, please check as there are several others that can be prescribed!    Cough Suppressant - Dextromethorphan Hydrobromide 30 mg or cough drops (Halls or equivalent generic)    Nasal congestion   - Saline nasal spray is very safe.   - Oxymetazoline (Afrin), but should ONLY USE FOR 3 DAYS.   - Topical  Vicks vaporub or Vicks nasal inhaler is also acceptable.  - Note tablet form decongestants (Sudafed, phenylephrine) can raise blood pressure and are not recommended    Allergy symptoms - Antihistamines are safe: diphenhydramine (Benadryl), loratadine (Claritin), cetrizine (Zyrtec). These can also help dry up drainage.    For sore throat -  salt water gargles, sore throat sprays like Chloraseptic or sore throat lozenges are safe for temporary relief    For thick secretions (thick mucous) - Guaifenesin (Mucinex), saline nasal spray     To prevent colds - low dose vitamin C is probably OK, but can increase risk of kidney stones. Zinc lozenges (not tablets) taken through the day may help minimize. Let the lozenge dissolve in the back of your throat. Note: Zinc can inhibit the virus from multiplying in your throat, but it is not proven to prevent colds.    For memory Loss - OK to try Prevagen    Indigestion  - famotidine (Pepcid) 20 mg daily is generally safe. Calcium carbonate (Tums and many other brands) can cause high calcium and should be taken only after taking to your provider about your calcium levels. High calcium can hurt the kidney. Avoid cimetidine (Tagamet) as it can interfere with your immunosuppression and cause toxic levels. A few doses of pepto bismol should be fine, but continued indigestion should be evaluated by your provider.    Nausea or vomiting - these symptoms can indicate there is something wrong with your stomach and should be evaluated if they last more than 6-8 hours OR anytime you cannot keep your medicines down. Not being able to take your medicine can cause complications.     Diarrhea -  Pepto-Bismol, even Metamucil can help liquid stools. Diarrhea can be a sign of infection, so please let your provider know if it continues past 1-2 days for full evaluation. Avoid Imodium unless you have spoken to a provider.    Constipation- Colace, Dulcolax, MiraLax are safe to take regardless of your  kidney function.  Please check with the provider before using magnesium or phosphorus containing supplements such as magnesium citrate, milk of magnesium, or Fleet's Phospho-Soda.  These remedies may cause harm, depending on the degree of kidney insufficiency you have.  Your doctor can advise if year magnesium and phosphorus levels are low enough to take these.    Herbal and natural remedies - Some herbals are very safe and effective while others are proven to cause renal failure or interact with your medication. Do NOT take any natural or herbal remedies without discussing with transplant.    If you develop fever or shortness of breath, or start to feel worse, you need to get checked out by a provider in a clinic or go to ED, depending on the severity of your symptoms.    Don't forget we recommend yearly influenza vaccine. It does not protect you against all infections, and you can still get the flu. It is proven to protect transplant patients from serious influenza related serious illness or death.

## 2023-09-11 NOTE — LETTER
September 14, 2023        Dilcia Saavedra  [address] [place] [address]  Phone: [phone]  Fax: [phone]             [facility] 1st Fl  [address]  [place] [address]  Phone: [phone]   Patient: Alverto Ramirez Jr.   MR Number: [id number]   YOB: 1950   Date of Visit: 9/11/2023       Dear Dr. Dilcia Saavedra    Thank you for referring Alverto Ramirez to me for evaluation. Attached you will find relevant portions of my assessment and plan of care.    If you have questions, please do not hesitate to call me. I look forward to following Alverto Ramirez along with you.    Sincerely,    Dilcia Parker MD    Enclosure    If you would like to receive this communication electronically, please contact [email or web address] or [phone] to request [employer] access.    [employer] is a tool which provides read-only access to select patient information with whom you have a relationship. Its easy to use and provides real time access to review your patients record including encounter summaries, notes, results, and demographic information.    If you feel you have received this communication in error or would no longer like to receive these types of communications, please e-mail [email or web address]

## 2023-09-13 ENCOUNTER — TELEPHONE (OUTPATIENT)
Dept: NEPHROLOGY | Facility: CLINIC | Age: 73
End: 2023-09-13
Payer: MEDICARE

## 2023-09-13 NOTE — TELEPHONE ENCOUNTER
----- Message from Zita Goldman sent at 9/12/2023  3:05 PM CDT -----  Contact: self  ..Type:  RX Refill Request    Who Called: .Alverto Ramirez Jr.  Refill or New Rx:New   RX Name and Strength:  How is the patient currently taking it? (ex. 1XDay):  Is this a 30 day or 90 day RX:   Preferred Pharmacy with phone number:.  [id number] - [place] - [address] [facility] [place] [address]  Phone: [phone] Fax: [phone]  Local or Mail Order:local   Ordering Provider:Quentin   Would the patient rather a call back or a response via [facility]? Call back   Best Call Back Number:.[phone]  Additional Information:  abscess on gums

## 2023-09-14 ENCOUNTER — TELEPHONE (OUTPATIENT)
Dept: INTERNAL MEDICINE | Facility: CLINIC | Age: 73
End: 2023-09-14
Payer: MEDICARE

## 2023-09-14 NOTE — TELEPHONE ENCOUNTER
Patient states he needs medication for an abcess on his gums. Informed patient to contact his PCP.

## 2023-09-14 NOTE — PROGRESS NOTES
Post-Transplant Assessment    Referring Physician: Chris Adair  Current Nephrologist: Dilcia Saavedra    ORGAN: RIGHT KIDNEY  Donor Type: donation after brain death  [facility] Increased Risk: yes  Cold Ischemia: 1,354 mins  Induction Medications: thymoglobulin    History of present illness:  Patient is a 73 y.o. male who presents to the clinic today for reassessment of renal function and management of immunosuppression    Pertinent Nephrology/Transplant History:   Hx A fib  MGUS IgM kappa  Phobic anxiety d/o - claustrophobia, per psych 2019  DBD [facility] kidney transplant 3/19/22. KDPI 26%. donor RPR+. CIT>22hrs, Thymo induction  Severe atherosclerotic disease involving iliac arteries    3/20/23:  Reports no new medical complaints. Says he is doing well. Discussed maintenance health follow ups, including dermatology evaluation. Discussed following up with primary care, general nephrology, and hem/onc.     09/14/2023   Alverto presents for f/u 18 mos after his kidney transplant. He feels great and reports no complaints. He states BPs at home are 130-140 typically. He denies any kidney-related complaints, such as pain over allograft, flank pain, dysuria, frequency, or other LUTS.     Review of patient's allergies indicates:  No Known Allergies     Current Outpatient Medications   Medication Sig    ALPRAZolam (XANAX) 1 MG tablet Take 1 tablet (1 mg total) by mouth daily as needed for Anxiety or Insomnia.    calcitRIOL (ROCALTROL) 0.5 MCG Cap Take 1 capsule (0.5 mcg total) by mouth every Mon, Wed, Fri, Sun.    cholecalciferol, vitamin D3, (VITAMIN D3) 50 mcg (2,000 unit) Tab Take 1 tablet (2,000 Units total) by mouth once daily.    magnesium oxide (MAG-OX) 400 mg (241.3 mg magnesium) tablet Take 1 tablet (400 mg total) by mouth 2 (two) times daily.    metoprolol succinate (TOPROL-XL) 50 MG 24 hr tablet Take 1 tablet (50 mg total) by mouth 2 (two) times daily.    mupirocin (BACTROBAN) 2 % ointment by Nasal route once  daily.    mycophenolate (CELLCEPT) 250 mg Cap Take 3 capsules (750 mg total) by mouth 2 (two) times daily.    NIFEdipine (PROCARDIA-XL) 60 MG (OSM) 24 hr tablet Take 1 tablet (60 mg total) by mouth 2 (two) times a day.    predniSONE (DELTASONE) 5 MG tablet Take 1 tablet (5 mg total) by mouth once daily. Take by mouth daily: 20mg 3/22/22 -, 15mg -, 10mg -, 5mg - thereafter    sildenafiL (VIAGRA) 100 MG tablet Take 1 tablet (100 mg total) by mouth daily as needed for Erectile Dysfunction.    sodium bicarbonate 650 MG tablet Take 3 tablets (1,950 mg total) by mouth 2 (two) times daily.    tacrolimus (PROGRAF) 1 MG Cap Take 3 capsules (3 mg total) by mouth every morning AND 3 capsules (3 mg total) every evening.    traZODone (DESYREL) 50 MG tablet Take 1-2 tablets ( mg total) by mouth every evening.     No current facility-administered medications for this visit.       Review of Systems   Constitutional:  Negative for fever.   Respiratory:  Negative for shortness of breath.    Cardiovascular:  Negative for chest pain and leg swelling.   Gastrointestinal: Negative.    Genitourinary: Negative.    Neurological: Negative.    +IMMUNOSUPPRESSED     Physical Exam :  Vitals:    23 1058   BP: (!) 163/85   Pulse: 71   Resp: 18   Temp: 97.2 °F (36.2 °C)     Physical Exam  Eyes:      General: No scleral icterus.  Cardiovascular:      Rate and Rhythm: Normal rate.   Pulmonary:      Effort: Pulmonary effort is normal.      Breath sounds: Normal breath sounds.   Abdominal:      General: There is no distension.      Palpations: Abdomen is soft.   Musculoskeletal:         General: Deformity (LUE AVF) present.      Right lower leg: No edema.      Left lower leg: No edema.   Neurological:      Mental Status: He is alert.     Labs reviewed     Assessment:    1. Chronic kidney disease (CKD), stage II (mild)    2. -donor kidney transplant    3. Immunosuppressed status    4. Immunosuppressive  management encounter following kidney transplant    5. Renal hypertension    6. At risk for opportunistic infections    7. Metabolic acidosis    8. Hypophosphatemia    9. Hypomagnesemia    10. Prophylactic immunotherapy      Plan:  No changes  RTC 6 mos    CKD stage II DD post kidney transplant 3/2022 : BL sCr 1.1-1.3;   Recent Labs   Lab 07/20/23  0950 08/03/23  1342 09/05/23  0805 09/05/23  0814   Creatinine  --  1.3  --  1.2   eGFR  --  57 A  --  >60.0   Prot/Creat Ratio, Urine Unable to calculate  --  Unable to calculate  --    - Donor characteristics: KDPI 26%. donor RPR+. CIT>22hrs  - Well healed, and no concerns at present  - Will continue follow up as per our center guidelines.      Encounter for Monitoring Immunosuppression post Transplant for pt on chronic IS  Recent Labs   Lab 07/20/23  0946 09/05/23  0814 09/07/23  0833   Tacrolimus Lvl 8.7 6.2 7.2   - tac, mmf, pred  -Target level for Alverto is 7-9  -Recheck as per guidelines.  -Monitor for side effects and toxicities, given narrow therapeutic window and significant risk of AE     Renal Hypertension   - Adequate control with nifedipine and metoprolol  - Continue with home blood pressure monitoring     Metabolic Bone Disease/Secondary Hyperparathyroidism (SPTH)   -Taking calcitriol and vit d [cholecalciferol] for SPTH  -Calcium and phosphorus level discussed with the patient, patient will continue follow up with the general nephrologist for management of metabolic bone disease. Calcium and phosphorus as per our center protocol.  Recent Labs   Lab 11/11/22  0914 12/19/22  0837 04/13/23  0939 07/20/23  0946 08/03/23  1342 09/05/23  0814   PTH, Intact 132.7 H  --  135.5 H 140.9 H  --   --    Calcium 10.1  10.1   < > 9.8 9.5 9.8 9.7   Phosphorus 2.6 L  2.6 L   < > 2.5 L 2.6 L  --  2.7    < > = values in this interval not displayed.       Electrolyte Review & Management  Hypophosphatemia  - stop KPN suppl.   -Will tolerate mild asymptomatic low level d/t  pill burden, DDI, and adverse SE   Hypomagnesemia-   -cont Mg  Acidosis  -Cont HCO3  Lab Results   Component Value Date     09/05/2023    K 4.1 09/05/2023     09/05/2023    CO2 24 09/05/2023    MG 1.7 07/20/2023     Anemia:   Resolved  Will continue monitoring as per our center guidelines. No indication for acute intervention today.  Lab Results   Component Value Date    WBC 6.68 09/05/2023    HGB 15.9 09/05/2023    HCT 51.5 09/05/2023    MCV 90 09/05/2023     09/05/2023     At risk of Opportunistic infection d/t immunosuppressed status  No evidence of infections  BK screening  Will continue with urine or blood PCR as per our guidelines to prevent BK virus viremia and allograft dysfunction  Lab Results   Component Value Date    BKVIRUSPCRQB <125 09/05/2023         No orders of the defined types were placed in this encounter.    Medications Discontinued During This Encounter   Medication Reason    k phos di & mono-sod phos mono (K-PHOS-NEUTRAL) 250 mg Tab Therapy completed      Dilcia Parker MD

## 2023-09-14 NOTE — TELEPHONE ENCOUNTER
----- Message from Michelle Biggs sent at 9/14/2023  9:02 AM CDT -----  Patient is requesting a call back concerning a gum infection, he is requesting medication until he can see his dentist. Call back at [phone]        [id number] - [place] - [address] AT [facility] [place] [address]  Phone: [phone] Fax: [phone]

## 2023-09-15 ENCOUNTER — TELEPHONE (OUTPATIENT)
Dept: INTERNAL MEDICINE | Facility: CLINIC | Age: 73
End: 2023-09-15
Payer: MEDICARE

## 2023-09-15 NOTE — TELEPHONE ENCOUNTER
Spoke with pt; pt stated on yesterday his gums were swollen but he's feeling much better today; pt states he's been using peroxide to help with pain until he is seen by dentist; pt states he will callback if gums get worst /LD

## 2023-09-15 NOTE — TELEPHONE ENCOUNTER
----- Message from Hannah Baptiste sent at 9/14/2023 10:39 AM CDT -----  Contact: kike Del Toro  .Type:  Patient Returning Call    Who Called: Alverto  Who Left Message for Patient: Nurse  Does the patient know what this is regarding?: No  Would the patient rather a call back or a response via [facility]?  Call  Best Call Back Number: .[phone]   Additional Information:

## 2023-09-28 ENCOUNTER — LAB VISIT (OUTPATIENT)
Dept: LAB | Facility: HOSPITAL | Age: 73
End: 2023-09-28
Attending: UROLOGY
Payer: MEDICARE

## 2023-09-28 DIAGNOSIS — Z12.5 PROSTATE CANCER SCREENING: ICD-10-CM

## 2023-09-28 LAB — COMPLEXED PSA SERPL-MCNC: 6.9 NG/ML (ref 0–4)

## 2023-09-28 PROCEDURE — 36415 COLL VENOUS BLD VENIPUNCTURE: CPT | Performed by: UROLOGY

## 2023-09-28 PROCEDURE — 84153 ASSAY OF PSA TOTAL: CPT | Performed by: UROLOGY

## 2023-10-02 ENCOUNTER — OFFICE VISIT (OUTPATIENT)
Dept: UROLOGY | Facility: CLINIC | Age: 73
End: 2023-10-02
Payer: MEDICARE

## 2023-10-02 VITALS
WEIGHT: 192 LBS | DIASTOLIC BLOOD PRESSURE: 77 MMHG | SYSTOLIC BLOOD PRESSURE: 146 MMHG | HEART RATE: 74 BPM | BODY MASS INDEX: 30.07 KG/M2

## 2023-10-02 DIAGNOSIS — R97.20 ELEVATED PSA: ICD-10-CM

## 2023-10-02 DIAGNOSIS — N40.1 BPH WITH OBSTRUCTION/LOWER URINARY TRACT SYMPTOMS: Primary | ICD-10-CM

## 2023-10-02 DIAGNOSIS — N13.8 BPH WITH OBSTRUCTION/LOWER URINARY TRACT SYMPTOMS: Primary | ICD-10-CM

## 2023-10-02 DIAGNOSIS — Z94.0 KIDNEY REPLACED BY TRANSPLANT: ICD-10-CM

## 2023-10-02 DIAGNOSIS — N52.01 ERECTILE DYSFUNCTION DUE TO ARTERIAL INSUFFICIENCY: ICD-10-CM

## 2023-10-02 LAB
BILIRUB UR QL STRIP: NEGATIVE
GLUCOSE UR QL STRIP: NEGATIVE
KETONES UR QL STRIP: NEGATIVE
LEUKOCYTE ESTERASE UR QL STRIP: POSITIVE
PH, POC UA: 7
POC BLOOD, URINE: NEGATIVE
POC NITRATES, URINE: NEGATIVE
PROT UR QL STRIP: POSITIVE
SP GR UR STRIP: 1.02 (ref 1–1.03)
UROBILINOGEN UR STRIP-ACNC: 0.2 (ref 0.3–2.2)

## 2023-10-02 PROCEDURE — 1101F PT FALLS ASSESS-DOCD LE1/YR: CPT | Mod: CPTII,S$GLB,, | Performed by: UROLOGY

## 2023-10-02 PROCEDURE — 3044F HG A1C LEVEL LT 7.0%: CPT | Mod: CPTII,S$GLB,, | Performed by: UROLOGY

## 2023-10-02 PROCEDURE — 3288F PR FALLS RISK ASSESSMENT DOCUMENTED: ICD-10-PCS | Mod: CPTII,S$GLB,, | Performed by: UROLOGY

## 2023-10-02 PROCEDURE — 99214 PR OFFICE/OUTPT VISIT, EST, LEVL IV, 30-39 MIN: ICD-10-PCS | Mod: S$GLB,,, | Performed by: UROLOGY

## 2023-10-02 PROCEDURE — 3044F PR MOST RECENT HEMOGLOBIN A1C LEVEL <7.0%: ICD-10-PCS | Mod: CPTII,S$GLB,, | Performed by: UROLOGY

## 2023-10-02 PROCEDURE — 3077F PR MOST RECENT SYSTOLIC BLOOD PRESSURE >= 140 MM HG: ICD-10-PCS | Mod: CPTII,S$GLB,, | Performed by: UROLOGY

## 2023-10-02 PROCEDURE — 99999 PR PBB SHADOW E&M-EST. PATIENT-LVL III: CPT | Mod: PBBFAC,,, | Performed by: UROLOGY

## 2023-10-02 PROCEDURE — 99999 PR PBB SHADOW E&M-EST. PATIENT-LVL III: ICD-10-PCS | Mod: PBBFAC,,, | Performed by: UROLOGY

## 2023-10-02 PROCEDURE — 1126F AMNT PAIN NOTED NONE PRSNT: CPT | Mod: CPTII,S$GLB,, | Performed by: UROLOGY

## 2023-10-02 PROCEDURE — 3077F SYST BP >= 140 MM HG: CPT | Mod: CPTII,S$GLB,, | Performed by: UROLOGY

## 2023-10-02 PROCEDURE — 1159F PR MEDICATION LIST DOCUMENTED IN MEDICAL RECORD: ICD-10-PCS | Mod: CPTII,S$GLB,, | Performed by: UROLOGY

## 2023-10-02 PROCEDURE — 1160F PR REVIEW ALL MEDS BY PRESCRIBER/CLIN PHARMACIST DOCUMENTED: ICD-10-PCS | Mod: CPTII,S$GLB,, | Performed by: UROLOGY

## 2023-10-02 PROCEDURE — 1126F PR PAIN SEVERITY QUANTIFIED, NO PAIN PRESENT: ICD-10-PCS | Mod: CPTII,S$GLB,, | Performed by: UROLOGY

## 2023-10-02 PROCEDURE — 1101F PR PT FALLS ASSESS DOC 0-1 FALLS W/OUT INJ PAST YR: ICD-10-PCS | Mod: CPTII,S$GLB,, | Performed by: UROLOGY

## 2023-10-02 PROCEDURE — 3288F FALL RISK ASSESSMENT DOCD: CPT | Mod: CPTII,S$GLB,, | Performed by: UROLOGY

## 2023-10-02 PROCEDURE — 3066F PR DOCUMENTATION OF TREATMENT FOR NEPHROPATHY: ICD-10-PCS | Mod: CPTII,S$GLB,, | Performed by: UROLOGY

## 2023-10-02 PROCEDURE — 3078F DIAST BP <80 MM HG: CPT | Mod: CPTII,S$GLB,, | Performed by: UROLOGY

## 2023-10-02 PROCEDURE — 1159F MED LIST DOCD IN RCRD: CPT | Mod: CPTII,S$GLB,, | Performed by: UROLOGY

## 2023-10-02 PROCEDURE — 81003 POCT URINALYSIS, DIPSTICK, AUTOMATED, W/O SCOPE: ICD-10-PCS | Mod: QW,S$GLB,, | Performed by: UROLOGY

## 2023-10-02 PROCEDURE — 81003 URINALYSIS AUTO W/O SCOPE: CPT | Mod: QW,S$GLB,, | Performed by: UROLOGY

## 2023-10-02 PROCEDURE — 99214 OFFICE O/P EST MOD 30 MIN: CPT | Mod: S$GLB,,, | Performed by: UROLOGY

## 2023-10-02 PROCEDURE — 1160F RVW MEDS BY RX/DR IN RCRD: CPT | Mod: CPTII,S$GLB,, | Performed by: UROLOGY

## 2023-10-02 PROCEDURE — 3078F PR MOST RECENT DIASTOLIC BLOOD PRESSURE < 80 MM HG: ICD-10-PCS | Mod: CPTII,S$GLB,, | Performed by: UROLOGY

## 2023-10-02 PROCEDURE — 3066F NEPHROPATHY DOC TX: CPT | Mod: CPTII,S$GLB,, | Performed by: UROLOGY

## 2023-10-02 RX ORDER — SILDENAFIL 100 MG/1
100 TABLET, FILM COATED ORAL DAILY PRN
Qty: 30 TABLET | Refills: 11 | Status: SHIPPED | OUTPATIENT
Start: 2023-10-02 | End: 2024-10-01

## 2023-10-02 NOTE — PROGRESS NOTES
Chief Complaint   Patient presents with    Other     1 year f/u         History of Present Illness:   Alverto Ramirez Jr. is a 73 y.o. male here for follow up.     10/2/23-He has had a little frequency over the past week. Drinking more water. Nocturia x 1. Stream is good. Sometimes he has urgency. No incontinence. Viagra 100mg works fair.   10/3/22-Had a renal transplant about 6 months ago. Good urinary stream. No gross hematuria. Not currently on any prostate medications. Viagra works fair at 50mg dose. No nocturia.   9/27/21- He states that he is on PD. He is on the transplant list. He was taking finasteride, but stopped 7 months ago due to worsened ED. He has been urinating a little more since membrane change with PD. No dysuria or gross hematuria. 100mg sildenafil not working as well as he would like.    9/21/20: PSA not rising on last check.  20mg sildenafil not sufficient.  Reviewed history in detail. Stream is good.  My mom's neighbor.  9/16/19: Doing well no complaints.  Taking 1-2 of the sildenafil 20.  Reviewed history in detail.   9/10/18: Voiding fine with cardura off his list.  PSA dropped this last year.  7/17/17: Having some ED problems.  Still taking doxazosin, voiding well.  7/11/16: Doing well voiding fine  6/29/15: No problems in interim.  Has an AV fistula in left arm now. PSA unchanged at 3.6.  No urinary bother doing fine.  Taking saw palmetto and pumpkin seed.   12/29/14: 65 yo man being evaluated for kidney transplant had an elevated PSA of 4.2 and is here for screening.  Had a AV fistula surgery last week.  No abd/pelvic pain and no exac/rel factors.  No hematuria.  No urolithiasis history.  No prostate cancer family history but his brother had some sort of prostate surgery.  No urinary bother.  Still makes urine and is not on dialysis.  Feels good in general.    Review of Systems   Constitutional:  Negative for chills and fever.   Cardiovascular:  Negative for chest pain.   Genitourinary:   Negative for difficulty urinating.   Psychiatric/Behavioral:  Negative for confusion.    All other systems reviewed and are negative.      Current Outpatient Medications   Medication Sig Dispense Refill    ALPRAZolam (XANAX) 1 MG tablet Take 1 tablet (1 mg total) by mouth daily as needed for Anxiety or Insomnia. 90 tablet 0    calcitRIOL (ROCALTROL) 0.5 MCG Cap Take 1 capsule (0.5 mcg total) by mouth every Mon, Wed, Fri, Sun. 90 capsule 3    cholecalciferol, vitamin D3, (VITAMIN D3) 50 mcg (2,000 unit) Tab Take 1 tablet (2,000 Units total) by mouth once daily. 60 tablet 11    magnesium oxide (MAG-OX) 400 mg (241.3 mg magnesium) tablet Take 1 tablet (400 mg total) by mouth 2 (two) times daily. 60 tablet 11    metoprolol succinate (TOPROL-XL) 50 MG 24 hr tablet Take 1 tablet (50 mg total) by mouth 2 (two) times daily. 180 tablet 3    mupirocin (BACTROBAN) 2 % ointment by Nasal route once daily. 30 g 3    mycophenolate (CELLCEPT) 250 mg Cap Take 3 capsules (750 mg total) by mouth 2 (two) times daily. 240 capsule 11    NIFEdipine (PROCARDIA-XL) 60 MG (OSM) 24 hr tablet Take 1 tablet (60 mg total) by mouth 2 (two) times a day. 180 tablet 3    predniSONE (DELTASONE) 5 MG tablet Take 1 tablet (5 mg total) by mouth once daily. Take by mouth daily: 20mg 3/22/22 -4/21, 15mg 4/22-5/22, 10mg 5/23-6/22, 5mg 6/23- thereafter 91 tablet 3    sodium bicarbonate 650 MG tablet Take 3 tablets (1,950 mg total) by mouth 2 (two) times daily. 180 tablet 11    tacrolimus (PROGRAF) 1 MG Cap Take 3 capsules (3 mg total) by mouth every morning AND 3 capsules (3 mg total) every evening. 210 capsule 11    sildenafiL (VIAGRA) 100 MG tablet Take 1 tablet (100 mg total) by mouth daily as needed for Erectile Dysfunction. 30 tablet 11    traZODone (DESYREL) 50 MG tablet TAKE 1 TO 2 TABLETS(50  MG) BY MOUTH EVERY EVENING 180 tablet 3     No current facility-administered medications for this visit.       Review of patient's allergies  indicates:  No Known Allergies    Past medical, family, and social history reviewed as documented in chart with pertinent positive medical, family, and social history detailed in HPI.    Physical Exam  Vitals:    10/02/23 1044   BP: (!) 146/77   Pulse: 74       General: Well-developed, well-nourished, in no acute distress  HEENT: Normocephalic, atraumatic, extraocular eye movements in tact  Neck: supple, trachea midline, no cervical or supraclavicular adenopathy  Respirations: Even and unlabored  Back: Midline spine, no CVA tenderness  Rectal: 10/2/23-40gram prostate without nodules or tenderness. No gross blood.   Extremities: Moves all extremities equally, atraumatic, no clubbing, cyanosis, edema  Skin: warm and dry, no lesions  Psych: normal affect  Neuro: Alert and oriented x 3. Cranial nerves II-XII grossly intact    Urinalysis: trace protein, small LE    PSA    3/15: 3.6    6/15: 3.6    6/16: 4.2    7/17: 4.9    9/18: 4.1    9/19: 4.6    5/20: 4.2    9/21  4.7  9/22: 3.8  9/28/23: 6.9    Assessment:   1. BPH with obstruction/lower urinary tract symptoms  POCT Urinalysis, Dipstick, Automated, W/O Scope      2. Erectile dysfunction due to arterial insufficiency        3. Kidney replaced by transplant        4. Elevated PSA  Prostate Specific Antigen, Diagnostic                Plan:  BPH with obstruction/lower urinary tract symptoms  -     POCT Urinalysis, Dipstick, Automated, W/O Scope    Erectile dysfunction due to arterial insufficiency    Kidney replaced by transplant    Elevated PSA  -     Prostate Specific Antigen, Diagnostic; Future; Expected date: 01/02/2024    Other orders  -     sildenafiL (VIAGRA) 100 MG tablet; Take 1 tablet (100 mg total) by mouth daily as needed for Erectile Dysfunction.  Dispense: 30 tablet; Refill: 11      Discussed elevated PSA  Recommended prostate MRI, pt declined, understanding that a rise in PSA could indicate prostate cancer. Will monitor closely with PSA.    Follow up in  about 3 months (around 1/2/2024) for labs before visit.

## 2023-10-04 DIAGNOSIS — F51.04 PSYCHOPHYSIOLOGICAL INSOMNIA: ICD-10-CM

## 2023-10-04 RX ORDER — TRAZODONE HYDROCHLORIDE 50 MG/1
TABLET ORAL
Qty: 180 TABLET | Refills: 3 | Status: SHIPPED | OUTPATIENT
Start: 2023-10-04 | End: 2024-02-07 | Stop reason: SDUPTHER

## 2023-10-05 NOTE — TELEPHONE ENCOUNTER
Refill Decision Note   Alverto Ramirez  is requesting a refill authorization.  Brief Assessment and Rationale for Refill:  Approve     Medication Therapy Plan:         Comments:     Note composed:7:50 PM 10/04/2023

## 2023-10-05 NOTE — TELEPHONE ENCOUNTER
No care due was identified.  [facility] Embedded Care Due Messages. Reference number: [id number].   10/04/2023 7:22:49 PM CDT

## 2023-10-10 ENCOUNTER — TELEPHONE (OUTPATIENT)
Dept: NEPHROLOGY | Facility: CLINIC | Age: 73
End: 2023-10-10
Payer: MEDICARE

## 2023-10-10 NOTE — TELEPHONE ENCOUNTER
Pt offered to reschedule to dr ogden this mon or tues . He will call us back to schedule once he talks to his wife.10/10/23/sf

## 2023-10-11 ENCOUNTER — TELEPHONE (OUTPATIENT)
Dept: NEPHROLOGY | Facility: CLINIC | Age: 73
End: 2023-10-11
Payer: MEDICARE

## 2023-10-11 NOTE — TELEPHONE ENCOUNTER
----- Message from Anisa Castle sent at 10/31/2018  9:04 AM CDT -----  Contact: Rosalba Lopez Ms Rosalba needs clarification on the medication Fosrenol, the directions, [phone], ref# [id number]. Thank you  
Called and spoke with the pharmacy. Medication has been increased. Patient will take Fosrenol 2 tablets by mouth 3 times daily with meals.  
0 = understands/communicates without difficulty

## 2023-10-13 ENCOUNTER — LAB VISIT (OUTPATIENT)
Dept: LAB | Facility: HOSPITAL | Age: 73
End: 2023-10-13
Attending: NURSE PRACTITIONER
Payer: MEDICARE

## 2023-10-13 DIAGNOSIS — N18.31 CHRONIC KIDNEY DISEASE, STAGE 3A: ICD-10-CM

## 2023-10-13 DIAGNOSIS — R97.20 ELEVATED PSA: ICD-10-CM

## 2023-10-13 DIAGNOSIS — Z94.0 S/P KIDNEY TRANSPLANT: ICD-10-CM

## 2023-10-13 LAB
25(OH)D3+25(OH)D2 SERPL-MCNC: 43 NG/ML (ref 30–96)
ALBUMIN SERPL BCP-MCNC: 4.2 G/DL (ref 3.5–5.2)
ANION GAP SERPL CALC-SCNC: 9 MMOL/L (ref 8–16)
BUN SERPL-MCNC: 14 MG/DL (ref 8–23)
CALCIUM SERPL-MCNC: 9.7 MG/DL (ref 8.7–10.5)
CHLORIDE SERPL-SCNC: 110 MMOL/L (ref 95–110)
CO2 SERPL-SCNC: 22 MMOL/L (ref 23–29)
COMPLEXED PSA SERPL-MCNC: 6.2 NG/ML (ref 0–4)
CREAT SERPL-MCNC: 1.2 MG/DL (ref 0.5–1.4)
EST. GFR  (NO RACE VARIABLE): >60 ML/MIN/1.73 M^2
GLUCOSE SERPL-MCNC: 93 MG/DL (ref 70–110)
MAGNESIUM SERPL-MCNC: 1.8 MG/DL (ref 1.6–2.6)
PHOSPHATE SERPL-MCNC: 2.7 MG/DL (ref 2.7–4.5)
POTASSIUM SERPL-SCNC: 4.3 MMOL/L (ref 3.5–5.1)
PTH-INTACT SERPL-MCNC: 173.5 PG/ML (ref 9–77)
SODIUM SERPL-SCNC: 141 MMOL/L (ref 136–145)

## 2023-10-13 PROCEDURE — 84153 ASSAY OF PSA TOTAL: CPT | Performed by: UROLOGY

## 2023-10-13 PROCEDURE — 83970 ASSAY OF PARATHORMONE: CPT | Performed by: NURSE PRACTITIONER

## 2023-10-13 PROCEDURE — 83735 ASSAY OF MAGNESIUM: CPT | Performed by: NURSE PRACTITIONER

## 2023-10-13 PROCEDURE — 80069 RENAL FUNCTION PANEL: CPT | Performed by: NURSE PRACTITIONER

## 2023-10-13 PROCEDURE — 80197 ASSAY OF TACROLIMUS: CPT | Performed by: NURSE PRACTITIONER

## 2023-10-13 PROCEDURE — 82306 VITAMIN D 25 HYDROXY: CPT | Performed by: NURSE PRACTITIONER

## 2023-10-13 PROCEDURE — 36415 COLL VENOUS BLD VENIPUNCTURE: CPT | Performed by: NURSE PRACTITIONER

## 2023-10-13 NOTE — TELEPHONE ENCOUNTER
Pt asking why medicine cost $14 instead of $4. Instructed pt to call [employer] or his insurance company. Pt verbalized understanding.

## 2023-10-14 LAB — TACROLIMUS BLD-MCNC: 8.3 NG/ML (ref 5–15)

## 2023-10-17 ENCOUNTER — OFFICE VISIT (OUTPATIENT)
Dept: NEPHROLOGY | Facility: CLINIC | Age: 73
End: 2023-10-17
Payer: MEDICARE

## 2023-10-17 VITALS
HEIGHT: 67 IN | HEART RATE: 64 BPM | RESPIRATION RATE: 18 BRPM | BODY MASS INDEX: 30.52 KG/M2 | SYSTOLIC BLOOD PRESSURE: 152 MMHG | WEIGHT: 194.44 LBS | DIASTOLIC BLOOD PRESSURE: 84 MMHG

## 2023-10-17 DIAGNOSIS — Z94.0 KIDNEY TRANSPLANT STATUS: Primary | ICD-10-CM

## 2023-10-17 DIAGNOSIS — N25.81 SECONDARY HYPERPARATHYROIDISM OF RENAL ORIGIN: ICD-10-CM

## 2023-10-17 DIAGNOSIS — I10 PRIMARY HYPERTENSION: ICD-10-CM

## 2023-10-17 DIAGNOSIS — D84.9 IMMUNOSUPPRESSION: ICD-10-CM

## 2023-10-17 PROCEDURE — 3077F PR MOST RECENT SYSTOLIC BLOOD PRESSURE >= 140 MM HG: ICD-10-PCS | Mod: CPTII,S$GLB,, | Performed by: INTERNAL MEDICINE

## 2023-10-17 PROCEDURE — 99999 PR PBB SHADOW E&M-EST. PATIENT-LVL III: CPT | Mod: PBBFAC,,, | Performed by: INTERNAL MEDICINE

## 2023-10-17 PROCEDURE — 3066F NEPHROPATHY DOC TX: CPT | Mod: CPTII,S$GLB,, | Performed by: INTERNAL MEDICINE

## 2023-10-17 PROCEDURE — 1101F PT FALLS ASSESS-DOCD LE1/YR: CPT | Mod: CPTII,S$GLB,, | Performed by: INTERNAL MEDICINE

## 2023-10-17 PROCEDURE — 99214 PR OFFICE/OUTPT VISIT, EST, LEVL IV, 30-39 MIN: ICD-10-PCS | Mod: S$GLB,,, | Performed by: INTERNAL MEDICINE

## 2023-10-17 PROCEDURE — 3079F PR MOST RECENT DIASTOLIC BLOOD PRESSURE 80-89 MM HG: ICD-10-PCS | Mod: CPTII,S$GLB,, | Performed by: INTERNAL MEDICINE

## 2023-10-17 PROCEDURE — 3044F PR MOST RECENT HEMOGLOBIN A1C LEVEL <7.0%: ICD-10-PCS | Mod: CPTII,S$GLB,, | Performed by: INTERNAL MEDICINE

## 2023-10-17 PROCEDURE — 1101F PR PT FALLS ASSESS DOC 0-1 FALLS W/OUT INJ PAST YR: ICD-10-PCS | Mod: CPTII,S$GLB,, | Performed by: INTERNAL MEDICINE

## 2023-10-17 PROCEDURE — 99214 OFFICE O/P EST MOD 30 MIN: CPT | Mod: S$GLB,,, | Performed by: INTERNAL MEDICINE

## 2023-10-17 PROCEDURE — 3288F FALL RISK ASSESSMENT DOCD: CPT | Mod: CPTII,S$GLB,, | Performed by: INTERNAL MEDICINE

## 2023-10-17 PROCEDURE — 1126F PR PAIN SEVERITY QUANTIFIED, NO PAIN PRESENT: ICD-10-PCS | Mod: CPTII,S$GLB,, | Performed by: INTERNAL MEDICINE

## 2023-10-17 PROCEDURE — 1160F PR REVIEW ALL MEDS BY PRESCRIBER/CLIN PHARMACIST DOCUMENTED: ICD-10-PCS | Mod: CPTII,S$GLB,, | Performed by: INTERNAL MEDICINE

## 2023-10-17 PROCEDURE — 1126F AMNT PAIN NOTED NONE PRSNT: CPT | Mod: CPTII,S$GLB,, | Performed by: INTERNAL MEDICINE

## 2023-10-17 PROCEDURE — 1159F PR MEDICATION LIST DOCUMENTED IN MEDICAL RECORD: ICD-10-PCS | Mod: CPTII,S$GLB,, | Performed by: INTERNAL MEDICINE

## 2023-10-17 PROCEDURE — 1159F MED LIST DOCD IN RCRD: CPT | Mod: CPTII,S$GLB,, | Performed by: INTERNAL MEDICINE

## 2023-10-17 PROCEDURE — 1160F RVW MEDS BY RX/DR IN RCRD: CPT | Mod: CPTII,S$GLB,, | Performed by: INTERNAL MEDICINE

## 2023-10-17 PROCEDURE — 3077F SYST BP >= 140 MM HG: CPT | Mod: CPTII,S$GLB,, | Performed by: INTERNAL MEDICINE

## 2023-10-17 PROCEDURE — 3066F PR DOCUMENTATION OF TREATMENT FOR NEPHROPATHY: ICD-10-PCS | Mod: CPTII,S$GLB,, | Performed by: INTERNAL MEDICINE

## 2023-10-17 PROCEDURE — 3079F DIAST BP 80-89 MM HG: CPT | Mod: CPTII,S$GLB,, | Performed by: INTERNAL MEDICINE

## 2023-10-17 PROCEDURE — 3044F HG A1C LEVEL LT 7.0%: CPT | Mod: CPTII,S$GLB,, | Performed by: INTERNAL MEDICINE

## 2023-10-17 PROCEDURE — 99999 PR PBB SHADOW E&M-EST. PATIENT-LVL III: ICD-10-PCS | Mod: PBBFAC,,, | Performed by: INTERNAL MEDICINE

## 2023-10-17 PROCEDURE — 3288F PR FALLS RISK ASSESSMENT DOCUMENTED: ICD-10-PCS | Mod: CPTII,S$GLB,, | Performed by: INTERNAL MEDICINE

## 2023-10-17 NOTE — PROGRESS NOTES
"Subjective:       Patient ID: Alverto Ramirez Jr. is a 73 y.o. male.    Chief Complaint:  Kidney transplant status    HPI    He presents to clinic today for routine follow-up.  He is accompanied today by his wife.  Since his last office visit he has been doing well and has no specific or new complaints.  His laboratory studies and medications were reviewed.  All Nephrology related questions were answered to his satisfaction.      Review of Systems   Constitutional: Negative.    HENT: Negative.     Respiratory: Negative.     Cardiovascular: Negative.    Gastrointestinal: Negative.    Genitourinary: Negative.    Musculoskeletal: Negative.    Skin: Negative.        BP (!) 152/84   Pulse 64   Resp 18   Ht 5' 7" (1.702 m)   Wt 88.2 kg (194 lb 7.1 oz)   BMI 30.45 kg/m²     Lab Results   Component Value Date    WBC 6.68 09/05/2023    HGB 15.9 09/05/2023    HCT 51.5 09/05/2023    MCV 90 09/05/2023     09/05/2023      BMP  Lab Results   Component Value Date     10/13/2023    K 4.3 10/13/2023     10/13/2023    CO2 22 (L) 10/13/2023    BUN 14 10/13/2023    CREATININE 1.2 10/13/2023    CALCIUM 9.7 10/13/2023    ANIONGAP 9 10/13/2023    ESTGFRAFRICA >60.0 07/14/2022    EGFRNONAA 54.1 (A) 07/14/2022     CMP  Sodium   Date Value Ref Range Status   10/13/2023 141 136 - 145 mmol/L Final     Potassium   Date Value Ref Range Status   10/13/2023 4.3 3.5 - 5.1 mmol/L Final     Chloride   Date Value Ref Range Status   10/13/2023 110 95 - 110 mmol/L Final     CO2   Date Value Ref Range Status   10/13/2023 22 (L) 23 - 29 mmol/L Final     Glucose   Date Value Ref Range Status   10/13/2023 93 70 - 110 mg/dL Final     BUN   Date Value Ref Range Status   10/13/2023 14 8 - 23 mg/dL Final     Creatinine   Date Value Ref Range Status   10/13/2023 1.2 0.5 - 1.4 mg/dL Final     Calcium   Date Value Ref Range Status   10/13/2023 9.7 8.7 - 10.5 mg/dL Final     Total Protein   Date Value Ref Range Status   08/03/2023 7.1 6.0 - 8.4 " g/dL Final     Albumin   Date Value Ref Range Status   10/13/2023 4.2 3.5 - 5.2 g/dL Final     Total Bilirubin   Date Value Ref Range Status   08/03/2023 0.5 0.1 - 1.0 mg/dL Final     Comment:     For infants and newborns, interpretation of results should be based  on gestational age, weight and in agreement with clinical  observations.    Premature Infant recommended reference ranges:  Up to 24 hours.............<8.0 mg/dL  Up to 48 hours............<12.0 mg/dL  3-5 days..................<15.0 mg/dL  6-29 days.................<15.0 mg/dL       Alkaline Phosphatase   Date Value Ref Range Status   08/03/2023 92 55 - 135 U/L Final     AST   Date Value Ref Range Status   08/03/2023 20 10 - 40 U/L Final     ALT   Date Value Ref Range Status   08/03/2023 27 10 - 44 U/L Final     Anion Gap   Date Value Ref Range Status   10/13/2023 9 8 - 16 mmol/L Final     eGFR if    Date Value Ref Range Status   07/14/2022 >60.0 >60 mL/min/1.73 m^2 Final     eGFR if non    Date Value Ref Range Status   07/14/2022 54.1 (A) >60 mL/min/1.73 m^2 Final     Comment:     Calculation used to obtain the estimated glomerular filtration  rate (eGFR) is the CKD-EPI equation.        Current Outpatient Medications on File Prior to Visit   Medication Sig Dispense Refill    ALPRAZolam (XANAX) 1 MG tablet Take 1 tablet (1 mg total) by mouth daily as needed for Anxiety or Insomnia. 90 tablet 0    calcitRIOL (ROCALTROL) 0.5 MCG Cap Take 1 capsule (0.5 mcg total) by mouth every Mon, Wed, Fri, Sun. 90 capsule 3    cholecalciferol, vitamin D3, (VITAMIN D3) 50 mcg (2,000 unit) Tab Take 1 tablet (2,000 Units total) by mouth once daily. 60 tablet 11    magnesium oxide (MAG-OX) 400 mg (241.3 mg magnesium) tablet Take 1 tablet (400 mg total) by mouth 2 (two) times daily. 60 tablet 11    metoprolol succinate (TOPROL-XL) 50 MG 24 hr tablet Take 1 tablet (50 mg total) by mouth 2 (two) times daily. 180 tablet 3    mupirocin (BACTROBAN)  2 % ointment by Nasal route once daily. 30 g 3    mycophenolate (CELLCEPT) 250 mg Cap Take 3 capsules (750 mg total) by mouth 2 (two) times daily. 240 capsule 11    NIFEdipine (PROCARDIA-XL) 60 MG (OSM) 24 hr tablet Take 1 tablet (60 mg total) by mouth 2 (two) times a day. 180 tablet 3    predniSONE (DELTASONE) 5 MG tablet Take 1 tablet (5 mg total) by mouth once daily. Take by mouth daily: 20mg 3/22/22 -4/21, 15mg 4/22-5/22, 10mg 5/23-6/22, 5mg 6/23- thereafter 91 tablet 3    sildenafiL (VIAGRA) 100 MG tablet Take 1 tablet (100 mg total) by mouth daily as needed for Erectile Dysfunction. 30 tablet 11    sodium bicarbonate 650 MG tablet Take 3 tablets (1,950 mg total) by mouth 2 (two) times daily. 180 tablet 11    tacrolimus (PROGRAF) 1 MG Cap Take 3 capsules (3 mg total) by mouth every morning AND 3 capsules (3 mg total) every evening. 210 capsule 11    traZODone (DESYREL) 50 MG tablet TAKE 1 TO 2 TABLETS(50  MG) BY MOUTH EVERY EVENING 180 tablet 3    [DISCONTINUED] amLODIPine (NORVASC) 10 MG tablet Take 1 tablet (10 mg total) by mouth once daily. 30 tablet 11    [DISCONTINUED] calcium carbonate (OS-SUSAN) 500 mg calcium (1,250 mg) chewable tablet Take 1 tablet by mouth 3 (three) times daily.      [DISCONTINUED] lanthanum (FOSRENOL) 1000 MG chewable tablet CHEW 2 TABLETS THREE TIMES A DAY WITH MEALS 540 tablet 4    [DISCONTINUED] losartan (COZAAR) 100 MG tablet Take 1 tablet (100 mg total) by mouth once daily. 90 tablet 3    [DISCONTINUED] torsemide (DEMADEX) 100 MG Tab Take 2 tablets (200 mg total) by mouth once daily. 180 tablet 3     No current facility-administered medications on file prior to visit.            Objective:            Physical Exam  Constitutional:       Appearance: Normal appearance.   HENT:      Head: Normocephalic and atraumatic.   Eyes:      General: No scleral icterus.     Extraocular Movements: Extraocular movements intact.      Pupils: Pupils are equal, round, and reactive to light.    Pulmonary:      Effort: Pulmonary effort is normal.      Breath sounds: No stridor.   Musculoskeletal:      Right lower leg: No edema.      Left lower leg: No edema.   Skin:     General: Skin is warm and dry.   Neurological:      General: No focal deficit present.      Mental Status: He is alert and oriented to person, place, and time.   Psychiatric:         Mood and Affect: Mood normal.         Behavior: Behavior normal.       Assessment:       1. Kidney transplant status    2. Immunosuppression    3. Primary hypertension    4. Secondary hyperparathyroidism of renal origin        Plan:       1. Creatinine has remained stable ranging between 1.2 and 1.4 with has 6 months.  He is status post kidney transplant in March of 2022.  Stable renal allograft.  Urinalysis is bland without evidence of proteinuria.    2. He continues on 3 mg in the morning 2 mg in the evening Prograf.  Most recent level is 8.3.  He is also on 750 mg twice daily and mycophenolate 5 mg daily of prednisone.    3. Blood pressure was slightly elevated in the clinic today.  States that it typically runs within normal range at home.  He will continue to monitor his pressures.    4. Intact PTH is elevated 173.  Vitamin-D level was normal 43.  Phosphorus is normal at 2.7.  Calcium is normal at 9.7 and albumin of 4.2.      Chris Salgado MD

## 2023-11-20 ENCOUNTER — TELEPHONE (OUTPATIENT)
Dept: HEMATOLOGY/ONCOLOGY | Facility: CLINIC | Age: 73
End: 2023-11-20
Payer: MEDICARE

## 2023-11-20 NOTE — TELEPHONE ENCOUNTER
----- Message from SARAH Silver sent at 11/17/2023 12:04 PM CST -----  Contact: Pt @ [phone]  Good morning,     If his urologist wants to check his PSA level again, they can order the lab and attach it to our lab appointment. It looks like it was just checked last month and she plans to check it again in January when she sees him again.     ----- Message -----  From: Sera Elizabeth MA  Sent: 11/16/2023  10:47 AM CST  To: SARAH Silver    Spoke with the patient and rs his appt, patient wants to know if a PSA can be added to his labs he's having drawn. Please advise   ----- Message -----  From: Carla Villarreal MA  Sent: 11/16/2023  10:26 AM CST  To: Sera Elizabeth MA      ----- Message -----  From: Marla Kaplan  Sent: 11/16/2023  10:23 AM CST  To: Fernando Al Staff    Patient is returning a phone call.      Who left a message for the patient:  SERA    Does patient know what this is regarding:  Reschedule appt     Would you like a call back, or a response through your [facility] portal?:   call back     Comments: Please call back to advise on a new appt.

## 2023-12-05 ENCOUNTER — TELEPHONE (OUTPATIENT)
Dept: OPHTHALMOLOGY | Facility: CLINIC | Age: 73
End: 2023-12-05
Payer: MEDICARE

## 2023-12-05 NOTE — TELEPHONE ENCOUNTER
----- Message from Suzette Flores sent at 12/5/2023 12:36 PM CST -----  Regarding: copy of prescription  Contact: alverto  .Type:  Needs Medical Advice    Who Called: alverto  Symptoms (please be specific):    How long has patient had these symptoms:    Pharmacy name and phone #:    Would the patient rather a call back or a response via [facility]? call  Best Call Back Number:  [phone]  Additional Information: Alverto would like  a copy of his prescription. He will be in the area on Thursday and can pick it up then. Please advise.

## 2023-12-07 ENCOUNTER — LAB VISIT (OUTPATIENT)
Dept: LAB | Facility: HOSPITAL | Age: 73
End: 2023-12-07
Attending: NURSE PRACTITIONER
Payer: MEDICARE

## 2023-12-07 DIAGNOSIS — D63.1 ANEMIA IN ESRD (END-STAGE RENAL DISEASE): ICD-10-CM

## 2023-12-07 DIAGNOSIS — D47.2 MONOCLONAL GAMMOPATHY: ICD-10-CM

## 2023-12-07 DIAGNOSIS — N18.6 ANEMIA IN ESRD (END-STAGE RENAL DISEASE): ICD-10-CM

## 2023-12-07 DIAGNOSIS — Z94.0 KIDNEY TRANSPLANT STATUS: ICD-10-CM

## 2023-12-07 LAB
ALBUMIN SERPL BCP-MCNC: 4.2 G/DL (ref 3.5–5.2)
ALP SERPL-CCNC: 107 U/L (ref 55–135)
ALT SERPL W/O P-5'-P-CCNC: 35 U/L (ref 10–44)
ANION GAP SERPL CALC-SCNC: 13 MMOL/L (ref 8–16)
AST SERPL-CCNC: 21 U/L (ref 10–40)
BILIRUB SERPL-MCNC: 0.4 MG/DL (ref 0.1–1)
BUN SERPL-MCNC: 19 MG/DL (ref 8–23)
CALCIUM SERPL-MCNC: 10.1 MG/DL (ref 8.7–10.5)
CHLORIDE SERPL-SCNC: 105 MMOL/L (ref 95–110)
CO2 SERPL-SCNC: 23 MMOL/L (ref 23–29)
CREAT SERPL-MCNC: 1.5 MG/DL (ref 0.5–1.4)
ERYTHROCYTE [DISTWIDTH] IN BLOOD BY AUTOMATED COUNT: 14.7 % (ref 11.5–14.5)
EST. GFR  (NO RACE VARIABLE): 48 ML/MIN/1.73 M^2
GLUCOSE SERPL-MCNC: 145 MG/DL (ref 70–110)
HCT VFR BLD AUTO: 48.5 % (ref 40–54)
HGB BLD-MCNC: 16 G/DL (ref 14–18)
IMM GRANULOCYTES # BLD AUTO: 0.04 K/UL (ref 0–0.04)
MCH RBC QN AUTO: 29.2 PG (ref 27–31)
MCHC RBC AUTO-ENTMCNC: 33 G/DL (ref 32–36)
MCV RBC AUTO: 89 FL (ref 82–98)
NEUTROPHILS # BLD AUTO: 4.2 K/UL (ref 1.8–7.7)
PLATELET # BLD AUTO: 178 K/UL (ref 150–450)
PMV BLD AUTO: 9.8 FL (ref 9.2–12.9)
POTASSIUM SERPL-SCNC: 3.9 MMOL/L (ref 3.5–5.1)
PROT SERPL-MCNC: 7.6 G/DL (ref 6–8.4)
RBC # BLD AUTO: 5.48 M/UL (ref 4.6–6.2)
SODIUM SERPL-SCNC: 141 MMOL/L (ref 136–145)
WBC # BLD AUTO: 7.36 K/UL (ref 3.9–12.7)

## 2023-12-07 PROCEDURE — 86334 PATHOLOGIST INTERPRETATION IFE: ICD-10-PCS | Mod: 26,,, | Performed by: PATHOLOGY

## 2023-12-07 PROCEDURE — 84165 PROTEIN E-PHORESIS SERUM: CPT | Performed by: NURSE PRACTITIONER

## 2023-12-07 PROCEDURE — 84165 PROTEIN E-PHORESIS SERUM: CPT | Mod: 26,,, | Performed by: PATHOLOGY

## 2023-12-07 PROCEDURE — 86334 IMMUNOFIX E-PHORESIS SERUM: CPT | Performed by: NURSE PRACTITIONER

## 2023-12-07 PROCEDURE — 83521 IG LIGHT CHAINS FREE EACH: CPT | Mod: 59 | Performed by: NURSE PRACTITIONER

## 2023-12-07 PROCEDURE — 85027 COMPLETE CBC AUTOMATED: CPT | Performed by: NURSE PRACTITIONER

## 2023-12-07 PROCEDURE — 80053 COMPREHEN METABOLIC PANEL: CPT | Performed by: NURSE PRACTITIONER

## 2023-12-07 PROCEDURE — 36415 COLL VENOUS BLD VENIPUNCTURE: CPT | Performed by: NURSE PRACTITIONER

## 2023-12-07 PROCEDURE — 84165 PATHOLOGIST INTERPRETATION SPE: ICD-10-PCS | Mod: 26,,, | Performed by: PATHOLOGY

## 2023-12-07 PROCEDURE — 86334 IMMUNOFIX E-PHORESIS SERUM: CPT | Mod: 26,,, | Performed by: PATHOLOGY

## 2023-12-08 LAB
ALBUMIN SERPL ELPH-MCNC: 4.21 G/DL (ref 3.35–5.55)
ALPHA1 GLOB SERPL ELPH-MCNC: 0.33 G/DL (ref 0.17–0.41)
ALPHA2 GLOB SERPL ELPH-MCNC: 0.97 G/DL (ref 0.43–0.99)
B-GLOBULIN SERPL ELPH-MCNC: 0.74 G/DL (ref 0.5–1.1)
GAMMA GLOB SERPL ELPH-MCNC: 0.76 G/DL (ref 0.67–1.58)
INTERPRETATION SERPL IFE-IMP: NORMAL
KAPPA LC SER QL IA: 1.9 MG/DL (ref 0.33–1.94)
KAPPA LC/LAMBDA SER IA: 1.68 (ref 0.26–1.65)
LAMBDA LC SER QL IA: 1.13 MG/DL (ref 0.57–2.63)
PROT SERPL-MCNC: 7 G/DL (ref 6–8.4)

## 2023-12-08 NOTE — PROGRESS NOTES
Subjective:       Patient ID: Alverto Ramirez Jr. is a 73 y.o. male.    Chief Complaint:   1. Monoclonal gammopathy  IgM kappa      2. Anemia in ESRD (end-stage renal disease)        3. Kidney transplant status          Current Treatment:  Observation     Treatment History:    HPI: This is a 73-year-old  male with  afib, anemia secondary to CKD, hypertensive nephropathy, and IgM monoclonal gammopathy of undetermined significance that was diagnosed in 2018 and since has been monitored.       He was on peritoneal dialysis and underwent kidney transplantation on 3/19/2022. He follows with the transplant team in [facility].     DAYSI in 8/2023 demonstrates faint IgM kappa and free lambda light chains not visible on corresponding SPEP and cannot be measured. Kappa and lambda FLCs WNL with slightly elevated ratio.    Interval History: Patient presents for follow up on labs. He presents alone and reports resolution of headaches and being cold. Of note, he is no longer anemic. CBC unremarkable. CMP reveals slight decline in kidney function and increased BG; he admits to drinking coffee with sugar prior to labs. Also discussed that not drinking water prior to labs can result in decreased kidney function on blood work. SPEP/DAYSI with normal protein, normal kappa and lambda FLCs with slightly elevated ratio. He states steroids he is on for post transplant status are keeping him awake. His providers were concerned about him being on Xanax, so he stopped it. Discussed developing a sleep routine as melatonin 10mg has not been effective in the past.     Reviewed labs with patient:   CBC:   Recent Labs   Lab 12/07/23  0913   WBC 7.36   RBC 5.48   Hemoglobin 16.0   Hematocrit 48.5   Platelets 178   MCV 89   MCH 29.2   MCHC 33.0     CMP:  Recent Labs   Lab 12/07/23  0913   Glucose 145 H   Calcium 10.1   Albumin 4.2   Total Protein 7.6   Sodium 141   Potassium 3.9   CO2 23   Chloride 105   BUN 19   Creatinine 1.5 H   Alkaline  Phosphatase 107   ALT 35   AST 21   Total Bilirubin 0.4     Social History     Socioeconomic History    Marital status:    Occupational History     Employer: retired   Tobacco Use    Smoking status: Former     Current packs/day: 0.00     Average packs/day: 1 pack/day for 15.0 years (15.0 ttl pk-yrs)     Types: Cigarettes     Start date: 1969     Quit date: 1984     Years since quittin.0    Smokeless tobacco: Never   Substance and Sexual Activity    Alcohol use: Yes     Alcohol/week: 5.0 - 7.0 standard drinks of alcohol     Types: 5 - 7 Standard drinks or equivalent per week     Comment: stopped drinking    Drug use: No    Sexual activity: Yes     Partners: Female   Social History Narrative    Retired from [employer]     for 41 y.o.    2 children    No history of blood transfusions    No donors     Past Medical History:   Diagnosis Date    Anemia in CKD (chronic kidney disease)     Atrial fibrillation     CKD (chronic kidney disease) stage 4, GFR 15 2014    Colon cancer screening 2014    Elevated PSA 2014    HTN (hypertension) diagnosed in his 40s 10/16/2014    Monoclonal gammopathy 2014    Phobic anxiety disorder 2014    Proteinuria 2014    Stage 3a chronic kidney disease 3/25/2022     Family History   Problem Relation Age of Onset    Heart disease Father     Dementia Father     Diabetes Mother     Cataracts Mother     Glaucoma Mother     Hypertension Sister     Cancer Brother         prostate    Kidney disease Sister         ESRD    Cancer Paternal Uncle      Past Surgical History:   Procedure Laterality Date    AV FISTULA PLACEMENT  2014    COLONOSCOPY N/A 2017    Procedure: COLONOSCOPY;  Surgeon: Tony Peacock III, MD;  Location: [facility];  Service: Endoscopy;  Laterality: N/A;    KIDNEY TRANSPLANT Right 3/18/2022    Procedure: TRANSPLANT, KIDNEY;  Surgeon: Victoriano Thomas MD;  Location: [address];  Service: Transplant;   "Laterality: Right;    PERITONEAL CATHETER INSERTION      VASECTOMY      VASECTOMY       Review of Systems   Constitutional:  Negative for appetite change and fatigue.   HENT:  Negative for mouth sores, rhinorrhea and sore throat.    Eyes: Negative.    Respiratory:  Negative for shortness of breath.    Cardiovascular: Negative.    Gastrointestinal:  Negative for constipation, diarrhea, nausea and vomiting.   Endocrine: Negative for cold intolerance (since starting dialysis).   Genitourinary: Negative.    Musculoskeletal: Negative.    Integumentary:  Negative.   Allergic/Immunologic: Negative.    Neurological:  Negative for dizziness, weakness, light-headedness, numbness and headaches (sinus headaches "every now and then").   Hematological: Negative.    Psychiatric/Behavioral: Negative.         Medication List with Changes/Refills   Current Medications    ALPRAZOLAM (XANAX) 1 MG TABLET    Take 1 tablet (1 mg total) by mouth daily as needed for Anxiety or Insomnia.    AMOXICILLIN (AMOXIL) 500 MG TAB    Take 500 mg by mouth 3 (three) times daily.    CALCITRIOL (ROCALTROL) 0.5 MCG CAP    Take 1 capsule (0.5 mcg total) by mouth every Mon, Wed, Fri, Sun.    CHOLECALCIFEROL, VITAMIN D3, (VITAMIN D3) 50 MCG (2,000 UNIT) TAB    Take 1 tablet (2,000 Units total) by mouth once daily.    MAGNESIUM OXIDE (MAG-OX) 400 MG (241.3 MG MAGNESIUM) TABLET    Take 1 tablet (400 mg total) by mouth 2 (two) times daily.    METOPROLOL SUCCINATE (TOPROL-XL) 50 MG 24 HR TABLET    Take 1 tablet (50 mg total) by mouth 2 (two) times daily.    MUPIROCIN (BACTROBAN) 2 % OINTMENT    by Nasal route once daily.    MYCOPHENOLATE (CELLCEPT) 250 MG CAP    Take 3 capsules (750 mg total) by mouth 2 (two) times daily.    NIFEDIPINE (PROCARDIA-XL) 60 MG (OSM) 24 HR TABLET    Take 1 tablet (60 mg total) by mouth 2 (two) times a day.    PREDNISONE (DELTASONE) 5 MG TABLET    Take 1 tablet (5 mg total) by mouth once daily. Take by mouth daily: 20mg 3/22/22 -4/21, " 15mg 4/22-5/22, 10mg 5/23-6/22, 5mg 6/23- thereafter    SILDENAFIL (VIAGRA) 100 MG TABLET    Take 1 tablet (100 mg total) by mouth daily as needed for Erectile Dysfunction.    SODIUM BICARBONATE 650 MG TABLET    Take 3 tablets (1,950 mg total) by mouth 2 (two) times daily.    TACROLIMUS (PROGRAF) 1 MG CAP    Take 3 capsules (3 mg total) by mouth every morning AND 3 capsules (3 mg total) every evening.    TRAZODONE (DESYREL) 50 MG TABLET    TAKE 1 TO 2 TABLETS(50  MG) BY MOUTH EVERY EVENING     Objective:     Vitals:    12/11/23 0847   BP: (!) 163/86   Pulse: 70   Resp: 20   Temp: 97.3 °F (36.3 °C)     Physical Exam  Vitals reviewed.   Constitutional:       Appearance: Normal appearance.   HENT:      Head: Normocephalic.   Eyes:      Extraocular Movements: Extraocular movements intact.      Pupils: Pupils are equal, round, and reactive to light.      Comments:        Cardiovascular:      Rate and Rhythm: Normal rate and regular rhythm.      Heart sounds: Normal heart sounds.   Pulmonary:      Effort: Pulmonary effort is normal.      Breath sounds: Normal breath sounds.   Abdominal:      General: Bowel sounds are normal.      Palpations: Abdomen is soft.      Comments: rounded     Genitourinary:     Comments: deferred    Musculoskeletal:         General: Normal range of motion.      Cervical back: Normal range of motion and neck supple.   Skin:     General: Skin is warm and dry.   Neurological:      Mental Status: He is alert and oriented to person, place, and time.   Psychiatric:         Behavior: Behavior normal.         Thought Content: Thought content normal.      (1) Restricted in physically strenuous activity, ambulatory and able to do work of light nature  Assessment:     Problem List Items Addressed This Visit          Renal/    Kidney transplant status     Underwent kidney transplantation on 3/19/2022. Follows with transplant team.             Oncology    Anemia in ESRD (end-stage renal disease)  (Chronic)     Anemia of chronic kidney disease resolved s/p kidney transplant. Normal RBC, H&H.   Will continue to monitor.         Monoclonal gammopathy - Primary     IgM monoclonal gammopathy. Will repeat protein electrophoresis and immunofixation.  Will continue to monitor every 4 months or sooner as needed.           Plan:     Monoclonal gammopathy    Anemia in ESRD (end-stage renal disease)    Kidney transplant status    Labs reviewed; anemia resolved; SPEP/DAYSI interpretation not resulted at time of visit.   Continue to monitor MGUS every 4 months; patient prefers every 6 months.   Follow up in 6 months with SPEP, DAYSI, FLC, CBC, and Comprehensive Metabolic Panel.    Route Chart for Scheduling    Med Onc Chart Routing      Follow up with physician    Follow up with ROBERT 6 months.   Infusion scheduling note    Injection scheduling note    Labs CBC, CMP, free light chains, SPEP and other   Scheduling:  Preferred lab:  Lab interval:  and DAYSI in 6 months, 3-4 days prior at [place]   Imaging None      Pharmacy appointment No pharmacy appointment needed      Other referrals       No additional referrals needed         I will review assessment/plan with collaborating physician.      SARAH Silver

## 2023-12-11 ENCOUNTER — OFFICE VISIT (OUTPATIENT)
Dept: HEMATOLOGY/ONCOLOGY | Facility: CLINIC | Age: 73
End: 2023-12-11
Payer: MEDICARE

## 2023-12-11 VITALS
HEART RATE: 70 BPM | TEMPERATURE: 97 F | HEIGHT: 67 IN | RESPIRATION RATE: 20 BRPM | DIASTOLIC BLOOD PRESSURE: 86 MMHG | BODY MASS INDEX: 31.01 KG/M2 | WEIGHT: 197.56 LBS | SYSTOLIC BLOOD PRESSURE: 163 MMHG | OXYGEN SATURATION: 98 %

## 2023-12-11 DIAGNOSIS — N18.6 ANEMIA IN ESRD (END-STAGE RENAL DISEASE): ICD-10-CM

## 2023-12-11 DIAGNOSIS — D47.2 MONOCLONAL GAMMOPATHY: Primary | ICD-10-CM

## 2023-12-11 DIAGNOSIS — Z94.0 KIDNEY TRANSPLANT STATUS: ICD-10-CM

## 2023-12-11 DIAGNOSIS — D63.1 ANEMIA IN ESRD (END-STAGE RENAL DISEASE): ICD-10-CM

## 2023-12-11 LAB
PATHOLOGIST INTERPRETATION IFE: NORMAL
PATHOLOGIST INTERPRETATION SPE: NORMAL

## 2023-12-11 PROCEDURE — 3077F SYST BP >= 140 MM HG: CPT | Mod: CPTII,S$GLB,, | Performed by: NURSE PRACTITIONER

## 2023-12-11 PROCEDURE — 1159F MED LIST DOCD IN RCRD: CPT | Mod: CPTII,S$GLB,, | Performed by: NURSE PRACTITIONER

## 2023-12-11 PROCEDURE — 3077F PR MOST RECENT SYSTOLIC BLOOD PRESSURE >= 140 MM HG: ICD-10-PCS | Mod: CPTII,S$GLB,, | Performed by: NURSE PRACTITIONER

## 2023-12-11 PROCEDURE — 1160F PR REVIEW ALL MEDS BY PRESCRIBER/CLIN PHARMACIST DOCUMENTED: ICD-10-PCS | Mod: CPTII,S$GLB,, | Performed by: NURSE PRACTITIONER

## 2023-12-11 PROCEDURE — 1160F RVW MEDS BY RX/DR IN RCRD: CPT | Mod: CPTII,S$GLB,, | Performed by: NURSE PRACTITIONER

## 2023-12-11 PROCEDURE — 1101F PT FALLS ASSESS-DOCD LE1/YR: CPT | Mod: CPTII,S$GLB,, | Performed by: NURSE PRACTITIONER

## 2023-12-11 PROCEDURE — 1159F PR MEDICATION LIST DOCUMENTED IN MEDICAL RECORD: ICD-10-PCS | Mod: CPTII,S$GLB,, | Performed by: NURSE PRACTITIONER

## 2023-12-11 PROCEDURE — 3044F HG A1C LEVEL LT 7.0%: CPT | Mod: CPTII,S$GLB,, | Performed by: NURSE PRACTITIONER

## 2023-12-11 PROCEDURE — 3066F PR DOCUMENTATION OF TREATMENT FOR NEPHROPATHY: ICD-10-PCS | Mod: CPTII,S$GLB,, | Performed by: NURSE PRACTITIONER

## 2023-12-11 PROCEDURE — 99214 PR OFFICE/OUTPT VISIT, EST, LEVL IV, 30-39 MIN: ICD-10-PCS | Mod: S$GLB,,, | Performed by: NURSE PRACTITIONER

## 2023-12-11 PROCEDURE — 99214 OFFICE O/P EST MOD 30 MIN: CPT | Mod: S$GLB,,, | Performed by: NURSE PRACTITIONER

## 2023-12-11 PROCEDURE — 3288F PR FALLS RISK ASSESSMENT DOCUMENTED: ICD-10-PCS | Mod: CPTII,S$GLB,, | Performed by: NURSE PRACTITIONER

## 2023-12-11 PROCEDURE — 3079F PR MOST RECENT DIASTOLIC BLOOD PRESSURE 80-89 MM HG: ICD-10-PCS | Mod: CPTII,S$GLB,, | Performed by: NURSE PRACTITIONER

## 2023-12-11 PROCEDURE — 3044F PR MOST RECENT HEMOGLOBIN A1C LEVEL <7.0%: ICD-10-PCS | Mod: CPTII,S$GLB,, | Performed by: NURSE PRACTITIONER

## 2023-12-11 PROCEDURE — 3288F FALL RISK ASSESSMENT DOCD: CPT | Mod: CPTII,S$GLB,, | Performed by: NURSE PRACTITIONER

## 2023-12-11 PROCEDURE — 1126F AMNT PAIN NOTED NONE PRSNT: CPT | Mod: CPTII,S$GLB,, | Performed by: NURSE PRACTITIONER

## 2023-12-11 PROCEDURE — 99999 PR PBB SHADOW E&M-EST. PATIENT-LVL IV: ICD-10-PCS | Mod: PBBFAC,,, | Performed by: NURSE PRACTITIONER

## 2023-12-11 PROCEDURE — 3079F DIAST BP 80-89 MM HG: CPT | Mod: CPTII,S$GLB,, | Performed by: NURSE PRACTITIONER

## 2023-12-11 PROCEDURE — 1101F PR PT FALLS ASSESS DOC 0-1 FALLS W/OUT INJ PAST YR: ICD-10-PCS | Mod: CPTII,S$GLB,, | Performed by: NURSE PRACTITIONER

## 2023-12-11 PROCEDURE — 3066F NEPHROPATHY DOC TX: CPT | Mod: CPTII,S$GLB,, | Performed by: NURSE PRACTITIONER

## 2023-12-11 PROCEDURE — 99999 PR PBB SHADOW E&M-EST. PATIENT-LVL IV: CPT | Mod: PBBFAC,,, | Performed by: NURSE PRACTITIONER

## 2023-12-11 PROCEDURE — 1126F PR PAIN SEVERITY QUANTIFIED, NO PAIN PRESENT: ICD-10-PCS | Mod: CPTII,S$GLB,, | Performed by: NURSE PRACTITIONER

## 2023-12-11 RX ORDER — AMOXICILLIN 500 MG/1
500 TABLET, FILM COATED ORAL 3 TIMES DAILY
COMMUNITY
Start: 2023-11-27 | End: 2024-03-07 | Stop reason: ALTCHOICE

## 2023-12-21 ENCOUNTER — TELEPHONE (OUTPATIENT)
Dept: NEPHROLOGY | Facility: CLINIC | Age: 73
End: 2023-12-21
Payer: MEDICARE

## 2023-12-21 NOTE — TELEPHONE ENCOUNTER
Called patient and Nurse Terence with the Oral surgeon's office. Terence will fax the forms over to the number provided.

## 2023-12-21 NOTE — TELEPHONE ENCOUNTER
----- Message from Surya Austin sent at 12/21/2023  2:26 PM CST -----  Regarding: Consult/Advisory  Contact: Alverto Ramirez Jr.  Consult/Advisory    Name Of Caller: Alverto Ramirez Jr.       Contact Preference: [phone] (home)      Nature of call: Patient calling to get dental clearance in order to get 5 teeth extracted. Requesting a call back.     Dr. Lamb, Oral Surgeon - nurse Terence  PH: [phone]

## 2023-12-28 ENCOUNTER — TELEPHONE (OUTPATIENT)
Dept: NEPHROLOGY | Facility: CLINIC | Age: 73
End: 2023-12-28
Payer: MEDICARE

## 2023-12-28 NOTE — TELEPHONE ENCOUNTER
Pt asked if ok to eat something when he takes his meds anthony 2am before his surgery. Advised to do whatever the dentist tells him . 12/28/23/sf

## 2023-12-28 NOTE — TELEPHONE ENCOUNTER
----- Message from Rachael beau sent at 12/28/2023  1:24 PM CST -----  Name of Who is Calling:pt           What is the request in detail:requesting to speak with office regarding a question about his medication. Patient is requesting a call back asap as this is related to his oral surgery tomorrow.           Can the clinic reply by [facility]:no           What Number to Call Back if not in [facility]: [phone]

## 2024-01-05 DIAGNOSIS — E83.42 HYPOMAGNESEMIA: ICD-10-CM

## 2024-01-05 DIAGNOSIS — Z94.0 S/P KIDNEY TRANSPLANT: ICD-10-CM

## 2024-01-05 DIAGNOSIS — E87.20 ACIDOSIS: ICD-10-CM

## 2024-01-05 RX ORDER — TACROLIMUS 1 MG/1
CAPSULE ORAL
Qty: 210 CAPSULE | Refills: 11 | Status: CANCELLED | OUTPATIENT
Start: 2024-01-05

## 2024-01-05 RX ORDER — SODIUM BICARBONATE 650 MG/1
1950 TABLET ORAL 2 TIMES DAILY
Qty: 180 TABLET | Refills: 11 | Status: SHIPPED | OUTPATIENT
Start: 2024-01-05 | End: 2025-01-04

## 2024-01-05 RX ORDER — LANOLIN ALCOHOL/MO/W.PET/CERES
400 CREAM (GRAM) TOPICAL 2 TIMES DAILY
Qty: 60 TABLET | Refills: 11 | Status: SHIPPED | OUTPATIENT
Start: 2024-01-05

## 2024-01-05 RX ORDER — TACROLIMUS 1 MG/1
CAPSULE ORAL
Qty: 210 CAPSULE | Refills: 11 | Status: SHIPPED | OUTPATIENT
Start: 2024-01-05 | End: 2024-01-08 | Stop reason: SDUPTHER

## 2024-01-05 NOTE — TELEPHONE ENCOUNTER
----- Message from Rema Govea RN sent at 1/5/2024 11:51 AM CST -----  Regarding: FW: rx refill  Hi, please fill patient's magnesium and sodium bicarb.  This management is being deferred to Dr. Salgado.     Marta  ----- Message -----  From: Stephen Abarca  Sent: 1/5/2024  11:44 AM CST  To: [facility] Post-Kidney Transplant Clinical  Subject: rx refill                                        Pt call in regards to needing a RX REFILL for sodium bicarbonate 650 MG tablet,magnesium oxide (MAG-OX) 400 mg (241.3 mg magnesium) tablet,tacrolimus (PROGRAF) 1 MG Cap    [facility] #[id number] - [place] - [address] AT [facility] [address]    Call

## 2024-01-05 NOTE — TELEPHONE ENCOUNTER
----- Message from Leann Wilberto sent at 1/5/2024  1:29 PM CST -----  .Type:  RX Refill Request    Who Called: .Alverto Ramirez Jr.   Refill or New Rx:refill  RX Name and Strength:tacrolimus (PROGRAF) 1 MG Cap  How is the patient currently taking it? (ex. 1XDay):daily  Is this a 30 day or 90 day RX:    Preferred Pharmacy with phone number:.  [facility] #[id number] - [place] - [address] AT [facility] [address] [address]  Phone: [phone] Fax: [phone]     Local or Mail Order:local  Ordering Provider:  Would the patient rather a call back or a response via [facility]? Call back  Best Call Back Number:.[phone]   Additional Information:

## 2024-01-05 NOTE — TELEPHONE ENCOUNTER
Message sent to Dr. Salgado's office for management of magnesium and sodium bicarbonate.  Transplant will continue to manage tacrolimus.       ----- Message from Stephen Abarca sent at 1/5/2024 11:41 AM CST -----  Regarding: rx refill  Pt call in regards to needing a RX REFILL for sodium bicarbonate 650 MG tablet,magnesium oxide (MAG-OX) 400 mg (241.3 mg magnesium) tablet,tacrolimus (PROGRAF) 1 MG Cap    [facility] #[id number] - [place] - [address] AT [facility] [address]    Call

## 2024-01-08 DIAGNOSIS — Z94.0 S/P KIDNEY TRANSPLANT: ICD-10-CM

## 2024-01-08 RX ORDER — NIFEDIPINE 60 MG/1
60 TABLET, EXTENDED RELEASE ORAL 2 TIMES DAILY
Qty: 180 TABLET | Refills: 3 | Status: SHIPPED | OUTPATIENT
Start: 2024-01-08

## 2024-01-08 RX ORDER — TACROLIMUS 1 MG/1
CAPSULE ORAL
Qty: 210 CAPSULE | Refills: 11 | Status: SHIPPED | OUTPATIENT
Start: 2024-01-08 | End: 2024-01-11 | Stop reason: SDUPTHER

## 2024-01-08 RX ORDER — MYCOPHENOLATE MOFETIL 250 MG/1
750 CAPSULE ORAL 2 TIMES DAILY
Qty: 180 CAPSULE | Refills: 11 | Status: SHIPPED | OUTPATIENT
Start: 2024-01-08 | End: 2024-01-11 | Stop reason: SDUPTHER

## 2024-01-08 RX ORDER — PREDNISONE 5 MG/1
5 TABLET ORAL DAILY
Qty: 30 TABLET | Refills: 11 | Status: SHIPPED | OUTPATIENT
Start: 2024-01-08 | End: 2024-01-11 | Stop reason: SDUPTHER

## 2024-01-08 NOTE — TELEPHONE ENCOUNTER
----- Message from Lynn Rodriguez sent at 1/8/2024  8:45 AM CST -----  Regarding: Refill  Contact: Pt  [phone]            Rx Name:  NIFEdipine (PROCARDIA-XL) 60 MG (OSM) 24 hr tablet      Preferred Pharmacy with number:     [facility] #[id number] - [place] - [address] AT [facility] [address] [address]  Phone: [phone] Fax: [phone]       Ordering Provider:Dilcia Saavedra NP    Contact preference: [phone]    Comments/Notes:  Requesting refill

## 2024-01-08 NOTE — TELEPHONE ENCOUNTER
----- Message from Lynn Rodriguez sent at 1/8/2024  8:41 AM CST -----  Regarding: Refill  Contact: Pt  [phone]          Rx Name:  tacrolimus (PROGRAF) 1 MG Cap   is completely out                     predniSONE (DELTASONE) 5 MG tablet                    mycophenolate (CELLCEPT) 250 mg Cap    Preferred Pharmacy with number:      [facility] #[id number] - [place] - [address] [facility] [address] [address]  Phone: [phone] Fax: [phone]       Ordering Provider: Dilcia Parker MD    Contact preference: [phone]    Comments/Notes:  Requesting refill

## 2024-01-10 ENCOUNTER — TELEPHONE (OUTPATIENT)
Dept: TRANSPLANT | Facility: CLINIC | Age: 74
End: 2024-01-10
Payer: MEDICARE

## 2024-01-10 NOTE — TELEPHONE ENCOUNTER
----- Message from Rema Govea RN sent at 1/10/2024  4:44 PM CST -----  Regarding: FW: Refill  Contact: Pt  [phone]    ----- Message -----  From: Lynn Rodriguez  Sent: 1/10/2024   3:48 PM CST  To: [facility] Post-Kidney Transplant Clinical  Subject: Refill                                                     Rx Name:  tacrolimus (PROGRAF) 1 MG Cap, pt is completely out       Preferred Pharmacy with number:   [facility] #[id number] - [place] - [address] AT [facility] [address] [address]  Phone: [phone] Fax: [phone]     Ordering Provider: Dilcia Parker MD    Contact preference:  [phone]    Comments/Notes:  Requesting refill need authorization states it needs to be sent to part B coverage current authorization  on 23 please call to give verbal approval for expedited services Pt requesting a call back

## 2024-01-11 DIAGNOSIS — Z94.0 S/P KIDNEY TRANSPLANT: ICD-10-CM

## 2024-01-11 RX ORDER — MYCOPHENOLATE MOFETIL 250 MG/1
750 CAPSULE ORAL 2 TIMES DAILY
Qty: 180 CAPSULE | Refills: 11 | Status: SHIPPED | OUTPATIENT
Start: 2024-01-11

## 2024-01-11 RX ORDER — PREDNISONE 5 MG/1
5 TABLET ORAL DAILY
Qty: 30 TABLET | Refills: 11 | Status: SHIPPED | OUTPATIENT
Start: 2024-01-11

## 2024-01-11 RX ORDER — TACROLIMUS 1 MG/1
CAPSULE ORAL
Qty: 180 CAPSULE | Refills: 11 | Status: SHIPPED | OUTPATIENT
Start: 2024-01-11

## 2024-02-07 DIAGNOSIS — F41.9 ANXIETY: ICD-10-CM

## 2024-02-07 DIAGNOSIS — Z94.0 KIDNEY REPLACED BY TRANSPLANT: Primary | ICD-10-CM

## 2024-02-07 DIAGNOSIS — F51.04 PSYCHOPHYSIOLOGICAL INSOMNIA: ICD-10-CM

## 2024-02-07 RX ORDER — TRAZODONE HYDROCHLORIDE 50 MG/1
50-100 TABLET ORAL NIGHTLY
Qty: 180 TABLET | Refills: 3 | Status: SHIPPED | OUTPATIENT
Start: 2024-02-07 | End: 2025-02-06

## 2024-02-07 RX ORDER — TRAZODONE HYDROCHLORIDE 50 MG/1
TABLET ORAL
Refills: 0 | OUTPATIENT
Start: 2024-02-07

## 2024-02-07 RX ORDER — ALPRAZOLAM 1 MG/1
1 TABLET ORAL DAILY PRN
Qty: 90 TABLET | Refills: 0 | Status: SHIPPED | OUTPATIENT
Start: 2024-02-07 | End: 2024-05-07

## 2024-02-07 NOTE — TELEPHONE ENCOUNTER
Pt called in stating he would like medication listed to be sent through mail order if possible /LD

## 2024-02-07 NOTE — TELEPHONE ENCOUNTER
No care due was identified.  [facility] Embedded Care Due Messages. Reference number: [id number].   2/07/2024 2:31:43 PM CST

## 2024-02-07 NOTE — TELEPHONE ENCOUNTER
----- Message from Glen Case sent at 2/7/2024  2:32 PM CST -----  Contact: Alverto Del Toro would like a call back at [phone] in regards to needing to see about getting medications, ALPRAZolam (XANAX) 1 MG tablet and traZODone (DESYREL) 50 MG tablet sent over to [email or web address] for mail order if possible please contact [email or web address] at [phone]   [facility] - [place] [address]  Phone: [phone] Fax: [phone]  Thanks   Am

## 2024-02-23 ENCOUNTER — TELEPHONE (OUTPATIENT)
Dept: NEPHROLOGY | Facility: CLINIC | Age: 74
End: 2024-02-23

## 2024-02-23 NOTE — TELEPHONE ENCOUNTER
----- Message from Star Charlton sent at 2/23/2024  9:48 AM CST -----  Contact: Alverto Del Toro is needing a call back in regards to addressing some concerns. Please give him a call back at [phone]

## 2024-03-01 ENCOUNTER — LAB VISIT (OUTPATIENT)
Dept: LAB | Facility: HOSPITAL | Age: 74
End: 2024-03-01
Attending: INTERNAL MEDICINE
Payer: MEDICARE

## 2024-03-01 DIAGNOSIS — Z94.0 KIDNEY REPLACED BY TRANSPLANT: Primary | ICD-10-CM

## 2024-03-01 DIAGNOSIS — Z94.0 KIDNEY REPLACED BY TRANSPLANT: ICD-10-CM

## 2024-03-01 LAB
ALBUMIN SERPL BCP-MCNC: 3.6 G/DL (ref 3.5–5.2)
ANION GAP SERPL CALC-SCNC: 10 MMOL/L (ref 8–16)
BASOPHILS # BLD AUTO: 0.05 K/UL (ref 0–0.2)
BASOPHILS NFR BLD: 0.5 % (ref 0–1.9)
BUN SERPL-MCNC: 17 MG/DL (ref 8–23)
CALCIUM SERPL-MCNC: 10.3 MG/DL (ref 8.7–10.5)
CHLORIDE SERPL-SCNC: 104 MMOL/L (ref 95–110)
CO2 SERPL-SCNC: 25 MMOL/L (ref 23–29)
CREAT SERPL-MCNC: 1.2 MG/DL (ref 0.5–1.4)
DIFFERENTIAL METHOD BLD: ABNORMAL
EOSINOPHIL # BLD AUTO: 0.3 K/UL (ref 0–0.5)
EOSINOPHIL NFR BLD: 2.7 % (ref 0–8)
ERYTHROCYTE [DISTWIDTH] IN BLOOD BY AUTOMATED COUNT: 14.6 % (ref 11.5–14.5)
EST. GFR  (NO RACE VARIABLE): >60 ML/MIN/1.73 M^2
GLUCOSE SERPL-MCNC: 105 MG/DL (ref 70–110)
HCT VFR BLD AUTO: 47.9 % (ref 40–54)
HGB BLD-MCNC: 15.4 G/DL (ref 14–18)
IMM GRANULOCYTES # BLD AUTO: 0.13 K/UL (ref 0–0.04)
IMM GRANULOCYTES NFR BLD AUTO: 1.3 % (ref 0–0.5)
LYMPHOCYTES # BLD AUTO: 2.2 K/UL (ref 1–4.8)
LYMPHOCYTES NFR BLD: 22.3 % (ref 18–48)
MCH RBC QN AUTO: 28.2 PG (ref 27–31)
MCHC RBC AUTO-ENTMCNC: 32.2 G/DL (ref 32–36)
MCV RBC AUTO: 88 FL (ref 82–98)
MONOCYTES # BLD AUTO: 1.1 K/UL (ref 0.3–1)
MONOCYTES NFR BLD: 11.1 % (ref 4–15)
NEUTROPHILS # BLD AUTO: 6.1 K/UL (ref 1.8–7.7)
NEUTROPHILS NFR BLD: 62.1 % (ref 38–73)
NRBC BLD-RTO: 0 /100 WBC
PHOSPHATE SERPL-MCNC: 2.4 MG/DL (ref 2.7–4.5)
PLATELET # BLD AUTO: 271 K/UL (ref 150–450)
PMV BLD AUTO: 10.7 FL (ref 9.2–12.9)
POTASSIUM SERPL-SCNC: 4.1 MMOL/L (ref 3.5–5.1)
RBC # BLD AUTO: 5.46 M/UL (ref 4.6–6.2)
SODIUM SERPL-SCNC: 139 MMOL/L (ref 136–145)
WBC # BLD AUTO: 9.84 K/UL (ref 3.9–12.7)

## 2024-03-01 PROCEDURE — 36415 COLL VENOUS BLD VENIPUNCTURE: CPT | Performed by: INTERNAL MEDICINE

## 2024-03-01 PROCEDURE — 80069 RENAL FUNCTION PANEL: CPT | Performed by: INTERNAL MEDICINE

## 2024-03-01 PROCEDURE — 87799 DETECT AGENT NOS DNA QUANT: CPT | Performed by: INTERNAL MEDICINE

## 2024-03-01 PROCEDURE — 80197 ASSAY OF TACROLIMUS: CPT | Performed by: INTERNAL MEDICINE

## 2024-03-01 PROCEDURE — 85025 COMPLETE CBC W/AUTO DIFF WBC: CPT | Performed by: INTERNAL MEDICINE

## 2024-03-01 RX ORDER — METOPROLOL SUCCINATE 50 MG/1
50 TABLET, EXTENDED RELEASE ORAL 2 TIMES DAILY
Qty: 180 TABLET | Refills: 0 | Status: SHIPPED | OUTPATIENT
Start: 2024-03-01 | End: 2024-06-03 | Stop reason: SDUPTHER

## 2024-03-01 NOTE — TELEPHONE ENCOUNTER
Spoke to patient who had recent UTI treated.  Pt would like to repeat a urine culture since he doesn't feel like it's completely gone.    ----- Message -----  From: Dilcia Parker MD  Sent: 3/1/2024   1:19 PM CST  To: [facility] Post-Kid[facility] Transplant Clinical    UA concerning for infection - ask if has any s/s of UTI  If he has sx, collect CCMS urine for UA reflex to culture

## 2024-03-02 LAB — TACROLIMUS BLD-MCNC: 5.3 NG/ML (ref 5–15)

## 2024-03-04 ENCOUNTER — LAB VISIT (OUTPATIENT)
Dept: LAB | Facility: HOSPITAL | Age: 74
End: 2024-03-04
Payer: MEDICARE

## 2024-03-04 DIAGNOSIS — Z94.0 KIDNEY REPLACED BY TRANSPLANT: ICD-10-CM

## 2024-03-04 LAB
BACTERIA #/AREA URNS HPF: ABNORMAL /HPF
BILIRUB UR QL STRIP: NEGATIVE
BKV DNA SERPL NAA+PROBE-ACNC: <125 COPIES/ML
BKV DNA SERPL NAA+PROBE-LOG#: <2.1 LOG (10) COPIES/ML
BKV DNA SERPL QL NAA+PROBE: NOT DETECTED
CLARITY UR: ABNORMAL
COLOR UR: YELLOW
GLUCOSE UR QL STRIP: NEGATIVE
HGB UR QL STRIP: ABNORMAL
KETONES UR QL STRIP: NEGATIVE
LEUKOCYTE ESTERASE UR QL STRIP: ABNORMAL
MICROSCOPIC COMMENT: ABNORMAL
NITRITE UR QL STRIP: POSITIVE
PH UR STRIP: 7 [PH] (ref 5–8)
PROT UR QL STRIP: ABNORMAL
RBC #/AREA URNS HPF: 8 /HPF (ref 0–4)
SP GR UR STRIP: 1.01 (ref 1–1.03)
URN SPEC COLLECT METH UR: ABNORMAL
WBC #/AREA URNS HPF: >100 /HPF (ref 0–5)

## 2024-03-04 PROCEDURE — 87088 URINE BACTERIA CULTURE: CPT | Performed by: INTERNAL MEDICINE

## 2024-03-04 PROCEDURE — 87077 CULTURE AEROBIC IDENTIFY: CPT | Performed by: INTERNAL MEDICINE

## 2024-03-04 PROCEDURE — 87186 SC STD MICRODIL/AGAR DIL: CPT | Performed by: INTERNAL MEDICINE

## 2024-03-04 PROCEDURE — 87086 URINE CULTURE/COLONY COUNT: CPT | Performed by: INTERNAL MEDICINE

## 2024-03-04 PROCEDURE — 81000 URINALYSIS NONAUTO W/SCOPE: CPT | Performed by: INTERNAL MEDICINE

## 2024-03-04 NOTE — PROGRESS NOTES
Kidney Post-Transplant Assessment    Referring Physician: Chris Adair  Current Nephrologist: Rajan Hayes    ORGAN: RIGHT KIDNEY  Donor Type: donation after brain death  PHS Increased Risk: yes  Cold Ischemia: 1,354 mins  Induction Medications: thymoglobulin    Subjective:     CC:  Reassessment of renal allograft function and management of chronic immunosuppression.    HPI:  Mr. Ramirez is a 73 y.o. 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 His most recent creatinine is 1.3. He takes mycophenolate mofetil, prednisone and tacrolimus for maintenance immunosuppression. His post transplant course has been complicated by urinary retention severe diarrhea and hypophosphatemia  .    Hospitalization/ ED visits  None    Interval HX:  Reports overall doing well. Was recently treated for prostatitis and with recurrent UTI on cefpodoxime .  Reports BP 130s/80s at home, weight stable, good UOP, no edema, no abd pain, fever, or chills.  Lab /diagnostic results reviewed with patient today.   All questions answered        Current Outpatient Medications:     ALPRAZolam (XANAX) 1 MG tablet, Take 1 tablet (1 mg total) by mouth daily as needed for Anxiety or Insomnia., Disp: 90 tablet, Rfl: 0    calcitRIOL (ROCALTROL) 0.5 MCG Cap, Take 1 capsule (0.5 mcg total) by mouth every Mon, Wed, Fri, Sun., Disp: 90 capsule, Rfl: 3    cefpodoxime (VANTIN) 200 MG tablet, Take 1 tablet (200 mg total) by mouth 2 (two) times daily. for 10 days, Disp: 20 tablet, Rfl: 0    cholecalciferol, vitamin D3, (VITAMIN D3) 50 mcg (2,000 unit) Tab, Take 1 tablet (2,000 Units total) by mouth once daily., Disp: 60 tablet, Rfl: 11    magnesium oxide (MAG-OX) 400 mg (241.3 mg magnesium) tablet, Take 1 tablet (400 mg total) by mouth 2 (two) times daily., Disp: 60 tablet, Rfl: 11    metoprolol succinate (TOPROL-XL) 50 MG 24 hr tablet, Take 1 tablet (50 mg total) by mouth 2 (two) times  daily., Disp: 180 tablet, Rfl: 0    mupirocin (BACTROBAN) 2 % ointment, by Nasal route once daily., Disp: 30 g, Rfl: 3    mycophenolate (CELLCEPT) 250 mg Cap, Take 3 capsules (750 mg total) by mouth 2 (two) times daily., Disp: 180 capsule, Rfl: 11    NIFEdipine (PROCARDIA-XL) 60 MG (OSM) 24 hr tablet, Take 1 tablet (60 mg total) by mouth 2 (two) times a day., Disp: 180 tablet, Rfl: 3    predniSONE (DELTASONE) 5 MG tablet, Take 1 tablet (5 mg total) by mouth once daily., Disp: 30 tablet, Rfl: 11    sildenafiL (VIAGRA) 100 MG tablet, Take 1 tablet (100 mg total) by mouth daily as needed for Erectile Dysfunction., Disp: 30 tablet, Rfl: 11    sodium bicarbonate 650 MG tablet, Take 3 tablets (1,950 mg total) by mouth 2 (two) times daily., Disp: 180 tablet, Rfl: 11    tacrolimus (PROGRAF) 1 MG Cap, Take 3 capsules (3 mg total) by mouth every morning AND 3 capsules (3 mg total) every evening., Disp: 180 capsule, Rfl: 11    tamsulosin (FLOMAX) 0.4 mg Cap, Take 1 capsule by mouth 2 (two) times daily., Disp: , Rfl:     traZODone (DESYREL) 50 MG tablet, Take 1-2 tablets ( mg total) by mouth every evening., Disp: 180 tablet, Rfl: 3      Past Medical History:   Diagnosis Date    Anemia in CKD (chronic kidney disease)     Atrial fibrillation     CKD (chronic kidney disease) stage 4, GFR 15 11/26/2014    Colon cancer screening 12/19/2014    Elevated PSA 11/26/2014    HTN (hypertension) diagnosed in his 40s 10/16/2014    Monoclonal gammopathy 11/26/2014    Phobic anxiety disorder 11/26/2014    Proteinuria 11/26/2014    Stage 3a chronic kidney disease 3/25/2022         Review of Systems   Constitutional:  Negative for appetite change, chills, fatigue and fever.   HENT:  Negative for trouble swallowing.    Respiratory:  Negative for cough, chest tightness, shortness of breath and wheezing.    Cardiovascular:  Negative for chest pain, palpitations and leg swelling.   Gastrointestinal: Negative.  Negative for abdominal pain,  "constipation, diarrhea and nausea.   Genitourinary: Negative.  Negative for difficulty urinating, frequency and urgency.   Musculoskeletal:  Negative for arthralgias and myalgias.   Skin:  Negative for rash.   Allergic/Immunologic: Positive for immunocompromised state.   Neurological: Negative.  Negative for dizziness, weakness, light-headedness and headaches.   Psychiatric/Behavioral:  Negative for sleep disturbance.         Objective:   BP (!) 142/78 (BP Location: Right arm, Patient Position: Sitting, BP Method: Small (Manual))   Pulse 75   Temp 97.7 °F (36.5 °C) (Temporal)   Ht 5' 7.5" (1.715 m)   Wt 86 kg (189 lb 9.5 oz)   SpO2 98%   BMI 29.26 kg/m²     Physical Exam  Constitutional:       General: He is not in acute distress.     Appearance: He is well-developed. He is not diaphoretic.   Eyes:      General: No scleral icterus.  Cardiovascular:      Rate and Rhythm: Normal rate and regular rhythm.      Heart sounds: Normal heart sounds. No murmur heard.     No friction rub. No gallop.   Pulmonary:      Effort: Pulmonary effort is normal. No respiratory distress.      Breath sounds: Normal breath sounds. No wheezing or rales.   Abdominal:      General: Bowel sounds are normal. There is no distension.      Palpations: Abdomen is soft.      Tenderness: There is no abdominal tenderness.      Hernia: A hernia is present.   Musculoskeletal:         General: Deformity (LUE AVF) present. No tenderness. Normal range of motion.      Right lower leg: No edema.      Left lower leg: No edema.   Skin:     General: Skin is warm and dry.      Findings: No rash.      Nails: There is no clubbing.   Neurological:      Mental Status: He is alert and oriented to person, place, and time.   Psychiatric:         Behavior: Behavior normal.            Labs:  Lab Results   Component Value Date    WBC 9.84 03/01/2024    HGB 15.4 03/01/2024    HCT 47.9 03/01/2024     03/01/2024    K 4.1 03/01/2024     03/01/2024    CO2 25 " "03/01/2024    BUN 17 03/01/2024    CREATININE 1.2 03/01/2024    EGFRNONAA 54.1 (A) 07/14/2022    CALCIUM 10.3 03/01/2024    PHOS 2.4 (L) 03/01/2024    MG 1.8 10/13/2023    ALBUMIN 3.6 03/01/2024    AST 21 12/07/2023    ALT 35 12/07/2023    UTPCR 0.32 (H) 03/01/2024    .5 (H) 10/13/2023    TACROLIMUS 5.3 03/01/2024       No results found for: "EXTANC", "EXTWBC", "EXTSEGS", "EXTPLATELETS", "EXTHEMOGLOBI", "EXTHEMATOCRI", "EXTCREATININ", "EXTSODIUM", "EXTPOTASSIUM", "EXTBUN", "EXTCO2", "EXTCALCIUM", "EXTPHOSPHORU", "EXTGLUCOSE", "EXTALBUMIN", "EXTAST", "EXTALT", "EXTBILITOTAL", "EXTLIPASE", "EXTAMYLASE"    No results found for: "EXTCYCLOSLVL", "EXTSIROLIMUS", "EXTTACROLVL", "EXTPROTCRE", "EXTPTHINTACT", "EXTPROTEINUA", "EXTWBCUA", "EXTRBCUA"    Labs were reviewed with the patient    Assessment:     1. S/P kidney transplant    2. Essential hypertension    3. Long-term use of immunosuppressant medication    4. Chronic kidney disease-mineral and bone disorder        Plan:     Continue current IS therapy regimen  UTI 3/7/23: Ecoli. Cefpodoxime 200 mg BID x 10 days   Continue to f/u with nephrologist    Patient insurance is having issue with medication coverage. Encouraged to notify us by Monday if still and insurance so we can have the [facility] pharmacist to assist      1. CKD stage: will continue follow up as per our center guidelines. patient to continue close follow up with the local General nephrologist. Education provided in appropriate fluid intake, potassium intake. Continue with oral hydration.      2. Immunosuppression: Prograf trough 9.4, therapeutic target 7-9. Continue Prograf 4/4, MMF 1000 Mg BID, and Prednisone   Lab Results   Component Value Date    TACROLIMUS 5.3 03/01/2024    TACROLIMUS 8.3 10/13/2023    TACROLIMUS 7.2 09/07/2023   Will closely monitor for toxicities, education provided about adherence to medicines and need to communicate any side effect to the transplant nurse or physician.      3. " Allograft Function:stable at baseline for the patient. Continue follow up as per our guidelines and with the local General nephrologist. Communication will be sent today.       9/2/2022  5mo 2wk   Creatinine 0.5 - 1.4 mg/dL 1.2   eGFR >60 mL/min/1.73 m^2 >60.0   Glucose 70 - 110 mg/dL 97       4. Hypertension management:  Continue with home blood pressure monitoring, low salt and healthy life discussed with the patient.  BP Readings from Last 3 Encounters:   03/08/24 (!) 142/78   12/11/23 (!) 163/86   10/17/23 (!) 152/84   MTP, nifedipine    5. Metabolic Bone Disease/Secondary Hyperparathyroidism:calcium and phosphorus level discussed with the patient, patient will continue follow up with the general nephrologist for management of metabolic bone disease calcium and phosphorus as per our center protocol. Will monitor PTH, Vit D level, calcium.   Lab Results   Component Value Date    .5 (H) 10/13/2023    CALCIUM 10.3 03/01/2024    PHOS 2.4 (L) 03/01/2024    PHOS 2.7 10/13/2023    PHOS 2.7 09/05/2023   Calcitrol, kpn, magox    6. Electrolytes: reviewed with the patient, essentially within the normal range no need for acute changes today, will monitor as per our center guidelines.  Lab Results   Component Value Date     03/01/2024    K 4.1 03/01/2024     03/01/2024    CO2 25 03/01/2024    CO2 23 12/07/2023    CO2 22 (L) 10/13/2023   nabicarb     7. Anemia: will continue monitoring as per our center guidelines. No indication for acute intervention today.  Lab Results   Component Value Date    WBC 9.84 03/01/2024    HGB 15.4 03/01/2024    HCT 47.9 03/01/2024    MCV 88 03/01/2024     03/01/2024       8.Proteinuria: will continue with pr/cr ratio as per our center guidelines  Lab Results   Component Value Date    PROTEINURINE 15 03/01/2024    CREATRANDUR 47.0 03/01/2024    UTPCR 0.32 (H) 03/01/2024    UTPCR Unable to calculate 10/13/2023    UTPCR Unable to calculate 09/05/2023        9. BK virus  infection screening: will continue with urine or blood PCR as per our guidelines to prevent BK virus viremia and allograft dysfunction  Lab Results   Component Value Date    BKVIRUSPCRQB <125 03/01/2024         10. Weight education: provided during the clinic visit.  Body mass index is 29.26 kg/m².     11.Patient safety education regarding immunosuppression including prophylaxis posttransplant for CMV, PCP : Education provided about vaccination and prevention of infections.    12.  Cytopenias: no significant cytopenias will monitor as per our guidelines. Medicine list reviewed including potential causes of drug-induced cytopenias  Lab Results   Component Value Date    WBC 9.84 03/01/2024    HGB 15.4 03/01/2024    HCT 47.9 03/01/2024    MCV 88 03/01/2024     03/01/2024       13. Post-transplant Prophylaxis; CMV Infection, PJP and Candida mucosistis and other indicated for this particular patient.   PCP PROPHYLAXIS: Bactrim until 9/16/22      Exercise: reminded Telly of the importance of regular exercise for weight management, blood sugar and blood pressure management.  I also explained exercise has been shown to improve cardiovascular health, energy level, and sleep hygiene.  Lastly, I advised him that cardiovascular complications are leading cause of death for renal transplant recipients, and regular exercise can help lower this risk.       Follow-up:   Clinic: return to transplant clinic weekly for the first month after transplant; every 2 weeks during months 2-3; then at 6-, 9-, 12-, 18-, 24-, and 36- months post-transplant to reassess for complications from immunosuppression toxicity and monitor for rejection.  Annually thereafter.    Labs: since patient remains at high risk for rejection and drug-related complications that warrant close monitoring, labs will be ordered as follows: continue twice weekly CBC, renal panel, and drug level for first month; then same labs once weekly through 3rd month  post-transplant.  Urine for UA and protein/creatinine ratio monthly.  Serum BK - PCR at 1-, 3-, 6-, 9-, 12-, 18-, 24-, 36-, 48-, and 60 months post-transplant.  Hepatic panel at 1-, 2-, 3-, 6-, 9-, 12-, 18-, 24-, and 36- months post-transplant.    Judi Robert NP       Education:   Material provided to the patient.  Patient reminded to call with any health changes since these can be early signs of significant complications.  Also, I advised the patient to be sure any new medications or changes of old medications are discussed with either a pharmacist or physician knowledgeable with transplant to avoid rejection/drug toxicity related to significant drug interactions.    Patient advised that it is recommended that all transplanted patients, and their close contacts and household members receive Covid vaccination.

## 2024-03-06 LAB — BACTERIA UR CULT: ABNORMAL

## 2024-03-07 ENCOUNTER — PATIENT MESSAGE (OUTPATIENT)
Dept: TRANSPLANT | Facility: CLINIC | Age: 74
End: 2024-03-07
Payer: MEDICARE

## 2024-03-07 DIAGNOSIS — N39.0 URINARY TRACT INFECTION WITHOUT HEMATURIA, SITE UNSPECIFIED: Primary | ICD-10-CM

## 2024-03-07 RX ORDER — CEFPODOXIME PROXETIL 200 MG/1
200 TABLET, FILM COATED ORAL 2 TIMES DAILY
Qty: 20 TABLET | Refills: 0 | Status: SHIPPED | OUTPATIENT
Start: 2024-03-07 | End: 2024-03-17

## 2024-03-07 NOTE — TELEPHONE ENCOUNTER
Received written order from Dr. Valdez.  Spoke to patient and reviewed urine culture and the need to treat with a 10 day course of antibiotic.  Patient verbalized understanding and did not have any further questions.       ----- Message from Dilcia Parker MD sent at 3/7/2024  1:23 PM CST -----  Cefpodoxime 200 mg BID x 10 days

## 2024-03-08 ENCOUNTER — OFFICE VISIT (OUTPATIENT)
Dept: TRANSPLANT | Facility: CLINIC | Age: 74
End: 2024-03-08
Payer: MEDICARE

## 2024-03-08 VITALS
HEIGHT: 68 IN | BODY MASS INDEX: 28.74 KG/M2 | HEART RATE: 75 BPM | WEIGHT: 189.63 LBS | DIASTOLIC BLOOD PRESSURE: 78 MMHG | TEMPERATURE: 98 F | SYSTOLIC BLOOD PRESSURE: 142 MMHG | OXYGEN SATURATION: 98 %

## 2024-03-08 DIAGNOSIS — N18.9 CHRONIC KIDNEY DISEASE-MINERAL AND BONE DISORDER: ICD-10-CM

## 2024-03-08 DIAGNOSIS — Z79.60 LONG-TERM USE OF IMMUNOSUPPRESSANT MEDICATION: ICD-10-CM

## 2024-03-08 DIAGNOSIS — Z94.0 S/P KIDNEY TRANSPLANT: Primary | ICD-10-CM

## 2024-03-08 DIAGNOSIS — I10 ESSENTIAL HYPERTENSION: Chronic | ICD-10-CM

## 2024-03-08 DIAGNOSIS — M89.9 CHRONIC KIDNEY DISEASE-MINERAL AND BONE DISORDER: ICD-10-CM

## 2024-03-08 DIAGNOSIS — E83.9 CHRONIC KIDNEY DISEASE-MINERAL AND BONE DISORDER: ICD-10-CM

## 2024-03-08 PROCEDURE — 3078F DIAST BP <80 MM HG: CPT | Mod: CPTII,S$GLB,, | Performed by: NURSE PRACTITIONER

## 2024-03-08 PROCEDURE — 99999 PR PBB SHADOW E&M-EST. PATIENT-LVL IV: CPT | Mod: PBBFAC,,, | Performed by: NURSE PRACTITIONER

## 2024-03-08 PROCEDURE — 3077F SYST BP >= 140 MM HG: CPT | Mod: CPTII,S$GLB,, | Performed by: NURSE PRACTITIONER

## 2024-03-08 PROCEDURE — 3288F FALL RISK ASSESSMENT DOCD: CPT | Mod: CPTII,S$GLB,, | Performed by: NURSE PRACTITIONER

## 2024-03-08 PROCEDURE — 99215 OFFICE O/P EST HI 40 MIN: CPT | Mod: S$GLB,,, | Performed by: NURSE PRACTITIONER

## 2024-03-08 PROCEDURE — 1101F PT FALLS ASSESS-DOCD LE1/YR: CPT | Mod: CPTII,S$GLB,, | Performed by: NURSE PRACTITIONER

## 2024-03-08 PROCEDURE — 1159F MED LIST DOCD IN RCRD: CPT | Mod: CPTII,S$GLB,, | Performed by: NURSE PRACTITIONER

## 2024-03-08 PROCEDURE — 1126F AMNT PAIN NOTED NONE PRSNT: CPT | Mod: CPTII,S$GLB,, | Performed by: NURSE PRACTITIONER

## 2024-03-08 RX ORDER — TAMSULOSIN HYDROCHLORIDE 0.4 MG/1
1 CAPSULE ORAL 2 TIMES DAILY
COMMUNITY
Start: 2024-02-08

## 2024-03-08 NOTE — LETTER
March 8, 2024        Dilcia Saavedra  [address] [address] [place] [address]  Phone: [phone]  Fax: [phone]             [facility]- Trans[facility] 1st Fl  [address]  [facility] [address]  Phone: [phone]   Patient: Alverto Ramirez Jr.   MR Number: [id number]   YOB: 1950   Date of Visit: 3/8/2024       Dear Dr. Dilcia Saavedra    Thank you for referring Alverto Ramirez to me for evaluation. Attached you will find relevant portions of my assessment and plan of care.    If you have questions, please do not hesitate to call me. I look forward to following Alverto Ramirez along with you.    Sincerely,    Judi Robert, NP    Enclosure    If you would like to receive this communication electronically, please contact [email or web address] or [phone] to request Connected Link access.    Connected Link is a tool which provides read-only access to select patient information with whom you have a relationship. Its easy to use and provides real time access to review your patients record including encounter summaries, notes, results, and demographic information.    If you feel you have received this communication in error or would no longer like to receive these types of communications, please e-mail [email or web address]

## 2024-03-13 ENCOUNTER — TELEPHONE (OUTPATIENT)
Dept: INTERNAL MEDICINE | Facility: CLINIC | Age: 74
End: 2024-03-13
Payer: MEDICARE

## 2024-03-13 PROCEDURE — 93005 ELECTROCARDIOGRAM TRACING: CPT | Mod: S$GLB,,, | Performed by: PHYSICIAN ASSISTANT

## 2024-03-13 PROCEDURE — 93010 ELECTROCARDIOGRAM REPORT: CPT | Mod: S$GLB,,, | Performed by: INTERNAL MEDICINE

## 2024-03-13 NOTE — TELEPHONE ENCOUNTER
Spoke with patient and assisted him in scheduling an appointment on 3/14/24 with Dr. Mcclelland for sinus issues and cough. Patient verbalized understanding.

## 2024-03-13 NOTE — TELEPHONE ENCOUNTER
----- Message from Erin Albarran sent at 3/13/2024  9:31 AM CDT -----  Who Called: Pt    What is the request in detail: Requesting call back to discuss having cold, needs to know what to do. Please advise    Can the clinic reply by [facility]? No    Best Call Back Number: [phone]      Additional Information:

## 2024-03-14 ENCOUNTER — OFFICE VISIT (OUTPATIENT)
Dept: INTERNAL MEDICINE | Facility: CLINIC | Age: 74
End: 2024-03-14
Payer: MEDICARE

## 2024-03-14 VITALS
OXYGEN SATURATION: 96 % | DIASTOLIC BLOOD PRESSURE: 82 MMHG | SYSTOLIC BLOOD PRESSURE: 138 MMHG | BODY MASS INDEX: 29.48 KG/M2 | HEART RATE: 94 BPM | WEIGHT: 191 LBS

## 2024-03-14 DIAGNOSIS — R05.1 ACUTE COUGH: Primary | ICD-10-CM

## 2024-03-14 DIAGNOSIS — I49.9 IRREGULAR HEART BEAT: ICD-10-CM

## 2024-03-14 DIAGNOSIS — Z94.0 S/P KIDNEY TRANSPLANT: ICD-10-CM

## 2024-03-14 LAB
OHS QRS DURATION: 80 MS
OHS QTC CALCULATION: 458 MS

## 2024-03-14 PROCEDURE — 1160F RVW MEDS BY RX/DR IN RCRD: CPT | Mod: CPTII,S$GLB,, | Performed by: PHYSICIAN ASSISTANT

## 2024-03-14 PROCEDURE — 3079F DIAST BP 80-89 MM HG: CPT | Mod: CPTII,S$GLB,, | Performed by: PHYSICIAN ASSISTANT

## 2024-03-14 PROCEDURE — 3075F SYST BP GE 130 - 139MM HG: CPT | Mod: CPTII,S$GLB,, | Performed by: PHYSICIAN ASSISTANT

## 2024-03-14 PROCEDURE — 1126F AMNT PAIN NOTED NONE PRSNT: CPT | Mod: CPTII,S$GLB,, | Performed by: PHYSICIAN ASSISTANT

## 2024-03-14 PROCEDURE — 99213 OFFICE O/P EST LOW 20 MIN: CPT | Mod: S$GLB,,, | Performed by: PHYSICIAN ASSISTANT

## 2024-03-14 PROCEDURE — 1159F MED LIST DOCD IN RCRD: CPT | Mod: CPTII,S$GLB,, | Performed by: PHYSICIAN ASSISTANT

## 2024-03-14 PROCEDURE — 99999 PR PBB SHADOW E&M-EST. PATIENT-LVL IV: CPT | Mod: PBBFAC,,, | Performed by: PHYSICIAN ASSISTANT

## 2024-03-14 RX ORDER — BENZONATATE 200 MG/1
200 CAPSULE ORAL 3 TIMES DAILY PRN
Qty: 30 CAPSULE | Refills: 0 | Status: SHIPPED | OUTPATIENT
Start: 2024-03-14

## 2024-03-14 RX ORDER — PREDNISONE 5 MG/1
5 TABLET ORAL DAILY
Qty: 30 TABLET | Refills: 11 | Status: SHIPPED | OUTPATIENT
Start: 2024-03-14

## 2024-03-14 RX ORDER — FLUTICASONE PROPIONATE 50 MCG
2 SPRAY, SUSPENSION (ML) NASAL DAILY
Qty: 16 G | Refills: 0 | Status: SHIPPED | OUTPATIENT
Start: 2024-03-14

## 2024-03-14 RX ORDER — MYCOPHENOLATE MOFETIL 250 MG/1
750 CAPSULE ORAL 2 TIMES DAILY
Qty: 180 CAPSULE | Refills: 11 | Status: SHIPPED | OUTPATIENT
Start: 2024-03-14

## 2024-03-14 RX ORDER — TACROLIMUS 1 MG/1
CAPSULE ORAL
Qty: 180 CAPSULE | Refills: 11 | Status: SHIPPED | OUTPATIENT
Start: 2024-03-14

## 2024-03-14 RX ORDER — MONTELUKAST SODIUM 10 MG/1
10 TABLET ORAL NIGHTLY
Qty: 30 TABLET | Refills: 0 | Status: SHIPPED | OUTPATIENT
Start: 2024-03-14 | End: 2024-04-13

## 2024-03-14 NOTE — TELEPHONE ENCOUNTER
Spoke to patient to confirm that patient is requesting to transfer the following prescriptions to [facility].  Patient is aware and would like to have his transplant medications sent to [facility] and will let us know if there are any issues.       ----- Message from Tyesha Gutierrez sent at 3/14/2024 10:38 AM CDT -----  Regarding: transfer scripts  Contact: [facility]  [phone]  [facility] called requesting to get scripts transferred to them  Tacrolimus (PROGRAF) 1 MG Cap  mycophenolate (CELLCEPT) 250 mg Cap  predniSONE (DELTASONE) 5 MG tablet    Send script to   [facility]  [phone]              No further information provided      Patient can be contacted @#

## 2024-03-14 NOTE — PROGRESS NOTES
Subjective:       Patient ID: Alverto Ramirez Jr. is a 73 y.o. male.    Chief Complaint: Cough (Patient stated that he has had a cough at night for over a week. )      HPI  72 y/o male presents to clinic with c/o dry cough that is worse at night and started about 1 week ago. Denies F/C, nasal congestion, SOB, wheezing or chest tightness. He has tried no treatments. He is currently on Vantin for UTI.     Review of Systems   Constitutional:  Negative for chills and fever.   HENT:  Positive for postnasal drip and sneezing. Negative for congestion, ear pain and sore throat.    Respiratory:  Positive for cough. Negative for chest tightness, shortness of breath and wheezing.    Cardiovascular:  Negative for chest pain.       Objective:      Physical Exam  Vitals and nursing note reviewed.   Constitutional:       General: He is awake. He is not in acute distress.     Appearance: Normal appearance. He is well-developed.   HENT:      Head: Normocephalic and atraumatic.      Right Ear: Tympanic membrane, ear canal and external ear normal.      Left Ear: Tympanic membrane, ear canal and external ear normal.      Nose: Nose normal.      Mouth/Throat:      Lips: Pink.      Mouth: Mucous membranes are moist.      Pharynx: Oropharynx is clear.   Eyes:      General: Lids are normal.      Extraocular Movements: Extraocular movements intact.      Conjunctiva/sclera: Conjunctivae normal.   Cardiovascular:      Rate and Rhythm: Normal rate. Rhythm irregular.      Heart sounds: Normal heart sounds.   Pulmonary:      Effort: Pulmonary effort is normal. No respiratory distress.      Breath sounds: Normal breath sounds. No decreased breath sounds, wheezing, rhonchi or rales.   Musculoskeletal:      Cervical back: Normal range of motion and neck supple.   Skin:     General: Skin is warm and moist.   Neurological:      Mental Status: He is alert.         Assessment:       1. Acute cough    2. Irregular heart beat        Plan:   1. Acute  cough  -     fluticasone propionate (FLONASE) 50 mcg/actuation nasal spray; 2 sprays (100 mcg total) by Each Nostril route once daily.  Dispense: 16 g; Refill: 0  -     montelukast (SINGULAIR) 10 mg tablet; Take 1 tablet (10 mg total) by mouth every evening.  Dispense: 30 tablet; Refill: 0  -     benzonatate (TESSALON) 200 MG capsule; Take 1 capsule (200 mg total) by mouth 3 (three) times daily as needed for Cough.  Dispense: 30 capsule; Refill: 0    2. Irregular heart beat  -     EKG 12-lead      EKG showed NSR

## 2024-03-20 ENCOUNTER — TELEPHONE (OUTPATIENT)
Dept: NEPHROLOGY | Facility: CLINIC | Age: 74
End: 2024-03-20

## 2024-03-20 NOTE — TELEPHONE ENCOUNTER
----- Message from Patricia Blake sent at 3/20/2024  4:33 PM CDT -----  Contact: Alverto Samaniego is calling wanting to make appointment with this provider. Please call patient @ [phone]

## 2024-04-11 ENCOUNTER — LAB VISIT (OUTPATIENT)
Dept: LAB | Facility: HOSPITAL | Age: 74
End: 2024-04-11
Attending: INTERNAL MEDICINE
Payer: MEDICARE

## 2024-04-11 DIAGNOSIS — Z94.0 KIDNEY TRANSPLANT STATUS: ICD-10-CM

## 2024-04-11 DIAGNOSIS — D84.9 IMMUNOSUPPRESSION: ICD-10-CM

## 2024-04-11 DIAGNOSIS — N25.81 SECONDARY HYPERPARATHYROIDISM OF RENAL ORIGIN: ICD-10-CM

## 2024-04-11 DIAGNOSIS — I10 PRIMARY HYPERTENSION: ICD-10-CM

## 2024-04-11 LAB
25(OH)D3+25(OH)D2 SERPL-MCNC: 51 NG/ML (ref 30–96)
ALBUMIN SERPL BCP-MCNC: 3.8 G/DL (ref 3.5–5.2)
ANION GAP SERPL CALC-SCNC: 14 MMOL/L (ref 8–16)
BACTERIA #/AREA URNS HPF: ABNORMAL /HPF
BILIRUB UR QL STRIP: NEGATIVE
BUN SERPL-MCNC: 20 MG/DL (ref 8–23)
CALCIUM SERPL-MCNC: 10.7 MG/DL (ref 8.7–10.5)
CHLORIDE SERPL-SCNC: 103 MMOL/L (ref 95–110)
CLARITY UR: ABNORMAL
CO2 SERPL-SCNC: 23 MMOL/L (ref 23–29)
COLOR UR: YELLOW
CREAT SERPL-MCNC: 1.6 MG/DL (ref 0.5–1.4)
CREAT UR-MCNC: 69 MG/DL (ref 23–375)
EST. GFR  (NO RACE VARIABLE): 44.7 ML/MIN/1.73 M^2
GLUCOSE SERPL-MCNC: 144 MG/DL (ref 70–110)
GLUCOSE UR QL STRIP: NEGATIVE
HGB UR QL STRIP: ABNORMAL
KETONES UR QL STRIP: NEGATIVE
LEUKOCYTE ESTERASE UR QL STRIP: ABNORMAL
MAGNESIUM SERPL-MCNC: 1.8 MG/DL (ref 1.6–2.6)
MICROSCOPIC COMMENT: ABNORMAL
NITRITE UR QL STRIP: POSITIVE
PH UR STRIP: 7 [PH] (ref 5–8)
PHOSPHATE SERPL-MCNC: 2.1 MG/DL (ref 2.7–4.5)
POTASSIUM SERPL-SCNC: 3.8 MMOL/L (ref 3.5–5.1)
PROT UR QL STRIP: ABNORMAL
PROT UR-MCNC: 26 MG/DL (ref 0–15)
PROT/CREAT UR: 0.38 MG/G{CREAT} (ref 0–0.2)
PTH-INTACT SERPL-MCNC: 70.2 PG/ML (ref 9–77)
RBC #/AREA URNS HPF: 2 /HPF (ref 0–4)
SODIUM SERPL-SCNC: 140 MMOL/L (ref 136–145)
SP GR UR STRIP: 1.01 (ref 1–1.03)
URN SPEC COLLECT METH UR: ABNORMAL
WBC #/AREA URNS HPF: 80 /HPF (ref 0–5)

## 2024-04-11 PROCEDURE — 83735 ASSAY OF MAGNESIUM: CPT | Performed by: INTERNAL MEDICINE

## 2024-04-11 PROCEDURE — 36415 COLL VENOUS BLD VENIPUNCTURE: CPT | Performed by: INTERNAL MEDICINE

## 2024-04-11 PROCEDURE — 82306 VITAMIN D 25 HYDROXY: CPT | Performed by: INTERNAL MEDICINE

## 2024-04-11 PROCEDURE — 84156 ASSAY OF PROTEIN URINE: CPT | Performed by: INTERNAL MEDICINE

## 2024-04-11 PROCEDURE — 83970 ASSAY OF PARATHORMONE: CPT | Performed by: INTERNAL MEDICINE

## 2024-04-11 PROCEDURE — 81000 URINALYSIS NONAUTO W/SCOPE: CPT | Performed by: INTERNAL MEDICINE

## 2024-04-11 PROCEDURE — 80197 ASSAY OF TACROLIMUS: CPT | Performed by: INTERNAL MEDICINE

## 2024-04-11 PROCEDURE — 80069 RENAL FUNCTION PANEL: CPT | Performed by: INTERNAL MEDICINE

## 2024-04-12 LAB — TACROLIMUS BLD-MCNC: 9.9 NG/ML (ref 5–15)

## 2024-04-30 ENCOUNTER — OFFICE VISIT (OUTPATIENT)
Dept: NEPHROLOGY | Facility: CLINIC | Age: 74
End: 2024-04-30
Payer: MEDICARE

## 2024-04-30 VITALS
DIASTOLIC BLOOD PRESSURE: 70 MMHG | BODY MASS INDEX: 29.01 KG/M2 | SYSTOLIC BLOOD PRESSURE: 148 MMHG | HEART RATE: 60 BPM | WEIGHT: 191.38 LBS | HEIGHT: 68 IN

## 2024-04-30 DIAGNOSIS — D84.9 IMMUNOSUPPRESSION: ICD-10-CM

## 2024-04-30 DIAGNOSIS — Z94.0 KIDNEY TRANSPLANT STATUS: Primary | ICD-10-CM

## 2024-04-30 DIAGNOSIS — R80.1 PERSISTENT PROTEINURIA: ICD-10-CM

## 2024-04-30 DIAGNOSIS — N25.81 SECONDARY HYPERPARATHYROIDISM OF RENAL ORIGIN: ICD-10-CM

## 2024-04-30 DIAGNOSIS — I10 PRIMARY HYPERTENSION: ICD-10-CM

## 2024-04-30 PROCEDURE — 1159F MED LIST DOCD IN RCRD: CPT | Mod: CPTII,S$GLB,, | Performed by: INTERNAL MEDICINE

## 2024-04-30 PROCEDURE — 3077F SYST BP >= 140 MM HG: CPT | Mod: CPTII,S$GLB,, | Performed by: INTERNAL MEDICINE

## 2024-04-30 PROCEDURE — 3078F DIAST BP <80 MM HG: CPT | Mod: CPTII,S$GLB,, | Performed by: INTERNAL MEDICINE

## 2024-04-30 PROCEDURE — 1126F AMNT PAIN NOTED NONE PRSNT: CPT | Mod: CPTII,S$GLB,, | Performed by: INTERNAL MEDICINE

## 2024-04-30 PROCEDURE — 3288F FALL RISK ASSESSMENT DOCD: CPT | Mod: CPTII,S$GLB,, | Performed by: INTERNAL MEDICINE

## 2024-04-30 PROCEDURE — 99999 PR PBB SHADOW E&M-EST. PATIENT-LVL IV: CPT | Mod: PBBFAC,,, | Performed by: INTERNAL MEDICINE

## 2024-04-30 PROCEDURE — 1101F PT FALLS ASSESS-DOCD LE1/YR: CPT | Mod: CPTII,S$GLB,, | Performed by: INTERNAL MEDICINE

## 2024-04-30 PROCEDURE — 99214 OFFICE O/P EST MOD 30 MIN: CPT | Mod: S$GLB,,, | Performed by: INTERNAL MEDICINE

## 2024-04-30 PROCEDURE — 1160F RVW MEDS BY RX/DR IN RCRD: CPT | Mod: CPTII,S$GLB,, | Performed by: INTERNAL MEDICINE

## 2024-04-30 NOTE — PROGRESS NOTES
"Subjective:       Patient ID: Alverto Ramirez Jr. is a 73 y.o. male.    Chief Complaint: Kidney Transplant    HPI    He presents to clinic today for routine follow-up.  Since his last office visit he relates he developed an E coli infection in his bowels.  He was treated with antibiotics.  He appears to have made a full recovery.  His laboratory studies and medications were reviewed.  All Nephrology related questions were answered to his satisfaction.      Review of Systems   Constitutional: Negative.    HENT: Negative.     Respiratory: Negative.     Cardiovascular: Negative.    Gastrointestinal: Negative.    Genitourinary: Negative.    Musculoskeletal: Negative.    Skin: Negative.        BP (!) 148/70   Pulse 60   Ht 5' 7.5" (1.715 m)   Wt 86.8 kg (191 lb 5.8 oz)   BMI 29.53 kg/m²     Lab Results   Component Value Date    WBC 9.84 03/01/2024    HGB 15.4 03/01/2024    HCT 47.9 03/01/2024    MCV 88 03/01/2024     03/01/2024      BMP  Lab Results   Component Value Date     04/11/2024    K 3.8 04/11/2024     04/11/2024    CO2 23 04/11/2024    BUN 20 04/11/2024    CREATININE 1.6 (H) 04/11/2024    CALCIUM 10.7 (H) 04/11/2024    ANIONGAP 14 04/11/2024    ESTGFRAFRICA >60.0 07/14/2022    EGFRNONAA 54.1 (A) 07/14/2022     CMP  Sodium   Date Value Ref Range Status   04/11/2024 140 136 - 145 mmol/L Final     Potassium   Date Value Ref Range Status   04/11/2024 3.8 3.5 - 5.1 mmol/L Final     Chloride   Date Value Ref Range Status   04/11/2024 103 95 - 110 mmol/L Final     CO2   Date Value Ref Range Status   04/11/2024 23 23 - 29 mmol/L Final     Glucose   Date Value Ref Range Status   04/11/2024 144 (H) 70 - 110 mg/dL Final     BUN   Date Value Ref Range Status   04/11/2024 20 8 - 23 mg/dL Final     Creatinine   Date Value Ref Range Status   04/11/2024 1.6 (H) 0.5 - 1.4 mg/dL Final     Calcium   Date Value Ref Range Status   04/11/2024 10.7 (H) 8.7 - 10.5 mg/dL Final     Total Protein   Date Value Ref Range " Status   12/07/2023 7.6 6.0 - 8.4 g/dL Final     Albumin   Date Value Ref Range Status   04/11/2024 3.8 3.5 - 5.2 g/dL Final     Total Bilirubin   Date Value Ref Range Status   12/07/2023 0.4 0.1 - 1.0 mg/dL Final     Comment:     For infants and newborns, interpretation of results should be based  on gestational age, weight and in agreement with clinical  observations.    Premature Infant recommended reference ranges:  Up to 24 hours.............<8.0 mg/dL  Up to 48 hours............<12.0 mg/dL  3-5 days..................<15.0 mg/dL  6-29 days.................<15.0 mg/dL       Alkaline Phosphatase   Date Value Ref Range Status   12/07/2023 107 55 - 135 U/L Final     AST   Date Value Ref Range Status   12/07/2023 21 10 - 40 U/L Final     ALT   Date Value Ref Range Status   12/07/2023 35 10 - 44 U/L Final     Anion Gap   Date Value Ref Range Status   04/11/2024 14 8 - 16 mmol/L Final     eGFR if    Date Value Ref Range Status   07/14/2022 >60.0 >60 mL/min/1.73 m^2 Final     eGFR if non    Date Value Ref Range Status   07/14/2022 54.1 (A) >60 mL/min/1.73 m^2 Final     Comment:     Calculation used to obtain the estimated glomerular filtration  rate (eGFR) is the CKD-EPI equation.        Current Outpatient Medications on File Prior to Visit   Medication Sig Dispense Refill    ALPRAZolam (XANAX) 1 MG tablet Take 1 tablet (1 mg total) by mouth daily as needed for Anxiety or Insomnia. 90 tablet 0    benzonatate (TESSALON) 200 MG capsule Take 1 capsule (200 mg total) by mouth 3 (three) times daily as needed for Cough. 30 capsule 0    calcitRIOL (ROCALTROL) 0.5 MCG Cap Take 1 capsule (0.5 mcg total) by mouth every Mon, Wed, Fri, Sun. 90 capsule 3    cholecalciferol, vitamin D3, (VITAMIN D3) 50 mcg (2,000 unit) Tab Take 1 tablet (2,000 Units total) by mouth once daily. 60 tablet 11    fluticasone propionate (FLONASE) 50 mcg/actuation nasal spray 2 sprays (100 mcg total) by Each Nostril route  once daily. 16 g 0    magnesium oxide (MAG-OX) 400 mg (241.3 mg magnesium) tablet Take 1 tablet (400 mg total) by mouth 2 (two) times daily. 60 tablet 11    metoprolol succinate (TOPROL-XL) 50 MG 24 hr tablet Take 1 tablet (50 mg total) by mouth 2 (two) times daily. 180 tablet 0    mupirocin (BACTROBAN) 2 % ointment by Nasal route once daily. 30 g 3    mycophenolate (CELLCEPT) 250 mg Cap Take 3 capsules (750 mg total) by mouth 2 (two) times daily. 180 capsule 11    NIFEdipine (PROCARDIA-XL) 60 MG (OSM) 24 hr tablet Take 1 tablet (60 mg total) by mouth 2 (two) times a day. 180 tablet 3    predniSONE (DELTASONE) 5 MG tablet Take 1 tablet (5 mg total) by mouth once daily. 30 tablet 11    sildenafiL (VIAGRA) 100 MG tablet Take 1 tablet (100 mg total) by mouth daily as needed for Erectile Dysfunction. 30 tablet 11    sodium bicarbonate 650 MG tablet Take 3 tablets (1,950 mg total) by mouth 2 (two) times daily. 180 tablet 11    tacrolimus (PROGRAF) 1 MG Cap Take 3 capsules (3 mg total) by mouth every morning AND 3 capsules (3 mg total) every evening. 180 capsule 11    tamsulosin (FLOMAX) 0.4 mg Cap Take 1 capsule by mouth 2 (two) times daily.      traZODone (DESYREL) 50 MG tablet Take 1-2 tablets ( mg total) by mouth every evening. 180 tablet 3    [DISCONTINUED] amLODIPine (NORVASC) 10 MG tablet Take 1 tablet (10 mg total) by mouth once daily. 30 tablet 11    [DISCONTINUED] calcium carbonate (OS-SUSAN) 500 mg calcium (1,250 mg) chewable tablet Take 1 tablet by mouth 3 (three) times daily.      [DISCONTINUED] lanthanum (FOSRENOL) 1000 MG chewable tablet CHEW 2 TABLETS THREE TIMES A DAY WITH MEALS 540 tablet 4    [DISCONTINUED] losartan (COZAAR) 100 MG tablet Take 1 tablet (100 mg total) by mouth once daily. 90 tablet 3    [DISCONTINUED] torsemide (DEMADEX) 100 MG Tab Take 2 tablets (200 mg total) by mouth once daily. 180 tablet 3     No current facility-administered medications on file prior to visit.             Objective:            Physical Exam  Constitutional:       Appearance: Normal appearance.   HENT:      Head: Normocephalic and atraumatic.   Eyes:      General: No scleral icterus.     Extraocular Movements: Extraocular movements intact.      Pupils: Pupils are equal, round, and reactive to light.   Pulmonary:      Effort: Pulmonary effort is normal.      Breath sounds: No stridor.   Musculoskeletal:      Right lower leg: No edema.      Left lower leg: No edema.   Skin:     General: Skin is warm and dry.   Neurological:      General: No focal deficit present.      Mental Status: He is alert and oriented to person, place, and time.   Psychiatric:         Mood and Affect: Mood normal.         Behavior: Behavior normal.       Assessment:       1. Kidney transplant status    2. Immunosuppression    3. Primary hypertension    4. Secondary hyperparathyroidism of renal origin    5. Persistent proteinuria      6. Hyperglycemia  Plan:       1. Status post kidney transplant in March of 2022.  Stable renal allograft with creatinine ranging between about 1.2 and 1.6.  We discussed the importance of hydration.    2. He remains on 3 mg twice daily of Prograf.  His most recent level was 9.9.  States that he did not take his Prograf the morning of his labs.  Will plan to recheck tomorrow if it remains high will plan to adjust his Prograf dose.  He is also on prednisone 5 mg daily and mycophenolate 750 mg twice daily.    3. Blood pressure was mildly elevated in the clinic today.  He states it is usually better at home.  Will continue to monitor.    4. Intact PTH has improved to 70.  Vitamin-D was normal at 51.  Calcium has increased slightly over normal at 10.7.  Will continue to monitor.  Phosphorus is low at 2.1.  He is working on increasing phosphorus in his diet.    5. His persistent low-grade proteinuria.  380 mg by most recent PC ratio.  Will continue to monitor.      6. His blood sugars have been running a little higher  than normal for the past several months.  Most recent is 144.  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  Will defer to his primary care physician for management.        Chris Salgado MD

## 2024-05-01 ENCOUNTER — LAB VISIT (OUTPATIENT)
Dept: LAB | Facility: HOSPITAL | Age: 74
End: 2024-05-01
Attending: INTERNAL MEDICINE
Payer: MEDICARE

## 2024-05-01 DIAGNOSIS — D84.9 IMMUNOSUPPRESSION: ICD-10-CM

## 2024-05-01 PROCEDURE — 80197 ASSAY OF TACROLIMUS: CPT | Performed by: INTERNAL MEDICINE

## 2024-05-01 PROCEDURE — 36415 COLL VENOUS BLD VENIPUNCTURE: CPT | Performed by: INTERNAL MEDICINE

## 2024-05-02 ENCOUNTER — TELEPHONE (OUTPATIENT)
Dept: NEPHROLOGY | Facility: CLINIC | Age: 74
End: 2024-05-02
Payer: MEDICARE

## 2024-05-02 LAB — TACROLIMUS BLD-MCNC: 5.1 NG/ML (ref 5–15)

## 2024-05-02 NOTE — TELEPHONE ENCOUNTER
----- Message from Chris Salgado MD sent at 5/2/2024  8:03 AM CDT -----  Would you please let him know that his repeat Prograf level was within a good range.  He needs to stay on his current dose.    Thanks  ----- Message -----  From: [facility] [employer] [facility]  Sent: 5/2/2024   7:58 AM CDT  To: Chris Salgado MD

## 2024-06-03 RX ORDER — METOPROLOL SUCCINATE 50 MG/1
50 TABLET, EXTENDED RELEASE ORAL 2 TIMES DAILY
Qty: 180 TABLET | Refills: 3 | Status: SHIPPED | OUTPATIENT
Start: 2024-06-03

## 2024-06-03 NOTE — TELEPHONE ENCOUNTER
----- Message from Jennifer JENNYFER Baptiste sent at 6/3/2024  8:25 AM CDT -----  Contact: Alverto  Type:  RX Refill Request    Who Called:  Alverto   Refill or New Rx: Refill   RX Name and Strength: Metoprolol succinate (TOPROL-XL) 50 MG 24 hr tablet  How is the patient currently taking it? (ex. 1XDay): 2xday   Is this a 30 day or 90 day RX: 30  Preferred Pharmacy with phone number:   [id number] - [place] - [address] AT [facility] [address] [address]  Phone: [phone] Fax: [phone]  Local or Mail Order: local  Ordering Provider: Dilcia Parker MD(during a hospital stay)  Would the patient rather a call back or a response via [facility]? Call back   Best Call Back Number: Please call her  [phone]  Additional Information:

## 2024-06-13 ENCOUNTER — LAB VISIT (OUTPATIENT)
Dept: LAB | Facility: HOSPITAL | Age: 74
End: 2024-06-13
Attending: NURSE PRACTITIONER
Payer: MEDICARE

## 2024-06-13 DIAGNOSIS — D47.2 MONOCLONAL GAMMOPATHY: ICD-10-CM

## 2024-06-13 DIAGNOSIS — N18.6 ANEMIA IN ESRD (END-STAGE RENAL DISEASE): ICD-10-CM

## 2024-06-13 DIAGNOSIS — Z94.0 KIDNEY TRANSPLANT STATUS: ICD-10-CM

## 2024-06-13 DIAGNOSIS — D63.1 ANEMIA IN ESRD (END-STAGE RENAL DISEASE): ICD-10-CM

## 2024-06-13 LAB
ALBUMIN SERPL BCP-MCNC: 3.9 G/DL (ref 3.5–5.2)
ALP SERPL-CCNC: 101 U/L (ref 55–135)
ALT SERPL W/O P-5'-P-CCNC: 31 U/L (ref 10–44)
ANION GAP SERPL CALC-SCNC: 10 MMOL/L (ref 8–16)
AST SERPL-CCNC: 18 U/L (ref 10–40)
BILIRUB SERPL-MCNC: 0.3 MG/DL (ref 0.1–1)
BUN SERPL-MCNC: 21 MG/DL (ref 8–23)
CALCIUM SERPL-MCNC: 10.1 MG/DL (ref 8.7–10.5)
CHLORIDE SERPL-SCNC: 104 MMOL/L (ref 95–110)
CO2 SERPL-SCNC: 24 MMOL/L (ref 23–29)
CREAT SERPL-MCNC: 1.5 MG/DL (ref 0.5–1.4)
ERYTHROCYTE [DISTWIDTH] IN BLOOD BY AUTOMATED COUNT: 16.9 % (ref 11.5–14.5)
EST. GFR  (NO RACE VARIABLE): 48 ML/MIN/1.73 M^2
GLUCOSE SERPL-MCNC: 117 MG/DL (ref 70–110)
HCT VFR BLD AUTO: 47.4 % (ref 40–54)
HGB BLD-MCNC: 15.2 G/DL (ref 14–18)
IMM GRANULOCYTES # BLD AUTO: 0.07 K/UL (ref 0–0.04)
MCH RBC QN AUTO: 27.3 PG (ref 27–31)
MCHC RBC AUTO-ENTMCNC: 32.1 G/DL (ref 32–36)
MCV RBC AUTO: 85 FL (ref 82–98)
NEUTROPHILS # BLD AUTO: 4.9 K/UL (ref 1.8–7.7)
PLATELET # BLD AUTO: 233 K/UL (ref 150–450)
PMV BLD AUTO: 9.8 FL (ref 9.2–12.9)
POTASSIUM SERPL-SCNC: 3.8 MMOL/L (ref 3.5–5.1)
PROT SERPL-MCNC: 7.8 G/DL (ref 6–8.4)
RBC # BLD AUTO: 5.57 M/UL (ref 4.6–6.2)
SODIUM SERPL-SCNC: 138 MMOL/L (ref 136–145)
WBC # BLD AUTO: 8.28 K/UL (ref 3.9–12.7)

## 2024-06-13 PROCEDURE — 84165 PROTEIN E-PHORESIS SERUM: CPT | Performed by: NURSE PRACTITIONER

## 2024-06-13 PROCEDURE — 80053 COMPREHEN METABOLIC PANEL: CPT | Performed by: NURSE PRACTITIONER

## 2024-06-13 PROCEDURE — 84165 PROTEIN E-PHORESIS SERUM: CPT | Mod: 26,,, | Performed by: PATHOLOGY

## 2024-06-13 PROCEDURE — 36415 COLL VENOUS BLD VENIPUNCTURE: CPT | Performed by: NURSE PRACTITIONER

## 2024-06-13 PROCEDURE — 86334 IMMUNOFIX E-PHORESIS SERUM: CPT | Mod: 26,,, | Performed by: PATHOLOGY

## 2024-06-13 PROCEDURE — 83521 IG LIGHT CHAINS FREE EACH: CPT | Mod: 59 | Performed by: NURSE PRACTITIONER

## 2024-06-13 PROCEDURE — 86334 IMMUNOFIX E-PHORESIS SERUM: CPT | Performed by: NURSE PRACTITIONER

## 2024-06-13 PROCEDURE — 85027 COMPLETE CBC AUTOMATED: CPT | Performed by: NURSE PRACTITIONER

## 2024-06-14 LAB
ALBUMIN SERPL ELPH-MCNC: 4.02 G/DL (ref 3.35–5.55)
ALPHA1 GLOB SERPL ELPH-MCNC: 0.37 G/DL (ref 0.17–0.41)
ALPHA2 GLOB SERPL ELPH-MCNC: 1.11 G/DL (ref 0.43–0.99)
B-GLOBULIN SERPL ELPH-MCNC: 0.81 G/DL (ref 0.5–1.1)
GAMMA GLOB SERPL ELPH-MCNC: 0.99 G/DL (ref 0.67–1.58)
INTERPRETATION SERPL IFE-IMP: NORMAL
KAPPA LC SER QL IA: 2.44 MG/DL (ref 0.33–1.94)
KAPPA LC/LAMBDA SER IA: 1.72 (ref 0.26–1.65)
LAMBDA LC SER QL IA: 1.42 MG/DL (ref 0.57–2.63)
PROT SERPL-MCNC: 7.3 G/DL (ref 6–8.4)

## 2024-06-17 LAB
PATHOLOGIST INTERPRETATION IFE: NORMAL
PATHOLOGIST INTERPRETATION SPE: NORMAL

## 2024-06-19 ENCOUNTER — SOCIAL WORK (OUTPATIENT)
Dept: TRANSPLANT | Facility: HOSPITAL | Age: 74
End: 2024-06-19
Payer: MEDICARE

## 2024-06-19 NOTE — PROGRESS NOTES
Patient's post-kidney transplant coordinator Rema DOWLING, RN provided SW with card pt wrote to send to pt's donor's family.  SW contacted patient ([phone]) to inform him that card had been received by SW and that the letter will be placed in the [facility] mail to be sent to the appropriate OPO to coordinate correspondence with pt's donor's family.  Pt verbalized understanding of same and thanked SW for update.  KENNY placed letter in mail today and sent to [facility] - Attn: [facility] ([address]; [place] [address] / [phone] ).  Transplant SW remains available for further psychosocial support and f/u as needed.

## 2024-06-20 ENCOUNTER — OFFICE VISIT (OUTPATIENT)
Dept: HEMATOLOGY/ONCOLOGY | Facility: CLINIC | Age: 74
End: 2024-06-20
Payer: MEDICARE

## 2024-06-20 VITALS
HEART RATE: 83 BPM | SYSTOLIC BLOOD PRESSURE: 132 MMHG | HEIGHT: 68 IN | WEIGHT: 195.31 LBS | TEMPERATURE: 98 F | BODY MASS INDEX: 29.6 KG/M2 | DIASTOLIC BLOOD PRESSURE: 77 MMHG

## 2024-06-20 DIAGNOSIS — N18.6 ANEMIA IN ESRD (END-STAGE RENAL DISEASE): Chronic | ICD-10-CM

## 2024-06-20 DIAGNOSIS — D63.1 ANEMIA IN ESRD (END-STAGE RENAL DISEASE): Chronic | ICD-10-CM

## 2024-06-20 DIAGNOSIS — Z94.0 KIDNEY TRANSPLANT STATUS: ICD-10-CM

## 2024-06-20 DIAGNOSIS — D47.2 MONOCLONAL GAMMOPATHY: Primary | ICD-10-CM

## 2024-06-20 DIAGNOSIS — Z79.60 LONG-TERM USE OF IMMUNOSUPPRESSANT MEDICATION: ICD-10-CM

## 2024-06-20 PROCEDURE — 1159F MED LIST DOCD IN RCRD: CPT | Mod: CPTII,S$GLB,,

## 2024-06-20 PROCEDURE — 1126F AMNT PAIN NOTED NONE PRSNT: CPT | Mod: CPTII,S$GLB,,

## 2024-06-20 PROCEDURE — 3078F DIAST BP <80 MM HG: CPT | Mod: CPTII,S$GLB,,

## 2024-06-20 PROCEDURE — 1101F PT FALLS ASSESS-DOCD LE1/YR: CPT | Mod: CPTII,S$GLB,,

## 2024-06-20 PROCEDURE — 3288F FALL RISK ASSESSMENT DOCD: CPT | Mod: CPTII,S$GLB,,

## 2024-06-20 PROCEDURE — G2211 COMPLEX E/M VISIT ADD ON: HCPCS | Mod: S$GLB,,,

## 2024-06-20 PROCEDURE — 3075F SYST BP GE 130 - 139MM HG: CPT | Mod: CPTII,S$GLB,,

## 2024-06-20 PROCEDURE — 99999 PR PBB SHADOW E&M-EST. PATIENT-LVL IV: CPT | Mod: PBBFAC,,,

## 2024-06-20 PROCEDURE — 1160F RVW MEDS BY RX/DR IN RCRD: CPT | Mod: CPTII,S$GLB,,

## 2024-06-20 PROCEDURE — 99214 OFFICE O/P EST MOD 30 MIN: CPT | Mod: S$GLB,,,

## 2024-06-28 NOTE — ASSESSMENT & PLAN NOTE
06/13/24 DAYSI No discrete monoclonal peaks identified   06/13/24 SPEP Normal total protein. No discrete paraprotein bands identified.   06/13/24 FLC 1.72    · No CRAB features or other indicators of active myeloma with exception of CKD s/p kidney transplant     Plan for continued q 6 motnh f/u with labs one week prior

## 2024-06-28 NOTE — PROGRESS NOTES
Subjective:      Patient ID: Alverto Ramirez Jr. is a 73 y.o. male.    Chief Complaint: Follow-up ([facility])      HPI: Mr. Ramirez is a 73-year-old male with  afib, anemia secondary to CKD, hypertensive nephropathy, and IgM monoclonal gammopathy of undetermined significance that was diagnosed in 2018 currently monitored with routine surveillance. Previously on peritoneal dialysis he is s/p successful cadaver kidney tx completed 22 and continues regular f/u with transplant team.       Interval History:  Pt presents today for routine follow-up. Pt states he is feeling well overall and voices no complaints today. Denies n/v/d/c, fever, chills, sob, cp, fatigue, new or worsening bone pain, night sweats, unintentional weight loss.      Social History     Socioeconomic History    Marital status:    Occupational History     Employer: retired   Tobacco Use    Smoking status: Former     Current packs/day: 0.00     Average packs/day: 1 pack/day for 15.0 years (15.0 ttl pk-yrs)     Types: Cigarettes     Start date: 1969     Quit date: 1984     Years since quittin.6    Smokeless tobacco: Never   Substance and Sexual Activity    Alcohol use: Yes     Alcohol/week: 5.0 - 7.0 standard drinks of alcohol     Types: 5 - 7 Standard drinks or equivalent per week     Comment: stopped drinking    Drug use: No    Sexual activity: Yes     Partners: Female   Social History Narrative    Retired from [employer]     for 41 y.o.    2 children    No history of blood transfusions    No donors       Family History   Problem Relation Name Age of Onset    Heart disease Father      Dementia Father      Diabetes Mother      Cataracts Mother      Glaucoma Mother      Hypertension Sister 5     Cancer Brother 1         prostate    Kidney disease Sister 1         ESRD    Cancer Paternal Uncle         Past Surgical History:   Procedure Laterality Date    AV FISTULA PLACEMENT  2014    COLONOSCOPY N/A 2017     Procedure: COLONOSCOPY;  Surgeon: Tony Peacock III, MD;  Location: [facility];  Service: Endoscopy;  Laterality: N/A;    KIDNEY TRANSPLANT Right 03/18/2022    Procedure: TRANSPLANT, KIDNEY;  Surgeon: Victoriano Thomas MD;  Location: [address];  Service: Transplant;  Laterality: Right;    MULTIPLE TOOTH EXTRACTIONS  2024    PERITONEAL CATHETER INSERTION      PROSTATE BIOPSY  2024    VASECTOMY      VASECTOMY         Past Medical History:   Diagnosis Date    Anemia in CKD (chronic kidney disease)     Atrial fibrillation     CKD (chronic kidney disease) stage 4, GFR 15 11/26/2014    Colon cancer screening 12/19/2014    Elevated PSA 11/26/2014    HTN (hypertension) diagnosed in his 40s 10/16/2014    Monoclonal gammopathy 11/26/2014    Phobic anxiety disorder 11/26/2014    Proteinuria 11/26/2014    Stage 3a chronic kidney disease 3/25/2022       Review of Systems   Constitutional:  Negative for activity change, appetite change, chills, fatigue, fever and unexpected weight change.   HENT:  Negative for congestion, dental problem, mouth sores and nosebleeds.    Eyes:  Negative for visual disturbance.   Respiratory:  Negative for cough, choking and chest tightness.    Cardiovascular:  Negative for chest pain, palpitations and leg swelling.   Gastrointestinal:  Negative for abdominal distention, abdominal pain, anal bleeding, blood in stool, constipation, diarrhea, nausea and vomiting.   Endocrine: Negative.    Genitourinary:  Negative for dysuria, frequency, hematuria and urgency.   Musculoskeletal:  Negative for arthralgias, back pain, gait problem, joint swelling and myalgias.   Skin:  Negative for wound.   Allergic/Immunologic: Negative for immunocompromised state.   Neurological:  Negative for dizziness, light-headedness, numbness and headaches.   Hematological:  Negative for adenopathy. Does not bruise/bleed easily.   Psychiatric/Behavioral:  The patient is not nervous/anxious.        Medication List with  Changes/Refills   Current Medications    ALPRAZOLAM (XANAX) 1 MG TABLET    Take 1 tablet (1 mg total) by mouth daily as needed for Anxiety or Insomnia.    BENZONATATE (TESSALON) 200 MG CAPSULE    Take 1 capsule (200 mg total) by mouth 3 (three) times daily as needed for Cough.    CALCITRIOL (ROCALTROL) 0.5 MCG CAP    Take 1 capsule (0.5 mcg total) by mouth every Mon, Wed, Fri, Sun.    CHOLECALCIFEROL, VITAMIN D3, (VITAMIN D3) 50 MCG (2,000 UNIT) TAB    Take 1 tablet (2,000 Units total) by mouth once daily.    FLUTICASONE PROPIONATE (FLONASE) 50 MCG/ACTUATION NASAL SPRAY    2 sprays (100 mcg total) by Each Nostril route once daily.    MAGNESIUM OXIDE (MAG-OX) 400 MG (241.3 MG MAGNESIUM) TABLET    Take 1 tablet (400 mg total) by mouth 2 (two) times daily.    METOPROLOL SUCCINATE (TOPROL-XL) 50 MG 24 HR TABLET    Take 1 tablet (50 mg total) by mouth 2 (two) times daily.    MUPIROCIN (BACTROBAN) 2 % OINTMENT    by Nasal route once daily.    MYCOPHENOLATE (CELLCEPT) 250 MG CAP    Take 3 capsules (750 mg total) by mouth 2 (two) times daily.    NIFEDIPINE (PROCARDIA-XL) 60 MG (OSM) 24 HR TABLET    Take 1 tablet (60 mg total) by mouth 2 (two) times a day.    PREDNISONE (DELTASONE) 5 MG TABLET    Take 1 tablet (5 mg total) by mouth once daily.    SILDENAFIL (VIAGRA) 100 MG TABLET    Take 1 tablet (100 mg total) by mouth daily as needed for Erectile Dysfunction.    SODIUM BICARBONATE 650 MG TABLET    Take 3 tablets (1,950 mg total) by mouth 2 (two) times daily.    TACROLIMUS (PROGRAF) 1 MG CAP    Take 3 capsules (3 mg total) by mouth every morning AND 3 capsules (3 mg total) every evening.    TAMSULOSIN (FLOMAX) 0.4 MG CAP    Take 1 capsule by mouth 2 (two) times daily.    TRAZODONE (DESYREL) 50 MG TABLET    Take 1-2 tablets ( mg total) by mouth every evening.        Objective:     Vitals:    06/20/24 1051   BP: 132/77   Pulse: 83   Temp: 97.7 °F (36.5 °C)       Physical Exam  Constitutional:       Appearance: Normal  appearance.   HENT:      Head: Normocephalic.   Eyes:      Conjunctiva/sclera: Conjunctivae normal.   Cardiovascular:      Rate and Rhythm: Normal rate.   Pulmonary:      Effort: Pulmonary effort is normal.   Skin:     General: Skin is warm and dry.   Neurological:      Mental Status: He is alert.   Psychiatric:         Mood and Affect: Mood normal.         Behavior: Behavior normal.         Lab Results   Component Value Date    WBC 8.28 06/13/2024    HGB 15.2 06/13/2024    HCT 47.4 06/13/2024    MCV 85 06/13/2024     06/13/2024       Lab Results   Component Value Date     06/13/2024    K 3.8 06/13/2024     06/13/2024    CO2 24 06/13/2024    BUN 21 06/13/2024    CREATININE 1.5 (H) 06/13/2024    CALCIUM 10.1 06/13/2024    ANIONGAP 10 06/13/2024    ESTGFRAFRICA >60.0 07/14/2022    EGFRNONAA 54.1 (A) 07/14/2022     Lab Results   Component Value Date    ALT 31 06/13/2024    AST 18 06/13/2024    ALKPHOS 101 06/13/2024    BILITOT 0.3 06/13/2024       Assessment/Plan:     Problem List Items Addressed This Visit          Renal/    Kidney transplant status    Relevant Orders    CBC Auto Differential    Comprehensive Metabolic Panel    Ferritin    Iron and TIBC    Immunofixation Electrophoresis    Immunoglobulin Free LT Chains Blood    Immunoglobulins (IgG, IgA, IgM) Quantitative    Protein Electrophoresis, Serum       Oncology    RESOLVED: Anemia in ESRD (end-stage renal disease) (Chronic)     Lab Results   Component Value Date    HGB 15.2 06/13/2024   WNL  Resolved                Relevant Orders    CBC Auto Differential    Comprehensive Metabolic Panel    Ferritin    Iron and TIBC    Immunofixation Electrophoresis    Immunoglobulin Free LT Chains Blood    Immunoglobulins (IgG, IgA, IgM) Quantitative    Protein Electrophoresis, Serum    Monoclonal gammopathy - Primary     06/13/24 DAYSI No discrete monoclonal peaks identified   06/13/24 SPEP Normal total protein. No discrete paraprotein bands identified.    06/13/24 FLC 1.72    · No CRAB features or other indicators of active myeloma with exception of CKD s/p kidney transplant     Plan for continued q 6 motnh f/u with labs one week prior            Relevant Orders    CBC Auto Differential    Comprehensive Metabolic Panel    Ferritin    Iron and TIBC    Immunofixation Electrophoresis    Immunoglobulin Free LT Chains Blood    Immunoglobulins (IgG, IgA, IgM) Quantitative    Protein Electrophoresis, Serum       Palliative Care    Long-term use of immunosuppressant medication    Relevant Orders    CBC Auto Differential    Comprehensive Metabolic Panel    Ferritin    Iron and TIBC    Immunofixation Electrophoresis    Immunoglobulin Free LT Chains Blood    Immunoglobulins (IgG, IgA, IgM) Quantitative    Protein Electrophoresis, Serum         Med Onc Chart Routing      Follow up with physician    Follow up with ROBERT 6 months. with labs one week prior with HATTIE   Infusion scheduling note    Injection scheduling note    Labs CBC, CMP, ferritin, iron and TIBC, SPEP, other and free light chains   Scheduling:  Preferred lab:  Lab interval:     Imaging    Pharmacy appointment    Other referrals                       JIM Alfred, FNP-C  Hematology/Oncology

## 2024-07-15 RX ORDER — NIFEDIPINE 60 MG/1
60 TABLET, EXTENDED RELEASE ORAL 2 TIMES DAILY
Qty: 180 TABLET | Refills: 3 | Status: SHIPPED | OUTPATIENT
Start: 2024-07-15 | End: 2024-07-22 | Stop reason: SDUPTHER

## 2024-07-15 NOTE — TELEPHONE ENCOUNTER
----- Message from Carlo Melgoza MA sent at 7/15/2024 10:20 AM CDT -----  Contact: kandi@ [phone]  Pt  called            Pt is requesting a refill on medication NIFEdipine (PROCARDIA-XL) 60 MG (OSM) 24 hr tablet.          [facility] - [address] [address]  Phone: [phone] Fax: [phone]  Hours: Not open 24 hours

## 2024-07-16 ENCOUNTER — TELEPHONE (OUTPATIENT)
Dept: INTERNAL MEDICINE | Facility: CLINIC | Age: 74
End: 2024-07-16
Payer: MEDICARE

## 2024-07-16 NOTE — TELEPHONE ENCOUNTER
----- Message from Carlo Melgoza MA sent at 7/15/2024 10:20 AM CDT -----  Contact: Alverto@ [phone]  Pt called            Pt is requesting a refill on medication traZODone (DESYREL) 50 MG tablet.          [facility] - [address] [address]  Phone: [phone] Fax: [phone]  Hours: Not open 24 hours  
Spoke with pt; MA informed him he had valid refills on file for Trazodone; pt stated he contacted [facility] to refill /LD  
Oxygen

## 2024-07-22 RX ORDER — NIFEDIPINE 60 MG/1
60 TABLET, EXTENDED RELEASE ORAL 2 TIMES DAILY
Qty: 180 TABLET | Refills: 3 | Status: SHIPPED | OUTPATIENT
Start: 2024-07-22

## 2024-07-22 NOTE — TELEPHONE ENCOUNTER
Patient reports being out of Nifedipine while waiting for his mail order to come in. He would like a short term Rx sent to [facility] on [address] please.

## 2024-07-22 NOTE — TELEPHONE ENCOUNTER
----- Message from Whit Avila sent at 7/22/2024  8:04 AM CDT -----  Contact: [phone]  Type:  RX Refill Request    Who Called: ADAM MITCHELL JR. [[id number]]  Refill or New Rx:refill  RX Name and Strength:NIFEdipine (PROCARDIA-XL) 60 MG (OSM) 24 hr tablet  How is the patient currently taking it? (ex. 1XDay):  Is this a 30 day or 90 day RX:  Preferred Pharmacy with phone number:: [phone] Fax: [phone]    Local or Mail Order:local  Ordering Provider:melvi  Would the patient rather a call back or a response via [facility]? Call back  Best Call Back Number:[phone]  Additional Information: [id number]             [facility][id number] - [place] - [address] AT [facility] [address] [address]  Phone: [phone] Fax: [phone]

## 2024-07-30 ENCOUNTER — OFFICE VISIT (OUTPATIENT)
Dept: INTERNAL MEDICINE | Facility: CLINIC | Age: 74
End: 2024-07-30
Payer: MEDICARE

## 2024-07-30 VITALS
BODY MASS INDEX: 29.27 KG/M2 | HEIGHT: 68 IN | WEIGHT: 193.13 LBS | SYSTOLIC BLOOD PRESSURE: 138 MMHG | OXYGEN SATURATION: 98 % | TEMPERATURE: 99 F | DIASTOLIC BLOOD PRESSURE: 80 MMHG | HEART RATE: 74 BPM

## 2024-07-30 DIAGNOSIS — I10 PRIMARY HYPERTENSION: ICD-10-CM

## 2024-07-30 DIAGNOSIS — F41.9 ANXIETY: Primary | Chronic | ICD-10-CM

## 2024-07-30 DIAGNOSIS — E78.2 MIXED HYPERLIPIDEMIA: Chronic | ICD-10-CM

## 2024-07-30 DIAGNOSIS — F51.04 PSYCHOPHYSIOLOGICAL INSOMNIA: Chronic | ICD-10-CM

## 2024-07-30 DIAGNOSIS — Z86.010 HISTORY OF COLON POLYPS: ICD-10-CM

## 2024-07-30 PROBLEM — E87.8 ELECTROLYTE ABNORMALITY: Status: RESOLVED | Noted: 2023-03-20 | Resolved: 2024-07-30

## 2024-07-30 PROBLEM — N18.6 ESRD ON DIALYSIS: Status: RESOLVED | Noted: 2022-03-25 | Resolved: 2024-07-30

## 2024-07-30 PROBLEM — Z99.2 ESRD ON DIALYSIS: Status: RESOLVED | Noted: 2022-03-25 | Resolved: 2024-07-30

## 2024-07-30 PROCEDURE — 99214 OFFICE O/P EST MOD 30 MIN: CPT | Mod: S$GLB,,, | Performed by: FAMILY MEDICINE

## 2024-07-30 PROCEDURE — 1159F MED LIST DOCD IN RCRD: CPT | Mod: CPTII,S$GLB,, | Performed by: FAMILY MEDICINE

## 2024-07-30 PROCEDURE — 99999 PR PBB SHADOW E&M-EST. PATIENT-LVL III: CPT | Mod: PBBFAC,,, | Performed by: FAMILY MEDICINE

## 2024-07-30 PROCEDURE — 3079F DIAST BP 80-89 MM HG: CPT | Mod: CPTII,S$GLB,, | Performed by: FAMILY MEDICINE

## 2024-07-30 PROCEDURE — G2211 COMPLEX E/M VISIT ADD ON: HCPCS | Mod: S$GLB,,, | Performed by: FAMILY MEDICINE

## 2024-07-30 PROCEDURE — 1126F AMNT PAIN NOTED NONE PRSNT: CPT | Mod: CPTII,S$GLB,, | Performed by: FAMILY MEDICINE

## 2024-07-30 PROCEDURE — 1160F RVW MEDS BY RX/DR IN RCRD: CPT | Mod: CPTII,S$GLB,, | Performed by: FAMILY MEDICINE

## 2024-07-30 PROCEDURE — 3288F FALL RISK ASSESSMENT DOCD: CPT | Mod: CPTII,S$GLB,, | Performed by: FAMILY MEDICINE

## 2024-07-30 PROCEDURE — 3075F SYST BP GE 130 - 139MM HG: CPT | Mod: CPTII,S$GLB,, | Performed by: FAMILY MEDICINE

## 2024-07-30 PROCEDURE — 1101F PT FALLS ASSESS-DOCD LE1/YR: CPT | Mod: CPTII,S$GLB,, | Performed by: FAMILY MEDICINE

## 2024-07-30 NOTE — PROGRESS NOTES
Subjective:   Patient ID: Alverto Ramirez Jr. is a 73 y.o. male.  Chief Complaint:  Follow-up    Presents for six-month follow-up on anxiety and insomnia  Also [facility]     Medical history:  - End-stage renal disease.  Underwent successful transplant March 2022. Recent renal function panel with progression to CKD 3 range. Up-to-date on visits with Nephrology ind Transplant.  Reports compliance with recommended medication regimen.  - Hypertension.  Controlled on Procardia 60 mg twice a day and Lopressor 50 mg twice a day.  Reports compliance.  Denies side effects.  No shortness of breath or swelling.  - Mixed hyperlipidemia.  Currently not on any medications.  Last lipid panel with LDL 84. Denies chest pain or claudication.  Needs repeat lipid panel.  - Elevated PSA, urinary difficulties, and Erectile dysfunction.  Followed by Urology.  Currently off Flomax.  Still taking Viagra daily as needed.  Stable with symptoms controlled on takes Viagra daily as needed.     - Anxiety  On  Xanax 1 mg daily as needed.   Averages 1 pill daily with 90 pills lasting approximately 3 months   Reports medication remains effective for symptom control.  No suspicion for abusive or diversion behaviors.  Denies any side effects.      Tolerating current treatment well.   Happy with medication.    Wants to continue.  No behaviors to suggest inappropriate use of prescribed medications.  [place] Board of Pharmacy Controlled Prescription Drug Monitoring database was queried and showed no activity to suggest abuse, diversion, or other inappropriate use of prescription medications.      - Insomnia  On trazodone 50 mg nightly  Denies any side effects.      Tolerating current treatment well.   Reports still effective to help sleep difficulty  Happy with medication.    Wants to continue.    - History colon polyps   Overdue for repeat colonoscopy for colon cancer screening     No new complaints today        Review  "of Systems   Constitutional:  Negative for fatigue.   Respiratory:  Negative for shortness of breath.    Cardiovascular:  Negative for chest pain and palpitations.   Gastrointestinal:  Negative for abdominal pain, constipation, diarrhea, nausea and vomiting.   Neurological:  Negative for light-headedness and headaches.   Psychiatric/Behavioral:  Negative for agitation, behavioral problems, confusion, decreased concentration, dysphoric mood, hallucinations and sleep disturbance. The patient is not nervous/anxious and is not hyperactive.        Objective:   /80 (BP Location: Right arm, Patient Position: Sitting, BP Method: Large (Manual))   Pulse 74   Temp 98.8 °F (37.1 °C) (Tympanic)   Ht 5' 7.5" (1.715 m)   Wt 87.6 kg (193 lb 2 oz)   SpO2 98%   BMI 29.80 kg/m²     Physical Exam  Vitals and nursing note reviewed.   Constitutional:       Appearance: Normal appearance. He is well-developed and overweight.   Eyes:      General: No scleral icterus.     Conjunctiva/sclera: Conjunctivae normal.   Neck:      Vascular: Normal carotid pulses. No carotid bruit or JVD.   Cardiovascular:      Rate and Rhythm: Normal rate and regular rhythm.      Heart sounds: Normal heart sounds.   Pulmonary:      Effort: Pulmonary effort is normal.      Breath sounds: Normal breath sounds.   Abdominal:      General: There is no distension.      Palpations: Abdomen is soft.      Tenderness: There is no abdominal tenderness.   Musculoskeletal:      Right lower leg: No edema.      Left lower leg: No edema.   Skin:     Findings: No rash.   Neurological:      Mental Status: He is alert and oriented to person, place, and time.      Coordination: Coordination is intact.      Gait: Gait is intact.   Psychiatric:         Attention and Perception: Attention normal. He is attentive.         Mood and Affect: Mood and affect normal. Mood is not anxious or depressed.         Speech: Speech normal.         Behavior: Behavior normal. Behavior is " cooperative.         Thought Content: Thought content normal.         Cognition and Memory: Cognition normal.         Judgment: Judgment normal.       Assessment:       ICD-10-CM ICD-9-CM   1. Anxiety  F41.9 300.00   2. Psychophysiological insomnia  F51.04 307.42   3. Mixed hyperlipidemia  E78.2 272.2   4. Primary hypertension  I10 401.9   5. History of colon polyps  Z86.010 V12.72     Plan:   Anxiety  Stable, no side effects, mood and symptoms well controlled on present medication .  Continue Xanax 1 mg daily    Psychophysiological insomnia  Stable, no side effects, symptoms well controlled on present medication.  Continue trazodone 50 mg nightly    Mixed hyperlipidemia  -     Lipid Panel; Future; Expected date: 07/30/2024  Asymptomatic   Check lipid panel   Start statin if indicated     Primary hypertension  Controlled.  Stable.  Asymptomatic.  BP at goal.    Continue Procardia XL 60 mg twice a day   Continue metoprolol 50 mg twice a day     History of colon polyps  Declines colonoscopy referral at this time     Follow up with Nephrology , Urology, and transplant as scheduled     Return to clinic 6 months or sooner as needed    Visit today included increased complexity associated with managing the longitudinal care of the patient due to the serious and/or complex managed problem(s) listed above.

## 2024-08-20 ENCOUNTER — LAB VISIT (OUTPATIENT)
Dept: LAB | Facility: HOSPITAL | Age: 74
End: 2024-08-20
Attending: INTERNAL MEDICINE
Payer: MEDICARE

## 2024-08-20 DIAGNOSIS — Z94.0 KIDNEY TRANSPLANT STATUS: ICD-10-CM

## 2024-08-20 DIAGNOSIS — R80.1 PERSISTENT PROTEINURIA: ICD-10-CM

## 2024-08-20 DIAGNOSIS — I10 PRIMARY HYPERTENSION: ICD-10-CM

## 2024-08-20 DIAGNOSIS — E78.2 MIXED HYPERLIPIDEMIA: Chronic | ICD-10-CM

## 2024-08-20 DIAGNOSIS — N25.81 SECONDARY HYPERPARATHYROIDISM OF RENAL ORIGIN: ICD-10-CM

## 2024-08-20 DIAGNOSIS — D84.9 IMMUNOSUPPRESSION: ICD-10-CM

## 2024-08-20 LAB
25(OH)D3+25(OH)D2 SERPL-MCNC: 44 NG/ML (ref 30–96)
ALBUMIN SERPL BCP-MCNC: 4 G/DL (ref 3.5–5.2)
ANION GAP SERPL CALC-SCNC: 9 MMOL/L (ref 8–16)
BUN SERPL-MCNC: 15 MG/DL (ref 8–23)
CALCIUM SERPL-MCNC: 10.2 MG/DL (ref 8.7–10.5)
CHLORIDE SERPL-SCNC: 104 MMOL/L (ref 95–110)
CHOLEST SERPL-MCNC: 173 MG/DL (ref 120–199)
CHOLEST/HDLC SERPL: 2.7 {RATIO} (ref 2–5)
CO2 SERPL-SCNC: 24 MMOL/L (ref 23–29)
CREAT SERPL-MCNC: 1.4 MG/DL (ref 0.5–1.4)
EST. GFR  (NO RACE VARIABLE): 52.1 ML/MIN/1.73 M^2
GLUCOSE SERPL-MCNC: 88 MG/DL (ref 70–110)
HDLC SERPL-MCNC: 63 MG/DL (ref 40–75)
HDLC SERPL: 36.4 % (ref 20–50)
LDLC SERPL CALC-MCNC: 82.2 MG/DL (ref 63–159)
MAGNESIUM SERPL-MCNC: 1.9 MG/DL (ref 1.6–2.6)
NONHDLC SERPL-MCNC: 110 MG/DL
PHOSPHATE SERPL-MCNC: 2.3 MG/DL (ref 2.7–4.5)
POTASSIUM SERPL-SCNC: 4.3 MMOL/L (ref 3.5–5.1)
PTH-INTACT SERPL-MCNC: 99.5 PG/ML (ref 9–77)
SODIUM SERPL-SCNC: 137 MMOL/L (ref 136–145)
TRIGL SERPL-MCNC: 139 MG/DL (ref 30–150)

## 2024-08-20 PROCEDURE — 80197 ASSAY OF TACROLIMUS: CPT | Performed by: INTERNAL MEDICINE

## 2024-08-20 PROCEDURE — 80069 RENAL FUNCTION PANEL: CPT | Performed by: INTERNAL MEDICINE

## 2024-08-20 PROCEDURE — 82306 VITAMIN D 25 HYDROXY: CPT | Performed by: INTERNAL MEDICINE

## 2024-08-20 PROCEDURE — 36415 COLL VENOUS BLD VENIPUNCTURE: CPT | Performed by: INTERNAL MEDICINE

## 2024-08-20 PROCEDURE — 83970 ASSAY OF PARATHORMONE: CPT | Performed by: INTERNAL MEDICINE

## 2024-08-20 PROCEDURE — 83735 ASSAY OF MAGNESIUM: CPT | Performed by: INTERNAL MEDICINE

## 2024-08-20 PROCEDURE — 80061 LIPID PANEL: CPT | Performed by: FAMILY MEDICINE

## 2024-08-21 LAB — TACROLIMUS BLD-MCNC: 5.4 NG/ML (ref 5–15)

## 2024-08-27 ENCOUNTER — OFFICE VISIT (OUTPATIENT)
Dept: NEPHROLOGY | Facility: CLINIC | Age: 74
End: 2024-08-27
Payer: MEDICARE

## 2024-08-27 VITALS
DIASTOLIC BLOOD PRESSURE: 62 MMHG | SYSTOLIC BLOOD PRESSURE: 122 MMHG | RESPIRATION RATE: 18 BRPM | BODY MASS INDEX: 29.23 KG/M2 | HEART RATE: 84 BPM | WEIGHT: 192.88 LBS | HEIGHT: 68 IN

## 2024-08-27 DIAGNOSIS — D84.9 IMMUNOSUPPRESSION: ICD-10-CM

## 2024-08-27 DIAGNOSIS — Z94.0 KIDNEY TRANSPLANT STATUS: Primary | ICD-10-CM

## 2024-08-27 DIAGNOSIS — I10 PRIMARY HYPERTENSION: ICD-10-CM

## 2024-08-27 DIAGNOSIS — N18.31 STAGE 3A CHRONIC KIDNEY DISEASE: ICD-10-CM

## 2024-08-27 DIAGNOSIS — N25.81 SECONDARY HYPERPARATHYROIDISM OF RENAL ORIGIN: ICD-10-CM

## 2024-08-27 DIAGNOSIS — R80.1 PERSISTENT PROTEINURIA: ICD-10-CM

## 2024-08-27 PROCEDURE — 1126F AMNT PAIN NOTED NONE PRSNT: CPT | Mod: CPTII,S$GLB,, | Performed by: INTERNAL MEDICINE

## 2024-08-27 PROCEDURE — 1160F RVW MEDS BY RX/DR IN RCRD: CPT | Mod: CPTII,S$GLB,, | Performed by: INTERNAL MEDICINE

## 2024-08-27 PROCEDURE — 1159F MED LIST DOCD IN RCRD: CPT | Mod: CPTII,S$GLB,, | Performed by: INTERNAL MEDICINE

## 2024-08-27 PROCEDURE — 99999 PR PBB SHADOW E&M-EST. PATIENT-LVL IV: CPT | Mod: PBBFAC,,, | Performed by: INTERNAL MEDICINE

## 2024-08-27 PROCEDURE — 3074F SYST BP LT 130 MM HG: CPT | Mod: CPTII,S$GLB,, | Performed by: INTERNAL MEDICINE

## 2024-08-27 PROCEDURE — 99215 OFFICE O/P EST HI 40 MIN: CPT | Mod: S$GLB,,, | Performed by: INTERNAL MEDICINE

## 2024-08-27 PROCEDURE — 3078F DIAST BP <80 MM HG: CPT | Mod: CPTII,S$GLB,, | Performed by: INTERNAL MEDICINE

## 2024-08-27 NOTE — PROGRESS NOTES
"Subjective:       Patient ID: Alverto Ramirez Jr. is a 74 y.o. male.    Chief Complaint:  Kidney transplant status, hypertension, immunosuppression    HPI    He presents to clinic today with his wife for routine follow-up.  Since his last office visit he has been doing well.  His primary concern today is of decreased energy level.  We discussed that his renal function is very stable and essentially normal for his age.  At this point there does not appear to be a cause of his decreased energy level from a renal standpoint or immunosuppressive regimen.  I suggested that he follow-up with his primary care physician for further evaluation.  His laboratory studies and medications were reviewed.  All Nephrology related questions were answered to his satisfaction.      Review of Systems   Constitutional: Negative.    HENT: Negative.     Respiratory: Negative.     Cardiovascular: Negative.    Gastrointestinal: Negative.    Genitourinary: Negative.    Musculoskeletal: Negative.    Skin: Negative.        /62   Pulse 84   Resp 18   Ht 5' 7.5" (1.715 m)   Wt 87.5 kg (192 lb 14.4 oz)   BMI 29.77 kg/m²     Lab Results   Component Value Date    WBC 8.28 06/13/2024    HGB 15.2 06/13/2024    HCT 47.4 06/13/2024    MCV 85 06/13/2024     06/13/2024      BMP  Lab Results   Component Value Date     08/20/2024    K 4.3 08/20/2024     08/20/2024    CO2 24 08/20/2024    BUN 15 08/20/2024    CREATININE 1.4 08/20/2024    CALCIUM 10.2 08/20/2024    ANIONGAP 9 08/20/2024    ESTGFRAFRICA >60.0 07/14/2022    EGFRNONAA 54.1 (A) 07/14/2022     CMP  Sodium   Date Value Ref Range Status   08/20/2024 137 136 - 145 mmol/L Final     Potassium   Date Value Ref Range Status   08/20/2024 4.3 3.5 - 5.1 mmol/L Final     Chloride   Date Value Ref Range Status   08/20/2024 104 95 - 110 mmol/L Final     CO2   Date Value Ref Range Status   08/20/2024 24 23 - 29 mmol/L Final     Glucose   Date Value Ref Range Status   08/20/2024 88 70 " - 110 mg/dL Final     BUN   Date Value Ref Range Status   08/20/2024 15 8 - 23 mg/dL Final     Creatinine   Date Value Ref Range Status   08/20/2024 1.4 0.5 - 1.4 mg/dL Final     Calcium   Date Value Ref Range Status   08/20/2024 10.2 8.7 - 10.5 mg/dL Final     Total Protein   Date Value Ref Range Status   06/13/2024 7.8 6.0 - 8.4 g/dL Final     Albumin   Date Value Ref Range Status   08/20/2024 4.0 3.5 - 5.2 g/dL Final     Total Bilirubin   Date Value Ref Range Status   06/13/2024 0.3 0.1 - 1.0 mg/dL Final     Comment:     For infants and newborns, interpretation of results should be based  on gestational age, weight and in agreement with clinical  observations.    Premature Infant recommended reference ranges:  Up to 24 hours.............<8.0 mg/dL  Up to 48 hours............<12.0 mg/dL  3-5 days..................<15.0 mg/dL  6-29 days.................<15.0 mg/dL       Alkaline Phosphatase   Date Value Ref Range Status   06/13/2024 101 55 - 135 U/L Final     AST   Date Value Ref Range Status   06/13/2024 18 10 - 40 U/L Final     ALT   Date Value Ref Range Status   06/13/2024 31 10 - 44 U/L Final     Anion Gap   Date Value Ref Range Status   08/20/2024 9 8 - 16 mmol/L Final     eGFR if    Date Value Ref Range Status   07/14/2022 >60.0 >60 mL/min/1.73 m^2 Final     eGFR if non    Date Value Ref Range Status   07/14/2022 54.1 (A) >60 mL/min/1.73 m^2 Final     Comment:     Calculation used to obtain the estimated glomerular filtration  rate (eGFR) is the CKD-EPI equation.               Objective:            Physical Exam  Constitutional:       Appearance: Normal appearance.   HENT:      Head: Normocephalic and atraumatic.   Eyes:      General: No scleral icterus.     Extraocular Movements: Extraocular movements intact.      Pupils: Pupils are equal, round, and reactive to light.   Pulmonary:      Effort: Pulmonary effort is normal.      Breath sounds: No stridor.   Musculoskeletal:       Right lower leg: No edema.      Left lower leg: No edema.   Skin:     General: Skin is warm and dry.   Neurological:      General: No focal deficit present.      Mental Status: He is alert and oriented to person, place, and time.   Psychiatric:         Mood and Affect: Mood normal.         Behavior: Behavior normal.       Assessment:       1. Kidney transplant status    2. Immunosuppression    3. Primary hypertension    4. Secondary hyperparathyroidism of renal origin    5. Persistent proteinuria      6. CKD 3  Plan:       1. Status post kidney transplant in March of 2022.  Stable renal allograft with creatinine between 1.2 and 1.6 for the past 6 months.  Most recent creatinine is 1.4.    2. He continues on 3 mg twice daily of Prograf.  Most recent level was 5.4.  He is also on mycophenolate 750 mg twice daily and prednisone 5 mg daily.    3. Blood pressure is well controlled on current regimen.    4. Intact PTH is mildly elevated at 99.5.  Phosphorus is normal at 2.3.  Calcium is stable at 10.2 with an albumin of 4.0.  Vitamin-D is normal 44.    5. His most recent PC ratio and urinalysis did not show evidence of proteinuria.  He has had intermittent low-grade proteinuria in the past.  Will continue to monitor.      6. Creatinine has remained relatively stable with minor intrinsic fluctuation ranging between 1.2 and 1.6.  Most recent is 1.4.  This fluctuation is hemodynamic in nature.  We reviewed the importance of good hydration.  He states he drinks plenty of water.    His most recent urinalysis shows white cells as well as bacteria nitrates.  He had no symptoms of dysuria.  No symptoms of prostatism.  No change in urinary habits or foul smell to his urine.  At this point will continue to monitor.  He has follow-up with his urologist in the next few weeks.    Proximally 40 minutes was spent in face-to-face conversation and chart review.      Chris Salgado MD

## 2024-08-30 NOTE — PROGRESS NOTES
Subjective:   Patient ID: Alverto Ramirez Jr. is a 71 y.o. male.  Chief Complaint:  Establish Care    Presents to establish care  Previous PCP Dr. Arnulfo Du  Currently followed by [facility]    Medical history:  - Eend-stage renal disease.  Underwent successful transplant March 2022. Recent renal function panel stable.  Up-to-date visit with Nephrology in transplant.  Reports compliance with recommended medication regimen.  - Hypertension.  Controlled on Procardia 60 mg twice a day Lopressor 50 mg twice a day.  Reports compliance.  Denies side effects.  No shortness of breath or swelling.  - Mixed hyperlipidemia.  Currently not on any medications.  Lipid panel ordered.  Denies chest pain or claudication.  - Insomnia.  Trazodone 50 mg nightly.  Reports effective when used in combination with Xanax.  Needs refill.  - Anxiety.  On Xanax 1 mg daily as needed.  Reviewed prescription monitoring program which shows 90 pills last greater than 3 months.  No suspicion for abusive or diversion behaviors.  Needs refills.  -erectile dysfunction.  Stable with symptoms controlled on takes Viagra daily as needed.    Health maintenance needs include:  COVID booster and shingles vaccine series.  Both are currently deferred since post transplant.    No new complaints or concerns today      Current Outpatient Medications:     cholecalciferol, vitamin D3, (VITAMIN D3) 50 mcg (2,000 unit) Tab, Take 1 tablet (2,000 Units total) by mouth once daily., Disp: 60 tablet, Rfl: 11    cinacalcet (SENSIPAR) 30 MG Tab, Take 1 tablet (30 mg total) by mouth daily with breakfast., Disp: 30 tablet, Rfl: 11    fluticasone propionate (FLONASE) 50 mcg/actuation nasal spray, 2 sprays (100 mcg total) by Each Nostril route as needed for Allergies., Disp: 16 g, Rfl: 3    k phos di & mono-sod phos mono (K-PHOS-NEUTRAL) 250 mg Tab, Take 1 tablet by mouth 3 (three) times daily., Disp: 90 tablet, Rfl: 11    magnesium oxide (MAG-OX)  400 mg (241.3 mg magnesium) tablet, Take 1 tablet (400 mg total) by mouth 2 (two) times daily., Disp: 60 tablet, Rfl: 11    metoprolol tartrate (LOPRESSOR) 50 MG tablet, Take 1 tablet (50 mg total) by mouth 2 (two) times daily., Disp: 180 tablet, Rfl: 3    mycophenolate (CELLCEPT) 250 mg Cap, Take 4 capsules (1,000 mg total) by mouth 2 (two) times daily., Disp: 240 capsule, Rfl: 11    NIFEdipine (PROCARDIA-XL) 60 MG (OSM) 24 hr tablet, Take 1 tablet (60 mg total) by mouth 2 (two) times a day., Disp: 180 tablet, Rfl: 3    predniSONE (DELTASONE) 5 MG tablet, Take by mouth daily: 20mg 3/22/22 -4/21, 15mg 4/22-5/22, 10mg 5/23-6/22, 5mg 6/23- thereafter, Disp: 120 tablet, Rfl: 5    sildenafiL (VIAGRA) 50 MG tablet, Take 1 tablet (50 mg total) by mouth daily as needed for Erectile Dysfunction., Disp: 30 tablet, Rfl: 11    sodium bicarbonate 650 MG tablet, Take 3 tablets (1,950 mg total) by mouth 2 (two) times daily., Disp: 180 tablet, Rfl: 11    sulfamethoxazole-trimethoprim 400-80mg (BACTRIM,SEPTRA) 400-80 mg per tablet, Take 1 tablet by mouth once daily., Disp: 90 tablet, Rfl: 1    tacrolimus (PROGRAF) 1 MG Cap, Take 4 capsules (4 mg total) by mouth every morning AND 4 capsules (4 mg total) every evening., Disp: 420 capsule, Rfl: 11    valGANciclovir (VALCYTE) 450 mg Tab, Take 2 tablets (900 mg total) by mouth once daily., Disp: 104 tablet, Rfl: 0    ALPRAZolam (XANAX) 1 MG tablet, Take 1 tablet (1 mg total) by mouth daily as needed for Anxiety., Disp: 30 tablet, Rfl: 0    traZODone (DESYREL) 50 MG tablet, Take 1 tablet (50 mg total) by mouth every evening., Disp: 90 tablet, Rfl: 3    Review of Systems   Constitutional: Negative for activity change, appetite change, chills, diaphoresis, fatigue and fever.   Eyes: Negative for visual disturbance.   Respiratory: Negative for cough, chest tightness and shortness of breath.    Cardiovascular: Negative for chest pain, palpitations and leg swelling.  "  Gastrointestinal: Negative for abdominal pain, constipation, diarrhea, nausea and vomiting.   Endocrine: Negative for cold intolerance and heat intolerance.   Genitourinary: Negative for difficulty urinating.   Musculoskeletal: Negative for arthralgias, myalgias and neck pain.   Skin: Negative for rash.   Neurological: Negative for dizziness, tremors, syncope, weakness, light-headedness, numbness and headaches.   Hematological: Does not bruise/bleed easily.   Psychiatric/Behavioral: Positive for sleep disturbance. Negative for agitation, behavioral problems, confusion, decreased concentration, dysphoric mood, hallucinations, self-injury and suicidal ideas. The patient is nervous/anxious. The patient is not hyperactive.      Objective:   BP (!) 140/72 (BP Location: Left arm, Patient Position: Sitting, BP Method: Large (Manual))   Pulse 83   Temp 98.5 °F (36.9 °C)   Ht 5' 7.5" (1.715 m)   Wt 85.3 kg (188 lb 0.8 oz)   SpO2 98%   BMI 29.02 kg/m²     Physical Exam  Vitals and nursing note reviewed.   Constitutional:       General: He is not in acute distress.     Appearance: Normal appearance. He is well-developed and normal weight.   Cardiovascular:      Rate and Rhythm: Normal rate and regular rhythm.   Pulmonary:      Effort: Pulmonary effort is normal. No tachypnea, accessory muscle usage or respiratory distress.   Musculoskeletal:      Right lower leg: No edema.      Left lower leg: No edema.   Skin:     Findings: No rash.   Neurological:      Mental Status: He is alert.      Coordination: Coordination is intact.      Gait: Gait is intact.   Psychiatric:         Attention and Perception: Attention normal.         Mood and Affect: Mood normal.         Behavior: Behavior normal.         Thought Content: Thought content normal.         Cognition and Memory: Cognition normal.         Judgment: Judgment normal.       Assessment:       ICD-10-CM ICD-9-CM   1. Psychophysiological insomnia  F51.04 307.42   2. " Anxiety  F41.9 300.00   3. End-stage renal disease (ESRD)  N18.6 585.6   4. S/P kidney transplant  Z94.0 V42.0   5. Essential hypertension  I10 401.9   6. Mixed hyperlipidemia  E78.2 272.2     Plan:   Psychophysiological insomnia  -     traZODone (DESYREL) 50 MG tablet; Take 1 tablet (50 mg total) by mouth every evening.  Dispense: 90 tablet; Refill: 3  Stable, no side effects, symptoms well controlled on present medication.  Continue trazodone 50 mg nightly    Anxiety  -     ALPRAZolam (XANAX) 1 MG tablet; Take 1 tablet (1 mg total) by mouth daily as needed for Anxiety.  Dispense: 30 tablet; Refill: 0  Stable, no side effects, mood and symptoms well controlled on present medication .  Continue continue Xanax 1 mg daily as needed    End-stage renal disease (ESRD)  S/P kidney transplant  Stable.  Asymptomatic.  Continue per Nephrology and Transplant Team    Essential hypertension  Controlled.  Stable.  Asymptomatic.  BP at goal.  Continue Procardia 60 mg twice a day  Continue Lopressor 50 mg twice a day    Mixed hyperlipidemia  Stable.  Asymptomatic.  Lipid panel pending  Start statin if indicated    Return to clinic 3 months or sooner if needed         English

## 2024-10-11 DIAGNOSIS — N25.81 SECONDARY HYPERPARATHYROIDISM: ICD-10-CM

## 2024-10-11 DIAGNOSIS — E83.39 HYPOPHOSPHATEMIA: ICD-10-CM

## 2024-10-11 DIAGNOSIS — Z94.0 S/P KIDNEY TRANSPLANT: ICD-10-CM

## 2024-10-11 RX ORDER — CALCITRIOL 0.5 UG/1
0.5 CAPSULE ORAL
Qty: 90 CAPSULE | Refills: 3 | Status: SHIPPED | OUTPATIENT
Start: 2024-10-11 | End: 2026-07-03

## 2024-10-11 NOTE — TELEPHONE ENCOUNTER
----- Message from Cristy sent at 10/11/2024  1:33 PM CDT -----  Contact: PT  [phone]  Pt needs a refill on calcitRIOL (ROCALTROL) 0.5 MCG Cap called into     [id number] - [place] - [address] [facility] [address] [address]  Phone: [phone] Fax: [phone]      Pt mom/dad/guardian can be reached at [phone]

## 2024-10-14 ENCOUNTER — TELEPHONE (OUTPATIENT)
Dept: NEPHROLOGY | Facility: CLINIC | Age: 74
End: 2024-10-14
Payer: MEDICARE

## 2024-10-14 RX ORDER — NIFEDIPINE 60 MG/1
60 TABLET, EXTENDED RELEASE ORAL 2 TIMES DAILY
Qty: 180 TABLET | Refills: 3 | Status: SHIPPED | OUTPATIENT
Start: 2024-10-14

## 2024-10-14 NOTE — TELEPHONE ENCOUNTER
----- Message from Alice sent at 10/14/2024  8:58 AM CDT -----  Contact: [phone]  Requesting an RX refill or new RX.    Is this a refill or new RX: refill    RX name and strength (copy/paste from chart):  NIFEdipine (PROCARDIA-XL) 60 MG (OSM) 24 hr tablet    Is this a 30 day or 90 day RX: 30    Pharmacy name and phone # (copy/paste from chart):      [id number] - [place] - [facility] AT [facility] [address] [address]  Phone: [phone] Fax: [phone]       The doctors have asked that we provide their patients with the following 2 reminders -- prescription refills can take up to 72 hours, and a friendly reminder that in the future you can use your [facility] account to request refills: yes

## 2024-12-05 DIAGNOSIS — F41.9 ANXIETY: ICD-10-CM

## 2024-12-05 DIAGNOSIS — R05.1 ACUTE COUGH: ICD-10-CM

## 2024-12-05 RX ORDER — FLUTICASONE PROPIONATE 50 MCG
SPRAY, SUSPENSION (ML) NASAL
Qty: 16 G | Refills: 0 | OUTPATIENT
Start: 2024-12-05

## 2024-12-05 NOTE — TELEPHONE ENCOUNTER
No care due was identified.  [facility] Embedded Care Due Messages. Reference number: [id number].   12/05/2024 9:32:28 AM CST

## 2024-12-13 ENCOUNTER — LAB VISIT (OUTPATIENT)
Dept: LAB | Facility: HOSPITAL | Age: 74
End: 2024-12-13
Payer: MEDICARE

## 2024-12-13 DIAGNOSIS — Z79.60 LONG-TERM USE OF IMMUNOSUPPRESSANT MEDICATION: ICD-10-CM

## 2024-12-13 DIAGNOSIS — Z94.0 KIDNEY TRANSPLANT STATUS: ICD-10-CM

## 2024-12-13 DIAGNOSIS — N18.6 ANEMIA IN ESRD (END-STAGE RENAL DISEASE): Chronic | ICD-10-CM

## 2024-12-13 DIAGNOSIS — D63.1 ANEMIA IN ESRD (END-STAGE RENAL DISEASE): Chronic | ICD-10-CM

## 2024-12-13 DIAGNOSIS — D47.2 MONOCLONAL GAMMOPATHY: ICD-10-CM

## 2024-12-13 LAB
ALBUMIN SERPL BCP-MCNC: 4.1 G/DL (ref 3.5–5.2)
ALP SERPL-CCNC: 106 U/L (ref 40–150)
ALT SERPL W/O P-5'-P-CCNC: 38 U/L (ref 10–44)
ANION GAP SERPL CALC-SCNC: 12 MMOL/L (ref 8–16)
AST SERPL-CCNC: 19 U/L (ref 10–40)
BASOPHILS # BLD AUTO: 0.03 K/UL (ref 0–0.2)
BASOPHILS NFR BLD: 0.4 % (ref 0–1.9)
BILIRUB SERPL-MCNC: 0.4 MG/DL (ref 0.1–1)
BUN SERPL-MCNC: 18 MG/DL (ref 8–23)
CALCIUM SERPL-MCNC: 10.1 MG/DL (ref 8.7–10.5)
CHLORIDE SERPL-SCNC: 105 MMOL/L (ref 95–110)
CO2 SERPL-SCNC: 23 MMOL/L (ref 23–29)
CREAT SERPL-MCNC: 1.3 MG/DL (ref 0.5–1.4)
DIFFERENTIAL METHOD BLD: ABNORMAL
EOSINOPHIL # BLD AUTO: 0.1 K/UL (ref 0–0.5)
EOSINOPHIL NFR BLD: 1.3 % (ref 0–8)
ERYTHROCYTE [DISTWIDTH] IN BLOOD BY AUTOMATED COUNT: 15.4 % (ref 11.5–14.5)
EST. GFR  (NO RACE VARIABLE): 57 ML/MIN/1.73 M^2
FERRITIN SERPL-MCNC: 977 NG/ML (ref 20–300)
GLUCOSE SERPL-MCNC: 115 MG/DL (ref 70–110)
HCT VFR BLD AUTO: 53 % (ref 40–54)
HGB BLD-MCNC: 16.7 G/DL (ref 14–18)
IGA SERPL-MCNC: 204 MG/DL (ref 40–350)
IGG SERPL-MCNC: 879 MG/DL (ref 650–1600)
IGM SERPL-MCNC: 173 MG/DL (ref 50–300)
IMM GRANULOCYTES # BLD AUTO: 0.06 K/UL (ref 0–0.04)
IMM GRANULOCYTES NFR BLD AUTO: 0.8 % (ref 0–0.5)
IRON SERPL-MCNC: 69 UG/DL (ref 45–160)
LYMPHOCYTES # BLD AUTO: 1.8 K/UL (ref 1–4.8)
LYMPHOCYTES NFR BLD: 23.2 % (ref 18–48)
MCH RBC QN AUTO: 27.6 PG (ref 27–31)
MCHC RBC AUTO-ENTMCNC: 31.5 G/DL (ref 32–36)
MCV RBC AUTO: 88 FL (ref 82–98)
MONOCYTES # BLD AUTO: 0.9 K/UL (ref 0.3–1)
MONOCYTES NFR BLD: 11.2 % (ref 4–15)
NEUTROPHILS # BLD AUTO: 4.9 K/UL (ref 1.8–7.7)
NEUTROPHILS NFR BLD: 63.1 % (ref 38–73)
NRBC BLD-RTO: 0 /100 WBC
PLATELET # BLD AUTO: 198 K/UL (ref 150–450)
PMV BLD AUTO: 10 FL (ref 9.2–12.9)
POTASSIUM SERPL-SCNC: 3.8 MMOL/L (ref 3.5–5.1)
PROT SERPL-MCNC: 8.2 G/DL (ref 6–8.4)
RBC # BLD AUTO: 6.05 M/UL (ref 4.6–6.2)
SATURATED IRON: 22 % (ref 20–50)
SODIUM SERPL-SCNC: 140 MMOL/L (ref 136–145)
TOTAL IRON BINDING CAPACITY: 312 UG/DL (ref 250–450)
TRANSFERRIN SERPL-MCNC: 211 MG/DL (ref 200–375)
WBC # BLD AUTO: 7.77 K/UL (ref 3.9–12.7)

## 2024-12-13 PROCEDURE — 83521 IG LIGHT CHAINS FREE EACH: CPT | Mod: 59

## 2024-12-13 PROCEDURE — 80053 COMPREHEN METABOLIC PANEL: CPT

## 2024-12-13 PROCEDURE — 86334 IMMUNOFIX E-PHORESIS SERUM: CPT

## 2024-12-13 PROCEDURE — 84165 PROTEIN E-PHORESIS SERUM: CPT | Mod: 26,,, | Performed by: PATHOLOGY

## 2024-12-13 PROCEDURE — 82728 ASSAY OF FERRITIN: CPT

## 2024-12-13 PROCEDURE — 36415 COLL VENOUS BLD VENIPUNCTURE: CPT

## 2024-12-13 PROCEDURE — 85025 COMPLETE CBC W/AUTO DIFF WBC: CPT

## 2024-12-13 PROCEDURE — 84165 PROTEIN E-PHORESIS SERUM: CPT

## 2024-12-13 PROCEDURE — 82784 ASSAY IGA/IGD/IGG/IGM EACH: CPT | Mod: 59

## 2024-12-13 PROCEDURE — 86334 IMMUNOFIX E-PHORESIS SERUM: CPT | Mod: 26,,, | Performed by: PATHOLOGY

## 2024-12-13 PROCEDURE — 83540 ASSAY OF IRON: CPT

## 2024-12-13 RX ORDER — ALPRAZOLAM 1 MG/1
1 TABLET ORAL DAILY PRN
Qty: 90 TABLET | Refills: 0 | Status: SHIPPED | OUTPATIENT
Start: 2024-12-13

## 2024-12-16 LAB
ALBUMIN SERPL ELPH-MCNC: 4.11 G/DL (ref 3.35–5.55)
ALPHA1 GLOB SERPL ELPH-MCNC: 0.4 G/DL (ref 0.17–0.41)
ALPHA2 GLOB SERPL ELPH-MCNC: 1.17 G/DL (ref 0.43–0.99)
B-GLOBULIN SERPL ELPH-MCNC: 0.89 G/DL (ref 0.5–1.1)
GAMMA GLOB SERPL ELPH-MCNC: 0.93 G/DL (ref 0.67–1.58)
INTERPRETATION SERPL IFE-IMP: NORMAL
KAPPA LC SER QL IA: 2.03 MG/DL (ref 0.33–1.94)
KAPPA LC/LAMBDA SER IA: 1.48 (ref 0.26–1.65)
LAMBDA LC SER QL IA: 1.37 MG/DL (ref 0.57–2.63)
PROT SERPL-MCNC: 7.5 G/DL (ref 6–8.4)

## 2024-12-17 LAB
PATHOLOGIST INTERPRETATION IFE: NORMAL
PATHOLOGIST INTERPRETATION SPE: NORMAL

## 2024-12-18 ENCOUNTER — OFFICE VISIT (OUTPATIENT)
Dept: HEMATOLOGY/ONCOLOGY | Facility: CLINIC | Age: 74
End: 2024-12-18
Payer: MEDICARE

## 2024-12-18 VITALS
OXYGEN SATURATION: 97 % | SYSTOLIC BLOOD PRESSURE: 156 MMHG | HEART RATE: 71 BPM | BODY MASS INDEX: 29.17 KG/M2 | TEMPERATURE: 97 F | HEIGHT: 68 IN | DIASTOLIC BLOOD PRESSURE: 80 MMHG | WEIGHT: 192.44 LBS

## 2024-12-18 DIAGNOSIS — Z94.0 KIDNEY TRANSPLANT STATUS: ICD-10-CM

## 2024-12-18 DIAGNOSIS — D47.2 MONOCLONAL GAMMOPATHY: Primary | ICD-10-CM

## 2024-12-18 DIAGNOSIS — R79.89 ELEVATED FERRITIN: ICD-10-CM

## 2024-12-18 PROCEDURE — 1159F MED LIST DOCD IN RCRD: CPT | Mod: CPTII,S$GLB,, | Performed by: NURSE PRACTITIONER

## 2024-12-18 PROCEDURE — 99214 OFFICE O/P EST MOD 30 MIN: CPT | Mod: S$GLB,,, | Performed by: NURSE PRACTITIONER

## 2024-12-18 PROCEDURE — 3077F SYST BP >= 140 MM HG: CPT | Mod: CPTII,S$GLB,, | Performed by: NURSE PRACTITIONER

## 2024-12-18 PROCEDURE — 1160F RVW MEDS BY RX/DR IN RCRD: CPT | Mod: CPTII,S$GLB,, | Performed by: NURSE PRACTITIONER

## 2024-12-18 PROCEDURE — 1101F PT FALLS ASSESS-DOCD LE1/YR: CPT | Mod: CPTII,S$GLB,, | Performed by: NURSE PRACTITIONER

## 2024-12-18 PROCEDURE — 3288F FALL RISK ASSESSMENT DOCD: CPT | Mod: CPTII,S$GLB,, | Performed by: NURSE PRACTITIONER

## 2024-12-18 PROCEDURE — 1126F AMNT PAIN NOTED NONE PRSNT: CPT | Mod: CPTII,S$GLB,, | Performed by: NURSE PRACTITIONER

## 2024-12-18 PROCEDURE — 3079F DIAST BP 80-89 MM HG: CPT | Mod: CPTII,S$GLB,, | Performed by: NURSE PRACTITIONER

## 2024-12-18 PROCEDURE — 99999 PR PBB SHADOW E&M-EST. PATIENT-LVL IV: CPT | Mod: PBBFAC,,, | Performed by: NURSE PRACTITIONER

## 2024-12-18 NOTE — PROGRESS NOTES
Subjective:       Patient ID: Alverto Ramirez Jr. is a 74 y.o. male.    Chief Complaint:   1. Monoclonal gammopathy  IgM kappa      2. Kidney transplant status          Current Treatment:  Observation     Treatment History:    HPI: This is a 74-year-old  male with afib, anemia secondary to CKD, hypertensive nephropathy, and IgM monoclonal gammopathy of undetermined significance that was diagnosed in 2018 and since has been monitored.       He was on peritoneal dialysis and underwent kidney transplantation on 3/19/2022. He follows with the transplant team in [facility].     DAYSI in 8/2023 demonstrates faint IgM kappa and free lambda light chains not visible on corresponding SPEP and cannot be measured. Kappa and lambda FLCs WNL with slightly elevated ratio.    Interval History: Patient presents for follow up on labs. He presents alone and has no complaints today. CBC with no cytopenias. CMP with stable, mildly low eGFR. Iron levels WNL except elevated ferritin. He denies iron supplementation. Will check ESR, CRP to evaluate for inflammation. Immunoglobulins WNL. Faint IgM kappa specific monoclonal protein present on DAYSI, not detected on SPEP. Elevated but improved kappa FLC with normal ratio. Will continue to monitor.     Reviewed labs with patient:   CBC:   Recent Labs   Lab 12/13/24  1009   WBC 7.77   RBC 6.05   Hemoglobin 16.7   Hematocrit 53.0   Platelets 198   MCV 88   MCH 27.6   MCHC 31.5 L     CMP:  Recent Labs   Lab 12/13/24  1009   Glucose 115 H   Calcium 10.1   Albumin 4.1   Total Protein 8.2   Sodium 140   Potassium 3.8   CO2 23   Chloride 105   BUN 18   Creatinine 1.3   Alkaline Phosphatase 106   ALT 38   AST 19   Total Bilirubin 0.4     Lab Results   Component Value Date    IRON 69 12/13/2024    TRANSFERRIN 211 12/13/2024    TIBC 312 12/13/2024    FESATURATED 22 12/13/2024      Lab Results   Component Value Date    FERRITIN 977 (H) 12/13/2024     Social History     Socioeconomic History    Marital  status:    Occupational History     Employer: retired   Tobacco Use    Smoking status: Former     Current packs/day: 0.00     Average packs/day: 1 pack/day for 15.0 years (15.0 ttl pk-yrs)     Types: Cigarettes     Start date: 1969     Quit date: 1984     Years since quittin.0    Smokeless tobacco: Never   Substance and Sexual Activity    Alcohol use: Yes     Alcohol/week: 5.0 - 7.0 standard drinks of alcohol     Types: 5 - 7 Standard drinks or equivalent per week     Comment: stopped drinking    Drug use: No    Sexual activity: Yes     Partners: Female   Social History Narrative    Retired from [employer]     for 41 y.o.    2 children    No history of blood transfusions    No donors     Past Medical History:   Diagnosis Date    Anemia in CKD (chronic kidney disease)     Atrial fibrillation     CKD (chronic kidney disease) stage 4, GFR 15 2014    Colon cancer screening 2014    Elevated PSA 2014    HTN (hypertension) diagnosed in his 40s 10/16/2014    Monoclonal gammopathy 2014    Phobic anxiety disorder 2014    Proteinuria 2014    Stage 3a chronic kidney disease 3/25/2022     Family History   Problem Relation Name Age of Onset    Heart disease Father      Dementia Father      Diabetes Mother      Cataracts Mother      Glaucoma Mother      Hypertension Sister 5     Cancer Brother 1         prostate    Kidney disease Sister 1         ESRD    Cancer Paternal Uncle       Past Surgical History:   Procedure Laterality Date    AV FISTULA PLACEMENT  2014    COLONOSCOPY N/A 2017    Procedure: COLONOSCOPY;  Surgeon: Tony Peacock III, MD;  Location: [facility];  Service: Endoscopy;  Laterality: N/A;    KIDNEY TRANSPLANT Right 2022    Procedure: TRANSPLANT, KIDNEY;  Surgeon: Victoriano Thomas MD;  Location: [address];  Service: Transplant;  Laterality: Right;    MULTIPLE TOOTH EXTRACTIONS      PERITONEAL CATHETER INSERTION       "PROSTATE BIOPSY  2024    VASECTOMY      VASECTOMY       Review of Systems   Constitutional:  Negative for appetite change and fatigue.   HENT:  Negative for mouth sores, rhinorrhea and sore throat.    Eyes: Negative.    Respiratory:  Negative for shortness of breath.    Cardiovascular: Negative.    Gastrointestinal:  Negative for constipation, diarrhea, nausea and vomiting.   Endocrine: Negative for cold intolerance (since starting dialysis).   Genitourinary: Negative.    Musculoskeletal: Negative.    Integumentary:  Negative.   Allergic/Immunologic: Negative.    Neurological:  Negative for dizziness, weakness, light-headedness, numbness and headaches (sinus headaches "every now and then").   Hematological: Negative.    Psychiatric/Behavioral:  Positive for sleep disturbance (Trazodone no longer effective; stopped Xanax due to concern for decrease in sexual function).        Medication List with Changes/Refills   Current Medications    ALPRAZOLAM (XANAX) 1 MG TABLET    Take 1 tablet (1 mg total) by mouth daily as needed for Anxiety or Insomnia.    CALCITRIOL (ROCALTROL) 0.5 MCG CAP    Take 1 capsule (0.5 mcg total) by mouth every Mon, Wed, Fri, Sun.    CHOLECALCIFEROL, VITAMIN D3, (VITAMIN D3) 50 MCG (2,000 UNIT) TAB    Take 1 tablet (2,000 Units total) by mouth once daily.    MAGNESIUM OXIDE (MAG-OX) 400 MG (241.3 MG MAGNESIUM) TABLET    Take 1 tablet (400 mg total) by mouth 2 (two) times daily.    METOPROLOL SUCCINATE (TOPROL-XL) 50 MG 24 HR TABLET    Take 1 tablet (50 mg total) by mouth 2 (two) times daily.    MUPIROCIN (BACTROBAN) 2 % OINTMENT    by Nasal route once daily.    MYCOPHENOLATE (CELLCEPT) 250 MG CAP    Take 3 capsules (750 mg total) by mouth 2 (two) times daily.    NIFEDIPINE (PROCARDIA-XL) 60 MG (OSM) 24 HR TABLET    Take 1 tablet (60 mg total) by mouth 2 (two) times a day.    PREDNISONE (DELTASONE) 5 MG TABLET    Take 1 tablet (5 mg total) by mouth once daily.    SILDENAFIL (VIAGRA) 100 MG TABLET   "  Take 1 tablet (100 mg total) by mouth daily as needed for Erectile Dysfunction.    SODIUM BICARBONATE 650 MG TABLET    Take 3 tablets (1,950 mg total) by mouth 2 (two) times daily.    TACROLIMUS (PROGRAF) 1 MG CAP    Take 3 capsules (3 mg total) by mouth every morning AND 3 capsules (3 mg total) every evening.    TAMSULOSIN (FLOMAX) 0.4 MG CAP    Take 1 capsule by mouth 2 (two) times daily.    TRAZODONE (DESYREL) 50 MG TABLET    Take 1-2 tablets ( mg total) by mouth every evening.     Objective:     Vitals:    12/18/24 1023   BP: (!) 156/80   Pulse: 71   Temp: 97.3 °F (36.3 °C)     Physical Exam  Vitals reviewed.   Constitutional:       Appearance: Normal appearance.   HENT:      Head: Normocephalic.      Mouth/Throat:      Comments:     Eyes:      Extraocular Movements: Extraocular movements intact.      Pupils: Pupils are equal, round, and reactive to light.   Cardiovascular:      Rate and Rhythm: Normal rate and regular rhythm.      Heart sounds: Normal heart sounds.   Pulmonary:      Effort: Pulmonary effort is normal.      Breath sounds: Normal breath sounds.   Abdominal:      General: Bowel sounds are normal.      Palpations: Abdomen is soft.      Comments: rounded     Genitourinary:     Comments: deferred    Musculoskeletal:         General: Normal range of motion.      Cervical back: Normal range of motion and neck supple.   Skin:     General: Skin is warm and dry.   Neurological:      Mental Status: He is alert and oriented to person, place, and time.   Psychiatric:         Behavior: Behavior normal.         Thought Content: Thought content normal.         (1) Restricted in physically strenuous activity, ambulatory and able to do work of light nature  Assessment:     Problem List Items Addressed This Visit          Renal/    Kidney transplant status     Underwent kidney transplantation on 3/19/2022. Follows with transplant team.             Oncology    Monoclonal gammopathy - Primary     IgM  monoclonal gammopathy. Will repeat protein electrophoresis and immunofixation.  Will continue to monitor every 4 months or sooner as needed.           Plan:     Monoclonal gammopathy    Kidney transplant status    Labs reviewed; anemia resolved; no iron deficiency.   Continue to monitor MGUS every 4 months; patient prefers every 6 months.   Follow up in 6 months with SPEP, DAYSI, FLC, CBC, and Comprehensive Metabolic Panel.  Will check CRP, ESR to evaluate elevated ferritin level. Will have drawn with patient's next blood draw.   Will communicate results via patient portal.     Route Chart for Scheduling    Med Onc Chart Routing      Follow up with physician    Follow up with ROBERT 6 months.   Infusion scheduling note    Injection scheduling note    Labs CBC, CMP, free light chains, SPEP and other   Scheduling:  Preferred lab:  Lab interval:  and DAYSI in 6 months, 3-4 days prior; ESR, CRP with patient's next lab appt next month   Imaging None      Pharmacy appointment No pharmacy appointment needed      Other referrals       No additional referrals needed             I will review assessment/plan with collaborating physician.      SARAH Silver

## 2025-01-23 ENCOUNTER — TELEPHONE (OUTPATIENT)
Dept: OPHTHALMOLOGY | Facility: CLINIC | Age: 75
End: 2025-01-23
Payer: MEDICARE

## 2025-01-23 NOTE — TELEPHONE ENCOUNTER
Spoke to pt and moved his appt from the 30th to the 27th w/ Dr. Yost to avoid schedule conflict w/ pt's pcp.     ----- Message from Rachael sent at 1/23/2025 11:07 AM CST -----  Type:  Needs Medical Advice    Who Called: pt  Symptoms (please be specific): na   How long has patient had these symptoms:  na  Pharmacy name and phone #:  na  Would the patient rather a call back or a response via [facility]? call  Best Call Back Number: [phone]    Additional Information: patient has another appt on the same day with primary care provider and requesting to reschedule appt for a different time but keep the appt on the same day

## 2025-01-27 ENCOUNTER — OFFICE VISIT (OUTPATIENT)
Dept: OPHTHALMOLOGY | Facility: CLINIC | Age: 75
End: 2025-01-27
Payer: MEDICARE

## 2025-01-27 DIAGNOSIS — H52.203 MYOPIA WITH ASTIGMATISM AND PRESBYOPIA, BILATERAL: ICD-10-CM

## 2025-01-27 DIAGNOSIS — H40.013 OPEN ANGLE WITH BORDERLINE FINDINGS OF BOTH EYES: ICD-10-CM

## 2025-01-27 DIAGNOSIS — H52.13 MYOPIA WITH ASTIGMATISM AND PRESBYOPIA, BILATERAL: ICD-10-CM

## 2025-01-27 DIAGNOSIS — H04.203 BILATERAL EPIPHORA: ICD-10-CM

## 2025-01-27 DIAGNOSIS — Z83.511 FAMILY HISTORY OF GLAUCOMA: ICD-10-CM

## 2025-01-27 DIAGNOSIS — Z83.511 FAMILY HISTORY OF GLAUCOMA IN MOTHER: ICD-10-CM

## 2025-01-27 DIAGNOSIS — H52.4 MYOPIA WITH ASTIGMATISM AND PRESBYOPIA, BILATERAL: ICD-10-CM

## 2025-01-27 DIAGNOSIS — H25.13 NUCLEAR SCLEROSIS, BILATERAL: Primary | ICD-10-CM

## 2025-01-27 PROCEDURE — 1159F MED LIST DOCD IN RCRD: CPT | Mod: CPTII,S$GLB,, | Performed by: OPTOMETRIST

## 2025-01-27 PROCEDURE — 99999 PR PBB SHADOW E&M-EST. PATIENT-LVL III: CPT | Mod: PBBFAC,,, | Performed by: OPTOMETRIST

## 2025-01-27 PROCEDURE — 92133 CPTRZD OPH DX IMG PST SGM ON: CPT | Mod: S$GLB,,, | Performed by: OPTOMETRIST

## 2025-01-27 PROCEDURE — 92015 DETERMINE REFRACTIVE STATE: CPT | Mod: S$GLB,,, | Performed by: OPTOMETRIST

## 2025-01-27 PROCEDURE — 92014 COMPRE OPH EXAM EST PT 1/>: CPT | Mod: S$GLB,,, | Performed by: OPTOMETRIST

## 2025-01-27 PROCEDURE — 1160F RVW MEDS BY RX/DR IN RCRD: CPT | Mod: CPTII,S$GLB,, | Performed by: OPTOMETRIST

## 2025-01-27 NOTE — PROGRESS NOTES
HPI     Annual Exam            Comments: Vision changes since last eye exam?: yes. Pt states OD eye is   very dry and having a lot tearing.    Any eye pain today: no    Other ocular symptoms: no    Interested in contact lens fitting today? no                     Comments    Using B&L AT's           Last edited by Srikanth Yost, OD on 1/27/2025 11:00 AM.            Assessment /Plan     For exam results, see Encounter Report.    Nuclear sclerosis, bilateral  Visually significant cataract.  Patient is at the option stage.   Cataract surgery will improve vision, but necessity is dependent on patient's symptoms.   Pt declines surgical consult at this time.  Will continue to monitor over the next 12 months. Pt to call or RTC with any significant change in vision prior to next visit.    Bilateral epiphora  Doing well currently with AT PRN  Monitor 12 months    Open angle with borderline findings of both eyes  Family history of glaucoma in mother  -     Posterior Segment OCT Optic Nerve- Both eyes  Normal IOP today OD, OS  Stable gOCT today OU compared to previous  No evidence of glaucoma at this time but based on risk factors recommend to continue monitoring.   Monitor 12 months    Myopia with astigmatism and presbyopia, bilateral  Eyeglass Final Rx       Eyeglass Final Rx         Sphere Cylinder Axis Add    Right -0.25 +2.25 095 +2.75    Left -1.25 +2.25 085 +2.75      Type: Bifocal    Expiration Date: 1/27/2026                    RTC 1 yr for dilated eye exam with gOCT or PRN if any problems.   Discussed above and answered questions.

## 2025-02-07 DIAGNOSIS — Z94.0 KIDNEY REPLACED BY TRANSPLANT: Primary | ICD-10-CM

## 2025-02-20 ENCOUNTER — LAB VISIT (OUTPATIENT)
Dept: LAB | Facility: HOSPITAL | Age: 75
End: 2025-02-20
Attending: INTERNAL MEDICINE
Payer: MEDICARE

## 2025-02-20 DIAGNOSIS — R80.1 PERSISTENT PROTEINURIA: ICD-10-CM

## 2025-02-20 DIAGNOSIS — D84.9 IMMUNOSUPPRESSION: ICD-10-CM

## 2025-02-20 DIAGNOSIS — Z94.0 KIDNEY TRANSPLANT STATUS: ICD-10-CM

## 2025-02-20 DIAGNOSIS — I10 PRIMARY HYPERTENSION: ICD-10-CM

## 2025-02-20 LAB
BACTERIA #/AREA URNS HPF: ABNORMAL /HPF
BILIRUB UR QL STRIP: NEGATIVE
CLARITY UR: ABNORMAL
COLOR UR: YELLOW
CREAT UR-MCNC: 35 MG/DL (ref 23–375)
GLUCOSE UR QL STRIP: NEGATIVE
HGB UR QL STRIP: ABNORMAL
KETONES UR QL STRIP: NEGATIVE
LEUKOCYTE ESTERASE UR QL STRIP: ABNORMAL
MICROSCOPIC COMMENT: ABNORMAL
NITRITE UR QL STRIP: POSITIVE
PH UR STRIP: 7 [PH] (ref 5–8)
PROT UR QL STRIP: NEGATIVE
PROT UR-MCNC: 8 MG/DL (ref 0–15)
PROT/CREAT UR: 0.23 MG/G{CREAT} (ref 0–0.2)
RBC #/AREA URNS HPF: 4 /HPF (ref 0–4)
SP GR UR STRIP: <=1.005 (ref 1–1.03)
URN SPEC COLLECT METH UR: ABNORMAL
WBC #/AREA URNS HPF: 74 /HPF (ref 0–5)
WBC CLUMPS URNS QL MICRO: ABNORMAL

## 2025-02-20 PROCEDURE — 81000 URINALYSIS NONAUTO W/SCOPE: CPT | Performed by: INTERNAL MEDICINE

## 2025-02-20 PROCEDURE — 84156 ASSAY OF PROTEIN URINE: CPT | Performed by: INTERNAL MEDICINE

## 2025-02-21 ENCOUNTER — TELEPHONE (OUTPATIENT)
Dept: NEPHROLOGY | Facility: CLINIC | Age: 75
End: 2025-02-21
Payer: MEDICARE

## 2025-02-21 DIAGNOSIS — Z94.0 S/P KIDNEY TRANSPLANT: Primary | ICD-10-CM

## 2025-02-21 NOTE — TELEPHONE ENCOUNTER
Coverage for Dr. Salgado:   Tac level high at 11, was it a true 12 hour trough? Repeat level next week

## 2025-02-24 ENCOUNTER — LAB VISIT (OUTPATIENT)
Dept: LAB | Facility: HOSPITAL | Age: 75
End: 2025-02-24
Attending: INTERNAL MEDICINE
Payer: MEDICARE

## 2025-02-24 DIAGNOSIS — Z94.0 S/P KIDNEY TRANSPLANT: ICD-10-CM

## 2025-02-24 PROCEDURE — 80197 ASSAY OF TACROLIMUS: CPT | Performed by: INTERNAL MEDICINE

## 2025-02-24 PROCEDURE — 36415 COLL VENOUS BLD VENIPUNCTURE: CPT | Performed by: INTERNAL MEDICINE

## 2025-02-25 LAB — TACROLIMUS BLD-MCNC: 5.2 NG/ML (ref 5–15)

## 2025-02-27 ENCOUNTER — OFFICE VISIT (OUTPATIENT)
Dept: NEPHROLOGY | Facility: CLINIC | Age: 75
End: 2025-02-27
Payer: MEDICARE

## 2025-02-27 VITALS
HEIGHT: 68 IN | RESPIRATION RATE: 18 BRPM | HEART RATE: 76 BPM | DIASTOLIC BLOOD PRESSURE: 78 MMHG | SYSTOLIC BLOOD PRESSURE: 136 MMHG | BODY MASS INDEX: 29.57 KG/M2 | WEIGHT: 195.13 LBS

## 2025-02-27 DIAGNOSIS — D84.9 IMMUNOSUPPRESSION: ICD-10-CM

## 2025-02-27 DIAGNOSIS — N25.81 SECONDARY HYPERPARATHYROIDISM OF RENAL ORIGIN: ICD-10-CM

## 2025-02-27 DIAGNOSIS — Z94.0 KIDNEY TRANSPLANT STATUS: Primary | ICD-10-CM

## 2025-02-27 DIAGNOSIS — I10 PRIMARY HYPERTENSION: ICD-10-CM

## 2025-02-27 PROCEDURE — 99999 PR PBB SHADOW E&M-EST. PATIENT-LVL IV: CPT | Mod: PBBFAC,,, | Performed by: INTERNAL MEDICINE

## 2025-02-27 PROCEDURE — 1160F RVW MEDS BY RX/DR IN RCRD: CPT | Mod: CPTII,S$GLB,, | Performed by: INTERNAL MEDICINE

## 2025-02-27 PROCEDURE — 1101F PT FALLS ASSESS-DOCD LE1/YR: CPT | Mod: CPTII,S$GLB,, | Performed by: INTERNAL MEDICINE

## 2025-02-27 PROCEDURE — 3075F SYST BP GE 130 - 139MM HG: CPT | Mod: CPTII,S$GLB,, | Performed by: INTERNAL MEDICINE

## 2025-02-27 PROCEDURE — 99215 OFFICE O/P EST HI 40 MIN: CPT | Mod: S$GLB,,, | Performed by: INTERNAL MEDICINE

## 2025-02-27 PROCEDURE — 1159F MED LIST DOCD IN RCRD: CPT | Mod: CPTII,S$GLB,, | Performed by: INTERNAL MEDICINE

## 2025-02-27 PROCEDURE — 1126F AMNT PAIN NOTED NONE PRSNT: CPT | Mod: CPTII,S$GLB,, | Performed by: INTERNAL MEDICINE

## 2025-02-27 PROCEDURE — 3288F FALL RISK ASSESSMENT DOCD: CPT | Mod: CPTII,S$GLB,, | Performed by: INTERNAL MEDICINE

## 2025-02-27 PROCEDURE — 3078F DIAST BP <80 MM HG: CPT | Mod: CPTII,S$GLB,, | Performed by: INTERNAL MEDICINE

## 2025-02-27 RX ORDER — CALCITRIOL 0.25 UG/1
0.25 CAPSULE ORAL
Qty: 51.42 CAPSULE | Refills: 3 | Status: SHIPPED | OUTPATIENT
Start: 2025-02-28

## 2025-02-27 NOTE — PROGRESS NOTES
"Subjective:       Patient ID: Alverto Ramirez Jr. is a 74 y.o. male.    Chief Complaint:  Kidney transplant status, immunosuppression, hypertension    HPI    He presents to clinic today for routine follow-up.  Since his last office visit he has been doing well and has no specific or new complaints.  His laboratory studies and medications were reviewed.  All Nephrology related questions were answered to his satisfaction.      Review of Systems   Constitutional: Negative.    HENT: Negative.     Respiratory: Negative.     Cardiovascular: Negative.    Gastrointestinal: Negative.    Genitourinary: Negative.    Musculoskeletal: Negative.    Skin: Negative.        /78   Pulse 76   Resp 18   Ht 5' 7.5" (1.715 m)   Wt 88.5 kg (195 lb 1.7 oz)   BMI 30.11 kg/m²     Lab Results   Component Value Date    WBC 7.77 12/13/2024    HGB 16.7 12/13/2024    HCT 53.0 12/13/2024    MCV 88 12/13/2024     12/13/2024      BMP  Lab Results   Component Value Date     02/20/2025    K 4.5 02/20/2025     02/20/2025    CO2 24 02/20/2025    BUN 15 02/20/2025    CREATININE 1.3 02/20/2025    CALCIUM 10.6 (H) 02/20/2025    ANIONGAP 12 02/20/2025    ESTGFRAFRICA >60.0 07/14/2022    EGFRNONAA 54.1 (A) 07/14/2022     CMP  Sodium   Date Value Ref Range Status   02/20/2025 141 136 - 145 mmol/L Final     Potassium   Date Value Ref Range Status   02/20/2025 4.5 3.5 - 5.1 mmol/L Final     Chloride   Date Value Ref Range Status   02/20/2025 105 95 - 110 mmol/L Final     CO2   Date Value Ref Range Status   02/20/2025 24 23 - 29 mmol/L Final     Glucose   Date Value Ref Range Status   02/20/2025 105 70 - 110 mg/dL Final     BUN   Date Value Ref Range Status   02/20/2025 15 8 - 23 mg/dL Final     Creatinine   Date Value Ref Range Status   02/20/2025 1.3 0.5 - 1.4 mg/dL Final     Calcium   Date Value Ref Range Status   02/20/2025 10.6 (H) 8.7 - 10.5 mg/dL Final     Total Protein   Date Value Ref Range Status   12/13/2024 8.2 6.0 - 8.4 " g/dL Final     Albumin   Date Value Ref Range Status   02/20/2025 4.1 3.5 - 5.2 g/dL Final     Total Bilirubin   Date Value Ref Range Status   12/13/2024 0.4 0.1 - 1.0 mg/dL Final     Comment:     For infants and newborns, interpretation of results should be based  on gestational age, weight and in agreement with clinical  observations.    Premature Infant recommended reference ranges:  Up to 24 hours.............<8.0 mg/dL  Up to 48 hours............<12.0 mg/dL  3-5 days..................<15.0 mg/dL  6-29 days.................<15.0 mg/dL       Alkaline Phosphatase   Date Value Ref Range Status   12/13/2024 106 40 - 150 U/L Final     AST   Date Value Ref Range Status   12/13/2024 19 10 - 40 U/L Final     ALT   Date Value Ref Range Status   12/13/2024 38 10 - 44 U/L Final     Anion Gap   Date Value Ref Range Status   02/20/2025 12 8 - 16 mmol/L Final     eGFR if    Date Value Ref Range Status   07/14/2022 >60.0 >60 mL/min/1.73 m^2 Final     eGFR if non    Date Value Ref Range Status   07/14/2022 54.1 (A) >60 mL/min/1.73 m^2 Final     Comment:     Calculation used to obtain the estimated glomerular filtration  rate (eGFR) is the CKD-EPI equation.        Medications Ordered Prior to Encounter[1]         Objective:            Physical Exam  Constitutional:       Appearance: Normal appearance.   HENT:      Head: Normocephalic and atraumatic.   Eyes:      General: No scleral icterus.     Extraocular Movements: Extraocular movements intact.      Pupils: Pupils are equal, round, and reactive to light.   Pulmonary:      Effort: Pulmonary effort is normal.      Breath sounds: No stridor.   Musculoskeletal:      Right lower leg: No edema.      Left lower leg: No edema.   Skin:     General: Skin is warm and dry.   Neurological:      General: No focal deficit present.      Mental Status: He is alert and oriented to person, place, and time.   Psychiatric:         Mood and Affect: Mood normal.          Behavior: Behavior normal.       Assessment:       1. Kidney transplant status    2. Immunosuppression    3. Primary hypertension    4. Secondary hyperparathyroidism of renal origin        Plan:       1. Status post kidney transplant in March of 2022.  Stable renal allograft with creatinine ranging between 1.3 and 1.4 for the past 6 months.  Urinalysis is bland without evidence of significant proteinuria.  His most recent PC ratio showed low-grade proteinuria however this is likely a calculation error due to overestimation.  Will continue to monitor.    2. He remained stable on 3 mg twice daily of Prograf.  Most recent level is 5.2.  He is on 5 mg of prednisone daily and 750 mg twice daily of mycophenolate.    3. Blood pressure is well controlled on current regimen.    4. Intact PTH is elevated 115.  Vitamin-D is normal at 46.  Phosphorus remains low at 2.4.  Calcium is slightly elevated at 10.6 with an albumin of 4.1.  On review of laboratory studies for the past year he has had 1 other episode of mild hypercalcemia.  We decreased his Rocaltrol from 0.5 mcg 4 times a week to 0.25 mcg 4 times a week.  Will continue to monitor.    Approximately 40 minutes was spent in face-to-face conversation and chart review.      Chris Salgado MD         [1]   Current Outpatient Medications on File Prior to Visit   Medication Sig Dispense Refill    ALPRAZolam (XANAX) 1 MG tablet Take 1 tablet (1 mg total) by mouth daily as needed for Anxiety or Insomnia. 90 tablet 0    cholecalciferol, vitamin D3, (VITAMIN D3) 50 mcg (2,000 unit) Tab Take 1 tablet (2,000 Units total) by mouth once daily. 60 tablet 11    magnesium oxide (MAG-OX) 400 mg (241.3 mg magnesium) tablet Take 1 tablet (400 mg total) by mouth 2 (two) times daily. 60 tablet 11    metoprolol succinate (TOPROL-XL) 50 MG 24 hr tablet Take 1 tablet (50 mg total) by mouth 2 (two) times daily. 180 tablet 3    mupirocin (BACTROBAN) 2 % ointment by Nasal route once daily. 30 g  3    mycophenolate (CELLCEPT) 250 mg Cap Take 3 capsules (750 mg total) by mouth 2 (two) times daily. 180 capsule 11    NIFEdipine (PROCARDIA-XL) 60 MG (OSM) 24 hr tablet Take 1 tablet (60 mg total) by mouth 2 (two) times a day. 180 tablet 3    predniSONE (DELTASONE) 5 MG tablet Take 1 tablet (5 mg total) by mouth once daily. 30 tablet 11    sildenafiL (VIAGRA) 100 MG tablet Take 1 tablet (100 mg total) by mouth daily as needed for Erectile Dysfunction. 30 tablet 11    sodium bicarbonate 650 MG tablet Take 3 tablets (1,950 mg total) by mouth 2 (two) times daily. (Patient taking differently: Take 2 tablets by mouth 2 (two) times daily.) 180 tablet 11    tacrolimus (PROGRAF) 1 MG Cap Take 3 capsules (3 mg total) by mouth every morning AND 3 capsules (3 mg total) every evening. 180 capsule 11    tamsulosin (FLOMAX) 0.4 mg Cap Take 1 capsule by mouth 2 (two) times daily.      traZODone (DESYREL) 50 MG tablet Take 1-2 tablets ( mg total) by mouth every evening. 180 tablet 3    [DISCONTINUED] calcitRIOL (ROCALTROL) 0.5 MCG Cap Take 1 capsule (0.5 mcg total) by mouth every Mon, Wed, Fri, Sun. 90 capsule 3    [DISCONTINUED] amLODIPine (NORVASC) 10 MG tablet Take 1 tablet (10 mg total) by mouth once daily. 30 tablet 11    [DISCONTINUED] calcium carbonate (OS-SUSAN) 500 mg calcium (1,250 mg) chewable tablet Take 1 tablet by mouth 3 (three) times daily.      [DISCONTINUED] lanthanum (FOSRENOL) 1000 MG chewable tablet CHEW 2 TABLETS THREE TIMES A DAY WITH MEALS 540 tablet 4    [DISCONTINUED] losartan (COZAAR) 100 MG tablet Take 1 tablet (100 mg total) by mouth once daily. 90 tablet 3    [DISCONTINUED] torsemide (DEMADEX) 100 MG Tab Take 2 tablets (200 mg total) by mouth once daily. 180 tablet 3     No current facility-administered medications on file prior to visit.

## 2025-02-28 DIAGNOSIS — Z94.0 S/P KIDNEY TRANSPLANT: ICD-10-CM

## 2025-02-28 RX ORDER — MYCOPHENOLATE MOFETIL 250 MG/1
750 CAPSULE ORAL 2 TIMES DAILY
Qty: 180 CAPSULE | Refills: 11 | Status: SHIPPED | OUTPATIENT
Start: 2025-02-28

## 2025-02-28 RX ORDER — MYCOPHENOLATE MOFETIL 250 MG/1
750 CAPSULE ORAL 2 TIMES DAILY
Qty: 18 CAPSULE | Refills: 0 | Status: SHIPPED | OUTPATIENT
Start: 2025-02-28 | End: 2025-03-03

## 2025-03-11 ENCOUNTER — RESULTS FOLLOW-UP (OUTPATIENT)
Dept: TRANSPLANT | Facility: HOSPITAL | Age: 75
End: 2025-03-11

## 2025-03-11 ENCOUNTER — LAB VISIT (OUTPATIENT)
Dept: LAB | Facility: HOSPITAL | Age: 75
End: 2025-03-11
Attending: INTERNAL MEDICINE
Payer: MEDICARE

## 2025-03-11 DIAGNOSIS — Z94.0 KIDNEY REPLACED BY TRANSPLANT: ICD-10-CM

## 2025-03-11 LAB
ALBUMIN SERPL BCP-MCNC: 3.9 G/DL (ref 3.5–5.2)
ANION GAP SERPL CALC-SCNC: 9 MMOL/L (ref 8–16)
BASOPHILS # BLD AUTO: 0.02 K/UL (ref 0–0.2)
BASOPHILS NFR BLD: 0.3 % (ref 0–1.9)
BUN SERPL-MCNC: 14 MG/DL (ref 8–23)
CALCIUM SERPL-MCNC: 9.2 MG/DL (ref 8.7–10.5)
CHLORIDE SERPL-SCNC: 107 MMOL/L (ref 95–110)
CO2 SERPL-SCNC: 23 MMOL/L (ref 23–29)
CREAT SERPL-MCNC: 1.1 MG/DL (ref 0.5–1.4)
DIFFERENTIAL METHOD BLD: ABNORMAL
EOSINOPHIL # BLD AUTO: 0.1 K/UL (ref 0–0.5)
EOSINOPHIL NFR BLD: 1.5 % (ref 0–8)
ERYTHROCYTE [DISTWIDTH] IN BLOOD BY AUTOMATED COUNT: 15.5 % (ref 11.5–14.5)
EST. GFR  (NO RACE VARIABLE): >60 ML/MIN/1.73 M^2
GLUCOSE SERPL-MCNC: 90 MG/DL (ref 70–110)
HCT VFR BLD AUTO: 50.4 % (ref 40–54)
HGB BLD-MCNC: 15.2 G/DL (ref 14–18)
IMM GRANULOCYTES # BLD AUTO: 0.04 K/UL (ref 0–0.04)
IMM GRANULOCYTES NFR BLD AUTO: 0.6 % (ref 0–0.5)
LYMPHOCYTES # BLD AUTO: 2.3 K/UL (ref 1–4.8)
LYMPHOCYTES NFR BLD: 31.6 % (ref 18–48)
MCH RBC QN AUTO: 27.4 PG (ref 27–31)
MCHC RBC AUTO-ENTMCNC: 30.2 G/DL (ref 32–36)
MCV RBC AUTO: 91 FL (ref 82–98)
MONOCYTES # BLD AUTO: 0.9 K/UL (ref 0.3–1)
MONOCYTES NFR BLD: 12.9 % (ref 4–15)
NEUTROPHILS # BLD AUTO: 3.8 K/UL (ref 1.8–7.7)
NEUTROPHILS NFR BLD: 53.1 % (ref 38–73)
NRBC BLD-RTO: 0 /100 WBC
PHOSPHATE SERPL-MCNC: 2.9 MG/DL (ref 2.7–4.5)
PLATELET # BLD AUTO: 215 K/UL (ref 150–450)
PMV BLD AUTO: 11.8 FL (ref 9.2–12.9)
POTASSIUM SERPL-SCNC: 4 MMOL/L (ref 3.5–5.1)
RBC # BLD AUTO: 5.55 M/UL (ref 4.6–6.2)
SODIUM SERPL-SCNC: 139 MMOL/L (ref 136–145)
WBC # BLD AUTO: 7.22 K/UL (ref 3.9–12.7)

## 2025-03-11 PROCEDURE — 85025 COMPLETE CBC W/AUTO DIFF WBC: CPT | Performed by: INTERNAL MEDICINE

## 2025-03-11 PROCEDURE — 80197 ASSAY OF TACROLIMUS: CPT | Performed by: INTERNAL MEDICINE

## 2025-03-11 PROCEDURE — 36415 COLL VENOUS BLD VENIPUNCTURE: CPT | Performed by: INTERNAL MEDICINE

## 2025-03-11 PROCEDURE — 87799 DETECT AGENT NOS DNA QUANT: CPT | Performed by: INTERNAL MEDICINE

## 2025-03-11 PROCEDURE — 80069 RENAL FUNCTION PANEL: CPT | Performed by: INTERNAL MEDICINE

## 2025-03-12 ENCOUNTER — PATIENT MESSAGE (OUTPATIENT)
Dept: TRANSPLANT | Facility: CLINIC | Age: 75
End: 2025-03-12
Payer: MEDICARE

## 2025-03-12 DIAGNOSIS — R39.9 UTI SYMPTOMS: Primary | ICD-10-CM

## 2025-03-12 DIAGNOSIS — Z94.0 KIDNEY REPLACED BY TRANSPLANT: ICD-10-CM

## 2025-03-12 LAB — TACROLIMUS BLD-MCNC: 6 NG/ML (ref 5–15)

## 2025-03-13 ENCOUNTER — LAB VISIT (OUTPATIENT)
Dept: LAB | Facility: HOSPITAL | Age: 75
End: 2025-03-13
Payer: MEDICARE

## 2025-03-13 DIAGNOSIS — R39.9 UTI SYMPTOMS: ICD-10-CM

## 2025-03-13 DIAGNOSIS — Z94.0 KIDNEY REPLACED BY TRANSPLANT: ICD-10-CM

## 2025-03-13 PROCEDURE — 87086 URINE CULTURE/COLONY COUNT: CPT | Performed by: INTERNAL MEDICINE

## 2025-03-13 PROCEDURE — 87186 SC STD MICRODIL/AGAR DIL: CPT | Mod: 59 | Performed by: INTERNAL MEDICINE

## 2025-03-13 PROCEDURE — 87088 URINE BACTERIA CULTURE: CPT | Performed by: INTERNAL MEDICINE

## 2025-03-14 ENCOUNTER — RESULTS FOLLOW-UP (OUTPATIENT)
Dept: TRANSPLANT | Facility: CLINIC | Age: 75
End: 2025-03-14

## 2025-03-14 NOTE — PROGRESS NOTES
Kidney Post-Transplant Assessment    Referring Physician: Chris Adair  Current Nephrologist: Rajan Hayes    ORGAN: RIGHT KIDNEY  Donor Type: donation after brain death  PHS Increased Risk: yes  Cold Ischemia: 1,354 mins  Induction Medications: thymoglobulin    Subjective:     CC:  Reassessment of renal allograft function and management of chronic immunosuppression.    HPI:  Mr. Ramirez is a 74 y.o. 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 Post transplant course complicated by urinary retention, severe diarrhea, and hypophosphatemia. His baseline creatinine is 1.1-1.4. He takes mycophenolate mofetil, prednisone and tacrolimus for maintenance immunosuppression.     Following with general nephrology Dr. Salgado. Last UA/culture reviewed, he is no longer having dysuria. Feels well without complaints. Staying hydrated. BP elevated in clinic, typically runs lower. No peripheral edema. Appetite good, weight stable. No pain over allograft. Tolerating IS without issue, no diarrhea or vomiting.     Current Outpatient Medications   Medication Sig Dispense Refill    ALPRAZolam (XANAX) 1 MG tablet Take 1 tablet (1 mg total) by mouth daily as needed for Anxiety or Insomnia. 90 tablet 0    calcitRIOL (ROCALTROL) 0.25 MCG Cap Take 1 capsule (0.25 mcg total) by mouth every Mon, Wed, Fri, Sun. 51.42 capsule 3    cholecalciferol, vitamin D3, (VITAMIN D3) 50 mcg (2,000 unit) Tab Take 1 tablet (2,000 Units total) by mouth once daily. 60 tablet 11    magnesium oxide (MAG-OX) 400 mg (241.3 mg magnesium) tablet Take 1 tablet (400 mg total) by mouth 2 (two) times daily. 60 tablet 11    metoprolol succinate (TOPROL-XL) 50 MG 24 hr tablet Take 1 tablet (50 mg total) by mouth 2 (two) times daily. 180 tablet 3    mycophenolate (CELLCEPT) 250 mg Cap Take 3 capsules (750 mg total) by mouth 2 (two)  times daily. 180 capsule 11    NIFEdipine (PROCARDIA-XL) 60 MG (OSM) 24 hr tablet Take 1 tablet (60 mg total) by mouth 2 (two) times a day. 180 tablet 3    tamsulosin (FLOMAX) 0.4 mg Cap Take 1 capsule by mouth 2 (two) times daily.      predniSONE (DELTASONE) 5 MG tablet Take 1 tablet (5 mg total) by mouth once daily. 30 tablet 11    sildenafiL (VIAGRA) 100 MG tablet Take 1 tablet (100 mg total) by mouth daily as needed for Erectile Dysfunction. 30 tablet 11    sodium bicarbonate 650 MG tablet Take 3 tablets (1,950 mg total) by mouth 2 (two) times daily. (Patient taking differently: Take 2 tablets by mouth 2 (two) times daily.) 180 tablet 11    tacrolimus (PROGRAF) 1 MG Cap Take 3 capsules (3 mg total) by mouth every morning AND 3 capsules (3 mg total) every evening. 180 capsule 11    traZODone (DESYREL) 50 MG tablet Take 1-2 tablets ( mg total) by mouth every evening. 180 tablet 3     No current facility-administered medications for this visit.       Past Medical History:   Diagnosis Date    Anemia in CKD (chronic kidney disease)     Atrial fibrillation     CKD (chronic kidney disease) stage 4, GFR 15 11/26/2014    Colon cancer screening 12/19/2014    Elevated PSA 11/26/2014    HTN (hypertension) diagnosed in his 40s 10/16/2014    Monoclonal gammopathy 11/26/2014    Phobic anxiety disorder 11/26/2014    Proteinuria 11/26/2014    Stage 3a chronic kidney disease 3/25/2022       Review of Systems   Constitutional:  Negative for activity change and fever.   Eyes:  Negative for visual disturbance.   Respiratory:  Negative for cough and shortness of breath.    Cardiovascular:  Negative for chest pain and leg swelling.   Gastrointestinal:  Negative for abdominal pain, constipation, diarrhea and nausea.   Genitourinary:  Negative for difficulty urinating, frequency and hematuria.   Musculoskeletal:  Negative for arthralgias and myalgias.   Skin:  Negative for wound.   Allergic/Immunologic: Positive for  "immunocompromised state.   Neurological:  Negative for weakness.   Psychiatric/Behavioral:  Negative for sleep disturbance.        Objective:   Blood pressure (!) 165/79, pulse 81, temperature 97 °F (36.1 °C), temperature source Temporal, resp. rate 18, height 5' 7.5" (1.715 m), weight 89.4 kg (197 lb 1.5 oz), SpO2 97%.body mass index is 30.41 kg/m².    Physical Exam  Vitals and nursing note reviewed.   Constitutional:       Appearance: Normal appearance.   Cardiovascular:      Rate and Rhythm: Normal rate and regular rhythm.      Heart sounds: Normal heart sounds.   Pulmonary:      Effort: Pulmonary effort is normal.      Breath sounds: Normal breath sounds.   Abdominal:      General: There is no distension.   Musculoskeletal:         General: Normal range of motion.   Skin:     General: Skin is warm and dry.   Neurological:      Mental Status: He is alert.         Labs:  Lab Results   Component Value Date    WBC 7.22 2025    HGB 15.2 2025    HCT 50.4 2025     2025    K 4.0 2025     2025    CO2 23 2025    BUN 14 2025    CREATININE 1.1 2025    EGFRNONAA 54.1 (A) 2022    CALCIUM 9.2 2025    PHOS 2.9 2025    MG 2.0 2025    ALBUMIN 3.9 2025    AST 19 2024    ALT 38 2024    UTPCR 0.25 (H) 2025    .7 (H) 2025    TACROLIMUS 6.0 2025       Labs were reviewed with the patient    Assessment:     1. -donor kidney transplant    2. Immunosuppressive management encounter following kidney transplant    3. CKD (chronic kidney disease), stage II    4. Renovascular hypertension    5. At risk for opportunistic infections      Plan:   Mr. Ramirez is now 3 years post kidney transplant with stable allograft function. He will continue following with general nephrology for CKD care, IS refills x 1 year provided. Will not treat for last UA/culture as he is asymptomatic. Stressed the importance of " routine health maintenance as well as cancer screenings while on immunosuppression. He understands that transplant will re-open his chart in the future if issues arise with allograft.       1. Immunosuppression: Prograf trough 6.0, which is therapeutic (goal 5-7). Continue Prograf 3/3,  mg BID, and Prednisone 5 mg QD. Will continue to monitor for drug toxicities    2. Allograft Function: Stable. Continue good oral hydration.   - ESRD secondary to HTN s/p donation after brain death kidney transplant on 3/19/22 (Thymo induction, CMV +/+).  - Post transplant course complicated by urinary retention, severe diarrhea, and hypophosphatemia.   - baseline creatinine is 1.1-1.4.   Lab Results   Component Value Date    CREATININE 1.1 03/11/2025       3. Hypertension management:   - advise low salt diet and home BP monitoring      4. Metabolic Bone Disease/Secondary Hyperparathyroidism: stable  Lab Results   Component Value Date    .7 (H) 02/20/2025    CALCIUM 9.2 03/11/2025    PHOS 2.9 03/11/2025       5. Electrolytes: stable. No need for intervention   Lab Results   Component Value Date     03/11/2025    K 4.0 03/11/2025     03/11/2025    CO2 23 03/11/2025       6. Anemia: stable. No need for intervention   Lab Results   Component Value Date    WBC 7.22 03/11/2025    HGB 15.2 03/11/2025    HCT 50.4 03/11/2025    MCV 91 03/11/2025     03/11/2025         7. Proteinuria: continue p/c ratio as per guidelines    - last UPC 0.25    8. BK virus infection screening:  BK PCR per protocol    - last PCR not detected    9. Weight education: provided during the clinic visit   Body mass index is 30.41 kg/m².     10. Patient safety education regarding immunosuppression including prophylaxis posttransplant for CMV, PCP                Radha Mcelroy NP

## 2025-03-15 LAB
BACTERIA UR CULT: ABNORMAL
BKV DNA SERPL NAA+PROBE-ACNC: <125 COPIES/ML
BKV DNA SERPL NAA+PROBE-LOG#: <2.1 LOG (10) COPIES/ML
BKV DNA SERPL QL NAA+PROBE: NOT DETECTED

## 2025-03-17 ENCOUNTER — OFFICE VISIT (OUTPATIENT)
Dept: TRANSPLANT | Facility: CLINIC | Age: 75
End: 2025-03-17
Payer: MEDICARE

## 2025-03-17 VITALS
RESPIRATION RATE: 18 BRPM | HEART RATE: 81 BPM | TEMPERATURE: 97 F | DIASTOLIC BLOOD PRESSURE: 79 MMHG | OXYGEN SATURATION: 97 % | HEIGHT: 68 IN | BODY MASS INDEX: 29.86 KG/M2 | SYSTOLIC BLOOD PRESSURE: 165 MMHG | WEIGHT: 197.06 LBS

## 2025-03-17 DIAGNOSIS — Z94.0 DECEASED-DONOR KIDNEY TRANSPLANT: Primary | ICD-10-CM

## 2025-03-17 DIAGNOSIS — I15.0 RENOVASCULAR HYPERTENSION: ICD-10-CM

## 2025-03-17 DIAGNOSIS — Z91.89 AT RISK FOR OPPORTUNISTIC INFECTIONS: ICD-10-CM

## 2025-03-17 DIAGNOSIS — Z79.899 IMMUNOSUPPRESSIVE MANAGEMENT ENCOUNTER FOLLOWING KIDNEY TRANSPLANT: ICD-10-CM

## 2025-03-17 DIAGNOSIS — N18.2 CKD (CHRONIC KIDNEY DISEASE), STAGE II: ICD-10-CM

## 2025-03-17 DIAGNOSIS — Z94.0 IMMUNOSUPPRESSIVE MANAGEMENT ENCOUNTER FOLLOWING KIDNEY TRANSPLANT: ICD-10-CM

## 2025-03-17 PROCEDURE — 99999 PR PBB SHADOW E&M-EST. PATIENT-LVL IV: CPT | Mod: PBBFAC,,, | Performed by: NURSE PRACTITIONER

## 2025-03-17 RX ORDER — PREDNISONE 5 MG/1
5 TABLET ORAL DAILY
Qty: 30 TABLET | Refills: 11 | Status: SHIPPED | OUTPATIENT
Start: 2025-03-17

## 2025-03-17 RX ORDER — TACROLIMUS 1 MG/1
CAPSULE ORAL
Qty: 180 CAPSULE | Refills: 11 | Status: SHIPPED | OUTPATIENT
Start: 2025-03-17

## 2025-03-17 RX ORDER — CEFPODOXIME PROXETIL 200 MG/1
200 TABLET, FILM COATED ORAL 2 TIMES DAILY
Qty: 20 TABLET | Refills: 0 | Status: CANCELLED | OUTPATIENT
Start: 2025-03-17 | End: 2025-03-27

## 2025-03-17 NOTE — LETTER
March 17, 2025        Dilcia Saavedra  [address] [address]  Phone: [phone]  Fax: [phone]             [facility]- Transplant [facility]  [address]  [facility] [address]  Phone: [phone]   Patient: Alverto Ramirez Jr.   MR Number: [id number]   YOB: 1950   Date of Visit: 3/17/2025       Dear Dr. Dilcia Saavedra    Thank you for referring Alverto Ramirez to me for evaluation. Attached you will find relevant portions of my assessment and plan of care.    If you have questions, please do not hesitate to call me. I look forward to following Alverto Ramirez along with you.    Sincerely,    Radha Mcelroy NP    Enclosure    If you would like to receive this communication electronically, please contact [email or web address] or [phone] to request [employer] access.    [employer] [facility] is a tool which provides read-only access to select patient information with whom you have a relationship. Its easy to use and provides real time access to review your patients record including encounter summaries, notes, results, and demographic information.    If you feel you have received this communication in error or would no longer like to receive these types of communications, please e-mail [email or web address]

## 2025-03-19 ENCOUNTER — OFFICE VISIT (OUTPATIENT)
Dept: INTERNAL MEDICINE | Facility: CLINIC | Age: 75
End: 2025-03-19
Payer: MEDICARE

## 2025-03-19 VITALS
BODY MASS INDEX: 30.23 KG/M2 | OXYGEN SATURATION: 99 % | SYSTOLIC BLOOD PRESSURE: 154 MMHG | HEART RATE: 79 BPM | DIASTOLIC BLOOD PRESSURE: 90 MMHG | HEIGHT: 68 IN | WEIGHT: 199.5 LBS

## 2025-03-19 DIAGNOSIS — I10 PRIMARY HYPERTENSION: ICD-10-CM

## 2025-03-19 DIAGNOSIS — Z86.0100 HISTORY OF COLON POLYPS: ICD-10-CM

## 2025-03-19 DIAGNOSIS — F51.04 PSYCHOPHYSIOLOGICAL INSOMNIA: Primary | ICD-10-CM

## 2025-03-19 DIAGNOSIS — J30.2 SEASONAL ALLERGIC RHINITIS, UNSPECIFIED TRIGGER: ICD-10-CM

## 2025-03-19 DIAGNOSIS — F41.9 ANXIETY: ICD-10-CM

## 2025-03-19 PROCEDURE — G2211 COMPLEX E/M VISIT ADD ON: HCPCS | Mod: S$GLB,,, | Performed by: FAMILY MEDICINE

## 2025-03-19 PROCEDURE — 3288F FALL RISK ASSESSMENT DOCD: CPT | Mod: CPTII,S$GLB,, | Performed by: FAMILY MEDICINE

## 2025-03-19 PROCEDURE — 99999 PR PBB SHADOW E&M-EST. PATIENT-LVL III: CPT | Mod: PBBFAC,,, | Performed by: FAMILY MEDICINE

## 2025-03-19 PROCEDURE — 99214 OFFICE O/P EST MOD 30 MIN: CPT | Mod: S$GLB,,, | Performed by: FAMILY MEDICINE

## 2025-03-19 PROCEDURE — 1126F AMNT PAIN NOTED NONE PRSNT: CPT | Mod: CPTII,S$GLB,, | Performed by: FAMILY MEDICINE

## 2025-03-19 PROCEDURE — 3080F DIAST BP >= 90 MM HG: CPT | Mod: CPTII,S$GLB,, | Performed by: FAMILY MEDICINE

## 2025-03-19 PROCEDURE — 3077F SYST BP >= 140 MM HG: CPT | Mod: CPTII,S$GLB,, | Performed by: FAMILY MEDICINE

## 2025-03-19 PROCEDURE — 1101F PT FALLS ASSESS-DOCD LE1/YR: CPT | Mod: CPTII,S$GLB,, | Performed by: FAMILY MEDICINE

## 2025-03-19 RX ORDER — TRAZODONE HYDROCHLORIDE 50 MG/1
50-100 TABLET ORAL NIGHTLY
Qty: 180 TABLET | Refills: 3 | Status: SHIPPED | OUTPATIENT
Start: 2025-03-19 | End: 2026-03-19

## 2025-03-19 RX ORDER — FLUTICASONE PROPIONATE 50 MCG
1 SPRAY, SUSPENSION (ML) NASAL DAILY
Qty: 16 G | Refills: 5 | Status: SHIPPED | OUTPATIENT
Start: 2025-03-19

## 2025-03-19 RX ORDER — ALPRAZOLAM 1 MG/1
1 TABLET ORAL DAILY PRN
Qty: 90 TABLET | Refills: 0 | Status: SHIPPED | OUTPATIENT
Start: 2025-03-19

## 2025-03-19 NOTE — PROGRESS NOTES
Subjective:   Patient ID: Alverto Ramirez Jr. is a 74 y.o. male.  Chief Complaint:  Follow-up    Presents for six-month follow-up on anxiety and insomnia  Also followed by [facility]     - Anxiety  On  Xanax 1 mg daily as needed.   Averages 1 pill daily with 90 pills lasting approximately 3 months   Reports medication remains effective for symptom control.  No suspicion for abusive or diversion behaviors.  Denies any side effects.      Tolerating current treatment well.   Happy with medication.    Wants to continue.  No behaviors to suggest inappropriate use of prescribed medications.  [place] Board of Pharmacy Controlled Prescription Drug Monitoring database was queried and showed no activity to suggest abuse, diversion, or other inappropriate use of prescription medications.      - Insomnia  On trazodone 50 mg nightly  Denies any side effects.      Tolerating current treatment well.   Reports still effective to help sleep difficulty  Happy with medication.    Wants to continue.    Medical history:  - End-stage renal disease.  Underwent successful transplant March 2022.  Renal function panel March 2025 with normal creatinine and calculated EGFR.  Up-to-date on visits with Nephrology and Transplant.  Reports compliance with recommended medication regimen.  - Hypertension.  Previously controlled on Procardia XL 60 mg twice a day and Toprol-XL 50 mg twice a day.  Reports compliance.  Denies side effects.  No shortness of breath or swelling.  Blood pressure is mildly elevated office.  Reports normal at  - Mixed hyperlipidemia.  Currently not on any medications.  August 2024 lipid panel with LDL less than 100.  Denies chest pain or claudication.  - Elevated PSA, urinary difficulties, and Erectile dysfunction. Followed by Urology.  Remains off Flomax.  Urinary difficulty stable.  Still taking Viagra daily as needed.   - History colon polyps.  Last colonoscopy with polypectomy 2017. Overdue for  "repeat colonoscopy for colon cancer screening     Complains of increased nasal congestion runny nose today but no other significant URTI symptoms   No fever        Review of Systems   Constitutional:  Negative for chills, fatigue and fever.   HENT:  Positive for congestion, postnasal drip, rhinorrhea and sneezing. Negative for dental problem, ear discharge, ear pain, sinus pressure, sinus pain, sore throat, tinnitus, trouble swallowing and voice change.    Eyes:  Positive for redness. Negative for photophobia, pain, discharge, itching and visual disturbance.   Respiratory:  Negative for cough, chest tightness, shortness of breath and wheezing.    Cardiovascular:  Negative for chest pain, palpitations and leg swelling.   Gastrointestinal:  Negative for abdominal pain, constipation, diarrhea, nausea and vomiting.   Musculoskeletal:  Negative for myalgias and neck pain.   Skin:  Negative for rash.   Neurological:  Negative for dizziness, light-headedness and headaches.   Hematological:  Negative for adenopathy.   Psychiatric/Behavioral:  Negative for agitation, behavioral problems, confusion, decreased concentration, dysphoric mood, hallucinations and sleep disturbance. The patient is not nervous/anxious and is not hyperactive.      Objective:   BP (!) 154/90 (BP Location: Right arm, Patient Position: Sitting)   Pulse 79   Ht 5' 7.5" (1.715 m)   Wt 90.5 kg (199 lb 8.3 oz)   SpO2 99%   BMI 30.79 kg/m²     Physical Exam  Vitals and nursing note reviewed.   Constitutional:       Appearance: Normal appearance. He is well-developed. He is obese.      Comments:   Blood pressure elevated   HENT:      Right Ear: Tympanic membrane and ear canal normal.      Left Ear: Tympanic membrane and ear canal normal.      Nose: Mucosal edema, congestion and rhinorrhea present.      Right Sinus: No maxillary sinus tenderness or frontal sinus tenderness.      Left Sinus: No maxillary sinus tenderness or frontal sinus tenderness.      " Mouth/Throat:      Pharynx: Oropharynx is clear. Uvula midline. No pharyngeal swelling, oropharyngeal exudate or posterior oropharyngeal erythema.   Eyes:      General: Allergic shiner present.      Conjunctiva/sclera:      Right eye: Right conjunctiva is injected.      Left eye: Left conjunctiva is injected.   Neck:      Vascular: No JVD.   Cardiovascular:      Rate and Rhythm: Normal rate and regular rhythm.      Heart sounds: Normal heart sounds.   Pulmonary:      Effort: Pulmonary effort is normal.      Breath sounds: Normal breath sounds. No rhonchi.   Musculoskeletal:      Right lower leg: No edema.      Left lower leg: No edema.   Lymphadenopathy:      Cervical: No cervical adenopathy.   Skin:     Findings: No rash.   Neurological:      Mental Status: He is alert and oriented to person, place, and time.      Coordination: Coordination is intact.      Gait: Gait is intact.   Psychiatric:         Attention and Perception: Attention normal. He is attentive.         Mood and Affect: Mood and affect normal. Mood is not anxious or depressed.         Speech: Speech normal.         Behavior: Behavior normal. Behavior is cooperative.         Thought Content: Thought content normal.         Cognition and Memory: Cognition normal.         Judgment: Judgment normal.         Assessment:       ICD-10-CM ICD-9-CM   1. Psychophysiological insomnia  F51.04 307.42   2. Anxiety  F41.9 300.00   3. Seasonal allergic rhinitis, unspecified trigger  J30.2 477.9   4. Primary hypertension  I10 401.9   5. History of colon polyps  Z86.0100 V12.72     Plan:   Psychophysiological insomnia  -     traZODone (DESYREL) 50 MG tablet; Take 1-2 tablets ( mg total) by mouth every evening.  Dispense: 180 tablet; Refill: 3  Stable, no side effects, mood and symptoms well controlled on present medication .  Continue current medication    Anxiety  -     ALPRAZolam (XANAX) 1 MG tablet; Take 1 tablet (1 mg total) by mouth daily as needed for  Anxiety or Insomnia.  Dispense: 90 tablet; Refill: 0  Stable, no side effects, mood and symptoms well controlled on present medication .  Continue current medication    Seasonal allergic rhinitis, unspecified trigger  -     fluticasone propionate (FLONASE) 50 mcg/actuation nasal spray; 1 spray (50 mcg total) by Each Nostril route once daily.  Dispense: 16 g; Refill: 5    Primary hypertension  Asymptomatic.  Borderline control in office.  Reports normal blood pressures at home.    Continue Procardia XL 60 twice a day   Continue Toprol-XL 50 mg twice a day    History of colon polyps  Declines/defers repeat colonoscopy at this time   Rediscuss at follow-up visit in 3 months     Follow up with any/all specialists as scheduled     Return to clinic 3 months or sooner as needed    Visit today included increased complexity associated with managing the longitudinal care of the patient due to the serious and/or complex managed problem(s) listed above.

## 2025-05-05 ENCOUNTER — TELEPHONE (OUTPATIENT)
Dept: INTERNAL MEDICINE | Facility: CLINIC | Age: 75
End: 2025-05-05
Payer: MEDICARE

## 2025-05-06 ENCOUNTER — TELEPHONE (OUTPATIENT)
Dept: NEPHROLOGY | Facility: CLINIC | Age: 75
End: 2025-05-06
Payer: MEDICARE

## 2025-05-06 DIAGNOSIS — Z94.0 S/P KIDNEY TRANSPLANT: Primary | ICD-10-CM

## 2025-05-06 DIAGNOSIS — N18.4 CHRONIC KIDNEY DISEASE, STAGE 4 (SEVERE): ICD-10-CM

## 2025-05-06 NOTE — TELEPHONE ENCOUNTER
"Pt wanted to do lab to see if all is ok". Not having any issues at this time." He has labs before his next appt. Scheduled. 5/6/25/sf  "

## 2025-05-06 NOTE — TELEPHONE ENCOUNTER
----- Message from Eulogio sent at 5/6/2025 10:27 AM CDT -----  .Type: Patient Call Back  Who called: patient  What is the request in detail:  Called in wanting lab orders put in . Please call back Can the clinic reply by [facility]?     Would the patient rather a call back or a response via [facility]?  Best call back number:.[phone]

## 2025-05-26 RX ORDER — METOPROLOL SUCCINATE 50 MG/1
50 TABLET, EXTENDED RELEASE ORAL 2 TIMES DAILY
Qty: 180 TABLET | Refills: 3 | Status: SHIPPED | OUTPATIENT
Start: 2025-05-26

## 2025-06-16 ENCOUNTER — LAB VISIT (OUTPATIENT)
Dept: LAB | Facility: HOSPITAL | Age: 75
End: 2025-06-16
Attending: NURSE PRACTITIONER
Payer: MEDICARE

## 2025-06-16 DIAGNOSIS — Z94.0 KIDNEY TRANSPLANT STATUS: ICD-10-CM

## 2025-06-16 DIAGNOSIS — D47.2 MONOCLONAL GAMMOPATHY: ICD-10-CM

## 2025-06-16 LAB
ABSOLUTE NEUTROPHIL COUNT (OHS): 4.61 K/UL (ref 1.8–7.7)
ALBUMIN SERPL BCP-MCNC: 4.3 G/DL (ref 3.5–5.2)
ALP SERPL-CCNC: 106 UNIT/L (ref 40–150)
ALT SERPL W/O P-5'-P-CCNC: 28 UNIT/L (ref 10–44)
ANION GAP (OHS): 10 MMOL/L (ref 8–16)
AST SERPL-CCNC: 18 UNIT/L (ref 11–45)
BILIRUB SERPL-MCNC: 0.4 MG/DL (ref 0.1–1)
BUN SERPL-MCNC: 17 MG/DL (ref 8–23)
CALCIUM SERPL-MCNC: 9.8 MG/DL (ref 8.7–10.5)
CHLORIDE SERPL-SCNC: 107 MMOL/L (ref 95–110)
CO2 SERPL-SCNC: 22 MMOL/L (ref 23–29)
CREAT SERPL-MCNC: 1.3 MG/DL (ref 0.5–1.4)
ERYTHROCYTE [DISTWIDTH] IN BLOOD BY AUTOMATED COUNT: 14.6 % (ref 11.5–14.5)
GFR SERPLBLD CREATININE-BSD FMLA CKD-EPI: 57 ML/MIN/1.73/M2
GLUCOSE SERPL-MCNC: 120 MG/DL (ref 70–110)
HCT VFR BLD AUTO: 50.7 % (ref 40–54)
HGB BLD-MCNC: 16.7 GM/DL (ref 14–18)
IMM GRANULOCYTES # BLD AUTO: 0.03 K/UL (ref 0–0.04)
MCH RBC QN AUTO: 29 PG (ref 27–31)
MCHC RBC AUTO-ENTMCNC: 32.9 G/DL (ref 32–36)
MCV RBC AUTO: 88 FL (ref 82–98)
PLATELET # BLD AUTO: 173 K/UL (ref 150–450)
PMV BLD AUTO: 10.8 FL (ref 9.2–12.9)
POTASSIUM SERPL-SCNC: 3.9 MMOL/L (ref 3.5–5.1)
PROT SERPL-MCNC: 7.8 GM/DL (ref 6–8.4)
RBC # BLD AUTO: 5.76 M/UL (ref 4.6–6.2)
SODIUM SERPL-SCNC: 139 MMOL/L (ref 136–145)
WBC # BLD AUTO: 7.66 K/UL (ref 3.9–12.7)

## 2025-06-16 PROCEDURE — 86334 IMMUNOFIX E-PHORESIS SERUM: CPT

## 2025-06-16 PROCEDURE — 82040 ASSAY OF SERUM ALBUMIN: CPT

## 2025-06-16 PROCEDURE — 83521 IG LIGHT CHAINS FREE EACH: CPT

## 2025-06-16 PROCEDURE — 36415 COLL VENOUS BLD VENIPUNCTURE: CPT

## 2025-06-16 PROCEDURE — 84165 PROTEIN E-PHORESIS SERUM: CPT | Mod: 26,,, | Performed by: PATHOLOGY

## 2025-06-16 PROCEDURE — 85027 COMPLETE CBC AUTOMATED: CPT

## 2025-06-16 PROCEDURE — 86334 IMMUNOFIX E-PHORESIS SERUM: CPT | Mod: 26,,, | Performed by: PATHOLOGY

## 2025-06-16 PROCEDURE — 84165 PROTEIN E-PHORESIS SERUM: CPT

## 2025-06-16 NOTE — PROGRESS NOTES
Subjective:       Patient ID: Alverto Ramirez Jr. is a 74 y.o. male.    Chief Complaint:   1. Monoclonal gammopathy  IgM kappa      2. Kidney transplant status          Current Treatment:  Observation     Treatment History:    HPI: This is a 74-year-old  male with afib, anemia secondary to CKD, hypertensive nephropathy, and IgM monoclonal gammopathy of undetermined significance that was diagnosed in 2018 and since has been monitored.       He was on peritoneal dialysis and underwent kidney transplantation on 3/19/2022. He follows with the transplant team in [facility].     DAYSI in 8/2023 demonstrates faint IgM kappa and free lambda light chains not visible on corresponding SPEP and cannot be measured. Kappa and lambda FLCs WNL with slightly elevated ratio.    Interval History: Patient presents for follow up on labs. He presents with his wife and has no complaints today. He states he had diarrhea recently which could have been due to something he ate. CBC with no cytopenias. CMP with slightly decreased kidney function despite normal BUN/creatinine. Discussed increasing his hydration throughout the day. ESR WNL; CRP mildly elevated. No monoclonal protein present on DAYSI. Normal FLC with normal ratio. Will continue to monitor.     Reviewed labs with patient:   CBC:   Recent Labs   Lab 06/16/25  1045   WBC 7.66   RBC 5.76   HGB 16.7   HCT 50.7   Platelet Count 173   MCV 88   MCH 29.0   MCHC 32.9     CMP:  Recent Labs   Lab 06/16/25  1045   Glucose 120 H   Calcium 9.8   Albumin 4.3   Protein Total 7.1  7.8   Sodium 139   Potassium 3.9   CO2 22 L   Chloride 107   BUN 17   Creatinine 1.3      ALT 28   AST 18   Bilirubin Total 0.4     Social History     Socioeconomic History    Marital status:    Occupational History     Employer: retired   Tobacco Use    Smoking status: Former     Current packs/day: 0.00     Average packs/day: 1 pack/day for 15.0 years (15.0 ttl pk-yrs)     Types: Cigarettes     Start  date: 1969     Quit date: 1984     Years since quittin.5    Smokeless tobacco: Never   Substance and Sexual Activity    Alcohol use: Yes     Alcohol/week: 5.0 - 7.0 standard drinks of alcohol     Types: 5 - 7 Standard drinks or equivalent per week     Comment: stopped drinking    Drug use: No    Sexual activity: Yes     Partners: Female   Social History Narrative    Retired from [employer]     for 41 y.o.    2 children    No history of blood transfusions    No donors     Past Medical History:   Diagnosis Date    Anemia in CKD (chronic kidney disease)     Atrial fibrillation     CKD (chronic kidney disease) stage 4, GFR 15 2014    Colon cancer screening 2014    Elevated PSA 2014    HTN (hypertension) diagnosed in his 40s 10/16/2014    Monoclonal gammopathy 2014    Phobic anxiety disorder 2014    Proteinuria 2014    Stage 3a chronic kidney disease 3/25/2022     Family History   Problem Relation Name Age of Onset    Heart disease Father      Dementia Father      Diabetes Mother      Cataracts Mother      Glaucoma Mother      Hypertension Sister 5     Cancer Brother 1         prostate    Kidney disease Sister 1         ESRD    Cancer Paternal Uncle       Past Surgical History:   Procedure Laterality Date    AV FISTULA PLACEMENT  2014    COLONOSCOPY N/A 2017    Procedure: COLONOSCOPY;  Surgeon: Tony Peacock III, MD;  Location: [facility];  Service: Endoscopy;  Laterality: N/A;    KIDNEY TRANSPLANT Right 2022    Procedure: TRANSPLANT, KIDNEY;  Surgeon: Victoriano Thomas MD;  Location: [address];  Service: Transplant;  Laterality: Right;    MULTIPLE TOOTH EXTRACTIONS      PERITONEAL CATHETER INSERTION      PROSTATE BIOPSY      VASECTOMY      VASECTOMY       Review of Systems   Constitutional:  Negative for appetite change and fatigue.   HENT:  Negative for mouth sores, rhinorrhea and sore throat.    Eyes: Negative.    Respiratory:   Negative for shortness of breath.    Cardiovascular: Negative.    Gastrointestinal:  Positive for diarrhea (recently; attributed to something he ate). Negative for constipation, nausea and vomiting.   Endocrine: Negative for cold intolerance.   Genitourinary: Negative.    Musculoskeletal: Negative.    Integumentary:  Negative.   Allergic/Immunologic: Negative.    Neurological:  Negative for dizziness, weakness, light-headedness, numbness and headaches.   Hematological: Negative.    Psychiatric/Behavioral:  Positive for sleep disturbance (improved with Trazodone and Xanax).        Medication List with Changes/Refills   Current Medications    ALPRAZOLAM (XANAX) 1 MG TABLET    Take 1 tablet (1 mg total) by mouth daily as needed for Anxiety or Insomnia.    CALCITRIOL (ROCALTROL) 0.25 MCG CAP    Take 1 capsule (0.25 mcg total) by mouth every Mon, Wed, Fri, Sun.    CHOLECALCIFEROL, VITAMIN D3, (VITAMIN D3) 50 MCG (2,000 UNIT) TAB    Take 1 tablet (2,000 Units total) by mouth once daily.    FLUTICASONE PROPIONATE (FLONASE) 50 MCG/ACTUATION NASAL SPRAY    1 spray (50 mcg total) by Each Nostril route once daily.    MAGNESIUM OXIDE (MAG-OX) 400 MG (241.3 MG MAGNESIUM) TABLET    Take 1 tablet (400 mg total) by mouth 2 (two) times daily.    METOPROLOL SUCCINATE (TOPROL-XL) 50 MG 24 HR TABLET    TAKE 1 TABLET(50 MG) BY MOUTH TWICE DAILY    MYCOPHENOLATE (CELLCEPT) 250 MG CAP    Take 3 capsules (750 mg total) by mouth 2 (two) times daily.    NIFEDIPINE (PROCARDIA-XL) 60 MG (OSM) 24 HR TABLET    Take 1 tablet (60 mg total) by mouth 2 (two) times a day.    PREDNISONE (DELTASONE) 5 MG TABLET    Take 1 tablet (5 mg total) by mouth once daily.    SILDENAFIL (VIAGRA) 100 MG TABLET    Take 1 tablet (100 mg total) by mouth daily as needed for Erectile Dysfunction.    SODIUM BICARBONATE 650 MG TABLET    Take 3 tablets (1,950 mg total) by mouth 2 (two) times daily.    TACROLIMUS (PROGRAF) 1 MG CAP    Take 3 capsules (3 mg total) by mouth  every morning AND 3 capsules (3 mg total) every evening.    TAMSULOSIN (FLOMAX) 0.4 MG CAP    Take 1 capsule by mouth 2 (two) times daily.    TRAZODONE (DESYREL) 50 MG TABLET    Take 1-2 tablets ( mg total) by mouth every evening.     Objective:     Vitals:    06/18/25 1055   BP: 128/73   Pulse: 70   Resp: 18   Temp: 97.7 °F (36.5 °C)     Physical Exam  Vitals reviewed.   Constitutional:       Appearance: Normal appearance.   HENT:      Head: Normocephalic.      Mouth/Throat:      Comments:     Eyes:      Extraocular Movements: Extraocular movements intact.      Pupils: Pupils are equal, round, and reactive to light.   Cardiovascular:      Rate and Rhythm: Normal rate and regular rhythm.      Heart sounds: Normal heart sounds.   Pulmonary:      Effort: Pulmonary effort is normal.      Breath sounds: Normal breath sounds.   Abdominal:      General: Bowel sounds are normal.      Palpations: Abdomen is soft.      Comments: rounded     Genitourinary:     Comments: deferred    Musculoskeletal:         General: Normal range of motion.      Cervical back: Normal range of motion and neck supple.   Skin:     General: Skin is warm and dry.   Neurological:      Mental Status: He is alert and oriented to person, place, and time.   Psychiatric:         Behavior: Behavior normal.         Thought Content: Thought content normal.         (1) Restricted in physically strenuous activity, ambulatory and able to do work of light nature  Assessment:     Problem List Items Addressed This Visit          Renal/    Kidney transplant status    Underwent kidney transplantation on 3/19/2022. Follows with transplant team.             Oncology    Monoclonal gammopathy - Primary    IgM monoclonal gammopathy. Will repeat protein electrophoresis and immunofixation.  Will continue to monitor every 4 months or sooner as needed.           Plan:     Monoclonal gammopathy    Kidney transplant status    Labs reviewed; no anemia.   Continue to  monitor MGUS every 4 months; patient prefers every 9 months.   Follow up in9 monthswithSPEP, DAYSI, FLC, CBC, and Comprehensive Metabolic Panel.    Route Chart for Scheduling    Med Onc Chart Routing      Follow up with physician    Follow up with ROBERT Other. 9 months   Infusion scheduling note    Injection scheduling note    Labs CBC, CMP, free light chains, SPEP and other   Scheduling:  Preferred lab:  Lab interval:  and DAYSI in 9 months, 3-4 days prior   Imaging None      Pharmacy appointment No pharmacy appointment needed      Other referrals No nutrition appointment needed -        No additional referrals needed         I will review assessment/plan with collaborating physician.      SARAH Silver

## 2025-06-17 LAB
ALBUMIN, SPE (OHS): 4.25 G/DL (ref 3.35–5.55)
ALPHA 1 GLOB (OHS): 0.33 GM/DL (ref 0.17–0.41)
ALPHA 2 GLOB (OHS): 0.95 GM/DL (ref 0.43–0.99)
BETA GLOB (OHS): 0.75 GM/DL (ref 0.5–1.1)
GAMMA GLOBULIN (OHS): 0.82 GM/DL (ref 0.67–1.58)
KAPPA LC FREE SER-MCNC: 1.32 MG/L (ref 0.26–1.65)
KAPPA LC FREE/LAMBDA FREE SER: 1.54 MG/DL (ref 0.33–1.94)
LAMBDA LC FREE SERPL-MCNC: 1.17 MG/DL (ref 0.57–2.63)
PATHOLOGIST INTERPRETATION - IFE SERUM (OHS): NORMAL
PATHOLOGIST REVIEW - SPE (OHS): NORMAL
PROT SERPL-MCNC: 7.1 GM/DL (ref 6–8.4)

## 2025-06-18 ENCOUNTER — OFFICE VISIT (OUTPATIENT)
Dept: HEMATOLOGY/ONCOLOGY | Facility: CLINIC | Age: 75
End: 2025-06-18
Payer: MEDICARE

## 2025-06-18 VITALS
WEIGHT: 194.25 LBS | HEART RATE: 70 BPM | HEIGHT: 67 IN | RESPIRATION RATE: 18 BRPM | OXYGEN SATURATION: 98 % | TEMPERATURE: 98 F | SYSTOLIC BLOOD PRESSURE: 128 MMHG | BODY MASS INDEX: 30.49 KG/M2 | DIASTOLIC BLOOD PRESSURE: 73 MMHG

## 2025-06-18 DIAGNOSIS — D47.2 MONOCLONAL GAMMOPATHY: Primary | ICD-10-CM

## 2025-06-18 DIAGNOSIS — Z94.0 KIDNEY TRANSPLANT STATUS: ICD-10-CM

## 2025-06-18 PROCEDURE — 1159F MED LIST DOCD IN RCRD: CPT | Mod: CPTII,S$GLB,, | Performed by: NURSE PRACTITIONER

## 2025-06-18 PROCEDURE — 99214 OFFICE O/P EST MOD 30 MIN: CPT | Mod: S$GLB,,, | Performed by: NURSE PRACTITIONER

## 2025-06-18 PROCEDURE — 3078F DIAST BP <80 MM HG: CPT | Mod: CPTII,S$GLB,, | Performed by: NURSE PRACTITIONER

## 2025-06-18 PROCEDURE — 3288F FALL RISK ASSESSMENT DOCD: CPT | Mod: CPTII,S$GLB,, | Performed by: NURSE PRACTITIONER

## 2025-06-18 PROCEDURE — 99999 PR PBB SHADOW E&M-EST. PATIENT-LVL IV: CPT | Mod: PBBFAC,,, | Performed by: NURSE PRACTITIONER

## 2025-06-18 PROCEDURE — 3074F SYST BP LT 130 MM HG: CPT | Mod: CPTII,S$GLB,, | Performed by: NURSE PRACTITIONER

## 2025-06-18 PROCEDURE — 1101F PT FALLS ASSESS-DOCD LE1/YR: CPT | Mod: CPTII,S$GLB,, | Performed by: NURSE PRACTITIONER

## 2025-06-18 PROCEDURE — 1126F AMNT PAIN NOTED NONE PRSNT: CPT | Mod: CPTII,S$GLB,, | Performed by: NURSE PRACTITIONER

## 2025-06-18 PROCEDURE — 1160F RVW MEDS BY RX/DR IN RCRD: CPT | Mod: CPTII,S$GLB,, | Performed by: NURSE PRACTITIONER

## 2025-07-15 ENCOUNTER — TELEPHONE (OUTPATIENT)
Dept: INTERNAL MEDICINE | Facility: CLINIC | Age: 75
End: 2025-07-15
Payer: MEDICARE

## 2025-07-15 DIAGNOSIS — F41.9 ANXIETY: ICD-10-CM

## 2025-07-15 RX ORDER — ALPRAZOLAM 1 MG/1
TABLET ORAL
Qty: 90 TABLET | OUTPATIENT
Start: 2025-07-15

## 2025-07-15 NOTE — TELEPHONE ENCOUNTER
No care due was identified.  [facility] Embedded Care Due Messages. Reference number: [id number].   7/15/2025 12:45:35 PM CDT

## 2025-07-15 NOTE — TELEPHONE ENCOUNTER
Attempt to contact pt regarding rescheduling appt due to PCP being out of office; no answer; lvm for callback /LD

## 2025-07-15 NOTE — TELEPHONE ENCOUNTER
Copied from CRM #[id number]. Topic: Appointments - Appointment Rescheduling  >> Jul 15, 2025 11:54 AM Noelle wrote:  Type:  Sooner Appointment Request    Caller is requesting a sooner appointment.  Caller declined first available appointment listed below.  Caller will not accept being placed on the waitlist and is requesting a message be sent to doctor.  Name of Caller:Alverto Ramirez Jr.  When is the first available appointment?09/25  Symptoms:3 month fu  Would the patient rather a call back or a response via [facility]? Call back  Best Call Back Number:[phone]  Additional Information: pt needing top reschedule appt that was cancelled for today

## 2025-07-15 NOTE — TELEPHONE ENCOUNTER
Spoke with pt; MA called to reschedule appt that was canceled today due to PCP being out of office; MA scheduled next opening; pt verbalized understanding /LD

## 2025-07-23 ENCOUNTER — OFFICE VISIT (OUTPATIENT)
Dept: INTERNAL MEDICINE | Facility: CLINIC | Age: 75
End: 2025-07-23
Payer: MEDICARE

## 2025-07-23 VITALS
BODY MASS INDEX: 31.08 KG/M2 | HEART RATE: 71 BPM | OXYGEN SATURATION: 97 % | WEIGHT: 198 LBS | DIASTOLIC BLOOD PRESSURE: 80 MMHG | HEIGHT: 67 IN | SYSTOLIC BLOOD PRESSURE: 134 MMHG

## 2025-07-23 DIAGNOSIS — F41.9 ANXIETY: Primary | Chronic | ICD-10-CM

## 2025-07-23 DIAGNOSIS — F51.04 PSYCHOPHYSIOLOGICAL INSOMNIA: Chronic | ICD-10-CM

## 2025-07-23 DIAGNOSIS — E78.2 MIXED HYPERLIPIDEMIA: Chronic | ICD-10-CM

## 2025-07-23 DIAGNOSIS — I10 PRIMARY HYPERTENSION: Chronic | ICD-10-CM

## 2025-07-23 DIAGNOSIS — R73.9 ELEVATED SERUM GLUCOSE: ICD-10-CM

## 2025-07-23 PROCEDURE — 1160F RVW MEDS BY RX/DR IN RCRD: CPT | Mod: CPTII,S$GLB,, | Performed by: FAMILY MEDICINE

## 2025-07-23 PROCEDURE — G2211 COMPLEX E/M VISIT ADD ON: HCPCS | Mod: S$GLB,,, | Performed by: FAMILY MEDICINE

## 2025-07-23 PROCEDURE — 3288F FALL RISK ASSESSMENT DOCD: CPT | Mod: CPTII,S$GLB,, | Performed by: FAMILY MEDICINE

## 2025-07-23 PROCEDURE — 3079F DIAST BP 80-89 MM HG: CPT | Mod: CPTII,S$GLB,, | Performed by: FAMILY MEDICINE

## 2025-07-23 PROCEDURE — 1159F MED LIST DOCD IN RCRD: CPT | Mod: CPTII,S$GLB,, | Performed by: FAMILY MEDICINE

## 2025-07-23 PROCEDURE — 3075F SYST BP GE 130 - 139MM HG: CPT | Mod: CPTII,S$GLB,, | Performed by: FAMILY MEDICINE

## 2025-07-23 PROCEDURE — 99999 PR PBB SHADOW E&M-EST. PATIENT-LVL III: CPT | Mod: PBBFAC,,, | Performed by: FAMILY MEDICINE

## 2025-07-23 PROCEDURE — 1126F AMNT PAIN NOTED NONE PRSNT: CPT | Mod: CPTII,S$GLB,, | Performed by: FAMILY MEDICINE

## 2025-07-23 PROCEDURE — 99214 OFFICE O/P EST MOD 30 MIN: CPT | Mod: S$GLB,,, | Performed by: FAMILY MEDICINE

## 2025-07-23 PROCEDURE — 1101F PT FALLS ASSESS-DOCD LE1/YR: CPT | Mod: CPTII,S$GLB,, | Performed by: FAMILY MEDICINE

## 2025-07-23 RX ORDER — ALPRAZOLAM 1 MG/1
1 TABLET ORAL DAILY PRN
Qty: 90 TABLET | Refills: 0 | Status: SHIPPED | OUTPATIENT
Start: 2025-07-23

## 2025-07-23 NOTE — PROGRESS NOTES
Subjective:   Patient ID: Alverto Ramirez Jr. is a 74 y.o. male.  Chief Complaint:  Follow-up    Presents for six-month follow-up on anxiety and insomnia  Also followed by [facility]     - Anxiety  On  Xanax 1 mg daily as needed.   Averages 1 pill daily with 90 pills lasting approximately 3 months   Reports medication remains effective for symptom control.  No suspicion for abusive or diversion behaviors.  Denies any side effects.      Tolerating current treatment well.   Happy with medication.    Wants to continue.  No behaviors to suggest inappropriate use of prescribed medications.  [place] Board of Pharmacy Controlled Prescription Drug Monitoring database was queried and showed no activity to suggest abuse, diversion, or other inappropriate use of prescription medications.      - Insomnia  On trazodone 50 - 100 mg nightly  Denies any side effects.      Tolerating current treatment well.   Reports still effective to help sleep difficulty  Happy with medication.    Wants to continue.     Medical history:  - End-stage renal disease.  Underwent successful transplant March 2022.  Renal function panel June 2025 with normal creatinine and mildly decreased calculated EGFR.  Glucose elevated.  Up-to-date on visits with Nephrology and Transplant.  Reports compliance with recommended medication regimen.  - Hypertension.  Controlled.  On Procardia XL 60 mg twice a day and Toprol-XL 50 mg twice a day.  Reports compliance.  Denies side effects.  No shortness of breath or swelling.    - Mixed hyperlipidemia.  Currently not on any medications.  August 2024 lipid panel with LDL less than 100.  Denies chest pain or claudication.  - Elevated PSA, urinary difficulties, and Erectile dysfunction. Followed by Urology.  Remains off Flomax.  Urinary difficulty stable.  Still taking Viagra daily as needed.   - History colon polyps.  Last colonoscopy with polypectomy 2017. Overdue for repeat colonoscopy for colon  "cancer screening.    Review of Systems   Constitutional:  Negative for chills, fatigue and fever.   HENT:  Negative for congestion, dental problem, ear discharge, ear pain, postnasal drip, rhinorrhea, sinus pressure, sinus pain, sneezing, sore throat, tinnitus, trouble swallowing and voice change.    Eyes:  Negative for photophobia, pain, discharge, redness, itching and visual disturbance.   Respiratory:  Negative for cough, chest tightness, shortness of breath and wheezing.    Cardiovascular:  Negative for chest pain, palpitations and leg swelling.   Gastrointestinal:  Negative for abdominal pain, constipation, diarrhea, nausea and vomiting.   Musculoskeletal:  Negative for myalgias and neck pain.   Skin:  Negative for rash.   Neurological:  Negative for dizziness, light-headedness and headaches.   Hematological:  Negative for adenopathy.   Psychiatric/Behavioral:  Positive for confusion. Negative for agitation, behavioral problems, decreased concentration, dysphoric mood, hallucinations and sleep disturbance. The patient is not nervous/anxious and is not hyperactive.      Objective:   /80 (BP Location: Left arm, Patient Position: Sitting)   Pulse 71   Ht 5' 7" (1.702 m)   Wt 89.8 kg (197 lb 15.6 oz)   SpO2 97%   BMI 31.01 kg/m²     Physical Exam  Vitals and nursing note reviewed.   Constitutional:       Appearance: Normal appearance. He is well-developed. He is obese.   Eyes:      General: No scleral icterus.     Conjunctiva/sclera: Conjunctivae normal.   Neck:      Vascular: Normal carotid pulses. No carotid bruit or JVD.   Cardiovascular:      Rate and Rhythm: Normal rate and regular rhythm.      Heart sounds: Normal heart sounds.   Pulmonary:      Effort: Pulmonary effort is normal.      Breath sounds: Normal breath sounds.   Abdominal:      General: There is no distension.      Palpations: Abdomen is soft.      Tenderness: There is no abdominal tenderness.   Musculoskeletal:      Right lower leg: No " edema.      Left lower leg: No edema.   Skin:     Findings: No rash.   Neurological:      Mental Status: He is alert and oriented to person, place, and time.      Coordination: Coordination is intact.      Gait: Gait is intact.   Psychiatric:         Attention and Perception: Attention normal. He is attentive.         Mood and Affect: Mood and affect normal. Mood is not anxious or depressed.         Speech: Speech normal.         Behavior: Behavior normal. Behavior is cooperative.         Thought Content: Thought content normal.         Cognition and Memory: Cognition normal.         Judgment: Judgment normal.       Assessment:       ICD-10-CM ICD-9-CM   1. Anxiety  F41.9 300.00   2. Psychophysiological insomnia  F51.04 307.42   3. Primary hypertension  I10 401.9   4. Mixed hyperlipidemia  E78.2 272.2   5. Elevated serum glucose  R73.9 790.29     Plan:   Anxiety  Psychophysiological insomnia  -     ALPRAZolam (XANAX) 1 MG tablet; Take 1 tablet (1 mg total) by mouth daily as needed for Anxiety or Insomnia.  Dispense: 90 tablet; Refill: 0  Stable, no side effects, mood and symptoms well controlled on present medication .  Continue trazodone  mg nightly   Continue Xanax 1 mg daily    Primary hypertension  Controlled.  Stable.  Asymptomatic.  BP at goal.    Continue Procardia XL 60 mg twice a day  Continue Toprol-XL 50 mg twice a day    Mixed hyperlipidemia  -     Lipid Panel; Future; Expected date: 07/23/2025  Asymptomatic   Check lipid panel   Start statin if indicated     Elevated serum glucose  -     Hemoglobin A1C; Future; Expected date: 07/23/2025  Asymptomatic   Check A1c   Treat for prediabetes indicated     Follow up with any/all specialists as scheduled     Return to clinic 3 months or sooner as needed    Visit today included increased complexity associated with managing the longitudinal care of the patient due to the serious and/or complex managed problem(s) listed above.

## 2025-08-20 ENCOUNTER — APPOINTMENT (OUTPATIENT)
Dept: LAB | Facility: HOSPITAL | Age: 75
End: 2025-08-20
Attending: INTERNAL MEDICINE
Payer: MEDICARE

## 2025-08-27 ENCOUNTER — OFFICE VISIT (OUTPATIENT)
Dept: NEPHROLOGY | Facility: CLINIC | Age: 75
End: 2025-08-27
Payer: MEDICARE

## 2025-08-27 VITALS
RESPIRATION RATE: 18 BRPM | HEART RATE: 41 BPM | SYSTOLIC BLOOD PRESSURE: 152 MMHG | DIASTOLIC BLOOD PRESSURE: 80 MMHG | BODY MASS INDEX: 29.7 KG/M2 | HEIGHT: 68 IN | WEIGHT: 196 LBS

## 2025-08-27 DIAGNOSIS — N18.6 ANEMIA IN ESRD (END-STAGE RENAL DISEASE): Primary | Chronic | ICD-10-CM

## 2025-08-27 DIAGNOSIS — D84.9 IMMUNOSUPPRESSION: ICD-10-CM

## 2025-08-27 DIAGNOSIS — Z94.0 S/P KIDNEY TRANSPLANT: Primary | ICD-10-CM

## 2025-08-27 DIAGNOSIS — N25.81 SECONDARY HYPERPARATHYROIDISM OF RENAL ORIGIN: ICD-10-CM

## 2025-08-27 DIAGNOSIS — N18.31 STAGE 3A CHRONIC KIDNEY DISEASE: ICD-10-CM

## 2025-08-27 DIAGNOSIS — Z12.5 ENCOUNTER FOR SCREENING FOR MALIGNANT NEOPLASM OF PROSTATE: ICD-10-CM

## 2025-08-27 DIAGNOSIS — I10 PRIMARY HYPERTENSION: ICD-10-CM

## 2025-08-27 DIAGNOSIS — D63.1 ANEMIA IN ESRD (END-STAGE RENAL DISEASE): Chronic | ICD-10-CM

## 2025-08-27 DIAGNOSIS — D63.1 ANEMIA IN ESRD (END-STAGE RENAL DISEASE): Primary | Chronic | ICD-10-CM

## 2025-08-27 DIAGNOSIS — N18.6 ANEMIA IN ESRD (END-STAGE RENAL DISEASE): Chronic | ICD-10-CM

## 2025-08-27 PROCEDURE — 3077F SYST BP >= 140 MM HG: CPT | Mod: CPTII,S$GLB,, | Performed by: INTERNAL MEDICINE

## 2025-08-27 PROCEDURE — 1160F RVW MEDS BY RX/DR IN RCRD: CPT | Mod: CPTII,S$GLB,, | Performed by: INTERNAL MEDICINE

## 2025-08-27 PROCEDURE — 1101F PT FALLS ASSESS-DOCD LE1/YR: CPT | Mod: CPTII,S$GLB,, | Performed by: INTERNAL MEDICINE

## 2025-08-27 PROCEDURE — 1159F MED LIST DOCD IN RCRD: CPT | Mod: CPTII,S$GLB,, | Performed by: INTERNAL MEDICINE

## 2025-08-27 PROCEDURE — 99215 OFFICE O/P EST HI 40 MIN: CPT | Mod: S$GLB,,, | Performed by: INTERNAL MEDICINE

## 2025-08-27 PROCEDURE — 1126F AMNT PAIN NOTED NONE PRSNT: CPT | Mod: CPTII,S$GLB,, | Performed by: INTERNAL MEDICINE

## 2025-08-27 PROCEDURE — 3079F DIAST BP 80-89 MM HG: CPT | Mod: CPTII,S$GLB,, | Performed by: INTERNAL MEDICINE

## 2025-08-27 PROCEDURE — 99999 PR PBB SHADOW E&M-EST. PATIENT-LVL IV: CPT | Mod: PBBFAC,,, | Performed by: INTERNAL MEDICINE

## 2025-08-27 PROCEDURE — 3288F FALL RISK ASSESSMENT DOCD: CPT | Mod: CPTII,S$GLB,, | Performed by: INTERNAL MEDICINE

## 2025-09-04 ENCOUNTER — TELEPHONE (OUTPATIENT)
Dept: NEPHROLOGY | Facility: CLINIC | Age: 75
End: 2025-09-04
Payer: MEDICARE

## (undated) DEVICE — ADHESIVE DERMABOND ADVANCED

## (undated) DEVICE — SUT PDS BV 6-0

## (undated) DEVICE — TRAY FOLEY 16FR INFECTION CONT

## (undated) DEVICE — SUT PROLENE 5-0 36IN C-1

## (undated) DEVICE — SUT SILK 3-0 STRANDS 30IN

## (undated) DEVICE — KIT SURGIFLO HEMOSTATIC MATRIX

## (undated) DEVICE — EVACUATOR WOUND BULB 100CC

## (undated) DEVICE — SET IRR URLGY 2LINE UNIV SPIKE

## (undated) DEVICE — SYR ONLY LUER LOCK 20CC

## (undated) DEVICE — DRESSING ABSRBNT ISLAND 3.6X8

## (undated) DEVICE — SUT 1 36IN PDS II VIO MONO

## (undated) DEVICE — SEE MEDLINE ITEM 156900

## (undated) DEVICE — STOCKINETTE 2INX36

## (undated) DEVICE — SUT ETHILON 3-0 PS2 18 BLK

## (undated) DEVICE — TOWEL OR XRAY WHITE 17X26IN

## (undated) DEVICE — SUT 2-0 12-18IN SILK

## (undated) DEVICE — SUT 3-0 12-18IN SILK

## (undated) DEVICE — SET DECANTER MEDICHOICE

## (undated) DEVICE — Device

## (undated) DEVICE — ELECTRODE REM PLYHSV RETURN 9

## (undated) DEVICE — HEMOSTAT SURGICEL NU-KNIT 6X9

## (undated) DEVICE — DRAIN CHANNEL ROUND 19FR

## (undated) DEVICE — DRESSING ADH ISLAND 3.6 X 14

## (undated) DEVICE — DRESSING MEPORE ADH 3.5X12

## (undated) DEVICE — BELLOW CANN HEMOBLAST 1.65GR

## (undated) DEVICE — STAPLER SKIN PROXIMATE WIDE

## (undated) DEVICE — CLIP SPRING 6MM

## (undated) DEVICE — SUT PROLENE 6-0 BV-1 30IN

## (undated) DEVICE — INSERTS STEALTH FIBRA SIZE 1.

## (undated) DEVICE — SUT 4-0 12-18IN SILK BLACK

## (undated) DEVICE — CLIPPER BLADE MOD 4406 (CAREF)

## (undated) DEVICE — SUT SILK 2-0 STRANDS 30IN

## (undated) DEVICE — INSERT CLAMP EVERGRIP 33MM

## (undated) DEVICE — TIP YANKAUERS BULB NO VENT

## (undated) DEVICE — GAUZE SPONGE 4'X4 12 PLY

## (undated) DEVICE — PLUG CATHETER STERILE FOLEY

## (undated) DEVICE — GAUZE SPONGE 4X4 12PLY

## (undated) DEVICE — FOLEY BLLN 20FR 3WAY 5CC

## (undated) DEVICE — PUNCH AORTIC 4.8MM

## (undated) DEVICE — SOL NS 1000CC

## (undated) DEVICE — INSERTS STEALTH FIBRA SIZE 2

## (undated) DEVICE — PUNCH AORTIC 4.0MM 6/CASE

## (undated) DEVICE — DRAPE SLUSH WARMER WITH DISC

## (undated) DEVICE — SUT PROLENE 4-0 SH BLU 36IN